# Patient Record
Sex: MALE | Race: WHITE | NOT HISPANIC OR LATINO | Employment: OTHER | URBAN - METROPOLITAN AREA
[De-identification: names, ages, dates, MRNs, and addresses within clinical notes are randomized per-mention and may not be internally consistent; named-entity substitution may affect disease eponyms.]

---

## 2017-01-05 DIAGNOSIS — F51.04 CHRONIC INSOMNIA: ICD-10-CM

## 2017-01-05 RX ORDER — ZOLPIDEM TARTRATE 10 MG/1
TABLET ORAL
Qty: 30 TABLET | Refills: 0 | Status: SHIPPED | OUTPATIENT
Start: 2017-01-05 | End: 2017-02-03 | Stop reason: SDUPTHER

## 2017-01-23 ENCOUNTER — LAB VISIT (OUTPATIENT)
Dept: LAB | Facility: HOSPITAL | Age: 62
End: 2017-01-23
Attending: FAMILY MEDICINE
Payer: MEDICARE

## 2017-01-23 DIAGNOSIS — L98.9 SKIN LESION: ICD-10-CM

## 2017-01-23 DIAGNOSIS — R73.03 PREDIABETES: ICD-10-CM

## 2017-01-23 DIAGNOSIS — F51.04 CHRONIC INSOMNIA: ICD-10-CM

## 2017-01-23 DIAGNOSIS — R91.1 LUNG NODULE SEEN ON IMAGING STUDY: ICD-10-CM

## 2017-01-23 DIAGNOSIS — I10 ESSENTIAL HYPERTENSION: ICD-10-CM

## 2017-01-23 DIAGNOSIS — K22.711 BARRETT'S ESOPHAGUS WITH HIGH GRADE DYSPLASIA: ICD-10-CM

## 2017-01-23 LAB
ALBUMIN SERPL BCP-MCNC: 4.1 G/DL
ALP SERPL-CCNC: 52 U/L
ALT SERPL W/O P-5'-P-CCNC: 24 U/L
ANION GAP SERPL CALC-SCNC: 6 MMOL/L
AST SERPL-CCNC: 18 U/L
BASOPHILS # BLD AUTO: 0.08 K/UL
BASOPHILS NFR BLD: 1 %
BILIRUB SERPL-MCNC: 0.5 MG/DL
BUN SERPL-MCNC: 21 MG/DL
CALCIUM SERPL-MCNC: 10 MG/DL
CHLORIDE SERPL-SCNC: 104 MMOL/L
CHOLEST/HDLC SERPL: 4.1 {RATIO}
CO2 SERPL-SCNC: 28 MMOL/L
CREAT SERPL-MCNC: 1.1 MG/DL
DIFFERENTIAL METHOD: NORMAL
EOSINOPHIL # BLD AUTO: 0.4 K/UL
EOSINOPHIL NFR BLD: 4.6 %
ERYTHROCYTE [DISTWIDTH] IN BLOOD BY AUTOMATED COUNT: 12.8 %
EST. GFR  (AFRICAN AMERICAN): >60 ML/MIN/1.73 M^2
EST. GFR  (NON AFRICAN AMERICAN): >60 ML/MIN/1.73 M^2
GLUCOSE SERPL-MCNC: 103 MG/DL
HCT VFR BLD AUTO: 47.3 %
HDL/CHOLESTEROL RATIO: 24.4 %
HDLC SERPL-MCNC: 209 MG/DL
HDLC SERPL-MCNC: 51 MG/DL
HGB BLD-MCNC: 16.1 G/DL
LDLC SERPL CALC-MCNC: 111.4 MG/DL
LYMPHOCYTES # BLD AUTO: 1.8 K/UL
LYMPHOCYTES NFR BLD: 22 %
MCH RBC QN AUTO: 29.7 PG
MCHC RBC AUTO-ENTMCNC: 34 %
MCV RBC AUTO: 87 FL
MONOCYTES # BLD AUTO: 0.8 K/UL
MONOCYTES NFR BLD: 9.9 %
NEUTROPHILS # BLD AUTO: 5.1 K/UL
NEUTROPHILS NFR BLD: 62.4 %
NONHDLC SERPL-MCNC: 158 MG/DL
PLATELET # BLD AUTO: 286 K/UL
PMV BLD AUTO: 9.8 FL
POTASSIUM SERPL-SCNC: 5.1 MMOL/L
PROT SERPL-MCNC: 7.7 G/DL
RBC # BLD AUTO: 5.42 M/UL
SODIUM SERPL-SCNC: 138 MMOL/L
TRIGL SERPL-MCNC: 233 MG/DL
WBC # BLD AUTO: 8.22 K/UL

## 2017-01-23 PROCEDURE — 80053 COMPREHEN METABOLIC PANEL: CPT

## 2017-01-23 PROCEDURE — 80061 LIPID PANEL: CPT

## 2017-01-23 PROCEDURE — 36415 COLL VENOUS BLD VENIPUNCTURE: CPT | Mod: PO

## 2017-01-23 PROCEDURE — 83036 HEMOGLOBIN GLYCOSYLATED A1C: CPT

## 2017-01-23 PROCEDURE — 85025 COMPLETE CBC W/AUTO DIFF WBC: CPT

## 2017-01-24 LAB
ESTIMATED AVG GLUCOSE: 126 MG/DL
HBA1C MFR BLD HPLC: 6 %

## 2017-02-02 DIAGNOSIS — F51.04 CHRONIC INSOMNIA: ICD-10-CM

## 2017-02-02 RX ORDER — ZOLPIDEM TARTRATE 10 MG/1
TABLET ORAL
Qty: 30 TABLET | Refills: 0 | OUTPATIENT
Start: 2017-02-02

## 2017-02-03 ENCOUNTER — OFFICE VISIT (OUTPATIENT)
Dept: INTERNAL MEDICINE | Facility: CLINIC | Age: 62
End: 2017-02-03
Payer: MEDICARE

## 2017-02-03 VITALS
OXYGEN SATURATION: 98 % | DIASTOLIC BLOOD PRESSURE: 80 MMHG | RESPIRATION RATE: 16 BRPM | SYSTOLIC BLOOD PRESSURE: 160 MMHG | BODY MASS INDEX: 30.52 KG/M2 | HEIGHT: 70 IN | TEMPERATURE: 97 F | WEIGHT: 213.19 LBS | HEART RATE: 68 BPM

## 2017-02-03 DIAGNOSIS — R10.32 ABDOMINAL PAIN, LLQ (LEFT LOWER QUADRANT): ICD-10-CM

## 2017-02-03 DIAGNOSIS — I10 ESSENTIAL HYPERTENSION: ICD-10-CM

## 2017-02-03 DIAGNOSIS — K40.20 BILATERAL INGUINAL HERNIA WITHOUT OBSTRUCTION OR GANGRENE, RECURRENCE NOT SPECIFIED: Primary | ICD-10-CM

## 2017-02-03 DIAGNOSIS — F51.04 CHRONIC INSOMNIA: ICD-10-CM

## 2017-02-03 DIAGNOSIS — R73.03 PREDIABETES: ICD-10-CM

## 2017-02-03 PROCEDURE — 3077F SYST BP >= 140 MM HG: CPT | Mod: S$GLB,,, | Performed by: FAMILY MEDICINE

## 2017-02-03 PROCEDURE — 99214 OFFICE O/P EST MOD 30 MIN: CPT | Mod: S$GLB,,, | Performed by: FAMILY MEDICINE

## 2017-02-03 PROCEDURE — 99499 UNLISTED E&M SERVICE: CPT | Mod: S$GLB,,, | Performed by: FAMILY MEDICINE

## 2017-02-03 PROCEDURE — 99999 PR PBB SHADOW E&M-EST. PATIENT-LVL IV: CPT | Mod: PBBFAC,,, | Performed by: FAMILY MEDICINE

## 2017-02-03 PROCEDURE — 3079F DIAST BP 80-89 MM HG: CPT | Mod: S$GLB,,, | Performed by: FAMILY MEDICINE

## 2017-02-03 RX ORDER — AMLODIPINE BESYLATE 10 MG/1
10 TABLET ORAL DAILY
Qty: 90 TABLET | Refills: 1 | Status: SHIPPED | OUTPATIENT
Start: 2017-02-03 | End: 2017-11-01 | Stop reason: SDUPTHER

## 2017-02-03 RX ORDER — BENAZEPRIL HYDROCHLORIDE 20 MG/1
40 TABLET ORAL DAILY
Qty: 180 TABLET | Refills: 1 | Status: SHIPPED | OUTPATIENT
Start: 2017-02-03 | End: 2017-07-22 | Stop reason: SDUPTHER

## 2017-02-03 RX ORDER — GABAPENTIN 300 MG/1
600 CAPSULE ORAL 3 TIMES DAILY
Qty: 270 CAPSULE | Refills: 1 | Status: SHIPPED | OUTPATIENT
Start: 2017-02-03 | End: 2017-04-22 | Stop reason: SDUPTHER

## 2017-02-03 RX ORDER — TIZANIDINE 2 MG/1
2 TABLET ORAL DAILY PRN
Qty: 90 TABLET | Refills: 1 | Status: SHIPPED | OUTPATIENT
Start: 2017-02-03 | End: 2017-07-06 | Stop reason: SDUPTHER

## 2017-02-03 RX ORDER — METOPROLOL TARTRATE 50 MG/1
50 TABLET ORAL 2 TIMES DAILY
Qty: 180 TABLET | Refills: 1 | Status: SHIPPED | OUTPATIENT
Start: 2017-02-03 | End: 2017-07-12 | Stop reason: SDUPTHER

## 2017-02-03 RX ORDER — PANTOPRAZOLE SODIUM 40 MG/1
40 TABLET, DELAYED RELEASE ORAL
Qty: 240 TABLET | Refills: 1 | Status: SHIPPED | OUTPATIENT
Start: 2017-02-03 | End: 2017-10-31 | Stop reason: SDUPTHER

## 2017-02-03 RX ORDER — HYDROCHLOROTHIAZIDE 12.5 MG/1
12.5 CAPSULE ORAL DAILY
Qty: 90 CAPSULE | Refills: 1 | Status: SHIPPED | OUTPATIENT
Start: 2017-02-03 | End: 2017-07-12 | Stop reason: SDUPTHER

## 2017-02-03 RX ORDER — HYDRALAZINE HYDROCHLORIDE 25 MG/1
25 TABLET, FILM COATED ORAL 3 TIMES DAILY
Qty: 90 TABLET | Refills: 11 | Status: SHIPPED | OUTPATIENT
Start: 2017-02-03 | End: 2020-02-10

## 2017-02-03 RX ORDER — ZOLPIDEM TARTRATE 10 MG/1
TABLET ORAL
Qty: 30 TABLET | Refills: 1 | Status: SHIPPED | OUTPATIENT
Start: 2017-02-03 | End: 2017-03-27 | Stop reason: SDUPTHER

## 2017-02-03 NOTE — MR AVS SNAPSHOT
[facility]  [address] [place] [address]  Phone: [phone]                  Mil LEPE May   2/3/2017 8:40 AM   Office Visit    Description:  Male : 1955   Provider:  Yumi Roberson MD   Department:  [facility]           Reason for Visit     Follow-up           Diagnoses this Visit        Comments    Bilateral inguinal hernia without obstruction or gangrene, recurrence not specified    -  Primary     Chronic insomnia         Abdominal pain, LLQ (left lower quadrant)         Essential hypertension         Prediabetes                To Do List           Future Appointments        Provider Department Dept Phone    2017 9:20 AM Yumi Roberson MD [facility] Internal Medicine [phone]    2017 9:00 [facility] [phone]    2017 10:00 [facility] [phone]      Goals (5 Years of Data)     None      Follow-Up and Disposition     Return in about 2 weeks (around 2017).       These Medications        Disp Refills Start End    amlodipine (NORVASC) 10 MG tablet 90 tablet 1 2/3/2017     Take 1 tablet (10 mg total) by mouth once daily. - Oral    Pharmacy: [address] Ph #: [phone]       benazepril (LOTENSIN) 20 MG tablet 180 tablet 1 2/3/2017     Take 2 tablets (40 mg total) by mouth once daily. - Oral    Pharmacy: [place] [id number] [facility] Ph #: [phone]       hydrochlorothiazide (MICROZIDE) 12.5 mg capsule 90 capsule 1 2/3/2017     Take 1 capsule (12.5 mg total) by mouth once daily. - Oral    Pharmacy: [address] Ph #: [phone]       metoprolol tartrate (LOPRESSOR) 50 MG tablet 180 tablet 1 2/3/2017     Take 1 tablet (50 mg total) by mouth 2 (two) times daily. - Oral    Pharmacy: [place] [id number] [facility] Ph #:  [phone]       pantoprazole (PROTONIX) 40 MG tablet 240 tablet 1 2/3/2017 2/3/2018    Take 1 tablet (40 mg total) by mouth 2 (two) times daily before meals. - Oral    Pharmacy: [place] [id number] [facility] Ph #: [phone]       zolpidem (AMBIEN) 10 mg Tab 30 tablet 1 2/3/2017     TAKE 1 TABLET BY MOUTH NIGHTLY AS NEEDED.    Pharmacy: [address] Ph #: [phone]       tizanidine (ZANAFLEX) 2 MG tablet 90 tablet 1 2/3/2017     Take 1 tablet (2 mg total) by mouth daily as needed. - Oral    Pharmacy: [address] Ph #: [phone]       gabapentin (NEURONTIN) 300 MG capsule 270 capsule 1 2/3/2017     Take 2 capsules (600 mg total) by mouth 3 (three) times daily. - Oral    Pharmacy: [address] Ph #: [phone]       hydrALAZINE (APRESOLINE) 25 MG tablet 90 tablet 11 2/3/2017 2/3/2018    Take 1 tablet (25 mg total) by mouth 3 (three) times daily. - Oral    Pharmacy: [address] Ph #: [phone]         [facility] On Call     [facility] On Call Nurse Care Line - 24/7 Assistance  Registered nurses in the [facility] On Call Center provide clinical advisement, health education, appointment booking, and other advisory services.  Call for this free service at [phone].             Medications           Message regarding Medications     Verify the changes and/or additions to your medication regime listed below are the same as discussed with your clinician today.  If any of these changes or additions are incorrect, please notify your healthcare provider.        START taking these NEW medications        Refills    hydrALAZINE (APRESOLINE) 25 MG tablet 11    Sig: Take 1 tablet (25 mg total) by mouth 3 (three) times daily.    Class: Normal    Route: Oral      STOP taking these medications     omeprazole (PRILOSEC) 20 MG capsule      "sucralfate (CARAFATE) 100 mg/mL suspension Take 10 mLs (1 g total) by mouth 4 (four) times daily with meals and nightly.           Verify that the below list of medications is an accurate representation of the medications you are currently taking.  If none reported, the list may be blank. If incorrect, please contact your healthcare provider. Carry this list with you in case of emergency.           Current Medications     amlodipine (NORVASC) 10 MG tablet Take 1 tablet (10 mg total) by mouth once daily.    aspirin 81 mg Tab Take 1 tablet by mouth Daily.    benazepril (LOTENSIN) 20 MG tablet Take 2 tablets (40 mg total) by mouth once daily.    clonazepam (KLONOPIN) 2 MG Tab 2 mg. Take 2 mg by mouth nightly as needed.    gabapentin (NEURONTIN) 300 MG capsule Take 2 capsules (600 mg total) by mouth 3 (three) times daily.    hydrochlorothiazide (MICROZIDE) 12.5 mg capsule Take 1 capsule (12.5 mg total) by mouth once daily.    hydrOXYzine (VISTARIL) 100 MG capsule Take 100 mg by mouth once daily.    lidocaine-prilocaine (EMLA) cream as needed.    methocarbamol (ROBAXIN) 750 MG Tab Take 1 tablet (750 mg total) by mouth 3 (three) times daily.    metoprolol tartrate (LOPRESSOR) 50 MG tablet Take 1 tablet (50 mg total) by mouth 2 (two) times daily.    pantoprazole (PROTONIX) 40 MG tablet Take 1 tablet (40 mg total) by mouth 2 (two) times daily before meals.    tizanidine (ZANAFLEX) 2 MG tablet Take 1 tablet (2 mg total) by mouth daily as needed.    zolpidem (AMBIEN) 10 mg Tab TAKE 1 TABLET BY MOUTH NIGHTLY AS NEEDED.    hydrALAZINE (APRESOLINE) 25 MG tablet Take 1 tablet (25 mg total) by mouth 3 (three) times daily.           Clinical Reference Information           Your Vitals Were     BP Pulse Temp Resp    160/80 (BP Location: Left arm, Patient Position: Sitting, BP Method: Manual) 68 97.3 °F (36.3 °C) (Tympanic) 16    Height Weight SpO2 BMI    5' 10" (1.778 m) 96.7 kg (213 lb 3 oz) 98% 30.59 kg/m2      Blood Pressure      "     Most Recent Value    BP  (!)  160/80      Allergies as of 2/3/2017     No Known Allergies      Immunizations Administered on Date of Encounter - 2/3/2017     None      Orders Placed During Today's Visit      Normal Orders This Visit    Ambulatory Referral to Gastroenterology     Ambulatory Referral to General Surgery       Language Assistance Services     ATTENTION: Language assistance services are available, free of charge. Please call [phone].      ATENCIÓN: Si habla español, tiene a meza disposición servicios gratuitos de asistencia lingüística. Llame al [phone].     CHÚ Ý: N?u b?n nói Ti?ng Vi?t, có các d?ch v? h? tr? ngôn ng? mi?n phí dành cho b?n. G?i s? [phone].         [facility] Medicine complies with applicable Federal civil rights laws and does not discriminate on the basis of race, color, national origin, age, disability, or sex.

## 2017-02-03 NOTE — PROGRESS NOTES
Chief Complaint:    Chief Complaint   Patient presents with    Follow-up       History of Present Illness:    Patient is a for six-month follow-up,  Is complaining of left lower quadrant pain going on for the past several weeks no nausea vomiting fever no change in bowel habits.  He has a history of partial nephrectomy on the left side.  The renal masses were not cancer.    Patient has hypertension blood pressures running high in spite of taking all the medication.      He is prediabetes which is stable.    He has Culp's esophagus followed with serial EGD.    He was found to have a nodule on his CAT scan and a repeat CAT scan will be done in 18 months from the previous one.    Patient is also following with psychiatrist for his anxiety depression.      ROS:  Review of Systems   Constitutional: Negative for activity change, chills, fatigue, fever and unexpected weight change.   HENT: Negative for congestion, ear discharge, ear pain, hearing loss, postnasal drip and rhinorrhea.    Eyes: Negative for pain and visual disturbance.   Respiratory: Negative for cough, chest tightness and shortness of breath.    Cardiovascular: Negative for chest pain and palpitations.   Gastrointestinal: Positive for abdominal pain. Negative for diarrhea and vomiting.   Endocrine: Negative for heat intolerance.   Genitourinary: Negative for dysuria, flank pain, frequency and hematuria.   Musculoskeletal: Negative for back pain, gait problem and neck pain.   Skin: Negative for color change and rash.   Neurological: Negative for dizziness, tremors, seizures, numbness and headaches.   Psychiatric/Behavioral: Negative for agitation, hallucinations, self-injury, sleep disturbance and suicidal ideas. The patient is not nervous/anxious.        Past Medical History   Diagnosis Date    Anticoagulant long-term use     Anxiety     Cancer     Diabetes mellitus     Diverticulitis     GERD (gastroesophageal reflux disease)     Hyperlipidemia  "     not on any meds currently    Hypertension     Lung mass     Mass of kidney        Social History:  Social History     Social History    Marital status:      Spouse name: N/A    Number of children: N/A    Years of education: N/A     Social History Main Topics    Smoking status: Former Smoker     Packs/day: 1.00     Years: 20.00     Quit date: 2/4/1995    Smokeless tobacco: Never Used    Alcohol use No    Drug use: No    Sexual activity: Yes     Partners: Female     Birth control/ protection: None     Other Topics Concern    None     Social History Narrative       Family History:   family history includes Cancer in his sister; Hypertension in his father and mother; Melanoma in his father.    Health Maintenance   Topic Date Due    TETANUS VACCINE  02/03/1973    Zoster Vaccine  02/03/2015    Influenza Vaccine  08/01/2016    Colonoscopy  01/05/2017    Lipid Panel  01/23/2018    Hepatitis C Screening  Completed       Physical Exam:    Vital Signs  Temp: 97.3 °F (36.3 °C)  Temp src: Tympanic  Pulse: 68  Resp: 16  SpO2: 98 %  BP: (!) 160/80  BP Location: Left arm  BP Method: Manual  Patient Position: Sitting  Pain Score: 0-No pain  Height and Weight  Height: 5' 10" (177.8 cm)  Weight: 96.7 kg (213 lb 3 oz)  BSA (Calculated - sq m): 2.19 sq meters  BMI (Calculated): 30.7  Weight in (lb) to have BMI = 25: 173.9]    Body mass index is 30.59 kg/(m^2).    Physical Exam   Constitutional: He is oriented to person, place, and time. He appears well-developed.   HENT:   Mouth/Throat: Oropharynx is clear and moist.   Eyes: Conjunctivae are normal. Pupils are equal, round, and reactive to light.   Neck: Normal range of motion. Neck supple.   Cardiovascular: Normal rate, regular rhythm and normal heart sounds.    No murmur heard.  Pulmonary/Chest: Effort normal and breath sounds normal. No respiratory distress. He has no wheezes. He has no rales. He exhibits no tenderness.   Abdominal: Soft. He exhibits no " distension and no mass. There is tenderness. There is no guarding. A hernia is present. Hernia confirmed positive in the right inguinal area and confirmed positive in the left inguinal area.       Genitourinary:         Musculoskeletal: He exhibits no edema or tenderness.   Lymphadenopathy:     He has no cervical adenopathy.   Neurological: He is alert and oriented to person, place, and time. He has normal reflexes.   Skin: Skin is warm and dry.   Psychiatric: He has a normal mood and affect. His behavior is normal. Judgment and thought content normal.       Lab Results   Component Value Date    CHOL 209 (H) 01/23/2017    CHOL 211 (H) 07/13/2015    CHOL 174 01/05/2015    TRIG 233 (H) 01/23/2017    TRIG 181 (H) 07/13/2015    TRIG 240 (H) 01/05/2015    HDL 51 01/23/2017    HDL 55 07/13/2015    HDL 55 01/05/2015    TOTALCHOLEST 4.1 01/23/2017    TOTALCHOLEST 3.8 07/13/2015    TOTALCHOLEST 3.2 01/05/2015    NONHDLCHOL 158 01/23/2017    NONHDLCHOL 156 07/13/2015    NONHDLCHOL 119 01/05/2015       Lab Results   Component Value Date    HGBA1C 6.0 01/23/2017       Assessment:      ICD-10-CM ICD-9-CM   1. Bilateral inguinal hernia without obstruction or gangrene, recurrence not specified K40.20 550.92   2. Chronic insomnia F51.04 780.52   3. Abdominal pain, LLQ (left lower quadrant) R10.32 789.04   4. Essential hypertension I10 401.9   5. Prediabetes R73.03 790.29         Plan:  1.  He will be referred to a general surgeon for bilateral inguinal hernia.  2.  Chronic left lower quadrant abdominal pain DC Gastro.  3.  Hypertension poor control and add hydralazine 25 mg 3 times a day.  4.  Prediabetes stable  5.  Culp's esophagus followed with serial EGDs with Gastro.  6.  Insomnia Ambien refill today.    Orders Placed This Encounter   Procedures    Ambulatory Referral to General Surgery    Ambulatory Referral to Gastroenterology       Current Outpatient Prescriptions   Medication Sig Dispense Refill    amlodipine  (NORVASC) 10 MG tablet Take 1 tablet (10 mg total) by mouth once daily. 90 tablet 1    aspirin 81 mg Tab Take 1 tablet by mouth Daily.      benazepril (LOTENSIN) 20 MG tablet Take 2 tablets (40 mg total) by mouth once daily. 180 tablet 1    clonazepam (KLONOPIN) 2 MG Tab 2 mg. Take 2 mg by mouth nightly as needed.      gabapentin (NEURONTIN) 300 MG capsule Take 2 capsules (600 mg total) by mouth 3 (three) times daily. 270 capsule 1    hydrochlorothiazide (MICROZIDE) 12.5 mg capsule Take 1 capsule (12.5 mg total) by mouth once daily. 90 capsule 1    hydrOXYzine (VISTARIL) 100 MG capsule Take 100 mg by mouth once daily.      lidocaine-prilocaine (EMLA) cream as needed.      methocarbamol (ROBAXIN) 750 MG Tab Take 1 tablet (750 mg total) by mouth 3 (three) times daily. 60 tablet 3    metoprolol tartrate (LOPRESSOR) 50 MG tablet Take 1 tablet (50 mg total) by mouth 2 (two) times daily. 180 tablet 1    pantoprazole (PROTONIX) 40 MG tablet Take 1 tablet (40 mg total) by mouth 2 (two) times daily before meals. 240 tablet 1    tizanidine (ZANAFLEX) 2 MG tablet Take 1 tablet (2 mg total) by mouth daily as needed. 90 tablet 1    zolpidem (AMBIEN) 10 mg Tab TAKE 1 TABLET BY MOUTH NIGHTLY AS NEEDED. 30 tablet 1    hydrALAZINE (APRESOLINE) 25 MG tablet Take 1 tablet (25 mg total) by mouth 3 (three) times daily. 90 tablet 11     No current facility-administered medications for this visit.        Medications Discontinued During This Encounter   Medication Reason    omeprazole (PRILOSEC) 20 MG capsule Patient no longer taking    sucralfate (CARAFATE) 100 mg/mL suspension Patient no longer taking    amlodipine (NORVASC) 10 MG tablet Reorder    benazepril (LOTENSIN) 20 MG tablet Reorder    hydrochlorothiazide (MICROZIDE) 12.5 mg capsule Reorder    metoprolol tartrate (LOPRESSOR) 50 MG tablet Reorder    pantoprazole (PROTONIX) 40 MG tablet Reorder    zolpidem (AMBIEN) 10 mg Tab Reorder    tizanidine (ZANAFLEX) 2  MG tablet Reorder    gabapentin (NEURONTIN) 300 MG capsule Reorder       Return in about 2 weeks (around 2/17/2017).      Dr Yumi Roberson MD    Disclaimer: This note is prepared using voice recognition system and as such is likely to have errors and is not proof read.

## 2017-02-17 ENCOUNTER — OFFICE VISIT (OUTPATIENT)
Dept: INTERNAL MEDICINE | Facility: CLINIC | Age: 62
End: 2017-02-17
Payer: MEDICARE

## 2017-02-17 VITALS
HEIGHT: 70 IN | DIASTOLIC BLOOD PRESSURE: 70 MMHG | OXYGEN SATURATION: 99 % | SYSTOLIC BLOOD PRESSURE: 110 MMHG | BODY MASS INDEX: 30.49 KG/M2 | HEART RATE: 49 BPM | TEMPERATURE: 97 F | RESPIRATION RATE: 16 BRPM | WEIGHT: 212.94 LBS

## 2017-02-17 DIAGNOSIS — I10 ESSENTIAL HYPERTENSION: Primary | ICD-10-CM

## 2017-02-17 DIAGNOSIS — H91.90 HEARING LOSS, UNSPECIFIED HEARING LOSS TYPE, UNSPECIFIED LATERALITY: ICD-10-CM

## 2017-02-17 PROCEDURE — 3074F SYST BP LT 130 MM HG: CPT | Mod: S$GLB,,, | Performed by: FAMILY MEDICINE

## 2017-02-17 PROCEDURE — 99999 PR PBB SHADOW E&M-EST. PATIENT-LVL III: CPT | Mod: PBBFAC,,, | Performed by: FAMILY MEDICINE

## 2017-02-17 PROCEDURE — 3078F DIAST BP <80 MM HG: CPT | Mod: S$GLB,,, | Performed by: FAMILY MEDICINE

## 2017-02-17 PROCEDURE — 99213 OFFICE O/P EST LOW 20 MIN: CPT | Mod: S$GLB,,, | Performed by: FAMILY MEDICINE

## 2017-02-17 PROCEDURE — 99499 UNLISTED E&M SERVICE: CPT | Mod: S$GLB,,, | Performed by: FAMILY MEDICINE

## 2017-02-17 NOTE — MR AVS SNAPSHOT
[facility]  [address] [place] [address]  Phone: [phone]                  Mil LEPE May   2017 9:20 AM   Office Visit    Description:  Male : 1955   Provider:  Yumi Roberson MD   Department:  [facility]           Reason for Visit     Follow-up           Diagnoses this Visit        Comments    Essential hypertension    -  Primary     Hearing loss, unspecified hearing loss type, unspecified laterality                To Do List           Future Appointments        Provider Department Dept Phone    2017 9:00 AM MD Maureen Aguiar - Gastroenterology [phone]    2017 10:00 AM MD Umu Capellanal - General Surgery [phone]    2017 11:30 AM Jorge Schwarz, [facility] - Audiology [phone]      Goals (5 Years of Data)     None      [facility] On Call     [facility] On Call Nurse [facility] -  Assistance  Registered nurses in the [facility] On Call Center provide clinical advisement, health education, appointment booking, and other advisory services.  Call for this free service at [phone].             Medications           Message regarding Medications     Verify the changes and/or additions to your medication regime listed below are the same as discussed with your clinician today.  If any of these changes or additions are incorrect, please notify your healthcare provider.             Verify that the below list of medications is an accurate representation of the medications you are currently taking.  If none reported, the list may be blank. If incorrect, please contact your healthcare provider. Carry this list with you in case of emergency.           Current Medications     amlodipine (NORVASC) 10 MG tablet Take 1 tablet (10 mg total) by mouth once daily.    aspirin 81 mg Tab Take 1 tablet by mouth Daily.    benazepril (LOTENSIN) 20 MG tablet Take 2 tablets (40 mg total) by mouth once daily.     "clonazepam (KLONOPIN) 2 MG Tab 2 mg. Take 2 mg by mouth nightly as needed.    gabapentin (NEURONTIN) 300 MG capsule Take 2 capsules (600 mg total) by mouth 3 (three) times daily.    hydrALAZINE (APRESOLINE) 25 MG tablet Take 1 tablet (25 mg total) by mouth 3 (three) times daily.    hydrochlorothiazide (MICROZIDE) 12.5 mg capsule Take 1 capsule (12.5 mg total) by mouth once daily.    hydrOXYzine (VISTARIL) 100 MG capsule Take 100 mg by mouth once daily.    lidocaine-prilocaine (EMLA) cream as needed.    methocarbamol (ROBAXIN) 750 MG Tab Take 1 tablet (750 mg total) by mouth 3 (three) times daily.    metoprolol tartrate (LOPRESSOR) 50 MG tablet Take 1 tablet (50 mg total) by mouth 2 (two) times daily.    pantoprazole (PROTONIX) 40 MG tablet Take 1 tablet (40 mg total) by mouth 2 (two) times daily before meals.    tizanidine (ZANAFLEX) 2 MG tablet Take 1 tablet (2 mg total) by mouth daily as needed.    zolpidem (AMBIEN) 10 mg Tab TAKE 1 TABLET BY MOUTH NIGHTLY AS NEEDED.           Clinical Reference Information           Your Vitals Were     BP Pulse Temp Resp    110/70 (BP Location: Left arm, Patient Position: Sitting, BP Method: Manual) 49 96.7 °F (35.9 °C) (Tympanic) 16    Height Weight SpO2 BMI    5' 10" (1.778 m) 96.6 kg (212 lb 15.4 oz) 99% 30.56 kg/m2      Blood Pressure          Most Recent Value    BP  110/70      Allergies as of 2/17/2017     No Known Allergies      Immunizations Administered on Date of Encounter - 2/17/2017     None      Orders Placed During Today's Visit      Normal Orders This Visit    Ambulatory referral to Audiology       Language Assistance Services     ATTENTION: Language assistance services are available, free of charge. Please call [phone].      ATENCIÓN: Si kunal pfeifferamy, tiene a meza disposición servicios gratuitos de asistencia lingüística. Llame al [phone].     CHÚ Ý: N?u b?n nói Ti?ng Vi?t, có các d?ch v? h? tr? ngôn ng? mi?n phí dành cho b?n. G?i s? " [phone].         [facility] Internal Medicine complies with applicable Federal civil rights laws and does not discriminate on the basis of race, color, national origin, age, disability, or sex.

## 2017-02-17 NOTE — PROGRESS NOTES
Chief Complaint:    Chief Complaint   Patient presents with    Follow-up       History of Present Illness:    Patient presents today for follow-up of his blood pressure.  His blood pressure is normal today he is taking the medications as prescribed he did feel a bit tired and drowsy but is getting better.  Also complains of hearing loss would like to get her hearing tested.    ROS:  Review of Systems   Constitutional: Negative for appetite change, chills and fever.   HENT: Positive for hearing loss. Negative for congestion, ear discharge, ear pain, facial swelling, mouth sores, postnasal drip, rhinorrhea, sinus pressure, sneezing, sore throat, trouble swallowing and voice change.    Eyes: Negative for discharge, redness and itching.   Respiratory: Negative for cough, chest tightness, shortness of breath and wheezing.    Cardiovascular: Negative for chest pain.       Past Medical History   Diagnosis Date    Anticoagulant long-term use     Anxiety     Cancer     Diabetes mellitus     Diverticulitis     GERD (gastroesophageal reflux disease)     Hyperlipidemia      not on any meds currently    Hypertension     Lung mass     Mass of kidney        Social History:  Social History     Social History    Marital status:      Spouse name: N/A    Number of children: N/A    Years of education: N/A     Social History Main Topics    Smoking status: Former Smoker     Packs/day: 1.00     Years: 20.00     Quit date: 2/4/1995    Smokeless tobacco: Never Used    Alcohol use No    Drug use: No    Sexual activity: Yes     Partners: Female     Birth control/ protection: None     Other Topics Concern    None     Social History Narrative    None       Family History:   family history includes Cancer in his sister; Hypertension in his father and mother; Melanoma in his father.    Health Maintenance   Topic Date Due    TETANUS VACCINE  02/03/1973    Zoster Vaccine  02/03/2015    Influenza Vaccine  08/01/2016  "   Colonoscopy  01/05/2017    Lipid Panel  01/23/2018    Hepatitis C Screening  Completed       Physical Exam:    Vital Signs  Temp: 96.7 °F (35.9 °C)  Temp src: Tympanic  Pulse: (!) 49  Resp: 16  SpO2: 99 %  BP: 110/70  BP Location: Left arm  BP Method: Manual  Patient Position: Sitting  Height and Weight  Height: 5' 10" (177.8 cm)  Weight: 96.6 kg (212 lb 15.4 oz)  BSA (Calculated - sq m): 2.18 sq meters  BMI (Calculated): 30.6  Weight in (lb) to have BMI = 25: 173.9]    Body mass index is 30.56 kg/(m^2).    Physical Exam   Constitutional: He appears well-developed and well-nourished.   HENT:   Head: Normocephalic and atraumatic.   Right Ear: Tympanic membrane is not bulging.   Left Ear: Tympanic membrane is not bulging.   Nose: No rhinorrhea. Right sinus exhibits no maxillary sinus tenderness and no frontal sinus tenderness. Left sinus exhibits no maxillary sinus tenderness and no frontal sinus tenderness.   Mouth/Throat: Mucous membranes are normal. No posterior oropharyngeal erythema or tonsillar abscesses.   Eyes: Conjunctivae are normal. Pupils are equal, round, and reactive to light.   Neck: Normal range of motion.   Cardiovascular: Normal rate and normal heart sounds.    Pulmonary/Chest: Effort normal and breath sounds normal. No stridor. No respiratory distress. He has no wheezes. He has no rales. He exhibits no tenderness.   Abdominal: Soft. There is no tenderness.   Lymphadenopathy:     He has no cervical adenopathy.       Lab Results   Component Value Date    CHOL 209 (H) 01/23/2017    CHOL 211 (H) 07/13/2015    CHOL 174 01/05/2015    TRIG 233 (H) 01/23/2017    TRIG 181 (H) 07/13/2015    TRIG 240 (H) 01/05/2015    HDL 51 01/23/2017    HDL 55 07/13/2015    HDL 55 01/05/2015    TOTALCHOLEST 4.1 01/23/2017    TOTALCHOLEST 3.8 07/13/2015    TOTALCHOLEST 3.2 01/05/2015    NONHDLCHOL 158 01/23/2017    NONHDLCHOL 156 07/13/2015    NONHDLCHOL 119 01/05/2015       Lab Results   Component Value Date    HGBA1C " 6.0 01/23/2017       Assessment:      ICD-10-CM ICD-9-CM   1. Essential hypertension I10 401.9   2. Hearing loss, unspecified hearing loss type, unspecified laterality H91.90 389.9         Plan:  1.  Hypertension good control continue current meds  2.  Hearing loss.  Audiology for hearing testing and hearing aid placement if needed.  Orders Placed This Encounter   Procedures    Ambulatory referral to Audiology       Current Outpatient Prescriptions   Medication Sig Dispense Refill    amlodipine (NORVASC) 10 MG tablet Take 1 tablet (10 mg total) by mouth once daily. 90 tablet 1    aspirin 81 mg Tab Take 1 tablet by mouth Daily.      benazepril (LOTENSIN) 20 MG tablet Take 2 tablets (40 mg total) by mouth once daily. 180 tablet 1    clonazepam (KLONOPIN) 2 MG Tab 2 mg. Take 2 mg by mouth nightly as needed.      gabapentin (NEURONTIN) 300 MG capsule Take 2 capsules (600 mg total) by mouth 3 (three) times daily. 270 capsule 1    hydrALAZINE (APRESOLINE) 25 MG tablet Take 1 tablet (25 mg total) by mouth 3 (three) times daily. 90 tablet 11    hydrochlorothiazide (MICROZIDE) 12.5 mg capsule Take 1 capsule (12.5 mg total) by mouth once daily. 90 capsule 1    hydrOXYzine (VISTARIL) 100 MG capsule Take 100 mg by mouth once daily.      lidocaine-prilocaine (EMLA) cream as needed.      methocarbamol (ROBAXIN) 750 MG Tab Take 1 tablet (750 mg total) by mouth 3 (three) times daily. 60 tablet 3    metoprolol tartrate (LOPRESSOR) 50 MG tablet Take 1 tablet (50 mg total) by mouth 2 (two) times daily. 180 tablet 1    pantoprazole (PROTONIX) 40 MG tablet Take 1 tablet (40 mg total) by mouth 2 (two) times daily before meals. 240 tablet 1    tizanidine (ZANAFLEX) 2 MG tablet Take 1 tablet (2 mg total) by mouth daily as needed. 90 tablet 1    zolpidem (AMBIEN) 10 mg Tab TAKE 1 TABLET BY MOUTH NIGHTLY AS NEEDED. 30 tablet 1     No current facility-administered medications for this visit.        There are no discontinued  medications.    No Follow-up on file.      Dr Yumi Roberson MD    Disclaimer: This note is prepared using voice recognition system and as such is likely to have errors and is not proof read.

## 2017-02-24 ENCOUNTER — CLINICAL SUPPORT (OUTPATIENT)
Dept: AUDIOLOGY | Facility: CLINIC | Age: 62
End: 2017-02-24
Payer: MEDICARE

## 2017-02-24 ENCOUNTER — OFFICE VISIT (OUTPATIENT)
Dept: SURGERY | Facility: CLINIC | Age: 62
End: 2017-02-24
Payer: MEDICARE

## 2017-02-24 ENCOUNTER — INITIAL CONSULT (OUTPATIENT)
Dept: GASTROENTEROLOGY | Facility: CLINIC | Age: 62
End: 2017-02-24
Payer: MEDICARE

## 2017-02-24 VITALS
WEIGHT: 210.75 LBS | BODY MASS INDEX: 30.17 KG/M2 | SYSTOLIC BLOOD PRESSURE: 148 MMHG | HEIGHT: 70 IN | DIASTOLIC BLOOD PRESSURE: 88 MMHG | HEART RATE: 64 BPM

## 2017-02-24 VITALS
HEART RATE: 52 BPM | DIASTOLIC BLOOD PRESSURE: 90 MMHG | TEMPERATURE: 99 F | SYSTOLIC BLOOD PRESSURE: 147 MMHG | WEIGHT: 209.44 LBS | BODY MASS INDEX: 30.05 KG/M2

## 2017-02-24 DIAGNOSIS — K22.70 BARRETT'S ESOPHAGUS DETERMINED BY BIOPSY: ICD-10-CM

## 2017-02-24 DIAGNOSIS — N28.89 BILATERAL RENAL MASSES: Primary | ICD-10-CM

## 2017-02-24 DIAGNOSIS — H90.3 SENSORINEURAL HEARING LOSS, BILATERAL: Primary | ICD-10-CM

## 2017-02-24 DIAGNOSIS — H93.13 TINNITUS, BILATERAL: ICD-10-CM

## 2017-02-24 DIAGNOSIS — R13.12 OROPHARYNGEAL DYSPHAGIA: ICD-10-CM

## 2017-02-24 DIAGNOSIS — K40.20 BILATERAL INGUINAL HERNIA WITHOUT OBSTRUCTION OR GANGRENE, RECURRENCE NOT SPECIFIED: Primary | ICD-10-CM

## 2017-02-24 DIAGNOSIS — K40.20 BILATERAL INGUINAL HERNIA WITHOUT OBSTRUCTION OR GANGRENE: ICD-10-CM

## 2017-02-24 DIAGNOSIS — R10.32 LLQ ABDOMINAL PAIN: ICD-10-CM

## 2017-02-24 DIAGNOSIS — Z12.11 SCREEN FOR COLON CANCER: ICD-10-CM

## 2017-02-24 DIAGNOSIS — H93.293 IMPAIRED AUDITORY DISCRIMINATION, BILATERAL: ICD-10-CM

## 2017-02-24 PROCEDURE — 3079F DIAST BP 80-89 MM HG: CPT | Mod: S$GLB,,, | Performed by: INTERNAL MEDICINE

## 2017-02-24 PROCEDURE — 99999 PR PBB SHADOW E&M-EST. PATIENT-LVL III: CPT | Mod: PBBFAC,,, | Performed by: INTERNAL MEDICINE

## 2017-02-24 PROCEDURE — 92557 COMPREHENSIVE HEARING TEST: CPT | Mod: S$GLB,,, | Performed by: AUDIOLOGIST-HEARING AID FITTER

## 2017-02-24 PROCEDURE — 3078F DIAST BP <80 MM HG: CPT | Mod: S$GLB,,, | Performed by: SURGERY

## 2017-02-24 PROCEDURE — 92567 TYMPANOMETRY: CPT | Mod: S$GLB,,, | Performed by: AUDIOLOGIST-HEARING AID FITTER

## 2017-02-24 PROCEDURE — 3077F SYST BP >= 140 MM HG: CPT | Mod: S$GLB,,, | Performed by: SURGERY

## 2017-02-24 PROCEDURE — 1160F RVW MEDS BY RX/DR IN RCRD: CPT | Mod: S$GLB,,, | Performed by: SURGERY

## 2017-02-24 PROCEDURE — 99499 UNLISTED E&M SERVICE: CPT | Mod: S$GLB,,, | Performed by: INTERNAL MEDICINE

## 2017-02-24 PROCEDURE — 99499 UNLISTED E&M SERVICE: CPT | Mod: S$GLB,,, | Performed by: SURGERY

## 2017-02-24 PROCEDURE — 3077F SYST BP >= 140 MM HG: CPT | Mod: S$GLB,,, | Performed by: INTERNAL MEDICINE

## 2017-02-24 PROCEDURE — 99203 OFFICE O/P NEW LOW 30 MIN: CPT | Mod: S$GLB,,, | Performed by: SURGERY

## 2017-02-24 PROCEDURE — 1160F RVW MEDS BY RX/DR IN RCRD: CPT | Mod: S$GLB,,, | Performed by: INTERNAL MEDICINE

## 2017-02-24 PROCEDURE — 99999 PR PBB SHADOW E&M-EST. PATIENT-LVL III: CPT | Mod: PBBFAC,,, | Performed by: SURGERY

## 2017-02-24 PROCEDURE — 99215 OFFICE O/P EST HI 40 MIN: CPT | Mod: S$GLB,,, | Performed by: INTERNAL MEDICINE

## 2017-02-24 RX ORDER — SODIUM CHLORIDE 9 MG/ML
INJECTION, SOLUTION INTRAVENOUS CONTINUOUS
Status: CANCELLED | OUTPATIENT
Start: 2017-02-24

## 2017-02-24 RX ORDER — SODIUM, POTASSIUM,MAG SULFATES 17.5-3.13G
1 SOLUTION, RECONSTITUTED, ORAL ORAL ONCE
Qty: 1 BOTTLE | Refills: 0 | Status: SHIPPED | OUTPATIENT
Start: 2017-02-24 | End: 2017-02-24

## 2017-02-24 NOTE — LETTER
February 24, 2017      Yumi Roberson MD  [address] [address] [address]           [facility]  [address] [place] [id number]  Phone: [phone]  Fax: [phone]          Patient: Mil Sood   MR Number: [id number]   YOB: 1955   Date of Visit: 2/24/2017       Dear Dr. Yumi Roberson:    Thank you for referring Mil Sood to me for evaluation. Attached you will find relevant portions of my assessment and plan of care.    If you have questions, please do not hesitate to call me. I look forward to following Mil Sood along with you.    Sincerely,    Ari Pelletier MD    Enclosure  CC:  No Recipients    If you would like to receive this communication electronically, please contact [email or web address] or [phone] to request more information on [employer] access.    For providers and/or their staff who would like to refer a patient to [facility], please contact us through our one-stop-shop provider referral line, Mindy Rene, at [phone].    If you feel you have received this communication in error or would no longer like to receive these types of communications, please e-mail [email or web address]

## 2017-02-24 NOTE — PROGRESS NOTES
Referring provider: Dr. Da LEPE May was seen 02/24/2017 for an audiological evaluation.  Patient complains of bilateral hearing loss for over five years.  He notes constant high-pitched tinnitus bilaterally for over one year.  It does not cause sleep disturbance.  He is accompanied by his wife who notes hearing loss for several years.  She puts the TV around volume 16 and he places around volume 65.  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  He thinks he hears better from his right ear.       Results reveal a mild-to-severe sensorineural hearing loss 250-8000 Hz for bilaterally with an asymmetry for the right ear at 750-1000 Hz.  Speech Reception Thresholds were  50 dBHL for the right ear and 40 dBHL for the left ear.   Word recognition scores were fair for the right ear and fair for the left ear.   Tympanograms were Type A for the right ear and Type A for the left ear.    Patient was counseled on the above findings.    Recommendations:  1. Hearing aids with tinnitus noise generator.  Information packet was given to patient.  I briefly discussed [facility] hearing aid program and [employer].  2. Hearing protection around loud noises

## 2017-02-24 NOTE — PROGRESS NOTES
Subjective:    PCP: Yumi Roberson MD    Referring physician: Dr. Yumi Roberson      Patient ID: Mil Sood is a 62 y.o. male.    Chief Complaint: Abdominal Pain (LLQ)      HPI   Mil Sood is a 62 y.o. /White male here today for abdominal pain.     Abdominal Pain  Patient complains of abdominal pain. The pain is located in the LUQ. The pain is described as stabbing, and is 10/10 in intensity, lasts 5-10 minutes and then subsides on it's own. No relation to meals or activity. No change in bowel pattern. Onset was 6 years ago. Symptoms have been gradually worsening since. Aggravating factors include none.  Alleviating factors include none. Associated symptoms include none. The patient denies belching, constipation, fever, hematochezia, melena, nausea and vomiting. Has 2-3 bowel movements a day which are loose. This has been the pattern since partial nephrectomy. He has not lost weight. Normal appetite.    Pain is around the scar from left partial nephrectomy. It has been present since his surgery in 2011 and has gradually worsened over the years. CT last year showed one left sided renal mass and 3 on right kidney. He reports he had a biopsy of these at [facility] and were noted to be benign and no further follow up was recommended.     When the pain comes on he can palpate a buldge around the scar site and this subsides when pain subsides. No change in bowel pattern with these symptoms.     Has intermittent dysphagia to dry foods like rice and chokes on it sometimes   EGD: no strictures       Past Medical History:   Diagnosis Date    Anticoagulant long-term use     Anxiety     Cancer     Diabetes mellitus     Diverticulitis     GERD (gastroesophageal reflux disease)     Hyperlipidemia     not on any meds currently    Hypertension     Lung mass     Mass of kidney        Past Surgical History:   Procedure Laterality Date    CHOLECYSTECTOMY      COLONOSCOPY      ESOPHAGOGASTRODUODENOSCOPY      NECK  SURGERY      PARTIAL NEPHRECTOMY      left    TONSILLECTOMY         Family History   Problem Relation Age of Onset    Hypertension Mother     Hypertension Father     Melanoma Father     Cancer Sister        Social History     Social History    Marital status:      Spouse name: N/A    Number of children: N/A    Years of education: N/A     Social History Main Topics    Smoking status: Former Smoker     Packs/day: 1.00     Years: 20.00     Quit date: 2/4/1995    Smokeless tobacco: Never Used    Alcohol use No    Drug use: No    Sexual activity: Yes     Partners: Female     Birth control/ protection: None     Other Topics Concern    None     Social History Narrative       Review of patient's allergies indicates:   Allergen Reactions    No known allergies        Current Outpatient Prescriptions   Medication Sig Dispense Refill    amlodipine (NORVASC) 10 MG tablet Take 1 tablet (10 mg total) by mouth once daily. 90 tablet 1    aspirin 81 mg Tab Take 1 tablet by mouth Daily.      benazepril (LOTENSIN) 20 MG tablet Take 2 tablets (40 mg total) by mouth once daily. 180 tablet 1    clonazepam (KLONOPIN) 2 MG Tab 2 mg. Take 2 mg by mouth nightly as needed.      gabapentin (NEURONTIN) 300 MG capsule Take 2 capsules (600 mg total) by mouth 3 (three) times daily. 270 capsule 1    hydrALAZINE (APRESOLINE) 25 MG tablet Take 1 tablet (25 mg total) by mouth 3 (three) times daily. 90 tablet 11    hydrochlorothiazide (MICROZIDE) 12.5 mg capsule Take 1 capsule (12.5 mg total) by mouth once daily. 90 capsule 1    hydrOXYzine (VISTARIL) 100 MG capsule Take 100 mg by mouth once daily.      lidocaine-prilocaine (EMLA) cream as needed.      methocarbamol (ROBAXIN) 750 MG Tab Take 1 tablet (750 mg total) by mouth 3 (three) times daily. 60 tablet 3    metoprolol tartrate (LOPRESSOR) 50 MG tablet Take 1 tablet (50 mg total) by mouth 2 (two) times daily. 180 tablet 1    pantoprazole (PROTONIX) 40 MG tablet Take  "1 tablet (40 mg total) by mouth 2 (two) times daily before meals. 240 tablet 1    tizanidine (ZANAFLEX) 2 MG tablet Take 1 tablet (2 mg total) by mouth daily as needed. 90 tablet 1    zolpidem (AMBIEN) 10 mg Tab TAKE 1 TABLET BY MOUTH NIGHTLY AS NEEDED. 30 tablet 1    sodium,potassium,mag sulfates (SUPREP BOWEL PREP KIT) 17.5-3.13-1.6 gram SolR Take 1 kit by mouth once. For colonoscopy preparation 1 Bottle 0     No current facility-administered medications for this visit.        Review of Systems   Constitutional: Negative for activity change, appetite change, chills, diaphoresis, fatigue and fever.   HENT: Positive for hearing loss. Negative for congestion, ear pain, facial swelling, mouth sores, nosebleeds, rhinorrhea, sore throat and voice change.    Eyes: Negative for photophobia, pain, discharge, redness and visual disturbance.   Respiratory: Negative for apnea, cough, choking, chest tightness, shortness of breath and wheezing.    Cardiovascular: Negative for chest pain, palpitations and leg swelling.   Gastrointestinal:        As per HPI   Endocrine: Positive for polyuria.   Genitourinary: Negative for decreased urine volume, difficulty urinating, dysuria, frequency and hematuria.   Musculoskeletal: Positive for back pain. Negative for arthralgias, myalgias, neck pain and neck stiffness.   Skin: Negative for pallor and rash.   Neurological: Positive for numbness. Negative for tremors, seizures, syncope, weakness and headaches.   Hematological: Negative for adenopathy. Does not bruise/bleed easily.   Psychiatric/Behavioral: Negative for behavioral problems, confusion, hallucinations and sleep disturbance. The patient is not nervous/anxious.        Objective:   BP (!) 148/88  Pulse 64  Ht 5' 10" (1.778 m)  Wt 95.6 kg (210 lb 12.2 oz)  BMI 30.24 kg/m2  Wt Readings from Last 1 Encounters:   02/24/17 0843 95.6 kg (210 lb 12.2 oz)       Physical Exam   Constitutional: He is oriented to person, place, and " time. He appears well-developed and well-nourished. No distress.   HENT:   Head: Normocephalic and atraumatic.   Mouth/Throat: Oropharynx is clear and moist.   Eyes: Conjunctivae and EOM are normal. Pupils are equal, round, and reactive to light. No scleral icterus.   Neck: Normal range of motion. Neck supple.   Cardiovascular: Normal rate, regular rhythm and normal heart sounds.    Pulmonary/Chest: Effort normal.   Abdominal: Soft. Bowel sounds are normal. He exhibits no distension and no mass. There is no tenderness. There is no rebound and no guarding. No hernia.   Transverse surgical scar on left side, mid abdomen without visible hernia   Musculoskeletal: Normal range of motion. He exhibits no edema.   Lymphadenopathy:     He has no cervical adenopathy.   Neurological: He is alert and oriented to person, place, and time. He has normal reflexes.   Skin: Skin is warm and dry. No rash noted. He is not diaphoretic. No erythema. No pallor.   Psychiatric: He has a normal mood and affect. His behavior is normal. Judgment and thought content normal.       Lab Results   Component Value Date    WBC 8.22 01/23/2017    HGB 16.1 01/23/2017    HCT 47.3 01/23/2017    MCV 87 01/23/2017     01/23/2017       Chemistry        Component Value Date/Time     01/23/2017 0808    K 5.1 01/23/2017 0808     01/23/2017 0808    CO2 28 01/23/2017 0808    BUN 21 01/23/2017 0808    CREATININE 1.1 01/23/2017 0808     01/23/2017 0808        Component Value Date/Time    CALCIUM 10.0 01/23/2017 0808    ALKPHOS 52 (L) 01/23/2017 0808    AST 18 01/23/2017 0808    ALT 24 01/23/2017 0808    BILITOT 0.5 01/23/2017 0808          Lab Results   Component Value Date    APTT 23.6 11/06/2014     Lab Results   Component Value Date    INR 1.0 05/02/2011       No components found for: [id number]    Lab Results   Component Value Date    TSH 1.211 02/26/2013     Lab Results   Component Value Date    HGBA1C 6.0 01/23/2017       Lab Results    Component Value Date    AMYLASE 83 01/25/2016     Lab Results   Component Value Date    LIPASE 145 (H) 01/25/2016       Lab Results   Component Value Date    HEPCAB Negative 07/13/2015     No results found for: PPD    No results found for: OCCULTBLOOD    No results found for: IRON, TIBC, FERRITIN, SATURATEDIRO    Assessment:      1. Bilateral renal masses    2. LLQ abdominal pain    3. Screen for colon cancer    4. Oropharyngeal dysphagia    5. Culp's esophagus determined by biopsy       LLQ pain appears to be related to internal hernia/adhesions vs secondary to renal mass (benign as per prior evaluation at [facility] as per patient)  Symptoms do not appear to be related to diverticulitis  Due for screening. Will exclude luminal pathology on colonoscopy.    Plan:     Bilateral renal masses  -     CT Abdomen Pelvis With Contrast; Future; Expected date: 2/24/17  -     CT Chest With Contrast; Future; Expected date: 2/24/17    LLQ abdominal pain  -     CT Abdomen Pelvis With Contrast; Future; Expected date: 2/24/17  -     CT Chest With Contrast; Future; Expected date: 2/24/17    Screen for colon cancer  -     Case request GI: COLONOSCOPY  -     sodium,potassium,mag sulfates (SUPREP BOWEL PREP KIT) 17.5-3.13-1.6 gram SolR; Take 1 kit by mouth once. For colonoscopy preparation  Dispense: 1 Bottle; Refill: 0    Oropharyngeal dysphagia  -     Fl Modified Barium Swallow Speech; Future; Expected date: 2/24/17    Culp's esophagus determined by biopsy    Surveillance due in 12/17.  Continue PPI      Return if symptoms worsen or fail to improve.      Amarilys Bravo MD

## 2017-02-24 NOTE — PROGRESS NOTES
Patient ID: Mil Sood is a 62 y.o. male.    Chief Complaint: Consult and Hernia      HPI:  HPI Comments: PATIENT HAS 6-8 month hx of noting some pain in both inguinal areas. He was noted to have some swelling there by pcp. Discomfort occurs mainly with exercise. Denies lower gi complaints, no gu complaints and no chronic cough. Never had hernias previously. He has some mild chronic discomfort from nephrectomy incision on left.       Review of Systems   Constitutional: Negative.    Eyes: Negative.    Respiratory: Negative.    Cardiovascular: Negative.  Negative for chest pain and palpitations.        Htn   Gastrointestinal:        Gerd with barretts esophagus   Endocrine: Negative.    Genitourinary: Negative.    Musculoskeletal: Positive for back pain, myalgias and neck pain.   Neurological: Negative.    Hematological: Negative.    Psychiatric/Behavioral: Negative.        Current Outpatient Prescriptions   Medication Sig Dispense Refill    amlodipine (NORVASC) 10 MG tablet Take 1 tablet (10 mg total) by mouth once daily. 90 tablet 1    aspirin 81 mg Tab Take 1 tablet by mouth Daily.      benazepril (LOTENSIN) 20 MG tablet Take 2 tablets (40 mg total) by mouth once daily. 180 tablet 1    clonazepam (KLONOPIN) 2 MG Tab 2 mg. Take 2 mg by mouth nightly as needed.      gabapentin (NEURONTIN) 300 MG capsule Take 2 capsules (600 mg total) by mouth 3 (three) times daily. 270 capsule 1    hydrALAZINE (APRESOLINE) 25 MG tablet Take 1 tablet (25 mg total) by mouth 3 (three) times daily. 90 tablet 11    hydrochlorothiazide (MICROZIDE) 12.5 mg capsule Take 1 capsule (12.5 mg total) by mouth once daily. 90 capsule 1    hydrOXYzine (VISTARIL) 100 MG capsule Take 100 mg by mouth once daily.      lidocaine-prilocaine (EMLA) cream as needed.      methocarbamol (ROBAXIN) 750 MG Tab Take 1 tablet (750 mg total) by mouth 3 (three) times daily. 60 tablet 3    metoprolol tartrate (LOPRESSOR) 50 MG tablet Take 1 tablet (50  mg total) by mouth 2 (two) times daily. 180 tablet 1    pantoprazole (PROTONIX) 40 MG tablet Take 1 tablet (40 mg total) by mouth 2 (two) times daily before meals. 240 tablet 1    sodium,potassium,mag sulfates (SUPREP BOWEL PREP KIT) 17.5-3.13-1.6 gram SolR Take 1 kit by mouth once. For colonoscopy preparation 1 Bottle 0    tizanidine (ZANAFLEX) 2 MG tablet Take 1 tablet (2 mg total) by mouth daily as needed. 90 tablet 1    zolpidem (AMBIEN) 10 mg Tab TAKE 1 TABLET BY MOUTH NIGHTLY AS NEEDED. 30 tablet 1     No current facility-administered medications for this visit.        Review of patient's allergies indicates:   Allergen Reactions    No known allergies        Past Medical History:   Diagnosis Date    Anticoagulant long-term use     Anxiety     Cancer     Diabetes mellitus     Diverticulitis     GERD (gastroesophageal reflux disease)     Hyperlipidemia     not on any meds currently    Hypertension     Lung mass     Mass of kidney    BARRETTS ESOPHAGUS    Past Surgical History:   Procedure Laterality Date    CHOLECYSTECTOMY      COLONOSCOPY      ESOPHAGOGASTRODUODENOSCOPY      NECK SURGERY      PARTIAL NEPHRECTOMY      left    TONSILLECTOMY     SUPPOSED TO GET C-SCOPE IN 2 MONTHS    Family History   Problem Relation Age of Onset    Hypertension Mother     Hypertension Father     Melanoma Father     Cancer Sister        Social History     Social History    Marital status:      Spouse name: N/A    Number of children: N/A    Years of education: N/A     Occupational History    Not on file.     Social History Main Topics    Smoking status: Former Smoker     Packs/day: 1.00     Years: 20.00     Quit date: 2/4/1995    Smokeless tobacco: Never Used    Alcohol use No    Drug use: No    Sexual activity: Yes     Partners: Female     Birth control/ protection: None     Other Topics Concern    Not on file     Social History Narrative       Vitals:    02/24/17 1005   BP: (!) 147/90    Pulse: (!) 52   Temp: 98.6 °F (37 °C)       Physical Exam   Constitutional: He is oriented to person, place, and time. He appears well-developed and well-nourished.   HENT:   Head: Atraumatic.   Eyes: EOM are normal. Pupils are equal, round, and reactive to light.   Neck: Normal range of motion. Neck supple.   Neck scar   Cardiovascular: Normal rate, regular rhythm and normal heart sounds.    Pulmonary/Chest: Effort normal and breath sounds normal.   Abdominal: Soft. Bowel sounds are normal.   Left flank scar, mildly tender.    Genitourinary:   Genitourinary Comments: Bilateral inguinal tenderness, with small bilateral protrusion. He is tender over protrusions that descend from external ring , are soft and reducible.   Neurological: He is alert and oriented to person, place, and time.   Psychiatric: He has a normal mood and affect.   Vitals reviewed.      Assessment & Plan:    bilateral inguinal hernias symptomatic in an active male. P: repair both hernias. Needs to take bp meds on am and protonix on am of surgery.Risk of hernia repair includes infection,recurrence, bleeding, mesh infection, testicular atrophy, and pain or numbness in the groin that may be long-lasting.

## 2017-02-24 NOTE — LETTER
February 24, 2017      Yumi Roberson MD  [address] [address] [address]           [facility]  [address] [place] [address]  Phone: [phone]  Fax: [phone]          Patient: Mil Sood   MR Number: [id number]   YOB: 1955   Date of Visit: 2/24/2017       Dear Dr. Yumi Roberson:    Thank you for referring Mil Sood to me for evaluation. Attached you will find relevant portions of my assessment and plan of care.    If you have questions, please do not hesitate to call me. I look forward to following Mil Sood along with you.    Sincerely,    Amarilys Bravo MD    Enclosure  CC:  No Recipients    If you would like to receive this communication electronically, please contact [email or web address] or [phone] to request more information on [employer] access.    For providers and/or their staff who would like to refer a patient to [facility], please contact us through our one-stop-shop provider referral line, [facility], at [phone].    If you feel you have received this communication in error or would no longer like to receive these types of communications, please e-mail [email or web address]

## 2017-02-24 NOTE — MR AVS SNAPSHOT
[facility]  [address] [facility]  [place] [address]  Phone: [phone]  Fax: [phone]                  Mil LEPE May   2017 9:00 AM   Initial consult    Description:  Male : 1955   Provider:  Amarilys Bravo MD   Department:  [facility]           Reason for Visit     Abdominal Pain           Diagnoses this Visit        Comments    Bilateral renal masses    -  Primary     LLQ abdominal pain         Screen for colon cancer                To Do List           Future Appointments        Provider Department Dept Phone    2017 10:00 AM [facility] [phone]    2017 11:30 AM Jorge Schwarz, [facility] Audiology [phone]      Goals (5 Years of Data)     None      Follow-Up and Disposition     Return if symptoms worsen or fail to improve.       These Medications        Disp Refills Start End    sodium,potassium,mag sulfates (SUPREP BOWEL PREP KIT) 17.5-3.13-1.6 gram SolR 1 Bottle 0 2017    Take 1 kit by mouth once. For colonoscopy preparation - Oral    Pharmacy: [address] #: [phone]         [facility] On Call     [facility] On Call Nurse Care Line -  Assistance  Registered nurses in the [facility] provide clinical advisement, health education, appointment booking, and other advisory services.  Call for this free service at [phone].             Medications           Message regarding Medications     Verify the changes and/or additions to your medication regime listed below are the same as discussed with your clinician today.  If any of these changes or additions are incorrect, please notify your healthcare provider.        START taking these NEW medications        Refills    sodium,potassium,mag sulfates (SUPREP BOWEL PREP KIT) 17.5-3.13-1.6 gram SolR 0    Sig: Take 1 kit by mouth once. For colonoscopy  preparation    Class: Normal    Route: Oral           Verify that the below list of medications is an accurate representation of the medications you are currently taking.  If none reported, the list may be blank. If incorrect, please contact your healthcare provider. Carry this list with you in case of emergency.           Current Medications     amlodipine (NORVASC) 10 MG tablet Take 1 tablet (10 mg total) by mouth once daily.    aspirin 81 mg Tab Take 1 tablet by mouth Daily.    benazepril (LOTENSIN) 20 MG tablet Take 2 tablets (40 mg total) by mouth once daily.    clonazepam (KLONOPIN) 2 MG Tab 2 mg. Take 2 mg by mouth nightly as needed.    gabapentin (NEURONTIN) 300 MG capsule Take 2 capsules (600 mg total) by mouth 3 (three) times daily.    hydrALAZINE (APRESOLINE) 25 MG tablet Take 1 tablet (25 mg total) by mouth 3 (three) times daily.    hydrochlorothiazide (MICROZIDE) 12.5 mg capsule Take 1 capsule (12.5 mg total) by mouth once daily.    hydrOXYzine (VISTARIL) 100 MG capsule Take 100 mg by mouth once daily.    lidocaine-prilocaine (EMLA) cream as needed.    methocarbamol (ROBAXIN) 750 MG Tab Take 1 tablet (750 mg total) by mouth 3 (three) times daily.    metoprolol tartrate (LOPRESSOR) 50 MG tablet Take 1 tablet (50 mg total) by mouth 2 (two) times daily.    pantoprazole (PROTONIX) 40 MG tablet Take 1 tablet (40 mg total) by mouth 2 (two) times daily before meals.    tizanidine (ZANAFLEX) 2 MG tablet Take 1 tablet (2 mg total) by mouth daily as needed.    zolpidem (AMBIEN) 10 mg Tab TAKE 1 TABLET BY MOUTH NIGHTLY AS NEEDED.    sodium,potassium,mag sulfates (SUPREP BOWEL PREP KIT) 17.5-3.13-1.6 gram SolR Take 1 kit by mouth once. For colonoscopy preparation           Clinical Reference Information           Your Vitals Were     BP                   148/88           Blood Pressure          Most Recent Value    BP  (!)  148/88      Allergies as of 2/24/2017     No Known Allergies      Immunizations Administered  on Date of Encounter - 2/24/2017     None      Orders Placed During Today's Visit      Normal Orders This Visit    Case request GI: COLONOSCOPY     Future Labs/Procedures Expected by Expires    CT Abdomen Pelvis With Contrast  2/24/2017 2/24/2018    CT Chest With Contrast  2/24/2017 2/24/2018      Language Assistance Services     ATTENTION: Language assistance services are available, free of charge. Please call [phone].      ATENCIÓN: Si habla español, tiene a meza disposición servicios gratuitos de asistencia lingüística. Llame al [phone].     CHÚ Ý: N?u b?n nói Ti?ng Vi?t, có các d?ch v? h? tr? ngôn ng? mi?n phí dành cho b?n. G?i s? [phone].         [facility] complies with applicable Federal civil rights laws and does not discriminate on the basis of race, color, national origin, age, disability, or sex.

## 2017-03-03 ENCOUNTER — HOSPITAL ENCOUNTER (OUTPATIENT)
Dept: RADIOLOGY | Facility: HOSPITAL | Age: 62
Discharge: HOME | End: 2017-03-03
Attending: INTERNAL MEDICINE
Payer: MEDICARE

## 2017-03-03 ENCOUNTER — CLINICAL SUPPORT (OUTPATIENT)
Dept: CARDIOLOGY | Facility: CLINIC | Age: 62
End: 2017-03-03
Payer: MEDICARE

## 2017-03-03 DIAGNOSIS — N28.89 BILATERAL RENAL MASSES: Primary | ICD-10-CM

## 2017-03-03 DIAGNOSIS — N28.89 BILATERAL RENAL MASSES: ICD-10-CM

## 2017-03-03 DIAGNOSIS — K40.20 BILATERAL INGUINAL HERNIA WITHOUT OBSTRUCTION OR GANGRENE, RECURRENCE NOT SPECIFIED: ICD-10-CM

## 2017-03-03 DIAGNOSIS — R10.32 LLQ ABDOMINAL PAIN: ICD-10-CM

## 2017-03-03 DIAGNOSIS — R13.12 OROPHARYNGEAL DYSPHAGIA: ICD-10-CM

## 2017-03-03 PROCEDURE — 93000 ELECTROCARDIOGRAM COMPLETE: CPT | Mod: S$GLB,,, | Performed by: INTERNAL MEDICINE

## 2017-03-03 RX ADMIN — IOHEXOL 75 ML: 350 INJECTION, SOLUTION INTRAVENOUS at 10:03

## 2017-03-03 RX ADMIN — IOHEXOL 30 ML: 350 INJECTION, SOLUTION INTRAVENOUS at 09:03

## 2017-03-07 ENCOUNTER — HOSPITAL ENCOUNTER (OUTPATIENT)
Dept: RADIOLOGY | Facility: HOSPITAL | Age: 62
Discharge: HOME | End: 2017-03-07
Attending: INTERNAL MEDICINE
Payer: MEDICARE

## 2017-03-07 PROCEDURE — G8997 SWALLOW GOAL STATUS: HCPCS | Mod: CJ

## 2017-03-07 PROCEDURE — 74230 X-RAY XM SWLNG FUNCJ C+: CPT | Mod: TC

## 2017-03-07 PROCEDURE — 92611 MOTION FLUOROSCOPY/SWALLOW: CPT

## 2017-03-07 PROCEDURE — G8996 SWALLOW CURRENT STATUS: HCPCS | Mod: CJ

## 2017-03-07 PROCEDURE — G8998 SWALLOW D/C STATUS: HCPCS | Mod: CJ

## 2017-03-07 NOTE — PROCEDURES
Modifed Barium Swallow Study  Speech Start Time: 1002  Speech Stop Time: 1020  Speech Total (min): 18 min         Reason for Referral  Patient was referred for a Modified Barium Swallow Study to assess the efficiency of his/her swallow function, rule out aspiration and make recommendations regarding safe dietary consistencies, effective compensatory strategies, and safe eating environment.     Diagnosis   <principal problem not specified>    Past Medical History:   Diagnosis Date    Anticoagulant long-term use     Anxiety     Cancer     Diabetes mellitus     Diverticulitis     GERD (gastroesophageal reflux disease)     Hyperlipidemia     not on any meds currently    Hypertension     Lung mass     Mass of kidney      Past Surgical History:   Procedure Laterality Date    CHOLECYSTECTOMY      COLONOSCOPY      ESOPHAGOGASTRODUODENOSCOPY      NECK SURGERY      PARTIAL NEPHRECTOMY      left    TONSILLECTOMY                  Recommendations  PO diet of mech soft with chopped meats, no grainy textures and thin liquids  Speech therapy for dysphagia    Oral Peripheral Examination   unremarable    Consistencies Assessed  Thin liquids via straw, Puree via spoon and Soft solids bites of cracker    Oral Preparation / Oral Phase  WNL    Pharyngeal Phase    Decreased laryngeal elevation, decreased pharyngeal constriction  Cervical Esophageal Phase    Mildly decreased UES opening, backflow in esophagus, slow emptying  Impressions  Pt presents with pharyngo-esophageal dysphagia characterized by reduced laryngeal elevation and pharyngeal constriction resulting in penetration of thin liquids and trace residue placing him at risk of aspiration. Small sips of thin reduced penetration and second dry swallow cleared residue. Backflow of solids were visualized in esophagus and slow emptying. No penetration or aspiration was noted with puree or solids.     Prognosis/Plan/Education    Care Plan        G-Codes  Functional  Assessment Tool Used: NOMS-Swallowing  Score: level 5  Functional Limitations: Swallowing  Swallow Current Status ():   Swallow Goal Status ():   Swallow Discharge Status ():

## 2017-03-09 NOTE — PRE-PROCEDURE INSTRUCTIONS
Pre op instructions reviewed with patient per phone:    To confirm, Your surgeon has instructed you:  Surgery is scheduled 3/14/17 at 1000.      Please report to [facility] 1st floor main lobby by 0830 Pre admit nurse will call day prior to surgery and verify arrival time.      INSTRUCTIONS IMPORTANT!!!  ¨ Do not eat, drink, or smoke after 12 midnight-including water. OK to brush teeth, no gum, candy or mints!    ¨ Take only these medicines with a small swallow of water-morning of surgery.  Norvasc, Benazepril     ____  Do not wear makeup, including mascara.  ____  No powder, lotions or creams to surgical area.  ____  Please remove all jewelry, including piercings and leave at home.  ____  No money or valuables needed. Please leave at home.  ____  Please bring identification and insurance information to hospital.  ____  If going home the same day, arrange for a ride home. You will not be able to   drive if Anesthesia was used.  ____  Children, under 12 years old, must remain in the waiting room with an adult.  They are not allowed in patient areas.  ____  Wear loose fitting clothing. Allow for dressings, bandages.  ____  Stop Aspirin, Ibuprofen, Motrin and Aleve at least 5-7 days before surgery, unless otherwise instructed by your doctor, or the nurse.   You MAY use Tylenol/acetaminophen until day of surgery.  ____  If you take diabetic medication, do not take am of surgery unless instructed by   Doctor.  ____ Stop taking any Fish Oil supplement or any Vitamins that contain Vitamin E at least 5 days prior to surgery.          Bathing Instructions-- The night before surgery and the morning prior to coming to the hospital:   -Do not shave the surgical area.   -Shower and wash your hair and body as usual with anti-bacterial  soap and shampoo.   -Rinse your hair and body completely.   -Use one packet of hibiclens to wash the surgical site (using your hand) gently for 5 minutes.  Do not scrub you  skin too hard.   -Do not use hibiclens on your head, face, or genitals.   -Do not wash with anti-bacterial soap after you use the hibiclens.   -Rinse your body thoroughly.   -Dry with clean, soft towel.  Do not use lotion, cream, deodorant, or powders on   the surgical site.    Use antibacterial soap in place of hibiclens if your surgery is on the head, face or genitals.         Surgical Site Infection    Prevention of surgical site infections:     -Keep incisions clean and dry.   -Do not soak/submerge incisions in water until completely healed.   -Do not apply lotions, powders, creams, or deodorants to site.   -Always make sure hands are cleaned with antibacterial soap/ alcohol-based   prior to touching the surgical site.  (This includes doctors, nurses, staff, and yourself.)    Signs and symptoms:   -Redness and pain around the area where you had surgery   -Drainage of cloudy fluid from your surgical wound   -Fever over 100.4  I have read or had read and explained to me, and understand the above information.

## 2017-03-13 ENCOUNTER — ANESTHESIA EVENT (OUTPATIENT)
Dept: SURGERY | Facility: HOSPITAL | Age: 62
End: 2017-03-13
Payer: MEDICARE

## 2017-03-14 ENCOUNTER — HOSPITAL ENCOUNTER (OUTPATIENT)
Facility: HOSPITAL | Age: 62
Discharge: HOME | End: 2017-03-14
Attending: SURGERY | Admitting: SURGERY
Payer: MEDICARE

## 2017-03-14 ENCOUNTER — SURGERY (OUTPATIENT)
Age: 62
End: 2017-03-14

## 2017-03-14 ENCOUNTER — ANESTHESIA (OUTPATIENT)
Dept: SURGERY | Facility: HOSPITAL | Age: 62
End: 2017-03-14
Payer: MEDICARE

## 2017-03-14 VITALS
DIASTOLIC BLOOD PRESSURE: 78 MMHG | WEIGHT: 207.25 LBS | SYSTOLIC BLOOD PRESSURE: 123 MMHG | HEART RATE: 50 BPM | RESPIRATION RATE: 18 BRPM | HEIGHT: 70 IN | OXYGEN SATURATION: 95 % | TEMPERATURE: 98 F | BODY MASS INDEX: 29.67 KG/M2

## 2017-03-14 DIAGNOSIS — K40.20 BILATERAL INGUINAL HERNIA WITHOUT OBSTRUCTION OR GANGRENE, RECURRENCE NOT SPECIFIED: ICD-10-CM

## 2017-03-14 DIAGNOSIS — K40.20 BILATERAL INGUINAL HERNIA WITHOUT OBSTRUCTION OR GANGRENE: ICD-10-CM

## 2017-03-14 LAB — POCT GLUCOSE: 61 MG/DL (ref 70–110)

## 2017-03-14 PROCEDURE — 49505 PRP I/HERN INIT REDUC >5 YR: CPT | Mod: RT,,, | Performed by: SURGERY

## 2017-03-14 PROCEDURE — 63600175 PHARM REV CODE 636 W HCPCS: Performed by: NURSE ANESTHETIST, CERTIFIED REGISTERED

## 2017-03-14 PROCEDURE — 25000003 PHARM REV CODE 250: Performed by: NURSE ANESTHETIST, CERTIFIED REGISTERED

## 2017-03-14 PROCEDURE — 63600175 PHARM REV CODE 636 W HCPCS: Performed by: SURGERY

## 2017-03-14 PROCEDURE — 25000003 PHARM REV CODE 250: Performed by: ANESTHESIOLOGY

## 2017-03-14 PROCEDURE — 37000009 HC ANESTHESIA EA ADD 15 MINS: Performed by: SURGERY

## 2017-03-14 PROCEDURE — 36000706: Performed by: SURGERY

## 2017-03-14 PROCEDURE — 71000016 HC POSTOP RECOV ADDL HR: Performed by: SURGERY

## 2017-03-14 PROCEDURE — 71000033 HC RECOVERY, INTIAL HOUR: Performed by: SURGERY

## 2017-03-14 PROCEDURE — 63600175 PHARM REV CODE 636 W HCPCS: Performed by: ANESTHESIOLOGY

## 2017-03-14 PROCEDURE — C1781 MESH (IMPLANTABLE): HCPCS | Performed by: SURGERY

## 2017-03-14 PROCEDURE — 37000008 HC ANESTHESIA 1ST 15 MINUTES: Performed by: SURGERY

## 2017-03-14 PROCEDURE — 36000707: Performed by: SURGERY

## 2017-03-14 PROCEDURE — 71000039 HC RECOVERY, EACH ADD'L HOUR: Performed by: SURGERY

## 2017-03-14 PROCEDURE — 25000003 PHARM REV CODE 250: Performed by: SURGERY

## 2017-03-14 PROCEDURE — 49505 PRP I/HERN INIT REDUC >5 YR: CPT | Mod: LT,,, | Performed by: SURGERY

## 2017-03-14 PROCEDURE — 71000015 HC POSTOP RECOV 1ST HR: Performed by: SURGERY

## 2017-03-14 DEVICE — PLUG LARGE: Type: IMPLANTABLE DEVICE | Site: INGUINAL | Status: FUNCTIONAL

## 2017-03-14 RX ORDER — MEPERIDINE HYDROCHLORIDE 50 MG/ML
12.5 INJECTION INTRAMUSCULAR; INTRAVENOUS; SUBCUTANEOUS ONCE AS NEEDED
Status: COMPLETED | OUTPATIENT
Start: 2017-03-14 | End: 2017-03-14

## 2017-03-14 RX ORDER — SODIUM CHLORIDE, SODIUM LACTATE, POTASSIUM CHLORIDE, CALCIUM CHLORIDE 600; 310; 30; 20 MG/100ML; MG/100ML; MG/100ML; MG/100ML
INJECTION, SOLUTION INTRAVENOUS CONTINUOUS
Status: DISCONTINUED | OUTPATIENT
Start: 2017-03-14 | End: 2017-03-14

## 2017-03-14 RX ORDER — ONDANSETRON 2 MG/ML
4 INJECTION INTRAMUSCULAR; INTRAVENOUS DAILY PRN
Status: DISCONTINUED | OUTPATIENT
Start: 2017-03-14 | End: 2017-03-14 | Stop reason: HOSPADM

## 2017-03-14 RX ORDER — ACETAMINOPHEN 10 MG/ML
1000 INJECTION, SOLUTION INTRAVENOUS ONCE
Status: COMPLETED | OUTPATIENT
Start: 2017-03-14 | End: 2017-03-14

## 2017-03-14 RX ORDER — GLYCOPYRROLATE 0.2 MG/ML
0.2 INJECTION INTRAMUSCULAR; INTRAVENOUS ONCE
Status: COMPLETED | OUTPATIENT
Start: 2017-03-14 | End: 2017-03-14

## 2017-03-14 RX ORDER — TAMSULOSIN HYDROCHLORIDE 0.4 MG/1
0.4 CAPSULE ORAL DAILY
Status: DISCONTINUED | OUTPATIENT
Start: 2017-03-15 | End: 2017-03-14 | Stop reason: HOSPADM

## 2017-03-14 RX ORDER — BUPIVACAINE HYDROCHLORIDE AND EPINEPHRINE 2.5; 5 MG/ML; UG/ML
INJECTION, SOLUTION EPIDURAL; INFILTRATION; INTRACAUDAL; PERINEURAL
Status: DISCONTINUED | OUTPATIENT
Start: 2017-03-14 | End: 2017-03-14 | Stop reason: HOSPADM

## 2017-03-14 RX ORDER — GLYCOPYRROLATE 0.2 MG/ML
INJECTION INTRAMUSCULAR; INTRAVENOUS
Status: DISCONTINUED | OUTPATIENT
Start: 2017-03-14 | End: 2017-03-14

## 2017-03-14 RX ORDER — SODIUM CHLORIDE 9 MG/ML
3 INJECTION, SOLUTION INTRAMUSCULAR; INTRAVENOUS; SUBCUTANEOUS
Status: DISCONTINUED | OUTPATIENT
Start: 2017-03-14 | End: 2017-03-14 | Stop reason: HOSPADM

## 2017-03-14 RX ORDER — MORPHINE SULFATE 10 MG/ML
2 INJECTION INTRAMUSCULAR; INTRAVENOUS; SUBCUTANEOUS EVERY 5 MIN PRN
Status: DISCONTINUED | OUTPATIENT
Start: 2017-03-14 | End: 2017-03-14 | Stop reason: HOSPADM

## 2017-03-14 RX ORDER — HYDROCODONE BITARTRATE AND ACETAMINOPHEN 5; 325 MG/1; MG/1
1 TABLET ORAL EVERY 4 HOURS PRN
Status: DISCONTINUED | OUTPATIENT
Start: 2017-03-14 | End: 2017-03-14 | Stop reason: HOSPADM

## 2017-03-14 RX ORDER — CEFAZOLIN SODIUM 2 G/50ML
2 SOLUTION INTRAVENOUS
Status: COMPLETED | OUTPATIENT
Start: 2017-03-14 | End: 2017-03-14

## 2017-03-14 RX ORDER — ONDANSETRON 8 MG/1
8 TABLET, ORALLY DISINTEGRATING ORAL EVERY 8 HOURS PRN
Status: DISCONTINUED | OUTPATIENT
Start: 2017-03-14 | End: 2017-03-14 | Stop reason: HOSPADM

## 2017-03-14 RX ORDER — PROPOFOL 10 MG/ML
VIAL (ML) INTRAVENOUS
Status: DISCONTINUED | OUTPATIENT
Start: 2017-03-14 | End: 2017-03-14

## 2017-03-14 RX ORDER — FENTANYL CITRATE 50 UG/ML
INJECTION, SOLUTION INTRAMUSCULAR; INTRAVENOUS
Status: DISCONTINUED | OUTPATIENT
Start: 2017-03-14 | End: 2017-03-14

## 2017-03-14 RX ORDER — HYDROCODONE BITARTRATE AND ACETAMINOPHEN 5; 325 MG/1; MG/1
1 TABLET ORAL
Status: DISCONTINUED | OUTPATIENT
Start: 2017-03-14 | End: 2017-03-14 | Stop reason: HOSPADM

## 2017-03-14 RX ORDER — NEOSTIGMINE METHYLSULFATE 1 MG/ML
INJECTION, SOLUTION INTRAVENOUS
Status: DISCONTINUED | OUTPATIENT
Start: 2017-03-14 | End: 2017-03-14

## 2017-03-14 RX ORDER — CEFAZOLIN SODIUM 1 G/3ML
INJECTION, POWDER, FOR SOLUTION INTRAMUSCULAR; INTRAVENOUS
Status: DISCONTINUED | OUTPATIENT
Start: 2017-03-14 | End: 2017-03-14 | Stop reason: HOSPADM

## 2017-03-14 RX ORDER — LIDOCAINE HYDROCHLORIDE 10 MG/ML
INJECTION INFILTRATION; PERINEURAL
Status: DISCONTINUED | OUTPATIENT
Start: 2017-03-14 | End: 2017-03-14

## 2017-03-14 RX ORDER — MIDAZOLAM HYDROCHLORIDE 1 MG/ML
INJECTION, SOLUTION INTRAMUSCULAR; INTRAVENOUS
Status: DISCONTINUED | OUTPATIENT
Start: 2017-03-14 | End: 2017-03-14

## 2017-03-14 RX ORDER — ROCURONIUM BROMIDE 10 MG/ML
INJECTION, SOLUTION INTRAVENOUS
Status: DISCONTINUED | OUTPATIENT
Start: 2017-03-14 | End: 2017-03-14

## 2017-03-14 RX ORDER — TAMSULOSIN HYDROCHLORIDE 0.4 MG/1
0.4 CAPSULE ORAL ONCE
Status: COMPLETED | OUTPATIENT
Start: 2017-03-14 | End: 2017-03-14

## 2017-03-14 RX ORDER — SODIUM CHLORIDE 9 MG/ML
INJECTION, SOLUTION INTRAVENOUS CONTINUOUS
Status: DISCONTINUED | OUTPATIENT
Start: 2017-03-14 | End: 2017-03-14

## 2017-03-14 RX ORDER — ONDANSETRON 2 MG/ML
INJECTION INTRAMUSCULAR; INTRAVENOUS
Status: DISCONTINUED | OUTPATIENT
Start: 2017-03-14 | End: 2017-03-14

## 2017-03-14 RX ORDER — HYDROCODONE BITARTRATE AND ACETAMINOPHEN 10; 325 MG/1; MG/1
1 TABLET ORAL
Qty: 20 TABLET | Refills: 0 | Status: SHIPPED | OUTPATIENT
Start: 2017-03-14 | End: 2017-03-31

## 2017-03-14 RX ADMIN — MIDAZOLAM HYDROCHLORIDE 2 MG: 1 INJECTION, SOLUTION INTRAMUSCULAR; INTRAVENOUS at 10:03

## 2017-03-14 RX ADMIN — MEPERIDINE HYDROCHLORIDE 12.5 MG: 50 INJECTION INTRAMUSCULAR; INTRAVENOUS; SUBCUTANEOUS at 12:03

## 2017-03-14 RX ADMIN — ONDANSETRON 4 MG: 2 INJECTION, SOLUTION INTRAMUSCULAR; INTRAVENOUS at 11:03

## 2017-03-14 RX ADMIN — SODIUM CHLORIDE, SODIUM LACTATE, POTASSIUM CHLORIDE, AND CALCIUM CHLORIDE: 600; 310; 30; 20 INJECTION, SOLUTION INTRAVENOUS at 10:03

## 2017-03-14 RX ADMIN — NEOSTIGMINE METHYLSULFATE 3 MG: 1 INJECTION INTRAVENOUS at 11:03

## 2017-03-14 RX ADMIN — GLYCOPYRROLATE 0.4 MG: 0.2 INJECTION, SOLUTION INTRAMUSCULAR; INTRAVENOUS at 11:03

## 2017-03-14 RX ADMIN — MORPHINE SULFATE 2 MG: 10 INJECTION, SOLUTION INTRAMUSCULAR; INTRAVENOUS at 02:03

## 2017-03-14 RX ADMIN — ROCURONIUM BROMIDE 30 MG: 10 INJECTION, SOLUTION INTRAVENOUS at 10:03

## 2017-03-14 RX ADMIN — PROPOFOL 150 MG: 10 INJECTION, EMULSION INTRAVENOUS at 10:03

## 2017-03-14 RX ADMIN — ROCURONIUM BROMIDE 20 MG: 10 INJECTION, SOLUTION INTRAVENOUS at 10:03

## 2017-03-14 RX ADMIN — GLYCOPYRROLATE 0.2 MG: 0.2 INJECTION INTRAMUSCULAR; INTRAVENOUS at 02:03

## 2017-03-14 RX ADMIN — CEFAZOLIN SODIUM 2 G: 2 SOLUTION INTRAVENOUS at 10:03

## 2017-03-14 RX ADMIN — MORPHINE SULFATE 2 MG: 10 INJECTION, SOLUTION INTRAMUSCULAR; INTRAVENOUS at 12:03

## 2017-03-14 RX ADMIN — ACETAMINOPHEN 1000 MG: 10 INJECTION, SOLUTION INTRAVENOUS at 01:03

## 2017-03-14 RX ADMIN — BUPIVACAINE HYDROCHLORIDE AND EPINEPHRINE BITARTRATE 30 ML: 2.5; .0091 INJECTION, SOLUTION EPIDURAL; INFILTRATION; INTRACAUDAL; PERINEURAL at 10:03

## 2017-03-14 RX ADMIN — LIDOCAINE HYDROCHLORIDE 80 MG: 10 INJECTION, SOLUTION INFILTRATION; PERINEURAL at 10:03

## 2017-03-14 RX ADMIN — CEFAZOLIN 1 G: 1 INJECTION, POWDER, FOR SOLUTION INTRAMUSCULAR; INTRAVENOUS at 10:03

## 2017-03-14 RX ADMIN — FENTANYL CITRATE 100 MCG: 50 INJECTION, SOLUTION INTRAMUSCULAR; INTRAVENOUS at 10:03

## 2017-03-14 RX ADMIN — GLYCOPYRROLATE 0.4 MG: 0.2 INJECTION, SOLUTION INTRAMUSCULAR; INTRAVENOUS at 10:03

## 2017-03-14 RX ADMIN — TAMSULOSIN HYDROCHLORIDE 0.4 MG: 0.4 CAPSULE ORAL at 07:03

## 2017-03-14 NOTE — OP NOTE
Please refer to Dr. Pelletier's op note for right inguinal hernia repair.  DATE OF PROCEDURE: 03/14/2017     PREOPERATIVE DIAGNOSIS: bilateraly inguinal hernia.     POSTOPERATIVE DIAGNOSIS: bilateral inguinal hernia.     PROCEDURE PERFORMED: left inguinal hernia repair with mesh.     SURGEON: Lew Hardy M.D.     ANESTHESIA: Monitored anesthesia care plus local.     ESTIMATED BLOOD LOSS: 3cc mL.     FINDINGS: moderate-sized left direct inguinal hernia repaired with plug and patch mesh    SPECIMEN: none    DRAINS: None.     COMPLICATIONS: None.     DESCRIPTION OF OPERATIVE PROCEDURE: The patient was identified in preoperative holding and brought back to the Operating Room. The patient was placed supine on the operating table and padded appropriately. Monitors were applied and monitored anesthesia care was initiated. His groins were shaved with electric clippers   and prepped and draped in the standard sterile surgical fashion. A timeout was   performed and all team members present agreed this was the correct procedure on   the correct side of the correct patient. We also confirmed administration of   appropriate preoperative antibiotics.     An appropriate left inguinal incision marked and administered a mix of   lidocaine and Marcaine. After assuring adequate local anesthesia, a 5 cm   oblique skin incision was made. Subcutaneous tissue was divided with Bovie   electrocautery. This dissection was carried down through Cruz fascia down to   the aponeurosis of the external oblique. The aponeurosis of the external   oblique was incised in line with its fibers, first with a scalpel and then   Metzenbaum scissors, and this incision was extended to the external spermatic   ring. The inguinal canal contents were bluntly encircled, first digitally and   then with a Penrose drain. Weitlaner retractors were placed to facilitate the   dissection. We proceeded to identify a direct inguinal hernia sac,   which was carefully  dissected away from the inguinal canal contents until it   could be reduced into the defect. We performed skeletonization of the spermatic cord and no indirect sac was noted. We then had appropriate borders of the inguinal canal identified and decided to repair the defect with a large meneuralon sutures. Medially, the mesh was anchored to the fascia overlying the pubic tubercle. Inferiorly, the mesh was anchored to the shelving edge of the inguinal ligament. Superiorly, the mesh was anchored to the conjoined tendon. The tails of the mesh were brought together around the cord structures creating a new internal ring that just admitted the tip of the   finger. The mesh was inspected and noted to lie in appropriate position without   any redundancy or tension. There was noted to be no tension on the cord structures. The aponeurosis of   the external oblique was closed with a running 3-0 Vicryl suture. Cruz   fascia was closed with several buried interrupted 3-0 Vicryl sutures and the   skin was closed with a running subcuticular vicryl suture. A sterile dressing   was applied. The patient was then transported to the Recovery Room in stable   condition. All sponge, instrument and needle counts were correct at the end of   the case. Please refer to Dr. Pelletier's note for right inguinal hernia repair.

## 2017-03-14 NOTE — ANESTHESIA PREPROCEDURE EVALUATION
03/14/2017  Mil Sood is a 62 y.o., male.    [facility] Anesthesia Evaluation    I have reviewed the Patient Summary Reports.    I have reviewed the Nursing Notes.      Review of Systems  Anesthesia Hx:  No problems with previous Anesthesia    Social:  Non-Smoker    Hematology/Oncology:     Oncology Normal     Cardiovascular:   Exercise tolerance: good Hypertension, well controlled CAD   ECG has been reviewed. Sinus bradycardia  Otherwise normal ECG  When compared with ECG of 06-NOV-2014 11:26,  Nonspecific T wave abnormality no longer evident in Lateral leads    CONCLUSIONS     1 - Normal left ventricular systolic function (EF 60-65%).     2 - Normal right ventricular systolic function .    Hepatic/GI:   GERD, well controlled    Endocrine:   Diabetes, well controlled, type 2    Psych:   Psychiatric History depression          Physical Exam  General:  Well nourished    Airway/Jaw/Neck:  Airway Findings: Mouth Opening: Normal Tongue: Large  General Airway Assessment: Adult  Mallampati: II  TM Distance: Normal, at least 6 cm      Dental:  Dental Findings: Upper Dentures    Chest/Lungs:  Chest/Lungs Findings: Normal Respiratory Rate     Heart/Vascular:  Heart Findings: Rate: Normal  Rhythm: Regular Rhythm        Mental Status:  Mental Status Findings:  Cooperative, Alert and Oriented         Anesthesia Plan  Type of Anesthesia, risks & benefits discussed:  Anesthesia Type:  general  Patient's Preference:   Intra-op Monitoring Plan:   Intra-op Monitoring Plan Comments:   Post Op Pain Control Plan:   Post Op Pain Control Plan Comments:   Induction:   IV  Beta Blocker:  Patient is not currently on a Beta-Blocker (No further documentation required).       Informed Consent: Patient understands risks and agrees with Anesthesia plan.  Questions answered. Anesthesia consent signed with patient.  ASA Score: 3     Day of  Surgery Review of History & Physical: I have interviewed and examined the patient. I have reviewed the patient's H&P dated:  There are no significant changes.          Ready For Surgery From Anesthesia Perspective.

## 2017-03-14 NOTE — OP NOTE
DATE OF PROCEDURE:  03/14/2017.    PREOPERATIVE DIAGNOSIS:  Bilateral small inguinal hernias.    POSTOPERATIVE DIAGNOSIS:  Bilateral small direct inguinal hernias.    I am dictating my portion of the procedure.  The other surgeon who is Dr. Lew Hardy, he is dictating his portion.  His portion was a left inguinal hernia   repair and mine was a right inguinal hernia repair with mesh.    FLUIDS:  1400 mL of crystalloid.    ESTIMATED BLOOD LOSS:  10 mL.    ANESTHESIA:  Total local anesthesia for both right and left side is 30 mL of   0.25% Marcaine and epinephrine.    OPERATIVE PROCEDURE:  Right inguinal hernia repair with mesh.    DESCRIPTION OF PROCEDURE:  After adequate general endotracheal anesthesia   obtained in supine position, the patient's abdomen and scrotum was prepped and   draped in the usual sterile fashion.  Preoperatively, he received SCD hose and   IV antibiotics.  After this was done, the skin and subcutaneous tissue was   injected with 0.25% Marcaine with epinephrine in the right inguinal area.  Later   in the case, muscle fascia and base of the spermatic cord was injected.  After   injecting the area, an oblique incision was made in the right inguinal area   carried through skin using knife directly over the inguinal canal.  It was   carried through the skin using knife, through subcutaneous tissue using cautery,   down to the aponeurosis of the external oblique.  This aponeurosis was opened   using scissors and the 2 leaves and this was carried through the external ring.    The 2 leaves were .  Spermatic cord was identified underneath and it   was dissected away from the external oblique fascia and elevated off of the   floor of inguinal canal.  Ilioinguinal nerve was identified and later in the   case, it was dissected as far lateral as possible and then injected laterally   and removed because danger of entrapment underneath the mesh.  After this was   done, a direct hernia was  identified.  Circumferentially was dissected away from   the spermatic cord and overlying muscle could be reduced.  Anterior medial   aspect of spermatic cord was identified at the internal ring.  It was ____   opened some and no indirect hernia could be identified.  The peritoneum was   identified and no indirect sac was noted.  After this was done, the floor was   injected with local anesthetic and a large mesh plug was then placed and to the   hernia defect.  The deep blue portion of the plug was placed just deep to the   inguinal ligament and the conjoined tendon and then the outside portion of the   plug was placed over top and then using 2-0 Vicryl it was used to secure the   mesh to the muscle superiorly, inguinal ligament inferiorly and kept the direct   hernia in reduction.  After this was done, a mesh screen was then placed on the   floor of the inguinal canal.  It was secured to underlying fascia using   interrupted 0 Nurolon sutures.  Medially, it was secured to the rectus fascia   and medial to the pubic tubercle, inferiorly to the inguinal ligament,   superiorly to the conjoined tendon.  Laterally, the mesh was split   longitudinally.  The 2 leaves were then wrapped around the spermatic cord,   secured to themselves laterally and then to the underlying oblique muscle.  This   created a new internal ring which was snug but not too tight.  After this was   done, the mesh appeared to have insecure the whole floor of the inguinal canal.    The wound was irrigated.  No real bleeders were noted.  The external oblique   was then closed using running 2-0 Vicryl.  Subcutaneous tissue closed using   interrupted 3-0 Vicryl, skin was closed using running 4-0 Vicryl subcuticular.    Steri-Strips were applied, 4 x 4's and tape.  The patient was awakened in the   Operating Room and taken to Recovery Room in stable condition.  It should be   noted that Dr. Martinez is doing a left inguinal hernia at the same  time.      ERICK/  dd: 03/14/2017 14:20:32 (CDT)  td: 03/14/2017 18:39:19 (CDT)  Doc ID   #[id number]  Job ID #[id number]    CC: [employer] [facility]

## 2017-03-14 NOTE — TRANSFER OF CARE
"Anesthesia Transfer of Care Note    Patient: Mil Sood    Procedure(s) Performed: Procedure(s) (LRB):  REPAIR-HERNIA-INGUINAL-BILATERAL (Bilateral)    Patient location: PACU    Anesthesia Type: general    Transport from OR: Transported from OR on room air with adequate spontaneous ventilation    Post pain: adequate analgesia    Post assessment: no apparent anesthetic complications    Post vital signs: stable    Level of consciousness: awake    Nausea/Vomiting: no nausea/vomiting    Complications: none          Last vitals:   Visit Vitals    BP (!) 186/100 (BP Location: Left arm, Patient Position: Sitting)    Pulse (!) 53    Temp 36.7 °C (98.1 °F) (Temporal)    Resp 16    Ht 5' 10" (1.778 m)    Wt 94 kg (207 lb 3.7 oz)    SpO2 96%    BMI 29.73 kg/m2     "

## 2017-03-14 NOTE — PLAN OF CARE
Spoke to Dr. Bauer concerning low HR.  Patient's pre-op HR was 53.  In PACU, patient has been running low 40s.  A few times, patient has dropped to 39.  Patient stated he took his metoprolol this am and his HR was 49.  MD said to continue to monitor and give ofirmev in place of narcotics for now.  May discharge patient when HR is > 47.

## 2017-03-14 NOTE — BRIEF OP NOTE
[facility]  Brief Operative Note     SUMMARY     Surgery Date: 3/14/2017     Surgeon(s) and Role:     * Ari Pelletier MD - Primary right inguinal hernia repair     * Lew Hardy MD -primary on left inguinal hernia rpair        Pre-op Diagnosis:  Bilateral inguinal hernia without obstruction or gangrene, recurrence not specified [K40.20]    Post-op Diagnosis:  Post-Op Diagnosis Codes:     * Bilateral inguinal hernia without obstruction or gangrene, recurrence not specified [K40.20]    Procedure(s) (LRB):  REPAIR-HERNIA-INGUINAL-BILATERAL (Bilateral)  Left inguinal hernia repair by dr. Hardy and right inguinal hernia repair by Dr. Pelletier  Anesthesia: General and 30ml of .25%marciane with epi    Description of the findings of the procedure:      Findings/Key Components: small bilateral direct inguinal hernias    Estimated Blood Loss: 10 mL         Specimens:   Specimen     None          Discharge Note    SUMMARY     Admit Date: 3/14/2017    Discharge Date and Time:  03/14/2017 12:09 PM    Hospital Course (synopsis of major diagnoses, care, treatment, and services provided during the course of the hospital stay): tolerated procedure well     Final Diagnosis: Post-Op Diagnosis Codes:     * Bilateral inguinal hernia without obstruction or gangrene, recurrence not specified [K40.20]    Disposition: Home or Self Care    Follow Up/Patient Instructions:     Medications:  Reconciled Home Medications:   Current Discharge Medication List      START taking these medications    Details   hydrocodone-acetaminophen 10-325mg (NORCO)  mg Tab Take 1 tablet by mouth every 4 to 6 hours as needed for Pain.  Qty: 20 tablet, Refills: 0         CONTINUE these medications which have NOT CHANGED    Details   amlodipine (NORVASC) 10 MG tablet Take 1 tablet (10 mg total) by mouth once daily.  Qty: 90 tablet, Refills: 1      benazepril (LOTENSIN) 20 MG tablet Take 2 tablets (40 mg total) by mouth once daily.  Qty:  180 tablet, Refills: 1      clonazepam (KLONOPIN) 2 MG Tab 2 mg. Take 2 mg by mouth nightly as needed.      gabapentin (NEURONTIN) 300 MG capsule Take 2 capsules (600 mg total) by mouth 3 (three) times daily.  Qty: 270 capsule, Refills: 1      hydrochlorothiazide (MICROZIDE) 12.5 mg capsule Take 1 capsule (12.5 mg total) by mouth once daily.  Qty: 90 capsule, Refills: 1      hydrOXYzine (VISTARIL) 100 MG capsule Take 100 mg by mouth once daily.      methocarbamol (ROBAXIN) 750 MG Tab Take 1 tablet (750 mg total) by mouth 3 (three) times daily.  Qty: 60 tablet, Refills: 3      pantoprazole (PROTONIX) 40 MG tablet Take 1 tablet (40 mg total) by mouth 2 (two) times daily before meals.  Qty: 240 tablet, Refills: 1      tizanidine (ZANAFLEX) 2 MG tablet Take 1 tablet (2 mg total) by mouth daily as needed.  Qty: 90 tablet, Refills: 1      zolpidem (AMBIEN) 10 mg Tab TAKE 1 TABLET BY MOUTH NIGHTLY AS NEEDED.  Qty: 30 tablet, Refills: 1    Associated Diagnoses: Chronic insomnia      aspirin 81 mg Tab Take 1 tablet by mouth Daily.      hydrALAZINE (APRESOLINE) 25 MG tablet Take 1 tablet (25 mg total) by mouth 3 (three) times daily.  Qty: 90 tablet, Refills: 11      lidocaine-prilocaine (EMLA) cream as needed.      metoprolol tartrate (LOPRESSOR) 50 MG tablet Take 1 tablet (50 mg total) by mouth 2 (two) times daily.  Qty: 180 tablet, Refills: 1             Discharge Procedure Orders  Diet general     Activity as tolerated     Shower on day dressing removed (No bath)     Call MD for:  temperature >100.4     Call MD for:  redness, tenderness, or signs of infection (pain, swelling, redness, odor or green/yellow discharge around incision site)     Remove dressing in 48 hours     WAIT 24 HRS BEFORE RESUMING ASPIRIN  Follow-up Information     Schedule an appointment as soon as possible for a visit with Ari Pelletier MD.    Specialty:  General Surgery    Why:  For wound re-check    Contact information:    [address]  [place] [address]  [phone]

## 2017-03-14 NOTE — DISCHARGE INSTRUCTIONS
Discharge Instructions for Open Hernia Repair  You had a procedure called open hernia repair. Also called a rupture, a hernia is a tear or weakness in the wall of the belly. This weakness may be present at birth. Or it can be caused by the wear and tear of daily living. Hernias may get worse with time or with physical stress. But surgery can help repair the weakness and eliminate symptoms.  Activity after surgery  Recommendations include the following:  · After surgery, take it easy for the rest of the day. If you had general anesthesia, dont use machinery or power tools, drink alcohol, or make any major decisions for at least the first 24 hours.  · Dont drive while you are still taking opioid pain medicine, and dont drive until you are able to step firmly on the brake pedal without hesitation.  · Ask others to help with chores and errands while you recover.  · Dont lift anything heavier than 10 pounds until your healthcare provider says its OK.  · Dont mow the lawn, use a vacuum , or do other strenuous activities until your healthcare provider says its OK.  · Walk as often as you feel able.  · Continue the coughing and deep breathing exercises that you learned in the hospital.  · Ask your healthcare provider when you can expect to return to work.  · Avoid constipation:  ¨ Eat fruits, vegetables, and whole grains.  ¨ Drink 6 to 8 glasses of water a day, unless otherwise instructed.  ¨ Use a laxative or a mild stool softener as instructed by your healthcare provider.  Bandage and incision care  Tips include the following:  · Do not get the bandage or wound wet for 48 hours.  · If strips of tape were used to close your incision, dont pull them off. Let them fall off on their own.  · Remove any gauze bandage in 48 hours.  · Wash your incision with mild soap and water. Pat it dry. Dont use oils, powders, or lotions on your incision. Do not soak your incision or take tub baths until cleared by your  healthcare provider.  Follow-up care  Keep follow-up appointments during your recovery. These allow your healthcare provider to check your progress and make sure youre healing well. You may also need to have your stitches, staples, or bandage removed. During office visits, tell your healthcare provider if you have any new symptoms. And be sure to ask any questions you have.     When to call your healthcare provider  Call your healthcare provider immediately if you have any of the following:  · A large amount of swelling or bruising (some testicular swelling and bruising is common)  · Bleeding  · Increasing pain  · Increased redness or drainage of the incision  · Fever of 100.4°F (38.0°C) or higher, or as directed by your healthcare provider  · Trouble urinating  · Nausea or vomiting   Date Last Reviewed: 7/1/2016 © 2000-2016 [employer]. [address] [place] [id number]. All rights reserved. This information is not intended as a substitute for professional medical care. Always follow your healthcare professional's instructions.    General Information:    1. Do not drink alcoholic beverages including beer for 24 hours or as long as you are on pain medication..  2. Do not drive a motor vehicle, operate machinery or power tools, or signs legal papers for 24 hours or as long as you are on pain medication.   3. You may experience light-headedness, dizziness, and sleepiness following surgery. Please do not stay alone. A responsible adult should be with you for this 24 hour period.  4. Go home and rest.  5. Progress slowly to a normal diet unless instructed.  Otherwise, begin with liquids such as soft drinks, then soup and crackers working up to solid foods. Drink plenty of nonalcoholic fluids.  6. Certain anesthetics and pain medications produce nausea and vomiting in certain individuals. If nausea becomes a problem at home, call you doctor.  7. A nurse will be calling you sometime after surgery.  Do not be alarmed. This is our way of finding out how you are doing.  8. Several times every hour while you are awake, take 2-3 deep breaths and cough. If you had stomach surgery hold a pillow or rolled towel firmly against your stomach before you cough. This will help with any pain the cough might cause.  9. Several times every hour while you are awake, pump and flex your feet 5-6 times and do foot circles. This will help prevent blood clots.  10. Call your doctor for severe pain, bleeding, fever, or signs or symptoms of infection (pain, swelling, redness, foul odor, drainage).  11. You can contact your doctor anytime by [phone] for the [facility] (at [facility]) or [phone] for the [facility] on [facility].   [email or web address] is another way to contact your doctor if you are an active participant online with [facility].      Acetaminophen; Hydrocodone tablets or capsules  What is this medicine?  ACETAMINOPHEN; HYDROCODONE (a set a ELAINE doni fen; priyanka droe KOE done) is a pain reliever. It is used to treat moderate to severe pain.  How should I use this medicine?  Take this medicine by mouth with a glass of water. Follow the directions on the prescription label. You can take it with or without food. If it upsets your stomach, take it with food. Do not take your medicine more often than directed.  A special MedGuide will be given to you by the pharmacist with each prescription and refill. Be sure to read this information carefully each time.  Talk to your pediatrician regarding the use of this medicine in children. Special care may be needed.  What side effects may I notice from receiving this medicine?  Side effects that you should report to your doctor or health care professional as soon as possible:  · allergic reactions like skin rash, itching or hives, swelling of the face, lips, or tongue  · breathing problems  · confusion  · redness, blistering, peeling or loosening of the skin,  including inside the mouth  · signs and symptoms of low blood pressure like dizziness; feeling faint or lightheaded, falls; unusually weak or tired  · trouble passing urine or change in the amount of urine  · yellowing of the eyes or skin  Side effects that usually do not require medical attention (report to your doctor or health care professional if they continue or are bothersome):  · constipation  · dry mouth  · nausea, vomiting  · tiredness  What may interact with this medicine?  This medicine may interact with the following medications:  · alcohol  · antiviral medicines for HIV or AIDS  · atropine  · antihistamines for allergy, cough and cold  · certain antibiotics like erythromycin, clarithromycin  · certain medicines for anxiety or sleep  · certain medicines for bladder problems like oxybutynin, tolterodine  · certain medicines for depression like amitriptyline, fluoxetine, sertraline  · certain medicines for fungal infections like ketoconazole and itraconazole  · certain medicines for Parkinson's disease like benztropine, trihexyphenidyl  · certain medicines for seizures like carbamazepine, phenobarbital, phenytoin, primidone  · certain medicines for stomach problems like dicyclomine, hyoscyamine  · certain medicines for travel sickness like scopolamine  · general anesthetics like halothane, isoflurane, methoxyflurane, propofol  · ipratropium  · local anesthetics like lidocaine, pramoxine, tetracaine  · MAOIs like Carbex, Eldepryl, Marplan, Nardil, and Parnate  · medicines that relax muscles for surgery  · other medicines with acetaminophen  · other narcotic medicines for pain or cough  · phenothiazines like chlorpromazine, mesoridazine, prochlorperazine, thioridazine  · rifampin  What if I miss a dose?  If you miss a dose, take it as soon as you can. If it is almost time for your next dose, take only that dose. Do not take double or extra doses.  Where should I keep my medicine?  Keep out of the reach of  children. This medicine can be abused. Keep your medicine in a safe place to protect it from theft. Do not share this medicine with anyone. Selling or giving away this medicine is dangerous and against the law.  This medicine may cause accidental overdose and death if it taken by other adults, children, or pets. Mix any unused medicine with a substance like cat litter or coffee grounds. Then throw the medicine away in a sealed container like a sealed bag or a coffee can with a lid. Do not use the medicine after the expiration date.  Store at room temperature between 15 and 30 degrees C (59 and 86 degrees F).  What should I tell my health care provider before I take this medicine?  They need to know if you have any of these conditions:  · brain tumor  · Crohn's disease, inflammatory bowel disease, or ulcerative colitis  · drug abuse or addiction  · head injury  · heart or circulation problems  · if you often drink alcohol  · kidney disease or problems going to the bathroom  · liver disease  · lung disease, asthma, or breathing problems  · an unusual or allergic reaction to acetaminophen, hydrocodone, other opioid analgesics, other medicines, foods, dyes, or preservatives  · pregnant or trying to get pregnant  · breast-feeding  What should I watch for while using this medicine?  Tell your doctor or health care professional if your pain does not go away, if it gets worse, or if you have new or a different type of pain. You may develop tolerance to the medicine. Tolerance means that you will need a higher dose of the medicine for pain relief. Tolerance is normal and is expected if you take the medicine for a long time.  Do not suddenly stop taking your medicine because you may develop a severe reaction. Your body becomes used to the medicine. This does NOT mean you are addicted. Addiction is a behavior related to getting and using a drug for a non-medical reason. If you have pain, you have a medical reason to take pain  medicine. Your doctor will tell you how much medicine to take. If your doctor wants you to stop the medicine, the dose will be slowly lowered over time to avoid any side effects.  There are different types of narcotic medicines (opiates). If you take more than one type at the same time or if you are taking another medicine that also causes drowsiness, you may have more side effects. Give your health care provider a list of all medicines you use. Your doctor will tell you how much medicine to take. Do not take more medicine than directed. Call emergency for help if you have problems breathing or unusual sleepiness.  Do not take other medicines that contain acetaminophen with this medicine. Always read labels carefully. If you have questions, ask your doctor or pharmacist.  If you take too much acetaminophen get medical help right away. Too much acetaminophen can be very dangerous and cause liver damage. Even if you do not have symptoms, it is important to get help right away.  You may get drowsy or dizzy. Do not drive, use machinery, or do anything that needs mental alertness until you know how this medicine affects you. Do not stand or sit up quickly, especially if you are an older patient. This reduces the risk of dizzy or fainting spells. Alcohol may interfere with the effect of this medicine. Avoid alcoholic drinks.  The medicine will cause constipation. Try to have a bowel movement at least every 2 to 3 days. If you do not have a bowel movement for 3 days, call your doctor or health care professional.  Your mouth may get dry. Chewing sugarless gum or sucking hard candy, and drinking plenty of water may help. Contact your doctor if the problem does not go away or is severe.  Date Last Reviewed:   NOTE:This sheet is a summary. It may not cover all possible information. If you have questions about this medicine, talk to your doctor, pharmacist, or health care provider. Copyright© 2016 [employer]

## 2017-03-14 NOTE — ANESTHESIA POSTPROCEDURE EVALUATION
"Anesthesia Post Evaluation    Patient: Mil Sood    Procedure(s) Performed: Procedure(s) (LRB):  REPAIR-HERNIA-INGUINAL-BILATERAL (Bilateral)    Final Anesthesia Type: general  Patient location during evaluation: PACU  Patient participation: Yes- Able to Participate  Level of consciousness: awake and alert  Post-procedure vital signs: reviewed and stable  Pain management: adequate  Airway patency: patent  PONV status at discharge: No PONV  Anesthetic complications: no      Cardiovascular status: blood pressure returned to baseline  Respiratory status: unassisted  Hydration status: euvolemic  Follow-up not needed.        Visit Vitals    BP (!) 159/88 (BP Location: Right arm, Patient Position: Lying, BP Method: Automatic)    Pulse (!) 47    Temp 36.3 °C (97.3 °F) (Temporal)    Resp (!) 21    Ht 5' 10" (1.778 m)    Wt 94 kg (207 lb 3.7 oz)    SpO2 98%    BMI 29.73 kg/m2       Pain/Doug Score: Pain Assessment Performed: Yes (3/14/2017  1:00 PM)  Presence of Pain: complains of pain/discomfort (3/14/2017  1:00 PM)  Pain Rating Prior to Med Admin: 8 (3/14/2017  1:00 PM)  Doug Score: 9 (3/14/2017  1:00 PM)      "

## 2017-03-14 NOTE — IP AVS SNAPSHOT
[address] [place] [address]           Patient Discharge Instructions     Our goal is to set you up for success. This packet includes information on your condition, medications, and your home care. It will help you to care for yourself so you don't get sicker and need to go back to the hospital.     Please ask your nurse if you have any questions.        There are many details to remember when preparing to leave the hospital. Here is what you will need to do:    1. Take your medicine. If you are prescribed medications, review your Medication List in the following pages. You may have new medications to  at the pharmacy and others that you'll need to stop taking. Review the instructions for how and when to take your medications. Talk with your doctor or nurses if you are unsure of what to do.     2. Go to your follow-up appointments. Specific follow-up information is listed in the following pages. Your may be contacted by a transition nurse or clinical provider about future appointments. Be sure we have all of the phone numbers to reach you, if needed. Please contact your provider's office if you are unable to make an appointment.     3. Watch for warning signs. Your doctor or nurse will give you detailed warning signs to watch for and when to call for assistance. These instructions may also include educational information about your condition. If you experience any of warning signs to your health, call your doctor.               ** Verify the list of medication(s) below is accurate and up to date. Carry this with you in case of emergency. If your medications have changed, please notify your healthcare provider.             Medication List      START taking these medications        Additional Info                      hydrocodone-acetaminophen 10-325mg  mg Tab   Commonly known as:  NORCO   Quantity:  20 tablet   Refills:  0   Dose:  1 tablet     Instructions:  Take 1 tablet by mouth every 4 to 6 hours as needed for Pain.     Begin Date    AM    Noon    PM    Bedtime         CONTINUE taking these medications        Additional Info                      amlodipine 10 MG tablet   Commonly known as:  NORVASC   Quantity:  90 tablet   Refills:  1   Dose:  10 mg    Instructions:  Take 1 tablet (10 mg total) by mouth once daily.     Begin Date    AM    Noon    PM    Bedtime       aspirin 81 mg Tab   Refills:  0   Dose:  1 tablet    Instructions:  Take 1 tablet by mouth Daily.     Begin Date    AM    Noon    PM    Bedtime       benazepril 20 MG tablet   Commonly known as:  LOTENSIN   Quantity:  180 tablet   Refills:  1   Dose:  40 mg    Instructions:  Take 2 tablets (40 mg total) by mouth once daily.     Begin Date    AM    Noon    PM    Bedtime       clonazePAM 2 MG Tab   Commonly known as:  KLONOPIN   Refills:  0   Dose:  2 mg    Instructions:  2 mg. Take 2 mg by mouth nightly as needed.     Begin Date    AM    Noon    PM    Bedtime       gabapentin 300 MG capsule   Commonly known as:  NEURONTIN   Quantity:  270 capsule   Refills:  1   Dose:  600 mg    Instructions:  Take 2 capsules (600 mg total) by mouth 3 (three) times daily.     Begin Date    AM    Noon    PM    Bedtime       hydrALAZINE 25 MG tablet   Commonly known as:  APRESOLINE   Quantity:  90 tablet   Refills:  11   Dose:  25 mg    Instructions:  Take 1 tablet (25 mg total) by mouth 3 (three) times daily.     Begin Date    AM    Noon    PM    Bedtime       hydrOXYzine 100 MG capsule   Commonly known as:  VISTARIL   Refills:  0   Dose:  100 mg    Instructions:  Take 100 mg by mouth once daily.     Begin Date    AM    Noon    PM    Bedtime       lidocaine-prilocaine cream   Commonly known as:  EMLA   Refills:  0    Instructions:  as needed.     Begin Date    AM    Noon    PM    Bedtime       methocarbamol 750 MG Tab   Commonly known as:  ROBAXIN   Quantity:  60 tablet   Refills:  3   Dose:  750 mg     Instructions:  Take 1 tablet (750 mg total) by mouth 3 (three) times daily.     Begin Date    AM    Noon    PM    Bedtime       metoprolol tartrate 50 MG tablet   Commonly known as:  LOPRESSOR   Quantity:  180 tablet   Refills:  1   Dose:  50 mg    Instructions:  Take 1 tablet (50 mg total) by mouth 2 (two) times daily.     Begin Date    AM    Noon    PM    Bedtime       pantoprazole 40 MG tablet   Commonly known as:  PROTONIX   Quantity:  240 tablet   Refills:  1   Dose:  40 mg    Instructions:  Take 1 tablet (40 mg total) by mouth 2 (two) times daily before meals.     Begin Date    AM    Noon    PM    Bedtime       tizanidine 2 MG tablet   Commonly known as:  ZANAFLEX   Quantity:  90 tablet   Refills:  1   Dose:  2 mg    Instructions:  Take 1 tablet (2 mg total) by mouth daily as needed.     Begin Date    AM    Noon    PM    Bedtime       zolpidem 10 mg Tab   Commonly known as:  AMBIEN   Quantity:  30 tablet   Refills:  1    Instructions:  TAKE 1 TABLET BY MOUTH NIGHTLY AS NEEDED.     Begin Date    AM    Noon    PM    Bedtime         ASK your doctor about these medications        Additional Info                      hydrochlorothiazide 12.5 mg capsule   Commonly known as:  MICROZIDE   Quantity:  90 capsule   Refills:  1   Dose:  12.5 mg    Instructions:  Take 1 capsule (12.5 mg total) by mouth once daily.     Begin Date    AM    Noon    PM    Bedtime            Where to Get Your Medications      You can get these medications from any pharmacy     Bring a paper prescription for each of these medications     hydrocodone-acetaminophen 10-325mg  mg Tab                  Please bring to all follow up appointments:    1. A copy of your discharge instructions.  2. All medicines you are currently taking in their original bottles.  3. Identification and insurance card.    Please arrive 15 minutes ahead of scheduled appointment time.    Please call 24 hours in advance if you must reschedule your appointment and/or  time.        Your Scheduled Appointments     Mar 22, 2017  1:00 PM CDT   Established Patient Visit with MD Maureen Page IV - Urology ([facility])    [address] [place] [id number] [phone]            Mar 31, 2017  8:00 AM CDT   Post OP with MD Maureen Capellan - General Surgery ([facility])    [address] [place] [id number] [phone]              Your Future Surgeries/Procedures     Apr 07, 2017   Surgery with Amarilys Bravo MD   [facility])    [address] [facility] [id number] [phone]              Follow-up Information     Schedule an appointment as soon as possible for a visit with Ari Pelletier MD.    Specialty:  General Surgery    Why:  For wound re-check    Contact information:    [address] [facility] [id number] [phone]          Discharge Instructions     Future Orders    Activity as tolerated     Call MD for:  redness, tenderness, or signs of infection (pain, swelling, redness, odor or green/yellow discharge around incision site)     Call MD for:  temperature >100.4     Diet general     Questions:    Total calories:      Fat restriction, if any:      Protein restriction, if any:      Na restriction, if any:      Fluid restriction:      Additional restrictions:      Remove dressing in 48 hours     Shower on day dressing removed (No bath)         Discharge Instructions         Discharge Instructions for Open Hernia Repair  You had a procedure called open hernia repair. Also called a rupture, a hernia is a tear or weakness in the wall of the belly. This weakness may be present at birth. Or it can be caused by the wear and tear of daily living. Hernias may get worse with time or with physical stress. But surgery can help repair the weakness and eliminate symptoms.  Activity after surgery  Recommendations include the following:  · After surgery, take it  easy for the rest of the day. If you had general anesthesia, dont use machinery or power tools, drink alcohol, or make any major decisions for at least the first 24 hours.  · Dont drive while you are still taking opioid pain medicine, and dont drive until you are able to step firmly on the brake pedal without hesitation.  · Ask others to help with chores and errands while you recover.  · Dont lift anything heavier than 10 pounds until your healthcare provider says its OK.  · Dont mow the lawn, use a vacuum , or do other strenuous activities until your healthcare provider says its OK.  · Walk as often as you feel able.  · Continue the coughing and deep breathing exercises that you learned in the hospital.  · Ask your healthcare provider when you can expect to return to work.  · Avoid constipation:  ¨ Eat fruits, vegetables, and whole grains.  ¨ Drink 6 to 8 glasses of water a day, unless otherwise instructed.  ¨ Use a laxative or a mild stool softener as instructed by your healthcare provider.  Bandage and incision care  Tips include the following:  · Do not get the bandage or wound wet for 48 hours.  · If strips of tape were used to close your incision, dont pull them off. Let them fall off on their own.  · Remove any gauze bandage in 48 hours.  · Wash your incision with mild soap and water. Pat it dry. Dont use oils, powders, or lotions on your incision. Do not soak your incision or take tub baths until cleared by your healthcare provider.  Follow-up care  Keep follow-up appointments during your recovery. These allow your healthcare provider to check your progress and make sure youre healing well. You may also need to have your stitches, staples, or bandage removed. During office visits, tell your healthcare provider if you have any new symptoms. And be sure to ask any questions you have.     When to call your healthcare provider  Call your healthcare provider immediately if you have any of the  following:  · A large amount of swelling or bruising (some testicular swelling and bruising is common)  · Bleeding  · Increasing pain  · Increased redness or drainage of the incision  · Fever of 100.4°F (38.0°C) or higher, or as directed by your healthcare provider  · Trouble urinating  · Nausea or vomiting   Date Last Reviewed: 7/1/2016 © 2000-2016 [employer]. [address] [place] [address], [place] [address]. All rights reserved. This information is not intended as a substitute for professional medical care. Always follow your healthcare professional's instructions.    General Information:    1. Do not drink alcoholic beverages including beer for 24 hours or as long as you are on pain medication..  2. Do not drive a motor vehicle, operate machinery or power tools, or signs legal papers for 24 hours or as long as you are on pain medication.   3. You may experience light-headedness, dizziness, and sleepiness following surgery. Please do not stay alone. A responsible adult should be with you for this 24 hour period.  4. Go home and rest.  5. Progress slowly to a normal diet unless instructed.  Otherwise, begin with liquids such as soft drinks, then soup and crackers working up to solid foods. Drink plenty of nonalcoholic fluids.  6. Certain anesthetics and pain medications produce nausea and vomiting in certain individuals. If nausea becomes a problem at home, call you doctor.  7. A nurse will be calling you sometime after surgery. Do not be alarmed. This is our way of finding out how you are doing.  8. Several times every hour while you are awake, take 2-3 deep breaths and cough. If you had stomach surgery hold a pillow or rolled towel firmly against your stomach before you cough. This will help with any pain the cough might cause.  9. Several times every hour while you are awake, pump and flex your feet 5-6 times and do foot circles. This will help prevent blood clots.  10. Call your doctor for severe pain,  bleeding, fever, or signs or symptoms of infection (pain, swelling, redness, foul odor, drainage).  11. You can contact your doctor anytime by [phone] for the [facility] (at [facility]) or [phone] for the [facility] on [facility].   [email or web address] is another way to contact your doctor if you are an active participant online with [facility].      Acetaminophen; Hydrocodone tablets or capsules  What is this medicine?  ACETAMINOPHEN; HYDROCODONE (a set a ELAINE doni fen; priyanka droe KOE done) is a pain reliever. It is used to treat moderate to severe pain.  How should I use this medicine?  Take this medicine by mouth with a glass of water. Follow the directions on the prescription label. You can take it with or without food. If it upsets your stomach, take it with food. Do not take your medicine more often than directed.  A special MedGuide will be given to you by the pharmacist with each prescription and refill. Be sure to read this information carefully each time.  Talk to your pediatrician regarding the use of this medicine in children. Special care may be needed.  What side effects may I notice from receiving this medicine?  Side effects that you should report to your doctor or health care professional as soon as possible:  · allergic reactions like skin rash, itching or hives, swelling of the face, lips, or tongue  · breathing problems  · confusion  · redness, blistering, peeling or loosening of the skin, including inside the mouth  · signs and symptoms of low blood pressure like dizziness; feeling faint or lightheaded, falls; unusually weak or tired  · trouble passing urine or change in the amount of urine  · yellowing of the eyes or skin  Side effects that usually do not require medical attention (report to your doctor or health care professional if they continue or are bothersome):  · constipation  · dry mouth  · nausea, vomiting  · tiredness  What may interact with this  medicine?  This medicine may interact with the following medications:  · alcohol  · antiviral medicines for HIV or AIDS  · atropine  · antihistamines for allergy, cough and cold  · certain antibiotics like erythromycin, clarithromycin  · certain medicines for anxiety or sleep  · certain medicines for bladder problems like oxybutynin, tolterodine  · certain medicines for depression like amitriptyline, fluoxetine, sertraline  · certain medicines for fungal infections like ketoconazole and itraconazole  · certain medicines for Parkinson's disease like benztropine, trihexyphenidyl  · certain medicines for seizures like carbamazepine, phenobarbital, phenytoin, primidone  · certain medicines for stomach problems like dicyclomine, hyoscyamine  · certain medicines for travel sickness like scopolamine  · general anesthetics like halothane, isoflurane, methoxyflurane, propofol  · ipratropium  · local anesthetics like lidocaine, pramoxine, tetracaine  · MAOIs like Carbex, Eldepryl, Marplan, Nardil, and Parnate  · medicines that relax muscles for surgery  · other medicines with acetaminophen  · other narcotic medicines for pain or cough  · phenothiazines like chlorpromazine, mesoridazine, prochlorperazine, thioridazine  · rifampin  What if I miss a dose?  If you miss a dose, take it as soon as you can. If it is almost time for your next dose, take only that dose. Do not take double or extra doses.  Where should I keep my medicine?  Keep out of the reach of children. This medicine can be abused. Keep your medicine in a safe place to protect it from theft. Do not share this medicine with anyone. Selling or giving away this medicine is dangerous and against the law.  This medicine may cause accidental overdose and death if it taken by other adults, children, or pets. Mix any unused medicine with a substance like cat litter or coffee grounds. Then throw the medicine away in a sealed container like a sealed bag or a coffee can with  a lid. Do not use the medicine after the expiration date.  Store at room temperature between 15 and 30 degrees C (59 and 86 degrees F).  What should I tell my health care provider before I take this medicine?  They need to know if you have any of these conditions:  · brain tumor  · Crohn's disease, inflammatory bowel disease, or ulcerative colitis  · drug abuse or addiction  · head injury  · heart or circulation problems  · if you often drink alcohol  · kidney disease or problems going to the bathroom  · liver disease  · lung disease, asthma, or breathing problems  · an unusual or allergic reaction to acetaminophen, hydrocodone, other opioid analgesics, other medicines, foods, dyes, or preservatives  · pregnant or trying to get pregnant  · breast-feeding  What should I watch for while using this medicine?  Tell your doctor or health care professional if your pain does not go away, if it gets worse, or if you have new or a different type of pain. You may develop tolerance to the medicine. Tolerance means that you will need a higher dose of the medicine for pain relief. Tolerance is normal and is expected if you take the medicine for a long time.  Do not suddenly stop taking your medicine because you may develop a severe reaction. Your body becomes used to the medicine. This does NOT mean you are addicted. Addiction is a behavior related to getting and using a drug for a non-medical reason. If you have pain, you have a medical reason to take pain medicine. Your doctor will tell you how much medicine to take. If your doctor wants you to stop the medicine, the dose will be slowly lowered over time to avoid any side effects.  There are different types of narcotic medicines (opiates). If you take more than one type at the same time or if you are taking another medicine that also causes drowsiness, you may have more side effects. Give your health care provider a list of all medicines you use. Your doctor will tell you how  much medicine to take. Do not take more medicine than directed. Call emergency for help if you have problems breathing or unusual sleepiness.  Do not take other medicines that contain acetaminophen with this medicine. Always read labels carefully. If you have questions, ask your doctor or pharmacist.  If you take too much acetaminophen get medical help right away. Too much acetaminophen can be very dangerous and cause liver damage. Even if you do not have symptoms, it is important to get help right away.  You may get drowsy or dizzy. Do not drive, use machinery, or do anything that needs mental alertness until you know how this medicine affects you. Do not stand or sit up quickly, especially if you are an older patient. This reduces the risk of dizzy or fainting spells. Alcohol may interfere with the effect of this medicine. Avoid alcoholic drinks.  The medicine will cause constipation. Try to have a bowel movement at least every 2 to 3 days. If you do not have a bowel movement for 3 days, call your doctor or health care professional.  Your mouth may get dry. Chewing sugarless gum or sucking hard candy, and drinking plenty of water may help. Contact your doctor if the problem does not go away or is severe.  Date Last Reviewed:   NOTE:This sheet is a summary. It may not cover all possible information. If you have questions about this medicine, talk to your doctor, pharmacist, or health care provider. Copyright© 2016 [employer]            Primary Diagnosis     Your primary diagnosis was:  Hernia      Admission Information     Date & Time Provider Department [facility]    3/14/2017  8:26 AM Ari Pelletier MD [facility] -  [id number]      Care Providers     Provider Role Specialty Primary office phone    Ari Pelletier MD Attending Provider General Surgery [phone]    Ari Pelletier MD Surgeon  General Surgery [phone]      Your Vitals Were     BP Pulse Temp Resp Height Weight    186/100 (BP  "Location: Left arm, Patient Position: Sitting) 53 98.1 °F (36.7 °C) (Temporal) 16 5' 10" (1.778 m) 94 kg (207 lb 3.7 oz)    SpO2 BMI             96% 29.73 kg/m2         Recent Lab Values        4/5/2012 10/5/2012 4/19/2013 1/2/2014 6/18/2014 1/5/2015 7/13/2015 1/23/2017      8:09 AM  8:13 AM  7:58 AM  1:41 PM  9:04 AM  8:40 AM  9:22 AM  8:08 AM    A1C 6.0 5.9 6.0 6.2 6.2 6.1 6.0 6.0    Comment for A1C at  8:08 AM on 1/23/2017:  According to ADA guidelines, hemoglobin A1C <7.0% represents  optimal control in non-pregnant diabetic patients.  Different  metrics may apply to specific populations.   Standards of Medical Care in Diabetes - 2016.  For the purpose of screening for the presence of diabetes:  <5.7%     Consistent with the absence of diabetes  5.7-6.4%  Consistent with increasing risk for diabetes   (prediabetes)  >or=6.5%  Consistent with diabetes  Currently no consensus exists for use of hemoglobin A1C  for diagnosis of diabetes for children.        Allergies as of 3/14/2017        Reactions    No Known Allergies       [facility]     [facility] Nurse Care Line - 24/7 Assistance  Unless otherwise directed by your provider, please contact [facility], our nurse care line that is available for 24/7 assistance.     Registered nurses in the [facility] provide clinical advisement, health education, appointment booking, and other advisory services.  Call for this free service at [phone].        Advance Directives     An advance directive is a document which, in the event you are no longer able to make decisions for yourself, tells your healthcare team what kind of treatment you do or do not want to receive, or who you would like to make those decisions for you.  If you do not currently have an advance directive, [facility] encourages you to create one.  For more information call:  [phone]), [phone] ([phone]),  or log on to [email or web address].      "   Language Assistance Services     ATTENTION: Language assistance services are available, free of charge. Please call [phone].      ATENCIÓN: Si habla stacey, tiene a meza disposición servicios gratuitos de asistencia lingüística. Llame al [phone].     CHÚ Ý: N?u b?n nói Ti?ng Vi?t, có các d?ch v? h? tr? ngôn ng? mi?n phí dành cho b?n. G?i s? [phone].         [facility] complies with applicable Federal civil rights laws and does not discriminate on the basis of race, color, national origin, age, disability, or sex.

## 2017-03-15 NOTE — PLAN OF CARE
Pt reports pain 4 out of 10 in abdomen. Confirmed pt and pt family had gone over AVS with previous nurse. Pt vitals taken and recored. Pt brought to main entrance via wheelchair with RN.

## 2017-03-22 ENCOUNTER — OFFICE VISIT (OUTPATIENT)
Dept: UROLOGY | Facility: CLINIC | Age: 62
End: 2017-03-22
Payer: MEDICARE

## 2017-03-22 VITALS — BODY MASS INDEX: 29.7 KG/M2 | WEIGHT: 207 LBS | SYSTOLIC BLOOD PRESSURE: 142 MMHG | DIASTOLIC BLOOD PRESSURE: 88 MMHG

## 2017-03-22 DIAGNOSIS — D36.9 ONCOCYTOMA: Primary | ICD-10-CM

## 2017-03-22 LAB
BILIRUB SERPL-MCNC: NORMAL MG/DL
BLOOD URINE, POC: NORMAL
COLOR, POC UA: YELLOW
GLUCOSE UR QL STRIP: NORMAL
KETONES UR QL STRIP: NORMAL
LEUKOCYTE ESTERASE URINE, POC: NORMAL
NITRITE, POC UA: NORMAL
PH, POC UA: 5
PROTEIN, POC: NORMAL
SPECIFIC GRAVITY, POC UA: 1.02
UROBILINOGEN, POC UA: NORMAL

## 2017-03-22 PROCEDURE — 3077F SYST BP >= 140 MM HG: CPT | Mod: S$GLB,,, | Performed by: UROLOGY

## 2017-03-22 PROCEDURE — 81002 URINALYSIS NONAUTO W/O SCOPE: CPT | Mod: S$GLB,,, | Performed by: UROLOGY

## 2017-03-22 PROCEDURE — 3079F DIAST BP 80-89 MM HG: CPT | Mod: S$GLB,,, | Performed by: UROLOGY

## 2017-03-22 PROCEDURE — 1160F RVW MEDS BY RX/DR IN RCRD: CPT | Mod: S$GLB,,, | Performed by: UROLOGY

## 2017-03-22 PROCEDURE — 99214 OFFICE O/P EST MOD 30 MIN: CPT | Mod: 25,S$GLB,, | Performed by: UROLOGY

## 2017-03-22 PROCEDURE — 99999 PR PBB SHADOW E&M-EST. PATIENT-LVL II: CPT | Mod: PBBFAC,,, | Performed by: UROLOGY

## 2017-03-22 NOTE — PROGRESS NOTES
"Chief Complaint: Renal Cell Carcinoma?    HPI:   3/22/17: Slight enlargement of bilateral small renal masses in background of path-proven oncocytoma in 2011.  Dr. Roberson completes prostate cancer screening annually.  2/17/16: Ct shows a 5.5 mm left lower lobe chest nodule and a metallic linear object in stomach.  It probably is the wire left behind after some sort of esophageal biopsy and the wire was supposed to pass over a few weeks time.  Br Gen path shows the prior mass to be an oncocytoma.  Our pathologist has requested the slides and will form a second opinion.  2/11/16: 62 yo man referred by Dr. Roberson after finding renal masses on recent workup for various complaints.  No abd/pelvic pain and no exac/rel factors.  No hematuria.  No urolithiasis except he says he passed one 30 years ago.  No urinary bother.  No  history.  Normal sexual function. In 2011 he had left upper pole partial nephrectomy with unknown findings at  General, but no longterm followup.  Now he has a 2.3 cm left lower pole enhancing mass, and "right kidney demonstrates development of at least 3 separate solid mass lesions. One in the upper pole cortex 1.6 cm in size. The other in the anterior right upper renal cortex measuring 1.5 cm in size. The third located in the anterior lower pole approximately 1.1 cm in size."  No chest imaging.  LFT normal.  Neg prostate cancer screening with PCP last ADA 6 months ago.    Allergies:  No known allergies    Medications:  has a current medication list which includes the following prescription(s): amlodipine, aspirin, benazepril, clonazepam, gabapentin, hydralazine, hydrochlorothiazide, hydrocodone-acetaminophen 10-325mg, hydroxyzine, lidocaine-prilocaine, methocarbamol, metoprolol tartrate, pantoprazole, tizanidine, and zolpidem.    Review of Systems:  General: No fever, chills, fatigability, or weight loss.  Skin: No rashes, itching, or changes in color or texture of skin.  Chest: Denies UMANA, " cyanosis, wheezing, cough, and sputum production.  Abdomen: Appetite fine. No weight loss. Denies diarrhea, abdominal pain, hematemesis, or blood in stool.  Musculoskeletal: Some joint stiffness or swelling. Some back pain.  : As above.  All other review of systems negative.    PMH:   has a past medical history of Anticoagulant long-term use; Anxiety; Cancer; Diabetes mellitus; Diverticulitis; GERD (gastroesophageal reflux disease); Hyperlipidemia; Hypertension; Lung mass; and Mass of kidney.    PSH:   has a past surgical history that includes Partial nephrectomy; Neck surgery; Cholecystectomy; Tonsillectomy; Colonoscopy; and Esophagogastroduodenoscopy.    FamHx: family history includes Cancer in his sister; Hypertension in his father and mother; Melanoma in his father.    SocHx:  reports that he quit smoking about 22 years ago. He has a 20.00 pack-year smoking history. He has never used smokeless tobacco. He reports that he does not drink alcohol or use illicit drugs.      Physical Exam:  Vitals:    03/22/17 1328   BP: (!) 142/88     General: A&Ox3, no apparent distress, no deformities  Neck: No masses, normal thyroid  Lungs: normal inspiration, no use of accessory muscles  Heart: normal pulse, no arrhythmias  Abdomen: Soft, NT, ND, no masses, no hernias, no hepatosplenomegaly  Lymphatic: Neck and groin nodes negative  Skin: The skin is warm and dry. No jaundice.  Ext: No c/c/e.  : Test desc lauryn, no abnormalities of epididymus. Penis normal, with normal penile and scrotal skin. Meatus normal.     Labs/Studies:   PSA    1/15: 0.71    Impression/Plan:   1. 3 suspicious lesions on right kidney and 1 on the left all a little larger.  Records from [facility] report original oncycytoma and second opinion underway.  Consideration still to staged bilateral partial nephrectomy versus cryotherapy. He just had a hernia repair, is in other workups, and isn't ready to do that now.  Will get Cr and CT in 6 mo and  discuss reccs again then.  Recc is for biopsy/cryo of left renal mass while observing smaller right lesions.

## 2017-03-22 NOTE — LETTER
March 22, 2017      Amarilys Bravo MD  [address] [place] [address] Dr Maritza OLIVIA [id number]           [facility]  [address] [place] [address] Tricia [place]IV[place] [address]  Phone: [phone]  Fax: [phone]          Patient: Mil Sood   MR Number: [id number]   YOB: 1955   Date of Visit: 3/22/2017       Dear Dr. Amarilys Bravo:    Thank you for referring Mil Sood to me for evaluation. Attached you will find relevant portions of my assessment and plan of care.    If you have questions, please do not hesitate to call me. I look forward to following Mil Sood along with you.    Sincerely,    Rashad Cleveland IV, MD    Enclosure  CC:  No Recipients    If you would like to receive this communication electronically, please contact [email or web address] or [phone] to request more information on [employer] access.    For providers and/or their staff who would like to refer a patient to [facility], please contact us through our one-stop-shop provider referral line, [facility], at [phone].    If you feel you have received this communication in error or would no longer like to receive these types of communications, please e-mail [email or web address]

## 2017-03-22 NOTE — MR AVS SNAPSHOT
[facility]  [address] [address]  Phone: [phone]  Fax: [phone]                  Mil LEPE May   3/22/2017 1:00 PM   Office Visit    Description:  Male : 1955   Provider:  Rashad Cleveland IV, MD   Department:  [facility]           Reason for Visit     Other           Diagnoses this Visit        Comments    Oncocytoma    -  Primary            To Do List           Future Appointments        Provider Department Dept Phone    3/31/2017 8:00 AM Ari Pelletier MD [facility] General Surgery [phone]    2017 10:00 AM LABORATORY, [address] [facility] [phone]    2017 9:30 AM [facility] CT1 LIMIT [address] [facility] [phone]    2017 2:20 PM Rashad Cleveland IV, MD [facility] Urolog [phone]      Your Future Surgeries/Procedures     2017   Surgery with Amarilys Bravo MD   [facility])    [address] [address] [phone]              Goals (5 Years of Data)     None      Follow-Up and Disposition     Return in about 6 months (around 2017).    Follow-up and Disposition History      [facility]     [facility] Care [facility] -  Assistance  Registered nurses in the [facility] provide clinical advisement, health education, appointment booking, and other advisory services.  Call for this free service at [phone].             Medications           Message regarding Medications     Verify the changes and/or additions to your medication regime listed below are the same as discussed with your clinician today.  If any of these changes or additions are incorrect, please notify your healthcare provider.             Verify that the below list of medications is an accurate representation of the medications you are currently taking.  If none reported, the list may be blank. If incorrect,  please contact your healthcare provider. Carry this list with you in case of emergency.           Current Medications     amlodipine (NORVASC) 10 MG tablet Take 1 tablet (10 mg total) by mouth once daily.    aspirin 81 mg Tab Take 1 tablet by mouth Daily.    benazepril (LOTENSIN) 20 MG tablet Take 2 tablets (40 mg total) by mouth once daily.    clonazepam (KLONOPIN) 2 MG Tab 2 mg. Take 2 mg by mouth nightly as needed.    gabapentin (NEURONTIN) 300 MG capsule Take 2 capsules (600 mg total) by mouth 3 (three) times daily.    hydrALAZINE (APRESOLINE) 25 MG tablet Take 1 tablet (25 mg total) by mouth 3 (three) times daily.    hydrochlorothiazide (MICROZIDE) 12.5 mg capsule Take 1 capsule (12.5 mg total) by mouth once daily.    hydrocodone-acetaminophen 10-325mg (NORCO)  mg Tab Take 1 tablet by mouth every 4 to 6 hours as needed for Pain.    hydrOXYzine (VISTARIL) 100 MG capsule Take 100 mg by mouth once daily.    lidocaine-prilocaine (EMLA) cream as needed.    methocarbamol (ROBAXIN) 750 MG Tab Take 1 tablet (750 mg total) by mouth 3 (three) times daily.    metoprolol tartrate (LOPRESSOR) 50 MG tablet Take 1 tablet (50 mg total) by mouth 2 (two) times daily.    pantoprazole (PROTONIX) 40 MG tablet Take 1 tablet (40 mg total) by mouth 2 (two) times daily before meals.    tizanidine (ZANAFLEX) 2 MG tablet Take 1 tablet (2 mg total) by mouth daily as needed.    zolpidem (AMBIEN) 10 mg Tab TAKE 1 TABLET BY MOUTH NIGHTLY AS NEEDED.           Clinical Reference Information           Your Vitals Were     BP Weight BMI          142/88 (BP Location: Left arm, Patient Position: Sitting) 93.9 kg (207 lb) 29.7 kg/m2        Blood Pressure          Most Recent Value    BP  (!)  142/88      Allergies as of 3/22/2017     No Known Allergies      Immunizations Administered on Date of Encounter - 3/22/2017     None      Orders Placed During Today's Visit      Normal Orders This Visit    POCT urine dipstick without microscope      Future Labs/Procedures Expected by Expires    Creatinine, serum  3/22/2017 5/21/2018    CT Abdomen Pelvis W Wo Contrast  3/22/2017 3/22/2018         3/22/2017  1:58 PM - Lucille Boggs LPN      Component Results     Component    Color    yellow    Spec Grav    1.025    pH, UA    5    WBC, UA    neg    Nitrite    neg    Protein    trace    Glucose, UA    neg    Ketones, UA    neg    Urobilinogen    neg    Bilirubin    neg    Blood, UA    neg            Language Assistance Services     ATTENTION: Language assistance services are available, free of charge. Please call [phone].      ATENCIÓN: Si habla español, tiene a meza disposición servicios gratuitos de asistencia lingüística. Llame al [phone].     CHÚ Ý: N?u b?n nói Ti?ng Vi?t, có các d?ch v? h? tr? ngôn ng? mi?n phí dành cho b?n. G?i s? [phone].         [facility] complies with applicable Federal civil rights laws and does not discriminate on the basis of race, color, national origin, age, disability, or sex.

## 2017-03-27 DIAGNOSIS — F51.04 CHRONIC INSOMNIA: ICD-10-CM

## 2017-03-27 RX ORDER — ZOLPIDEM TARTRATE 10 MG/1
TABLET ORAL
Qty: 30 TABLET | Refills: 0 | Status: SHIPPED | OUTPATIENT
Start: 2017-03-27 | End: 2017-06-08 | Stop reason: SDUPTHER

## 2017-03-31 ENCOUNTER — TELEPHONE (OUTPATIENT)
Dept: INTERNAL MEDICINE | Facility: CLINIC | Age: 62
End: 2017-03-31

## 2017-03-31 ENCOUNTER — OFFICE VISIT (OUTPATIENT)
Dept: SURGERY | Facility: CLINIC | Age: 62
End: 2017-03-31
Payer: MEDICARE

## 2017-03-31 VITALS
HEART RATE: 52 BPM | SYSTOLIC BLOOD PRESSURE: 151 MMHG | WEIGHT: 207 LBS | BODY MASS INDEX: 29.7 KG/M2 | DIASTOLIC BLOOD PRESSURE: 92 MMHG | TEMPERATURE: 98 F

## 2017-03-31 DIAGNOSIS — Z98.890 POST-OPERATIVE STATE: Primary | ICD-10-CM

## 2017-03-31 PROCEDURE — 99024 POSTOP FOLLOW-UP VISIT: CPT | Mod: S$GLB,,, | Performed by: SURGERY

## 2017-03-31 PROCEDURE — 99999 PR PBB SHADOW E&M-EST. PATIENT-LVL III: CPT | Mod: PBBFAC,,, | Performed by: SURGERY

## 2017-03-31 RX ORDER — HYDROCODONE BITARTRATE AND ACETAMINOPHEN 7.5; 325 MG/1; MG/1
1 TABLET ORAL EVERY 6 HOURS PRN
Qty: 12 TABLET | Refills: 0 | Status: ON HOLD | OUTPATIENT
Start: 2017-03-31 | End: 2017-07-18 | Stop reason: HOSPADM

## 2017-03-31 NOTE — MR AVS SNAPSHOT
[facility]  [address] [address] [address]  Phone: [phone]  Fax: [phone]                  Mil LEPE May   3/31/2017 8:00 AM   Office Visit    Description:  Male : 1955   Provider:  Ari Pelletier MD   Department:  [facility] General Surgery           Reason for Visit     Post-op Evaluation                To Do List           Future Appointments        Provider Department Dept Phone    2017 8:00 AM Ari Pelletier MD [facility] General Surgery [phone]    2017 10:00 AM LABORATORY, [address] [facility] [phone]    2017 9:30 AM [facility] CT1 LIMIT [address] [facility] [phone]    2017 2:20 PM Rashad Cleveland IV, MD [facility] Urology [phone]      Your Future Surgeries/Procedures     2017   Surgery with Amarilys Bravo MD   [facility]    [address] [facility] [address] [address] [phone]              Goals (5 Years of Data)     None       These Medications        Disp Refills Start End    hydrocodone-acetaminophen 7.5-325mg (NORCO) 7.5-325 mg per tablet 12 tablet 0 3/31/2017     Take 1 tablet by mouth every 6 (six) hours as needed for Pain. - Oral    Pharmacy: [facility] [id number] [place] [address] Ph #: [phone]         [facility]     [facility] Care Line - 24 Assistance  Unless otherwise directed by your provider, please contact [facility], our nurse care line that is available for  assistance.     Registered nurses in the [facility] provide: appointment scheduling, clinical advisement, health education, and other advisory services.  Call: [phone] (toll free)               Medications           Message regarding Medications     Verify the changes and/or additions to your medication regime listed below are the same as discussed with your clinician  today.  If any of these changes or additions are incorrect, please notify your healthcare provider.        START taking these NEW medications        Refills    hydrocodone-acetaminophen 7.5-325mg (NORCO) 7.5-325 mg per tablet 0    Sig: Take 1 tablet by mouth every 6 (six) hours as needed for Pain.    Class: Print    Route: Oral      STOP taking these medications     hydrocodone-acetaminophen 10-325mg (NORCO)  mg Tab Take 1 tablet by mouth every 4 to 6 hours as needed for Pain.           Verify that the below list of medications is an accurate representation of the medications you are currently taking.  If none reported, the list may be blank. If incorrect, please contact your healthcare provider. Carry this list with you in case of emergency.           Current Medications     amlodipine (NORVASC) 10 MG tablet Take 1 tablet (10 mg total) by mouth once daily.    aspirin 81 mg Tab Take 1 tablet by mouth Daily.    benazepril (LOTENSIN) 20 MG tablet Take 2 tablets (40 mg total) by mouth once daily.    clonazepam (KLONOPIN) 2 MG Tab 2 mg. Take 2 mg by mouth nightly as needed.    gabapentin (NEURONTIN) 300 MG capsule Take 2 capsules (600 mg total) by mouth 3 (three) times daily.    hydrALAZINE (APRESOLINE) 25 MG tablet Take 1 tablet (25 mg total) by mouth 3 (three) times daily.    hydrochlorothiazide (MICROZIDE) 12.5 mg capsule Take 1 capsule (12.5 mg total) by mouth once daily.    hydrOXYzine (VISTARIL) 100 MG capsule Take 100 mg by mouth once daily.    lidocaine-prilocaine (EMLA) cream as needed.    methocarbamol (ROBAXIN) 750 MG Tab Take 1 tablet (750 mg total) by mouth 3 (three) times daily.    metoprolol tartrate (LOPRESSOR) 50 MG tablet Take 1 tablet (50 mg total) by mouth 2 (two) times daily.    pantoprazole (PROTONIX) 40 MG tablet Take 1 tablet (40 mg total) by mouth 2 (two) times daily before meals.    tizanidine (ZANAFLEX) 2 MG tablet Take 1 tablet (2 mg total) by mouth daily as needed.    zolpidem (AMBIEN)  10 mg Tab TAKE 1 TABLET BY MOUTH NIGHTLY AS NEEDED.    hydrocodone-acetaminophen 7.5-325mg (NORCO) 7.5-325 mg per tablet Take 1 tablet by mouth every 6 (six) hours as needed for Pain.           Clinical Reference Information           Your Vitals Were     BP Pulse Temp Weight BMI    151/92 52 97.9 °F (36.6 °C) (Oral) 93.9 kg (207 lb 0.2 oz) 29.7 kg/m2      Blood Pressure          Most Recent Value    BP  (!)  151/92      Allergies as of 3/31/2017     No Known Allergies      Immunizations Administered on Date of Encounter - 3/31/2017     None      Language Assistance Services     ATTENTION: Language assistance services are available, free of charge. Please call [phone].      ATENCIÓN: Si kunal ibarra, tiene a meza disposición servicios gratuitos de asistencia lingüística. Llame al [phone].     CHÚ Ý: N?u b?n nói Ti?ng Vi?t, có các d?ch v? h? tr? ngôn ng? mi?n phí dành cho b?n. G?i s? [phone].         [facility] complies with applicable Federal civil rights laws and does not discriminate on the basis of race, color, national origin, age, disability, or sex.

## 2017-03-31 NOTE — TELEPHONE ENCOUNTER
Patient notified prescription refill for Ambien sent to pharmacy 03/27/17. Verbalized understanding.

## 2017-03-31 NOTE — TELEPHONE ENCOUNTER
----- Message from Rosaline Wilkes sent at 3/31/2017  8:34 AM CDT -----  HE IS NEEDING A REFILL OF HIS AMBIEN HE HAS TO USE MORE THAN HE USUALLY DOES SO HE COULD REST FROM HIS HERNIA SURGERY.

## 2017-04-07 ENCOUNTER — HOSPITAL ENCOUNTER (OUTPATIENT)
Facility: HOSPITAL | Age: 62
Discharge: HOME | End: 2017-04-07
Attending: INTERNAL MEDICINE | Admitting: INTERNAL MEDICINE
Payer: MEDICARE

## 2017-04-07 ENCOUNTER — SURGERY (OUTPATIENT)
Age: 62
End: 2017-04-07

## 2017-04-07 ENCOUNTER — ANESTHESIA EVENT (OUTPATIENT)
Dept: ENDOSCOPY | Facility: HOSPITAL | Age: 62
End: 2017-04-07
Payer: MEDICARE

## 2017-04-07 ENCOUNTER — ANESTHESIA (OUTPATIENT)
Dept: ENDOSCOPY | Facility: HOSPITAL | Age: 62
End: 2017-04-07
Payer: MEDICARE

## 2017-04-07 VITALS — RESPIRATION RATE: 21 BRPM

## 2017-04-07 VITALS
TEMPERATURE: 98 F | WEIGHT: 201 LBS | HEIGHT: 68 IN | OXYGEN SATURATION: 100 % | RESPIRATION RATE: 17 BRPM | HEART RATE: 66 BPM | DIASTOLIC BLOOD PRESSURE: 73 MMHG | BODY MASS INDEX: 30.46 KG/M2 | SYSTOLIC BLOOD PRESSURE: 118 MMHG

## 2017-04-07 DIAGNOSIS — Z12.11 SCREEN FOR COLON CANCER: ICD-10-CM

## 2017-04-07 LAB — POCT GLUCOSE: 105 MG/DL (ref 70–110)

## 2017-04-07 PROCEDURE — 25000003 PHARM REV CODE 250: Performed by: INTERNAL MEDICINE

## 2017-04-07 PROCEDURE — 63600175 PHARM REV CODE 636 W HCPCS: Performed by: NURSE ANESTHETIST, CERTIFIED REGISTERED

## 2017-04-07 PROCEDURE — 37000008 HC ANESTHESIA 1ST 15 MINUTES: Performed by: INTERNAL MEDICINE

## 2017-04-07 PROCEDURE — 88305 TISSUE EXAM BY PATHOLOGIST: CPT | Mod: 26,,, | Performed by: PATHOLOGY

## 2017-04-07 PROCEDURE — 45380 COLONOSCOPY AND BIOPSY: CPT | Mod: PT,,, | Performed by: INTERNAL MEDICINE

## 2017-04-07 PROCEDURE — 37000009 HC ANESTHESIA EA ADD 15 MINS: Performed by: INTERNAL MEDICINE

## 2017-04-07 PROCEDURE — 25000003 PHARM REV CODE 250: Performed by: NURSE ANESTHETIST, CERTIFIED REGISTERED

## 2017-04-07 PROCEDURE — 82962 GLUCOSE BLOOD TEST: CPT | Performed by: INTERNAL MEDICINE

## 2017-04-07 PROCEDURE — 27201012 HC FORCEPS, HOT/COLD, DISP: Performed by: INTERNAL MEDICINE

## 2017-04-07 PROCEDURE — 45380 COLONOSCOPY AND BIOPSY: CPT | Performed by: INTERNAL MEDICINE

## 2017-04-07 PROCEDURE — 88305 TISSUE EXAM BY PATHOLOGIST: CPT | Performed by: PATHOLOGY

## 2017-04-07 RX ORDER — SODIUM CHLORIDE, SODIUM LACTATE, POTASSIUM CHLORIDE, CALCIUM CHLORIDE 600; 310; 30; 20 MG/100ML; MG/100ML; MG/100ML; MG/100ML
INJECTION, SOLUTION INTRAVENOUS CONTINUOUS
Status: DISCONTINUED | OUTPATIENT
Start: 2017-04-07 | End: 2017-04-07 | Stop reason: HOSPADM

## 2017-04-07 RX ORDER — PROPOFOL 10 MG/ML
VIAL (ML) INTRAVENOUS
Status: DISCONTINUED | OUTPATIENT
Start: 2017-04-07 | End: 2017-04-07

## 2017-04-07 RX ORDER — LIDOCAINE HYDROCHLORIDE 10 MG/ML
INJECTION INFILTRATION; PERINEURAL
Status: DISCONTINUED | OUTPATIENT
Start: 2017-04-07 | End: 2017-04-07

## 2017-04-07 RX ADMIN — PROPOFOL 20 MG: 10 INJECTION, EMULSION INTRAVENOUS at 07:04

## 2017-04-07 RX ADMIN — PROPOFOL 50 MG: 10 INJECTION, EMULSION INTRAVENOUS at 07:04

## 2017-04-07 RX ADMIN — LIDOCAINE HYDROCHLORIDE 50 MG: 10 INJECTION, SOLUTION INFILTRATION; PERINEURAL at 07:04

## 2017-04-07 RX ADMIN — SODIUM CHLORIDE, SODIUM LACTATE, POTASSIUM CHLORIDE, AND CALCIUM CHLORIDE: .6; .31; .03; .02 INJECTION, SOLUTION INTRAVENOUS at 07:04

## 2017-04-07 NOTE — ANESTHESIA PREPROCEDURE EVALUATION
04/07/2017  Mil Sood is a 62 y.o., male.    [facility] Anesthesia Evaluation    I have reviewed the Patient Summary Reports.    I have reviewed the Nursing Notes.   I have reviewed the Medications.     Review of Systems  Anesthesia Hx:  No problems with previous Anesthesia  Denies Family Hx of Anesthesia complications.   Denies Personal Hx of Anesthesia complications.   Social:  Former Smoker, No Alcohol Use    Hematology/Oncology:  Hematology Normal      Oncology Comments: Lung and kidney    Cardiovascular:   Hypertension Denies MI. CAD    Denies CABG/stent.      hyperlipidemia ECG has been reviewed. ekg 3/2017:  Sinus bradycardia 54  Otherwise normal ECG  When compared with ECG of 06-NOV-2014 11:26,  Nonspecific T wave abnormality no longer evident in Lateral leads    Echo 11/2014:  1 - Normal left ventricular systolic function (EF 60-65%).     2 - Normal right ventricular systolic function    Pulmonary:   Denies COPD.  Denies Asthma.  Denies Sleep Apnea.    Renal/:  Renal/ Normal  S/p partial nephrectomy   Hepatic/GI:   Bowel Prep. GERD Denies Liver Disease. Denies Hepatitis. jensen's   Musculoskeletal:  Musculoskeletal Normal    Neurological:   Denies CVA. Denies Seizures.    Endocrine:   Diabetes Denies Hypothyroidism. Denies Hyperthyroidism.    Psych:   anxiety depression          Physical Exam  General:  Obesity    Airway/Jaw/Neck:  Airway Findings: Mouth Opening: Normal Tongue: Normal  General Airway Assessment: Adult  Mallampati: II      Dental:  Dental Findings: In tact   Chest/Lungs:  Chest/Lungs Findings: Clear to auscultation, Normal Respiratory Rate     Heart/Vascular:  Heart Findings: Rate: Normal  Rhythm: Regular Rhythm  Sounds: Normal             Anesthesia Plan  Type of Anesthesia, risks & benefits discussed:  Anesthesia Type:  MAC  Patient's Preference:   Intra-op Monitoring Plan:  standard ASA monitors  Intra-op Monitoring Plan Comments:   Post Op Pain Control Plan:   Post Op Pain Control Plan Comments:   Induction:   IV  Beta Blocker:  Patient is on a Beta-Blocker and has received one dose within the past 24 hours (No further documentation required).       Informed Consent: Patient understands risks and agrees with Anesthesia plan.  Questions answered. Anesthesia consent signed with patient.  ASA Score: 2     Day of Surgery Review of History & Physical: I have interviewed and examined the patient. I have reviewed the patient's H&P dated:  There are no significant changes.  H&P update referred to the surgeon.         Ready For Surgery From Anesthesia Perspective.

## 2017-04-07 NOTE — BRIEF OP NOTE
[facility]  Brief Operative Note     SUMMARY     Surgery Date: 4/7/2017     Surgeon(s) and Role:     * Amarilys Bravo MD - Primary    Assisting Surgeon: None    Pre-op Diagnosis:  Screen for colon cancer [Z12.11]    Post-op Diagnosis:  Post-Op Diagnosis Codes:     * Screen for colon cancer [Z12.11]    Procedure(s) (LRB):  COLONOSCOPY (N/A)    Anesthesia: Monitor Anesthesia Care    Description of the findings of the procedure: Procedure completed. See Procedure note for details.     Findings/Key Components:  Procedure completed. See Procedure note for details.     Prosthetic/Devices: None    Estimated Blood Loss: None         Specimens:   Specimen (12h ago through future)    Start     Ordered    04/07/17 0740  Specimen to Pathology - Surgery  Once     Comments:  1. Cecal polyps x 4  2. rectal polyp    04/07/17 0751          Discharge Note    SUMMARY     Admit Date: 4/7/2017    Discharge Date and Time: 4/7/2017    Hospital Course (synopsis of major diagnoses, care, treatment, and services provided during the course of the hospital stay): Procedure completed. See Procedure note for details.     Final Diagnosis: Post-Op Diagnosis Codes:     * Screen for colon cancer [Z12.11]    Disposition: Discharge home when discharge criteria met.    Follow Up/Patient Instructions: With primary care physician as previously scheduled.    Medications:  Reconciled Home Medications: Current Discharge Medication List      CONTINUE these medications which have NOT CHANGED    Details   amlodipine (NORVASC) 10 MG tablet Take 1 tablet (10 mg total) by mouth once daily.  Qty: 90 tablet, Refills: 1      benazepril (LOTENSIN) 20 MG tablet Take 2 tablets (40 mg total) by mouth once daily.  Qty: 180 tablet, Refills: 1      clonazepam (KLONOPIN) 2 MG Tab 2 mg. Take 2 mg by mouth nightly as needed.      metoprolol tartrate (LOPRESSOR) 50 MG tablet Take 1 tablet (50 mg total) by mouth 2 (two) times daily.  Qty: 180 tablet,  Refills: 1      aspirin 81 mg Tab Take 1 tablet by mouth Daily.      gabapentin (NEURONTIN) 300 MG capsule Take 2 capsules (600 mg total) by mouth 3 (three) times daily.  Qty: 270 capsule, Refills: 1      hydrALAZINE (APRESOLINE) 25 MG tablet Take 1 tablet (25 mg total) by mouth 3 (three) times daily.  Qty: 90 tablet, Refills: 11      hydrochlorothiazide (MICROZIDE) 12.5 mg capsule Take 1 capsule (12.5 mg total) by mouth once daily.  Qty: 90 capsule, Refills: 1      hydrocodone-acetaminophen 7.5-325mg (NORCO) 7.5-325 mg per tablet Take 1 tablet by mouth every 6 (six) hours as needed for Pain.  Qty: 12 tablet, Refills: 0      hydrOXYzine (VISTARIL) 100 MG capsule Take 100 mg by mouth once daily.      lidocaine-prilocaine (EMLA) cream as needed.      methocarbamol (ROBAXIN) 750 MG Tab Take 1 tablet (750 mg total) by mouth 3 (three) times daily.  Qty: 60 tablet, Refills: 3      pantoprazole (PROTONIX) 40 MG tablet Take 1 tablet (40 mg total) by mouth 2 (two) times daily before meals.  Qty: 240 tablet, Refills: 1      tizanidine (ZANAFLEX) 2 MG tablet Take 1 tablet (2 mg total) by mouth daily as needed.  Qty: 90 tablet, Refills: 1      zolpidem (AMBIEN) 10 mg Tab TAKE 1 TABLET BY MOUTH NIGHTLY AS NEEDED.  Qty: 30 tablet, Refills: 0    Associated Diagnoses: Chronic insomnia             Discharge Procedure Orders  Diet general     Activity as tolerated       Follow-up Information     Follow up with Yumi Roberson MD.    Specialty:  Family Medicine    Contact information:    [address] [address] [address] [phone]

## 2017-04-07 NOTE — H&P
Short Stay Endoscopy History and Physical    PCP - Yumi Roberson MD    Procedure - Colonoscopy  ASA - II  Mallampati - per anesthesia  History of Anesthesia problems - no  Family history Anesthesia problems -  no     HPI:  This is a 62 y.o. male here for evaluation of :  Screening for colon cancer    Average Risk Screening:Yes  Family history of colon cancer: No  History of polyps: No  Anemia: No  Blood in stools: No  Diarrhea: No  Abdominal Pain: No    Review of Systems:  CONSTITUTIONAL: Denies weight change,  fatigue, fevers, chills, night sweats.  CARDIOVASCULAR: Denies chest pain, shortness of breath, orthopnea and edema.  RESPIRATORY: Denies cough, hemoptysis, dyspnea, and wheezing.  GI: See HPI.    Medical History:  Past Medical History:   Diagnosis Date    Anticoagulant long-term use     Anxiety     Cancer     spot on kidney and lung    Diabetes mellitus     Borderline    Diverticulitis     GERD (gastroesophageal reflux disease)     Hyperlipidemia     not on any meds currently    Hypertension     Lung mass     Mass of kidney        Surgical History:   Past Surgical History:   Procedure Laterality Date    CHOLECYSTECTOMY      COLONOSCOPY      ESOPHAGOGASTRODUODENOSCOPY      NECK SURGERY      PARTIAL NEPHRECTOMY      left    TONSILLECTOMY         Family History:   Family History   Problem Relation Age of Onset    Hypertension Mother     Hypertension Father     Melanoma Father     Cancer Sister        Social History:   Social History   Substance Use Topics    Smoking status: Former Smoker     Packs/day: 1.00     Years: 20.00     Quit date: 2/4/1995    Smokeless tobacco: Never Used    Alcohol use No       Allergies: Reviewed.    Medications:  No current facility-administered medications on file prior to encounter.      Current Outpatient Prescriptions on File Prior to Encounter   Medication Sig Dispense Refill    amlodipine (NORVASC) 10 MG tablet Take 1 tablet (10 mg total) by mouth  once daily. 90 tablet 1    benazepril (LOTENSIN) 20 MG tablet Take 2 tablets (40 mg total) by mouth once daily. 180 tablet 1    clonazepam (KLONOPIN) 2 MG Tab 2 mg. Take 2 mg by mouth nightly as needed.      metoprolol tartrate (LOPRESSOR) 50 MG tablet Take 1 tablet (50 mg total) by mouth 2 (two) times daily. 180 tablet 1    aspirin 81 mg Tab Take 1 tablet by mouth Daily.      gabapentin (NEURONTIN) 300 MG capsule Take 2 capsules (600 mg total) by mouth 3 (three) times daily. 270 capsule 1    hydrALAZINE (APRESOLINE) 25 MG tablet Take 1 tablet (25 mg total) by mouth 3 (three) times daily. 90 tablet 11    hydrochlorothiazide (MICROZIDE) 12.5 mg capsule Take 1 capsule (12.5 mg total) by mouth once daily. 90 capsule 1    hydrOXYzine (VISTARIL) 100 MG capsule Take 100 mg by mouth once daily.      lidocaine-prilocaine (EMLA) cream as needed.      methocarbamol (ROBAXIN) 750 MG Tab Take 1 tablet (750 mg total) by mouth 3 (three) times daily. 60 tablet 3    pantoprazole (PROTONIX) 40 MG tablet Take 1 tablet (40 mg total) by mouth 2 (two) times daily before meals. 240 tablet 1    tizanidine (ZANAFLEX) 2 MG tablet Take 1 tablet (2 mg total) by mouth daily as needed. 90 tablet 1       Physical Exam:  Vital Signs:   Vitals:    04/07/17 0653   BP: (!) 153/90   Pulse: (!) 57   Resp: 18   Temp: 97.5 °F (36.4 °C)     General Appearance: Well appearing in no acute distress  ENT: OP clear  Chest: CTA B  CV: RRR, no m/r/g  Abd: s/nt/nd/nabs  Ext: no edema    Labs:  Lab Results   Component Value Date    WBC 5.87 03/03/2017    HGB 15.2 03/03/2017    HCT 45.5 03/03/2017    MCV 88 03/03/2017     03/03/2017     Lab Results   Component Value Date    INR 1.0 05/02/2011     No results found for: IRON, TIBC, FERRITIN, SATURATEDIRO      IMPRESSION:  Patient Active Problem List   Diagnosis    HTN (hypertension)    GERD (gastroesophageal reflux disease)    Prediabetes    Depression    CAD (coronary artery disease)     Vertigo, central    Cervical spondylosis    Oropharyngeal dysphagia    Culp's esophagus with high grade dysplasia    Culp's esophagus determined by biopsy    Bilateral inguinal hernia without obstruction or gangrene    Screen for colon cancer       Colon cancer screening    Plan:  I have explained the risks and benefits of colonoscopy to the patient including but not limited to bleeding, perforation, infection, and death. The patient wishes to proceed with colonoscopy.

## 2017-04-07 NOTE — DISCHARGE INSTRUCTIONS

## 2017-04-07 NOTE — ANESTHESIA POSTPROCEDURE EVALUATION
"Anesthesia Post Evaluation    Patient: Mil Sood    Procedure(s) Performed: Procedure(s) (LRB):  COLONOSCOPY (N/A)    Final Anesthesia Type: MAC  Patient location during evaluation: PACU  Patient participation: No - Unable to Participate, Sedation  Level of consciousness: sedated  Post-procedure vital signs: reviewed and stable  Pain management: adequate  Airway patency: patent  PONV status at discharge: No PONV  Anesthetic complications: no      Cardiovascular status: blood pressure returned to baseline and hemodynamically stable  Respiratory status: unassisted, spontaneous ventilation and room air  Hydration status: euvolemic  Follow-up not needed.        Visit Vitals    BP (!) 153/90 (BP Location: Left arm, Patient Position: Lying, BP Method: Automatic)    Pulse (!) 57    Temp 36.4 °C (97.5 °F) (Oral)    Resp 18    Ht 5' 8" (1.727 m)    Wt 91.2 kg (201 lb)    SpO2 98%    BMI 30.56 kg/m2       Pain/Doug Score: Pain Assessment Performed: Yes (4/7/2017  6:57 AM)  Presence of Pain: denies (4/7/2017  6:57 AM)      "

## 2017-04-07 NOTE — IP AVS SNAPSHOT
[address] [place] [address]           Patient Discharge Instructions   Our goal is to set you up for success. This packet includes information on your condition, medications, and your home care.  It will help you care for yourself to prevent having to return to the hospital.     Please ask your nurse if you have any questions.      There are many details to remember when preparing to leave the hospital. Here is what you will need to do:    1. Take your medicine. If you are prescribed medications, review your Medication List on the following pages. You may have new medications to  at the pharmacy and others that you'll need to stop taking. Review the instructions for how and when to take your medications. Talk with your doctor or nurses if you are unsure of what to do.     2. Go to your follow-up appointments. Specific follow-up information is listed in the following pages. Your may be contacted by a nurse or clinical provider about future appointments. Be sure we have all of the phone numbers to reach you. Please contact your provider's office if you are unable to make an appointment.     3. Watch for warning signs. Your doctor or nurse will give you detailed warning signs to watch for and when to call for assistance. These instructions may also include educational information about your condition. If you experience any of warning signs to your health, call your doctor.               ** Verify the list of medication(s) below is accurate and up to date. Carry this with you in case of emergency. If your medications have changed, please notify your healthcare provider.             Medication List      CONTINUE taking these medications        Additional Info                      amlodipine 10 MG tablet   Commonly known as:  NORVASC   Quantity:  90 tablet   Refills:  1   Dose:  10 mg    Instructions:  Take 1 tablet (10 mg total) by mouth once daily.     Begin  Date    AM    Noon    PM    Bedtime       aspirin 81 mg Tab   Refills:  0   Dose:  1 tablet    Instructions:  Take 1 tablet by mouth Daily.     Begin Date    AM    Noon    PM    Bedtime       benazepril 20 MG tablet   Commonly known as:  LOTENSIN   Quantity:  180 tablet   Refills:  1   Dose:  40 mg    Instructions:  Take 2 tablets (40 mg total) by mouth once daily.     Begin Date    AM    Noon    PM    Bedtime       clonazePAM 2 MG Tab   Commonly known as:  KLONOPIN   Refills:  0   Dose:  2 mg    Instructions:  2 mg. Take 2 mg by mouth nightly as needed.     Begin Date    AM    Noon    PM    Bedtime       gabapentin 300 MG capsule   Commonly known as:  NEURONTIN   Quantity:  270 capsule   Refills:  1   Dose:  600 mg    Instructions:  Take 2 capsules (600 mg total) by mouth 3 (three) times daily.     Begin Date    AM    Noon    PM    Bedtime       hydrALAZINE 25 MG tablet   Commonly known as:  APRESOLINE   Quantity:  90 tablet   Refills:  11   Dose:  25 mg    Instructions:  Take 1 tablet (25 mg total) by mouth 3 (three) times daily.     Begin Date    AM    Noon    PM    Bedtime       hydrochlorothiazide 12.5 mg capsule   Commonly known as:  MICROZIDE   Quantity:  90 capsule   Refills:  1   Dose:  12.5 mg    Instructions:  Take 1 capsule (12.5 mg total) by mouth once daily.     Begin Date    AM    Noon    PM    Bedtime       hydrocodone-acetaminophen 7.5-325mg 7.5-325 mg per tablet   Commonly known as:  NORCO   Quantity:  12 tablet   Refills:  0   Dose:  1 tablet    Instructions:  Take 1 tablet by mouth every 6 (six) hours as needed for Pain.     Begin Date    AM    Noon    PM    Bedtime       hydrOXYzine 100 MG capsule   Commonly known as:  VISTARIL   Refills:  0   Dose:  100 mg    Instructions:  Take 100 mg by mouth once daily.     Begin Date    AM    Noon    PM    Bedtime       lidocaine-prilocaine cream   Commonly known as:  EMLA   Refills:  0    Instructions:  as needed.     Begin Date    AM    Noon    PM     Bedtime       methocarbamol 750 MG Tab   Commonly known as:  ROBAXIN   Quantity:  60 tablet   Refills:  3   Dose:  750 mg    Instructions:  Take 1 tablet (750 mg total) by mouth 3 (three) times daily.     Begin Date    AM    Noon    PM    Bedtime       metoprolol tartrate 50 MG tablet   Commonly known as:  LOPRESSOR   Quantity:  180 tablet   Refills:  1   Dose:  50 mg    Instructions:  Take 1 tablet (50 mg total) by mouth 2 (two) times daily.     Begin Date    AM    Noon    PM    Bedtime       pantoprazole 40 MG tablet   Commonly known as:  PROTONIX   Quantity:  240 tablet   Refills:  1   Dose:  40 mg    Instructions:  Take 1 tablet (40 mg total) by mouth 2 (two) times daily before meals.     Begin Date    AM    Noon    PM    Bedtime       tizanidine 2 MG tablet   Commonly known as:  ZANAFLEX   Quantity:  90 tablet   Refills:  1   Dose:  2 mg    Instructions:  Take 1 tablet (2 mg total) by mouth daily as needed.     Begin Date    AM    Noon    PM    Bedtime       zolpidem 10 mg Tab   Commonly known as:  AMBIEN   Quantity:  30 tablet   Refills:  0    Instructions:  TAKE 1 TABLET BY MOUTH NIGHTLY AS NEEDED.     Begin Date    AM    Noon    PM    Bedtime                  Please bring to all follow up appointments:    1. A copy of your discharge instructions.  2. All medicines you are currently taking in their original bottles.  3. Identification and insurance card.    Please arrive 15 minutes ahead of scheduled appointment time.    Please call 24 hours in advance if you must reschedule your appointment and/or time.        Your Scheduled Appointments     Apr 28, 2017  8:00 AM CDT   Post OP with MD Erick Capellan - General Surgery ([facility])    [address] [place] [id number] [phone]            Sep 22, 2017 10:00 AM CDT   Non-Fasting Lab with LABORATORY, ERICK ALLEN   [facility] ([facility])    [address] [place] [id number]    [phone]            Sep 25, 2017  9:30 AM CDT   Ct Abd Pel W Contrast with [facility] CT1 LIMIT 500 LBS   [facility]    [address] [facility] [id number] [phone]            Sep 28, 2017  2:20 PM CDT   Established Patient Visit with MD Maureen Page IV - Urology ([facility])    [address] [facility] [address] [phone]              Follow-up Information     Follow up with Yumi Roberson MD.    Specialty:  Family Medicine    Contact information:    [address] [address] [address] [phone]          Discharge Instructions     Future Orders    Activity as tolerated     Diet general     Questions:    Total calories:      Fat restriction, if any:      Protein restriction, if any:      Na restriction, if any:      Fluid restriction:      Additional restrictions:          Discharge Instructions         Understanding Colon and Rectal Polyps    The colon (also called the large intestine) is a muscular tube that forms the last part of the digestive tract. It absorbs water and stores food waste. The colon is about 4 to 6 feet long. The rectum is the last 6 inches of the colon. The colon and rectum have a smooth lining composed of millions of cells. Changes in these cells can lead to growths in the colon that can become cancerous and should be removed. Multiple tests are available to screen for colon cancer, but the colonoscopy is the most recommended test. During colonoscopy, these polyps can be removed. How often you need this test depends on many things including your condition, your family history, symptoms, and what the findings were at the previous colonoscopy.   When the colon lining changes  Changes that happen in the cells that line the colon or rectum can lead to growths called polyps. Over a period of years, polyps can turn cancerous. Removing polyps early may prevent cancer from ever forming.  Polyps  Polyps  "are fleshy clumps of tissue that form on the lining of the colon or rectum. Small polyps are usually benign (not cancerous). However, over time, cells in a polyp can change and become cancerous. Certain types of polyps known as adenomatous polyps are premalignant. The risk for invasive cancer increases with the size of the polyp and certain cell and gene features. This means that they can become cancerous if they're not removed. Hyperplastic polyps are benign. They can grow quite large and not turn cancerous.   Cancer  Almost all colorectal cancers start when polyp cells begin growing abnormally. As a cancerous tumor grows, it may involve more and more of the colon or rectum. In time, cancer can also grow beyond the colon or rectum and spread to nearby organs or to glands called lymph nodes. The cells can also travel to other parts of the body. This is known as metastasis. The earlier a cancerous tumor is removed, the better the chance of preventing its spread.    Date Last Reviewed: 8/1/2016  © 6006-1606 [employer]. [address] [place] [address]. All rights reserved. This information is not intended as a substitute for professional medical care. Always follow your healthcare professional's instructions.            Admission Information     Date & Time Provider Department CSN    4/7/2017  6:27 AM Amarilys Bravo MD [facility] -  [id number]      Care Providers     Provider Role Specialty Primary office phone    Amarilys Bravo MD Attending Provider Gastroenterology [phone]    Amarilys Bravo MD Surgeon  Gastroenterology [phone]      Your Vitals Were     BP Pulse Temp Resp Height Weight    118/73 66 98.2 °F (36.8 °C) (Oral) 17 5' 8" (1.727 m) 91.2 kg (201 lb)    SpO2 BMI             100% 30.56 kg/m2         Recent Lab Values        4/5/2012 10/5/2012 4/19/2013 1/2/2014 6/18/2014 1/5/2015 7/13/2015 1/23/2017      8:09 AM  8:13 AM  7:58 AM  1:41 PM  9:04 AM "  8:40 AM  9:22 AM  8:08 AM    A1C 6.0 5.9 6.0 6.2 6.2 6.1 6.0 6.0    Comment for A1C at  8:08 AM on 1/23/2017:  According to ADA guidelines, hemoglobin A1C <7.0% represents  optimal control in non-pregnant diabetic patients.  Different  metrics may apply to specific populations.   Standards of Medical Care in Diabetes - 2016.  For the purpose of screening for the presence of diabetes:  <5.7%     Consistent with the absence of diabetes  5.7-6.4%  Consistent with increasing risk for diabetes   (prediabetes)  >or=6.5%  Consistent with diabetes  Currently no consensus exists for use of hemoglobin A1C  for diagnosis of diabetes for children.        Pending Labs     Order Current Status    Specimen to Pathology - Surgery Collected (04/07/17 0751)      Allergies as of 4/7/2017        Reactions    No Known Allergies       [facility]     [facility] Nurse Care Line - 24/7 Assistance  Unless otherwise directed by your provider, please contact [facility], our nurse care line that is available for 24/7 assistance.     Registered nurses in the [facility] provide clinical advisement, health education, appointment booking, and other advisory services.  Call for this free service at [phone].        Advance Directives     An advance directive is a document which, in the event you are no longer able to make decisions for yourself, tells your healthcare team what kind of treatment you do or do not want to receive, or who you would like to make those decisions for you.  If you do not currently have an advance directive, [facility] encourages you to create one.  For more information call:  [phone]), [phone] ([phone]),  or log on to [email or web address].        Smoking Cessation     If you would like to quit smoking:   You may be eligible for free services if you are a [place] resident and started smoking cigarettes before September 1, 1988.  Call the Smoking Cessation  Trust ([facility]) toll free at (429) [phone] or (292) 758-0832.   Call 1-800-QUIT-NOW if you do not meet the above criteria.   Contact us via email: [email or web address]   View our website for more information: [email or web address]        Language Assistance Services     ATTENTION: Language assistance services are available, free of charge. Please call [phone].      ATENCIÓN: Si habla español, tiene a meza disposición servicios gratuitos de asistencia lingüística. Llame al [phone].     CHÚ Ý: N?u b?n nói Ti?ng Vi?t, có các d?ch v? h? tr? ngôn ng? mi?n phí dành cho b?n. G?i s? [phone].         [facility] complies with applicable Federal civil rights laws and does not discriminate on the basis of race, color, national origin, age, disability, or sex.

## 2017-04-07 NOTE — TRANSFER OF CARE
"Anesthesia Transfer of Care Note    Patient: Mil Sood    Procedure(s) Performed: Procedure(s) (LRB):  COLONOSCOPY (N/A)    Patient location: PACU    Anesthesia Type: MAC    Transport from OR: Transported from OR on room air with adequate spontaneous ventilation    Post pain: adequate analgesia    Post assessment: no apparent anesthetic complications and tolerated procedure well    Post vital signs: stable    Level of consciousness: sedated    Nausea/Vomiting: no nausea/vomiting    Complications: none          Last vitals:   Visit Vitals    BP (!) 153/90 (BP Location: Left arm, Patient Position: Lying, BP Method: Automatic)    Pulse (!) 57    Temp 36.4 °C (97.5 °F) (Oral)    Resp 18    Ht 5' 8" (1.727 m)    Wt 91.2 kg (201 lb)    SpO2 98%    BMI 30.56 kg/m2     "

## 2017-04-07 NOTE — ANESTHESIA RELEASE NOTE
"Anesthesia Release from PACU Note    Patient: Mil Sood    Procedure(s) Performed: Procedure(s) (LRB):  COLONOSCOPY (N/A)    Anesthesia type: MAC    Post pain: Adequate analgesia    Post assessment: no apparent anesthetic complications, tolerated procedure well and no evidence of recall    Last Vitals:   Visit Vitals    BP (!) 153/90 (BP Location: Left arm, Patient Position: Lying, BP Method: Automatic)    Pulse (!) 57    Temp 36.4 °C (97.5 °F) (Oral)    Resp 18    Ht 5' 8" (1.727 m)    Wt 91.2 kg (201 lb)    SpO2 98%    BMI 30.56 kg/m2       Post vital signs: stable    Level of consciousness: sedated    Nausea/Vomiting: no nausea/no vomiting    Complications: none    Airway Patency: patent    Respiratory: unassisted, spontaneous ventilation, room air    Cardiovascular: stable and blood pressure at baseline    Hydration: euvolemic  "

## 2017-04-22 RX ORDER — GABAPENTIN 300 MG/1
CAPSULE ORAL
Qty: 270 CAPSULE | Refills: 0 | Status: SHIPPED | OUTPATIENT
Start: 2017-04-22 | End: 2017-05-24 | Stop reason: SDUPTHER

## 2017-04-28 ENCOUNTER — OFFICE VISIT (OUTPATIENT)
Dept: SURGERY | Facility: CLINIC | Age: 62
End: 2017-04-28
Payer: MEDICARE

## 2017-04-28 VITALS
WEIGHT: 203.69 LBS | DIASTOLIC BLOOD PRESSURE: 102 MMHG | TEMPERATURE: 98 F | HEART RATE: 61 BPM | BODY MASS INDEX: 30.97 KG/M2 | SYSTOLIC BLOOD PRESSURE: 177 MMHG

## 2017-04-28 DIAGNOSIS — Z98.890 POST-OPERATIVE STATE: Primary | ICD-10-CM

## 2017-04-28 PROCEDURE — 99024 POSTOP FOLLOW-UP VISIT: CPT | Mod: S$GLB,,, | Performed by: SURGERY

## 2017-04-28 PROCEDURE — 99999 PR PBB SHADOW E&M-EST. PATIENT-LVL III: CPT | Mod: PBBFAC,,, | Performed by: SURGERY

## 2017-04-28 NOTE — PROGRESS NOTES
Patient now 6 weeks post bilateral inguinal hernia repair with mesh. He is doing well and back to all his regular activities. No gi or gu complaints. Wound is fine with occasional tenderness to touch but no pain  O:vss, heart normal rate, normal resp. Abdomen is soft non tender. Both incisions are clean and dry , healing well with fascia firm , testes are normal  IMP: doing well post bilateral inguinal hernia repair with mesh  P: slowly resume exercises and normal activities. See me prn.

## 2017-04-28 NOTE — MR AVS SNAPSHOT
[facility] Surgery  [address] [address] [facility] [address]  [place] [address]  Phone: [phone]  Fax: [phone]                  Mil LEPE May   2017 8:00 AM   Office Visit    Description:  Male : 1955   Provider:  Ari Pelletier MD   Department:  [facility] General Surgery           Reason for Visit     Post-op Evaluation                To Do List           Future Appointments        Provider Department Dept Phone    2017 10:00 AM LABORATORY, [address] [facility] [phone]    2017 9:30 AM SUMH CT1 LIMIT 500 [address] [facility] [phone]    2017 2:20 PM Rashad Cleveland IV, MD [facility] Urology [phone]      Goals (5 Years of Data)     None      [facility]     [facility] Nurse Care Line -  Assistance  Unless otherwise directed by your provider, please contact [facility], our nurse care line that is available for  assistance.     Registered nurses in the [facility] provide: appointment scheduling, clinical advisement, health education, and other advisory services.  Call: [phone] (toll free)               Medications           Message regarding Medications     Verify the changes and/or additions to your medication regime listed below are the same as discussed with your clinician today.  If any of these changes or additions are incorrect, please notify your healthcare provider.             Verify that the below list of medications is an accurate representation of the medications you are currently taking.  If none reported, the list may be blank. If incorrect, please contact your healthcare provider. Carry this list with you in case of emergency.           Current Medications     amlodipine (NORVASC) 10 MG tablet Take 1 tablet (10 mg total) by mouth once daily.    aspirin 81 mg Tab Take 1 tablet by mouth Daily.    benazepril (LOTENSIN) 20 MG tablet Take 2 tablets (40 mg total) by mouth once  daily.    clonazepam (KLONOPIN) 2 MG Tab 2 mg. Take 2 mg by mouth nightly as needed.    gabapentin (NEURONTIN) 300 MG capsule TAKE 2 CAPSULES BY MOUTH THREE TIMES DAILY.    hydrALAZINE (APRESOLINE) 25 MG tablet Take 1 tablet (25 mg total) by mouth 3 (three) times daily.    hydrochlorothiazide (MICROZIDE) 12.5 mg capsule Take 1 capsule (12.5 mg total) by mouth once daily.    hydrocodone-acetaminophen 7.5-325mg (NORCO) 7.5-325 mg per tablet Take 1 tablet by mouth every 6 (six) hours as needed for Pain.    hydrOXYzine (VISTARIL) 100 MG capsule Take 100 mg by mouth once daily.    lidocaine-prilocaine (EMLA) cream as needed.    methocarbamol (ROBAXIN) 750 MG Tab Take 1 tablet (750 mg total) by mouth 3 (three) times daily.    metoprolol tartrate (LOPRESSOR) 50 MG tablet Take 1 tablet (50 mg total) by mouth 2 (two) times daily.    pantoprazole (PROTONIX) 40 MG tablet Take 1 tablet (40 mg total) by mouth 2 (two) times daily before meals.    tizanidine (ZANAFLEX) 2 MG tablet Take 1 tablet (2 mg total) by mouth daily as needed.    zolpidem (AMBIEN) 10 mg Tab TAKE 1 TABLET BY MOUTH NIGHTLY AS NEEDED.           Clinical Reference Information           Your Vitals Were     BP Pulse Temp Weight BMI    177/102 61 98.2 °F (36.8 °C) (Oral) 92.4 kg (203 lb 11.3 oz) 30.97 kg/m2      Blood Pressure          Most Recent Value    BP  (!)  177/102      Allergies as of 4/28/2017     No Known Allergies      Immunizations Administered on Date of Encounter - 4/28/2017     None      Language Assistance Services     ATTENTION: Language assistance services are available, free of charge. Please call [phone].      ATENCIÓN: Si anala satcey, tiene a meza disposición servicios gratuitos de asistencia lingüística. Llame al [phone].     CHÚ Ý: N?u b?n nói Ti?ng Vi?t, có các d?ch v? h? tr? ngôn ng? mi?n phí dành cho b?n. G?i s? [phone].         [facility] complies with applicable Federal civil rights laws and does not  discriminate on the basis of race, color, national origin, age, disability, or sex.

## 2017-05-24 RX ORDER — GABAPENTIN 300 MG/1
CAPSULE ORAL
Qty: 270 CAPSULE | Refills: 0 | Status: ON HOLD | OUTPATIENT
Start: 2017-05-24 | End: 2017-07-18 | Stop reason: HOSPADM

## 2017-05-30 ENCOUNTER — TELEPHONE (OUTPATIENT)
Dept: INTERNAL MEDICINE | Facility: CLINIC | Age: 62
End: 2017-05-30

## 2017-05-30 NOTE — TELEPHONE ENCOUNTER
----- Message from Jaqui Saravia sent at 5/30/2017 12:59 PM CDT -----  Contact: pt  Pt states he needs to speak someone in the office.[phone] or [phone]

## 2017-06-08 ENCOUNTER — OFFICE VISIT (OUTPATIENT)
Dept: FAMILY MEDICINE | Facility: CLINIC | Age: 62
End: 2017-06-08
Payer: MEDICARE

## 2017-06-08 VITALS
OXYGEN SATURATION: 98 % | HEIGHT: 70 IN | DIASTOLIC BLOOD PRESSURE: 80 MMHG | SYSTOLIC BLOOD PRESSURE: 138 MMHG | BODY MASS INDEX: 29.7 KG/M2 | TEMPERATURE: 97 F | WEIGHT: 207.44 LBS | HEART RATE: 64 BPM

## 2017-06-08 DIAGNOSIS — R73.03 PREDIABETES: ICD-10-CM

## 2017-06-08 DIAGNOSIS — I10 ESSENTIAL HYPERTENSION: ICD-10-CM

## 2017-06-08 DIAGNOSIS — M65.30 TRIGGER FINGER, UNSPECIFIED FINGER, UNSPECIFIED LATERALITY: Primary | ICD-10-CM

## 2017-06-08 DIAGNOSIS — Z12.5 PROSTATE CANCER SCREENING: ICD-10-CM

## 2017-06-08 DIAGNOSIS — F32.A DEPRESSION, UNSPECIFIED DEPRESSION TYPE: ICD-10-CM

## 2017-06-08 DIAGNOSIS — F51.04 CHRONIC INSOMNIA: ICD-10-CM

## 2017-06-08 PROCEDURE — 99999 PR PBB SHADOW E&M-EST. PATIENT-LVL IV: CPT | Mod: PBBFAC,,, | Performed by: FAMILY MEDICINE

## 2017-06-08 PROCEDURE — 99499 UNLISTED E&M SERVICE: CPT | Mod: S$GLB,,, | Performed by: FAMILY MEDICINE

## 2017-06-08 PROCEDURE — 99214 OFFICE O/P EST MOD 30 MIN: CPT | Mod: S$GLB,,, | Performed by: FAMILY MEDICINE

## 2017-06-08 RX ORDER — ZOLPIDEM TARTRATE 10 MG/1
10 TABLET ORAL NIGHTLY PRN
Qty: 30 TABLET | Refills: 4 | Status: SHIPPED | OUTPATIENT
Start: 2017-06-08 | End: 2017-10-31 | Stop reason: SDUPTHER

## 2017-06-11 NOTE — PROGRESS NOTES
Chief Complaint:    Chief Complaint   Patient presents with    Follow-up       History of Present Illness:    Here for six-month follow-up of chronic medical problems,  Hypertension hyperlipidemia prediabetes.  He also has some trigger finger was to be referred to surgery.  Blood pressure is normal no side effects to the medication.    ROS:  Review of Systems   Constitutional: Negative for activity change, chills, fatigue, fever and unexpected weight change.   HENT: Negative for congestion, ear discharge, ear pain, hearing loss, postnasal drip and rhinorrhea.    Eyes: Negative for pain and visual disturbance.   Respiratory: Negative for cough, chest tightness and shortness of breath.    Cardiovascular: Negative for chest pain and palpitations.   Gastrointestinal: Negative for abdominal pain, diarrhea and vomiting.   Endocrine: Negative for heat intolerance.   Genitourinary: Negative for dysuria, flank pain, frequency and hematuria.   Musculoskeletal: Negative for back pain, gait problem and neck pain.   Skin: Negative for color change and rash.   Neurological: Negative for dizziness, tremors, seizures, numbness and headaches.   Psychiatric/Behavioral: Negative for agitation, hallucinations, self-injury, sleep disturbance and suicidal ideas. The patient is not nervous/anxious.        Past Medical History:   Diagnosis Date    Anticoagulant long-term use     Anxiety     Cancer     spot on kidney and lung    Diabetes mellitus     Borderline    Diverticulitis     GERD (gastroesophageal reflux disease)     Hyperlipidemia     not on any meds currently    Hypertension     Lung mass     Mass of kidney        Social History:  Social History     Social History    Marital status:      Spouse name: N/A    Number of children: N/A    Years of education: N/A     Social History Main Topics    Smoking status: Former Smoker     Packs/day: 1.00     Years: 20.00     Quit date: 2/4/1995    Smokeless tobacco:  "Never Used    Alcohol use No    Drug use: No    Sexual activity: Yes     Partners: Female     Birth control/ protection: None     Other Topics Concern    None     Social History Narrative    None       Family History:   family history includes Cancer in his sister; Hypertension in his father and mother; Melanoma in his father.    Health Maintenance   Topic Date Due    TETANUS VACCINE  02/03/1973    Zoster Vaccine  02/03/2015    Influenza Vaccine  08/01/2017    Lipid Panel  01/23/2018    Colonoscopy  04/07/2020    Hepatitis C Screening  Completed       Physical Exam:    Vital Signs  Temp: 96.5 °F (35.8 °C)  Temp src: Temporal  Pulse: 64  SpO2: 98 %  BP: 138/80  BP Location: Left arm  BP Method: Manual  Patient Position: Sitting  Pain Score: 0-No pain  Height and Weight  Height: 5' 10" (177.8 cm)  Weight: 94.1 kg (207 lb 7.3 oz)  BSA (Calculated - sq m): 2.16 sq meters  BMI (Calculated): 29.8  Weight in (lb) to have BMI = 25: 173.9]    Body mass index is 29.77 kg/m².    Physical Exam   Constitutional: He is oriented to person, place, and time. He appears well-developed.   HENT:   Mouth/Throat: Oropharynx is clear and moist.   Eyes: Conjunctivae are normal. Pupils are equal, round, and reactive to light.   Neck: Normal range of motion. Neck supple.   Cardiovascular: Normal rate, regular rhythm and normal heart sounds.    No murmur heard.  Pulmonary/Chest: Effort normal and breath sounds normal. No respiratory distress. He has no wheezes. He has no rales. He exhibits no tenderness.   Abdominal: Soft. He exhibits no distension and no mass. There is no tenderness. There is no guarding.   Musculoskeletal: He exhibits no edema or tenderness.   Lymphadenopathy:     He has no cervical adenopathy.   Neurological: He is alert and oriented to person, place, and time. He has normal reflexes.   Skin: Skin is warm and dry.   Psychiatric: He has a normal mood and affect. His behavior is normal. Judgment and thought " content normal.       Lab Results   Component Value Date    CHOL 209 (H) 01/23/2017    CHOL 211 (H) 07/13/2015    CHOL 174 01/05/2015    TRIG 233 (H) 01/23/2017    TRIG 181 (H) 07/13/2015    TRIG 240 (H) 01/05/2015    HDL 51 01/23/2017    HDL 55 07/13/2015    HDL 55 01/05/2015    TOTALCHOLEST 4.1 01/23/2017    TOTALCHOLEST 3.8 07/13/2015    TOTALCHOLEST 3.2 01/05/2015    NONHDLCHOL 158 01/23/2017    NONHDLCHOL 156 07/13/2015    NONHDLCHOL 119 01/05/2015       Lab Results   Component Value Date    HGBA1C 6.0 01/23/2017       Assessment:      ICD-10-CM ICD-9-CM   1. Trigger finger, unspecified finger, unspecified laterality M65.30 727.03   2. Chronic insomnia F51.04 780.52   3. Essential hypertension I10 401.9   4. Depression, unspecified depression type F32.9 311   5. Prediabetes R73.03 790.29   6. Prostate cancer screening Z12.5 V76.44         Plan:  Doing okay continue current meds and plan  He will be referred to orthopedic  He will continue to follow with urology for the renal masses they turn out to be benign but they're enlarging in size.  Orders Placed This Encounter   Procedures    Lipid panel    Hemoglobin A1c    Comprehensive metabolic panel    PSA, Screening    Ambulatory referral to Orthopedics       Current Outpatient Prescriptions   Medication Sig Dispense Refill    amlodipine (NORVASC) 10 MG tablet Take 1 tablet (10 mg total) by mouth once daily. 90 tablet 1    aspirin 81 mg Tab Take 1 tablet by mouth Daily.      benazepril (LOTENSIN) 20 MG tablet Take 2 tablets (40 mg total) by mouth once daily. 180 tablet 1    clonazepam (KLONOPIN) 2 MG Tab 2 mg. Take 2 mg by mouth nightly as needed.      gabapentin (NEURONTIN) 300 MG capsule TAKE 2 CAPSULES BY MOUTH THREE TIMES DAILY. 270 capsule 0    hydrALAZINE (APRESOLINE) 25 MG tablet Take 1 tablet (25 mg total) by mouth 3 (three) times daily. 90 tablet 11    hydrochlorothiazide (MICROZIDE) 12.5 mg capsule Take 1 capsule (12.5 mg total) by mouth once  daily. 90 capsule 1    hydrocodone-acetaminophen 7.5-325mg (NORCO) 7.5-325 mg per tablet Take 1 tablet by mouth every 6 (six) hours as needed for Pain. 12 tablet 0    hydrOXYzine (VISTARIL) 100 MG capsule Take 100 mg by mouth once daily.      lidocaine-prilocaine (EMLA) cream as needed.      methocarbamol (ROBAXIN) 750 MG Tab Take 1 tablet (750 mg total) by mouth 3 (three) times daily. 60 tablet 3    metoprolol tartrate (LOPRESSOR) 50 MG tablet Take 1 tablet (50 mg total) by mouth 2 (two) times daily. 180 tablet 1    pantoprazole (PROTONIX) 40 MG tablet Take 1 tablet (40 mg total) by mouth 2 (two) times daily before meals. 240 tablet 1    tizanidine (ZANAFLEX) 2 MG tablet Take 1 tablet (2 mg total) by mouth daily as needed. 90 tablet 1    zolpidem (AMBIEN) 10 mg Tab Take 1 tablet (10 mg total) by mouth nightly as needed. 30 tablet 4     No current facility-administered medications for this visit.        Medications Discontinued During This Encounter   Medication Reason    zolpidem (AMBIEN) 10 mg Tab Reorder       Return in about 6 months (around 12/8/2017).      Dr Yumi Roberson MD    Disclaimer: This note is prepared using voice recognition system and as such is likely to have errors and is not proof read.

## 2017-06-15 RX ORDER — GABAPENTIN 300 MG/1
CAPSULE ORAL
Qty: 270 CAPSULE | Refills: 0 | Status: SHIPPED | OUTPATIENT
Start: 2017-06-15 | End: 2017-07-17 | Stop reason: SDUPTHER

## 2017-06-26 DIAGNOSIS — M79.643 PAIN OF HAND, UNSPECIFIED LATERALITY: Primary | ICD-10-CM

## 2017-06-28 ENCOUNTER — HOSPITAL ENCOUNTER (OUTPATIENT)
Dept: RADIOLOGY | Facility: HOSPITAL | Age: 62
Discharge: HOME | End: 2017-06-28
Attending: ORTHOPAEDIC SURGERY
Payer: MEDICARE

## 2017-06-28 ENCOUNTER — OFFICE VISIT (OUTPATIENT)
Dept: ORTHOPEDICS | Facility: CLINIC | Age: 62
End: 2017-06-28
Payer: MEDICARE

## 2017-06-28 VITALS
RESPIRATION RATE: 18 BRPM | HEIGHT: 70 IN | HEART RATE: 53 BPM | WEIGHT: 207.44 LBS | DIASTOLIC BLOOD PRESSURE: 87 MMHG | SYSTOLIC BLOOD PRESSURE: 161 MMHG | BODY MASS INDEX: 29.7 KG/M2

## 2017-06-28 DIAGNOSIS — Z01.818 PREOP TESTING: Primary | ICD-10-CM

## 2017-06-28 DIAGNOSIS — M79.643 PAIN OF HAND, UNSPECIFIED LATERALITY: ICD-10-CM

## 2017-06-28 DIAGNOSIS — M65.331 TRIGGER FINGER, RIGHT MIDDLE FINGER: ICD-10-CM

## 2017-06-28 DIAGNOSIS — M65.322 TRIGGER FINGER, LEFT INDEX FINGER: Primary | ICD-10-CM

## 2017-06-28 PROCEDURE — 73130 X-RAY EXAM OF HAND: CPT | Mod: 26,50,, | Performed by: RADIOLOGY

## 2017-06-28 PROCEDURE — 99204 OFFICE O/P NEW MOD 45 MIN: CPT | Mod: S$GLB,,, | Performed by: ORTHOPAEDIC SURGERY

## 2017-06-28 PROCEDURE — 99999 PR PBB SHADOW E&M-EST. PATIENT-LVL III: CPT | Mod: PBBFAC,,, | Performed by: ORTHOPAEDIC SURGERY

## 2017-06-28 RX ORDER — TRAZODONE HYDROCHLORIDE 100 MG/1
100 TABLET ORAL NIGHTLY
COMMUNITY
Start: 2017-06-08 | End: 2017-10-31 | Stop reason: SDUPTHER

## 2017-06-28 RX ORDER — HYDROXYZINE PAMOATE 50 MG/1
50 CAPSULE ORAL
COMMUNITY
Start: 2017-06-08 | End: 2018-12-03

## 2017-06-28 NOTE — PROGRESS NOTES
Subjective:      Patient ID: Mil Sood is a 62 y.o. male.    Chief Complaint: Pain of the Right Hand    HPI: The patient is seen in consultation at the request of Dr. Roberson for evaluation of painful triggering of the right long finger and left index finger.  His right long finger has been triggering for 6 or 7 years, the left index finger has been triggering for approximately 2 years.  The patient is right-hand dominant.  He is disabled due to post rack stress disorder.  The patient has had a partial left nephrectomy for removal of a benign mass.    Review of Systems   Constitution: Negative for chills, decreased appetite, weight gain and weight loss.   HENT: Negative for congestion, ear pain, hearing loss and sore throat.    Eyes: Negative for blurred vision, double vision, vision loss in left eye, vision loss in right eye and visual disturbance.   Cardiovascular: Negative for chest pain, irregular heartbeat, leg swelling and palpitations.   Respiratory: Negative for cough and shortness of breath.    Endocrine: Negative for cold intolerance and heat intolerance.   Hematologic/Lymphatic: Negative for adenopathy. Does not bruise/bleed easily.   Skin: Negative for color change, nail changes, rash and suspicious lesions.   Musculoskeletal: Positive for joint pain.   Gastrointestinal: Negative for abdominal pain, constipation, diarrhea, heartburn, nausea and vomiting.   Genitourinary: Negative for bladder incontinence and dysuria.        Status post partial left nephrectomy   Neurological: Negative for dizziness, paresthesias, sensory change and tremors.   Psychiatric/Behavioral: Negative for altered mental status, depression, memory loss and substance abuse.   Allergic/Immunologic: Negative for hives and persistent infections.         Objective:            General    Nursing note and vitals reviewed.  Constitutional: He is oriented to person, place, and time. He appears well-developed and well-nourished.   HENT:    Head: Normocephalic.   Nose: Nose normal.   Eyes: EOM are normal. Pupils are equal, round, and reactive to light.   Neck: Normal range of motion. Neck supple.   Cardiovascular: Normal rate and regular rhythm.    Pulmonary/Chest: Effort normal.   Abdominal: Soft. He exhibits no distension. There is no tenderness.   Neurological: He is alert and oriented to person, place, and time.   Psychiatric: He has a normal mood and affect. His behavior is normal.     General Musculoskeletal Exam   Gait: normal   Pelvic Obliquity: none    Right Ankle/Foot Exam   Right ankle exam is normal.    Range of Motion   The patient has normal right ankle ROM.    Alignment   Forefoot Alignment: normal    Muscle Strength   The patient has normal right ankle strength.    Tests   Anterior drawer: negative  Varus tilt: negative  Heel Walk: able to perform  Tiptoe Walk: able to perform    Other   Sensation: normal  Peroneal Subluxation: negative    Left Ankle/Foot Exam   Left ankle exam is normal.    Range of Motion   The patient has normal left ankle ROM.     Alignment   Forefoot Alignment: normal    Muscle Strength   The patient has normal left ankle strength.    Tests   Anterior drawer: negative  Varus tilt: negative  Heel Walk: able to perform  Tiptoe Walk: able to perform    Other   Sensation: normal  Peroneal Subluxation: negative    Right Knee Exam   Right knee exam is normal.    Inspection   Erythema: absent  Swelling: absent  Effusion: effusion  Deformity: deformity  Bruising: absent    Range of Motion   The patient has normal right knee ROM.    Tests   Meniscus   Raffaele:  Medial - negative Lateral - negative  Ligament Examination Lachman: normal (-1 to 2mm) PCL-Posterior Drawer: normal (0 to 2mm)     MCL - Valgus: normal (0 to 2mm)  LCL - Varus: normalPivot Shift: normal (Equal)  Posterolateral Corner: unstable (>15 degrees difference)  Patella   Patellar Apprehension: negative  Passive Patellar Tilt: neutral  Patellar Tracking:  normal  Patellar Grind: negative    Other   Sensation: normal    Left Knee Exam   Left knee exam is normal.    Inspection   Erythema: absent  Swelling: absent  Effusion: absent  Deformity: deformity  Bruising: absent    Range of Motion   The patient has normal left knee ROM.    Tests   Meniscus   Raffaele:  Medial - negative Lateral - negative  Stability Lachman: normal (-1 to 2mm) PCL-Posterior Drawer: normal (0 to 2mm)  MCL - Valgus: normal (0 to 2mm)  LCL - Varus: normal (0 to 2mm)Pivot Shift: normal (Equal)  Posterolateral Corner: unstable (>15 degrees difference)  Patella   Patellar Apprehension: negative  Passive Patellar Tilt: neutral  Patellar Tracking: normal  Patellar Grind: negative    Other   Sensation: normal    Right Hip Exam   Right hip exam is normal.     Inspection   Swelling: absent  Bruising: absent    Muscle Strength   The patient has normal right hip strength.    Other   Sensation: normal  Left Hip Exam   Left hip exam is normal.    Inspection   Swelling: absent  Bruising: absent    Range of Motion   The patient has normal left hip ROM.    Muscle Strength   The patient has normal left hip strength.     Other   Sensation: normal      Back (L-Spine & T-Spine) / Neck (C-Spine) Exam   Back exam is normal.    Neck (C-Spine) Range of Motion   Flexion:     Normal  Extension: Normal  Right Lateral Bend: normal  Left Lateral Bend: normal  Right Rotation: normal  Left Rotation: normal    Spinal Sensation   Right Side Sensation  C-Spine Level: normal   L-Spine Level: normal  S-Spine Level: normal  T-Spine Level: normal  Left Side Sensation  C-Spine Level: normal  L-Spine Level: normal  S-Spine Level: normal  T-Spine Level: normal    Other He has no scoliosis .  Head Tilt:  Negative      Right Hand/Wrist Exam     Inspection   Deformity: Wrist - deformity     Pain   Hand - The patient exhibits pain of the middle MCP.    Tenderness   The patient is tender to palpation of the abbott area.    Range of Motion    The patient has normal right hand/wrist ROM.    Tests   Phalens Sign: negative  Tinels Sign (Medial Nerve): negative  Finkelstein: negative  Cubital Tunnel Compression Test: negative      Other     Neuorologic Exam    Median Distribution: normal  Ulnar Distribution: normal  Radial Distribution: normal    Comments:  Positive triggering of right long finger      Left Hand/Wrist Exam     Inspection   Deformity: Wrist - absent     Pain   Hand - The patient exhibits pain of the index MCP.    Tenderness   The patient is tender to palpation of the abbott area.     Range of Motion   The patient has normal left hand/wrist ROM.    Tests   Phalens Sign: negative  Tinels Sign (Medial Nerve): negative  Finkelstein: negative  Cubital Tunnel Compression Test: negative      Other     Sensory Exam  Median Distribution: normal  Ulnar Distribution: normal  Radial Distribution: normal    Comments:  Positive triggering of left index finger      Right Elbow Exam   Right elbow exam is normal.    Inspection   Effusion: absent  Bruising: absent  Deformity: absent    Range of Motion   The patient has normal right elbow ROM.    Tests Tinel's Sign (cubital tunnel): negative    Other   Sensation: normal      Left Elbow Exam   Left elbow exam is normal.    Inspection   Effusion: absent  Bruising: absent  Deformity: absent    Range of Motion   The patient has normal left elbow ROM.    Tests Tinel's Sign (cubital tunnel): negative    Other   Sensation: normal    Right Shoulder Exam   Right shoulder exam is normal.    Tenderness   The patient is tender to palpation of the greater tuberosity.    Range of Motion   Active Abduction: normal   Passive Abduction: normal   Extension: normal   Forward Flexion: normal   Forward Elevation: normal  Adduction: normal  External Rotation 0 degrees: normal   Internal Rotation 0 degrees: normal     Muscle Strength   The patient has normal right shoulder strength.    Tests & Signs   Apprehension:  negative  Cross Arm: negative  Impingement: negative    Other   Sensation: normal    Left Shoulder Exam   Left shoulder exam is normal.    Range of Motion   Active Abduction: normal   Passive Abduction: normal   Extension: normal   Forward Flexion: normal   Forward Elevation: normal  Adduction: normal  External Rotation 0 degrees: normal   Internal Rotation 0 degrees: normal     Muscle Strength   The patient has normal left shoulder strength.    Tests & Signs   Apprehension: negative  Cross Arm: negative  Impingement: negative    Other   Sensation: normal       Muscle Strength   Right Upper Extremity   Wrist Extension:    Wrist Flexion:    :    Elbow Pronation:     Elbow Supination:     Elbow Extension:   Elbow Flexion:   Intrinsics:   Left Upper Extremity  Wrist Extension:    Wrist Flexion:    :     Elbow Pronation:     Elbow Supination:     Elbow Extension:   Elbow Flexion:   Intrinsics:   Right Lower Extremity   Hip Abduction:    Quadriceps:     Hamstrin/5   Left Lower Extremity   Hip Abduction:    Quadriceps:     Hamstrin/     Reflexes     Left Side  Biceps:  2+  Triceps:  2+  Quadriceps:  2+  Achilles:  2+    Right Side   Biceps:  2+  Triceps:  2+  Quadriceps:  2+  Achilles:  2+    Vascular Exam     Right Pulses  Dorsalis Pedis:      2+  Posterior Tibial:      2+  Radial:                    2+      Left Pulses  Dorsalis Pedis:      2+  Posterior Tibial:      2+  Radial:                    2+      Capillary Refill  Right Hand: normal capillary refill  Left Hand: normal capillary refill                X-RAY:   Technique: PA, lateral, and oblique views were obtained of the bilateral hands    Comparison: 2013    Findings: There is no radiographic evidence of acute osseous, articular, or soft tissue abnormality. 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  No definite  erosive osseous changes demonstrated.No appreciable change from prior.    Assessment:           Encounter Diagnoses   Name Primary?    Trigger finger, left index finger Yes    Trigger finger, right middle finger           Plan:     The patient desires to proceed with trigger finger releases beginning with his more symptomatically right long finger on July 18 and following with his left index finger on August 31.  He understands the material risks of surgery, what is involved, and desires to proceed.

## 2017-06-28 NOTE — LETTER
June 28, 2017      Yumi Roberson MD  [address] [address] [address]           [facility]  [address] [place] [address]  Phone: [phone]  Fax: [phone]          Patient: Mil Sood   MR Number: [id number]   YOB: 1955   Date of Visit: 6/28/2017       Dear Dr. Yumi Roberson:    Thank you for referring Mil Sood to me for evaluation. Attached you will find relevant portions of my assessment and plan of care.    If you have questions, please do not hesitate to call me. I look forward to following Mil Sood along with you.    Sincerely,    Timothy Abarca MD    Enclosure  CC:  No Recipients    If you would like to receive this communication electronically, please contact [email or web address] or [phone] to request more information on [employer] access.    For providers and/or their staff who would like to refer a patient to [facility], please contact us through our one-stop-shop provider referral line, [facility], at [phone].    If you feel you have received this communication in error or would no longer like to receive these types of communications, please e-mail [email or web address]

## 2017-06-29 ENCOUNTER — HOSPITAL ENCOUNTER (OUTPATIENT)
Dept: RADIOLOGY | Facility: HOSPITAL | Age: 62
Discharge: HOME | End: 2017-06-29
Attending: ORTHOPAEDIC SURGERY
Payer: MEDICARE

## 2017-06-29 DIAGNOSIS — Z01.818 PREOP TESTING: ICD-10-CM

## 2017-06-29 DIAGNOSIS — M65.30 TRIGGER FINGER OF LEFT HAND, UNSPECIFIED FINGER: Primary | ICD-10-CM

## 2017-06-29 DIAGNOSIS — M65.30 TRIGGER FINGER OF RIGHT HAND, UNSPECIFIED FINGER: Primary | ICD-10-CM

## 2017-06-29 PROCEDURE — 71020 XR CHEST PA AND LATERAL PRE-OP: CPT | Mod: 26,,, | Performed by: RADIOLOGY

## 2017-07-06 RX ORDER — TIZANIDINE 2 MG/1
TABLET ORAL
Qty: 90 TABLET | Refills: 0 | Status: SHIPPED | OUTPATIENT
Start: 2017-07-06 | End: 2017-07-12 | Stop reason: SDUPTHER

## 2017-07-12 RX ORDER — HYDROCHLOROTHIAZIDE 12.5 MG/1
CAPSULE ORAL
Qty: 90 CAPSULE | Refills: 0 | Status: SHIPPED | OUTPATIENT
Start: 2017-07-12 | End: 2017-10-03 | Stop reason: SDUPTHER

## 2017-07-12 RX ORDER — TIZANIDINE 2 MG/1
TABLET ORAL
Qty: 90 TABLET | Refills: 0 | Status: SHIPPED | OUTPATIENT
Start: 2017-07-12 | End: 2017-10-03 | Stop reason: SDUPTHER

## 2017-07-12 RX ORDER — METOPROLOL TARTRATE 50 MG/1
TABLET ORAL
Qty: 180 TABLET | Refills: 0 | Status: SHIPPED | OUTPATIENT
Start: 2017-07-12 | End: 2017-10-16 | Stop reason: SDUPTHER

## 2017-07-17 ENCOUNTER — ANESTHESIA EVENT (OUTPATIENT)
Dept: SURGERY | Facility: HOSPITAL | Age: 62
End: 2017-07-17
Payer: MEDICARE

## 2017-07-17 ENCOUNTER — OFFICE VISIT (OUTPATIENT)
Dept: FAMILY MEDICINE | Facility: CLINIC | Age: 62
End: 2017-07-17
Payer: MEDICARE

## 2017-07-17 VITALS
OXYGEN SATURATION: 98 % | TEMPERATURE: 97 F | WEIGHT: 204.06 LBS | DIASTOLIC BLOOD PRESSURE: 104 MMHG | HEART RATE: 70 BPM | BODY MASS INDEX: 29.21 KG/M2 | HEIGHT: 70 IN | SYSTOLIC BLOOD PRESSURE: 170 MMHG

## 2017-07-17 DIAGNOSIS — M65.331 TRIGGER FINGER, RIGHT MIDDLE FINGER: ICD-10-CM

## 2017-07-17 DIAGNOSIS — I25.10 CORONARY ARTERY DISEASE, ANGINA PRESENCE UNSPECIFIED, UNSPECIFIED VESSEL OR LESION TYPE, UNSPECIFIED WHETHER NATIVE OR TRANSPLANTED HEART: ICD-10-CM

## 2017-07-17 DIAGNOSIS — L25.9 CONTACT DERMATITIS, UNSPECIFIED CONTACT DERMATITIS TYPE, UNSPECIFIED TRIGGER: Primary | ICD-10-CM

## 2017-07-17 DIAGNOSIS — I10 ESSENTIAL HYPERTENSION: ICD-10-CM

## 2017-07-17 PROCEDURE — 99499 UNLISTED E&M SERVICE: CPT | Mod: S$GLB,,, | Performed by: FAMILY MEDICINE

## 2017-07-17 PROCEDURE — 99213 OFFICE O/P EST LOW 20 MIN: CPT | Mod: S$GLB,,, | Performed by: FAMILY MEDICINE

## 2017-07-17 PROCEDURE — 99999 PR PBB SHADOW E&M-EST. PATIENT-LVL III: CPT | Mod: PBBFAC,,, | Performed by: FAMILY MEDICINE

## 2017-07-17 RX ORDER — TRIAMCINOLONE ACETONIDE 1 MG/G
CREAM TOPICAL 2 TIMES DAILY
Qty: 45 G | Refills: 1 | Status: SHIPPED | OUTPATIENT
Start: 2017-07-17 | End: 2018-12-03

## 2017-07-17 NOTE — PROGRESS NOTES
Subjective:       Patient ID: Mil Sood is a 62 y.o. male.    Chief Complaint: Rash (Arms/ Legs / Chest / Left Eye )      HPI Comments:     He presents with an itchy rash all over his body that's emerge gradually over the last week.  Not exposed to any unusual plants or spending time outdoors.  Last new medication to be added was trazodone about a month ago.  He is due to have trigger finger release surgery tomorrow wanted to make sure this was not a problem.  Rash started on his right arm about a week ago later developed on his left arm right calf, left ankle,.  Yesterday a red area developed below his left eye, and the newest area,, left chest, broke out last night.  initial symptoms no malaise or fever.  Feels well overall.  The rash is intensely itchy.  He's used calamine and bleach.      Review of Systems   Constitutional: Negative for activity change, appetite change, chills, fatigue and fever.   HENT: Negative for sore throat.    Respiratory: Negative for cough and shortness of breath.    Cardiovascular: Negative for chest pain.   Gastrointestinal: Negative for abdominal pain and diarrhea.   Genitourinary: Negative for dysuria.   Musculoskeletal: Negative for arthralgias, back pain and myalgias.   Skin: Positive for rash.   Neurological: Negative for dizziness and headaches.   Hematological: Negative for adenopathy. Does not bruise/bleed easily.   Psychiatric/Behavioral: Negative for confusion.       Objective:      Physical Exam   Constitutional: He is oriented to person, place, and time.   HENT:   Head: Normocephalic and atraumatic.   Eyes: Conjunctivae are normal. Pupils are equal, round, and reactive to light.   Neck: Normal range of motion. Neck supple.   Cardiovascular: Normal rate, regular rhythm and normal heart sounds.    No murmur heard.  Pulmonary/Chest: Effort normal and breath sounds normal. He has no wheezes. He has no rales.   Neurological: He is alert and oriented to person, place, and  time.   Skin: Skin is warm and dry.   Essentially uniform-appearing  maculopapular rash in multiple areas: Bilateral arms, right calf, left ankle, L cheek. Clusters with some linear configurations. Non-vescicular. Excoriations.   Psychiatric: He has a normal mood and affect. His behavior is normal. Judgment and thought content normal.       Assessment:       1. Contact dermatitis, unspecified contact dermatitis type, unspecified trigger    2. Essential hypertension    3. Trigger finger, right middle finger    4. Coronary artery disease, angina presence unspecified, unspecified vessel or lesion type, unspecified whether native or transplanted heart        Plan:   Contact dermatitis, unspecified contact dermatitis type, unspecified trigger  Comments:  Triamcinolone cream twice a day    Essential hypertension  Comments:  Uncontrolled today.  Forgot to take his medicine.  Will take when he gets home    Trigger finger, right middle finger  Comments:  Plan for surgery tomorrow.  Has already held his aspirin    Coronary artery disease, angina presence unspecified, unspecified vessel or lesion type, unspecified whether native or transplanted heart  Comments:  Stable    Other orders  -     triamcinolone acetonide 0.1% (KENALOG) 0.1 % cream; Apply topically 2 (two) times daily.  Dispense: 45 g; Refill: 1

## 2017-07-17 NOTE — PRE ADMISSION SCREENING
Pre op instructions reviewed with patient per phone:    To confirm, Your surgeon has instructed you:  Surgery is scheduled 07/18/17 at 1045.      Please report to [facility] 1st floor main lobby by 0915  Pre admit office to call this afternoon only if arrival time changes.      INSTRUCTIONS IMPORTANT!!!  ¨ Do not eat, drink, or smoke after 12 midnight-including water. OK to brush teeth, no gum, candy or mints!    ¨ Take only these medicines with a small swallow of water-morning of surgery.    Amlodipine, Benazepril, Hydralazine, Metoprolol, Protonix        ____  Do not wear makeup, including mascara.  ____  No powder, lotions or creams to surgical area.  ____  Please remove all jewelry, including piercings and leave at home.  ____  No money or valuables needed. Please leave at home.  ____  Please bring identification and insurance information to hospital.  ____  If going home the same day, arrange for a ride home. You will not be able to   drive if Anesthesia was used.  ____  Children, under 12 years old, must remain in the waiting room with an adult.  They are not allowed in patient areas.  ____  Wear loose fitting clothing. Allow for dressings, bandages.  ____  Stop Aspirin, Ibuprofen, Motrin and Aleve at least 5-7 days before surgery, unless otherwise instructed by your doctor, or the nurse.   You MAY use Tylenol/acetaminophen until day of surgery.  ____  If you take diabetic medication, do not take am of surgery unless instructed by   Doctor.  ____ Stop taking any Fish Oil supplement or any Vitamins that contain Vitamin E at least 5 days prior to surgery.          Bathing Instructions-- The night before surgery and the morning prior to coming to the hospital:   -Do not shave the surgical area.   -Shower and wash your hair and body as usual with anti-bacterial  soap and shampoo.   -Rinse your hair and body completely.   -Use one packet of hibiclens to wash the surgical site (using your  hand) gently for 5 minutes.  Do not scrub you skin too hard.   -Do not use hibiclens on your head, face, or genitals.   -Do not wash with anti-bacterial soap after you use the hibiclens.   -Rinse your body thoroughly.   -Dry with clean, soft towel.  Do not use lotion, cream, deodorant, or powders on   the surgical site.    Use antibacterial soap in place of hibiclens if your surgery is on the head, face or genitals.         Surgical Site Infection    Prevention of surgical site infections:     -Keep incisions clean and dry.   -Do not soak/submerge incisions in water until completely healed.   -Do not apply lotions, powders, creams, or deodorants to site.   -Always make sure hands are cleaned with antibacterial soap/ alcohol-based   prior to touching the surgical site.  (This includes doctors, nurses, staff, and yourself.)    Signs and symptoms:   -Redness and pain around the area where you had surgery   -Drainage of cloudy fluid from your surgical wound   -Fever over 100.4  I have read or had read and explained to me, and understand the above information.

## 2017-07-18 ENCOUNTER — TELEPHONE (OUTPATIENT)
Dept: ORTHOPEDICS | Facility: CLINIC | Age: 62
End: 2017-07-18

## 2017-07-18 ENCOUNTER — ANESTHESIA (OUTPATIENT)
Dept: SURGERY | Facility: HOSPITAL | Age: 62
End: 2017-07-18
Payer: MEDICARE

## 2017-07-18 ENCOUNTER — HOSPITAL ENCOUNTER (OUTPATIENT)
Facility: HOSPITAL | Age: 62
Discharge: HOME | End: 2017-07-18
Attending: ORTHOPAEDIC SURGERY | Admitting: ORTHOPAEDIC SURGERY
Payer: MEDICARE

## 2017-07-18 VITALS
OXYGEN SATURATION: 99 % | WEIGHT: 203.94 LBS | BODY MASS INDEX: 29.2 KG/M2 | TEMPERATURE: 98 F | RESPIRATION RATE: 18 BRPM | DIASTOLIC BLOOD PRESSURE: 75 MMHG | SYSTOLIC BLOOD PRESSURE: 141 MMHG | HEIGHT: 70 IN | HEART RATE: 56 BPM

## 2017-07-18 DIAGNOSIS — M65.331 TRIGGER FINGER, RIGHT MIDDLE FINGER: Primary | ICD-10-CM

## 2017-07-18 DIAGNOSIS — M65.30 TRIGGER FINGER OF RIGHT HAND: ICD-10-CM

## 2017-07-18 LAB — POCT GLUCOSE: 89 MG/DL (ref 70–110)

## 2017-07-18 PROCEDURE — 63600175 PHARM REV CODE 636 W HCPCS: Performed by: PHYSICIAN ASSISTANT

## 2017-07-18 PROCEDURE — 36000707: Performed by: ORTHOPAEDIC SURGERY

## 2017-07-18 PROCEDURE — 63600175 PHARM REV CODE 636 W HCPCS: Performed by: NURSE ANESTHETIST, CERTIFIED REGISTERED

## 2017-07-18 PROCEDURE — 37000009 HC ANESTHESIA EA ADD 15 MINS: Performed by: ORTHOPAEDIC SURGERY

## 2017-07-18 PROCEDURE — 36000706: Performed by: ORTHOPAEDIC SURGERY

## 2017-07-18 PROCEDURE — 25000003 PHARM REV CODE 250: Performed by: ANESTHESIOLOGY

## 2017-07-18 PROCEDURE — 26055 INCISE FINGER TENDON SHEATH: CPT | Mod: F7,,, | Performed by: ORTHOPAEDIC SURGERY

## 2017-07-18 PROCEDURE — 25000003 PHARM REV CODE 250: Performed by: ORTHOPAEDIC SURGERY

## 2017-07-18 PROCEDURE — 71000033 HC RECOVERY, INTIAL HOUR: Performed by: ORTHOPAEDIC SURGERY

## 2017-07-18 PROCEDURE — 37000008 HC ANESTHESIA 1ST 15 MINUTES: Performed by: ORTHOPAEDIC SURGERY

## 2017-07-18 PROCEDURE — 71000015 HC POSTOP RECOV 1ST HR: Performed by: ORTHOPAEDIC SURGERY

## 2017-07-18 RX ORDER — MIDAZOLAM HYDROCHLORIDE 1 MG/ML
INJECTION, SOLUTION INTRAMUSCULAR; INTRAVENOUS
Status: DISCONTINUED | OUTPATIENT
Start: 2017-07-18 | End: 2017-07-18

## 2017-07-18 RX ORDER — KETOROLAC TROMETHAMINE 30 MG/ML
INJECTION, SOLUTION INTRAMUSCULAR; INTRAVENOUS
Status: DISCONTINUED | OUTPATIENT
Start: 2017-07-18 | End: 2017-07-18

## 2017-07-18 RX ORDER — OXYCODONE AND ACETAMINOPHEN 5; 325 MG/1; MG/1
1 TABLET ORAL
Status: DISCONTINUED | OUTPATIENT
Start: 2017-07-18 | End: 2017-07-18 | Stop reason: HOSPADM

## 2017-07-18 RX ORDER — BUPIVACAINE HYDROCHLORIDE 2.5 MG/ML
INJECTION, SOLUTION EPIDURAL; INFILTRATION; INTRACAUDAL
Status: DISCONTINUED | OUTPATIENT
Start: 2017-07-18 | End: 2017-07-18 | Stop reason: HOSPADM

## 2017-07-18 RX ORDER — MEPERIDINE HYDROCHLORIDE 50 MG/ML
12.5 INJECTION INTRAMUSCULAR; INTRAVENOUS; SUBCUTANEOUS
Status: DISCONTINUED | OUTPATIENT
Start: 2017-07-18 | End: 2017-07-18 | Stop reason: HOSPADM

## 2017-07-18 RX ORDER — CEFAZOLIN SODIUM 2 G/50ML
2 SOLUTION INTRAVENOUS
Status: COMPLETED | OUTPATIENT
Start: 2017-07-18 | End: 2017-07-18

## 2017-07-18 RX ORDER — SODIUM CHLORIDE 9 MG/ML
INJECTION, SOLUTION INTRAVENOUS CONTINUOUS
Status: DISCONTINUED | OUTPATIENT
Start: 2017-07-19 | End: 2017-07-18

## 2017-07-18 RX ORDER — LIDOCAINE HYDROCHLORIDE 10 MG/ML
1 INJECTION, SOLUTION EPIDURAL; INFILTRATION; INTRACAUDAL; PERINEURAL ONCE
Status: DISCONTINUED | OUTPATIENT
Start: 2017-07-18 | End: 2017-07-18 | Stop reason: HOSPADM

## 2017-07-18 RX ORDER — SODIUM CHLORIDE, SODIUM LACTATE, POTASSIUM CHLORIDE, CALCIUM CHLORIDE 600; 310; 30; 20 MG/100ML; MG/100ML; MG/100ML; MG/100ML
INJECTION, SOLUTION INTRAVENOUS CONTINUOUS
Status: DISCONTINUED | OUTPATIENT
Start: 2017-07-18 | End: 2017-07-18

## 2017-07-18 RX ORDER — MORPHINE SULFATE 10 MG/ML
2 INJECTION INTRAMUSCULAR; INTRAVENOUS; SUBCUTANEOUS EVERY 5 MIN PRN
Status: DISCONTINUED | OUTPATIENT
Start: 2017-07-18 | End: 2017-07-18 | Stop reason: HOSPADM

## 2017-07-18 RX ORDER — SODIUM CHLORIDE 0.9 % (FLUSH) 0.9 %
3 SYRINGE (ML) INJECTION
Status: DISCONTINUED | OUTPATIENT
Start: 2017-07-18 | End: 2017-07-18 | Stop reason: HOSPADM

## 2017-07-18 RX ORDER — ONDANSETRON 2 MG/ML
4 INJECTION INTRAMUSCULAR; INTRAVENOUS DAILY PRN
Status: DISCONTINUED | OUTPATIENT
Start: 2017-07-18 | End: 2017-07-18 | Stop reason: HOSPADM

## 2017-07-18 RX ORDER — SODIUM CHLORIDE 0.9 % (FLUSH) 0.9 %
3 SYRINGE (ML) INJECTION EVERY 8 HOURS
Status: DISCONTINUED | OUTPATIENT
Start: 2017-07-18 | End: 2017-07-18 | Stop reason: HOSPADM

## 2017-07-18 RX ORDER — HYDROCODONE BITARTRATE AND ACETAMINOPHEN 10; 325 MG/1; MG/1
TABLET ORAL
Qty: 40 TABLET | Refills: 0 | Status: SHIPPED | OUTPATIENT
Start: 2017-07-18 | End: 2018-12-03

## 2017-07-18 RX ORDER — PROPOFOL 10 MG/ML
VIAL (ML) INTRAVENOUS CONTINUOUS PRN
Status: DISCONTINUED | OUTPATIENT
Start: 2017-07-18 | End: 2017-07-18

## 2017-07-18 RX ADMIN — OXYCODONE HYDROCHLORIDE AND ACETAMINOPHEN 1 TABLET: 5; 325 TABLET ORAL at 12:07

## 2017-07-18 RX ADMIN — MIDAZOLAM HYDROCHLORIDE 2 MG: 1 INJECTION, SOLUTION INTRAMUSCULAR; INTRAVENOUS at 10:07

## 2017-07-18 RX ADMIN — CEFAZOLIN SODIUM 2 G: 2 SOLUTION INTRAVENOUS at 10:07

## 2017-07-18 RX ADMIN — SODIUM CHLORIDE, SODIUM LACTATE, POTASSIUM CHLORIDE, AND CALCIUM CHLORIDE: 600; 310; 30; 20 INJECTION, SOLUTION INTRAVENOUS at 10:07

## 2017-07-18 RX ADMIN — KETOROLAC TROMETHAMINE 30 MG: 30 INJECTION, SOLUTION INTRAMUSCULAR; INTRAVENOUS at 11:07

## 2017-07-18 RX ADMIN — PROPOFOL 1150 MG/HR: 10 INJECTION, EMULSION INTRAVENOUS at 10:07

## 2017-07-18 NOTE — ANESTHESIA PREPROCEDURE EVALUATION
07/18/2017  Mil Sood is a 62 y.o., male.    Anesthesia Evaluation    I have reviewed the Patient Summary Reports.    I have reviewed the Nursing Notes.   I have reviewed the Medications.     Review of Systems  Anesthesia Hx:  No problems with previous Anesthesia    Social:  Non-Smoker    Hematology/Oncology:  Hematology Normal        Cardiovascular:   Hypertension Denies CAD.    ECG has been reviewed.    Pulmonary:   Lung mass   Renal/:   Renal mass   Hepatic/GI:   GERD, poorly controlled    Neurological:  Neurology Normal    Endocrine:   Denies Diabetes.    Psych:   Psychiatric History          Physical Exam  General:  Obesity    Airway/Jaw/Neck:  Airway Findings: Mouth Opening: Normal General Airway Assessment: Adult  Mallampati: II  TM Distance: Normal, at least 6 cm       Chest/Lungs:  Chest/Lungs Findings: Clear to auscultation     Heart/Vascular:  Heart Findings: Rate: Normal  Rhythm: Regular Rhythm        Mental Status:  Mental Status Findings:  Cooperative, Alert and Oriented         Anesthesia Plan  Type of Anesthesia, risks & benefits discussed:  Anesthesia Type:  general  Patient's Preference:   Intra-op Monitoring Plan:   Intra-op Monitoring Plan Comments:   Post Op Pain Control Plan:   Post Op Pain Control Plan Comments:   Induction:   IV  Beta Blocker:  Patient is on a Beta-Blocker and has received one dose within the past 24 hours (No further documentation required).       Informed Consent: Patient understands risks and agrees with Anesthesia plan.  Questions answered. Anesthesia consent signed with patient.  ASA Score: 3     Day of Surgery Review of History & Physical: I have interviewed and examined the patient. I have reviewed the patient's H&P dated:  There are no significant changes.          Ready For Surgery From Anesthesia Perspective.

## 2017-07-18 NOTE — INTERVAL H&P NOTE
The patient has been examined and the H&P has been reviewed:    I concur with the findings and changes have been noted since the H&P was written:     The patient has marked triggering of his right long finger.  Poison ivy is not an issue on his right upper extremity.    Anesthesia/Surgery risks, benefits and alternative options discussed and understood by patient/family.          Active Hospital Problems    Diagnosis  POA    Trigger finger of right hand [M65.30]  Yes      Resolved Hospital Problems    Diagnosis Date Resolved POA   No resolved problems to display.

## 2017-07-18 NOTE — DISCHARGE INSTRUCTIONS
Treating Trigger Finger     The tendon sheath is opened to release the tendon. Once the tendon can move freely again, the finger can bend and straighten more normally.     Trigger finger occurs when the tissue inside your finger or thumb becomes inflamed. Mild cases can be treated without surgery. If the problem is severe, surgery may be needed. Your doctor will discuss your options with you.  Nonsurgical treatment  For mild symptoms, your doctor may have you rest the finger or thumb. You may also be told to take anti-inflammatory medicines. These include ibuprofen or aspirin. You may be given an injection of medicine in the base of the finger or thumb. This typically is a steroid, such as cortisone.  Surgery  If nonsurgical treatments dont ease your symptoms, surgery may be recommended. A tendon is a cordlike fiber that attaches muscle to bone and allows joints to bend. The tendon is surrounded by a protective cover called a sheath. During surgery, the sheath in your finger or thumb is opened to enlarge the space and release the swollen tendon. This allows the finger or thumb to bend and straighten normally. Surgery takes about 20 minutes. It can often be done using a local anesthetic. You may be able to go home the same day. Your hand will be wrapped in a soft bandage. You may need to wear a plaster splint for a short time to keep the finger or thumb still as it heals. The stitches will be removed in about 2 weeks. Your doctor can discuss the risks and benefits of surgery with you.  Date Last Reviewed: 9/21/2015 © 2000-2016 [employer]. [address] [place] [address], [place] [address]. All rights reserved. This information is not intended as a substitute for professional medical care. Always follow your healthcare professional's instructions.        General Information:    1.  Do not drink alcoholic beverages including beer for 24 hours or as long as you are on pain medication..  2.  Do not drive a  motor vehicle, operate machinery or power tools, or signs legal papers for 24 hours or as long as you are on pain medication.   3.  You may experience light-headedness, dizziness, and sleepiness following surgery. Please do not stay alone. A responsible adult should be with you for this 24 hour period.  4.  Go home and rest.    5. Progress slowly to a normal diet unless instructed.  Otherwise, begin with liquids such as soft drinks, then soup and crackers working up to solid foods. Drink plenty of nonalcoholic fluids.  6.  Certain anesthetics and pain medications produce nausea and vomiting in certain       individuals. If nausea becomes a problem at home, call you doctor.    7. A nurse will be calling you sometime after surgery. Do not be alarmed. This is our way of finding out how you are doing.    8. Several times every hour while you are awake, take 2-3 deep breaths and cough. If you had stomach surgery hold a pillow or rolled towel firmly against your stomach before you cough. This will help with any pain the cough might cause.  9. Several times every hour while you are awake, pump and flex your feet 5-6 times and do foot circles. This will help prevent blood clots.    10.Call your doctor for severe pain, bleeding, fever, or signs or symptoms of infection (pain, swelling, redness, foul odor, drainage).    11.You can contact your doctor anytime by [phone] for the [facility] (at [facility]) or [phone] for the [facility] on [facility].   [email or web address] is another way to contact your doctor if you are an active participant online with [facility].      Acetaminophen; Hydrocodone tablets or capsules  What is this medicine?  ACETAMINOPHEN; HYDROCODONE (a set a ELAINE doni fen; priyanka droe KOE done) is a pain reliever. It is used to treat moderate to severe pain.  How should I use this medicine?  Take this medicine by mouth with a glass of water. Follow the directions on the prescription  label. You can take it with or without food. If it upsets your stomach, take it with food. Do not take your medicine more often than directed.  A special MedGuide will be given to you by the pharmacist with each prescription and refill. Be sure to read this information carefully each time.  Talk to your pediatrician regarding the use of this medicine in children. Special care may be needed.  What side effects may I notice from receiving this medicine?  Side effects that you should report to your doctor or health care professional as soon as possible:  · allergic reactions like skin rash, itching or hives, swelling of the face, lips, or tongue  · breathing problems  · confusion  · redness, blistering, peeling or loosening of the skin, including inside the mouth  · signs and symptoms of low blood pressure like dizziness; feeling faint or lightheaded, falls; unusually weak or tired  · trouble passing urine or change in the amount of urine  · yellowing of the eyes or skin  Side effects that usually do not require medical attention (report to your doctor or health care professional if they continue or are bothersome):  · constipation  · dry mouth  · nausea, vomiting  · tiredness  What may interact with this medicine?  This medicine may interact with the following medications:  · alcohol  · antiviral medicines for HIV or AIDS  · atropine  · antihistamines for allergy, cough and cold  · certain antibiotics like erythromycin, clarithromycin  · certain medicines for anxiety or sleep  · certain medicines for bladder problems like oxybutynin, tolterodine  · certain medicines for depression like amitriptyline, fluoxetine, sertraline  · certain medicines for fungal infections like ketoconazole and itraconazole  · certain medicines for Parkinson's disease like benztropine, trihexyphenidyl  · certain medicines for seizures like carbamazepine, phenobarbital, phenytoin, primidone  · certain medicines for stomach problems like  dicyclomine, hyoscyamine  · certain medicines for travel sickness like scopolamine  · general anesthetics like halothane, isoflurane, methoxyflurane, propofol  · ipratropium  · local anesthetics like lidocaine, pramoxine, tetracaine  · MAOIs like Carbex, Eldepryl, Marplan, Nardil, and Parnate  · medicines that relax muscles for surgery  · other medicines with acetaminophen  · other narcotic medicines for pain or cough  · phenothiazines like chlorpromazine, mesoridazine, prochlorperazine, thioridazine  · rifampin  What if I miss a dose?  If you miss a dose, take it as soon as you can. If it is almost time for your next dose, take only that dose. Do not take double or extra doses.  Where should I keep my medicine?  Keep out of the reach of children. This medicine can be abused. Keep your medicine in a safe place to protect it from theft. Do not share this medicine with anyone. Selling or giving away this medicine is dangerous and against the law.  This medicine may cause accidental overdose and death if it taken by other adults, children, or pets. Mix any unused medicine with a substance like cat litter or coffee grounds. Then throw the medicine away in a sealed container like a sealed bag or a coffee can with a lid. Do not use the medicine after the expiration date.  Store at room temperature between 15 and 30 degrees C (59 and 86 degrees F).  What should I tell my health care provider before I take this medicine?  They need to know if you have any of these conditions:  · brain tumor  · Crohn's disease, inflammatory bowel disease, or ulcerative colitis  · drug abuse or addiction  · head injury  · heart or circulation problems  · if you often drink alcohol  · kidney disease or problems going to the bathroom  · liver disease  · lung disease, asthma, or breathing problems  · an unusual or allergic reaction to acetaminophen, hydrocodone, other opioid analgesics, other medicines, foods, dyes, or preservatives  · pregnant  or trying to get pregnant  · breast-feeding  What should I watch for while using this medicine?  Tell your doctor or health care professional if your pain does not go away, if it gets worse, or if you have new or a different type of pain. You may develop tolerance to the medicine. Tolerance means that you will need a higher dose of the medicine for pain relief. Tolerance is normal and is expected if you take the medicine for a long time.  Do not suddenly stop taking your medicine because you may develop a severe reaction. Your body becomes used to the medicine. This does NOT mean you are addicted. Addiction is a behavior related to getting and using a drug for a non-medical reason. If you have pain, you have a medical reason to take pain medicine. Your doctor will tell you how much medicine to take. If your doctor wants you to stop the medicine, the dose will be slowly lowered over time to avoid any side effects.  There are different types of narcotic medicines (opiates). If you take more than one type at the same time or if you are taking another medicine that also causes drowsiness, you may have more side effects. Give your health care provider a list of all medicines you use. Your doctor will tell you how much medicine to take. Do not take more medicine than directed. Call emergency for help if you have problems breathing or unusual sleepiness.  Do not take other medicines that contain acetaminophen with this medicine. Always read labels carefully. If you have questions, ask your doctor or pharmacist.  If you take too much acetaminophen get medical help right away. Too much acetaminophen can be very dangerous and cause liver damage. Even if you do not have symptoms, it is important to get help right away.  You may get drowsy or dizzy. Do not drive, use machinery, or do anything that needs mental alertness until you know how this medicine affects you. Do not stand or sit up quickly, especially if you are an older  patient. This reduces the risk of dizzy or fainting spells. Alcohol may interfere with the effect of this medicine. Avoid alcoholic drinks.  The medicine will cause constipation. Try to have a bowel movement at least every 2 to 3 days. If you do not have a bowel movement for 3 days, call your doctor or health care professional.  Your mouth may get dry. Chewing sugarless gum or sucking hard candy, and drinking plenty of water may help. Contact your doctor if the problem does not go away or is severe.  Date Last Reviewed:   NOTE:This sheet is a summary. It may not cover all possible information. If you have questions about this medicine, talk to your doctor, pharmacist, or health care provider. Copyright© 2016 [employer]

## 2017-07-18 NOTE — BRIEF OP NOTE
[facility]  Brief Operative Note     SUMMARY     Surgery Date: 7/18/2017     Surgeon(s) and Role:     * Timothy Abarca MD - Primary    Assisting Surgeon: None    Pre-op Diagnosis:  Trigger finger of right hand, unspecified finger [M65.30]    Post-op Diagnosis:  Post-Op Diagnosis Codes:     * Trigger finger of right hand, unspecified finger [M65.30]    Procedure(s) (LRB):  RELEASE-FINGER-TRIGGER, LONG FINGER. RIGHT (Right)    Anesthesia: General    Description of the findings of the procedure: Right long trigger finger    Findings/Key Components: Right long trigger finger    Estimated Blood Loss: Less than 3 cc       Specimens:   Specimen (12h ago through future)    None          Discharge Note    SUMMARY     Admit Date: 7/18/2017    Discharge Date and Time:  07/18/2017 11:33 AM    Hospital Course (synopsis of major diagnoses, care, treatment, and services provided during the course of the hospital stay): The patient had their procedure performed, had a routine post operative recovery, and was discharged home.       Final Diagnosis: Post-Op Diagnosis Codes:     * Trigger finger of right hand, unspecified finger [M65.30]    Disposition: Home or Self Care    Follow Up/Patient Instructions:     Medications:  Reconciled Home Medications:   Current Discharge Medication List      CONTINUE these medications which have NOT CHANGED    Details   amlodipine (NORVASC) 10 MG tablet Take 1 tablet (10 mg total) by mouth once daily.  Qty: 90 tablet, Refills: 1      aspirin 81 mg Tab Take 1 tablet by mouth Daily.      benazepril (LOTENSIN) 20 MG tablet Take 2 tablets (40 mg total) by mouth once daily.  Qty: 180 tablet, Refills: 1      clonazepam (KLONOPIN) 2 MG Tab 2 mg every evening. Take 2 mg by mouth nightly as needed.        hydrALAZINE (APRESOLINE) 25 MG tablet Take 1 tablet (25 mg total) by mouth 3 (three) times daily.  Qty: 90 tablet, Refills: 11      hydrochlorothiazide (MICROZIDE) 12.5 mg capsule TAKE 1  CAPSULE BY MOUTH ONCE DAILY.  Qty: 90 capsule, Refills: 0      !! hydrOXYzine pamoate (VISTARIL) 50 MG Cap       metoprolol tartrate (LOPRESSOR) 50 MG tablet TAKE 1 TABLET BY MOUTH TWICE A DAY.  Qty: 180 tablet, Refills: 0      pantoprazole (PROTONIX) 40 MG tablet Take 1 tablet (40 mg total) by mouth 2 (two) times daily before meals.  Qty: 240 tablet, Refills: 1      tizanidine (ZANAFLEX) 2 MG tablet TAKE 1 TABLET BY MOUTH ONCE DAILY AS NEEDED.  Qty: 90 tablet, Refills: 0      trazodone (DESYREL) 100 MG tablet Take 100 mg by mouth every evening.       zolpidem (AMBIEN) 10 mg Tab Take 1 tablet (10 mg total) by mouth nightly as needed.  Qty: 30 tablet, Refills: 4    Associated Diagnoses: Chronic insomnia      gabapentin (NEURONTIN) 300 MG capsule TAKE 2 CAPSULES BY MOUTH THREE TIMES DAILY.  Qty: 270 capsule, Refills: 0      hydrocodone-acetaminophen 7.5-325mg (NORCO) 7.5-325 mg per tablet Take 1 tablet by mouth every 6 (six) hours as needed for Pain.  Qty: 12 tablet, Refills: 0      !! hydrOXYzine (VISTARIL) 100 MG capsule Take 100 mg by mouth once daily.      triamcinolone acetonide 0.1% (KENALOG) 0.1 % cream Apply topically 2 (two) times daily.  Qty: 45 g, Refills: 1       !! - Potential duplicate medications found. Please discuss with provider.        No discharge procedures on file.

## 2017-07-18 NOTE — TELEPHONE ENCOUNTER
----- Message from Janina Alaniz sent at 7/18/2017 12:43 PM CDT -----  Contact: [place] [place]  States that pt needs post op appt for 07/31/17. Please call pt with appt at [phone]//thank you acc      Left message for pt letting him know that he is scheduled for 7/31 @ 8:15.

## 2017-07-18 NOTE — PROGRESS NOTES
Pt asked about rash to left arm. States its poison ivy and  prescribed cream for int. States it look better today than yesterday. Reported to mikey cervantes.

## 2017-07-18 NOTE — PLAN OF CARE
Patient and wife given discharge instructions and verbalize understanding.  Patient denies pain and vital signs are stable.

## 2017-07-18 NOTE — TRANSFER OF CARE
"Anesthesia Transfer of Care Note    Patient: Mil Sood    Procedure(s) Performed: Procedure(s) (LRB):  RELEASE-FINGER-TRIGGER, LONG FINGER. RIGHT (Right)    Patient location: PACU    Anesthesia Type: MAC    Transport from OR: Transported from OR on room air with adequate spontaneous ventilation    Post pain: adequate analgesia    Post assessment: no apparent anesthetic complications    Post vital signs: stable    Level of consciousness: awake, alert and oriented    Nausea/Vomiting: no nausea/vomiting    Complications: none    Transfer of care protocol was followed      Last vitals:   Visit Vitals  /78   Pulse 62   Temp 36.7 °C (98.1 °F) (Temporal)   Resp 17   Ht 5' 10" (1.778 m)   Wt 92.5 kg (203 lb 14.8 oz)   SpO2 (!) 92%   BMI 29.26 kg/m²     "

## 2017-07-18 NOTE — ANESTHESIA POSTPROCEDURE EVALUATION
"Anesthesia Post Evaluation    Patient: Mil Sood    Procedure(s) Performed: Procedure(s) (LRB):  RELEASE-FINGER-TRIGGER, LONG FINGER. RIGHT (Right)    Final Anesthesia Type: general  Patient location during evaluation: PACU  Patient participation: Yes- Able to Participate  Post-procedure vital signs: reviewed and stable  Pain management: adequate  Airway patency: patent  PONV status at discharge: No PONV  Anesthetic complications: no      Cardiovascular status: hemodynamically stable  Respiratory status: spontaneous ventilation  Hydration status: euvolemic  Follow-up not needed.        Visit Vitals  BP (!) 141/75   Pulse (!) 56   Temp 36.8 °C (98.2 °F) (Temporal)   Resp 18   Ht 5' 10" (1.778 m)   Wt 92.5 kg (203 lb 14.8 oz)   SpO2 99%   BMI 29.26 kg/m²       Pain/Doug Score: Pain Assessment Performed: Yes (7/18/2017 10:26 AM)  Presence of Pain: denies (7/18/2017 12:45 PM)  Pain Rating Prior to Med Admin: 0 (7/18/2017 12:11 PM)  Doug Score: 10 (7/18/2017 12:45 PM)      "

## 2017-07-18 NOTE — OP NOTE
Surgery date: July 18, 2017    Preoperative diagnosis: Right long trigger finger    Postoperative diagnosis: Right long trigger finger    Operation performed: Right long trigger finger release    Surgeon: Timothy Abarca M.D.    First Asst.: Umm Whitney PA-C    Anesthesia: Gen.    Drains: None    Findings: Right long trigger finger    Specimen: None    Estimated blood loss: Less than 3 cc    Indications: This 62-year-old male presented with painful triggering of his right long finger.  He was indicated for a right long trigger finger release to decrease his pain, diminish his morbidity, and improve his ultimate functional ability.    Description of operation: The patient was placed in the supine position operating table and satisfactory general anesthesia was administered by anesthesia.  Tourniquet was placed high on the patient's right upper arm and the patient's right upper extremity was then exsanguinated with an Esmarch bandage and the tourniquet inflated to 250 mmHg pressure.  The patient's right hand and arm were then prepped and draped in usual sterile fashion and positioned on an arm board.  A transverse palmar incision was made over the A1 pulley of the long finger.  Blunt dissection was utilized to identify the A1 pulley and the digital neurovascular bundles were retracted to the radial and ulnar side.  Longitudinal division of the A1 pulley was performed with sharp and blunt dissection.  Complete release of the entire pulley was achieved.  Nodularity of the underlying flexor tendons was noted.  Complete relief of altering was verified intraoperatively.  Hemostasis was maintained with needle tip electrocautery.  The wound was then closed utilizing 4-0 nylon running horizontal mattress sutures.  A sterile compression dressing was then applied to the wound followed by an Ace wrap.  The patient was awakened and taken to recovery room in satisfactory condition.  Blood loss was less than 3 cc, there were no  complications, and the patient tolerated the procedure well.

## 2017-07-24 RX ORDER — GABAPENTIN 300 MG/1
CAPSULE ORAL
Qty: 270 CAPSULE | Refills: 0 | Status: SHIPPED | OUTPATIENT
Start: 2017-07-24 | End: 2017-09-11 | Stop reason: SDUPTHER

## 2017-07-24 RX ORDER — BENAZEPRIL HYDROCHLORIDE 20 MG/1
TABLET ORAL
Qty: 180 TABLET | Refills: 0 | Status: SHIPPED | OUTPATIENT
Start: 2017-07-24 | End: 2017-10-03 | Stop reason: SDUPTHER

## 2017-07-31 ENCOUNTER — OFFICE VISIT (OUTPATIENT)
Dept: ORTHOPEDICS | Facility: CLINIC | Age: 62
End: 2017-07-31
Payer: MEDICARE

## 2017-07-31 VITALS
DIASTOLIC BLOOD PRESSURE: 101 MMHG | BODY MASS INDEX: 29.2 KG/M2 | SYSTOLIC BLOOD PRESSURE: 164 MMHG | WEIGHT: 203.94 LBS | RESPIRATION RATE: 18 BRPM | HEIGHT: 70 IN | HEART RATE: 61 BPM

## 2017-07-31 DIAGNOSIS — Z09 POSTOP CHECK: ICD-10-CM

## 2017-07-31 DIAGNOSIS — Z98.890 S/P TRIGGER FINGER RELEASE: Primary | ICD-10-CM

## 2017-07-31 PROCEDURE — 99999 PR PBB SHADOW E&M-EST. PATIENT-LVL III: CPT | Mod: PBBFAC,,, | Performed by: PHYSICIAN ASSISTANT

## 2017-07-31 PROCEDURE — 99024 POSTOP FOLLOW-UP VISIT: CPT | Mod: S$GLB,,, | Performed by: PHYSICIAN ASSISTANT

## 2017-07-31 NOTE — PROGRESS NOTES
Subjective:      Patient ID: Mil Sood is a 62 y.o. male.    Chief Complaint: Pain and Post-op Evaluation of the Right Hand    HPI  The patient is seen today s/p trigger finger release of right long finger performed on 7/18. He is here today to have sutures removed. No postop problems reported.       Review of Systems   Constitution: Negative for chills, fever and night sweats.   Respiratory: Negative for cough, shortness of breath and wheezing.    Gastrointestinal: Negative for diarrhea, nausea and vomiting.   Neurological: Negative for brief paralysis.   Psychiatric/Behavioral: Negative for altered mental status.         Objective:            General    Constitutional: He is oriented to person, place, and time. He appears well-developed and well-nourished.   Neck: Normal range of motion.   Cardiovascular: Normal rate and regular rhythm.    Pulmonary/Chest: Effort normal.   Abdominal: Soft.   Neurological: He is alert and oriented to person, place, and time.   Psychiatric: He has a normal mood and affect. His behavior is normal.             Right Hand/Wrist Exam     Comments:  Incision is clean, dry and intact. No evidence of infection.      Hand is calloused.                           Assessment:       Encounter Diagnoses   Name Primary?    S/P trigger finger release Yes    Postop check           Plan:       Mil was seen today for pain and post-op evaluation.    Diagnoses and all orders for this visit:    S/P trigger finger release    Postop check        Sutures were removed today without incident. He was instructed to keep hand clean and dry for 24 hrs before washing it.  He was educated on at-home exercises and scar massage.     He will return in 4 weeks if he has any concerns with that finger. He is currently scheduled for left index TF release next month.           Umm Whitney PA-C

## 2017-08-29 NOTE — PRE ADMISSION SCREENING
Pre op instructions reviewed with patient per phone:    To confirm, Your surgeon has instructed you:  Surgery is scheduled 08/31/17 at 0700.      Please report to [facility] 1st floor main lobby by 0530  Pre admit office to call afternoon prior to surgery with final arrival time.      INSTRUCTIONS IMPORTANT!!!  ¨ Do not eat, drink, or smoke after 12 midnight-including water. OK to brush teeth, no gum, candy or mints!    ¨ Take only these medicines with a small swallow of water-morning of surgery.  Benazepril, Hydralazine, Metoprolol         ____  Do not wear makeup, including mascara.  ____  No powder, lotions or creams to surgical area.  ____  Please remove all jewelry, including piercings and leave at home.  ____  No money or valuables needed. Please leave at home.  ____  Please bring identification and insurance information to hospital.  ____  If going home the same day, arrange for a ride home. You will not be able to   drive if Anesthesia was used.  ____  Children, under 12 years old, must remain in the waiting room with an adult.  They are not allowed in patient areas.  ____  Wear loose fitting clothing. Allow for dressings, bandages.  ____  Stop Aspirin, Ibuprofen, Motrin and Aleve at least 5-7 days before surgery, unless otherwise instructed by your doctor, or the nurse.   You MAY use Tylenol/acetaminophen until day of surgery.  ____  If you take diabetic medication, do not take am of surgery unless instructed by   Doctor.  ____ Stop taking any Fish Oil supplement or any Vitamins that contain Vitamin E at least 5 days prior to surgery.          Bathing Instructions-- The night before surgery and the morning prior to coming to the hospital:   -Do not shave the surgical area.   -Shower and wash your hair and body as usual with anti-bacterial  soap and shampoo.   -Rinse your hair and body completely.   -Use one packet of hibiclens to wash the surgical site (using your hand) gently for 5  minutes.  Do not scrub you skin too hard.   -Do not use hibiclens on your head, face, or genitals.   -Do not wash with anti-bacterial soap after you use the hibiclens.   -Rinse your body thoroughly.   -Dry with clean, soft towel.  Do not use lotion, cream, deodorant, or powders on   the surgical site.    Use antibacterial soap in place of hibiclens if your surgery is on the head, face or genitals.         Surgical Site Infection    Prevention of surgical site infections:     -Keep incisions clean and dry.   -Do not soak/submerge incisions in water until completely healed.   -Do not apply lotions, powders, creams, or deodorants to site.   -Always make sure hands are cleaned with antibacterial soap/ alcohol-based   prior to touching the surgical site.  (This includes doctors, nurses, staff, and yourself.)    Signs and symptoms:   -Redness and pain around the area where you had surgery   -Drainage of cloudy fluid from your surgical wound   -Fever over 100.4  I have read or had read and explained to me, and understand the above information.

## 2017-08-30 ENCOUNTER — ANESTHESIA EVENT (OUTPATIENT)
Dept: SURGERY | Facility: HOSPITAL | Age: 62
End: 2017-08-30
Payer: MEDICARE

## 2017-08-31 ENCOUNTER — SURGERY (OUTPATIENT)
Age: 62
End: 2017-08-31

## 2017-08-31 ENCOUNTER — HOSPITAL ENCOUNTER (OUTPATIENT)
Facility: HOSPITAL | Age: 62
Discharge: HOME | End: 2017-08-31
Attending: ORTHOPAEDIC SURGERY | Admitting: ORTHOPAEDIC SURGERY
Payer: MEDICARE

## 2017-08-31 ENCOUNTER — ANESTHESIA (OUTPATIENT)
Dept: SURGERY | Facility: HOSPITAL | Age: 62
End: 2017-08-31
Payer: MEDICARE

## 2017-08-31 DIAGNOSIS — M65.30 TRIGGER FINGER, LEFT: ICD-10-CM

## 2017-08-31 DIAGNOSIS — M65.322 TRIGGER FINGER, LEFT INDEX FINGER: Primary | ICD-10-CM

## 2017-08-31 LAB
POCT GLUCOSE: 94 MG/DL (ref 70–110)
POCT GLUCOSE: 98 MG/DL (ref 70–110)

## 2017-08-31 PROCEDURE — 71000015 HC POSTOP RECOV 1ST HR: Performed by: ORTHOPAEDIC SURGERY

## 2017-08-31 PROCEDURE — 25000003 PHARM REV CODE 250: Performed by: CLINIC/CENTER

## 2017-08-31 PROCEDURE — 25000003 PHARM REV CODE 250: Performed by: ANESTHESIOLOGY

## 2017-08-31 PROCEDURE — 25000003 PHARM REV CODE 250: Performed by: ORTHOPAEDIC SURGERY

## 2017-08-31 PROCEDURE — 63600175 PHARM REV CODE 636 W HCPCS: Performed by: PHYSICIAN ASSISTANT

## 2017-08-31 PROCEDURE — 26055 INCISE FINGER TENDON SHEATH: CPT | Mod: 79,F1,, | Performed by: ORTHOPAEDIC SURGERY

## 2017-08-31 PROCEDURE — 63600175 PHARM REV CODE 636 W HCPCS: Performed by: CLINIC/CENTER

## 2017-08-31 PROCEDURE — 71000033 HC RECOVERY, INTIAL HOUR: Performed by: ORTHOPAEDIC SURGERY

## 2017-08-31 PROCEDURE — 37000008 HC ANESTHESIA 1ST 15 MINUTES: Performed by: ORTHOPAEDIC SURGERY

## 2017-08-31 PROCEDURE — 37000009 HC ANESTHESIA EA ADD 15 MINS: Performed by: ORTHOPAEDIC SURGERY

## 2017-08-31 PROCEDURE — 36000707: Performed by: ORTHOPAEDIC SURGERY

## 2017-08-31 PROCEDURE — 36000706: Performed by: ORTHOPAEDIC SURGERY

## 2017-08-31 RX ORDER — METOCLOPRAMIDE HYDROCHLORIDE 5 MG/ML
10 INJECTION INTRAMUSCULAR; INTRAVENOUS EVERY 10 MIN PRN
Status: DISCONTINUED | OUTPATIENT
Start: 2017-08-31 | End: 2017-08-31 | Stop reason: HOSPADM

## 2017-08-31 RX ORDER — LIDOCAINE HYDROCHLORIDE 10 MG/ML
1 INJECTION, SOLUTION EPIDURAL; INFILTRATION; INTRACAUDAL; PERINEURAL ONCE
Status: DISCONTINUED | OUTPATIENT
Start: 2017-08-31 | End: 2017-08-31 | Stop reason: HOSPADM

## 2017-08-31 RX ORDER — MIDAZOLAM HYDROCHLORIDE 1 MG/ML
INJECTION, SOLUTION INTRAMUSCULAR; INTRAVENOUS
Status: DISCONTINUED | OUTPATIENT
Start: 2017-08-31 | End: 2017-08-31

## 2017-08-31 RX ORDER — HYDROCODONE BITARTRATE AND ACETAMINOPHEN 10; 325 MG/1; MG/1
TABLET ORAL
Qty: 40 TABLET | Refills: 0 | Status: SHIPPED | OUTPATIENT
Start: 2017-08-31 | End: 2017-11-16 | Stop reason: SDUPTHER

## 2017-08-31 RX ORDER — PROPOFOL 10 MG/ML
VIAL (ML) INTRAVENOUS
Status: DISCONTINUED | OUTPATIENT
Start: 2017-08-31 | End: 2017-08-31

## 2017-08-31 RX ORDER — ONDANSETRON 2 MG/ML
INJECTION INTRAMUSCULAR; INTRAVENOUS
Status: DISCONTINUED | OUTPATIENT
Start: 2017-08-31 | End: 2017-08-31

## 2017-08-31 RX ORDER — SODIUM CHLORIDE 9 MG/ML
INJECTION, SOLUTION INTRAVENOUS CONTINUOUS
Status: DISCONTINUED | OUTPATIENT
Start: 2017-09-01 | End: 2017-08-31 | Stop reason: HOSPADM

## 2017-08-31 RX ORDER — MEPERIDINE HYDROCHLORIDE 50 MG/ML
12.5 INJECTION INTRAMUSCULAR; INTRAVENOUS; SUBCUTANEOUS ONCE AS NEEDED
Status: DISCONTINUED | OUTPATIENT
Start: 2017-08-31 | End: 2017-08-31 | Stop reason: HOSPADM

## 2017-08-31 RX ORDER — FENTANYL CITRATE 50 UG/ML
INJECTION, SOLUTION INTRAMUSCULAR; INTRAVENOUS
Status: DISCONTINUED | OUTPATIENT
Start: 2017-08-31 | End: 2017-08-31

## 2017-08-31 RX ORDER — OXYCODONE HYDROCHLORIDE 5 MG/1
5 TABLET ORAL
Status: DISCONTINUED | OUTPATIENT
Start: 2017-08-31 | End: 2017-08-31 | Stop reason: HOSPADM

## 2017-08-31 RX ORDER — LIDOCAINE HYDROCHLORIDE 10 MG/ML
INJECTION INFILTRATION; PERINEURAL
Status: DISCONTINUED | OUTPATIENT
Start: 2017-08-31 | End: 2017-08-31

## 2017-08-31 RX ORDER — BUPIVACAINE HYDROCHLORIDE 2.5 MG/ML
INJECTION, SOLUTION EPIDURAL; INFILTRATION; INTRACAUDAL
Status: DISCONTINUED | OUTPATIENT
Start: 2017-08-31 | End: 2017-08-31 | Stop reason: HOSPADM

## 2017-08-31 RX ORDER — SODIUM CHLORIDE 0.9 % (FLUSH) 0.9 %
3 SYRINGE (ML) INJECTION
Status: DISCONTINUED | OUTPATIENT
Start: 2017-08-31 | End: 2017-08-31 | Stop reason: HOSPADM

## 2017-08-31 RX ORDER — SODIUM CHLORIDE, SODIUM LACTATE, POTASSIUM CHLORIDE, CALCIUM CHLORIDE 600; 310; 30; 20 MG/100ML; MG/100ML; MG/100ML; MG/100ML
INJECTION, SOLUTION INTRAVENOUS CONTINUOUS
Status: DISCONTINUED | OUTPATIENT
Start: 2017-08-31 | End: 2017-08-31 | Stop reason: HOSPADM

## 2017-08-31 RX ORDER — CEFAZOLIN SODIUM 2 G/50ML
2 SOLUTION INTRAVENOUS
Status: COMPLETED | OUTPATIENT
Start: 2017-08-31 | End: 2017-08-31

## 2017-08-31 RX ORDER — MORPHINE SULFATE 10 MG/ML
2 INJECTION INTRAMUSCULAR; INTRAVENOUS; SUBCUTANEOUS EVERY 5 MIN PRN
Status: DISCONTINUED | OUTPATIENT
Start: 2017-08-31 | End: 2017-08-31 | Stop reason: HOSPADM

## 2017-08-31 RX ORDER — SODIUM CHLORIDE 0.9 % (FLUSH) 0.9 %
3 SYRINGE (ML) INJECTION EVERY 8 HOURS
Status: DISCONTINUED | OUTPATIENT
Start: 2017-08-31 | End: 2017-08-31 | Stop reason: HOSPADM

## 2017-08-31 RX ORDER — SUCCINYLCHOLINE CHLORIDE 20 MG/ML
INJECTION INTRAMUSCULAR; INTRAVENOUS
Status: DISCONTINUED | OUTPATIENT
Start: 2017-08-31 | End: 2017-08-31

## 2017-08-31 RX ORDER — ROCURONIUM BROMIDE 10 MG/ML
INJECTION, SOLUTION INTRAVENOUS
Status: DISCONTINUED | OUTPATIENT
Start: 2017-08-31 | End: 2017-08-31

## 2017-08-31 RX ADMIN — CEFAZOLIN SODIUM 2 G: 2 SOLUTION INTRAVENOUS at 06:08

## 2017-08-31 RX ADMIN — PROPOFOL 150 MG: 10 INJECTION, EMULSION INTRAVENOUS at 07:08

## 2017-08-31 RX ADMIN — FENTANYL CITRATE 25 MCG: 50 INJECTION, SOLUTION INTRAMUSCULAR; INTRAVENOUS at 07:08

## 2017-08-31 RX ADMIN — FENTANYL CITRATE 50 MCG: 50 INJECTION, SOLUTION INTRAMUSCULAR; INTRAVENOUS at 07:08

## 2017-08-31 RX ADMIN — SODIUM CHLORIDE, SODIUM LACTATE, POTASSIUM CHLORIDE, AND CALCIUM CHLORIDE: 600; 310; 30; 20 INJECTION, SOLUTION INTRAVENOUS at 06:08

## 2017-08-31 RX ADMIN — BUPIVACAINE HYDROCHLORIDE 30 ML: 2.5 INJECTION, SOLUTION EPIDURAL; INFILTRATION; INTRACAUDAL; PERINEURAL at 07:08

## 2017-08-31 RX ADMIN — ONDANSETRON 4 MG: 2 INJECTION, SOLUTION INTRAMUSCULAR; INTRAVENOUS at 07:08

## 2017-08-31 RX ADMIN — LIDOCAINE HYDROCHLORIDE 60 MG: 10 INJECTION, SOLUTION INFILTRATION; PERINEURAL at 07:08

## 2017-08-31 RX ADMIN — SUCCINYLCHOLINE CHLORIDE 100 MG: 20 INJECTION, SOLUTION INTRAMUSCULAR; INTRAVENOUS at 07:08

## 2017-08-31 RX ADMIN — MIDAZOLAM HYDROCHLORIDE 2 MG: 1 INJECTION, SOLUTION INTRAMUSCULAR; INTRAVENOUS at 06:08

## 2017-08-31 RX ADMIN — OXYCODONE HYDROCHLORIDE 5 MG: 5 TABLET ORAL at 07:08

## 2017-08-31 RX ADMIN — ROCURONIUM BROMIDE 5 MG: 10 INJECTION, SOLUTION INTRAVENOUS at 07:08

## 2017-08-31 NOTE — ANESTHESIA POSTPROCEDURE EVALUATION
"Anesthesia Post Evaluation    Patient: Mil Sood    Procedure(s) Performed: Procedure(s) (LRB):  RELEASE-FINGER-TRIGGER, INDEX FINGER. LEFT (Left)    Final Anesthesia Type: general  Patient location during evaluation: PACU  Patient participation: Yes- Able to Participate  Level of consciousness: awake and alert  Post-procedure vital signs: reviewed and stable  Pain management: adequate  Airway patency: patent  PONV status at discharge: No PONV  Anesthetic complications: no      Cardiovascular status: blood pressure returned to baseline  Respiratory status: unassisted  Hydration status: euvolemic  Follow-up not needed.        Visit Vitals  BP (!) 151/95   Pulse (!) 59   Temp 36.8 °C (98.2 °F) (Temporal)   Resp 15   Ht 5' 11" (1.803 m)   Wt 92.7 kg (204 lb 5.9 oz)   SpO2 95%   BMI 28.50 kg/m²       Pain/Doug Score: Pain Assessment Performed: Yes (8/31/2017  8:30 AM)  Presence of Pain: denies (8/31/2017  8:30 AM)  Pain Rating Prior to Med Admin: 0 (8/31/2017  7:59 AM)  Doug Score: 10 (8/31/2017  8:30 AM)      "

## 2017-08-31 NOTE — OP NOTE
Surgery date: July 18, 2017     Preoperative diagnosis: Left index trigger finger     Postoperative diagnosis:  Left index trigger finger     Operation performed Left index trigger finger release     Surgeon: Timothy Abarca M.D.     First Asst.: Umm Whitney PA-C     Anesthesia: Gen.     Drains: None     Findings:  Left index trigger finger     Specimen: None     Estimated blood loss: Less than 3 cc     Indications: This 62-year-old male presented with painful triggering of his left index  finger.  He was indicated for a left index trigger finger release to decrease his pain, diminish his morbidity, and improve his ultimate functional ability.     Description of operation: The patient was placed in the supine position operating table and satisfactory general anesthesia was administered by anesthesia.  Tourniquet was placed high on the patient's left upper arm and the patient's left upper extremity was then exsanguinated with an Esmarch bandage and the tourniquet inflated to 250 mmHg pressure.  The patient's lefthand and arm were then prepped and draped in usual sterile fashion and positioned on an arm board.  A transverse palmar incision was made over the A1 pulley of theindexfinger.  Blunt dissection was utilized to identify the A1 pulley and the digital neurovascular bundles were retracted to the radial and ulnar side.  Longitudinal division of the A1 pulley was performed with sharp and blunt dissection.  Complete release of the entire pulley was achieved.  Nodularity of the underlying flexor tendons was noted.  Complete relief of all triggering was verified intraoperatively.  Hemostasis was maintained with needle tip electrocautery.  The wound was then closed utilizing 4-0 nylon running horizontal mattress sutures.  A sterile compression dressing was then applied to the wound followed by an Ace wrap.  The patient was awakened and taken to recovery room in satisfactory condition.  Blood loss was less than 3  cc, there were no complications, and the patient tolerated the procedure well.

## 2017-08-31 NOTE — ANESTHESIA PREPROCEDURE EVALUATION
08/31/2017  Mil oSod is a 62 y.o., male.    Anesthesia Evaluation    I have reviewed the Patient Summary Reports.    I have reviewed the Nursing Notes.   I have reviewed the Medications.     Review of Systems  Anesthesia Hx:  Denies Family Hx of Anesthesia complications.   Denies Personal Hx of Anesthesia complications.   Cardiovascular:   Hypertension CAD asymptomatic     Hepatic/GI:   GERD    Endocrine:   Diabetes    Psych:   Psychiatric History          Physical Exam  General:  Well nourished, Obesity    Airway/Jaw/Neck:  Airway Findings: Mallampati: II      Chest/Lungs:  Chest/Lungs Findings: Normal Respiratory Rate     Heart/Vascular:  Heart Findings: Normal            Anesthesia Plan  Type of Anesthesia, risks & benefits discussed:  Anesthesia Type:  general  Patient's Preference:   Intra-op Monitoring Plan:   Intra-op Monitoring Plan Comments:   Post Op Pain Control Plan:   Post Op Pain Control Plan Comments:   Induction:   IV  Beta Blocker:  Patient is on a Beta-Blocker and has received one dose within the past 24 hours (No further documentation required).       Informed Consent: Patient understands risks and agrees with Anesthesia plan.  Questions answered. Anesthesia consent signed with patient.  ASA Score: 3     Day of Surgery Review of History & Physical: I have interviewed and examined the patient. I have reviewed the patient's H&P dated:  There are no significant changes.          Ready For Surgery From Anesthesia Perspective.     Renal mass and lung mass    Chest two views.  Comparison 03/27/2013.    Findings: Heart size is within normal limits with mild tortuosity of the aorta.  Mediastinal and hilar contours are stable.  Slight reticular interstitial prominence in the lower lung zones and lucent bullous changes in the right upper lobe.  No confluent infiltrate or effusion.  Thoracic  spondylosis and post surgical changes cervical spine.   Impression      No acute disease.

## 2017-08-31 NOTE — DISCHARGE INSTRUCTIONS
Treating Trigger Finger     The tendon sheath is opened to release the tendon. Once the tendon can move freely again, the finger can bend and straighten more normally.     Trigger finger occurs when the tissue inside your finger or thumb becomes inflamed. Mild cases can be treated without surgery. If the problem is severe, surgery may be needed. Your doctor will discuss your options with you.  Nonsurgical treatment  For mild symptoms, your doctor may have you rest the finger or thumb. You may also be told to take anti-inflammatory medicines. These include ibuprofen or aspirin. You may be given an injection of medicine in the base of the finger or thumb. This typically is a steroid, such as cortisone.  Surgery  If nonsurgical treatments dont ease your symptoms, surgery may be recommended. A tendon is a cordlike fiber that attaches muscle to bone and allows joints to bend. The tendon is surrounded by a protective cover called a sheath. During surgery, the sheath in your finger or thumb is opened to enlarge the space and release the swollen tendon. This allows the finger or thumb to bend and straighten normally. Surgery takes about 20 minutes. It can often be done using a local anesthetic. You may be able to go home the same day. Your hand will be wrapped in a soft bandage. You may need to wear a plaster splint for a short time to keep the finger or thumb still as it heals. The stitches will be removed in about 2 weeks. Your doctor can discuss the risks and benefits of surgery with you.  Date Last Reviewed: 9/21/2015 © 2000-2016 [employer]. [address] [place] [address], [place] [address]. All rights reserved. This information is not intended as a substitute for professional medical care. Always follow your healthcare professional's instructions.      General Information:    1.  Do not drink alcoholic beverages including beer for 24 hours or as long as you are on pain medication..  2.  Do not drive a motor  vehicle, operate machinery or power tools, or signs legal papers for 24 hours or as long as you are on pain medication.   3.  You may experience light-headedness, dizziness, and sleepiness following surgery. Please do not stay alone. A responsible adult should be with you for this 24 hour period.  4.  Go home and rest.    5. Progress slowly to a normal diet unless instructed.  Otherwise, begin with liquids such as soft drinks, then soup and crackers working up to solid foods. Drink plenty of nonalcoholic fluids.  6.  Certain anesthetics and pain medications produce nausea and vomiting in certain       individuals. If nausea becomes a problem at home, call you doctor.    7. Several times every hour while you are awake, take 2-3 deep breaths and cough. If you had stomach surgery hold a pillow or rolled towel firmly against your stomach before you cough. This will help with any pain the cough might cause.    8. Several times every hour while you are awake, pump and flex your feet 5-6 times and do foot circles. This will help prevent blood clots.    9.Call your doctor for severe pain, bleeding, fever, or signs or symptoms of infection (pain, swelling, redness, foul odor, drainage).    10.You can contact your doctor anytime by [phone] for the [facility] (at [facility]) or [phone] for the [facility] on [facility].   [email or web address] is another way to contact your doctor if you are an active participant online with [facility].      Acetaminophen; Hydrocodone tablets or capsules  What is this medicine?  ACETAMINOPHEN; HYDROCODONE (a set a ELAINE doni fen; priyanka droe KOE done) is a pain reliever. It is used to treat moderate to severe pain.  How should I use this medicine?  Take this medicine by mouth with a glass of water. Follow the directions on the prescription label. You can take it with or without food. If it upsets your stomach, take it with food. Do not take your medicine more often than  directed.  A special MedGuide will be given to you by the pharmacist with each prescription and refill. Be sure to read this information carefully each time.  Talk to your pediatrician regarding the use of this medicine in children. Special care may be needed.  What side effects may I notice from receiving this medicine?  Side effects that you should report to your doctor or health care professional as soon as possible:  · allergic reactions like skin rash, itching or hives, swelling of the face, lips, or tongue  · breathing problems  · confusion  · redness, blistering, peeling or loosening of the skin, including inside the mouth  · signs and symptoms of low blood pressure like dizziness; feeling faint or lightheaded, falls; unusually weak or tired  · trouble passing urine or change in the amount of urine  · yellowing of the eyes or skin  Side effects that usually do not require medical attention (report to your doctor or health care professional if they continue or are bothersome):  · constipation  · dry mouth  · nausea, vomiting  · tiredness  What may interact with this medicine?  This medicine may interact with the following medications:  · alcohol  · antiviral medicines for HIV or AIDS  · atropine  · antihistamines for allergy, cough and cold  · certain antibiotics like erythromycin, clarithromycin  · certain medicines for anxiety or sleep  · certain medicines for bladder problems like oxybutynin, tolterodine  · certain medicines for depression like amitriptyline, fluoxetine, sertraline  · certain medicines for fungal infections like ketoconazole and itraconazole  · certain medicines for Parkinson's disease like benztropine, trihexyphenidyl  · certain medicines for seizures like carbamazepine, phenobarbital, phenytoin, primidone  · certain medicines for stomach problems like dicyclomine, hyoscyamine  · certain medicines for travel sickness like scopolamine  · general anesthetics like halothane, isoflurane,  methoxyflurane, propofol  · ipratropium  · local anesthetics like lidocaine, pramoxine, tetracaine  · MAOIs like Carbex, Eldepryl, Marplan, Nardil, and Parnate  · medicines that relax muscles for surgery  · other medicines with acetaminophen  · other narcotic medicines for pain or cough  · phenothiazines like chlorpromazine, mesoridazine, prochlorperazine, thioridazine  · rifampin  What if I miss a dose?  If you miss a dose, take it as soon as you can. If it is almost time for your next dose, take only that dose. Do not take double or extra doses.  Where should I keep my medicine?  Keep out of the reach of children. This medicine can be abused. Keep your medicine in a safe place to protect it from theft. Do not share this medicine with anyone. Selling or giving away this medicine is dangerous and against the law.  This medicine may cause accidental overdose and death if it taken by other adults, children, or pets. Mix any unused medicine with a substance like cat litter or coffee grounds. Then throw the medicine away in a sealed container like a sealed bag or a coffee can with a lid. Do not use the medicine after the expiration date.  Store at room temperature between 15 and 30 degrees C (59 and 86 degrees F).  What should I tell my health care provider before I take this medicine?  They need to know if you have any of these conditions:  · brain tumor  · Crohn's disease, inflammatory bowel disease, or ulcerative colitis  · drug abuse or addiction  · head injury  · heart or circulation problems  · if you often drink alcohol  · kidney disease or problems going to the bathroom  · liver disease  · lung disease, asthma, or breathing problems  · an unusual or allergic reaction to acetaminophen, hydrocodone, other opioid analgesics, other medicines, foods, dyes, or preservatives  · pregnant or trying to get pregnant  · breast-feeding  What should I watch for while using this medicine?  Tell your doctor or health care  professional if your pain does not go away, if it gets worse, or if you have new or a different type of pain. You may develop tolerance to the medicine. Tolerance means that you will need a higher dose of the medicine for pain relief. Tolerance is normal and is expected if you take the medicine for a long time.  Do not suddenly stop taking your medicine because you may develop a severe reaction. Your body becomes used to the medicine. This does NOT mean you are addicted. Addiction is a behavior related to getting and using a drug for a non-medical reason. If you have pain, you have a medical reason to take pain medicine. Your doctor will tell you how much medicine to take. If your doctor wants you to stop the medicine, the dose will be slowly lowered over time to avoid any side effects.  There are different types of narcotic medicines (opiates). If you take more than one type at the same time or if you are taking another medicine that also causes drowsiness, you may have more side effects. Give your health care provider a list of all medicines you use. Your doctor will tell you how much medicine to take. Do not take more medicine than directed. Call emergency for help if you have problems breathing or unusual sleepiness.  Do not take other medicines that contain acetaminophen with this medicine. Always read labels carefully. If you have questions, ask your doctor or pharmacist.  If you take too much acetaminophen get medical help right away. Too much acetaminophen can be very dangerous and cause liver damage. Even if you do not have symptoms, it is important to get help right away.  You may get drowsy or dizzy. Do not drive, use machinery, or do anything that needs mental alertness until you know how this medicine affects you. Do not stand or sit up quickly, especially if you are an older patient. This reduces the risk of dizzy or fainting spells. Alcohol may interfere with the effect of this medicine. Avoid  alcoholic drinks.  The medicine will cause constipation. Try to have a bowel movement at least every 2 to 3 days. If you do not have a bowel movement for 3 days, call your doctor or health care professional.  Your mouth may get dry. Chewing sugarless gum or sucking hard candy, and drinking plenty of water may help. Contact your doctor if the problem does not go away or is severe.  Date Last Reviewed:   NOTE:This sheet is a summary. It may not cover all possible information. If you have questions about this medicine, talk to your doctor, pharmacist, or health care provider. Copyright© 2016 [employer]

## 2017-08-31 NOTE — H&P
Subjective:     Patient is a 62 y.o. male presented with a history of left index trigger finger.  The left index finger has been triggering for approximately 2 years.  The patient is right-hand dominant. Onset of symptoms was gradual starting 2 years ago with gradually worsening course since that time. He is being admitted for outpatient surgical management of this condition.     Patient Active Problem List    Diagnosis Date Noted    Trigger finger of right hand 07/18/2017    Trigger finger, right middle finger 06/28/2017    Trigger finger, left index finger 06/28/2017    Screen for colon cancer 04/07/2017    Bilateral inguinal hernia without obstruction or gangrene 03/14/2017    Culp's esophagus determined by biopsy 12/01/2016    Culp's esophagus with high grade dysplasia 02/05/2016    Oropharyngeal dysphagia 02/01/2016    Cervical spondylosis 11/13/2015    Vertigo, central 11/06/2014    HTN (hypertension) 01/02/2014    GERD (gastroesophageal reflux disease) 01/02/2014    Prediabetes 01/02/2014    Depression 01/02/2014    CAD (coronary artery disease) 01/02/2014     Past Medical History:   Diagnosis Date    Anxiety     Cancer     spot on kidney and lung    Diabetes mellitus     Borderline    Diverticulitis     GERD (gastroesophageal reflux disease)     Hyperlipidemia     not on any meds currently    Hypertension     Lung mass     Mass of kidney       Past Surgical History:   Procedure Laterality Date    CHOLECYSTECTOMY      COLONOSCOPY      COLONOSCOPY N/A 4/7/2017    Procedure: COLONOSCOPY;  Surgeon: Amarilys Bravo MD;  Location[facility];  Service: Endoscopy;  Laterality: N/A;    ESOPHAGOGASTRODUODENOSCOPY      HERNIA REPAIR Bilateral     groin    NECK SURGERY      PARTIAL NEPHRECTOMY      left    RENAL BIOPSY      TONSILLECTOMY      TRIGGER FINGER RELEASE Right     long finger      No prescriptions prior to admission.     Review of patient's allergies indicates:    Allergen Reactions    No known allergies       Social History   Substance Use Topics    Smoking status: Former Smoker     Packs/day: 1.00     Years: 20.00     Quit date: 2/4/1995    Smokeless tobacco: Never Used    Alcohol use No      Family History   Problem Relation Age of Onset    Hypertension Mother     Hypertension Father     Melanoma Father     Cancer Sister       Review of Systems  A comprehensive review of systems was negative except for: Musculoskeletal: positive for arthralgias  Neurological: positive for paresthesia    Objective:     No data found.    General     Nursing note and vitals reviewed.  Constitutional: He is oriented to person, place, and time. He appears well-developed and well-nourished.   HENT:   Head: Normocephalic.   Nose: Nose normal.   Eyes: EOM are normal. Pupils are equal, round, and reactive to light.   Neck: Normal range of motion. Neck supple.   Cardiovascular: Normal rate and regular rhythm.    Pulmonary/Chest: Effort normal.   Abdominal: Soft. He exhibits no distension. There is no tenderness.   Neurological: He is alert and oriented to person, place, and time.   Psychiatric: He has a normal mood and affect. His behavior is normal.      Left Hand/Wrist Exam      Inspection   Deformity: Wrist - absent      Pain   Hand - The patient exhibits pain of the index MCP.     Tenderness   The patient is tender to palpation of the abbott area.      Range of Motion   The patient has normal left hand/wrist ROM.     Tests   Phalens Sign: negative  Tinels Sign (Medial Nerve): negative  Finkelstein: negative  Cubital Tunnel Compression Test: negative        Other      Sensory Exam  Median Distribution: normal  Ulnar Distribution: normal  Radial Distribution: normal     Comments:  Positive triggering of left index finger    Imaging Review  Technique: PA, lateral, and oblique views were obtained of the bilateral hands    Comparison: 02/26/2013    Findings: There is no radiographic  evidence of acute osseous, articular, or soft tissue abnormality. 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  No definite erosive osseous changes demonstrated.No appreciable change from prior.       Assessment:     Trigger finger, left index finger    Plan:     The various methods of treatment have been discussed with the patient and family.   After consideration of risks, benefits and other options for treatment, the patient has consented to surgical interventions for a trigger finger release of his left index finger on 8/31/17. He understands the material risks of surgery, what is involved, and desires to proceed.

## 2017-08-31 NOTE — TRANSFER OF CARE
"Anesthesia Transfer of Care Note    Patient: Mil Sood    Procedure(s) Performed: Procedure(s) (LRB):  RELEASE-FINGER-TRIGGER, INDEX FINGER. LEFT (Left)    Patient location: PACU    Anesthesia Type: general    Transport from OR: Transported from OR on room air with adequate spontaneous ventilation    Post pain: adequate analgesia    Post assessment: no apparent anesthetic complications    Post vital signs: stable    Level of consciousness: sedated    Nausea/Vomiting: no nausea/vomiting    Complications: none    Transfer of care protocol was followed      Last vitals:   Visit Vitals  /75   Pulse (!) 57   Temp 36.7 °C (98.1 °F) (Temporal)   Resp 14   Ht 5' 11" (1.803 m)   Wt 92.7 kg (204 lb 5.9 oz)   SpO2 99%   BMI 28.50 kg/m²     "

## 2017-08-31 NOTE — BRIEF OP NOTE
[facility]  Brief Operative Note     SUMMARY     Surgery Date: 8/31/2017     Surgeon(s) and Role:     * Timothy Abarca MD - Primary    Assisting Surgeon: None    Pre-op Diagnosis:  Trigger finger of left hand,index finger[M65.30]    Post-op Diagnosis:  Post-Op Diagnosis Codes:   Trigger finger of left hand,index finger[M65.30]          Procedure(s) (LRB):  RELEASE-FINGER-TRIGGER, INDEX FINGER. LEFT (Left)    Anesthesia: General    Description of the findings of the procedure:  Trigger finger of left hand,index finger    Findings/Key Components:  Trigger finger of left hand,index finger    Estimated Blood Loss: 2 mL         Specimens:   Specimen (12h ago through future)    None          Discharge Note    SUMMARY     Admit Date: 8/31/2017    Discharge Date and Time:  08/31/2017 8:07 AM    Hospital Course (synopsis of major diagnoses, care, treatment, and services provided during the course of the hospital stay): The patient had their procedure performed, had a routine post operative recovery, and was discharged home.       Final Diagnosis: Post-Op Diagnosis Codes:   Trigger finger of left hand,index finger  Disposition: Home or Self Care    Follow Up/Patient Instructions:     Medications:  Reconciled Home Medications:   Current Discharge Medication List      START taking these medications    Details   !! hydrocodone-acetaminophen 10-325mg (NORCO)  mg Tab Take 1 or 2 tablets every 4-8 hrs as needed for pain.  Qty: 40 tablet, Refills: 0       !! - Potential duplicate medications found. Please discuss with provider.      CONTINUE these medications which have NOT CHANGED    Details   amlodipine (NORVASC) 10 MG tablet Take 1 tablet (10 mg total) by mouth once daily.  Qty: 90 tablet, Refills: 1      benazepril (LOTENSIN) 20 MG tablet TAKE 2 TABLETS BY MOUTH ONCE A DAY.  Qty: 180 tablet, Refills: 0      clonazepam (KLONOPIN) 2 MG Tab 2 mg every evening. Take 2 mg by mouth nightly as needed.         gabapentin (NEURONTIN) 300 MG capsule TAKE 2 CAPSULES BY MOUTH THREE TIMES DAILY.  Qty: 270 capsule, Refills: 0      hydrALAZINE (APRESOLINE) 25 MG tablet Take 1 tablet (25 mg total) by mouth 3 (three) times daily.  Qty: 90 tablet, Refills: 11      hydrochlorothiazide (MICROZIDE) 12.5 mg capsule TAKE 1 CAPSULE BY MOUTH ONCE DAILY.  Qty: 90 capsule, Refills: 0      metoprolol tartrate (LOPRESSOR) 50 MG tablet TAKE 1 TABLET BY MOUTH TWICE A DAY.  Qty: 180 tablet, Refills: 0      pantoprazole (PROTONIX) 40 MG tablet Take 1 tablet (40 mg total) by mouth 2 (two) times daily before meals.  Qty: 240 tablet, Refills: 1      tizanidine (ZANAFLEX) 2 MG tablet TAKE 1 TABLET BY MOUTH ONCE DAILY AS NEEDED.  Qty: 90 tablet, Refills: 0      trazodone (DESYREL) 100 MG tablet Take 100 mg by mouth every evening.       zolpidem (AMBIEN) 10 mg Tab Take 1 tablet (10 mg total) by mouth nightly as needed.  Qty: 30 tablet, Refills: 4    Associated Diagnoses: Chronic insomnia      aspirin 81 mg Tab Take 1 tablet by mouth Daily.      !! hydrocodone-acetaminophen 10-325mg (NORCO)  mg Tab Take 1 or 2 tablets every 4-8 hrs as needed for pain.  Qty: 40 tablet, Refills: 0      hydrOXYzine pamoate (VISTARIL) 50 MG Cap       triamcinolone acetonide 0.1% (KENALOG) 0.1 % cream Apply topically 2 (two) times daily.  Qty: 45 g, Refills: 1       !! - Potential duplicate medications found. Please discuss with provider.          Discharge Procedure Orders  Diet general     Activity as tolerated     Call MD for:  temperature >100.4     Call MD for:  persistent nausea and vomiting     Call MD for:  severe uncontrolled pain     Call MD for:  redness, tenderness, or signs of infection (pain, swelling, redness, odor or green/yellow discharge around incision site)     Call MD for:  hives     Change dressing (specify)   Order Comments: Dressing change: in 3 days remove dressing and cover incision with a band-aide       Follow-up Information     Samantha SNYDER  BEBETO Whitney. Schedule an appointment as soon as possible for a visit on 9/11/2017.    Specialty:  Orthopedic Surgery  Why:  For suture removal, For wound re-check  Contact information:  [address] [place] [address] DR Maritza OLIVIA [id number] [phone]

## 2017-08-31 NOTE — INTERVAL H&P NOTE
The patient has been examined and the H&P has been reviewed:    I concur with the findings and no changes have occurred since H&P was written.    Anesthesia/Surgery risks, benefits and alternative options discussed and understood by patient/family.          Active Hospital Problems    Diagnosis  POA    Trigger finger, left [M65.30]  Yes      Resolved Hospital Problems    Diagnosis Date Resolved POA   No resolved problems to display.

## 2017-09-11 VITALS
SYSTOLIC BLOOD PRESSURE: 151 MMHG | HEART RATE: 59 BPM | HEIGHT: 71 IN | WEIGHT: 204.38 LBS | TEMPERATURE: 98 F | OXYGEN SATURATION: 95 % | RESPIRATION RATE: 15 BRPM | BODY MASS INDEX: 28.61 KG/M2 | DIASTOLIC BLOOD PRESSURE: 95 MMHG

## 2017-09-12 RX ORDER — GABAPENTIN 300 MG/1
CAPSULE ORAL
Qty: 270 CAPSULE | Refills: 0 | Status: SHIPPED | OUTPATIENT
Start: 2017-09-12 | End: 2017-10-26 | Stop reason: SDUPTHER

## 2017-09-15 ENCOUNTER — OFFICE VISIT (OUTPATIENT)
Dept: ORTHOPEDICS | Facility: CLINIC | Age: 62
End: 2017-09-15
Payer: MEDICARE

## 2017-09-15 VITALS
WEIGHT: 206.81 LBS | BODY MASS INDEX: 28.84 KG/M2 | HEART RATE: 50 BPM | SYSTOLIC BLOOD PRESSURE: 179 MMHG | DIASTOLIC BLOOD PRESSURE: 101 MMHG

## 2017-09-15 DIAGNOSIS — Z09 POSTOP CHECK: Primary | ICD-10-CM

## 2017-09-15 DIAGNOSIS — Z98.890 S/P TRIGGER FINGER RELEASE: ICD-10-CM

## 2017-09-15 PROCEDURE — 99024 POSTOP FOLLOW-UP VISIT: CPT | Mod: S$GLB,,, | Performed by: PHYSICIAN ASSISTANT

## 2017-09-15 PROCEDURE — 99999 PR PBB SHADOW E&M-EST. PATIENT-LVL III: CPT | Mod: PBBFAC,,, | Performed by: PHYSICIAN ASSISTANT

## 2017-09-15 NOTE — PROGRESS NOTES
Subjective:      Patient ID: Mil Sood is a 62 y.o. male.    Chief Complaint: Post-op Evaluation of the Left Hand    HPI  The patient is seen today status-post left index trigger finger release. This is his first visit since surgery. He has discomfort over his incision area. No drainage or fevers in his postoperative period.         Review of Systems   Constitution: Negative for chills, fever and night sweats.   Respiratory: Negative for cough, shortness of breath and wheezing.    Musculoskeletal: Positive for joint pain and stiffness.   Gastrointestinal: Negative for diarrhea, nausea and vomiting.   Neurological: Negative for brief paralysis.   Psychiatric/Behavioral: Negative for altered mental status.         Objective:            General    Constitutional: He is oriented to person, place, and time. He appears well-developed and well-nourished.   Neck: Normal range of motion.   Cardiovascular: Normal rate and regular rhythm.    Pulmonary/Chest: Effort normal.   Abdominal: Soft.   Neurological: He is alert and oriented to person, place, and time.   Psychiatric: He has a normal mood and affect. His behavior is normal.         Left Hand/Wrist Exam     Tenderness   The patient is tender to palpation of the abbott area.     Comments:  Skin is calloused on abbott area.   TTP along MCP joint of left index finger.     Incision is clean, dry and intact. No evidence for infection.                           Assessment:       Encounter Diagnoses   Name Primary?    Postop check Yes    S/P trigger finger release           Plan:       Mil was seen today for post-op evaluation.    Diagnoses and all orders for this visit:    Postop check    S/P trigger finger release      The patient had sutures removed without incident today. He was instructed on at-home exercises and scar massage. I offered to send him to OT to work on improving ROM of previous index and long fingers of right hand (s/p TFR) but he declined.       The  patient will return in 4 weeks if he has any concerns with his left hand.           Umm Whitney PA-C

## 2017-09-19 ENCOUNTER — LAB VISIT (OUTPATIENT)
Dept: LAB | Facility: HOSPITAL | Age: 62
End: 2017-09-19
Attending: UROLOGY
Payer: MEDICARE

## 2017-09-19 DIAGNOSIS — D36.9 ONCOCYTOMA: ICD-10-CM

## 2017-09-19 LAB
CREAT SERPL-MCNC: 1.1 MG/DL
EST. GFR  (AFRICAN AMERICAN): >60 ML/MIN/1.73 M^2
EST. GFR  (NON AFRICAN AMERICAN): >60 ML/MIN/1.73 M^2

## 2017-09-19 PROCEDURE — 36415 COLL VENOUS BLD VENIPUNCTURE: CPT

## 2017-09-19 PROCEDURE — 82565 ASSAY OF CREATININE: CPT

## 2017-09-22 ENCOUNTER — TELEPHONE (OUTPATIENT)
Dept: RADIOLOGY | Facility: HOSPITAL | Age: 62
End: 2017-09-22

## 2017-09-25 ENCOUNTER — HOSPITAL ENCOUNTER (OUTPATIENT)
Dept: RADIOLOGY | Facility: HOSPITAL | Age: 62
Discharge: HOME | End: 2017-09-25
Attending: UROLOGY
Payer: MEDICARE

## 2017-09-25 DIAGNOSIS — D36.9 ONCOCYTOMA: ICD-10-CM

## 2017-09-25 PROCEDURE — 25500020 PHARM REV CODE 255: Performed by: UROLOGY

## 2017-09-25 PROCEDURE — 74178 CT ABD&PLV WO CNTR FLWD CNTR: CPT | Mod: TC

## 2017-09-25 RX ADMIN — IOHEXOL 75 ML: 350 INJECTION, SOLUTION INTRAVENOUS at 08:09

## 2017-09-28 ENCOUNTER — OFFICE VISIT (OUTPATIENT)
Dept: UROLOGY | Facility: CLINIC | Age: 62
End: 2017-09-28
Payer: MEDICARE

## 2017-09-28 VITALS
BODY MASS INDEX: 28.72 KG/M2 | HEART RATE: 66 BPM | SYSTOLIC BLOOD PRESSURE: 143 MMHG | WEIGHT: 205.94 LBS | DIASTOLIC BLOOD PRESSURE: 90 MMHG

## 2017-09-28 DIAGNOSIS — Z12.5 PROSTATE CANCER SCREENING: ICD-10-CM

## 2017-09-28 DIAGNOSIS — N28.89 BILATERAL RENAL MASSES: Primary | ICD-10-CM

## 2017-09-28 PROCEDURE — 99999 PR PBB SHADOW E&M-EST. PATIENT-LVL II: CPT | Mod: PBBFAC,,, | Performed by: UROLOGY

## 2017-09-28 PROCEDURE — 3077F SYST BP >= 140 MM HG: CPT | Mod: S$GLB,,, | Performed by: UROLOGY

## 2017-09-28 PROCEDURE — 3008F BODY MASS INDEX DOCD: CPT | Mod: S$GLB,,, | Performed by: UROLOGY

## 2017-09-28 PROCEDURE — 3080F DIAST BP >= 90 MM HG: CPT | Mod: S$GLB,,, | Performed by: UROLOGY

## 2017-09-28 PROCEDURE — 99214 OFFICE O/P EST MOD 30 MIN: CPT | Mod: S$GLB,,, | Performed by: UROLOGY

## 2017-09-28 NOTE — PROGRESS NOTES
"Chief Complaint: Renal Cell Carcinoma?    HPI:   9/28/17: Followup CT shows renal masses stable on 6 month interval.  3/22/17: Slight enlargement of bilateral small renal masses in background of path-proven oncocytoma in 2011.  Dr. Roberson completes prostate cancer screening annually.  2/17/16: Ct shows a 5.5 mm left lower lobe chest nodule and a metallic linear object in stomach.  It probably is the wire left behind after some sort of esophageal biopsy and the wire was supposed to pass over a few weeks time.  Br Gen path shows the prior mass to be an oncocytoma.  Our pathologist has requested the slides and will form a second opinion.  2/11/16: 60 yo man referred by Dr. Roberson after finding renal masses on recent workup for various complaints.  No abd/pelvic pain and no exac/rel factors.  No hematuria.  No urolithiasis except he says he passed one 30 years ago.  No urinary bother.  No  history.  Normal sexual function. In 2011 he had left upper pole partial nephrectomy with unknown findings at  General, but no longterm followup.  Now he has a 2.3 cm left lower pole enhancing mass, and "right kidney demonstrates development of at least 3 separate solid mass lesions. One in the upper pole cortex 1.6 cm in size. The other in the anterior right upper renal cortex measuring 1.5 cm in size. The third located in the anterior lower pole approximately 1.1 cm in size."  No chest imaging.  LFT normal.  Neg prostate cancer screening with PCP last ADA 6 months ago.    Allergies:  No known allergies    Medications:  has a current medication list which includes the following prescription(s): amlodipine, aspirin, aspirin-calcium carbonate, benazepril, clonazepam, gabapentin, hydralazine, hydrochlorothiazide, hydrocodone-acetaminophen 10-325mg, hydrocodone-acetaminophen 10-325mg, hydroxyzine pamoate, metoprolol tartrate, pantoprazole, tizanidine, trazodone, triamcinolone acetonide 0.1%, and zolpidem.    Review of " Systems:  General: No fever, chills, fatigability, or weight loss.  Skin: No rashes, itching, or changes in color or texture of skin.  Chest: Denies UMANA, cyanosis, wheezing, cough, and sputum production.  Abdomen: Appetite fine. No weight loss. Denies diarrhea, abdominal pain, hematemesis, or blood in stool.  Musculoskeletal: Some joint stiffness or swelling. Some back pain.  : As above.  All other review of systems negative.    PMH:   has a past medical history of Anxiety; Cancer; Diabetes mellitus; Diverticulitis; GERD (gastroesophageal reflux disease); Hyperlipidemia; Hypertension; Lung mass; and Mass of kidney.    PSH:   has a past surgical history that includes Partial nephrectomy; Neck surgery; Cholecystectomy; Tonsillectomy; Colonoscopy; Esophagogastroduodenoscopy; Colonoscopy (N/A, 4/7/2017); Renal biopsy; Hernia repair (Bilateral); and Trigger finger release (Right).    FamHx: family history includes Cancer in his sister; Hypertension in his father and mother; Melanoma in his father.    SocHx:  reports that he quit smoking about 22 years ago. He has a 20.00 pack-year smoking history. He has never used smokeless tobacco. He reports that he does not drink alcohol or use drugs.      Physical Exam:  Vitals:    09/28/17 1355   BP: (!) 143/90   Pulse: 66     General: A&Ox3, no apparent distress, no deformities  Neck: No masses, normal thyroid  Lungs: normal inspiration, no use of accessory muscles  Heart: normal pulse, no arrhythmias  Abdomen: Soft, NT, ND  Skin: The skin is warm and dry. No jaundice.  Ext: No c/c/e.  :   3/17: Test desc lauryn, no abnormalities of epididymus. Penis normal, with normal penile and scrotal skin. Meatus normal.     Labs/Studies:   PSA    1/15: 0.71    Impression/Plan:   1. 3 suspicious lesions on right kidney and 1 on the left all about the same after getting a little bigger last time.  Records from [facility] report original oncycytoma and his other urologist concurs with  observation for the time being..  Consideration still to staged bilateral partial nephrectomy versus cryotherapy. Recc is for biopsy/cryo of left renal mass while observing smaller right lesions, if it grows some more.  2. US in 6 mo and CT 12 mo with RTC 12 mo.

## 2017-10-04 RX ORDER — HYDROCHLOROTHIAZIDE 12.5 MG/1
CAPSULE ORAL
Qty: 90 CAPSULE | Refills: 0 | Status: SHIPPED | OUTPATIENT
Start: 2017-10-04 | End: 2017-10-31 | Stop reason: SDUPTHER

## 2017-10-04 RX ORDER — BENAZEPRIL HYDROCHLORIDE 20 MG/1
TABLET ORAL
Qty: 180 TABLET | Refills: 0 | Status: SHIPPED | OUTPATIENT
Start: 2017-10-04 | End: 2017-12-18 | Stop reason: SDUPTHER

## 2017-10-04 RX ORDER — TIZANIDINE 2 MG/1
TABLET ORAL
Qty: 90 TABLET | Refills: 0 | Status: SHIPPED | OUTPATIENT
Start: 2017-10-04 | End: 2017-10-31 | Stop reason: SDUPTHER

## 2017-10-18 RX ORDER — METOPROLOL TARTRATE 50 MG/1
TABLET ORAL
Qty: 180 TABLET | Refills: 0 | Status: SHIPPED | OUTPATIENT
Start: 2017-10-18 | End: 2018-01-10 | Stop reason: SDUPTHER

## 2017-10-26 RX ORDER — GABAPENTIN 300 MG/1
CAPSULE ORAL
Qty: 270 CAPSULE | Refills: 0 | Status: SHIPPED | OUTPATIENT
Start: 2017-10-26 | End: 2017-12-11 | Stop reason: SDUPTHER

## 2017-10-31 ENCOUNTER — HOSPITAL ENCOUNTER (OUTPATIENT)
Dept: RADIOLOGY | Facility: HOSPITAL | Age: 62
Discharge: HOME | End: 2017-10-31
Attending: FAMILY MEDICINE
Payer: MEDICARE

## 2017-10-31 ENCOUNTER — OFFICE VISIT (OUTPATIENT)
Dept: FAMILY MEDICINE | Facility: CLINIC | Age: 62
End: 2017-10-31
Payer: MEDICARE

## 2017-10-31 VITALS
HEART RATE: 96 BPM | WEIGHT: 209.31 LBS | BODY MASS INDEX: 29.3 KG/M2 | SYSTOLIC BLOOD PRESSURE: 124 MMHG | DIASTOLIC BLOOD PRESSURE: 77 MMHG | TEMPERATURE: 97 F | OXYGEN SATURATION: 98 % | HEIGHT: 71 IN

## 2017-10-31 DIAGNOSIS — I25.10 CORONARY ARTERY DISEASE INVOLVING NATIVE HEART WITHOUT ANGINA PECTORIS, UNSPECIFIED VESSEL OR LESION TYPE: ICD-10-CM

## 2017-10-31 DIAGNOSIS — F51.04 CHRONIC INSOMNIA: ICD-10-CM

## 2017-10-31 DIAGNOSIS — R06.09 DOE (DYSPNEA ON EXERTION): Primary | ICD-10-CM

## 2017-10-31 DIAGNOSIS — R06.09 DOE (DYSPNEA ON EXERTION): ICD-10-CM

## 2017-10-31 DIAGNOSIS — I10 ESSENTIAL HYPERTENSION: ICD-10-CM

## 2017-10-31 PROCEDURE — 93010 ELECTROCARDIOGRAM REPORT: CPT | Mod: S$GLB,,, | Performed by: INTERNAL MEDICINE

## 2017-10-31 PROCEDURE — 71020 XR CHEST PA AND LATERAL: CPT | Mod: 26,,, | Performed by: RADIOLOGY

## 2017-10-31 PROCEDURE — 71020 XR CHEST PA AND LATERAL: CPT | Mod: TC,PO

## 2017-10-31 PROCEDURE — 99999 PR PBB SHADOW E&M-EST. PATIENT-LVL III: CPT | Mod: PBBFAC,,, | Performed by: FAMILY MEDICINE

## 2017-10-31 PROCEDURE — 93005 ELECTROCARDIOGRAM TRACING: CPT | Mod: S$GLB,,, | Performed by: FAMILY MEDICINE

## 2017-10-31 PROCEDURE — 99214 OFFICE O/P EST MOD 30 MIN: CPT | Mod: S$GLB,,, | Performed by: FAMILY MEDICINE

## 2017-10-31 PROCEDURE — 99499 UNLISTED E&M SERVICE: CPT | Mod: S$GLB,,, | Performed by: FAMILY MEDICINE

## 2017-10-31 RX ORDER — CLONAZEPAM 2 MG/1
2 TABLET ORAL NIGHTLY
Qty: 30 TABLET | Refills: 5 | Status: CANCELLED | OUTPATIENT
Start: 2017-10-31

## 2017-10-31 RX ORDER — TRAZODONE HYDROCHLORIDE 100 MG/1
100 TABLET ORAL NIGHTLY
Qty: 30 TABLET | Refills: 5 | Status: SHIPPED | OUTPATIENT
Start: 2017-10-31 | End: 2020-07-02 | Stop reason: SDUPTHER

## 2017-10-31 RX ORDER — ZOLPIDEM TARTRATE 10 MG/1
10 TABLET ORAL NIGHTLY PRN
Qty: 30 TABLET | Refills: 4 | Status: SHIPPED | OUTPATIENT
Start: 2017-10-31 | End: 2018-03-20 | Stop reason: SDUPTHER

## 2017-10-31 RX ORDER — PANTOPRAZOLE SODIUM 40 MG/1
40 TABLET, DELAYED RELEASE ORAL
Qty: 240 TABLET | Refills: 1 | Status: SHIPPED | OUTPATIENT
Start: 2017-10-31 | End: 2018-07-26

## 2017-10-31 RX ORDER — HYDROCHLOROTHIAZIDE 12.5 MG/1
12.5 CAPSULE ORAL DAILY
Qty: 90 CAPSULE | Refills: 0 | Status: SHIPPED | OUTPATIENT
Start: 2017-10-31 | End: 2018-04-05 | Stop reason: SDUPTHER

## 2017-10-31 RX ORDER — TIZANIDINE 2 MG/1
2 TABLET ORAL DAILY
Qty: 90 TABLET | Refills: 0 | Status: SHIPPED | OUTPATIENT
Start: 2017-10-31 | End: 2018-04-16 | Stop reason: SDUPTHER

## 2017-10-31 RX ORDER — ZINC GLUCONATE 50 MG
1000 TABLET ORAL DAILY
COMMUNITY
Start: 2017-10-02 | End: 2020-01-27

## 2017-10-31 NOTE — PROGRESS NOTES
Chief Complaint:    Chief Complaint   Patient presents with    Medication Refill    Shortness of Breath    Fatigue       History of Present Illness:    Patient presents today he has diabetes, hypertension hyperlipidemia coronary artery disease.  He said lately he's been feeling short of breath with slight exertion.  He denies any chest pain.  He has a long history of smoking.  Denies any fever cough wheezing.    ROS:  Review of Systems   Constitutional: Negative for activity change, chills, fatigue, fever and unexpected weight change.   HENT: Negative for congestion, ear discharge, ear pain, hearing loss, postnasal drip and rhinorrhea.    Eyes: Negative for pain and visual disturbance.   Respiratory: Positive for shortness of breath. Negative for cough and chest tightness.    Cardiovascular: Negative for chest pain and palpitations.   Gastrointestinal: Negative for abdominal pain, diarrhea and vomiting.   Endocrine: Negative for heat intolerance.   Genitourinary: Negative for dysuria, flank pain, frequency and hematuria.   Musculoskeletal: Negative for back pain, gait problem and neck pain.   Skin: Negative for color change and rash.   Neurological: Negative for dizziness, tremors, seizures, numbness and headaches.   Psychiatric/Behavioral: Negative for agitation, hallucinations, self-injury, sleep disturbance and suicidal ideas. The patient is not nervous/anxious.        Past Medical History:   Diagnosis Date    Anxiety     Cancer     spot on kidney and lung    Diabetes mellitus     Borderline    Diverticulitis     GERD (gastroesophageal reflux disease)     Hyperlipidemia     not on any meds currently    Hypertension     Lung mass     Mass of kidney        Social History:  Social History     Social History    Marital status:      Spouse name: N/A    Number of children: N/A    Years of education: N/A     Social History Main Topics    Smoking status: Former Smoker     Packs/day: 1.00      "Years: 20.00     Quit date: 2/4/1995    Smokeless tobacco: Never Used    Alcohol use No    Drug use: No    Sexual activity: Yes     Partners: Female     Birth control/ protection: None     Other Topics Concern    None     Social History Narrative    None       Family History:   family history includes Cancer in his sister; Hypertension in his father and mother; Melanoma in his father.    Health Maintenance   Topic Date Due    TETANUS VACCINE  02/03/1973    Zoster Vaccine  02/03/2015    Influenza Vaccine  08/01/2017    Lipid Panel  01/23/2018    Colonoscopy  04/07/2020    Hepatitis C Screening  Completed       Physical Exam:    Vital Signs  Temp: 97.2 °F (36.2 °C)  Temp src: Tympanic  Pulse: 96  SpO2: 98 %  BP: 124/77  BP Location: Left arm  Patient Position: Sitting  Pain Score:   4  Height and Weight  Height: 5' 11" (180.3 cm)  Weight: 95 kg (209 lb 5.2 oz)  BSA (Calculated - sq m): 2.18 sq meters  BMI (Calculated): 29.3  Weight in (lb) to have BMI = 25: 178.9]    Body mass index is 29.2 kg/m².    Physical Exam   Constitutional: He is oriented to person, place, and time. He appears well-developed.   HENT:   Mouth/Throat: Oropharynx is clear and moist.   Eyes: Conjunctivae are normal. Pupils are equal, round, and reactive to light.   Neck: Normal range of motion. Neck supple.   Cardiovascular: Normal rate, regular rhythm and normal heart sounds.    No murmur heard.  Pulmonary/Chest: Effort normal and breath sounds normal. No respiratory distress. He has no wheezes. He has no rales. He exhibits no tenderness.   Abdominal: Soft. He exhibits no distension and no mass. There is no tenderness. There is no guarding.   Musculoskeletal: He exhibits no edema or tenderness.   Lymphadenopathy:     He has no cervical adenopathy.   Neurological: He is alert and oriented to person, place, and time. He has normal reflexes.   Skin: Skin is warm and dry.   Psychiatric: He has a normal mood and affect. His behavior is " normal. Judgment and thought content normal.       Lab Results   Component Value Date    CHOL 209 (H) 01/23/2017    CHOL 211 (H) 07/13/2015    CHOL 174 01/05/2015    TRIG 233 (H) 01/23/2017    TRIG 181 (H) 07/13/2015    TRIG 240 (H) 01/05/2015    HDL 51 01/23/2017    HDL 55 07/13/2015    HDL 55 01/05/2015    TOTALCHOLEST 4.1 01/23/2017    TOTALCHOLEST 3.8 07/13/2015    TOTALCHOLEST 3.2 01/05/2015    NONHDLCHOL 158 01/23/2017    NONHDLCHOL 156 07/13/2015    NONHDLCHOL 119 01/05/2015       Lab Results   Component Value Date    HGBA1C 6.0 01/23/2017       Assessment:      ICD-10-CM ICD-9-CM   1. UMANA (dyspnea on exertion) R06.09 786.09   2. Chronic insomnia F51.04 780.52   3. Coronary artery disease involving native heart without angina pectoris, unspecified vessel or lesion type I25.10 414.01   4. Essential hypertension I10 401.9         Plan:  We'll initiate a workup as below.  At the local business negative as below consider doing a nuclear stress test.  Continue all medicines as before.  Orders Placed This Encounter   Procedures    X-Ray Chest PA And Lateral    CBC auto differential    Comprehensive metabolic panel    Lipid panel    Hemoglobin A1c    D dimer, quantitative    Brain natriuretic peptide    IN OFFICE EKG 12-LEAD (to Muse)    Complete PFT with bronchodilator       Current Outpatient Prescriptions   Medication Sig Dispense Refill    amlodipine (NORVASC) 10 MG tablet Take 1 tablet (10 mg total) by mouth once daily. (Patient taking differently: Take 10 mg by mouth every evening. ) 90 tablet 1    aspirin 81 mg Tab Take 1 tablet by mouth Daily.      benazepril (LOTENSIN) 20 MG tablet TAKE 2 TABLETS BY MOUTH ONCE A DAY. 180 tablet 0    clonazepam (KLONOPIN) 2 MG Tab 2 mg every evening. Take 2 mg by mouth nightly as needed.        gabapentin (NEURONTIN) 300 MG capsule TAKE 2 CAPSULES BY MOUTH THREE TIMES DAILY. 270 capsule 0    hydrALAZINE (APRESOLINE) 25 MG tablet Take 1 tablet (25 mg total) by  mouth 3 (three) times daily. (Patient taking differently: Take 25 mg by mouth every 12 (twelve) hours. ) 90 tablet 11    hydroCHLOROthiazide (MICROZIDE) 12.5 mg capsule Take 1 capsule (12.5 mg total) by mouth once daily. 90 capsule 0    hydrocodone-acetaminophen 10-325mg (NORCO)  mg Tab Take 1 or 2 tablets every 4-8 hrs as needed for pain. 40 tablet 0    hydrocodone-acetaminophen 10-325mg (NORCO)  mg Tab Take 1 or 2 tablets every 4-8 hrs as needed for pain. 40 tablet 0    hydrOXYzine pamoate (VISTARIL) 50 MG Cap       metoprolol tartrate (LOPRESSOR) 50 MG tablet TAKE 1 TABLET BY MOUTH TWICE A DAY. 180 tablet 0    pantoprazole (PROTONIX) 40 MG tablet Take 1 tablet (40 mg total) by mouth 2 (two) times daily before meals. 240 tablet 1    tiZANidine (ZANAFLEX) 2 MG tablet Take 1 tablet (2 mg total) by mouth once daily. 90 tablet 0    traZODone (DESYREL) 100 MG tablet Take 1 tablet (100 mg total) by mouth every evening. 30 tablet 5    VITAMIN B-12 1000 MCG tablet       zolpidem (AMBIEN) 10 mg Tab Take 1 tablet (10 mg total) by mouth nightly as needed. 30 tablet 4    triamcinolone acetonide 0.1% (KENALOG) 0.1 % cream Apply topically 2 (two) times daily. 45 g 1     No current facility-administered medications for this visit.        Medications Discontinued During This Encounter   Medication Reason    aspirin-calcium carbonate 81 mg-300 mg calcium(777 mg) Tab Duplicate Order    zolpidem (AMBIEN) 10 mg Tab Reorder    trazodone (DESYREL) 100 MG tablet Reorder    tizanidine (ZANAFLEX) 2 MG tablet Reorder    pantoprazole (PROTONIX) 40 MG tablet Reorder    hydrochlorothiazide (MICROZIDE) 12.5 mg capsule Reorder       No Follow-up on file.      Yumi Roberson MD          Disclaimer: This note is prepared using voice recognition system and as such is likely to have errors and is not proof read.

## 2017-11-01 RX ORDER — AMLODIPINE BESYLATE 10 MG/1
TABLET ORAL
Qty: 90 TABLET | Refills: 0 | Status: SHIPPED | OUTPATIENT
Start: 2017-11-01 | End: 2018-01-25 | Stop reason: SDUPTHER

## 2017-11-14 ENCOUNTER — PROCEDURE VISIT (OUTPATIENT)
Dept: PULMONOLOGY | Facility: CLINIC | Age: 62
End: 2017-11-14
Payer: MEDICARE

## 2017-11-14 DIAGNOSIS — R06.09 DOE (DYSPNEA ON EXERTION): ICD-10-CM

## 2017-11-14 PROCEDURE — 94729 DIFFUSING CAPACITY: CPT | Mod: S$GLB,,, | Performed by: INTERNAL MEDICINE

## 2017-11-14 PROCEDURE — 94060 EVALUATION OF WHEEZING: CPT | Mod: S$GLB,,, | Performed by: INTERNAL MEDICINE

## 2017-11-14 PROCEDURE — 94726 PLETHYSMOGRAPHY LUNG VOLUMES: CPT | Mod: S$GLB,,, | Performed by: INTERNAL MEDICINE

## 2017-11-15 LAB
POST FEF 25 75: 1.16 L/S (ref 2.14–3.77)
POST FET 100: 15.59 S
POST FEV1 FVC: 63 %
POST FEV1: 2.5 L (ref 3.25–4.06)
POST FIF 50: 2.33 L/S
POST FVC: 3.98 L (ref 4.37–5.33)
POST PEF: 5.77 L/S (ref 8.08–10.45)
PRE DLCO: 16.45 ML/MMHG/MIN (ref 21.25–29.55)
PRE ERV: 1.02 L
PRE FEF 25 75: 1.09 L/S (ref 2.14–3.77)
PRE FET 100: 14.16 S
PRE FEV1 FVC: 62 %
PRE FEV1: 2.46 L (ref 3.25–4.06)
PRE FRC PL: 2.3 L (ref 3.05–4.26)
PRE FVC: 4 L (ref 4.37–5.33)
PRE KROGHS K: 2.8 1/MIN
PRE PEF: 6.14 L/S (ref 8.08–10.45)
PRE RV: 1.28 L (ref 2.08–2.87)
PRE SVC: 4 L
PRE TLC: 5.28 L (ref 6.37–7.37)
PREDICTED DLCO: 25.4 ML/MMHG/MIN (ref 21.25–29.55)
PREDICTED FEV1 FVC: 75.26 % (ref 70.42–80.1)
PREDICTED FEV1: 3.65 L (ref 3.25–4.06)
PREDICTED FRC N2: 3.66 L (ref 3.05–4.26)
PREDICTED FRC PL: 3.66 L (ref 3.05–4.26)
PREDICTED FVC: 4.85 L (ref 4.37–5.33)
PREDICTED RV: 2.47 L (ref 2.08–2.87)
PREDICTED SVC: 4.56 L
PREDICTED TLC: 6.87 L (ref 6.37–7.37)
PROVOCATION PROTOCOL: ABNORMAL

## 2017-11-16 ENCOUNTER — OFFICE VISIT (OUTPATIENT)
Dept: FAMILY MEDICINE | Facility: CLINIC | Age: 62
End: 2017-11-16
Payer: MEDICARE

## 2017-11-16 VITALS
HEART RATE: 61 BPM | WEIGHT: 210.44 LBS | HEIGHT: 70 IN | DIASTOLIC BLOOD PRESSURE: 80 MMHG | SYSTOLIC BLOOD PRESSURE: 138 MMHG | OXYGEN SATURATION: 99 % | BODY MASS INDEX: 30.13 KG/M2 | TEMPERATURE: 97 F

## 2017-11-16 DIAGNOSIS — J44.9 CHRONIC OBSTRUCTIVE PULMONARY DISEASE, UNSPECIFIED COPD TYPE: Primary | ICD-10-CM

## 2017-11-16 DIAGNOSIS — I10 ESSENTIAL HYPERTENSION: ICD-10-CM

## 2017-11-16 PROCEDURE — 99499 UNLISTED E&M SERVICE: CPT | Mod: S$GLB,,, | Performed by: FAMILY MEDICINE

## 2017-11-16 PROCEDURE — 99999 PR PBB SHADOW E&M-EST. PATIENT-LVL III: CPT | Mod: PBBFAC,,, | Performed by: FAMILY MEDICINE

## 2017-11-16 PROCEDURE — 99214 OFFICE O/P EST MOD 30 MIN: CPT | Mod: S$GLB,,, | Performed by: FAMILY MEDICINE

## 2017-11-16 RX ORDER — FLUTICASONE PROPIONATE AND SALMETEROL XINAFOATE 115; 21 UG/1; UG/1
2 AEROSOL, METERED RESPIRATORY (INHALATION) 2 TIMES DAILY
Qty: 12 G | Refills: 12 | Status: SHIPPED | OUTPATIENT
Start: 2017-11-16 | End: 2020-07-27 | Stop reason: SDUPTHER

## 2017-11-16 RX ORDER — TIOTROPIUM BROMIDE 18 UG/1
18 CAPSULE ORAL; RESPIRATORY (INHALATION) DAILY
Qty: 30 CAPSULE | Refills: 2 | Status: SHIPPED | OUTPATIENT
Start: 2017-11-16 | End: 2020-07-27 | Stop reason: SDUPTHER

## 2017-11-16 NOTE — PROGRESS NOTES
Chief Complaint:    Chief Complaint   Patient presents with    Results       History of Present Illness:    Is here for follow-up on his tests,  Recently a primary function testing done which revealed that he had neck obstructive and restrictive defect he has a long history of smoking but doesn't smoke anymore.  His last CAT scan was shown a 6 mm nodule which was stable and apparently the previous CAT scans.  Patient's rest of the blood tests since come back normal negative for PE, BNP was normal and rest of the labs are okay.  He denies any chest pain.  His blood pressure was elevated he says he has not taken his medicines.    ROS:  Review of Systems   Constitutional: Negative for activity change, chills, fatigue, fever and unexpected weight change.   HENT: Negative for congestion, ear discharge, ear pain, hearing loss, postnasal drip and rhinorrhea.    Eyes: Negative for pain and visual disturbance.   Respiratory: Negative for cough, chest tightness and shortness of breath.    Cardiovascular: Negative for chest pain and palpitations.   Gastrointestinal: Negative for abdominal pain, diarrhea and vomiting.   Endocrine: Negative for heat intolerance.   Genitourinary: Negative for dysuria, flank pain, frequency and hematuria.   Musculoskeletal: Negative for back pain, gait problem and neck pain.   Skin: Negative for color change and rash.   Neurological: Negative for dizziness, tremors, seizures, numbness and headaches.   Psychiatric/Behavioral: Negative for agitation, hallucinations, self-injury, sleep disturbance and suicidal ideas. The patient is not nervous/anxious.        Past Medical History:   Diagnosis Date    Anxiety     Cancer     spot on kidney and lung    Diabetes mellitus     Borderline    Diverticulitis     GERD (gastroesophageal reflux disease)     Hyperlipidemia     not on any meds currently    Hypertension     Lung mass     Mass of kidney        Social History:  Social History     Social  "History    Marital status:      Spouse name: N/A    Number of children: N/A    Years of education: N/A     Social History Main Topics    Smoking status: Former Smoker     Packs/day: 1.00     Years: 20.00     Quit date: 2/4/1995    Smokeless tobacco: Never Used    Alcohol use No    Drug use: No    Sexual activity: Yes     Partners: Female     Birth control/ protection: None     Other Topics Concern    None     Social History Narrative    None       Family History:   family history includes Cancer in his sister; Hypertension in his father and mother; Melanoma in his father.    Health Maintenance   Topic Date Due    TETANUS VACCINE  02/03/1973    Zoster Vaccine  02/03/2015    Influenza Vaccine  08/01/2017    Lipid Panel  10/31/2018    Colonoscopy  04/07/2020    Hepatitis C Screening  Completed       Physical Exam:    Vital Signs  Temp: 96.7 °F (35.9 °C)  Temp src: Tympanic  Pulse: 61  SpO2: 99 %  BP: 138/80  BP Location: Left arm  Patient Position: Sitting  Pain Score: 0-No pain  Height and Weight  Height: 5' 10" (177.8 cm)  Weight: 95.5 kg (210 lb 6.9 oz)  BSA (Calculated - sq m): 2.17 sq meters  BMI (Calculated): 30.3  Weight in (lb) to have BMI = 25: 173.9]    Body mass index is 30.19 kg/m².    Physical Exam   Constitutional: He is oriented to person, place, and time. He appears well-developed.   HENT:   Mouth/Throat: Oropharynx is clear and moist.   Eyes: Conjunctivae are normal. Pupils are equal, round, and reactive to light.   Neck: Normal range of motion. Neck supple.   Cardiovascular: Normal rate, regular rhythm and normal heart sounds.    No murmur heard.  Pulmonary/Chest: Effort normal and breath sounds normal. No respiratory distress. He has no wheezes. He has no rales. He exhibits no tenderness.   Abdominal: Soft. He exhibits no distension and no mass. There is no tenderness. There is no guarding.   Musculoskeletal: He exhibits no edema or tenderness.   Lymphadenopathy:     He has no " cervical adenopathy.   Neurological: He is alert and oriented to person, place, and time. He has normal reflexes.   Skin: Skin is warm and dry.   Psychiatric: He has a normal mood and affect. His behavior is normal. Judgment and thought content normal.       Lab Results   Component Value Date    CHOL 224 (H) 10/31/2017    CHOL 209 (H) 01/23/2017    CHOL 211 (H) 07/13/2015    TRIG 259 (H) 10/31/2017    TRIG 233 (H) 01/23/2017    TRIG 181 (H) 07/13/2015    HDL 51 10/31/2017    HDL 51 01/23/2017    HDL 55 07/13/2015    TOTALCHOLEST 4.4 10/31/2017    TOTALCHOLEST 4.1 01/23/2017    TOTALCHOLEST 3.8 07/13/2015    NONHDLCHOL 173 10/31/2017    NONHDLCHOL 158 01/23/2017    NONHDLCHOL 156 07/13/2015       Lab Results   Component Value Date    HGBA1C 5.7 (H) 10/31/2017       Assessment:      ICD-10-CM ICD-9-CM   1. Chronic obstructive pulmonary disease, unspecified COPD type J44.9 496   2. Essential hypertension I10 401.9         Plan:  Recommend a trial of Spiriva 1 capsule daily and Advair 1 puff twice a day presents to mouth after using it.  Give it about 2 weeks if there is no improvement at all in his symptoms consider doing a nuclear stress test if he may have a case of coronary ischemia which is manifesting as shortness of breath.  2.  Please monitor home blood pressure readings carefully and call back with numbers.  No orders of the defined types were placed in this encounter.      Current Outpatient Prescriptions   Medication Sig Dispense Refill    amLODIPine (NORVASC) 10 MG tablet TAKE 1 TABLET BY MOUTH ONCE DAILY. 90 tablet 0    aspirin 81 mg Tab Take 1 tablet by mouth Daily.      benazepril (LOTENSIN) 20 MG tablet TAKE 2 TABLETS BY MOUTH ONCE A DAY. 180 tablet 0    clonazepam (KLONOPIN) 2 MG Tab 2 mg every evening. Take 2 mg by mouth nightly as needed.        gabapentin (NEURONTIN) 300 MG capsule TAKE 2 CAPSULES BY MOUTH THREE TIMES DAILY. 270 capsule 0    hydrALAZINE (APRESOLINE) 25 MG tablet Take 1 tablet  (25 mg total) by mouth 3 (three) times daily. (Patient taking differently: Take 25 mg by mouth every 12 (twelve) hours. ) 90 tablet 11    hydroCHLOROthiazide (MICROZIDE) 12.5 mg capsule Take 1 capsule (12.5 mg total) by mouth once daily. 90 capsule 0    hydrocodone-acetaminophen 10-325mg (NORCO)  mg Tab Take 1 or 2 tablets every 4-8 hrs as needed for pain. 40 tablet 0    hydrOXYzine pamoate (VISTARIL) 50 MG Cap       metoprolol tartrate (LOPRESSOR) 50 MG tablet TAKE 1 TABLET BY MOUTH TWICE A DAY. 180 tablet 0    pantoprazole (PROTONIX) 40 MG tablet Take 1 tablet (40 mg total) by mouth 2 (two) times daily before meals. 240 tablet 1    tiZANidine (ZANAFLEX) 2 MG tablet Take 1 tablet (2 mg total) by mouth once daily. 90 tablet 0    traZODone (DESYREL) 100 MG tablet Take 1 tablet (100 mg total) by mouth every evening. 30 tablet 5    VITAMIN B-12 1000 MCG tablet       zolpidem (AMBIEN) 10 mg Tab Take 1 tablet (10 mg total) by mouth nightly as needed. 30 tablet 4    fluticasone-salmeterol (ADVAIR HFA) 115-21 mcg/actuation HFAA Inhale 2 puffs into the lungs 2 (two) times daily. 12 g 12    tiotropium (SPIRIVA) 18 mcg inhalation capsule Inhale 1 capsule (18 mcg total) into the lungs once daily. Controller 30 capsule 2    triamcinolone acetonide 0.1% (KENALOG) 0.1 % cream Apply topically 2 (two) times daily. 45 g 1     No current facility-administered medications for this visit.        Medications Discontinued During This Encounter   Medication Reason    hydrocodone-acetaminophen 10-325mg (NORCO)  mg Tab Duplicate Order       No Follow-up on file.      Yumi Roberson MD

## 2017-12-11 ENCOUNTER — LAB VISIT (OUTPATIENT)
Dept: LAB | Facility: HOSPITAL | Age: 62
End: 2017-12-11
Attending: FAMILY MEDICINE
Payer: MEDICARE

## 2017-12-11 DIAGNOSIS — I10 ESSENTIAL HYPERTENSION: ICD-10-CM

## 2017-12-11 DIAGNOSIS — R73.03 PREDIABETES: ICD-10-CM

## 2017-12-11 DIAGNOSIS — Z12.5 PROSTATE CANCER SCREENING: ICD-10-CM

## 2017-12-11 LAB
ALBUMIN SERPL BCP-MCNC: 3.9 G/DL
ALP SERPL-CCNC: 51 U/L
ALT SERPL W/O P-5'-P-CCNC: 29 U/L
ANION GAP SERPL CALC-SCNC: 10 MMOL/L
AST SERPL-CCNC: 24 U/L
BILIRUB SERPL-MCNC: 0.5 MG/DL
BUN SERPL-MCNC: 19 MG/DL
CALCIUM SERPL-MCNC: 9.8 MG/DL
CHLORIDE SERPL-SCNC: 103 MMOL/L
CHOLEST SERPL-MCNC: 223 MG/DL
CHOLEST/HDLC SERPL: 4.4 {RATIO}
CO2 SERPL-SCNC: 26 MMOL/L
CREAT SERPL-MCNC: 1.1 MG/DL
EST. GFR  (AFRICAN AMERICAN): >60 ML/MIN/1.73 M^2
EST. GFR  (NON AFRICAN AMERICAN): >60 ML/MIN/1.73 M^2
GLUCOSE SERPL-MCNC: 85 MG/DL
HDLC SERPL-MCNC: 51 MG/DL
HDLC SERPL: 22.9 %
LDLC SERPL CALC-MCNC: 122.8 MG/DL
NONHDLC SERPL-MCNC: 172 MG/DL
POTASSIUM SERPL-SCNC: 5.1 MMOL/L
PROT SERPL-MCNC: 7.5 G/DL
SODIUM SERPL-SCNC: 139 MMOL/L
TRIGL SERPL-MCNC: 246 MG/DL

## 2017-12-11 PROCEDURE — 80053 COMPREHEN METABOLIC PANEL: CPT

## 2017-12-11 PROCEDURE — 83036 HEMOGLOBIN GLYCOSYLATED A1C: CPT

## 2017-12-11 PROCEDURE — 80061 LIPID PANEL: CPT

## 2017-12-11 PROCEDURE — 36415 COLL VENOUS BLD VENIPUNCTURE: CPT | Mod: PO

## 2017-12-11 PROCEDURE — 84153 ASSAY OF PSA TOTAL: CPT

## 2017-12-12 LAB
COMPLEXED PSA SERPL-MCNC: 0.87 NG/ML
ESTIMATED AVG GLUCOSE: 114 MG/DL
HBA1C MFR BLD HPLC: 5.6 %

## 2017-12-12 RX ORDER — GABAPENTIN 300 MG/1
CAPSULE ORAL
Qty: 270 CAPSULE | Refills: 0 | Status: SHIPPED | OUTPATIENT
Start: 2017-12-12 | End: 2018-01-25 | Stop reason: SDUPTHER

## 2017-12-20 RX ORDER — BENAZEPRIL HYDROCHLORIDE 20 MG/1
TABLET ORAL
Qty: 180 TABLET | Refills: 0 | Status: SHIPPED | OUTPATIENT
Start: 2017-12-20 | End: 2018-04-05 | Stop reason: SDUPTHER

## 2018-01-10 RX ORDER — METOPROLOL TARTRATE 50 MG/1
TABLET ORAL
Qty: 180 TABLET | Refills: 0 | Status: SHIPPED | OUTPATIENT
Start: 2018-01-10 | End: 2018-04-05 | Stop reason: SDUPTHER

## 2018-01-25 RX ORDER — GABAPENTIN 300 MG/1
CAPSULE ORAL
Qty: 270 CAPSULE | Refills: 0 | Status: SHIPPED | OUTPATIENT
Start: 2018-01-25 | End: 2018-03-07 | Stop reason: SDUPTHER

## 2018-01-25 RX ORDER — AMLODIPINE BESYLATE 10 MG/1
TABLET ORAL
Qty: 30 TABLET | Refills: 0 | Status: SHIPPED | OUTPATIENT
Start: 2018-01-25 | End: 2018-02-06 | Stop reason: SDUPTHER

## 2018-02-06 RX ORDER — AMLODIPINE BESYLATE 10 MG/1
TABLET ORAL
Qty: 30 TABLET | Refills: 0 | Status: SHIPPED | OUTPATIENT
Start: 2018-02-06 | End: 2018-04-05 | Stop reason: SDUPTHER

## 2018-03-07 RX ORDER — GABAPENTIN 300 MG/1
CAPSULE ORAL
Qty: 270 CAPSULE | Refills: 0 | Status: SHIPPED | OUTPATIENT
Start: 2018-03-07 | End: 2018-04-26 | Stop reason: SDUPTHER

## 2018-03-16 NOTE — PROGRESS NOTES
Subjective:       Patient ID: Mil Sood is a 62 y.o. male.    Chief Complaint: Post-op Evaluation    HPI Comments: Patient returns now17 days post bilateral inguinal hernia repair with mesh. Currently, still soreness in incisions but no wound complaints otherwise. He denies gi, or gu complaints    Review of Systems    Objective:      Physical Exam   Constitutional: He is oriented to person, place, and time. He appears well-developed and well-nourished.   Cardiovascular: Normal rate.    Pulmonary/Chest: Effort normal.   Abdominal: Soft. Bowel sounds are normal. He exhibits no distension. There is no tenderness.   Genitourinary:   Genitourinary Comments: Inguinal incisions are clean and dry, fascia is firm, good healing ridge, testes are normal   Neurological: He is alert and oriented to person, place, and time.   Psychiatric: He has a normal mood and affect. His behavior is normal.   Vitals reviewed.      Assessment:     doing well post op with some incisional pain     Plan:       Sedentary activities. No strenuous lifting or exercise until 6 weeks post surgery, see me in one month      .

## 2018-03-20 DIAGNOSIS — F51.04 CHRONIC INSOMNIA: ICD-10-CM

## 2018-03-20 RX ORDER — ZOLPIDEM TARTRATE 10 MG/1
TABLET ORAL
Qty: 30 TABLET | Refills: 0 | Status: SHIPPED | OUTPATIENT
Start: 2018-03-20 | End: 2018-04-26 | Stop reason: SDUPTHER

## 2018-03-28 ENCOUNTER — TELEPHONE (OUTPATIENT)
Dept: RADIOLOGY | Facility: HOSPITAL | Age: 63
End: 2018-03-28

## 2018-03-29 ENCOUNTER — APPOINTMENT (OUTPATIENT)
Dept: RADIOLOGY | Facility: HOSPITAL | Age: 63
End: 2018-03-29
Attending: UROLOGY
Payer: MEDICARE

## 2018-03-29 DIAGNOSIS — Z12.5 PROSTATE CANCER SCREENING: ICD-10-CM

## 2018-03-29 DIAGNOSIS — N28.89 BILATERAL RENAL MASSES: ICD-10-CM

## 2018-03-29 PROCEDURE — 76770 US EXAM ABDO BACK WALL COMP: CPT | Mod: TC,PO

## 2018-03-29 PROCEDURE — 76770 US EXAM ABDO BACK WALL COMP: CPT | Mod: 26,,, | Performed by: RADIOLOGY

## 2018-04-05 RX ORDER — HYDROCHLOROTHIAZIDE 12.5 MG/1
CAPSULE ORAL
Qty: 90 CAPSULE | Refills: 0 | Status: SHIPPED | OUTPATIENT
Start: 2018-04-05 | End: 2022-01-26 | Stop reason: SDUPTHER

## 2018-04-05 RX ORDER — METOPROLOL TARTRATE 50 MG/1
TABLET ORAL
Qty: 180 TABLET | Refills: 0 | Status: SHIPPED | OUTPATIENT
Start: 2018-04-05 | End: 2020-07-27 | Stop reason: SDUPTHER

## 2018-04-05 RX ORDER — AMLODIPINE BESYLATE 10 MG/1
TABLET ORAL
Qty: 90 TABLET | Refills: 0 | Status: SHIPPED | OUTPATIENT
Start: 2018-04-05 | End: 2020-03-04 | Stop reason: SDUPTHER

## 2018-04-05 RX ORDER — BENAZEPRIL HYDROCHLORIDE 20 MG/1
TABLET ORAL
Qty: 180 TABLET | Refills: 0 | Status: SHIPPED | OUTPATIENT
Start: 2018-04-05 | End: 2020-07-27 | Stop reason: SDUPTHER

## 2018-04-16 RX ORDER — GABAPENTIN 300 MG/1
CAPSULE ORAL
Qty: 270 CAPSULE | Refills: 0 | OUTPATIENT
Start: 2018-04-16

## 2018-04-16 RX ORDER — TIZANIDINE 2 MG/1
TABLET ORAL
Qty: 90 TABLET | Refills: 0 | Status: SHIPPED | OUTPATIENT
Start: 2018-04-16 | End: 2018-07-13 | Stop reason: SDUPTHER

## 2018-04-17 RX ORDER — TIZANIDINE 2 MG/1
TABLET ORAL
Qty: 90 TABLET | Refills: 0 | Status: SHIPPED | OUTPATIENT
Start: 2018-04-17 | End: 2018-11-01 | Stop reason: SDUPTHER

## 2018-04-26 ENCOUNTER — OFFICE VISIT (OUTPATIENT)
Dept: FAMILY MEDICINE | Facility: CLINIC | Age: 63
End: 2018-04-26
Payer: MEDICARE

## 2018-04-26 ENCOUNTER — PATIENT OUTREACH (OUTPATIENT)
Dept: ADMINISTRATIVE | Facility: HOSPITAL | Age: 63
End: 2018-04-26

## 2018-04-26 VITALS
SYSTOLIC BLOOD PRESSURE: 120 MMHG | TEMPERATURE: 99 F | HEART RATE: 55 BPM | WEIGHT: 205.56 LBS | BODY MASS INDEX: 29.43 KG/M2 | OXYGEN SATURATION: 99 % | HEIGHT: 70 IN | DIASTOLIC BLOOD PRESSURE: 80 MMHG

## 2018-04-26 DIAGNOSIS — F51.04 CHRONIC INSOMNIA: ICD-10-CM

## 2018-04-26 DIAGNOSIS — E78.2 MIXED HYPERLIPIDEMIA: ICD-10-CM

## 2018-04-26 DIAGNOSIS — R73.03 PREDIABETES: Primary | ICD-10-CM

## 2018-04-26 DIAGNOSIS — I25.10 CORONARY ARTERY DISEASE INVOLVING NATIVE HEART WITHOUT ANGINA PECTORIS, UNSPECIFIED VESSEL OR LESION TYPE: ICD-10-CM

## 2018-04-26 DIAGNOSIS — I10 ESSENTIAL HYPERTENSION: ICD-10-CM

## 2018-04-26 PROCEDURE — 90471 IMMUNIZATION ADMIN: CPT | Mod: S$GLB,,, | Performed by: FAMILY MEDICINE

## 2018-04-26 PROCEDURE — 99999 PR PBB SHADOW E&M-EST. PATIENT-LVL V: CPT | Mod: PBBFAC,,, | Performed by: FAMILY MEDICINE

## 2018-04-26 PROCEDURE — 99214 OFFICE O/P EST MOD 30 MIN: CPT | Mod: 25,S$GLB,, | Performed by: FAMILY MEDICINE

## 2018-04-26 PROCEDURE — 90715 TDAP VACCINE 7 YRS/> IM: CPT | Mod: S$GLB,,, | Performed by: FAMILY MEDICINE

## 2018-04-26 PROCEDURE — 99499 UNLISTED E&M SERVICE: CPT | Mod: S$GLB,,, | Performed by: FAMILY MEDICINE

## 2018-04-26 PROCEDURE — 3079F DIAST BP 80-89 MM HG: CPT | Mod: CPTII,S$GLB,, | Performed by: FAMILY MEDICINE

## 2018-04-26 PROCEDURE — 3074F SYST BP LT 130 MM HG: CPT | Mod: CPTII,S$GLB,, | Performed by: FAMILY MEDICINE

## 2018-04-26 RX ORDER — PRAVASTATIN SODIUM 20 MG/1
20 TABLET ORAL DAILY
Qty: 90 TABLET | Refills: 3 | Status: SHIPPED | OUTPATIENT
Start: 2018-04-26 | End: 2018-11-01

## 2018-04-26 RX ORDER — GABAPENTIN 300 MG/1
600 CAPSULE ORAL 3 TIMES DAILY
Qty: 270 CAPSULE | Refills: 1 | Status: SHIPPED | OUTPATIENT
Start: 2018-04-26 | End: 2018-07-13 | Stop reason: SDUPTHER

## 2018-04-26 NOTE — PROGRESS NOTES
Chief Complaint:    Chief Complaint   Patient presents with    Medication Refill     rev lab       History of Present Illness:    Patient is a for follow-up of chronic medical problems,  His chronic cough for the last couple days a dry cough.  No sinus pain and postnasal drip no new shortness of breath or any other symptoms.    His home blood pressure readings are normal  He does have COPD which is been stable he's been using his inhalers    He has bilateral renal masses which are being followed by urologist recent ultrasound was done but no comparison comments were made by the radiologist.    Patient had tried Crestor in the past with some muscle pain but is willing to try another statin.    ROS:  Review of Systems   Constitutional: Negative for activity change, chills, fatigue, fever and unexpected weight change.   HENT: Negative for congestion, ear discharge, ear pain, hearing loss, postnasal drip and rhinorrhea.    Eyes: Negative for pain and visual disturbance.   Respiratory: Positive for cough. Negative for chest tightness and shortness of breath.    Cardiovascular: Negative for chest pain and palpitations.   Gastrointestinal: Negative for abdominal pain, diarrhea and vomiting.   Endocrine: Negative for heat intolerance.   Genitourinary: Negative for dysuria, flank pain, frequency and hematuria.   Musculoskeletal: Negative for back pain, gait problem and neck pain.   Skin: Negative for color change and rash.   Neurological: Negative for dizziness, tremors, seizures, numbness and headaches.   Psychiatric/Behavioral: Negative for agitation, hallucinations, self-injury, sleep disturbance and suicidal ideas. The patient is not nervous/anxious.        Past Medical History:   Diagnosis Date    Anxiety     Cancer     spot on kidney and lung    Diabetes mellitus     Borderline    Diverticulitis     GERD (gastroesophageal reflux disease)     Hyperlipidemia     not on any meds currently    Hypertension      "Lung mass     Mass of kidney        Social History:  Social History     Social History    Marital status:      Spouse name: N/A    Number of children: N/A    Years of education: N/A     Social History Main Topics    Smoking status: Former Smoker     Packs/day: 1.00     Years: 20.00     Quit date: 2/4/1995    Smokeless tobacco: Never Used    Alcohol use No    Drug use: No    Sexual activity: Yes     Partners: Female     Birth control/ protection: None     Other Topics Concern    None     Social History Narrative    None       Family History:   family history includes Cancer in his sister; Hypertension in his father and mother; Melanoma in his father.    Health Maintenance   Topic Date Due    TETANUS VACCINE  02/03/1973    Zoster Vaccine  02/03/2015    Influenza Vaccine  08/01/2017    Lipid Panel  12/11/2018    Colonoscopy  04/07/2020    Hepatitis C Screening  Completed       Physical Exam:    Vital Signs  Temp: 98.6 °F (37 °C)  Temp src: Tympanic  Pulse: (!) 55  SpO2: 99 %  BP: 120/80  BP Location: Left arm  Patient Position: Sitting  Pain Score:  (home readings)  Height and Weight  Height: 5' 10" (177.8 cm)  Weight: 93.2 kg (205 lb 9.3 oz)  BSA (Calculated - sq m): 2.15 sq meters  BMI (Calculated): 29.6  Weight in (lb) to have BMI = 25: 173.9]    Body mass index is 29.5 kg/m².    Physical Exam   Constitutional: He is oriented to person, place, and time. He appears well-developed.   HENT:   Right Ear: External ear normal.   Left Ear: External ear normal.   Mouth/Throat: Oropharynx is clear and moist.   Eyes: Conjunctivae are normal. Pupils are equal, round, and reactive to light.   Neck: Normal range of motion. Neck supple.   Cardiovascular: Normal rate, regular rhythm and normal heart sounds.    No murmur heard.  Pulmonary/Chest: Effort normal and breath sounds normal. No respiratory distress. He has no wheezes. He has no rales. He exhibits no tenderness.   Abdominal: Soft. He exhibits no " distension and no mass. There is no tenderness. There is no guarding.   Musculoskeletal: He exhibits no edema or tenderness.   Lymphadenopathy:     He has no cervical adenopathy.   Neurological: He is alert and oriented to person, place, and time. He has normal reflexes.   Skin: Skin is warm and dry.   Psychiatric: He has a normal mood and affect. His behavior is normal. Judgment and thought content normal.       Results for orders placed or performed in visit on 12/11/17   Lipid panel   Result Value Ref Range    Cholesterol 223 (H) 120 - 199 mg/dL    Triglycerides 246 (H) 30 - 150 mg/dL    HDL 51 40 - 75 mg/dL    LDL Cholesterol 122.8 63.0 - 159.0 mg/dL    HDL/Chol Ratio 22.9 20.0 - 50.0 %    Total Cholesterol/HDL Ratio 4.4 2.0 - 5.0    Non-HDL Cholesterol 172 mg/dL   Hemoglobin A1c   Result Value Ref Range    Hemoglobin A1C 5.6 4.0 - 5.6 %    Estimated Avg Glucose 114 68 - 131 mg/dL   Comprehensive metabolic panel   Result Value Ref Range    Sodium 139 136 - 145 mmol/L    Potassium 5.1 3.5 - 5.1 mmol/L    Chloride 103 95 - 110 mmol/L    CO2 26 23 - 29 mmol/L    Glucose 85 70 - 110 mg/dL    BUN, Bld 19 8 - 23 mg/dL    Creatinine 1.1 0.5 - 1.4 mg/dL    Calcium 9.8 8.7 - 10.5 mg/dL    Total Protein 7.5 6.0 - 8.4 g/dL    Albumin 3.9 3.5 - 5.2 g/dL    Total Bilirubin 0.5 0.1 - 1.0 mg/dL    Alkaline Phosphatase 51 (L) 55 - 135 U/L    AST 24 10 - 40 U/L    ALT 29 10 - 44 U/L    Anion Gap 10 8 - 16 mmol/L    eGFR if African American >60.0 >60 mL/min/1.73 m^2    eGFR if non African American >60.0 >60 mL/min/1.73 m^2   PSA, Screening   Result Value Ref Range    PSA, SCREEN 0.87 0.00 - 4.00 ng/mL         Lab Results   Component Value Date    HGBA1C 5.6 12/11/2017       Assessment:      ICD-10-CM ICD-9-CM   1. Prediabetes R73.03 790.29   2. Essential hypertension I10 401.9   3. Coronary artery disease involving native heart without angina pectoris, unspecified vessel or lesion type I25.10 414.01   4. Mixed hyperlipidemia  E78.2 272.2         Plan:    The cough does not appear to be any significant issue at this time please watch it carefully could be upper respiratory versus ALLERGIES if any other symptoms develop please contact us  We'll start patient on pravastatin 20 mg once daily if muscle pain develops please stop the medication.  Home blood pressure readings are adequate  Coronary artery disease stable  Prediabetes stable with check his labs in 3 months  Renal mass bilateral please follow with urology.      Orders Placed This Encounter   Procedures    (In Office Administered) Tdap Vaccine    Comprehensive metabolic panel    Lipid panel    Hemoglobin A1c    CBC auto differential    Microalbumin/creatinine urine ratio       Current Outpatient Prescriptions   Medication Sig Dispense Refill    amLODIPine (NORVASC) 10 MG tablet TAKE 1 TABLET BY MOUTH ONCE DAILY. 90 tablet 0    aspirin 81 mg Tab Take 1 tablet by mouth Daily.      benazepril (LOTENSIN) 20 MG tablet TAKE 2 TABLETS BY MOUTH ONCE A DAY. 180 tablet 0    clonazepam (KLONOPIN) 2 MG Tab 2 mg every evening. Take 2 mg by mouth nightly as needed.        fluticasone-salmeterol (ADVAIR HFA) 115-21 mcg/actuation HFAA Inhale 2 puffs into the lungs 2 (two) times daily. 12 g 12    gabapentin (NEURONTIN) 300 MG capsule Take 2 capsules (600 mg total) by mouth 3 (three) times daily. 270 capsule 1    hydroCHLOROthiazide (MICROZIDE) 12.5 mg capsule TAKE 1 CAPSULE BY MOUTH ONCE DAILY. 90 capsule 0    hydrocodone-acetaminophen 10-325mg (NORCO)  mg Tab Take 1 or 2 tablets every 4-8 hrs as needed for pain. 40 tablet 0    hydrOXYzine pamoate (VISTARIL) 50 MG Cap Take 50 mg by mouth as needed.       metoprolol tartrate (LOPRESSOR) 50 MG tablet TAKE 1 TABLET BY MOUTH TWICE A DAY. 180 tablet 0    pantoprazole (PROTONIX) 40 MG tablet Take 1 tablet (40 mg total) by mouth 2 (two) times daily before meals. 240 tablet 1    tiotropium (SPIRIVA) 18 mcg inhalation capsule Inhale 1  capsule (18 mcg total) into the lungs once daily. Controller 30 capsule 2    traZODone (DESYREL) 100 MG tablet Take 1 tablet (100 mg total) by mouth every evening. 30 tablet 5    VITAMIN B-12 1000 MCG tablet       zolpidem (AMBIEN) 10 mg Tab TAKE 1 TABLET BY MOUTH AT BEDTIME AS NEEDED. 30 tablet 0    hydrALAZINE (APRESOLINE) 25 MG tablet Take 1 tablet (25 mg total) by mouth 3 (three) times daily. (Patient taking differently: Take 25 mg by mouth every 12 (twelve) hours. ) 90 tablet 11    pravastatin (PRAVACHOL) 20 MG tablet Take 1 tablet (20 mg total) by mouth once daily. 90 tablet 3    tiZANidine (ZANAFLEX) 2 MG tablet TAKE 1 TABLET BY MOUTH ONCE DAILY. (Patient not taking: Reported on 4/26/2018) 90 tablet 0    tiZANidine (ZANAFLEX) 2 MG tablet TAKE 1 TABLET BY MOUTH ONCE DAILY. 90 tablet 0    triamcinolone acetonide 0.1% (KENALOG) 0.1 % cream Apply topically 2 (two) times daily. 45 g 1     No current facility-administered medications for this visit.        Medications Discontinued During This Encounter   Medication Reason    gabapentin (NEURONTIN) 300 MG capsule Reorder       Follow-up in about 3 months (around 7/26/2018).      Yumi Roberson MD

## 2018-04-26 NOTE — PROGRESS NOTES
Adacel administered.  See immunization record.  Pt advised to wait in clinic 15 minutes to monitor for side effects.  Pt voiced understanding and tolerated injection well.

## 2018-04-27 RX ORDER — ZOLPIDEM TARTRATE 10 MG/1
TABLET ORAL
Qty: 30 TABLET | Refills: 0 | Status: SHIPPED | OUTPATIENT
Start: 2018-04-27 | End: 2018-06-04 | Stop reason: SDUPTHER

## 2018-06-04 DIAGNOSIS — F51.04 CHRONIC INSOMNIA: ICD-10-CM

## 2018-06-04 RX ORDER — ZOLPIDEM TARTRATE 10 MG/1
TABLET ORAL
Qty: 30 TABLET | Refills: 0 | Status: SHIPPED | OUTPATIENT
Start: 2018-06-04 | End: 2018-07-02 | Stop reason: SDUPTHER

## 2018-07-02 DIAGNOSIS — F51.04 CHRONIC INSOMNIA: ICD-10-CM

## 2018-07-02 RX ORDER — ZOLPIDEM TARTRATE 10 MG/1
TABLET ORAL
Qty: 30 TABLET | Refills: 0 | Status: SHIPPED | OUTPATIENT
Start: 2018-07-02 | End: 2018-07-26 | Stop reason: SDUPTHER

## 2018-07-03 DIAGNOSIS — F51.04 CHRONIC INSOMNIA: ICD-10-CM

## 2018-07-03 RX ORDER — ZOLPIDEM TARTRATE 10 MG/1
TABLET ORAL
Qty: 30 TABLET | Refills: 0 | Status: SHIPPED | OUTPATIENT
Start: 2018-07-03 | End: 2018-08-14 | Stop reason: SDUPTHER

## 2018-07-06 ENCOUNTER — PES CALL (OUTPATIENT)
Dept: ADMINISTRATIVE | Facility: CLINIC | Age: 63
End: 2018-07-06

## 2018-07-13 RX ORDER — TIZANIDINE 2 MG/1
TABLET ORAL
Qty: 90 TABLET | Refills: 0 | Status: SHIPPED | OUTPATIENT
Start: 2018-07-13 | End: 2018-10-02 | Stop reason: SDUPTHER

## 2018-07-13 RX ORDER — GABAPENTIN 300 MG/1
CAPSULE ORAL
Qty: 270 CAPSULE | Refills: 0 | Status: SHIPPED | OUTPATIENT
Start: 2018-07-13 | End: 2018-09-04 | Stop reason: SDUPTHER

## 2018-07-19 ENCOUNTER — LAB VISIT (OUTPATIENT)
Dept: LAB | Facility: HOSPITAL | Age: 63
End: 2018-07-19
Attending: FAMILY MEDICINE
Payer: MEDICARE

## 2018-07-19 DIAGNOSIS — R73.03 PREDIABETES: ICD-10-CM

## 2018-07-19 DIAGNOSIS — I10 ESSENTIAL HYPERTENSION: ICD-10-CM

## 2018-07-19 DIAGNOSIS — E78.2 MIXED HYPERLIPIDEMIA: ICD-10-CM

## 2018-07-19 LAB
ALBUMIN SERPL BCP-MCNC: 3.9 G/DL
ALP SERPL-CCNC: 44 U/L
ALT SERPL W/O P-5'-P-CCNC: 20 U/L
ANION GAP SERPL CALC-SCNC: 7 MMOL/L
AST SERPL-CCNC: 19 U/L
BASOPHILS # BLD AUTO: 0.09 K/UL
BASOPHILS NFR BLD: 0.9 %
BILIRUB SERPL-MCNC: 0.7 MG/DL
BUN SERPL-MCNC: 16 MG/DL
CALCIUM SERPL-MCNC: 9.6 MG/DL
CHLORIDE SERPL-SCNC: 101 MMOL/L
CHOLEST SERPL-MCNC: 200 MG/DL
CHOLEST/HDLC SERPL: 4.1 {RATIO}
CO2 SERPL-SCNC: 27 MMOL/L
CREAT SERPL-MCNC: 1 MG/DL
DIFFERENTIAL METHOD: ABNORMAL
EOSINOPHIL # BLD AUTO: 0.4 K/UL
EOSINOPHIL NFR BLD: 4.2 %
ERYTHROCYTE [DISTWIDTH] IN BLOOD BY AUTOMATED COUNT: 12.8 %
EST. GFR  (AFRICAN AMERICAN): >60 ML/MIN/1.73 M^2
EST. GFR  (NON AFRICAN AMERICAN): >60 ML/MIN/1.73 M^2
ESTIMATED AVG GLUCOSE: 111 MG/DL
GLUCOSE SERPL-MCNC: 90 MG/DL
HBA1C MFR BLD HPLC: 5.5 %
HCT VFR BLD AUTO: 45.3 %
HDLC SERPL-MCNC: 49 MG/DL
HDLC SERPL: 24.5 %
HGB BLD-MCNC: 14.6 G/DL
IMM GRANULOCYTES # BLD AUTO: 0.03 K/UL
IMM GRANULOCYTES NFR BLD AUTO: 0.3 %
LDLC SERPL CALC-MCNC: 116 MG/DL
LYMPHOCYTES # BLD AUTO: 1.5 K/UL
LYMPHOCYTES NFR BLD: 15.3 %
MCH RBC QN AUTO: 29.2 PG
MCHC RBC AUTO-ENTMCNC: 32.2 G/DL
MCV RBC AUTO: 91 FL
MONOCYTES # BLD AUTO: 1.3 K/UL
MONOCYTES NFR BLD: 13.2 %
NEUTROPHILS # BLD AUTO: 6.4 K/UL
NEUTROPHILS NFR BLD: 66.1 %
NONHDLC SERPL-MCNC: 151 MG/DL
NRBC BLD-RTO: 0 /100 WBC
PLATELET # BLD AUTO: 302 K/UL
PMV BLD AUTO: 9.6 FL
POTASSIUM SERPL-SCNC: 4.6 MMOL/L
PROT SERPL-MCNC: 7.3 G/DL
RBC # BLD AUTO: 5 M/UL
SODIUM SERPL-SCNC: 135 MMOL/L
TRIGL SERPL-MCNC: 175 MG/DL
WBC # BLD AUTO: 9.72 K/UL

## 2018-07-19 PROCEDURE — 80061 LIPID PANEL: CPT

## 2018-07-19 PROCEDURE — 80053 COMPREHEN METABOLIC PANEL: CPT

## 2018-07-19 PROCEDURE — 36415 COLL VENOUS BLD VENIPUNCTURE: CPT | Mod: PO

## 2018-07-19 PROCEDURE — 83036 HEMOGLOBIN GLYCOSYLATED A1C: CPT

## 2018-07-19 PROCEDURE — 85025 COMPLETE CBC W/AUTO DIFF WBC: CPT

## 2018-07-25 ENCOUNTER — HOSPITAL ENCOUNTER (EMERGENCY)
Facility: HOSPITAL | Age: 63
Discharge: HOME | End: 2018-07-25
Attending: EMERGENCY MEDICINE
Payer: MEDICARE

## 2018-07-25 ENCOUNTER — TELEPHONE (OUTPATIENT)
Dept: FAMILY MEDICINE | Facility: CLINIC | Age: 63
End: 2018-07-25

## 2018-07-25 VITALS
RESPIRATION RATE: 18 BRPM | HEIGHT: 71 IN | BODY MASS INDEX: 28.37 KG/M2 | OXYGEN SATURATION: 97 % | WEIGHT: 202.63 LBS | DIASTOLIC BLOOD PRESSURE: 81 MMHG | TEMPERATURE: 98 F | HEART RATE: 67 BPM | SYSTOLIC BLOOD PRESSURE: 145 MMHG

## 2018-07-25 DIAGNOSIS — K57.30 DIVERTICULOSIS OF LARGE INTESTINE WITHOUT HEMORRHAGE: ICD-10-CM

## 2018-07-25 DIAGNOSIS — N28.89 BILATERAL KIDNEY MASSES: ICD-10-CM

## 2018-07-25 DIAGNOSIS — I10 ESSENTIAL HYPERTENSION: ICD-10-CM

## 2018-07-25 DIAGNOSIS — R10.31 RIGHT LOWER QUADRANT ABDOMINAL PAIN: Primary | ICD-10-CM

## 2018-07-25 DIAGNOSIS — R93.7 ABNORMAL CT OF SPINE: ICD-10-CM

## 2018-07-25 LAB
ALBUMIN SERPL BCP-MCNC: 3.5 G/DL
ALP SERPL-CCNC: 43 U/L
ALT SERPL W/O P-5'-P-CCNC: 17 U/L
ANION GAP SERPL CALC-SCNC: 7 MMOL/L
APTT BLDCRRT: 27.3 SEC
AST SERPL-CCNC: 13 U/L
BASOPHILS # BLD AUTO: 0.05 K/UL
BASOPHILS NFR BLD: 0.5 %
BILIRUB SERPL-MCNC: 0.7 MG/DL
BILIRUB UR QL STRIP: NEGATIVE
BUN SERPL-MCNC: 15 MG/DL
CALCIUM SERPL-MCNC: 9.3 MG/DL
CHLORIDE SERPL-SCNC: 101 MMOL/L
CLARITY UR: CLEAR
CO2 SERPL-SCNC: 26 MMOL/L
COLOR UR: YELLOW
CREAT SERPL-MCNC: 1 MG/DL
DIFFERENTIAL METHOD: ABNORMAL
EOSINOPHIL # BLD AUTO: 0.3 K/UL
EOSINOPHIL NFR BLD: 2.5 %
ERYTHROCYTE [DISTWIDTH] IN BLOOD BY AUTOMATED COUNT: 12.6 %
EST. GFR  (AFRICAN AMERICAN): >60 ML/MIN/1.73 M^2
EST. GFR  (NON AFRICAN AMERICAN): >60 ML/MIN/1.73 M^2
GLUCOSE SERPL-MCNC: 140 MG/DL
GLUCOSE UR QL STRIP: NEGATIVE
HCT VFR BLD AUTO: 40.7 %
HGB BLD-MCNC: 13.9 G/DL
HGB UR QL STRIP: NEGATIVE
INR PPP: 1
KETONES UR QL STRIP: NEGATIVE
LEUKOCYTE ESTERASE UR QL STRIP: NEGATIVE
LIPASE SERPL-CCNC: 20 U/L
LYMPHOCYTES # BLD AUTO: 1.2 K/UL
LYMPHOCYTES NFR BLD: 10.9 %
MCH RBC QN AUTO: 29.4 PG
MCHC RBC AUTO-ENTMCNC: 34.2 G/DL
MCV RBC AUTO: 86 FL
MONOCYTES # BLD AUTO: 1.1 K/UL
MONOCYTES NFR BLD: 10 %
NEUTROPHILS # BLD AUTO: 8 K/UL
NEUTROPHILS NFR BLD: 76.1 %
NITRITE UR QL STRIP: NEGATIVE
PH UR STRIP: 8 [PH] (ref 5–8)
PLATELET # BLD AUTO: 278 K/UL
PMV BLD AUTO: 8.9 FL
POTASSIUM SERPL-SCNC: 4.1 MMOL/L
PROT SERPL-MCNC: 6.9 G/DL
PROT UR QL STRIP: NEGATIVE
PROTHROMBIN TIME: 10.7 SEC
RBC # BLD AUTO: 4.72 M/UL
SODIUM SERPL-SCNC: 134 MMOL/L
SP GR UR STRIP: 1.01 (ref 1–1.03)
URN SPEC COLLECT METH UR: NORMAL
UROBILINOGEN UR STRIP-ACNC: NEGATIVE EU/DL
WBC # BLD AUTO: 10.55 K/UL

## 2018-07-25 PROCEDURE — 96375 TX/PRO/DX INJ NEW DRUG ADDON: CPT | Mod: 59

## 2018-07-25 PROCEDURE — 81003 URINALYSIS AUTO W/O SCOPE: CPT

## 2018-07-25 PROCEDURE — 96361 HYDRATE IV INFUSION ADD-ON: CPT

## 2018-07-25 PROCEDURE — 96374 THER/PROPH/DIAG INJ IV PUSH: CPT

## 2018-07-25 PROCEDURE — 85730 THROMBOPLASTIN TIME PARTIAL: CPT

## 2018-07-25 PROCEDURE — 83690 ASSAY OF LIPASE: CPT

## 2018-07-25 PROCEDURE — 63600175 PHARM REV CODE 636 W HCPCS: Performed by: EMERGENCY MEDICINE

## 2018-07-25 PROCEDURE — 85025 COMPLETE CBC W/AUTO DIFF WBC: CPT

## 2018-07-25 PROCEDURE — 25500020 PHARM REV CODE 255: Performed by: EMERGENCY MEDICINE

## 2018-07-25 PROCEDURE — 99284 EMERGENCY DEPT VISIT MOD MDM: CPT | Mod: 25

## 2018-07-25 PROCEDURE — 80053 COMPREHEN METABOLIC PANEL: CPT

## 2018-07-25 PROCEDURE — 85610 PROTHROMBIN TIME: CPT

## 2018-07-25 PROCEDURE — 25000003 PHARM REV CODE 250: Performed by: EMERGENCY MEDICINE

## 2018-07-25 PROCEDURE — 36415 COLL VENOUS BLD VENIPUNCTURE: CPT

## 2018-07-25 PROCEDURE — 96376 TX/PRO/DX INJ SAME DRUG ADON: CPT

## 2018-07-25 RX ORDER — AMOXICILLIN AND CLAVULANATE POTASSIUM 875; 125 MG/1; MG/1
1 TABLET, FILM COATED ORAL EVERY 8 HOURS
Qty: 30 TABLET | Refills: 0 | Status: SHIPPED | OUTPATIENT
Start: 2018-07-25 | End: 2018-08-04

## 2018-07-25 RX ORDER — MORPHINE SULFATE 4 MG/ML
2 INJECTION, SOLUTION INTRAMUSCULAR; INTRAVENOUS
Status: COMPLETED | OUTPATIENT
Start: 2018-07-25 | End: 2018-07-25

## 2018-07-25 RX ORDER — CIPROFLOXACIN 500 MG/1
500 TABLET ORAL 2 TIMES DAILY
Qty: 20 TABLET | Refills: 0 | Status: SHIPPED | OUTPATIENT
Start: 2018-07-25 | End: 2018-07-25

## 2018-07-25 RX ORDER — METRONIDAZOLE 500 MG/1
500 TABLET ORAL
Status: DISCONTINUED | OUTPATIENT
Start: 2018-07-25 | End: 2018-07-25

## 2018-07-25 RX ORDER — ONDANSETRON 2 MG/ML
4 INJECTION INTRAMUSCULAR; INTRAVENOUS
Status: COMPLETED | OUTPATIENT
Start: 2018-07-25 | End: 2018-07-25

## 2018-07-25 RX ORDER — AMOXICILLIN AND CLAVULANATE POTASSIUM 875; 125 MG/1; MG/1
1 TABLET, FILM COATED ORAL
Status: COMPLETED | OUTPATIENT
Start: 2018-07-25 | End: 2018-07-25

## 2018-07-25 RX ORDER — METRONIDAZOLE 500 MG/1
500 TABLET ORAL 3 TIMES DAILY
Qty: 30 TABLET | Refills: 0 | Status: SHIPPED | OUTPATIENT
Start: 2018-07-25 | End: 2018-07-25

## 2018-07-25 RX ORDER — CIPROFLOXACIN 500 MG/1
500 TABLET ORAL
Status: DISCONTINUED | OUTPATIENT
Start: 2018-07-25 | End: 2018-07-25

## 2018-07-25 RX ORDER — MORPHINE SULFATE 4 MG/ML
4 INJECTION, SOLUTION INTRAMUSCULAR; INTRAVENOUS
Status: COMPLETED | OUTPATIENT
Start: 2018-07-25 | End: 2018-07-25

## 2018-07-25 RX ADMIN — SODIUM CHLORIDE 1000 ML: 0.9 INJECTION, SOLUTION INTRAVENOUS at 11:07

## 2018-07-25 RX ADMIN — MORPHINE SULFATE 2 MG: 4 INJECTION INTRAVENOUS at 10:07

## 2018-07-25 RX ADMIN — IOHEXOL 75 ML: 350 INJECTION, SOLUTION INTRAVENOUS at 12:07

## 2018-07-25 RX ADMIN — MORPHINE SULFATE 4 MG: 4 INJECTION INTRAVENOUS at 11:07

## 2018-07-25 RX ADMIN — ONDANSETRON 4 MG: 2 INJECTION, SOLUTION INTRAMUSCULAR; INTRAVENOUS at 10:07

## 2018-07-25 RX ADMIN — AMOXICILLIN AND CLAVULANATE POTASSIUM 1 TABLET: 875; 125 TABLET, FILM COATED ORAL at 01:07

## 2018-07-25 NOTE — DISCHARGE INSTRUCTIONS
Recommend outpatient bone scan to evaluate spine    Follow-up with urologist to evaluate solid renal masses

## 2018-07-25 NOTE — ED PROVIDER NOTES
"SCRIBE #1 NOTE: I, Jenifer Gallego, am scribing for, and in the presence of, Mery Viveros DO. I have scribed the entire note.      History      Chief Complaint   Patient presents with    Abdominal Pain     Pt reports right mid to lower abdominal pain that is sharp since Friday with fever        Review of patient's allergies indicates:   Allergen Reactions    No known allergies         HPI   HPI    7/25/2018, 9:59 AM   History obtained from the patient      History of Present Illness: Mil Sood is a 63 y.o. male patient who presents to the Emergency Department for RLQ abd pain migrating to groin described as "sharp" which onset suddenly about 5 days ago. Pt currently rates the pain a 8/10. Symptoms are intermittent and moderate in severity. Pt states the episodes are getting more frequent since onset.  Patient reports that he was unable to mow the lawn because the bumps aggravated the pain.  Patient reports that lying still makes it feel better.  No mitigating or exacerbating factors reported. Associated sxs include fever, nausea, and "loose" stools 3x/day. Patient denies any chills, constipation, hematochezia, dysuria, hematuria, urinary frequency, flank pain, vomiting, CP, SOB, testicular pain, scrotal swelling, and all other sxs at this time. No prior tx reported. Past surgical hx includes cholecystectomy. No further complaints or concerns at this time.       Arrival mode: Personal vehicle      PCP: Yumi Roberson MD       Past Medical History:  Past Medical History:   Diagnosis Date    Anxiety     Cancer     spot on kidney and lung    Diabetes mellitus     Borderline    Diverticulitis     GERD (gastroesophageal reflux disease)     Hyperlipidemia     not on any meds currently    Hypertension     Lung mass     Mass of kidney        Past Surgical History:  Past Surgical History:   Procedure Laterality Date    CHOLECYSTECTOMY      COLONOSCOPY      COLONOSCOPY N/A 4/7/2017    Procedure: " "COLONOSCOPY;  Surgeon: Amarilys Bravo MD;  Location: [facility];  Service: Endoscopy;  Laterality: N/A;    ESOPHAGOGASTRODUODENOSCOPY      HERNIA REPAIR Bilateral     groin    NECK SURGERY      PARTIAL NEPHRECTOMY      left    RENAL BIOPSY      TONSILLECTOMY      TRIGGER FINGER RELEASE Right     long finger         Family History:  Family History   Problem Relation Age of Onset    Hypertension Mother     Hypertension Father     Melanoma Father     Cancer Sister        Social History:  Social History     Social History Main Topics    Smoking status: Former Smoker     Packs/day: 1.00     Years: 20.00     Quit date: 2/4/1995    Smokeless tobacco: Never Used    Alcohol use No    Drug use: No    Sexual activity: Yes     Partners: Female     Birth control/ protection: None       ROS   Review of Systems   Constitutional: Positive for fever. Negative for chills.   HENT: Negative for sore throat.    Respiratory: Negative for shortness of breath.    Cardiovascular: Negative for chest pain.   Gastrointestinal: Positive for abdominal pain (RLQ) and nausea. Negative for blood in stool, constipation and vomiting.        + "loose" stools   Genitourinary: Negative for dysuria, flank pain, frequency, hematuria, scrotal swelling and testicular pain.   Musculoskeletal: Negative for back pain.   Skin: Negative for rash.   Neurological: Negative for weakness.   Hematological: Does not bruise/bleed easily.   All other systems reviewed and are negative.      Physical Exam      Initial Vitals [07/25/18 0952]   BP Pulse Resp Temp SpO2   (!) 160/72 79 18 98.1 °F (36.7 °C) 97 %      MAP       --          Physical Exam  Nursing Notes and Vital Signs Reviewed.  Constitutional: Patient is in no acute distress. Well-developed and well-nourished.  Head: Atraumatic. Normocephalic.  Eyes: PERRL. EOM intact. Conjunctivae are not pale. No scleral icterus.  ENT: Mucous membranes are moist. Oropharynx is clear and symmetric.  " "  Neck: Supple. Full ROM. No lymphadenopathy.  Cardiovascular: Regular rate. Regular rhythm. No murmurs, rubs, or gallops. Distal pulses are 2+ and symmetric.  Pulmonary/Chest: No respiratory distress. Clear to auscultation bilaterally. No wheezing or rales.  Abdominal: Soft. Distended.  There is RLQ tenderness.  No rebound, guarding, or rigidity. Bowel sounds are tympanic.  Genitourinary: No CVA tenderness  Musculoskeletal: Moves all extremities. No obvious deformities. No edema. No calf tenderness.  Skin: Warm and dry.  Neurological:  Alert, awake, and appropriate.  Normal speech.  No acute focal neurological deficits are appreciated.  Psychiatric: Normal affect. Good eye contact. Appropriate in content.    ED Course    Procedures  ED Vital Signs:  Vitals:    07/25/18 0952 07/25/18 1101 07/25/18 1131   BP: (!) 160/72 128/80 (!) 159/81   Pulse: 79 68 84   Resp: 18 18    Temp: 98.1 °F (36.7 °C)     TempSrc: Oral     SpO2: 97% 96% 97%   Weight: 91.9 kg (202 lb 9.6 oz)     Height: 5' 11" (1.803 m)         Abnormal Lab Results:  Labs Reviewed   CBC W/ AUTO DIFFERENTIAL - Abnormal; Notable for the following:        Result Value    Hemoglobin 13.9 (*)     MPV 8.9 (*)     Gran # (ANC) 8.0 (*)     Mono # 1.1 (*)     Gran% 76.1 (*)     Lymph% 10.9 (*)     All other components within normal limits   COMPREHENSIVE METABOLIC PANEL - Abnormal; Notable for the following:     Sodium 134 (*)     Glucose 140 (*)     Alkaline Phosphatase 43 (*)     Anion Gap 7 (*)     All other components within normal limits   LIPASE   PROTIME-INR   APTT   URINALYSIS        All Lab Results:  Results for orders placed or performed during the hospital encounter of 07/25/18   CBC auto differential   Result Value Ref Range    WBC 10.55 3.90 - 12.70 K/uL    RBC 4.72 4.60 - 6.20 M/uL    Hemoglobin 13.9 (L) 14.0 - 18.0 g/dL    Hematocrit 40.7 40.0 - 54.0 %    MCV 86 82 - 98 fL    MCH 29.4 27.0 - 31.0 pg    MCHC 34.2 32.0 - 36.0 g/dL    RDW 12.6 11.5 - " 14.5 %    Platelets 278 150 - 350 K/uL    MPV 8.9 (L) 9.2 - 12.9 fL    Gran # (ANC) 8.0 (H) 1.8 - 7.7 K/uL    Lymph # 1.2 1.0 - 4.8 K/uL    Mono # 1.1 (H) 0.3 - 1.0 K/uL    Eos # 0.3 0.0 - 0.5 K/uL    Baso # 0.05 0.00 - 0.20 K/uL    Gran% 76.1 (H) 38.0 - 73.0 %    Lymph% 10.9 (L) 18.0 - 48.0 %    Mono% 10.0 4.0 - 15.0 %    Eosinophil% 2.5 0.0 - 8.0 %    Basophil% 0.5 0.0 - 1.9 %    Differential Method Automated    Comprehensive metabolic panel   Result Value Ref Range    Sodium 134 (L) 136 - 145 mmol/L    Potassium 4.1 3.5 - 5.1 mmol/L    Chloride 101 95 - 110 mmol/L    CO2 26 23 - 29 mmol/L    Glucose 140 (H) 70 - 110 mg/dL    BUN, Bld 15 8 - 23 mg/dL    Creatinine 1.0 0.5 - 1.4 mg/dL    Calcium 9.3 8.7 - 10.5 mg/dL    Total Protein 6.9 6.0 - 8.4 g/dL    Albumin 3.5 3.5 - 5.2 g/dL    Total Bilirubin 0.7 0.1 - 1.0 mg/dL    Alkaline Phosphatase 43 (L) 55 - 135 U/L    AST 13 10 - 40 U/L    ALT 17 10 - 44 U/L    Anion Gap 7 (L) 8 - 16 mmol/L    eGFR if African American >60 >60 mL/min/1.73 m^2    eGFR if non African American >60 >60 mL/min/1.73 m^2   Lipase   Result Value Ref Range    Lipase 20 4 - 60 U/L   Protime-INR   Result Value Ref Range    Prothrombin Time 10.7 9.0 - 12.5 sec    INR 1.0 0.8 - 1.2   APTT   Result Value Ref Range    aPTT 27.3 21.0 - 32.0 sec   Urinalysis   Result Value Ref Range    Specimen UA Urine, Clean Catch     Color, UA Yellow Yellow, Straw, Asiya    Appearance, UA Clear Clear    pH, UA 8.0 5.0 - 8.0    Specific Gravity, UA 1.015 1.005 - 1.030    Protein, UA Negative Negative    Glucose, UA Negative Negative    Ketones, UA Negative Negative    Bilirubin (UA) Negative Negative    Occult Blood UA Negative Negative    Nitrite, UA Negative Negative    Urobilinogen, UA Negative <2.0 EU/dL    Leukocytes, UA Negative Negative         Imaging Results:  Imaging Results          CT Abdomen Pelvis With Contrast (Final result)  Result time 07/25/18 12:25:59    Final result by Kevin Geiger MD  (07/25/18 12:25:59)                 Impression:      Bilateral solid masses are noted within both kidneys.  Within the right kidney there is a 2.6 cm mass within the upper pole, 1.9 cm mass within the interpolar region, and 1.3 cm mass within the lower pole. Within the left kidney there is a 2.8 cm mass within the lower pole.  Findings were noted on prior CT and appear slightly larger on today's exam.    Extensive sigmoid diverticulosis without evidence of diverticulitis.    Normal appendix.    All CT scans at this facility use dose modulation, iterative reconstruction, and/or weight based dosing when appropriate to reduce radiation dose to as low as reasonable achievable.      Electronically signed by: Kevin Geiger MD  Date:    07/25/2018  Time:    12:25             Narrative:    EXAMINATION:  CT ABDOMEN PELVIS WITH CONTRAST    CLINICAL HISTORY:  RLQ pain, appendicitis suspected;Right lower quadrant pain;    TECHNIQUE:  Low dose axial images, sagittal and coronal reformations were obtained from the lung bases to the pubic symphysis following the IV administration of 75 mL of Omnipaque 350.    COMPARISON:  None    FINDINGS:  Heart: Normal size. No effusion.  Coronary artery disease noted.    Lung Bases: Clear.    Liver: Normal size and attenuation. No focal lesions.    Gallbladder: Surgically absent.    Bile Ducts: No dilatation.    Pancreas: No mass. No peripancreatic fat stranding.    Spleen: Normal.  Benign cyst with calcification is suspected.    Adrenals: Normal.    Kidneys/Ureters: The kidneys are normal in size.  Bilateral solid masses are noted within both kidneys.  Within the right kidney there is a 2.6 cm mass within the upper pole, 1.9 cm mass within the interpolar region, and 1.3 cm mass within the lower pole.  Within the left kidney there is a 2.8 cm mass within the lower pole.  No evidence of hydronephrosis.  No nephrolithiasis.    Bladder: No wall thickening.    Reproductive organs: Normal.    GI  Tract/Mesentery: Small hiatal hernia.  No evidence of bowel obstruction or inflammation.  Normal appendix.    Peritoneal Space: No ascites or free air.    Retroperitoneum: No significant adenopathy.    Abdominal wall: Normal.    Vasculature: No aneurysm.    Bones: Moderate multilevel degenerative changes are seen throughout the spine.  Lesions within the lower lumbar spine most likely reflect Schmorl's nodes.  Bone scan can be obtained as clinically warranted if there is concern for bony metastasis.                                        The Emergency Provider reviewed the vital signs and test results, which are outlined above.    ED Discussion     1:19 PM: Re-evaluated pt. Pt is resting comfortably and is in no acute distress. A repeat abdominal exam was performed. Abdomen is soft. No rebound, guarding, or rigidity.  No pulsatile masses. Discussed with pt the results of CT showing kidney masses. Discuss need for outpatient bone scan and follow up with Urology.  Also discussed importance of following up with bone scan.  Patient has history of diverticulosis.  Suspect that he may have mild level of diverticulitis.  Therefore treat with antibiotics.    1:24 PM: Reassessed pt at this time.  Pt is awake, alert, and in NAD at this time. Discussed with pt all pertinent ED information and results. Discussed pt dx and plan of tx. Gave pt all f/u and return to the ED instructions. All questions and concerns were addressed at this time. Pt expresses understanding of information and instructions, and is comfortable with plan to discharge. Pt is stable for discharge.      1:32 PM Suspect Diverticulitis per up-to-date..   Antibiotics used to treat diverticulitis must cover the usual gastrointestinal yvan of gram-negative rods and anaerobes, particularly Escherichia coli and Bacteroides fragilis [9,24]. We use one of the following outpatient antibiotic regimens orally in adult patients with normal renal and hepatic function:    ?Ciprofloxacin (500 mg every 12 hours) plus metronidazole (500 mg every 8 hours)   ?Trimethoprim-sulfamethoxazole (1 double-strength tablet [sulfamethoxazole 800 mg; trimethoprim 160 mg] every 12 hours) plus metronidazole (500 mg every 8 hours)   ?Amoxicillin-clavulanate (1 tablet [875 mg amoxicillin; 125 mg clavulanic acid] every 8 hours) [20,24,25] or Augmentin XR (2 tablets [each tablet containing 1 g amoxicillin; 62.5 mg clavulanic acid] every 12 hours)  ?Moxifloxacin (400 mg daily; use in patients intolerant of both metronidazole and beta lactam agents)  The local antibiogram should be consulted to avoid prescribing a regimen to which bacterial resistance exceeds 10 percent. As an example, in areas where the prevalence of E. coli resistance to fluoroquinolones exceeds 10 percent, amoxicillin-clavulanate or trimethoprim-sulfamethoxazole plus metronidazole are the preferred agents.       I discussed with patient and/or family/caretaker that evaluation in the ED does not suggest any emergent or life threatening medical conditions requiring immediate intervention beyond what was provided in the ED, and I believe patient is safe for discharge.  Regardless, an unremarkable evaluation in the ED does not preclude the development or presence of a serious of life threatening condition. As such, patient was instructed to return immediately for any worsening or change in current symptoms.    ED Medication(s):  Medications   morphine injection 2 mg (2 mg Intravenous Given 7/25/18 1032)   ondansetron injection 4 mg (4 mg Intravenous Given 7/25/18 1032)   sodium chloride 0.9% bolus 1,000 mL (0 mLs Intravenous Stopped 7/25/18 1140)   morphine injection 4 mg (4 mg Intravenous Given 7/25/18 1102)   omnipaque 350 iohexol 75 mL (75 mLs Intravenous Given 7/25/18 1204)   amoxicillin-clavulanate 875-125mg per tablet 1 tablet (1 tablet Oral Given 7/25/18 1333)       New Prescriptions    AMOXICILLIN-CLAVULANATE 875-125MG (AUGMENTIN)  875-125 MG PER TABLET    Take 1 tablet by mouth every 8 (eight) hours. for 10 days       Follow-up Information     Yumi Roberson MD In 2 days.    Specialty:  Family Medicine  Why:  Return to emergency department for:  Worsening pain, nausea, vomiting, fever, weakness, blood in the stool, or other concerns  Contact information:  [id number] [address] [address]  [phone]             Rashad Cleveland IV, MD.    Specialty:  Urology  Why:  To evaluate kidney masses.  Contact information:  [address]  [place] [address] [phone]                     Medical Decision Making    Medical Decision Making:   Initial Assessment:   Patient 63-year-old male complaining of right lower quadrant pain for 5 days.  No urinary complaints.  Differential Diagnosis:   Appendicitis, UTI, renal colic, hernia, diverticulitis  Clinical Tests:   Lab Tests: Ordered and Reviewed  Radiological Study: Ordered and Reviewed  ED Management:  IV started, blood work obtained, CT scan, repeat abdominal exams.  In-depth discussion with patient and family member for outpatient follow-up regarding solid kidney masses as well as outpatient bone scan for abnormal CT scan of the spine.  Patient was provided copies of his CT report.  Secure message was sent to patient's primary care physician as well as urologist to arrange follow-up.           Scribe Attestation:   Scribe #1: I performed the above scribed service and the documentation accurately describes the services I performed. I attest to the accuracy of the note.    Attending:   Physician Attestation Statement for Scribe #1: I, Mery Viveros DO, personally performed the services described in this documentation, as scribed by Jenifer Gallego, in my presence, and it is both accurate and complete.          Clinical Impression       ICD-10-CM ICD-9-CM   1. Right lower quadrant abdominal pain R10.31 789.03   2. Bilateral kidney masses N28.89 593.9   3. Diverticulosis of large  intestine without hemorrhage K57.30 562.10   4. Essential hypertension I10 401.9   5. Abnormal CT of spine R93.7 793.7       Disposition:   Disposition: Discharged  Condition: Stable         Mery Viveros,   07/25/18 1239

## 2018-07-26 ENCOUNTER — OFFICE VISIT (OUTPATIENT)
Dept: FAMILY MEDICINE | Facility: CLINIC | Age: 63
End: 2018-07-26
Payer: MEDICARE

## 2018-07-26 ENCOUNTER — TELEPHONE (OUTPATIENT)
Dept: UROLOGY | Facility: CLINIC | Age: 63
End: 2018-07-26

## 2018-07-26 ENCOUNTER — TELEPHONE (OUTPATIENT)
Dept: FAMILY MEDICINE | Facility: CLINIC | Age: 63
End: 2018-07-26

## 2018-07-26 VITALS
BODY MASS INDEX: 29.13 KG/M2 | WEIGHT: 203.5 LBS | HEART RATE: 69 BPM | HEIGHT: 70 IN | DIASTOLIC BLOOD PRESSURE: 65 MMHG | SYSTOLIC BLOOD PRESSURE: 107 MMHG | TEMPERATURE: 99 F | OXYGEN SATURATION: 97 %

## 2018-07-26 DIAGNOSIS — D49.89 NEOPLASM OF ABDOMEN: ICD-10-CM

## 2018-07-26 DIAGNOSIS — R93.7 ABNORMAL CT SCAN, LUMBAR SPINE: ICD-10-CM

## 2018-07-26 DIAGNOSIS — R10.31 RLQ ABDOMINAL PAIN: Primary | ICD-10-CM

## 2018-07-26 DIAGNOSIS — N28.89 BILATERAL RENAL MASSES: ICD-10-CM

## 2018-07-26 PROCEDURE — 3078F DIAST BP <80 MM HG: CPT | Mod: CPTII,S$GLB,, | Performed by: FAMILY MEDICINE

## 2018-07-26 PROCEDURE — 3074F SYST BP LT 130 MM HG: CPT | Mod: CPTII,S$GLB,, | Performed by: FAMILY MEDICINE

## 2018-07-26 PROCEDURE — 3008F BODY MASS INDEX DOCD: CPT | Mod: CPTII,S$GLB,, | Performed by: FAMILY MEDICINE

## 2018-07-26 PROCEDURE — 99214 OFFICE O/P EST MOD 30 MIN: CPT | Mod: S$GLB,,, | Performed by: FAMILY MEDICINE

## 2018-07-26 PROCEDURE — 99999 PR PBB SHADOW E&M-EST. PATIENT-LVL III: CPT | Mod: PBBFAC,,, | Performed by: FAMILY MEDICINE

## 2018-07-26 RX ORDER — OMEPRAZOLE 40 MG/1
40 CAPSULE, DELAYED RELEASE ORAL DAILY
Qty: 30 CAPSULE | Refills: 1 | Status: SHIPPED | OUTPATIENT
Start: 2018-07-26 | End: 2018-09-07 | Stop reason: SDUPTHER

## 2018-07-26 RX ORDER — ONDANSETRON HYDROCHLORIDE 8 MG/1
8 TABLET, FILM COATED ORAL EVERY 8 HOURS PRN
Qty: 20 TABLET | Refills: 1 | Status: SHIPPED | OUTPATIENT
Start: 2018-07-26 | End: 2018-12-03

## 2018-07-26 NOTE — TELEPHONE ENCOUNTER
----- Message from Mery Viveros DO sent at 7/25/2018  9:28 PM CDT -----  Good evening.  I saw your patient in the emergency room today.  He had evaluation for right lower abdominal pain.  CT scan showed that he had solid masses in his kidney,  increased in size since previous.  There was some abnormality of the lumbar spine as well.  Radiologist had recommended outpatient bone scan to exclude  possible mets to the bone .  I did discuss with the patient the importance of following with urology to evaluate the  solid kidney masses. Patient verbalized understanding.  I appreciate your help in arranging follow-up.    Mery

## 2018-07-26 NOTE — TELEPHONE ENCOUNTER
----- Message from Rashad Cleveland IV, MD sent at 7/26/2018  9:15 AM CDT -----  Please arrange to get him back in with me.    ----- Message -----  From: Mery Viveros DO  Sent: 7/25/2018   9:28 PM  To: Rashad Cleveland IV, MD, Yumi Roberson MD, #    Good evening.  I saw your patient in the emergency room today.  He had evaluation for right lower abdominal pain.  CT scan showed that he had solid masses in his kidney,  increased in size since previous.  There was some abnormality of the lumbar spine as well.  Radiologist had recommended outpatient bone scan to exclude  possible mets to the bone .  I did discuss with the patient the importance of following with urology to evaluate the  solid kidney masses. Patient verbalized understanding.  I appreciate your help in arranging follow-up.    Mery

## 2018-07-26 NOTE — PROGRESS NOTES
Chief Complaint:    Chief Complaint   Patient presents with    Follow-up       History of Present Illness:    Patient is here status post ER follow-up:  He says he has been having abdominal pain for the last 5 days but yesterday it got worse the pain was localized mostly in the lower abdomen associated with nausea and some low-grade fever he said.  Denies any vomiting he has not had any changes in bowel habits no change in appetite no trouble urinating.  Patient went to the ER he had a full workup labs were normal his CT scan did not show any acute inflammatory process he was started on some antibiotics and sent home he says the pain has eased a lot but is still there and he has some heartburn and nausea also he requests some medication to help with that.    Also it was found that he has solid masses on his kidneys which we are aware of but there was some concern about increase in the size of the masses and there was some mention about ruling out possible metastatic disease to the spine.    ROS:  Review of Systems   Constitutional: Positive for fever. Negative for activity change, chills, fatigue and unexpected weight change.   HENT: Negative for congestion, ear discharge, ear pain, hearing loss, postnasal drip and rhinorrhea.    Eyes: Negative for pain and visual disturbance.   Respiratory: Negative for cough, chest tightness and shortness of breath.    Cardiovascular: Negative for chest pain and palpitations.   Gastrointestinal: Positive for abdominal pain and nausea. Negative for diarrhea and vomiting.   Endocrine: Negative for heat intolerance.   Genitourinary: Negative for dysuria, flank pain, frequency and hematuria.   Musculoskeletal: Negative for back pain, gait problem and neck pain.   Skin: Negative for color change and rash.   Neurological: Negative for dizziness, tremors, seizures, numbness and headaches.   Psychiatric/Behavioral: Negative for agitation, hallucinations, self-injury, sleep disturbance  "and suicidal ideas. The patient is not nervous/anxious.        Past Medical History:   Diagnosis Date    Anxiety     Cancer     spot on kidney and lung    Diabetes mellitus     Borderline    Diverticulitis     GERD (gastroesophageal reflux disease)     Hyperlipidemia     not on any meds currently    Hypertension     Lung mass     Mass of kidney        Social History:  Social History     Social History    Marital status:      Spouse name: N/A    Number of children: N/A    Years of education: N/A     Social History Main Topics    Smoking status: Former Smoker     Packs/day: 1.00     Years: 20.00     Quit date: 2/4/1995    Smokeless tobacco: Never Used    Alcohol use No    Drug use: No    Sexual activity: Yes     Partners: Female     Birth control/ protection: None     Other Topics Concern    None     Social History Narrative    None       Family History:   family history includes Cancer in his sister; Hypertension in his father and mother; Melanoma in his father.    Health Maintenance   Topic Date Due    Zoster Vaccine  02/03/2015    Influenza Vaccine  08/01/2018    Lipid Panel  07/19/2019    Colonoscopy  04/07/2020    TETANUS VACCINE  04/26/2028    Hepatitis C Screening  Completed       Physical Exam:    Vital Signs  Temp: 99.2 °F (37.3 °C)  Temp src: Tympanic  Pulse: 69  SpO2: 97 %  BP: 107/65  BP Location: Left arm  Patient Position: Sitting  Pain Score:   6 (Upper right side)  Height and Weight  Height: 5' 10" (177.8 cm)  Weight: 92.3 kg (203 lb 7.8 oz)  BSA (Calculated - sq m): 2.13 sq meters  BMI (Calculated): 29.3  Weight in (lb) to have BMI = 25: 173.9]    Body mass index is 29.2 kg/m².    Physical Exam   Constitutional: He is oriented to person, place, and time. He appears well-developed.   HENT:   Mouth/Throat: Oropharynx is clear and moist.   Eyes: Conjunctivae are normal. Pupils are equal, round, and reactive to light.   Neck: Normal range of motion. Neck supple. "   Cardiovascular: Normal rate, regular rhythm and normal heart sounds.    No murmur heard.  Pulmonary/Chest: Effort normal and breath sounds normal. No respiratory distress. He has no wheezes. He has no rales. He exhibits no tenderness.   Abdominal: Soft. He exhibits no distension and no mass. There is tenderness in the right lower quadrant. There is no guarding. Hernia confirmed negative in the right inguinal area and confirmed negative in the left inguinal area.   Genitourinary: Testes normal.   Genitourinary Comments: Pt refused rectal exam   Musculoskeletal: He exhibits no edema or tenderness.   Lymphadenopathy:     He has no cervical adenopathy.   Neurological: He is alert and oriented to person, place, and time. He has normal reflexes.   Skin: Skin is warm and dry.   Psychiatric: He has a normal mood and affect. His behavior is normal. Judgment and thought content normal.       Results for orders placed or performed during the hospital encounter of 07/25/18   CBC auto differential   Result Value Ref Range    WBC 10.55 3.90 - 12.70 K/uL    RBC 4.72 4.60 - 6.20 M/uL    Hemoglobin 13.9 (L) 14.0 - 18.0 g/dL    Hematocrit 40.7 40.0 - 54.0 %    MCV 86 82 - 98 fL    MCH 29.4 27.0 - 31.0 pg    MCHC 34.2 32.0 - 36.0 g/dL    RDW 12.6 11.5 - 14.5 %    Platelets 278 150 - 350 K/uL    MPV 8.9 (L) 9.2 - 12.9 fL    Gran # (ANC) 8.0 (H) 1.8 - 7.7 K/uL    Lymph # 1.2 1.0 - 4.8 K/uL    Mono # 1.1 (H) 0.3 - 1.0 K/uL    Eos # 0.3 0.0 - 0.5 K/uL    Baso # 0.05 0.00 - 0.20 K/uL    Gran% 76.1 (H) 38.0 - 73.0 %    Lymph% 10.9 (L) 18.0 - 48.0 %    Mono% 10.0 4.0 - 15.0 %    Eosinophil% 2.5 0.0 - 8.0 %    Basophil% 0.5 0.0 - 1.9 %    Differential Method Automated    Comprehensive metabolic panel   Result Value Ref Range    Sodium 134 (L) 136 - 145 mmol/L    Potassium 4.1 3.5 - 5.1 mmol/L    Chloride 101 95 - 110 mmol/L    CO2 26 23 - 29 mmol/L    Glucose 140 (H) 70 - 110 mg/dL    BUN, Bld 15 8 - 23 mg/dL    Creatinine 1.0 0.5 - 1.4  mg/dL    Calcium 9.3 8.7 - 10.5 mg/dL    Total Protein 6.9 6.0 - 8.4 g/dL    Albumin 3.5 3.5 - 5.2 g/dL    Total Bilirubin 0.7 0.1 - 1.0 mg/dL    Alkaline Phosphatase 43 (L) 55 - 135 U/L    AST 13 10 - 40 U/L    ALT 17 10 - 44 U/L    Anion Gap 7 (L) 8 - 16 mmol/L    eGFR if African American >60 >60 mL/min/1.73 m^2    eGFR if non African American >60 >60 mL/min/1.73 m^2   Lipase   Result Value Ref Range    Lipase 20 4 - 60 U/L   Protime-INR   Result Value Ref Range    Prothrombin Time 10.7 9.0 - 12.5 sec    INR 1.0 0.8 - 1.2   APTT   Result Value Ref Range    aPTT 27.3 21.0 - 32.0 sec   Urinalysis   Result Value Ref Range    Specimen UA Urine, Clean Catch     Color, UA Yellow Yellow, Straw, Asiya    Appearance, UA Clear Clear    pH, UA 8.0 5.0 - 8.0    Specific Gravity, UA 1.015 1.005 - 1.030    Protein, UA Negative Negative    Glucose, UA Negative Negative    Ketones, UA Negative Negative    Bilirubin (UA) Negative Negative    Occult Blood UA Negative Negative    Nitrite, UA Negative Negative    Urobilinogen, UA Negative <2.0 EU/dL    Leukocytes, UA Negative Negative         Lab Results   Component Value Date    HGBA1C 5.5 07/19/2018       Assessment:      ICD-10-CM ICD-9-CM   1. RLQ abdominal pain R10.31 789.03   2. Abnormal CT scan, lumbar spine R93.7 793.7   3. Neoplasm of abdomen D49.89 239.89   4. Bilateral renal masses N28.89 593.9         Plan:    At this time we do not completely know the etiology of the right lower quadrant pain, patient has been instructed to take his antibiotics and watch the symptoms carefully should there be any worsening symptoms he needs to come back CS be can always consult Gastroenterology.    Patient has been made aware of his renal masses and we have requested his urologist to see him for follow-up of these masses.  Will get a bone scan to rule out possible med a stasis to the bones        Orders Placed This Encounter   Procedures    NM Bone Scan Whole Body       Current  Outpatient Prescriptions   Medication Sig Dispense Refill    amLODIPine (NORVASC) 10 MG tablet TAKE 1 TABLET BY MOUTH ONCE DAILY. 90 tablet 0    amoxicillin-clavulanate 875-125mg (AUGMENTIN) 875-125 mg per tablet Take 1 tablet by mouth every 8 (eight) hours. for 10 days 30 tablet 0    aspirin 81 mg Tab Take 1 tablet by mouth Daily.      benazepril (LOTENSIN) 20 MG tablet TAKE 2 TABLETS BY MOUTH ONCE A DAY. 180 tablet 0    clonazepam (KLONOPIN) 2 MG Tab 2 mg every evening. Take 2 mg by mouth nightly as needed.        fluticasone-salmeterol (ADVAIR HFA) 115-21 mcg/actuation HFAA Inhale 2 puffs into the lungs 2 (two) times daily. 12 g 12    gabapentin (NEURONTIN) 300 MG capsule TAKE 2 CAPSULES BY MOUTH THREE TIMES DAILY. 270 capsule 0    hydroCHLOROthiazide (MICROZIDE) 12.5 mg capsule TAKE 1 CAPSULE BY MOUTH ONCE DAILY. 90 capsule 0    hydrocodone-acetaminophen 10-325mg (NORCO)  mg Tab Take 1 or 2 tablets every 4-8 hrs as needed for pain. 40 tablet 0    hydrOXYzine pamoate (VISTARIL) 50 MG Cap Take 50 mg by mouth as needed.       metoprolol tartrate (LOPRESSOR) 50 MG tablet TAKE 1 TABLET BY MOUTH TWICE A DAY. 180 tablet 0    pravastatin (PRAVACHOL) 20 MG tablet Take 1 tablet (20 mg total) by mouth once daily. 90 tablet 3    tiotropium (SPIRIVA) 18 mcg inhalation capsule Inhale 1 capsule (18 mcg total) into the lungs once daily. Controller 30 capsule 2    tiZANidine (ZANAFLEX) 2 MG tablet TAKE 1 TABLET BY MOUTH ONCE DAILY. 90 tablet 0    tiZANidine (ZANAFLEX) 2 MG tablet TAKE 1 TABLET BY MOUTH ONCE DAILY. 90 tablet 0    traZODone (DESYREL) 100 MG tablet Take 1 tablet (100 mg total) by mouth every evening. 30 tablet 5    VITAMIN B-12 1000 MCG tablet       zolpidem (AMBIEN) 10 mg Tab TAKE 1 TABLET BY MOUTH AT BEDTIME AS NEEDED. 30 tablet 0    hydrALAZINE (APRESOLINE) 25 MG tablet Take 1 tablet (25 mg total) by mouth 3 (three) times daily. (Patient taking differently: Take 25 mg by mouth every 12  (twelve) hours. ) 90 tablet 11    omeprazole (PRILOSEC) 40 MG capsule Take 1 capsule (40 mg total) by mouth once daily. 30 capsule 1    ondansetron (ZOFRAN) 8 MG tablet Take 1 tablet (8 mg total) by mouth every 8 (eight) hours as needed for Nausea. 20 tablet 1    triamcinolone acetonide 0.1% (KENALOG) 0.1 % cream Apply topically 2 (two) times daily. 45 g 1     No current facility-administered medications for this visit.        Medications Discontinued During This Encounter   Medication Reason    zolpidem (AMBIEN) 10 mg Tab Duplicate Order    pantoprazole (PROTONIX) 40 MG tablet        No Follow-up on file.      Yumi Roberson MD

## 2018-07-26 NOTE — TELEPHONE ENCOUNTER
Please advised patient to see follow-up with Dr. Cleveland about the kidney masses.  He needs to also follow up with us  since there are some findings in the spine as well.

## 2018-07-31 ENCOUNTER — TELEPHONE (OUTPATIENT)
Dept: FAMILY MEDICINE | Facility: CLINIC | Age: 63
End: 2018-07-31

## 2018-07-31 NOTE — TELEPHONE ENCOUNTER
----- Message from Narda Rich sent at 7/31/2018 10:38 AM CDT -----  Contact: Bairon with [facility]   Bairon called and stated he needed to speak to the nurse. He stated he needs clarity on prescription. He can be reached at [phone].    Thanks,  TF

## 2018-07-31 NOTE — TELEPHONE ENCOUNTER
NO NO, he can only take 1 sleeping medication, he can choose which one and the others need to be cancelled.

## 2018-07-31 NOTE — TELEPHONE ENCOUNTER
Notified Bairon with [facility] of Dr Roberson's response. Bairon states he'll try to contact the patient.

## 2018-07-31 NOTE — TELEPHONE ENCOUNTER
[facility] pharmacy called and wants to make sure you're okay with this patient taking ambien with trazodone and clonazepam. Please advise.

## 2018-08-01 ENCOUNTER — HOSPITAL ENCOUNTER (OUTPATIENT)
Dept: RADIOLOGY | Facility: HOSPITAL | Age: 63
Discharge: HOME | End: 2018-08-01
Attending: FAMILY MEDICINE
Payer: MEDICARE

## 2018-08-01 DIAGNOSIS — D49.89 NEOPLASM OF ABDOMEN: ICD-10-CM

## 2018-08-01 PROCEDURE — A9503 TC99M MEDRONATE: HCPCS

## 2018-08-08 ENCOUNTER — OFFICE VISIT (OUTPATIENT)
Dept: UROLOGY | Facility: CLINIC | Age: 63
End: 2018-08-08
Payer: MEDICARE

## 2018-08-08 VITALS
WEIGHT: 200.63 LBS | DIASTOLIC BLOOD PRESSURE: 76 MMHG | SYSTOLIC BLOOD PRESSURE: 124 MMHG | HEIGHT: 70 IN | HEART RATE: 62 BPM | BODY MASS INDEX: 28.72 KG/M2

## 2018-08-08 DIAGNOSIS — N28.89 RENAL MASS: Primary | ICD-10-CM

## 2018-08-08 LAB
BILIRUB SERPL-MCNC: NORMAL MG/DL
BLOOD URINE, POC: NORMAL
COLOR, POC UA: YELLOW
GLUCOSE UR QL STRIP: NORMAL
KETONES UR QL STRIP: NORMAL
LEUKOCYTE ESTERASE URINE, POC: NORMAL
NITRITE, POC UA: NORMAL
PH, POC UA: 5
PROTEIN, POC: NORMAL
SPECIFIC GRAVITY, POC UA: 1.01
UROBILINOGEN, POC UA: NORMAL

## 2018-08-08 PROCEDURE — 81002 URINALYSIS NONAUTO W/O SCOPE: CPT | Mod: S$GLB,,, | Performed by: UROLOGY

## 2018-08-08 PROCEDURE — 99999 PR PBB SHADOW E&M-EST. PATIENT-LVL III: CPT | Mod: PBBFAC,,, | Performed by: UROLOGY

## 2018-08-08 PROCEDURE — 3078F DIAST BP <80 MM HG: CPT | Mod: CPTII,S$GLB,, | Performed by: UROLOGY

## 2018-08-08 PROCEDURE — 3074F SYST BP LT 130 MM HG: CPT | Mod: CPTII,S$GLB,, | Performed by: UROLOGY

## 2018-08-08 PROCEDURE — 3008F BODY MASS INDEX DOCD: CPT | Mod: CPTII,S$GLB,, | Performed by: UROLOGY

## 2018-08-08 PROCEDURE — 99214 OFFICE O/P EST MOD 30 MIN: CPT | Mod: 25,S$GLB,, | Performed by: UROLOGY

## 2018-08-08 NOTE — PROGRESS NOTES
Spoke with Juliet to scheduled Cyro Ablation for pt. States she will need to speak with Dr. Martínez before scheduling and she will call back with details.

## 2018-08-08 NOTE — PROGRESS NOTES
"Chief Complaint: Renal Cell Carcinoma?    HPI:   8/8/18: Reviewed imaging, history, symptoms in detail.  9/28/17: Followup CT shows renal masses stable on 6 month interval.  CT scan shows 2.6/1.9/2.3 right renal masses and a 2.8cm left lower pole renal mass.  All enlarging slowly.  3/22/17: Slight enlargement of bilateral small renal masses in background of path-proven oncocytoma in 2011.  Dr. Roberson completes prostate cancer screening annually.  2/17/16: Ct shows a 5.5 mm left lower lobe chest nodule and a metallic linear object in stomach.  It probably is the wire left behind after some sort of esophageal biopsy and the wire was supposed to pass over a few weeks time.  Br Gen path shows the prior mass to be an oncocytoma.  Our pathologist has requested the slides and will form a second opinion.  2/11/16: 60 yo man referred by Dr. Roberson after finding renal masses on recent workup for various complaints.  No abd/pelvic pain and no exac/rel factors.  No hematuria.  No urolithiasis except he says he passed one 30 years ago.  No urinary bother.  No  history.  Normal sexual function. In 2011 he had left upper pole partial nephrectomy with unknown findings at  General, but no longterm followup.  Now he has a 2.3 cm left lower pole enhancing mass, and "right kidney demonstrates development of at least 3 separate solid mass lesions. One in the upper pole cortex 1.6 cm in size. The other in the anterior right upper renal cortex measuring 1.5 cm in size. The third located in the anterior lower pole approximately 1.1 cm in size."  No chest imaging.  LFT normal.  Neg prostate cancer screening with PCP last ADA 6 months ago.    Allergies:  No known allergies    Medications:  has a current medication list which includes the following prescription(s): amlodipine, aspirin, benazepril, clonazepam, fluticasone-salmeterol, gabapentin, hydrochlorothiazide, hydrocodone-acetaminophen, hydroxyzine pamoate, metoprolol tartrate, " omeprazole, ondansetron, pravastatin, tiotropium, tizanidine, tizanidine, trazodone, vitamin b-12, zolpidem, hydralazine, and triamcinolone acetonide 0.1%.    Review of Systems:  General: No fever, chills, fatigability, or weight loss.  Skin: No rashes, itching, or changes in color or texture of skin.  Chest: Denies UMANA, cyanosis, wheezing, cough, and sputum production.  Abdomen: Appetite fine. No weight loss. Denies diarrhea, abdominal pain, hematemesis, or blood in stool.  Musculoskeletal: Some joint stiffness or swelling. Some back pain.  : As above.  All other review of systems negative.    PMH:   has a past medical history of Anxiety; Cancer; Diabetes mellitus; Diverticulitis; GERD (gastroesophageal reflux disease); Hyperlipidemia; Hypertension; Lung mass; and Mass of kidney.    PSH:   has a past surgical history that includes Partial nephrectomy; Neck surgery; Cholecystectomy; Tonsillectomy; Colonoscopy; Esophagogastroduodenoscopy; Colonoscopy (N/A, 4/7/2017); Renal biopsy; Hernia repair (Bilateral); and Trigger finger release (Right).    FamHx: family history includes Cancer in his sister; Hypertension in his father and mother; Melanoma in his father.    SocHx:  reports that he quit smoking about 23 years ago. He has a 20.00 pack-year smoking history. He has never used smokeless tobacco. He reports that he does not drink alcohol or use drugs.      Physical Exam:  Vitals:    08/08/18 0713   BP: 124/76   Pulse: 62     General: A&Ox3, no apparent distress, no deformities  Neck: No masses, normal thyroid  Lungs: normal inspiration, no use of accessory muscles  Heart: normal pulse, no arrhythmias  Abdomen: Soft, NT, ND  Skin: The skin is warm and dry. No jaundice.  Ext: No c/c/e.  :   3/17: Test desc lauryn, no abnormalities of epididymus. Penis normal, with normal penile and scrotal skin. Meatus normal.     Labs/Studies:   PSA    1/15: 0.71    Impression/Plan:   1. 3 suspicious lesions on right kidney and 1 on  the left all about the same after getting a little bigger last time.  Records from [facility] report original oncycytoma and his other urologist concurs with observation for the time being.  However they are getting larger.  Will refer for cryoablation of two larger right renal masses with biopsy and then modify plan from there.  2. RTC 4 mo with CT

## 2018-08-08 NOTE — H&P (VIEW-ONLY)
"Chief Complaint: Renal Cell Carcinoma?    HPI:   8/8/18: Reviewed imaging, history, symptoms in detail.  9/28/17: Followup CT shows renal masses stable on 6 month interval.  CT scan shows 2.6/1.9/2.3 right renal masses and a 2.8cm left lower pole renal mass.  All enlarging slowly.  3/22/17: Slight enlargement of bilateral small renal masses in background of path-proven oncocytoma in 2011.  Dr. Roberson completes prostate cancer screening annually.  2/17/16: Ct shows a 5.5 mm left lower lobe chest nodule and a metallic linear object in stomach.  It probably is the wire left behind after some sort of esophageal biopsy and the wire was supposed to pass over a few weeks time.  Br Gen path shows the prior mass to be an oncocytoma.  Our pathologist has requested the slides and will form a second opinion.  2/11/16: 62 yo man referred by Dr. Roberson after finding renal masses on recent workup for various complaints.  No abd/pelvic pain and no exac/rel factors.  No hematuria.  No urolithiasis except he says he passed one 30 years ago.  No urinary bother.  No  history.  Normal sexual function. In 2011 he had left upper pole partial nephrectomy with unknown findings at  General, but no longterm followup.  Now he has a 2.3 cm left lower pole enhancing mass, and "right kidney demonstrates development of at least 3 separate solid mass lesions. One in the upper pole cortex 1.6 cm in size. The other in the anterior right upper renal cortex measuring 1.5 cm in size. The third located in the anterior lower pole approximately 1.1 cm in size."  No chest imaging.  LFT normal.  Neg prostate cancer screening with PCP last ADA 6 months ago.    Allergies:  No known allergies    Medications:  has a current medication list which includes the following prescription(s): amlodipine, aspirin, benazepril, clonazepam, fluticasone-salmeterol, gabapentin, hydrochlorothiazide, hydrocodone-acetaminophen, hydroxyzine pamoate, metoprolol tartrate, " omeprazole, ondansetron, pravastatin, tiotropium, tizanidine, tizanidine, trazodone, vitamin b-12, zolpidem, hydralazine, and triamcinolone acetonide 0.1%.    Review of Systems:  General: No fever, chills, fatigability, or weight loss.  Skin: No rashes, itching, or changes in color or texture of skin.  Chest: Denies UMANA, cyanosis, wheezing, cough, and sputum production.  Abdomen: Appetite fine. No weight loss. Denies diarrhea, abdominal pain, hematemesis, or blood in stool.  Musculoskeletal: Some joint stiffness or swelling. Some back pain.  : As above.  All other review of systems negative.    PMH:   has a past medical history of Anxiety; Cancer; Diabetes mellitus; Diverticulitis; GERD (gastroesophageal reflux disease); Hyperlipidemia; Hypertension; Lung mass; and Mass of kidney.    PSH:   has a past surgical history that includes Partial nephrectomy; Neck surgery; Cholecystectomy; Tonsillectomy; Colonoscopy; Esophagogastroduodenoscopy; Colonoscopy (N/A, 4/7/2017); Renal biopsy; Hernia repair (Bilateral); and Trigger finger release (Right).    FamHx: family history includes Cancer in his sister; Hypertension in his father and mother; Melanoma in his father.    SocHx:  reports that he quit smoking about 23 years ago. He has a 20.00 pack-year smoking history. He has never used smokeless tobacco. He reports that he does not drink alcohol or use drugs.      Physical Exam:  Vitals:    08/08/18 0713   BP: 124/76   Pulse: 62     General: A&Ox3, no apparent distress, no deformities  Neck: No masses, normal thyroid  Lungs: normal inspiration, no use of accessory muscles  Heart: normal pulse, no arrhythmias  Abdomen: Soft, NT, ND  Skin: The skin is warm and dry. No jaundice.  Ext: No c/c/e.  :   3/17: Test desc lauryn, no abnormalities of epididymus. Penis normal, with normal penile and scrotal skin. Meatus normal.     Labs/Studies:   PSA    1/15: 0.71    Impression/Plan:   1. 3 suspicious lesions on right kidney and 1 on  the left all about the same after getting a little bigger last time.  Records from [facility] report original oncycytoma and his other urologist concurs with observation for the time being.  However they are getting larger.  Will refer for cryoablation of two larger right renal masses with biopsy and then modify plan from there.  2. RTC 4 mo with CT

## 2018-08-13 ENCOUNTER — TELEPHONE (OUTPATIENT)
Dept: UROLOGY | Facility: CLINIC | Age: 63
End: 2018-08-13

## 2018-08-13 NOTE — TELEPHONE ENCOUNTER
Spoke with Juliet, states Dr. Martínez is out this week & she will have to have provider review pt's chart then call back with information.

## 2018-08-14 DIAGNOSIS — F51.04 CHRONIC INSOMNIA: ICD-10-CM

## 2018-08-14 RX ORDER — ZOLPIDEM TARTRATE 10 MG/1
TABLET ORAL
Qty: 30 TABLET | Refills: 0 | Status: SHIPPED | OUTPATIENT
Start: 2018-08-14 | End: 2018-09-29 | Stop reason: SDUPTHER

## 2018-08-22 ENCOUNTER — TELEPHONE (OUTPATIENT)
Dept: RADIOLOGY | Facility: HOSPITAL | Age: 63
End: 2018-08-22

## 2018-08-22 ENCOUNTER — ANESTHESIA EVENT (OUTPATIENT)
Dept: SURGERY | Facility: HOSPITAL | Age: 63
End: 2018-08-22
Payer: MEDICARE

## 2018-08-23 ENCOUNTER — ANESTHESIA (OUTPATIENT)
Dept: SURGERY | Facility: HOSPITAL | Age: 63
End: 2018-08-23
Payer: MEDICARE

## 2018-08-23 ENCOUNTER — HOSPITAL ENCOUNTER (OUTPATIENT)
Facility: HOSPITAL | Age: 63
Discharge: HOME | End: 2018-08-23
Attending: RADIOLOGY | Admitting: RADIOLOGY
Payer: MEDICARE

## 2018-08-23 DIAGNOSIS — N28.89 RENAL MASS: ICD-10-CM

## 2018-08-23 PROCEDURE — 25000003 PHARM REV CODE 250: Performed by: NURSE ANESTHETIST, CERTIFIED REGISTERED

## 2018-08-23 PROCEDURE — 37000009 HC ANESTHESIA EA ADD 15 MINS: Performed by: RADIOLOGY

## 2018-08-23 PROCEDURE — 63600175 PHARM REV CODE 636 W HCPCS: Performed by: RADIOLOGY

## 2018-08-23 PROCEDURE — 88313 SPECIAL STAINS GROUP 2: CPT | Performed by: PATHOLOGY

## 2018-08-23 PROCEDURE — 25000003 PHARM REV CODE 250

## 2018-08-23 PROCEDURE — 88313 SPECIAL STAINS GROUP 2: CPT | Mod: 26,,, | Performed by: PATHOLOGY

## 2018-08-23 PROCEDURE — 50200 RENAL BIOPSY PERQ: CPT

## 2018-08-23 PROCEDURE — 88342 IMHCHEM/IMCYTCHM 1ST ANTB: CPT | Performed by: PATHOLOGY

## 2018-08-23 PROCEDURE — 88342 IMHCHEM/IMCYTCHM 1ST ANTB: CPT | Mod: 26,,, | Performed by: PATHOLOGY

## 2018-08-23 PROCEDURE — 63600175 PHARM REV CODE 636 W HCPCS: Performed by: NURSE ANESTHETIST, CERTIFIED REGISTERED

## 2018-08-23 PROCEDURE — 37000008 HC ANESTHESIA 1ST 15 MINUTES: Performed by: RADIOLOGY

## 2018-08-23 PROCEDURE — 50593 PERC CRYO ABLATE RENAL TUM: CPT

## 2018-08-23 PROCEDURE — 88305 TISSUE EXAM BY PATHOLOGIST: CPT | Mod: 26,,, | Performed by: PATHOLOGY

## 2018-08-23 RX ORDER — PHENYLEPHRINE HYDROCHLORIDE 10 MG/ML
INJECTION INTRAVENOUS
Status: DISCONTINUED | OUTPATIENT
Start: 2018-08-23 | End: 2018-08-23 | Stop reason: HOSPADM

## 2018-08-23 RX ORDER — CIPROFLOXACIN 2 MG/ML
400 INJECTION, SOLUTION INTRAVENOUS ONCE
Status: COMPLETED | OUTPATIENT
Start: 2018-08-23 | End: 2018-08-23

## 2018-08-23 RX ORDER — MIDAZOLAM HYDROCHLORIDE 1 MG/ML
INJECTION, SOLUTION INTRAMUSCULAR; INTRAVENOUS
Status: DISCONTINUED | OUTPATIENT
Start: 2018-08-23 | End: 2018-08-23 | Stop reason: HOSPADM

## 2018-08-23 RX ORDER — LIDOCAINE HYDROCHLORIDE 10 MG/ML
INJECTION INFILTRATION; PERINEURAL
Status: DISCONTINUED | OUTPATIENT
Start: 2018-08-23 | End: 2018-08-23 | Stop reason: HOSPADM

## 2018-08-23 RX ORDER — LIDOCAINE HYDROCHLORIDE 10 MG/ML
1 INJECTION, SOLUTION EPIDURAL; INFILTRATION; INTRACAUDAL; PERINEURAL ONCE
Status: DISCONTINUED | OUTPATIENT
Start: 2018-08-23 | End: 2018-08-23 | Stop reason: HOSPADM

## 2018-08-23 RX ORDER — MORPHINE SULFATE 10 MG/ML
2 INJECTION INTRAMUSCULAR; INTRAVENOUS; SUBCUTANEOUS EVERY 5 MIN PRN
Status: DISCONTINUED | OUTPATIENT
Start: 2018-08-23 | End: 2018-08-23 | Stop reason: HOSPADM

## 2018-08-23 RX ORDER — SUCCINYLCHOLINE CHLORIDE 20 MG/ML
INJECTION INTRAMUSCULAR; INTRAVENOUS
Status: DISCONTINUED | OUTPATIENT
Start: 2018-08-23 | End: 2018-08-23 | Stop reason: HOSPADM

## 2018-08-23 RX ORDER — GLYCOPYRROLATE 0.2 MG/ML
INJECTION INTRAMUSCULAR; INTRAVENOUS
Status: DISCONTINUED | OUTPATIENT
Start: 2018-08-23 | End: 2018-08-23 | Stop reason: HOSPADM

## 2018-08-23 RX ORDER — ROCURONIUM BROMIDE 10 MG/ML
INJECTION, SOLUTION INTRAVENOUS
Status: DISCONTINUED | OUTPATIENT
Start: 2018-08-23 | End: 2018-08-23 | Stop reason: HOSPADM

## 2018-08-23 RX ORDER — EPHEDRINE SULFATE 50 MG/ML
INJECTION, SOLUTION INTRAVENOUS
Status: DISCONTINUED | OUTPATIENT
Start: 2018-08-23 | End: 2018-08-23 | Stop reason: HOSPADM

## 2018-08-23 RX ORDER — PROPOFOL 10 MG/ML
VIAL (ML) INTRAVENOUS
Status: DISCONTINUED | OUTPATIENT
Start: 2018-08-23 | End: 2018-08-23 | Stop reason: HOSPADM

## 2018-08-23 RX ORDER — ONDANSETRON 2 MG/ML
INJECTION INTRAMUSCULAR; INTRAVENOUS
Status: DISCONTINUED | OUTPATIENT
Start: 2018-08-23 | End: 2018-08-23 | Stop reason: HOSPADM

## 2018-08-23 RX ORDER — SODIUM CHLORIDE 9 MG/ML
INJECTION, SOLUTION INTRAVENOUS CONTINUOUS PRN
Status: DISCONTINUED | OUTPATIENT
Start: 2018-08-23 | End: 2018-08-23 | Stop reason: HOSPADM

## 2018-08-23 RX ORDER — FENTANYL CITRATE 50 UG/ML
INJECTION, SOLUTION INTRAMUSCULAR; INTRAVENOUS
Status: DISCONTINUED | OUTPATIENT
Start: 2018-08-23 | End: 2018-08-23 | Stop reason: HOSPADM

## 2018-08-23 RX ADMIN — LIDOCAINE HYDROCHLORIDE 100 MG: 10 INJECTION, SOLUTION INFILTRATION; PERINEURAL at 10:08

## 2018-08-23 RX ADMIN — ONDANSETRON 4 MG: 2 INJECTION, SOLUTION INTRAMUSCULAR; INTRAVENOUS at 10:08

## 2018-08-23 RX ADMIN — SODIUM CHLORIDE: 900 INJECTION, SOLUTION INTRAVENOUS at 10:08

## 2018-08-23 RX ADMIN — MIDAZOLAM 2 MG: 1 INJECTION INTRAMUSCULAR; INTRAVENOUS at 10:08

## 2018-08-23 RX ADMIN — PHENYLEPHRINE HYDROCHLORIDE 300 MCG: 10 INJECTION INTRAVENOUS at 10:08

## 2018-08-23 RX ADMIN — ROBINUL 0.4 MG: 0.2 INJECTION INTRAMUSCULAR; INTRAVENOUS at 10:08

## 2018-08-23 RX ADMIN — CIPROFLOXACIN 400 MG: 2 INJECTION, SOLUTION INTRAVENOUS at 10:08

## 2018-08-23 RX ADMIN — PROPOFOL 150 MG: 10 INJECTION, EMULSION INTRAVENOUS at 10:08

## 2018-08-23 RX ADMIN — FENTANYL CITRATE 125 MCG: 50 INJECTION, SOLUTION INTRAMUSCULAR; INTRAVENOUS at 10:08

## 2018-08-23 RX ADMIN — EPHEDRINE SULFATE 25 MG: 50 INJECTION, SOLUTION INTRAMUSCULAR; INTRAVENOUS; SUBCUTANEOUS at 10:08

## 2018-08-23 RX ADMIN — ROCURONIUM BROMIDE 5 MG: 10 INJECTION, SOLUTION INTRAVENOUS at 10:08

## 2018-08-23 RX ADMIN — SUCCINYLCHOLINE CHLORIDE 160 MG: 20 INJECTION, SOLUTION INTRAMUSCULAR; INTRAVENOUS at 10:08

## 2018-08-23 NOTE — DISCHARGE INSTRUCTIONS
General Information:    1. Do not drink alcoholic beverages including beer for 24 hours or as long as you are on pain medication..  2. Do not drive a motor vehicle, operate machinery or power tools, or signs legal papers for 24 hours or as long as you are on pain medication.   3. You may experience light-headedness, dizziness, and sleepiness following surgery. Please do not stay alone. A responsible adult should be with you for this 24 hour period.  4. Go home and rest.  5. Progress slowly to a normal diet unless instructed.  Otherwise, begin with liquids such as soft drinks, then soup and crackers working up to solid foods. Drink plenty of nonalcoholic fluids.  6. Certain anesthetics and pain medications produce nausea and vomiting in certain individuals. If nausea becomes a problem at home, call you doctor.  7. A nurse will be calling you sometime after surgery. Do not be alarmed. This is our way of finding out how you are doing.  8. Several times every hour while you are awake, take 2-3 deep breaths and cough. If you had stomach surgery hold a pillow or rolled towel firmly against your stomach before you cough. This will help with any pain the cough might cause.  9. Several times every hour while you are awake, pump and flex your feet 5-6 times and do foot circles. This will help prevent blood clots.  10. Call your doctor for severe pain, bleeding, fever, or signs or symptoms of infection (pain, swelling, redness, foul odor, drainage).  11. You can contact your doctor anytime by [phone] for the [facility] (at [facility]) or [phone] for the [facility] on [facility].   [email or web address] is another way to contact your doctor if you are an active participant online with [facility].    Watch for signs and systems of infections and bleeding

## 2018-08-23 NOTE — TRANSFER OF CARE
"Anesthesia Transfer of Care Note    Patient: Mil Sood    Procedure(s) Performed: Procedure(s) (LRB):  ABLATION-CRYO in CT (N/A)    Patient location: PACU    Anesthesia Type: general    Transport from OR: Transported from OR on room air with adequate spontaneous ventilation    Post pain: adequate analgesia    Post assessment: no apparent anesthetic complications    Post vital signs: stable    Level of consciousness: awake    Nausea/Vomiting: no nausea/vomiting    Complications: none    Transfer of care protocol was followed      Last vitals:   Visit Vitals  /67   Pulse 64   Temp 36.8 °C (98.2 °F) (Oral)   Resp 18   Ht 5' 10.5" (1.791 m)   Wt 90.7 kg (200 lb)   SpO2 100%   BMI 28.29 kg/m²     "

## 2018-08-23 NOTE — PLAN OF CARE
Pt resting on bed, denies pain at present. Respirations even and unlabored on room air and tolerating well. VSS. Will cont to monitor. See flow sheet for detailed assessment.

## 2018-08-23 NOTE — INTERVAL H&P NOTE
Most recent H&P has been reviewed and updated by radiology  Risks and benefits were explained to the patient prior to the procedure.  Written and informed consent was obtained.        There are no hospital problems to display for this patient.

## 2018-08-23 NOTE — ANESTHESIA POSTPROCEDURE EVALUATION
"Anesthesia Post Evaluation    Patient: Mil Sood    Procedure(s) Performed: Procedure(s) (LRB):  ABLATION-CRYO in CT (N/A)    Final Anesthesia Type: general  Patient location during evaluation: PACU  Patient participation: Yes- Able to Participate  Level of consciousness: awake and alert  Post-procedure vital signs: reviewed and stable  Pain management: adequate  Airway patency: patent  PONV status at discharge: No PONV  Anesthetic complications: no      Cardiovascular status: blood pressure returned to baseline  Respiratory status: unassisted  Hydration status: euvolemic  Follow-up not needed.        Visit Vitals  /70   Pulse (!) 50   Temp 36.6 °C (97.8 °F) (Oral)   Resp 18   Ht 5' 10.5" (1.791 m)   Wt 90.7 kg (200 lb)   SpO2 (!) 94%   BMI 28.29 kg/m²       Pain/Doug Score: Pain Assessment Performed: Yes (8/23/2018  1:30 PM)  Presence of Pain: denies (8/23/2018  1:30 PM)  Doug Score: 9 (8/23/2018  1:30 PM)        "

## 2018-08-23 NOTE — ANESTHESIA PREPROCEDURE EVALUATION
08/23/2018  Mil Sood is a 63 y.o., male.    Anesthesia Evaluation    I have reviewed the Patient Summary Reports.    I have reviewed the Nursing Notes.   I have reviewed the Medications.     Review of Systems  Anesthesia Hx:  No problems with previous Anesthesia Denies Hx of Anesthetic complications  Denies Family Hx of Anesthesia complications.   Denies Personal Hx of Anesthesia complications.   Social:  Former Smoker    Cardiovascular:   Exercise tolerance: good Hypertension ECG has been reviewed.    Pulmonary:   COPD    Hepatic/GI:   GERD    Musculoskeletal:   Arthritis     Psych:   anxiety          Physical Exam  General:  Well nourished    Airway/Jaw/Neck:  Airway Findings: Mouth Opening: Normal Tongue: Normal  General Airway Assessment: Adult  Mallampati: II  TM Distance: Normal, at least 6 cm  Jaw/Neck Findings:  Neck ROM: Normal ROM      Dental:  Dental Findings: Upper Dentures   Chest/Lungs:  Chest/Lungs Findings: Clear to auscultation, Normal Respiratory Rate     Heart/Vascular:  Heart Findings: Rate: Normal  Rhythm: Regular Rhythm        Mental Status:  Mental Status Findings:  Alert and Oriented         Anesthesia Plan  Type of Anesthesia, risks & benefits discussed:  Anesthesia Type:  general  Patient's Preference:   Intra-op Monitoring Plan:   Intra-op Monitoring Plan Comments:   Post Op Pain Control Plan:   Post Op Pain Control Plan Comments:   Induction:   IV  Beta Blocker:  Patient is not currently on a Beta-Blocker (No further documentation required).       Informed Consent: Patient understands risks and agrees with Anesthesia plan.  Questions answered. Anesthesia consent signed with patient.  ASA Score: 2     Day of Surgery Review of History & Physical: I have interviewed and examined the patient. I have reviewed the patient's H&P dated:  There are no significant changes.           Ready For Surgery From Anesthesia Perspective.

## 2018-08-24 NOTE — DISCHARGE SUMMARY
Radiology Discharge Summary      Hospital Course: No complications    Admit Date: 8/23/2018  Discharge Date: 8/23/18    Instructions Given to Patient: Yes  Diet: regular diet and Resume prior diet  Activity: activity as tolerated and no driving for today    Description of Condition on Discharge: Stable  Vital Signs (Most Recent): Temp: 98 °F (36.7 °C) (08/23/18 1545)  Pulse: (!) 57 (08/23/18 1545)  Resp: 16 (08/23/18 1545)  BP: 124/70 (08/23/18 1545)  SpO2: 96 % (08/23/18 1545)    Discharge Disposition: Home    Discharge Diagnosis: rcc     Follow-up: with dr rojas with ct urogram in 1-3 months.     Kevin Geiger MD  Staff Radiologist  Department of Radiology  Pager: [phone]

## 2018-08-27 VITALS
HEART RATE: 57 BPM | RESPIRATION RATE: 16 BRPM | WEIGHT: 200 LBS | TEMPERATURE: 98 F | HEIGHT: 71 IN | BODY MASS INDEX: 28 KG/M2 | SYSTOLIC BLOOD PRESSURE: 124 MMHG | DIASTOLIC BLOOD PRESSURE: 70 MMHG | OXYGEN SATURATION: 96 %

## 2018-09-04 RX ORDER — GABAPENTIN 300 MG/1
CAPSULE ORAL
Qty: 270 CAPSULE | Refills: 0 | Status: SHIPPED | OUTPATIENT
Start: 2018-09-04 | End: 2018-10-19 | Stop reason: SDUPTHER

## 2018-09-08 RX ORDER — OMEPRAZOLE 40 MG/1
CAPSULE, DELAYED RELEASE ORAL
Qty: 60 CAPSULE | Refills: 0 | Status: SHIPPED | OUTPATIENT
Start: 2018-09-08 | End: 2018-11-05 | Stop reason: SDUPTHER

## 2018-09-29 DIAGNOSIS — F51.04 CHRONIC INSOMNIA: ICD-10-CM

## 2018-09-30 RX ORDER — ZOLPIDEM TARTRATE 10 MG/1
TABLET ORAL
Qty: 30 TABLET | Refills: 0 | Status: SHIPPED | OUTPATIENT
Start: 2018-09-30 | End: 2018-11-01 | Stop reason: SDUPTHER

## 2018-10-02 RX ORDER — TIZANIDINE 2 MG/1
TABLET ORAL
Qty: 90 TABLET | Refills: 0 | Status: SHIPPED | OUTPATIENT
Start: 2018-10-02 | End: 2018-11-01 | Stop reason: SDUPTHER

## 2018-10-09 DIAGNOSIS — F51.04 CHRONIC INSOMNIA: ICD-10-CM

## 2018-10-09 RX ORDER — ZOLPIDEM TARTRATE 10 MG/1
TABLET ORAL
Qty: 30 TABLET | Refills: 0 | OUTPATIENT
Start: 2018-10-09

## 2018-10-19 RX ORDER — GABAPENTIN 300 MG/1
CAPSULE ORAL
Qty: 270 CAPSULE | Refills: 0 | Status: SHIPPED | OUTPATIENT
Start: 2018-10-19 | End: 2019-01-22 | Stop reason: SDUPTHER

## 2018-11-01 ENCOUNTER — LAB VISIT (OUTPATIENT)
Dept: LAB | Facility: HOSPITAL | Age: 63
End: 2018-11-01
Attending: FAMILY MEDICINE
Payer: MEDICARE

## 2018-11-01 ENCOUNTER — OFFICE VISIT (OUTPATIENT)
Dept: FAMILY MEDICINE | Facility: CLINIC | Age: 63
End: 2018-11-01
Payer: MEDICARE

## 2018-11-01 VITALS
WEIGHT: 203.5 LBS | HEART RATE: 56 BPM | DIASTOLIC BLOOD PRESSURE: 88 MMHG | HEIGHT: 70 IN | TEMPERATURE: 97 F | SYSTOLIC BLOOD PRESSURE: 138 MMHG | BODY MASS INDEX: 29.13 KG/M2 | OXYGEN SATURATION: 98 %

## 2018-11-01 DIAGNOSIS — F51.04 CHRONIC INSOMNIA: ICD-10-CM

## 2018-11-01 DIAGNOSIS — G62.89 OTHER POLYNEUROPATHY: ICD-10-CM

## 2018-11-01 DIAGNOSIS — D30.00 RENAL ONCOCYTOMA, UNSPECIFIED LATERALITY: ICD-10-CM

## 2018-11-01 DIAGNOSIS — R20.2 NUMBNESS AND TINGLING OF FOOT: Primary | ICD-10-CM

## 2018-11-01 DIAGNOSIS — R20.0 NUMBNESS AND TINGLING OF FOOT: Primary | ICD-10-CM

## 2018-11-01 DIAGNOSIS — I10 ESSENTIAL HYPERTENSION: ICD-10-CM

## 2018-11-01 LAB
ALBUMIN SERPL BCP-MCNC: 4.1 G/DL
ALP SERPL-CCNC: 51 U/L
ALT SERPL W/O P-5'-P-CCNC: 35 U/L
ANION GAP SERPL CALC-SCNC: 9 MMOL/L
AST SERPL-CCNC: 38 U/L
BILIRUB SERPL-MCNC: 0.5 MG/DL
BUN SERPL-MCNC: 17 MG/DL
CALCIUM SERPL-MCNC: 10 MG/DL
CHLORIDE SERPL-SCNC: 102 MMOL/L
CO2 SERPL-SCNC: 26 MMOL/L
CREAT SERPL-MCNC: 1 MG/DL
EST. GFR  (AFRICAN AMERICAN): >60 ML/MIN/1.73 M^2
EST. GFR  (NON AFRICAN AMERICAN): >60 ML/MIN/1.73 M^2
GLUCOSE SERPL-MCNC: 99 MG/DL
POTASSIUM SERPL-SCNC: 4.8 MMOL/L
PROT SERPL-MCNC: 7.7 G/DL
SODIUM SERPL-SCNC: 137 MMOL/L
TSH SERPL DL<=0.005 MIU/L-ACNC: 1.42 UIU/ML
VIT B12 SERPL-MCNC: 368 PG/ML

## 2018-11-01 PROCEDURE — 3008F BODY MASS INDEX DOCD: CPT | Mod: CPTII,HCNC,S$GLB, | Performed by: FAMILY MEDICINE

## 2018-11-01 PROCEDURE — 99999 PR PBB SHADOW E&M-EST. PATIENT-LVL III: CPT | Mod: PBBFAC,HCNC,, | Performed by: FAMILY MEDICINE

## 2018-11-01 PROCEDURE — 80053 COMPREHEN METABOLIC PANEL: CPT | Mod: HCNC

## 2018-11-01 PROCEDURE — 3079F DIAST BP 80-89 MM HG: CPT | Mod: CPTII,HCNC,S$GLB, | Performed by: FAMILY MEDICINE

## 2018-11-01 PROCEDURE — 82607 VITAMIN B-12: CPT | Mod: HCNC

## 2018-11-01 PROCEDURE — 3075F SYST BP GE 130 - 139MM HG: CPT | Mod: CPTII,HCNC,S$GLB, | Performed by: FAMILY MEDICINE

## 2018-11-01 PROCEDURE — 84165 PROTEIN E-PHORESIS SERUM: CPT | Mod: 26,HCNC,, | Performed by: PATHOLOGY

## 2018-11-01 PROCEDURE — 36415 COLL VENOUS BLD VENIPUNCTURE: CPT | Mod: HCNC,PO

## 2018-11-01 PROCEDURE — 99213 OFFICE O/P EST LOW 20 MIN: CPT | Mod: PBBFAC,HCNC,PO | Performed by: FAMILY MEDICINE

## 2018-11-01 PROCEDURE — 84443 ASSAY THYROID STIM HORMONE: CPT | Mod: HCNC

## 2018-11-01 PROCEDURE — 99214 OFFICE O/P EST MOD 30 MIN: CPT | Mod: HCNC,S$GLB,, | Performed by: FAMILY MEDICINE

## 2018-11-01 PROCEDURE — 82300 ASSAY OF CADMIUM: CPT | Mod: HCNC

## 2018-11-01 PROCEDURE — 84165 PROTEIN E-PHORESIS SERUM: CPT | Mod: HCNC

## 2018-11-01 RX ORDER — ROPINIROLE 1 MG/1
1 TABLET, FILM COATED ORAL NIGHTLY
Qty: 30 TABLET | Refills: 11 | Status: SHIPPED | OUTPATIENT
Start: 2018-11-01 | End: 2019-09-04

## 2018-11-01 RX ORDER — ZOLPIDEM TARTRATE 10 MG/1
10 TABLET ORAL NIGHTLY PRN
Qty: 30 TABLET | Refills: 0 | Status: SHIPPED | OUTPATIENT
Start: 2018-11-01 | End: 2018-11-29 | Stop reason: SDUPTHER

## 2018-11-01 RX ORDER — TIZANIDINE 2 MG/1
2 TABLET ORAL DAILY
Qty: 90 TABLET | Refills: 0 | Status: SHIPPED | OUTPATIENT
Start: 2018-11-01 | End: 2019-02-13 | Stop reason: DRUGHIGH

## 2018-11-01 RX ORDER — CLONAZEPAM 2 MG/1
2 TABLET ORAL NIGHTLY
Qty: 21 TABLET | Refills: 0 | Status: SHIPPED | OUTPATIENT
Start: 2018-11-01 | End: 2019-02-13 | Stop reason: DRUGHIGH

## 2018-11-01 NOTE — PROGRESS NOTES
Chief Complaint:    Chief Complaint   Patient presents with    Bilateral feet burning       History of Present Illness:    He is here for 6 month follow-up:  He had a biopsy of his renal lesion which turned out to be benign he had cryo therapy destruction done he is doing okay.  He complains of some numbing sensation bilateral feet mostly in the for feet, no history of alcohol or any type of chemotherapy.  Home blood pressure readings are okay    Also complains of leg cramps mostly at nighttime.    ROS:  Review of Systems   Constitutional: Negative for activity change, chills, fatigue, fever and unexpected weight change.   HENT: Negative for congestion, ear discharge, ear pain, hearing loss, postnasal drip and rhinorrhea.    Eyes: Negative for pain and visual disturbance.   Respiratory: Negative for cough, chest tightness and shortness of breath.    Cardiovascular: Negative for chest pain and palpitations.   Gastrointestinal: Negative for abdominal pain, diarrhea and vomiting.   Endocrine: Negative for heat intolerance.   Genitourinary: Negative for dysuria, flank pain, frequency and hematuria.   Musculoskeletal: Negative for back pain, gait problem and neck pain.   Skin: Negative for color change and rash.   Neurological: Negative for dizziness, tremors, seizures, numbness and headaches.   Psychiatric/Behavioral: Negative for agitation, hallucinations, self-injury, sleep disturbance and suicidal ideas. The patient is not nervous/anxious.        Past Medical History:   Diagnosis Date    Anxiety     Cancer     spot on kidney and lung    Diabetes mellitus     Borderline    Diverticulitis     GERD (gastroesophageal reflux disease)     Hyperlipidemia     not on any meds currently    Hypertension     Lung mass     Mass of kidney        Social History:  Social History     Socioeconomic History    Marital status:      Spouse name: None    Number of children: None    Years of education: None     "Highest education level: None   Social Needs    Financial resource strain: None    Food insecurity - worry: None    Food insecurity - inability: None    Transportation needs - medical: None    Transportation needs - non-medical: None   Occupational History    None   Tobacco Use    Smoking status: Former Smoker     Packs/day: 1.00     Years: 20.00     Pack years: 20.00     Last attempt to quit: 1995     Years since quittin.7    Smokeless tobacco: Never Used   Substance and Sexual Activity    Alcohol use: No     Alcohol/week: 0.0 oz    Drug use: No    Sexual activity: Yes     Partners: Female     Birth control/protection: None   Other Topics Concern    None   Social History Narrative    None       Family History:   family history includes Cancer in his sister; Hypertension in his father and mother; Melanoma in his father.    Health Maintenance   Topic Date Due    Zoster Vaccine  2015    Influenza Vaccine  2018    Lipid Panel  2019    Colonoscopy  2020    TETANUS VACCINE  2028    Hepatitis C Screening  Completed       Physical Exam:    Vital Signs  Temp: 97 °F (36.1 °C)  Temp src: Tympanic  Pulse: (!) 56  SpO2: 98 %  BP: 138/88  BP Location: Left arm  Patient Position: Sitting  Pain Score: 0-No pain  Height and Weight  Height: 5' 10" (177.8 cm)  Weight: 92.3 kg (203 lb 7.8 oz)  BSA (Calculated - sq m): 2.13 sq meters  BMI (Calculated): 29.3  Weight in (lb) to have BMI = 25: 173.9]    Body mass index is 29.2 kg/m².    Physical Exam   Constitutional: He is oriented to person, place, and time. He appears well-developed.   HENT:   Mouth/Throat: Oropharynx is clear and moist.   Eyes: Conjunctivae are normal. Pupils are equal, round, and reactive to light.   Neck: Normal range of motion. Neck supple.   Cardiovascular: Normal rate, regular rhythm and normal heart sounds.   No murmur heard.  Pulses:       Dorsalis pedis pulses are 1+ on the right side, and 1+ on the " left side.        Posterior tibial pulses are 1+ on the right side, and 1+ on the left side.   Pulmonary/Chest: Effort normal and breath sounds normal. No respiratory distress. He has no wheezes. He has no rales. He exhibits no tenderness.   Abdominal: Soft. He exhibits no distension and no mass. There is no tenderness. There is no guarding.   Musculoskeletal: He exhibits no edema or tenderness.   Feet:   Right Foot:   Protective Sensation: 10 sites tested. 6 sites sensed.   Left Foot:   Protective Sensation: 10 sites tested. 6 sites sensed.   Lymphadenopathy:     He has no cervical adenopathy.   Neurological: He is alert and oriented to person, place, and time. He has normal reflexes.   Skin: Skin is warm and dry.   Psychiatric: He has a normal mood and affect. His behavior is normal. Judgment and thought content normal.         Assessment:      ICD-10-CM ICD-9-CM   1. Numbness and tingling of foot R20.0 782.0    R20.2    2. Chronic insomnia F51.04 780.52   3. Essential hypertension I10 401.9   4. Renal oncocytoma, unspecified laterality D30.00 223.0   5. Other polyneuropathy G62.89 357.89         Plan:    Will do nerve conduction study and labs as below  Continue Neurontin  Will prescribe be quit for leg cramps  Other medical problems as above stable  Follow-up in 1 month      Orders Placed This Encounter   Procedures    Vitamin B12    TSH    Comprehensive metabolic panel    HEAVY METALS SCREEN, BLOOD (QUANTITATIVE)    Protein electrophoresis, serum    Nerve conduction test       Current Outpatient Medications   Medication Sig Dispense Refill    amLODIPine (NORVASC) 10 MG tablet TAKE 1 TABLET BY MOUTH ONCE DAILY. 90 tablet 0    aspirin 81 mg Tab Take 1 tablet by mouth Daily.      benazepril (LOTENSIN) 20 MG tablet TAKE 2 TABLETS BY MOUTH ONCE A DAY. 180 tablet 0    clonazePAM (KLONOPIN) 2 MG Tab Take 1 tablet (2 mg total) by mouth every evening. 21 tablet 0    fluticasone-salmeterol (ADVAIR HFA) 115-21  mcg/actuation HFAA Inhale 2 puffs into the lungs 2 (two) times daily. 12 g 12    gabapentin (NEURONTIN) 300 MG capsule TAKE 2 CAPSULES BY MOUTH THREE TIMES DAILY. 270 capsule 0    hydroCHLOROthiazide (MICROZIDE) 12.5 mg capsule TAKE 1 CAPSULE BY MOUTH ONCE DAILY. 90 capsule 0    hydrocodone-acetaminophen 10-325mg (NORCO)  mg Tab Take 1 or 2 tablets every 4-8 hrs as needed for pain. 40 tablet 0    hydrOXYzine pamoate (VISTARIL) 50 MG Cap Take 50 mg by mouth as needed.       metoprolol tartrate (LOPRESSOR) 50 MG tablet TAKE 1 TABLET BY MOUTH TWICE A DAY. 180 tablet 0    omeprazole (PRILOSEC) 40 MG capsule TAKE 1 CAPSULE BY MOUTH ONCE DAILY. 60 capsule 0    ondansetron (ZOFRAN) 8 MG tablet Take 1 tablet (8 mg total) by mouth every 8 (eight) hours as needed for Nausea. 20 tablet 1    tiotropium (SPIRIVA) 18 mcg inhalation capsule Inhale 1 capsule (18 mcg total) into the lungs once daily. Controller 30 capsule 2    tiZANidine (ZANAFLEX) 2 MG tablet Take 1 tablet (2 mg total) by mouth once daily. 90 tablet 0    traZODone (DESYREL) 100 MG tablet Take 1 tablet (100 mg total) by mouth every evening. 30 tablet 5    VITAMIN B-12 1000 MCG tablet       zolpidem (AMBIEN) 10 mg Tab Take 1 tablet (10 mg total) by mouth nightly as needed. 30 tablet 0    hydrALAZINE (APRESOLINE) 25 MG tablet Take 1 tablet (25 mg total) by mouth 3 (three) times daily. (Patient taking differently: Take 25 mg by mouth every 12 (twelve) hours. ) 90 tablet 11    rOPINIRole (REQUIP) 1 MG tablet Take 1 tablet (1 mg total) by mouth every evening. 30 tablet 11    triamcinolone acetonide 0.1% (KENALOG) 0.1 % cream Apply topically 2 (two) times daily. 45 g 1     No current facility-administered medications for this visit.        Medications Discontinued During This Encounter   Medication Reason    pravastatin (PRAVACHOL) 20 MG tablet Patient no longer taking    tiZANidine (ZANAFLEX) 2 MG tablet Duplicate Order    zolpidem (AMBIEN) 10 mg  Tab Reorder    clonazepam (KLONOPIN) 2 MG Tab Reorder    tiZANidine (ZANAFLEX) 2 MG tablet Reorder       Follow-up in about 1 month (around 12/1/2018).      Yumi Roberson MD

## 2018-11-02 LAB
ALBUMIN SERPL ELPH-MCNC: 4.4 G/DL
ALPHA1 GLOB SERPL ELPH-MCNC: 0.29 G/DL
ALPHA2 GLOB SERPL ELPH-MCNC: 0.98 G/DL
B-GLOBULIN SERPL ELPH-MCNC: 0.83 G/DL
GAMMA GLOB SERPL ELPH-MCNC: 1.01 G/DL
PATHOLOGIST INTERPRETATION SPE: NORMAL
PROT SERPL-MCNC: 7.5 G/DL

## 2018-11-03 LAB
ARSENIC BLD-MCNC: <1 NG/ML (ref 0–12)
CADMIUM BLD-MCNC: 0.7 NG/ML (ref 0–4.9)
CITY: NORMAL
COUNTY: NORMAL
GUARDIAN FIRST NAME: NORMAL
GUARDIAN LAST NAME: NORMAL
HOME PHONE: NORMAL
LEAD BLD-MCNC: <1 MCG/DL (ref 0–4.9)
MERCURY BLD-MCNC: <1 NG/ML (ref 0–9)
RACE: NORMAL
STATE: NORMAL
STREET ADDRESS: NORMAL
VENOUS/CAPILLARY: NORMAL
ZIP: NORMAL

## 2018-11-05 RX ORDER — OMEPRAZOLE 40 MG/1
CAPSULE, DELAYED RELEASE ORAL
Qty: 60 CAPSULE | Refills: 0 | Status: SHIPPED | OUTPATIENT
Start: 2018-11-05 | End: 2019-02-04 | Stop reason: SDUPTHER

## 2018-11-29 RX ORDER — ZOLPIDEM TARTRATE 10 MG/1
TABLET ORAL
Qty: 30 TABLET | Refills: 5 | Status: SHIPPED | OUTPATIENT
Start: 2018-11-29 | End: 2019-06-17 | Stop reason: SDUPTHER

## 2018-12-03 ENCOUNTER — HOSPITAL ENCOUNTER (OUTPATIENT)
Dept: RADIOLOGY | Facility: HOSPITAL | Age: 63
Discharge: HOME | End: 2018-12-03
Attending: FAMILY MEDICINE
Payer: MEDICARE

## 2018-12-03 ENCOUNTER — OFFICE VISIT (OUTPATIENT)
Dept: FAMILY MEDICINE | Facility: CLINIC | Age: 63
End: 2018-12-03
Payer: MEDICARE

## 2018-12-03 VITALS
WEIGHT: 207.25 LBS | OXYGEN SATURATION: 98 % | BODY MASS INDEX: 29.67 KG/M2 | HEART RATE: 70 BPM | DIASTOLIC BLOOD PRESSURE: 80 MMHG | SYSTOLIC BLOOD PRESSURE: 132 MMHG | HEIGHT: 70 IN

## 2018-12-03 DIAGNOSIS — M54.41 CHRONIC RIGHT-SIDED LOW BACK PAIN WITH RIGHT-SIDED SCIATICA: Primary | ICD-10-CM

## 2018-12-03 DIAGNOSIS — G89.29 CHRONIC RIGHT-SIDED LOW BACK PAIN WITH RIGHT-SIDED SCIATICA: ICD-10-CM

## 2018-12-03 DIAGNOSIS — M54.50 LOW BACK PAIN, NON-SPECIFIC: ICD-10-CM

## 2018-12-03 DIAGNOSIS — M54.41 CHRONIC RIGHT-SIDED LOW BACK PAIN WITH RIGHT-SIDED SCIATICA: ICD-10-CM

## 2018-12-03 DIAGNOSIS — G89.29 CHRONIC RIGHT-SIDED LOW BACK PAIN WITH RIGHT-SIDED SCIATICA: Primary | ICD-10-CM

## 2018-12-03 PROCEDURE — 3079F DIAST BP 80-89 MM HG: CPT | Mod: CPTII,HCNC,S$GLB, | Performed by: FAMILY MEDICINE

## 2018-12-03 PROCEDURE — 3075F SYST BP GE 130 - 139MM HG: CPT | Mod: CPTII,HCNC,S$GLB, | Performed by: FAMILY MEDICINE

## 2018-12-03 PROCEDURE — 3008F BODY MASS INDEX DOCD: CPT | Mod: CPTII,HCNC,S$GLB, | Performed by: FAMILY MEDICINE

## 2018-12-03 PROCEDURE — 72100 X-RAY EXAM L-S SPINE 2/3 VWS: CPT | Mod: 26,HCNC,, | Performed by: RADIOLOGY

## 2018-12-03 PROCEDURE — 72100 X-RAY EXAM L-S SPINE 2/3 VWS: CPT | Mod: TC,FY,HCNC,PO

## 2018-12-03 PROCEDURE — 99999 PR PBB SHADOW E&M-EST. PATIENT-LVL V: CPT | Mod: PBBFAC,HCNC,, | Performed by: FAMILY MEDICINE

## 2018-12-03 PROCEDURE — 99214 OFFICE O/P EST MOD 30 MIN: CPT | Mod: HCNC,S$GLB,, | Performed by: FAMILY MEDICINE

## 2018-12-03 RX ORDER — CITALOPRAM 20 MG/1
20 TABLET, FILM COATED ORAL DAILY
COMMUNITY
Start: 2018-11-20 | End: 2019-06-19

## 2018-12-03 RX ORDER — TIZANIDINE 4 MG/1
4 TABLET ORAL EVERY 8 HOURS
Qty: 30 TABLET | Refills: 2 | Status: SHIPPED | OUTPATIENT
Start: 2018-12-03 | End: 2018-12-26 | Stop reason: SDUPTHER

## 2018-12-03 NOTE — PROGRESS NOTES
Chief Complaint:    Chief Complaint   Patient presents with    Follow-up     1 month f/u     Flank Pain     right flank pain, he thinks it may be hsi kidney, no urinary problems , increases with lifting or carrying        History of Present Illness:  Presents today says been having some back pain/flank pain for last few months sometimes is radiates down his leg he says he has some tingling numbness on his right toes he has nerve conduction study set up.  He denies any abdominal symptoms no nausea vomiting fever no trouble urinating.  He does have benign renal tumors for which she is following with Urology    ROS:  Review of Systems   Constitutional: Negative for activity change, chills, fatigue, fever and unexpected weight change.   HENT: Negative for congestion, ear discharge, ear pain, hearing loss, postnasal drip and rhinorrhea.    Eyes: Negative for pain and visual disturbance.   Respiratory: Negative for cough, chest tightness and shortness of breath.    Cardiovascular: Negative for chest pain and palpitations.   Gastrointestinal: Negative for abdominal pain, diarrhea and vomiting.   Endocrine: Negative for heat intolerance.   Genitourinary: Negative for dysuria, flank pain, frequency and hematuria.   Musculoskeletal: Positive for back pain. Negative for gait problem and neck pain.   Skin: Negative for color change and rash.   Neurological: Negative for dizziness, tremors, seizures, numbness and headaches.   Psychiatric/Behavioral: Negative for agitation, hallucinations, self-injury, sleep disturbance and suicidal ideas. The patient is not nervous/anxious.        Past Medical History:   Diagnosis Date    Anxiety     Cancer     spot on kidney and lung    Diabetes mellitus     Borderline    Diverticulitis     GERD (gastroesophageal reflux disease)     Hyperlipidemia     not on any meds currently    Hypertension     Lung mass     Mass of kidney        Social History:  Social History     Socioeconomic  "History    Marital status:      Spouse name: None    Number of children: None    Years of education: None    Highest education level: None   Social Needs    Financial resource strain: None    Food insecurity - worry: None    Food insecurity - inability: None    Transportation needs - medical: None    Transportation needs - non-medical: None   Occupational History    None   Tobacco Use    Smoking status: Former Smoker     Packs/day: 1.00     Years: 20.00     Pack years: 20.00     Last attempt to quit: 1995     Years since quittin.8    Smokeless tobacco: Never Used   Substance and Sexual Activity    Alcohol use: No     Alcohol/week: 0.0 oz    Drug use: No    Sexual activity: Yes     Partners: Female     Birth control/protection: None   Other Topics Concern    None   Social History Narrative    None       Family History:   family history includes Cancer in his sister; Hypertension in his father and mother; Melanoma in his father.    Health Maintenance   Topic Date Due    Zoster Vaccine  2015    Influenza Vaccine  2018    Lipid Panel  2019    Colonoscopy  2020    TETANUS VACCINE  2028    Hepatitis C Screening  Completed       Physical Exam:    Vital Signs  Pulse: 70  SpO2: 98 %  BP: 132/80  BP Location: Left arm  Patient Position: Sitting  Pain Score:   5  Pain Loc: Back(right flank pain )  Height and Weight  Height: 5' 10" (177.8 cm)  Weight: 94 kg (207 lb 3.7 oz)  BSA (Calculated - sq m): 2.15 sq meters  BMI (Calculated): 29.8  Weight in (lb) to have BMI = 25: 173.9]    Body mass index is 29.73 kg/m².    Physical Exam   Constitutional: He is oriented to person, place, and time. He appears well-developed.   HENT:   Mouth/Throat: Oropharynx is clear and moist.   Eyes: Conjunctivae are normal. Pupils are equal, round, and reactive to light.   Neck: Normal range of motion. Neck supple.   Cardiovascular: Normal rate, regular rhythm and normal heart " sounds.   No murmur heard.  Pulmonary/Chest: Effort normal and breath sounds normal. No respiratory distress. He has no wheezes. He has no rales. He exhibits no tenderness.   Abdominal: Soft. He exhibits no distension and no mass. There is no tenderness. There is no guarding.   Musculoskeletal: He exhibits no edema.        Lumbar back: He exhibits tenderness.        Back:    Lymphadenopathy:     He has no cervical adenopathy.   Neurological: He is alert and oriented to person, place, and time. He has normal reflexes.   Skin: Skin is warm and dry.   Psychiatric: He has a normal mood and affect. His behavior is normal. Judgment and thought content normal.         Assessment:      ICD-10-CM ICD-9-CM   1. Chronic right-sided low back pain with right-sided sciatica M54.41 724.2    G89.29 724.3     338.29   2. Low back pain, non-specific M54.5 724.2         Plan:    Will get x-ray of the lumbar spine and an MRI.  Keep appointment for the nerve conduction study.  Zanaflex given for pain.      Orders Placed This Encounter   Procedures    X-Ray Lumbar Spine AP And Lateral    MRI Lumbar Spine Without Contrast       Current Outpatient Medications   Medication Sig Dispense Refill    amLODIPine (NORVASC) 10 MG tablet TAKE 1 TABLET BY MOUTH ONCE DAILY. 90 tablet 0    aspirin 81 mg Tab Take 1 tablet by mouth Daily.      benazepril (LOTENSIN) 20 MG tablet TAKE 2 TABLETS BY MOUTH ONCE A DAY. 180 tablet 0    citalopram (CELEXA) 20 MG tablet Take 20 mg by mouth once daily.      clonazePAM (KLONOPIN) 2 MG Tab Take 1 tablet (2 mg total) by mouth every evening. 21 tablet 0    fluticasone-salmeterol (ADVAIR HFA) 115-21 mcg/actuation HFAA Inhale 2 puffs into the lungs 2 (two) times daily. 12 g 12    gabapentin (NEURONTIN) 300 MG capsule TAKE 2 CAPSULES BY MOUTH THREE TIMES DAILY. 270 capsule 0    hydrALAZINE (APRESOLINE) 25 MG tablet Take 1 tablet (25 mg total) by mouth 3 (three) times daily. (Patient taking differently: Take 25  mg by mouth every 12 (twelve) hours. ) 90 tablet 11    hydroCHLOROthiazide (MICROZIDE) 12.5 mg capsule TAKE 1 CAPSULE BY MOUTH ONCE DAILY. 90 capsule 0    metoprolol tartrate (LOPRESSOR) 50 MG tablet TAKE 1 TABLET BY MOUTH TWICE A DAY. 180 tablet 0    omeprazole (PRILOSEC) 40 MG capsule TAKE 1 CAPSULE BY MOUTH ONCE DAILY. 60 capsule 0    rOPINIRole (REQUIP) 1 MG tablet Take 1 tablet (1 mg total) by mouth every evening. 30 tablet 11    tiotropium (SPIRIVA) 18 mcg inhalation capsule Inhale 1 capsule (18 mcg total) into the lungs once daily. Controller 30 capsule 2    tiZANidine (ZANAFLEX) 2 MG tablet Take 1 tablet (2 mg total) by mouth once daily. 90 tablet 0    traZODone (DESYREL) 100 MG tablet Take 1 tablet (100 mg total) by mouth every evening. 30 tablet 5    VITAMIN B-12 1000 MCG tablet       zolpidem (AMBIEN) 10 mg Tab TAKE 1 TABLET BY MOUTH NIGHTLY AS NEEDED. 30 tablet 5    tiZANidine (ZANAFLEX) 4 MG tablet Take 1 tablet (4 mg total) by mouth every 8 (eight) hours. 30 tablet 2     No current facility-administered medications for this visit.        Medications Discontinued During This Encounter   Medication Reason    ondansetron (ZOFRAN) 8 MG tablet Patient no longer taking    triamcinolone acetonide 0.1% (KENALOG) 0.1 % cream Patient no longer taking    hydrocodone-acetaminophen 10-325mg (NORCO)  mg Tab Patient no longer taking    hydrOXYzine pamoate (VISTARIL) 50 MG Cap Patient no longer taking       No Follow-up on file.      Yumi Roberson MD

## 2018-12-10 ENCOUNTER — HOSPITAL ENCOUNTER (OUTPATIENT)
Dept: RADIOLOGY | Facility: HOSPITAL | Age: 63
Discharge: HOME | End: 2018-12-10
Attending: UROLOGY
Payer: MEDICARE

## 2018-12-10 DIAGNOSIS — N28.89 RENAL MASS: ICD-10-CM

## 2018-12-10 PROCEDURE — 74178 CT ABD&PLV WO CNTR FLWD CNTR: CPT | Mod: TC,HCNC

## 2018-12-10 PROCEDURE — 25500020 PHARM REV CODE 255: Mod: HCNC | Performed by: UROLOGY

## 2018-12-10 RX ADMIN — IOHEXOL 75 ML: 350 INJECTION, SOLUTION INTRAVENOUS at 09:12

## 2018-12-12 ENCOUNTER — HOSPITAL ENCOUNTER (OUTPATIENT)
Dept: RADIOLOGY | Facility: HOSPITAL | Age: 63
Discharge: HOME | End: 2018-12-12
Attending: FAMILY MEDICINE
Payer: MEDICARE

## 2018-12-12 DIAGNOSIS — M54.50 LOW BACK PAIN, NON-SPECIFIC: ICD-10-CM

## 2018-12-12 PROCEDURE — 72148 MRI LUMBAR SPINE W/O DYE: CPT | Mod: TC,HCNC

## 2018-12-17 ENCOUNTER — TELEPHONE (OUTPATIENT)
Dept: FAMILY MEDICINE | Facility: CLINIC | Age: 63
End: 2018-12-17

## 2018-12-17 ENCOUNTER — OFFICE VISIT (OUTPATIENT)
Dept: UROLOGY | Facility: CLINIC | Age: 63
End: 2018-12-17
Payer: MEDICARE

## 2018-12-17 VITALS
DIASTOLIC BLOOD PRESSURE: 82 MMHG | HEIGHT: 70 IN | BODY MASS INDEX: 30.08 KG/M2 | WEIGHT: 210.13 LBS | SYSTOLIC BLOOD PRESSURE: 140 MMHG | HEART RATE: 76 BPM

## 2018-12-17 DIAGNOSIS — M47.816 OSTEOARTHRITIS OF FACET JOINT OF LUMBAR SPINE: ICD-10-CM

## 2018-12-17 DIAGNOSIS — D36.9 ONCOCYTOMA: Primary | ICD-10-CM

## 2018-12-17 DIAGNOSIS — M48.00 MULTILEVEL NEURAL FORAMINAL STENOSIS: Primary | ICD-10-CM

## 2018-12-17 PROCEDURE — 3008F BODY MASS INDEX DOCD: CPT | Mod: CPTII,HCNC,S$GLB, | Performed by: UROLOGY

## 2018-12-17 PROCEDURE — 3079F DIAST BP 80-89 MM HG: CPT | Mod: CPTII,HCNC,S$GLB, | Performed by: UROLOGY

## 2018-12-17 PROCEDURE — 99214 OFFICE O/P EST MOD 30 MIN: CPT | Mod: HCNC,S$GLB,, | Performed by: UROLOGY

## 2018-12-17 PROCEDURE — 3077F SYST BP >= 140 MM HG: CPT | Mod: CPTII,HCNC,S$GLB, | Performed by: UROLOGY

## 2018-12-17 PROCEDURE — 99999 PR PBB SHADOW E&M-EST. PATIENT-LVL III: CPT | Mod: PBBFAC,HCNC,, | Performed by: UROLOGY

## 2018-12-17 NOTE — PROGRESS NOTES
"Chief Complaint: Renal Cell Carcinoma?    HPI:   12/17/18: Did cryoablation of largest right renal mass, and path redemonstrates oncocytoma.  One residual right mass, and two lower pole left (3.1/1.1 cm).  8/8/18: Reviewed imaging, history, symptoms in detail.  9/28/17: Followup CT shows renal masses stable on 6 month interval.  CT scan shows 2.6/1.9/2.3 right renal masses and a 2.8cm left lower pole renal mass.  All enlarging slowly.  3/22/17: Slight enlargement of bilateral small renal masses in background of path-proven oncocytoma in 2011.  Dr. Roberson completes prostate cancer screening annually.  2/17/16: Ct shows a 5.5 mm left lower lobe chest nodule and a metallic linear object in stomach.  It probably is the wire left behind after some sort of esophageal biopsy and the wire was supposed to pass over a few weeks time.   Gen path shows the prior mass to be an oncocytoma.  Our pathologist has requested the slides and will form a second opinion.  2/11/16: 60 yo man referred by Dr. Roberson after finding renal masses on recent workup for various complaints.  No abd/pelvic pain and no exac/rel factors.  No hematuria.  No urolithiasis except he says he passed one 30 years ago.  No urinary bother.  No  history.  Normal sexual function. In 2011 he had left upper pole partial nephrectomy with unknown findings at  General, but no longterm followup.  Now he has a 2.3 cm left lower pole enhancing mass, and "right kidney demonstrates development of at least 3 separate solid mass lesions. One in the upper pole cortex 1.6 cm in size. The other in the anterior right upper renal cortex measuring 1.5 cm in size. The third located in the anterior lower pole approximately 1.1 cm in size."  No chest imaging.  LFT normal.  Neg prostate cancer screening with PCP last ADA 6 months ago.    Allergies:  No known allergies    Medications:  has a current medication list which includes the following prescription(s): amlodipine, " aspirin, benazepril, citalopram, clonazepam, fluticasone-salmeterol, gabapentin, hydralazine, hydrochlorothiazide, metoprolol tartrate, omeprazole, ropinirole, tiotropium, tizanidine, tizanidine, trazodone, zolpidem, and vitamin b-12.    Review of Systems:  General: No fever, chills, fatigability, or weight loss.  Skin: No rashes, itching, or changes in color or texture of skin.  Chest: Denies UMANA, cyanosis, wheezing, cough, and sputum production.  Abdomen: Appetite fine. No weight loss. Denies diarrhea, abdominal pain, hematemesis, or blood in stool.  Musculoskeletal: Some joint stiffness or swelling. Some back pain.  : As above.  All other review of systems negative.    PMH:   has a past medical history of Anxiety, Cancer, Diabetes mellitus, Diverticulitis, GERD (gastroesophageal reflux disease), Hyperlipidemia, Hypertension, Lung mass, and Mass of kidney.    PSH:   has a past surgical history that includes Partial nephrectomy; Neck surgery; Cholecystectomy; Tonsillectomy; Colonoscopy; Esophagogastroduodenoscopy; Renal biopsy; Hernia repair (Bilateral); Trigger finger release (Right); ABLATION-CRYO in CT (N/A, 8/23/2018); RELEASE-FINGER-TRIGGER (Left, 8/31/2017); RELEASE-FINGER-TRIGGER (Right, 7/18/2017); COLONOSCOPY (N/A, 4/7/2017); REPAIR-HERNIA-INGUINAL-BILATERAL (Bilateral, 3/14/2017); ESOPHAGOGASTRODUODENOSCOPY (EGD) (N/A, 12/1/2016); ESOPHAGOGASTRODUODENOSCOPY (EGD) (N/A, 5/23/2016); ULTRASOUND-ENDOSCOPIC-UPPER / EMR (N/A, 2/11/2016); ESOPHAGOGASTRODUODENOSCOPY (EGD) (N/A, 2/11/2016); and ESOPHAGOGASTRODUODENOSCOPY (EGD) (N/A, 2/1/2016).    FamHx: family history includes Cancer in his sister; Hypertension in his father and mother; Melanoma in his father.    SocHx:  reports that he quit smoking about 23 years ago. He has a 20.00 pack-year smoking history. he has never used smokeless tobacco. He reports that he does not drink alcohol or use drugs.      Physical Exam:  Vitals:    12/17/18 0805   BP: (!)  140/82   Pulse: 76     General: A&Ox3, no apparent distress, no deformities  Neck: No masses, normal thyroid  Lungs: normal inspiration, no use of accessory muscles  Heart: normal pulse, no arrhythmias  Abdomen: Soft, NT, ND  Skin: The skin is warm and dry. No jaundice.  Ext: No c/c/e.  :   3/17: Test desc lauryn, no abnormalities of epididymus. Penis normal, with normal penile and scrotal skin. Meatus normal.     Labs/Studies:   PSA    1/15: 0.71    Impression/Plan:   1. Right cryo went well will now treat left masses similarly.   2. RTC 4 mo with CT

## 2018-12-18 ENCOUNTER — TELEPHONE (OUTPATIENT)
Dept: UROLOGY | Facility: CLINIC | Age: 63
End: 2018-12-18

## 2018-12-18 NOTE — TELEPHONE ENCOUNTER
Per Juliet in Radiology Cryoablation will be done at [facility]. Referral edited to assure authorization for outside facility. Spoke with Theresa (nurse at [facility]). She states she will call pt directly to schedule & provide instructions.

## 2018-12-19 DIAGNOSIS — M47.812 CERVICAL SPONDYLOSIS: Primary | ICD-10-CM

## 2018-12-20 ENCOUNTER — PROCEDURE VISIT (OUTPATIENT)
Dept: NEUROLOGY | Facility: CLINIC | Age: 63
End: 2018-12-20
Payer: MEDICARE

## 2018-12-20 DIAGNOSIS — M47.812 CERVICAL SPONDYLOSIS: ICD-10-CM

## 2018-12-20 PROCEDURE — 95886 MUSC TEST DONE W/N TEST COMP: CPT | Mod: HCNC,S$GLB,, | Performed by: PSYCHIATRY & NEUROLOGY

## 2018-12-20 PROCEDURE — 95911 NRV CNDJ TEST 9-10 STUDIES: CPT | Mod: HCNC,S$GLB,, | Performed by: PSYCHIATRY & NEUROLOGY

## 2018-12-21 NOTE — PROGRESS NOTES
Notified pt of abnormal results. Pt verbalized understanding and states he had the nerve conduction study yesterday. States he was told he has a pinched nerve.

## 2018-12-26 RX ORDER — TIZANIDINE 4 MG/1
TABLET ORAL
Qty: 90 TABLET | Refills: 0 | Status: SHIPPED | OUTPATIENT
Start: 2018-12-26 | End: 2019-01-08 | Stop reason: SDUPTHER

## 2019-01-04 DIAGNOSIS — C64.9 RENAL CELL CARCINOMA, UNSPECIFIED LATERALITY: Primary | ICD-10-CM

## 2019-01-08 RX ORDER — TIZANIDINE 4 MG/1
TABLET ORAL
Qty: 90 TABLET | Refills: 0 | Status: SHIPPED | OUTPATIENT
Start: 2019-01-08 | End: 2019-01-16 | Stop reason: ALTCHOICE

## 2019-01-09 ENCOUNTER — LAB VISIT (OUTPATIENT)
Dept: LAB | Facility: HOSPITAL | Age: 64
End: 2019-01-09
Attending: FAMILY MEDICINE
Payer: MEDICARE

## 2019-01-09 ENCOUNTER — OFFICE VISIT (OUTPATIENT)
Dept: FAMILY MEDICINE | Facility: CLINIC | Age: 64
End: 2019-01-09
Payer: MEDICARE

## 2019-01-09 VITALS
BODY MASS INDEX: 31.4 KG/M2 | WEIGHT: 212 LBS | SYSTOLIC BLOOD PRESSURE: 130 MMHG | TEMPERATURE: 98 F | DIASTOLIC BLOOD PRESSURE: 82 MMHG | HEART RATE: 70 BPM | HEIGHT: 69 IN | OXYGEN SATURATION: 98 %

## 2019-01-09 DIAGNOSIS — Z12.5 PROSTATE CANCER SCREENING ENCOUNTER, OPTIONS AND RISKS DISCUSSED: ICD-10-CM

## 2019-01-09 DIAGNOSIS — Z51.81 MEDICATION MONITORING ENCOUNTER: ICD-10-CM

## 2019-01-09 DIAGNOSIS — I10 ESSENTIAL HYPERTENSION: ICD-10-CM

## 2019-01-09 DIAGNOSIS — T46.6X5A MYALGIA DUE TO HMG COA REDUCTASE INHIBITOR: ICD-10-CM

## 2019-01-09 DIAGNOSIS — F32.A DEPRESSION, UNSPECIFIED DEPRESSION TYPE: ICD-10-CM

## 2019-01-09 DIAGNOSIS — Z78.9 STATIN INTOLERANCE: ICD-10-CM

## 2019-01-09 DIAGNOSIS — K40.30 UNILATERAL INGUINAL HERNIA WITH OBSTRUCTION AND WITHOUT GANGRENE, RECURRENCE NOT SPECIFIED: ICD-10-CM

## 2019-01-09 DIAGNOSIS — Z00.00 WELL ADULT EXAM: Primary | ICD-10-CM

## 2019-01-09 DIAGNOSIS — M54.16 BILATERAL LUMBAR RADICULOPATHY: ICD-10-CM

## 2019-01-09 DIAGNOSIS — M79.10 MYALGIA DUE TO HMG COA REDUCTASE INHIBITOR: ICD-10-CM

## 2019-01-09 LAB
ALBUMIN SERPL BCP-MCNC: 3.4 G/DL
ALP SERPL-CCNC: 48 U/L
ALT SERPL W/O P-5'-P-CCNC: 22 U/L
ANION GAP SERPL CALC-SCNC: 7 MMOL/L
AST SERPL-CCNC: 20 U/L
BILIRUB SERPL-MCNC: 0.4 MG/DL
BUN SERPL-MCNC: 23 MG/DL
CALCIUM SERPL-MCNC: 9.7 MG/DL
CHLORIDE SERPL-SCNC: 101 MMOL/L
CHOLEST SERPL-MCNC: 164 MG/DL
CHOLEST/HDLC SERPL: 3.9 {RATIO}
CO2 SERPL-SCNC: 29 MMOL/L
CREAT SERPL-MCNC: 1.1 MG/DL
EST. GFR  (AFRICAN AMERICAN): >60 ML/MIN/1.73 M^2
EST. GFR  (NON AFRICAN AMERICAN): >60 ML/MIN/1.73 M^2
GLUCOSE SERPL-MCNC: 95 MG/DL
HDLC SERPL-MCNC: 42 MG/DL
HDLC SERPL: 25.6 %
LDLC SERPL CALC-MCNC: 91.6 MG/DL
NONHDLC SERPL-MCNC: 122 MG/DL
POTASSIUM SERPL-SCNC: 4.9 MMOL/L
PROT SERPL-MCNC: 7.3 G/DL
SODIUM SERPL-SCNC: 137 MMOL/L
TRIGL SERPL-MCNC: 152 MG/DL

## 2019-01-09 PROCEDURE — 84153 ASSAY OF PSA TOTAL: CPT | Mod: HCNC

## 2019-01-09 PROCEDURE — 36415 COLL VENOUS BLD VENIPUNCTURE: CPT | Mod: HCNC,PO

## 2019-01-09 PROCEDURE — 93010 ELECTROCARDIOGRAM REPORT: CPT | Mod: HCNC,S$GLB,, | Performed by: INTERNAL MEDICINE

## 2019-01-09 PROCEDURE — 93005 EKG 12-LEAD: ICD-10-PCS | Mod: HCNC,S$GLB,, | Performed by: FAMILY MEDICINE

## 2019-01-09 PROCEDURE — 99999 PR PBB SHADOW E&M-EST. PATIENT-LVL V: ICD-10-PCS | Mod: PBBFAC,HCNC,, | Performed by: FAMILY MEDICINE

## 2019-01-09 PROCEDURE — 80061 LIPID PANEL: CPT | Mod: HCNC

## 2019-01-09 PROCEDURE — 3075F PR MOST RECENT SYSTOLIC BLOOD PRESS GE 130-139MM HG: ICD-10-PCS | Mod: CPTII,HCNC,S$GLB, | Performed by: FAMILY MEDICINE

## 2019-01-09 PROCEDURE — 80053 COMPREHEN METABOLIC PANEL: CPT | Mod: HCNC

## 2019-01-09 PROCEDURE — 3075F SYST BP GE 130 - 139MM HG: CPT | Mod: CPTII,HCNC,S$GLB, | Performed by: FAMILY MEDICINE

## 2019-01-09 PROCEDURE — 93010 EKG 12-LEAD: ICD-10-PCS | Mod: HCNC,S$GLB,, | Performed by: INTERNAL MEDICINE

## 2019-01-09 PROCEDURE — 3079F PR MOST RECENT DIASTOLIC BLOOD PRESSURE 80-89 MM HG: ICD-10-PCS | Mod: CPTII,HCNC,S$GLB, | Performed by: FAMILY MEDICINE

## 2019-01-09 PROCEDURE — 3079F DIAST BP 80-89 MM HG: CPT | Mod: CPTII,HCNC,S$GLB, | Performed by: FAMILY MEDICINE

## 2019-01-09 PROCEDURE — 99396 PREV VISIT EST AGE 40-64: CPT | Mod: HCNC,S$GLB,, | Performed by: FAMILY MEDICINE

## 2019-01-09 PROCEDURE — 99999 PR PBB SHADOW E&M-EST. PATIENT-LVL V: CPT | Mod: PBBFAC,HCNC,, | Performed by: FAMILY MEDICINE

## 2019-01-09 PROCEDURE — 99396 PR PREVENTIVE VISIT,EST,40-64: ICD-10-PCS | Mod: HCNC,S$GLB,, | Performed by: FAMILY MEDICINE

## 2019-01-09 PROCEDURE — 93005 ELECTROCARDIOGRAM TRACING: CPT | Mod: HCNC,S$GLB,, | Performed by: FAMILY MEDICINE

## 2019-01-09 RX ORDER — CIPROFLOXACIN 250 MG/1
1 TABLET, FILM COATED ORAL 2 TIMES DAILY
Refills: 0 | COMMUNITY
Start: 2019-01-02 | End: 2019-02-13 | Stop reason: DRUGHIGH

## 2019-01-09 NOTE — PROGRESS NOTES
Chief Complaint:    Chief Complaint   Patient presents with    Back Pain    Annual Exam       History of Present Illness:  Presents today for six-month follow-up,  He had a renal biopsy done which results are pending  He has had hernia surgery many years back he seems to be getting pain in the left inguinal region any time he coughs  He recently had nerve conduction study and MRI which revealed that he has chronic bilateral L5-S1 lumbar radiculopathy he experiences constant pain and numbness both legs  Blood pressure is normal  Depression anxiety is going to [facility] [place] and wants EKG.      ROS:  Review of Systems   Constitutional: Negative for activity change, chills, fatigue, fever and unexpected weight change.   HENT: Negative for congestion, ear discharge, ear pain, hearing loss, postnasal drip and rhinorrhea.    Eyes: Negative for pain and visual disturbance.   Respiratory: Negative for cough, chest tightness and shortness of breath.    Cardiovascular: Negative for chest pain and palpitations.   Gastrointestinal: Negative for abdominal pain, diarrhea and vomiting.   Endocrine: Negative for heat intolerance.   Genitourinary: Negative for dysuria, flank pain, frequency and hematuria.   Musculoskeletal: Positive for back pain. Negative for gait problem and neck pain.   Skin: Negative for color change and rash.   Neurological: Negative for dizziness, tremors, seizures, numbness and headaches.   Psychiatric/Behavioral: Negative for agitation, hallucinations, self-injury, sleep disturbance and suicidal ideas. The patient is not nervous/anxious.        Past Medical History:   Diagnosis Date    Anxiety     Cancer     spot on kidney and lung    Diabetes mellitus     Borderline    Diverticulitis     GERD (gastroesophageal reflux disease)     Hyperlipidemia     not on any meds currently    Hypertension     Lung mass     Mass of kidney        Social History:  Social History     Socioeconomic History     "Marital status:      Spouse name: None    Number of children: None    Years of education: None    Highest education level: None   Social Needs    Financial resource strain: None    Food insecurity - worry: None    Food insecurity - inability: None    Transportation needs - medical: None    Transportation needs - non-medical: None   Occupational History    None   Tobacco Use    Smoking status: Former Smoker     Packs/day: 1.00     Years: 20.00     Pack years: 20.00     Last attempt to quit: 1995     Years since quittin.9    Smokeless tobacco: Never Used   Substance and Sexual Activity    Alcohol use: No     Alcohol/week: 0.0 oz    Drug use: No    Sexual activity: Yes     Partners: Female     Birth control/protection: None   Other Topics Concern    None   Social History Narrative    None       Family History:   family history includes Cancer in his sister; Hypertension in his father and mother; Melanoma in his father.    Health Maintenance   Topic Date Due    Zoster Vaccine  2015    Influenza Vaccine  2018    Lipid Panel  2019    Colonoscopy  2020    TETANUS VACCINE  2028    Hepatitis C Screening  Completed       Physical Exam:    Vital Signs  Temp: 97.7 °F (36.5 °C)  Temp src: Tympanic  Pulse: 70  SpO2: 98 %  BP: 130/82  BP Location: Left arm  Patient Position: Sitting  Pain Score:   8  Pain Loc: Back  Height and Weight  Height: 5' 9" (175.3 cm)  Weight: 96.2 kg (211 lb 15.6 oz)  BSA (Calculated - sq m): 2.16 sq meters  BMI (Calculated): 31.4  Weight in (lb) to have BMI = 25: 168.9]    Body mass index is 31.3 kg/m².    Physical Exam   Constitutional: He is oriented to person, place, and time. He appears well-developed.   HENT:   Mouth/Throat: Oropharynx is clear and moist.   Eyes: Conjunctivae are normal. Pupils are equal, round, and reactive to light.   Neck: Normal range of motion. Neck supple.   Cardiovascular: Normal rate, regular rhythm and " normal heart sounds.   No murmur heard.  Pulmonary/Chest: Effort normal and breath sounds normal. No respiratory distress. He has no wheezes. He has no rales. He exhibits no tenderness.   Abdominal: Soft. He exhibits no distension and no mass. There is no tenderness. There is no guarding.   Genitourinary:         Musculoskeletal: He exhibits no edema or tenderness.   Lymphadenopathy:     He has no cervical adenopathy.   Neurological: He is alert and oriented to person, place, and time. He has normal reflexes.   Skin: Skin is warm and dry.   Psychiatric: He has a normal mood and affect. His behavior is normal. Judgment and thought content normal.         Assessment:      ICD-10-CM ICD-9-CM   1. Well adult exam Z00.00 V70.0   2. Bilateral lumbar radiculopathy M54.16 724.4   3. Essential hypertension I10 401.9   4. Depression, unspecified depression type F32.9 311   5. Unilateral inguinal hernia with obstruction and without gangrene, recurrence not specified K40.30 550.10   6. Medication monitoring encounter Z51.81 V58.83   7. Prostate cancer screening encounter, options and risks discussed Z12.5 V76.44   8. Myalgia due to HMG CoA reductase inhibitor M79.10 729.1    T46.6X5A E942.2   9. Statin intolerance Z78.9 995.27         Plan:    Patient will be referred to Neurosurgery for bilateral lumbar radiculopathy  Vahid do EKG as he request  Other medical problems are stable  He will be referred to General surgery for evaluation of the left inguinal pain I did not see any evidence of the hernia recurring.  Follow-up 6 months      Orders Placed This Encounter   Procedures    Comprehensive metabolic panel    PSA, Screening    Lipid panel    Ambulatory referral to Neurosurgery    Ambulatory referral to General Surgery    EKG 12-lead       Current Outpatient Medications   Medication Sig Dispense Refill    amLODIPine (NORVASC) 10 MG tablet TAKE 1 TABLET BY MOUTH ONCE DAILY. 90 tablet 0    aspirin 81 mg Tab Take 1  tablet by mouth Daily.      benazepril (LOTENSIN) 20 MG tablet TAKE 2 TABLETS BY MOUTH ONCE A DAY. 180 tablet 0    ciprofloxacin HCl (CIPRO) 250 MG tablet Take 1 tablet by mouth 2 (two) times daily.  0    citalopram (CELEXA) 20 MG tablet Take 20 mg by mouth once daily.      clonazePAM (KLONOPIN) 2 MG Tab Take 1 tablet (2 mg total) by mouth every evening. 21 tablet 0    gabapentin (NEURONTIN) 300 MG capsule TAKE 2 CAPSULES BY MOUTH THREE TIMES DAILY. 270 capsule 0    hydroCHLOROthiazide (MICROZIDE) 12.5 mg capsule TAKE 1 CAPSULE BY MOUTH ONCE DAILY. 90 capsule 0    metoprolol tartrate (LOPRESSOR) 50 MG tablet TAKE 1 TABLET BY MOUTH TWICE A DAY. 180 tablet 0    omeprazole (PRILOSEC) 40 MG capsule TAKE 1 CAPSULE BY MOUTH ONCE DAILY. 60 capsule 0    rOPINIRole (REQUIP) 1 MG tablet Take 1 tablet (1 mg total) by mouth every evening. 30 tablet 11    tiZANidine (ZANAFLEX) 2 MG tablet Take 1 tablet (2 mg total) by mouth once daily. 90 tablet 0    tiZANidine (ZANAFLEX) 4 MG tablet TAKE 1 TABLET BY MOUTH EVERY 8 HOURS AS NEEDED. 90 tablet 0    traZODone (DESYREL) 100 MG tablet Take 1 tablet (100 mg total) by mouth every evening. 30 tablet 5    VITAMIN B-12 1000 MCG tablet       zolpidem (AMBIEN) 10 mg Tab TAKE 1 TABLET BY MOUTH NIGHTLY AS NEEDED. 30 tablet 5    fluticasone-salmeterol (ADVAIR HFA) 115-21 mcg/actuation HFAA Inhale 2 puffs into the lungs 2 (two) times daily. 12 g 12    hydrALAZINE (APRESOLINE) 25 MG tablet Take 1 tablet (25 mg total) by mouth 3 (three) times daily. (Patient taking differently: Take 25 mg by mouth every 12 (twelve) hours. ) 90 tablet 11    tiotropium (SPIRIVA) 18 mcg inhalation capsule Inhale 1 capsule (18 mcg total) into the lungs once daily. Controller 30 capsule 2     No current facility-administered medications for this visit.        There are no discontinued medications.    Follow-up in about 6 months (around 7/9/2019).      Yumi Roberson MD

## 2019-01-10 ENCOUNTER — TELEPHONE (OUTPATIENT)
Dept: FAMILY MEDICINE | Facility: CLINIC | Age: 64
End: 2019-01-10

## 2019-01-10 LAB — COMPLEXED PSA SERPL-MCNC: 1 NG/ML

## 2019-01-10 NOTE — TELEPHONE ENCOUNTER
Attempted to notify patient the EKG results were faxed to DEEPA Rosales at [phone] with no answer. Message left.    DEEPA Rosales  [facility]  [address], [facility]  (p) [phone] (f) [phone]

## 2019-01-16 ENCOUNTER — OFFICE VISIT (OUTPATIENT)
Dept: NEUROSURGERY | Facility: CLINIC | Age: 64
End: 2019-01-16
Payer: MEDICARE

## 2019-01-16 ENCOUNTER — OFFICE VISIT (OUTPATIENT)
Dept: PAIN MEDICINE | Facility: CLINIC | Age: 64
End: 2019-01-16
Payer: MEDICARE

## 2019-01-16 ENCOUNTER — HOSPITAL ENCOUNTER (OUTPATIENT)
Dept: RADIOLOGY | Facility: HOSPITAL | Age: 64
Discharge: HOME | End: 2019-01-16
Attending: PHYSICIAN ASSISTANT
Payer: MEDICARE

## 2019-01-16 ENCOUNTER — TELEPHONE (OUTPATIENT)
Dept: NEUROSURGERY | Facility: CLINIC | Age: 64
End: 2019-01-16

## 2019-01-16 VITALS
HEART RATE: 62 BPM | BODY MASS INDEX: 31.25 KG/M2 | WEIGHT: 211 LBS | SYSTOLIC BLOOD PRESSURE: 179 MMHG | DIASTOLIC BLOOD PRESSURE: 106 MMHG | HEIGHT: 69 IN

## 2019-01-16 VITALS
RESPIRATION RATE: 18 BRPM | HEART RATE: 68 BPM | DIASTOLIC BLOOD PRESSURE: 99 MMHG | BODY MASS INDEX: 31.25 KG/M2 | HEIGHT: 69 IN | WEIGHT: 211 LBS | SYSTOLIC BLOOD PRESSURE: 168 MMHG

## 2019-01-16 DIAGNOSIS — M51.36 DEGENERATIVE DISC DISEASE, LUMBAR: ICD-10-CM

## 2019-01-16 DIAGNOSIS — M48.061 LUMBAR FORAMINAL STENOSIS: ICD-10-CM

## 2019-01-16 DIAGNOSIS — Z98.890 HISTORY OF CERVICAL SPINAL SURGERY: ICD-10-CM

## 2019-01-16 DIAGNOSIS — M51.36 DEGENERATIVE DISC DISEASE, LUMBAR: Primary | ICD-10-CM

## 2019-01-16 DIAGNOSIS — M54.16 LUMBAR RADICULOPATHY: ICD-10-CM

## 2019-01-16 DIAGNOSIS — M51.36 DDD (DEGENERATIVE DISC DISEASE), LUMBAR: Primary | ICD-10-CM

## 2019-01-16 PROCEDURE — 72120 X-RAY BEND ONLY L-S SPINE: CPT | Mod: TC,HCNC

## 2019-01-16 PROCEDURE — 99499 UNLISTED E&M SERVICE: CPT | Mod: S$GLB,,, | Performed by: PHYSICIAN ASSISTANT

## 2019-01-16 PROCEDURE — 99204 OFFICE O/P NEW MOD 45 MIN: CPT | Mod: HCNC,S$GLB,, | Performed by: PHYSICIAN ASSISTANT

## 2019-01-16 PROCEDURE — 3008F PR BODY MASS INDEX (BMI) DOCUMENTED: ICD-10-PCS | Mod: CPTII,HCNC,S$GLB, | Performed by: PHYSICIAN ASSISTANT

## 2019-01-16 PROCEDURE — 99204 OFFICE O/P NEW MOD 45 MIN: CPT | Mod: HCNC,S$GLB,, | Performed by: NEUROLOGICAL SURGERY

## 2019-01-16 PROCEDURE — 99999 PR PBB SHADOW E&M-EST. PATIENT-LVL IV: CPT | Mod: PBBFAC,HCNC,, | Performed by: NEUROLOGICAL SURGERY

## 2019-01-16 PROCEDURE — 3080F PR MOST RECENT DIASTOLIC BLOOD PRESSURE >= 90 MM HG: ICD-10-PCS | Mod: CPTII,HCNC,S$GLB, | Performed by: PHYSICIAN ASSISTANT

## 2019-01-16 PROCEDURE — 72120 X-RAY BEND ONLY L-S SPINE: CPT | Mod: 26,HCNC,, | Performed by: RADIOLOGY

## 2019-01-16 PROCEDURE — 72120 XR LUMBAR SPINE FLEXION AND EXTENSION ONLY: ICD-10-PCS | Mod: 26,HCNC,, | Performed by: RADIOLOGY

## 2019-01-16 PROCEDURE — 99999 PR PBB SHADOW E&M-EST. PATIENT-LVL IV: CPT | Mod: PBBFAC,HCNC,, | Performed by: PHYSICIAN ASSISTANT

## 2019-01-16 PROCEDURE — 99999 PR PBB SHADOW E&M-EST. PATIENT-LVL IV: ICD-10-PCS | Mod: PBBFAC,HCNC,, | Performed by: NEUROLOGICAL SURGERY

## 2019-01-16 PROCEDURE — 3080F DIAST BP >= 90 MM HG: CPT | Mod: CPTII,HCNC,S$GLB, | Performed by: PHYSICIAN ASSISTANT

## 2019-01-16 PROCEDURE — 3008F PR BODY MASS INDEX (BMI) DOCUMENTED: ICD-10-PCS | Mod: CPTII,HCNC,S$GLB, | Performed by: NEUROLOGICAL SURGERY

## 2019-01-16 PROCEDURE — 99204 PR OFFICE/OUTPT VISIT, NEW, LEVL IV, 45-59 MIN: ICD-10-PCS | Mod: HCNC,S$GLB,, | Performed by: PHYSICIAN ASSISTANT

## 2019-01-16 PROCEDURE — 3077F PR MOST RECENT SYSTOLIC BLOOD PRESSURE >= 140 MM HG: ICD-10-PCS | Mod: CPTII,HCNC,S$GLB, | Performed by: NEUROLOGICAL SURGERY

## 2019-01-16 PROCEDURE — 3077F SYST BP >= 140 MM HG: CPT | Mod: CPTII,HCNC,S$GLB, | Performed by: NEUROLOGICAL SURGERY

## 2019-01-16 PROCEDURE — 3080F PR MOST RECENT DIASTOLIC BLOOD PRESSURE >= 90 MM HG: ICD-10-PCS | Mod: CPTII,HCNC,S$GLB, | Performed by: NEUROLOGICAL SURGERY

## 2019-01-16 PROCEDURE — 3077F SYST BP >= 140 MM HG: CPT | Mod: CPTII,HCNC,S$GLB, | Performed by: PHYSICIAN ASSISTANT

## 2019-01-16 PROCEDURE — 99999 PR PBB SHADOW E&M-EST. PATIENT-LVL IV: ICD-10-PCS | Mod: PBBFAC,HCNC,, | Performed by: PHYSICIAN ASSISTANT

## 2019-01-16 PROCEDURE — 3008F BODY MASS INDEX DOCD: CPT | Mod: CPTII,HCNC,S$GLB, | Performed by: PHYSICIAN ASSISTANT

## 2019-01-16 PROCEDURE — 99499 RISK ADDL DX/OHS AUDIT: ICD-10-PCS | Mod: S$GLB,,, | Performed by: PHYSICIAN ASSISTANT

## 2019-01-16 PROCEDURE — 3008F BODY MASS INDEX DOCD: CPT | Mod: CPTII,HCNC,S$GLB, | Performed by: NEUROLOGICAL SURGERY

## 2019-01-16 PROCEDURE — 99204 PR OFFICE/OUTPT VISIT, NEW, LEVL IV, 45-59 MIN: ICD-10-PCS | Mod: HCNC,S$GLB,, | Performed by: NEUROLOGICAL SURGERY

## 2019-01-16 PROCEDURE — 3080F DIAST BP >= 90 MM HG: CPT | Mod: CPTII,HCNC,S$GLB, | Performed by: NEUROLOGICAL SURGERY

## 2019-01-16 PROCEDURE — 3077F PR MOST RECENT SYSTOLIC BLOOD PRESSURE >= 140 MM HG: ICD-10-PCS | Mod: CPTII,HCNC,S$GLB, | Performed by: PHYSICIAN ASSISTANT

## 2019-01-16 RX ORDER — HYDROCODONE BITARTRATE AND ACETAMINOPHEN 5; 325 MG/1; MG/1
1 TABLET ORAL EVERY 6 HOURS PRN
Qty: 30 TABLET | Refills: 0 | Status: SHIPPED | OUTPATIENT
Start: 2019-01-16 | End: 2019-03-12

## 2019-01-16 RX ORDER — METHOCARBAMOL 750 MG/1
750 TABLET, FILM COATED ORAL 3 TIMES DAILY PRN
Qty: 40 TABLET | Refills: 0 | Status: SHIPPED | OUTPATIENT
Start: 2019-01-16 | End: 2019-01-26

## 2019-01-16 NOTE — LETTER
January 16, 2019      Yumi Roberson MD  [address] [address] [address]           [facility]  [address] [place] [address] Dr [address]it[address] [place]IA [address]  Phone: [phone]  Fax: [phone]          Patient: Mil Sood   MR Number: [id number]   YOB: 1955   Date of Visit: 1/16/2019       Dear Dr. Yumi Roberson:    Thank you for referring Mil Sood to me for evaluation. Attached you will find relevant portions of my assessment and plan of care.    If you have questions, please do not hesitate to call me. I look forward to following Mil Sood along with you.    Sincerely,    Blayne Atwood MD    Enclosure  CC:  No Recipients    If you would like to receive this communication electronically, please contact [email or web address] or [phone] to request more information on [place] [employer] access.    For providers and/or their staff who would like to refer a patient to [facility], please contact us through our one-stop-shop provider referral line, [facility] , at [phone].    If you feel you have received this communication in error or would no longer like to receive these types of communications, please e-mail [email or web address]
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
## 2019-01-16 NOTE — TELEPHONE ENCOUNTER
----- Message from Nany Burns sent at 1/16/2019 10:11 AM CST -----  Contact: Judith from [facility]   States she's she's calling rg a drug change and can be reached at [phone]//thanks/dbw

## 2019-01-16 NOTE — PROGRESS NOTES
Subjective:     Patient ID: Mil Sood is a 63 y.o. male.    Chief Complaint: No chief complaint on file.    HPI    Past Medical History:   Diagnosis Date    Anxiety     Cancer     spot on kidney and lung    Diabetes mellitus     Borderline    Diverticulitis     GERD (gastroesophageal reflux disease)     Hyperlipidemia     not on any meds currently    Hypertension     Lung mass     Mass of kidney      Past Surgical History:   Procedure Laterality Date    ABLATION-CRYO in CT N/A 8/23/2018    Performed by Kevin Geiger MD at [facility] OR    CHOLECYSTECTOMY      COLONOSCOPY      COLONOSCOPY N/A 4/7/2017    Performed by Amarilys Bravo MD at [facility] ENDO    ESOPHAGOGASTRODUODENOSCOPY      ESOPHAGOGASTRODUODENOSCOPY (EGD) N/A 12/1/2016    Performed by Am[facility]ilys [facility]ravo [facility] at [facility] ENDO    ESOPHAGOGASTRODUODENOSCOPY (EGD) N/A 5/23/2016    Performed by Am[facility]ilys Bravo MD at [facility] ENDO    ESOPHAGOGASTRODUODENOSCOPY (EGD) N/A 2/11/2016    Performed by Am[facility]ilys Bravo MD at [facility] ENDO    ESOPHAGOGASTRODUODENOSCOPY (EGD) N/A 2/1/2016    Performed by Am[facility]ilys Bravo MD at [facility] ENDO    HERNIA REPAIR Bilateral     groin    NECK SURGERY      PARTIAL NEPHRECTOMY      left    RELEASE-FINGER-TRIGGER Left 8/31/2017    Performed by Timothy Abarca MD at [facility] OR    RELEASE-FINGER-TRIGGER Right 7/18/2017    Performed by Timothy [facility]barca MD at [facility] OR    RENAL BIOPSY      REPAIR-HERNIA-INGUINAL-BILATERAL Bilateral 3/14/2017    Performed by Ari Pelletier MD at [facility] OR    TONSILLECTOMY      TRIGGER FINGER RELEASE Right     long finger    ULTRASOUND-ENDOSCOPIC-UPPER / EMR N/A 2/11/2016    Performed by Am[facility]ilys [facility]ravo MD at [facility] ENDO     Family History   Problem Relation Age of Onset    Hypertension Mother     Hypertension Father     Melanoma Father     Cancer Sister      Social History     Socioeconomic History    Marital status:      Spouse name: Not on file    Number of  children: Not on file    Years of education: Not on file    Highest education level: Not on file   Social Needs    Financial resource strain: Not on file    Food insecurity - worry: Not on file    Food insecurity - inability: Not on file    Transportation needs - medical: Not on file    Transportation needs - non-medical: Not on file   Occupational History    Not on file   Tobacco Use    Smoking status: Former Smoker     Packs/day: 1.00     Years: 20.00     Pack years: 20.00     Last attempt to quit: 1995     Years since quittin.9    Smokeless tobacco: Never Used   Substance and Sexual Activity    Alcohol use: No     Alcohol/week: 0.0 oz    Drug use: No    Sexual activity: Yes     Partners: Female     Birth control/protection: None   Other Topics Concern    Not on file   Social History Narrative    Not on file        Medication List           Accurate as of 19  8:05 AM. If you have any questions, ask your nurse or doctor.               CHANGE how you take these medications    hydrALAZINE 25 MG tablet  Commonly known as:  APRESOLINE  Take 1 tablet (25 mg total) by mouth 3 (three) times daily.  What changed:  when to take this        CONTINUE taking these medications    amLODIPine 10 MG tablet  Commonly known as:  NORVASC  TAKE 1 TABLET BY MOUTH ONCE DAILY.     aspirin 81 mg Tab     benazepril 20 MG tablet  Commonly known as:  LOTENSIN  TAKE 2 TABLETS BY MOUTH ONCE A DAY.     ciprofloxacin HCl 250 MG tablet  Commonly known as:  CIPRO     citalopram 20 MG tablet  Commonly known as:  CELEXA     clonazePAM 2 MG Tab  Commonly known as:  KLONOPIN  Take 1 tablet (2 mg total) by mouth every evening.     fluticasone-salmeterol 115-21 mcg/actuation Hfaa  Commonly known as:  ADVAIR HFA  Inhale 2 puffs into the lungs 2 (two) times daily.     gabapentin 300 MG capsule  Commonly known as:  NEURONTIN  TAKE 2 CAPSULES BY MOUTH THREE TIMES DAILY.     hydroCHLOROthiazide 12.5 mg capsule  Commonly known as:   MICROZIDE  TAKE 1 CAPSULE BY MOUTH ONCE DAILY.     metoprolol tartrate 50 MG tablet  Commonly known as:  LOPRESSOR  TAKE 1 TABLET BY MOUTH TWICE A DAY.     omeprazole 40 MG capsule  Commonly known as:  PRILOSEC  TAKE 1 CAPSULE BY MOUTH ONCE DAILY.     rOPINIRole 1 MG tablet  Commonly known as:  REQUIP  Take 1 tablet (1 mg total) by mouth every evening.     tiotropium 18 mcg inhalation capsule  Commonly known as:  SPIRIVA  Inhale 1 capsule (18 mcg total) into the lungs once daily. Controller     * tiZANidine 2 MG tablet  Commonly known as:  ZANAFLEX  Take 1 tablet (2 mg total) by mouth once daily.     * tiZANidine 4 MG tablet  Commonly known as:  ZANAFLEX  TAKE 1 TABLET BY MOUTH EVERY 8 HOURS AS NEEDED.     traZODone 100 MG tablet  Commonly known as:  DESYREL  Take 1 tablet (100 mg total) by mouth every evening.     VITAMIN B-12 1000 MCG tablet  Generic drug:  cyanocobalamin     zolpidem 10 mg Tab  Commonly known as:  AMBIEN  TAKE 1 TABLET BY MOUTH NIGHTLY AS NEEDED.         * This list has 2 medication(s) that are the same as other medications prescribed for you. Read the directions carefully, and ask your doctor or other care provider to review them with you.              Review of patient's allergies indicates:   Allergen Reactions    Crestor [rosuvastatin]      Joint pain      ROS    Objective:   There is no height or weight on file to calculate BMI.  There were no vitals filed for this visit.    General: Mil is well-developed, well-nourished, appears stated age, in no acute distress, alert and oriented to time, place and person.       Ortho/SPM Exam    Assessment:     No diagnosis found.     Plan:
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
## 2019-01-16 NOTE — LETTER
January 16, 2019      Blayne Atwood MD  [address]  [address]  [facility]  [place] [address]           [facility]  [address] [facility] [address]  Phone: [phone]  Fax: [phone]          Patient: Mil Sood   MR Number: [id number]   YOB: 1955   Date of Visit: 1/16/2019       Dear Dr. Blayne Atwood:    Thank you for referring Mil Sood to me for evaluation. Attached you will find relevant portions of my assessment and plan of care.    If you have questions, please do not hesitate to call me. I look forward to following Mil Sood along with you.    Sincerely,    Delores Mcknight PA-C    Enclosure  CC:  No Recipients    If you would like to receive this communication electronically, please contact [email or web address] or [phone] to request more information on [employer] access.    For providers and/or their staff who would like to refer a patient to [facility], please contact us through our one-stop-shop provider referral line, [facility], at [phone].    If you feel you have received this communication in error or would no longer like to receive these types of communications, please e-mail [email or web address]

## 2019-01-16 NOTE — PROGRESS NOTES
"Subjective:      Patient ID: Mil Sood is a 63 y.o. male.    Chief Complaint: Lumbar Spine Pain (L-Spine)    HPI     The patient is here today for evaluation of lumbar spine pain.  His pain is constant and radiates down his legs. He states that his toes are numb and are "on fire." His right lower extremity is more symptomatic. The patient had EMG/NCV in December of 2018 which indicated chronic radiculopathy of L5-S1 greater on the right side. The patient currently is not prescribed any pain medication. His current pain is a 6 on a pain scale of 1-10. The patients symptoms are gradually worsening. The patient has not had any previous lumbar surgery. He has had surgery on his cervical spine. The patient has not tried formal physical therapy or pain management/injections yet. The patient has experienced these symptoms for years. He denies any bladder or bowel issues.     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     Review of Systems   Constitutional: Negative for activity change, appetite change and chills.   HENT: Negative for hearing loss, sore throat and tinnitus.    Eyes: Negative for pain, discharge and itching.   Cardiovascular: Negative for chest pain.   Gastrointestinal: Negative for abdominal pain.   Endocrine: Negative for cold intolerance and heat intolerance.   Genitourinary: Negative for difficulty urinating and dysuria.   Musculoskeletal: Positive for back pain and gait problem.   Allergic/Immunologic: Negative for environmental allergies.   Neurological: Positive for weakness. Negative for dizziness, tremors, light-headedness and headaches.   Hematological: Negative for adenopathy.   Psychiatric/Behavioral: Negative for agitation, behavioral problems and confusion.         Objective:       Physical Exam:  Nursing note and vitals reviewed.    Constitutional: He appears well-developed and well-nourished. No distress.     Eyes: Pupils " are equal, round, and reactive to light. EOM are normal.     Cardiovascular: Intact distal pulses.     Abdominal: Soft.     Psych/Behavior: He is alert. He is oriented to person, place, and time. He has a normal mood and affect.     Neurological:        DTRs: Tricep reflexes are 2+ on the right side and 2+ on the left side. Bicep reflexes are 2+ on the right side and 2+ on the left side. Brachioradialis reflexes are 2+ on the right side and 2+ on the left side. Achilles reflexes are 2+ on the right side and 2+ on the left side.     General    Nursing note and vitals reviewed.  Constitutional: He is oriented to person, place, and time. He appears well-developed and well-nourished. No distress.   HENT:   Head: Normocephalic and atraumatic.   Nose: Nose normal.   Eyes: EOM are normal. Pupils are equal, round, and reactive to light.   Neck: Normal range of motion. Neck supple.   Cardiovascular: Intact distal pulses.    Pulmonary/Chest: Effort normal. No respiratory distress.   Abdominal: Soft. He exhibits no distension.   Neurological: He is alert and oriented to person, place, and time. No cranial nerve deficit.   Psychiatric: He has a normal mood and affect. His behavior is normal.         Right Ankle/Foot Exam     Tests   Heel Walk: unable to perform  Tiptoe Walk: unable to perform  Single Heel Rise: able to perform  Double Heel Rise: unable to perform double heel rise    Comments:  Weakness with DF and PF    Left Ankle/Foot Exam     Tests   Heel Walk: unable to perform  Tiptoe Walk: unable to perform  Single Heel Rise: able to perform  Double Heel Rise: able to perform    Comments:  Weakness with DF and PF  Back (L-Spine & T-Spine) / Neck (C-Spine) Exam     Tenderness Posterior midline palpation reveals tenderness of the Upper L-Spine and Lower L-Spine. Right paramedian tenderness of the Lower L-Spine, Sacrum and Upper L-Spine. Left paramedian tenderness of the Lower L-Spine, Sacrum and Upper L-Spine.     Back  (L-Spine & T-Spine) Range of Motion   Lateral bend right: abnormal   Lateral bend left: abnormal     Neck (C-Spine) Range of Motion   Flexion:     Normal  Extension: Normal  Right Lateral Bend: normal  Left Lateral Bend: normal  Right Rotation: normal  Left Rotation: normal    Spinal Sensation   Right Side Sensation  C-Spine Level: normal   L-Spine Level: normal  Left Side Sensation  C-Spine Level: normal  L-Spine Level: normal    Back (L-Spine & T-Spine) Tests   Right Side Tests  Squat Test: unable to perform  Left Side Tests  Squat: unable to perform    Neck (C-Spine) Tests   Spurling's Test   Left:  Negative  Right: negative      Muscle Strength   Right Upper Extremity   Biceps: 5/5/5   Deltoid:  5/5  Triceps:  5/5  Wrist extension: 5/5/5   Wrist flexion: 5/5/5   Finger Flexors:  5/5  Finger Extensors:  5/5  Left Upper Extremity  Biceps: 5/5/5   Deltoid:  5/5  Triceps:  5/5  Wrist extension: 5/5/5   Wrist flexion: 5/5/5   Finger Flexors:  5/5  Finger Extensors:  5/5  Right Lower Extremity   Hip Abduction: 5/5   Hip Flexion: 5/5   Hip Extensors: 5/5  Quadriceps:  5/5   Hamstrin/5   Anterior tibial:  5/5/5  Gastrocsoleus:  5/5/5  EHL:  5/5  Left Lower Extremity   Hip Abduction: 5/5   Hip Flexion: 5/5   Hip Extensors: 5/5  Quadriceps:  5/5   Hamstrin/5   Anterior tibial:  5/5/5   Gastrocsoleus:  5/5/5  EHL:  5/5    Reflexes     Left Side  Biceps:  2+  Triceps:  2+  Brachioradialis:  2+  Quadriceps:  2+  Post Tibial:  2+  Achilles:  2+  Left Rios's Sign:  Absent    Right Side   Biceps:  2+  Triceps:  2+  Brachioradialis:  2+  Quadriceps:  2+  Post Tibial:  2+  Achilles:  2+  Right Rios's Sign:  absent    I  reviewed all pertinent imaging regarding this case.    MR Lumbar   L1-L2: Degenerative disc disease.  Disc bulge with mild central canal stenosis.  No significant neural foraminal stenosis.    L2-L3: Degenerative disc disease.  Mild disc bulge without central canal or neural foraminal  stenosis.    L3-L4: Degenerative disc disease.  Disc bulge and ligamentum flavum thickening with mild central canal narrowing and mild bilateral neural foraminal stenosis.    L4-5: Degenerative disc disease.  Patent central canal.  Mild right neural foraminal stenosis due to degenerative endplate spurring.    L5-S1: Degenerative disc disease.  Bilateral facet joint osteoarthritis.  Disc bulge without central canal narrowing.  There is severe bilateral neural foraminal stenosis due to disc bulge and degenerative endplate spurring.      Impression       1. Severe bilateral L5-S1 neural foraminal stenosis due to degenerative endplate spurring and a disc bulge.  2. Multilevel marked degenerative disc disease at all levels of the lumbar spine.  3. No significant central canal stenosis seen at any level in the lumbar spine.       Assessment:     1. Degenerative disc disease, lumbar      Plan:     Degenerative disc disease, lumbar  -     X-Ray Lumbar Spine Flexion And Extension Only; Future; Expected date: 01/16/2019  -     Ambulatory Referral to Physical/Occupational Therapy  -     HYDROcodone-acetaminophen (NORCO) 5-325 mg per tablet; Take 1 tablet by mouth every 6 (six) hours as needed for Pain.  Dispense: 30 tablet; Refill: 0    Other orders  -     methocarbamol (ROBAXIN) 750 MG Tab; Take 1 tablet (750 mg total) by mouth 3 (three) times daily as needed.  Dispense: 40 tablet; Refill: 0    Treatment options were discussed today.   The patient is a candidate for a lumbar spinal fusion however he will be referred to outpatient physical therapy and interventional pain management for an injection prior to considering surgery.  He will get flexion and extension x-rays of his lumbar spine to look for instability.   We will arrange for an appointment with IPM for bilateral L5-S1 RIGOBERTO. Patient will follow-up 2 weeks after injection.        Thank you for the referral   Please call with any questions    Blayne Atwood  MD  Neurosurgery

## 2019-01-16 NOTE — TELEPHONE ENCOUNTER
----- Message from Narda Rich sent at 1/16/2019  1:56 PM CST -----  Contact: Pt   Pt called and stated he needs to know if he was prescribed a pain medication along with the muscle relaxer. He can be reached at [phone].     Thanks,  TF

## 2019-01-21 ENCOUNTER — OFFICE VISIT (OUTPATIENT)
Dept: SURGERY | Facility: CLINIC | Age: 64
End: 2019-01-21
Payer: MEDICARE

## 2019-01-21 ENCOUNTER — CLINICAL SUPPORT (OUTPATIENT)
Dept: REHABILITATION | Facility: HOSPITAL | Age: 64
End: 2019-01-21
Payer: MEDICARE

## 2019-01-21 VITALS
TEMPERATURE: 98 F | HEART RATE: 62 BPM | WEIGHT: 211 LBS | SYSTOLIC BLOOD PRESSURE: 117 MMHG | DIASTOLIC BLOOD PRESSURE: 76 MMHG | BODY MASS INDEX: 31.16 KG/M2

## 2019-01-21 DIAGNOSIS — R10.32 LEFT INGUINAL PAIN: Primary | ICD-10-CM

## 2019-01-21 DIAGNOSIS — M54.16 LUMBAR RADICULOPATHY: Primary | ICD-10-CM

## 2019-01-21 PROCEDURE — 3008F PR BODY MASS INDEX (BMI) DOCUMENTED: ICD-10-PCS | Mod: CPTII,HCNC,S$GLB, | Performed by: SURGERY

## 2019-01-21 PROCEDURE — 99999 PR PBB SHADOW E&M-EST. PATIENT-LVL IV: CPT | Mod: PBBFAC,HCNC,, | Performed by: SURGERY

## 2019-01-21 PROCEDURE — G8978 MOBILITY CURRENT STATUS: HCPCS | Mod: CK,HCNC

## 2019-01-21 PROCEDURE — 3078F DIAST BP <80 MM HG: CPT | Mod: CPTII,HCNC,S$GLB, | Performed by: SURGERY

## 2019-01-21 PROCEDURE — 3074F PR MOST RECENT SYSTOLIC BLOOD PRESSURE < 130 MM HG: ICD-10-PCS | Mod: CPTII,HCNC,S$GLB, | Performed by: SURGERY

## 2019-01-21 PROCEDURE — 3008F BODY MASS INDEX DOCD: CPT | Mod: CPTII,HCNC,S$GLB, | Performed by: SURGERY

## 2019-01-21 PROCEDURE — 99212 OFFICE O/P EST SF 10 MIN: CPT | Mod: HCNC,S$GLB,, | Performed by: SURGERY

## 2019-01-21 PROCEDURE — G8979 MOBILITY GOAL STATUS: HCPCS | Mod: CI,HCNC

## 2019-01-21 PROCEDURE — 99999 PR PBB SHADOW E&M-EST. PATIENT-LVL IV: ICD-10-PCS | Mod: PBBFAC,HCNC,, | Performed by: SURGERY

## 2019-01-21 PROCEDURE — 3074F SYST BP LT 130 MM HG: CPT | Mod: CPTII,HCNC,S$GLB, | Performed by: SURGERY

## 2019-01-21 PROCEDURE — 3078F PR MOST RECENT DIASTOLIC BLOOD PRESSURE < 80 MM HG: ICD-10-PCS | Mod: CPTII,HCNC,S$GLB, | Performed by: SURGERY

## 2019-01-21 PROCEDURE — 97161 PT EVAL LOW COMPLEX 20 MIN: CPT | Mod: HCNC

## 2019-01-21 PROCEDURE — 97110 THERAPEUTIC EXERCISES: CPT | Mod: HCNC

## 2019-01-21 PROCEDURE — 99212 PR OFFICE/OUTPT VISIT, EST, LEVL II, 10-19 MIN: ICD-10-PCS | Mod: HCNC,S$GLB,, | Performed by: SURGERY

## 2019-01-21 NOTE — PROGRESS NOTES
PHYSICAL THERAPY INITIAL OUTPATIENT EVALUATION    Referring Provider:  Umm Whitney    Diagnosis:       ICD-10-CM ICD-9-CM    1. Lumbar radiculopathy M54.16 724.4        Orders:  Evaluate and Treat    Date of Initial Evaluation: 1/21/19    Visit # 1     BACKGROUND: 62 yo male who was referred to PT due to LBP. Patient reports having an MRI on his back and that there are multiple disc bulges throughout.  He reports the following information:  - duration of pain: pain for years, worse over past ~3 months   - timing: constant pain  - character: aching, burning, numbness  - radiating, dermatomal: extends into bilateral lower extremities & his last 3 toes with numbness / tingling / burning  - reports no prior trauma, prior cervical surgery (C4-C6 ACDF in 2005  - pertinent negatives: No fever, No chills, No weight loss, No bladder dysfunction, No bowel dysfunction, No saddle anesthesia    OBJECTIVE      Posture/Structure:  Slightly flexed posture in standing/walking (at hips), Leans to right in sitting (able to correct); BLE hamstring length: (45 degrees bilaterally)    L/sp AROM:   % limited Pain Present (Y/N)   FB 75% Y   RSB 25% minimal   LSB 25% minimal   BB 75% (standing) Y     Palpation: tenderness to touch throughout the T/S and L/S paraspinals    Strength:   Right Left   Hip Flexor/ER 3-/5     3- /5   Quad 4+/5     4 /5   Hamstring 4+/5      4/5   ANT TIB 5/5      5/5     Neuro/Sensation:  Tingling/numbness Left anterior thigh (sometimes on right) and bilateral last three toes both lower extremities    Special Test: positive slump test and SLR test bilaterally    Function: Patient reports 42% disability based on score of the Oswestry Low back Pain questionnaire Scale. (see below)    MODIFIED OSWESTRY LOW BACK PAIN DISABILITY QUESTIONNAIRE    Completed by patient on 1/21/19    The following scores are patient-reported and range from 0-5, with 0 being least impaired and 5 being most impaired.      Section 1-  Pain intensity    Score 3/5   Section 2- Person care  Score 1/5   Section 3 Lifting- Optional  Score 1/5     Section 4  Walking  Score 2/5  Section 5 Sitting   Score 2/5   Section 6 Standing  Score 2/5  Section 7 Sleeping  Score 2/5   Section 8 Social Life   Score 1/5  Section 9 Traveling  Score 3/5   Section 10 Employ/home  Score 4/5      ASSESSMENT:  The patient is a 63 y.o. year old male who presents to physical therapy with complaints of worsening of LBP.  Patient's impairments include radicular pain down LLE (sometimes has radicular pain in RLE), numbness in left anterior thigh and bilateral last 3 toes on both legs .  These impairments are limiting patient's ability to sit or stand for long periods of time and walking for more than 1/2 mile. Traveling is limited due to inability to sit for long periods of time.  Patient's prognosis is good for stated goals.  Patient will benefit from skilled physical therapy intervention to instruct and educate on positions to avoid that will increase pain and exercises to perform to decrease pain/numbness.  During today's session, patient did not have relief in pain and had an increase in pain during standing back extension. Prone prop on elbows was tolerated and did not increase pain. He was able to shift weight over the LLE in sitting and standing once corrected. He ambulated with slight trunk/hip flexion, but was able to correct with cues. Pt presentation is stable requiring a low complexity evaluation.     Short Term Goals:    1.I with HEP  2.Pt to increase lumbar ROM to WNL (trunk flexion and lateral flexion B)  3. Pt to increase BLE hip flexion strength and ER to 3+/5    4.Pt to have pain less than 4/10 at all times.  5.Pt to improve with hamstring length by 20 degrees  6.Pt to score less than 30% impaired on the Oswestry back pain questionnaire  Long Term Goals:  1.Pt to score less than 10% On Oswestry Low back pain disability questionnaire    TREATMENT  PROVIDED:  -Manual Therapy: unable to tolerated L/S or T/S mobs due to tenderness  -Therapeutic Exercise:  Patient instructed in prone on elbows to tolerance, supine pelvic tilts, supine LTR within pain-free range, supine knee to chest within pain-free range.  -Modalities: -----  -Evaluation: Eval complete and goals established with patient  -Education on proper posture, body mechanics and condition; HEP provided to patient    PLAN:  Patient will benefit from physical therapy (2) x/week for (6) weeks including manual therapy, therapeutic exercise, functional activities, modalities, and patient education.    Thank you for this referral.    These services are reasonable and necessary for the conditions set forth above while under my care.     Feli Nunez, PT

## 2019-01-21 NOTE — PROGRESS NOTES
Subjective:       Patient ID: Mil Sood is a 63 y.o. male.    Left groin pain    Chief Complaint: Consult and Hernia    Patient felt the pain in the left groin.  It to sharp stabbing pain it developed about the same time he developed pain in his back in some weakness in his left leg.  He was seen by his primary care doctor there was concern for recurrence.  He had bilateral open inguinal hernia repairs done in 2016.  He does not report a bulge      Review of Systems   Musculoskeletal:        Left groin pain       Objective:      Physical Exam   Constitutional: He appears well-developed and well-nourished. No distress.   HENT:   Head: Normocephalic and atraumatic.   Abdominal: Soft. Bowel sounds are normal. A hernia (No evidence of a right or left recurrent inguinal hernia) is present.   Well-healed incision   Vitals reviewed.      Assessment:      no evidence of a recurrent hernia.  The patient states his scheduled for back surgery in several months.      Plan:       Ice packs, nonsteroidal anti inflammatories.  Follow up in 6 months

## 2019-01-21 NOTE — LETTER
January 21, 2019      Yumi Roberson MD  [address] [address] [address]           [facility]  [address] [place] [id number]  Phone: [phone]  Fax: [phone]          Patient: Mil Sood   MR Number: [id number]   YOB: 1955   Date of Visit: 1/21/2019       Dear Dr. Yumi Roberson:    Thank you for referring Mil Sood to me for evaluation. Attached you will find relevant portions of my assessment and plan of care.    If you have questions, please do not hesitate to call me. I look forward to following Mil Sood along with you.    Sincerely,    Shaji Jauregui MD    Enclosure  CC:  No Recipients    If you would like to receive this communication electronically, please contact [email or web address] or [phone] to request more information on [facility] Link access.    For providers and/or their staff who would like to refer a patient to [facility], please contact us through our one-stop-shop provider referral line, Mindy Rene, at [phone].    If you feel you have received this communication in error or would no longer like to receive these types of communications, please e-mail [email or web address]

## 2019-01-21 NOTE — PATIENT INSTRUCTIONS
Try ice packs  You can also try aleve, ibuprofen/motrin      If you notice a bulge in the left inguinal area please make an appointment to see us

## 2019-01-22 RX ORDER — GABAPENTIN 300 MG/1
CAPSULE ORAL
Qty: 270 CAPSULE | Refills: 0 | Status: SHIPPED | OUTPATIENT
Start: 2019-01-22 | End: 2019-03-12 | Stop reason: SDUPTHER

## 2019-01-28 ENCOUNTER — CLINICAL SUPPORT (OUTPATIENT)
Dept: REHABILITATION | Facility: HOSPITAL | Age: 64
End: 2019-01-28
Payer: MEDICARE

## 2019-01-28 ENCOUNTER — TELEPHONE (OUTPATIENT)
Dept: NEUROSURGERY | Facility: CLINIC | Age: 64
End: 2019-01-28

## 2019-01-28 DIAGNOSIS — M54.16 LUMBAR RADICULOPATHY: Primary | ICD-10-CM

## 2019-01-28 PROCEDURE — 97014 ELECTRIC STIMULATION THERAPY: CPT | Mod: HCNC

## 2019-01-28 PROCEDURE — 97010 HOT OR COLD PACKS THERAPY: CPT | Mod: HCNC

## 2019-01-28 PROCEDURE — 97110 THERAPEUTIC EXERCISES: CPT | Mod: HCNC

## 2019-01-28 NOTE — TELEPHONE ENCOUNTER
Left message informing pt of next appt is 2/13 if he'd like and earlier appt to give call back//ns    Spoke with pt informed pt of 's last note, pt is scheduled for injection on 1/31/19 and visit with  On 2/13/19//ns    ----- Message from Sabrina Funez sent at 1/28/2019 10:38 AM CST -----  Contact: pt  Please call pt @ [phone] regarding physical therapy, pt states it's not working, pt need to discuss with nurse.

## 2019-01-28 NOTE — PROGRESS NOTES
PHYSICAL THERAPY PROGRESS NOTE:    Referring Provider:  Umm Whitney    Diagnosis:       ICD-10-CM ICD-9-CM    1. Lumbar radiculopathy M54.16 724.4        Orders:  Evaluate and Treat    Date of Initial Evaluation: 1/21/19    Visit # 2    BACKGROUND: 64 yo male who was referred to PT due to LBP. Patient reports having an MRI on his back and that there are multiple disc bulges throughout.  He reports the following information:  - duration of pain: pain for years, worse over past ~3 months   - timing: constant pain  - character: aching, burning, numbness  - radiating, dermatomal: extends into bilateral lower extremities & his last 3 toes with numbness / tingling / burning  - reports no prior trauma, prior cervical surgery (C4-C6 ACDF in 2005)    SUBJECTIVE: Patient reports that he has not had any pain relief with the exercises prescribed. He reports that he almost didn't come to therapy today because of pain. He reports that he has an injection scheduled for Thursday and will try therapy if the injection gives him any pain relief.    -OBJECTIVE      Posture/Structure:  Slightly flexed posture in standing/walking (at hips), lateral rotation right when walking, Leans to right in sitting (able to correct);   BLE hamstring length: (45 degrees bilaterally)    L/sp AROM:   % limited Pain Present (Y/N)   FB 75% Y   RSB 25% minimal   LSB 25% minimal   BB 75% (standing) Y     Palpation: tenderness to touch throughout the T/S and L/S paraspinals    Strength:   Right Left   Hip Flexor/ER 3-/5     3- /5   Quad 4+/5     4 /5   Hamstring 4+/5      4/5   ANT TIB 5/5      5/5     Neuro/Sensation:  Tingling/numbness Left anterior thigh (sometimes on right) and bilateral last three toes both lower extremities    Special Test: positive slump test and SLR test bilaterally    Function: Patient reports 42% disability based on score of the Oswestry Low back Pain questionnaire Scale. (see below)    MODIFIED OSWESTRY LOW BACK PAIN  DISABILITY QUESTIONNAIRE    Completed by patient on 1/21/19    The following scores are patient-reported and range from 0-5, with 0 being least impaired and 5 being most impaired.      Section 1- Pain intensity    Score 3/5   Section 2- Person care  Score 1/5   Section 3 Lifting- Optional  Score 1/5     Section 4  Walking  Score 2/5  Section 5 Sitting   Score 2/5   Section 6 Standing  Score 2/5  Section 7 Sleeping  Score 2/5   Section 8 Social Life   Score 1/5  Section 9 Traveling  Score 3/5   Section 10 Employ/home  Score 4/5      ASSESSMENT:  The patient is a 63 y.o. year old male who presents to physical therapy with complaints of worsening of LBP.  Patient's impairments include radicular pain down LLE (sometimes has radicular pain in RLE), numbness in left anterior thigh and bilateral last 3 toes on both legs .  These impairments are limiting patient's ability to sit or stand for long periods of time and walking for more than 1/2 mile. Traveling is limited due to inability to sit for long periods of time.  During today's session, patient did not have relief in pain and reported that none of the exercises prescribed gave him  pain relief. He had no relief after MH and IFC e-stim. He was unable to tolerated any exercises today.    Short Term Goals:    1.I with HEP  2.Pt to increase lumbar ROM to WNL (trunk flexion and lateral flexion B)  3. Pt to increase BLE hip flexion strength and ER to 3+/5    4.Pt to have pain less than 4/10 at all times.  5.Pt to improve with hamstring length by 20 degrees  6.Pt to score less than 30% impaired on the Oswestry back pain questionnaire  Long Term Goals:  1.Pt to score less than 10% On Oswestry Low back pain disability questionnaire    TREATMENT PROVIDED:  -Manual Therapy:   -Therapeutic Exercise: performed lateral pelvic shifts in standing with increase in pain, prone positioning did not improve pain, unable to perform other exercises due to pain.  -Modalities: MH + IFC x 15  minutes for pain management (no change in pain)  -Evaluation: ------  -Education on proper posture, body mechanics and condition    PLAN:  Patient will benefit from physical therapy (2) x/week to include manual therapy, therapeutic exercise, functional activities, modalities, and patient education.    Thank you for this referral.    These services are reasonable and necessary for the conditions set forth above while under my care.     Feli Nunez, PT

## 2019-01-29 ENCOUNTER — PATIENT OUTREACH (OUTPATIENT)
Dept: ADMINISTRATIVE | Facility: HOSPITAL | Age: 64
End: 2019-01-29

## 2019-01-29 NOTE — PROGRESS NOTES
Statin Therapy: Completed statin therapy risk assessment dated 04/26/18//11/01/2018//01/09/2019 [facility] for [facility] Ad[facility] Functional Status Assessment measurement year 2018. Intolerance documented.

## 2019-01-31 ENCOUNTER — HOSPITAL ENCOUNTER (OUTPATIENT)
Facility: HOSPITAL | Age: 64
Discharge: HOME | End: 2019-01-31
Attending: ANESTHESIOLOGY | Admitting: ANESTHESIOLOGY
Payer: MEDICARE

## 2019-01-31 VITALS
SYSTOLIC BLOOD PRESSURE: 114 MMHG | OXYGEN SATURATION: 97 % | DIASTOLIC BLOOD PRESSURE: 74 MMHG | RESPIRATION RATE: 16 BRPM | HEART RATE: 80 BPM

## 2019-01-31 DIAGNOSIS — M54.16 LUMBAR RADICULOPATHY: ICD-10-CM

## 2019-01-31 PROCEDURE — 63600175 PHARM REV CODE 636 W HCPCS: Mod: HCNC

## 2019-01-31 PROCEDURE — 62323 NJX INTERLAMINAR LMBR/SAC: CPT | Mod: HCNC,,, | Performed by: ANESTHESIOLOGY

## 2019-01-31 PROCEDURE — 63600175 PHARM REV CODE 636 W HCPCS: Mod: HCNC | Performed by: ANESTHESIOLOGY

## 2019-01-31 PROCEDURE — 62323 PR INJ LUMBAR/SACRAL, W/IMAGING GUIDANCE: ICD-10-PCS | Mod: HCNC,,, | Performed by: ANESTHESIOLOGY

## 2019-01-31 PROCEDURE — 25000003 PHARM REV CODE 250: Mod: HCNC

## 2019-01-31 PROCEDURE — 25000003 PHARM REV CODE 250: Mod: HCNC | Performed by: ANESTHESIOLOGY

## 2019-01-31 RX ORDER — DEXAMETHASONE SODIUM PHOSPHATE 10 MG/ML
INJECTION INTRAMUSCULAR; INTRAVENOUS
Status: DISCONTINUED | OUTPATIENT
Start: 2019-01-31 | End: 2019-01-31 | Stop reason: HOSPADM

## 2019-01-31 RX ORDER — DIAZEPAM 5 MG/1
5 TABLET ORAL ONCE
Status: COMPLETED | OUTPATIENT
Start: 2019-01-31 | End: 2019-01-31

## 2019-01-31 RX ORDER — LIDOCAINE HYDROCHLORIDE 10 MG/ML
INJECTION, SOLUTION EPIDURAL; INFILTRATION; INTRACAUDAL; PERINEURAL
Status: DISCONTINUED | OUTPATIENT
Start: 2019-01-31 | End: 2019-01-31 | Stop reason: HOSPADM

## 2019-01-31 RX ADMIN — DIAZEPAM 5 MG: 5 TABLET ORAL at 12:01

## 2019-01-31 NOTE — OP NOTE
PROCEDURE: Lumbar epidural steroid injection under fluoroscopic guidance    LEVEL: L5/S1  SIDE: Midline     PROCEDURE DATE: 1/31/2019    PREOPERATIVE DIAGNOSIS: Lumbar radiculopathy  POSTOPERATIVE DIAGNOSIS: Lumbar radiculopathy    PROVIDER: Garcia Galvan MD  Assistant(s): None    ANESTHESIA: Local, Diazepam 5 mg po    >> 0 mg of VERSED    >> 0 mcg of FENTANYL    INDICATION: The patient has low back pain and radiculopathy symptoms unresponsive to conservative treatments. Fluoroscopy was used to optimize visualization of needle placement and to maximize safety.        PROCEDURE DESCRIPTION / TECHNIQUE:   The patient was seen and identified in the preoperative area. Risks, benefits, complications, and alternatives were discussed with the patient. The patient agreed to proceed with the procedure and signed the consent. The site and side of the procedure was identified and marked. An IV was not placed for this procedure.    The patient was taken to the procedural suite. The patient was positioned in prone orientation on procedure table and a pillow was placed under the abdomen to reduce lumbar lordosis. A time out was performed prior to any intervention. The procedure, site, side, and allergies were stated and agreed to by all present. The lumbosacral area was widely prepped with ChloraPrep. The procedural site was draped in usual sterile fashion. Vital signs were closely monitored throughout this procedure. Conscious sedation was not used for this procedure.    Using anterior-posterior fluoroscopy, the above noted INTERLAMINAR SPACE was identified and the skin over this site of intended entry was marked and then infiltrated with 3-4 mL of PF 1% LIDOCAINE subcutaneously using a 1.5 inch 27 gauge needle. A 20-gauge Tuohy epidural needle was then inserted and advanced toward the epidural space incrementally under fluoroscopic guidance. A Loss of Resistance technique was used as the epidural needle was advanced. Once loss  of resistance was realized and after negative aspiration of blood and spinal fluid, correct needle position within the epidural space was verified with injection of 0.5 to 1 mL of contrast dye (Omnipaque 240). Appropriate epidural spread was seen on imaging. Again, after negative aspiration for blood and spinal fluid a 4 mL mixture containing 2 mL of Dexamethasone (10 mg/mL) and 2 mL of preservative free Normal Saline was then injected. No pain or paresthesias were noted with injection. There was low resistance during the injection. Washout of epidurogram was seen on fluoroscopy following injection of the above solution. The stylet was replaced and the Tuohy needle was withdrawn intact. The skin was cleansed, and bandages were applied.    Description of Findings: Not applicable    Prosthetic devices, grafts, tissues, or devices implanted: None    Specimen Removed: No    Estimated Blood Loss: minimal    COMPLICATIONS: None    DISPOSITION / PLANS: The patient was transferred to the recovery area in a stable condition for observation. The patient was reexamined prior to discharge. There was no evidence of acute neurologic injury following the procedure.  Patient was discharged from the recovery room after meeting discharge criteria. Home discharge instructions were given to the patient by the staff.

## 2019-01-31 NOTE — PLAN OF CARE
Problem: Adult Inpatient Plan of Care  Goal: Plan of Care Review  Outcome: Outcome(s) achieved Date Met: 01/31/19  Patient d/c home in stable condition via wheelchair with ride. Verbalized understanding of d/c instructions. Patient voiced no complaints at this time. Patient stood at side of bed, walked steps with no new motor deficits. Neurologically intact.

## 2019-02-04 RX ORDER — OMEPRAZOLE 40 MG/1
CAPSULE, DELAYED RELEASE ORAL
Qty: 60 CAPSULE | Refills: 0 | Status: SHIPPED | OUTPATIENT
Start: 2019-02-04 | End: 2019-05-20 | Stop reason: SDUPTHER

## 2019-02-13 ENCOUNTER — OFFICE VISIT (OUTPATIENT)
Dept: NEUROSURGERY | Facility: CLINIC | Age: 64
End: 2019-02-13
Payer: MEDICARE

## 2019-02-13 VITALS
SYSTOLIC BLOOD PRESSURE: 141 MMHG | WEIGHT: 211 LBS | HEIGHT: 69 IN | RESPIRATION RATE: 12 BRPM | HEART RATE: 63 BPM | BODY MASS INDEX: 31.25 KG/M2 | DIASTOLIC BLOOD PRESSURE: 93 MMHG

## 2019-02-13 DIAGNOSIS — G89.29 CHRONIC BILATERAL LOW BACK PAIN WITH BILATERAL SCIATICA: Primary | ICD-10-CM

## 2019-02-13 DIAGNOSIS — M54.41 CHRONIC BILATERAL LOW BACK PAIN WITH BILATERAL SCIATICA: Primary | ICD-10-CM

## 2019-02-13 DIAGNOSIS — M54.42 CHRONIC BILATERAL LOW BACK PAIN WITH BILATERAL SCIATICA: Primary | ICD-10-CM

## 2019-02-13 DIAGNOSIS — S43.431A TEAR OF RIGHT GLENOID LABRUM, INITIAL ENCOUNTER: ICD-10-CM

## 2019-02-13 PROCEDURE — 3080F PR MOST RECENT DIASTOLIC BLOOD PRESSURE >= 90 MM HG: ICD-10-PCS | Mod: HCNC,CPTII,S$GLB, | Performed by: NEUROLOGICAL SURGERY

## 2019-02-13 PROCEDURE — 3080F DIAST BP >= 90 MM HG: CPT | Mod: HCNC,CPTII,S$GLB, | Performed by: NEUROLOGICAL SURGERY

## 2019-02-13 PROCEDURE — 3008F PR BODY MASS INDEX (BMI) DOCUMENTED: ICD-10-PCS | Mod: HCNC,CPTII,S$GLB, | Performed by: NEUROLOGICAL SURGERY

## 2019-02-13 PROCEDURE — 3008F BODY MASS INDEX DOCD: CPT | Mod: HCNC,CPTII,S$GLB, | Performed by: NEUROLOGICAL SURGERY

## 2019-02-13 PROCEDURE — 99214 PR OFFICE/OUTPT VISIT, EST, LEVL IV, 30-39 MIN: ICD-10-PCS | Mod: HCNC,S$GLB,, | Performed by: NEUROLOGICAL SURGERY

## 2019-02-13 PROCEDURE — 3077F PR MOST RECENT SYSTOLIC BLOOD PRESSURE >= 140 MM HG: ICD-10-PCS | Mod: HCNC,CPTII,S$GLB, | Performed by: NEUROLOGICAL SURGERY

## 2019-02-13 PROCEDURE — 99999 PR PBB SHADOW E&M-EST. PATIENT-LVL V: ICD-10-PCS | Mod: PBBFAC,HCNC,, | Performed by: NEUROLOGICAL SURGERY

## 2019-02-13 PROCEDURE — 99999 PR PBB SHADOW E&M-EST. PATIENT-LVL V: CPT | Mod: PBBFAC,HCNC,, | Performed by: NEUROLOGICAL SURGERY

## 2019-02-13 PROCEDURE — 99214 OFFICE O/P EST MOD 30 MIN: CPT | Mod: HCNC,S$GLB,, | Performed by: NEUROLOGICAL SURGERY

## 2019-02-13 PROCEDURE — 3077F SYST BP >= 140 MM HG: CPT | Mod: HCNC,CPTII,S$GLB, | Performed by: NEUROLOGICAL SURGERY

## 2019-02-13 RX ORDER — CLONAZEPAM 1 MG/1
1 TABLET ORAL NIGHTLY PRN
COMMUNITY
Start: 2019-01-17 | End: 2021-07-29 | Stop reason: SDUPTHER

## 2019-02-13 RX ORDER — HYDROXYZINE PAMOATE 50 MG/1
50 CAPSULE ORAL DAILY PRN
COMMUNITY
Start: 2019-01-21 | End: 2020-01-27 | Stop reason: SDUPTHER

## 2019-02-13 RX ORDER — TIZANIDINE 4 MG/1
TABLET ORAL
COMMUNITY
Start: 2019-01-21 | End: 2019-03-12 | Stop reason: SDUPTHER

## 2019-02-21 RX ORDER — TIZANIDINE 4 MG/1
TABLET ORAL
Qty: 90 TABLET | Refills: 0 | Status: SHIPPED | OUTPATIENT
Start: 2019-02-21 | End: 2019-03-04 | Stop reason: SDUPTHER

## 2019-02-21 NOTE — TELEPHONE ENCOUNTER
Spoke with patient and informed him that no one from this office called him. Patient verbalized understanding.

## 2019-02-21 NOTE — TELEPHONE ENCOUNTER
----- Message from Sudhakar Valladares sent at 2/21/2019  1:34 PM CST -----  Contact: self  Type:  Patient Returning Call    Who Called:pt  Who Left Message for Patient:n/a  Does the patient know what this is regarding?:no  Would the patient rather a call back or a response via [facility]? call back  Best Call Back Number:[phone]  Additional Information: none      Thanks,  Sudhakar Valladares

## 2019-02-25 ENCOUNTER — HOSPITAL ENCOUNTER (OUTPATIENT)
Dept: RADIOLOGY | Facility: HOSPITAL | Age: 64
Discharge: HOME | End: 2019-02-25
Attending: NEUROLOGICAL SURGERY
Payer: MEDICARE

## 2019-02-25 DIAGNOSIS — M54.42 CHRONIC BILATERAL LOW BACK PAIN WITH BILATERAL SCIATICA: ICD-10-CM

## 2019-02-25 DIAGNOSIS — M54.41 CHRONIC BILATERAL LOW BACK PAIN WITH BILATERAL SCIATICA: ICD-10-CM

## 2019-02-25 DIAGNOSIS — G89.29 CHRONIC BILATERAL LOW BACK PAIN WITH BILATERAL SCIATICA: ICD-10-CM

## 2019-02-25 PROCEDURE — 73721 MRI JNT OF LWR EXTRE W/O DYE: CPT | Mod: TC,HCNC,LT

## 2019-02-25 PROCEDURE — 72131 CT LUMBAR SPINE W/O DYE: CPT | Mod: TC,HCNC

## 2019-02-26 ENCOUNTER — TELEPHONE (OUTPATIENT)
Dept: NEUROSURGERY | Facility: CLINIC | Age: 64
End: 2019-02-26

## 2019-02-26 ENCOUNTER — TELEPHONE (OUTPATIENT)
Dept: ORTHOPEDICS | Facility: CLINIC | Age: 64
End: 2019-02-26

## 2019-02-26 NOTE — PROGRESS NOTES
Subjective:      Patient ID: Mil Sood is a 63 y.o. male.    Chief Complaint: Lumbar Spine Pain (L-Spine)    HPI   Patient is here today for follow-up after having a flexion-extension film of the lumbar spine, CT scan of the lumbar spine, and after epidural steroid injection and having undergone physical therapy.    Since last visit his symptoms are essentially unchanged  Continues to complain of back pain greater than leg pain.  Leg pain on the right side down to his foot with associated numbness and tingling.  No weakness.  Ambulates unassisted.  Denying any bowel or bladder symptoms    Epidural injections only offered temporary relief of the symptoms    EMG/NCV in December of 2018 which indicated chronic radiculopathy of L5-S1 greater on the right side.      Review of Systems   Constitutional: Negative for activity change, appetite change and chills.   HENT: Negative for hearing loss, sore throat and tinnitus.    Eyes: Negative for pain, discharge and itching.   Cardiovascular: Negative for chest pain.   Gastrointestinal: Negative for abdominal pain.   Endocrine: Negative for cold intolerance and heat intolerance.   Genitourinary: Negative for difficulty urinating and dysuria.   Musculoskeletal: Positive for back pain and gait problem.   Allergic/Immunologic: Negative for environmental allergies.   Neurological: Positive for weakness. Negative for dizziness, tremors, light-headedness and headaches.   Hematological: Negative for adenopathy.   Psychiatric/Behavioral: Negative for agitation, behavioral problems and confusion.         Objective:       Physical Exam:  Nursing note and vitals reviewed.    Constitutional: He appears well-developed and well-nourished. No distress.     Eyes: Pupils are equal, round, and reactive to light. EOM are normal.     Cardiovascular: Intact distal pulses.     Abdominal: Soft.     Psych/Behavior: He is alert. He is oriented to person, place, and time. He has a normal mood and  affect.     Neurological:        DTRs: Tricep reflexes are 2+ on the right side and 2+ on the left side. Bicep reflexes are 2+ on the right side and 2+ on the left side. Brachioradialis reflexes are 2+ on the right side and 2+ on the left side. Achilles reflexes are 2+ on the right side and 2+ on the left side.     General    Nursing note and vitals reviewed.  Constitutional: He is oriented to person, place, and time. He appears well-developed and well-nourished. No distress.   HENT:   Head: Normocephalic and atraumatic.   Nose: Nose normal.   Eyes: EOM are normal. Pupils are equal, round, and reactive to light.   Neck: Normal range of motion. Neck supple.   Cardiovascular: Intact distal pulses.    Pulmonary/Chest: Effort normal. No respiratory distress.   Abdominal: Soft. He exhibits no distension.   Neurological: He is alert and oriented to person, place, and time. No cranial nerve deficit.   Psychiatric: He has a normal mood and affect. His behavior is normal.         Right Ankle/Foot Exam     Tests   Heel Walk: unable to perform  Tiptoe Walk: unable to perform  Single Heel Rise: able to perform  Double Heel Rise: unable to perform double heel rise    Comments:  Weakness with DF and PF    Left Ankle/Foot Exam     Tests   Heel Walk: unable to perform  Tiptoe Walk: unable to perform  Single Heel Rise: able to perform  Double Heel Rise: able to perform    Comments:  Weakness with DF and PF  Back (L-Spine & T-Spine) / Neck (C-Spine) Exam     Tenderness Posterior midline palpation reveals tenderness of the Upper L-Spine and Lower L-Spine. Right paramedian tenderness of the Lower L-Spine, Sacrum and Upper L-Spine. Left paramedian tenderness of the Lower L-Spine, Sacrum and Upper L-Spine.     Back (L-Spine & T-Spine) Range of Motion   Lateral bend right: abnormal   Lateral bend left: abnormal     Neck (C-Spine) Range of Motion   Flexion:     Normal  Extension: Normal  Right Lateral Bend: normal  Left Lateral Bend:  normal  Right Rotation: normal  Left Rotation: normal    Spinal Sensation   Right Side Sensation  C-Spine Level: normal   L-Spine Level: normal  Left Side Sensation  C-Spine Level: normal  L-Spine Level: normal    Back (L-Spine & T-Spine) Tests   Right Side Tests  Squat Test: unable to perform  Left Side Tests  Squat: unable to perform    Neck (C-Spine) Tests   Spurling's Test   Left:  Negative  Right: negative      Muscle Strength   Right Upper Extremity   Biceps: /5   Deltoid:  5/5  Triceps:  5/5  Wrist extension: 5/5   Wrist flexion: 5/5   Finger Flexors:  5/5  Finger Extensors:  5/5  Left Upper Extremity  Biceps:    Deltoid:  /5  Triceps:    Wrist extension:    Wrist flexion:    Finger Flexors:  5  Finger Extensors:  5  Right Lower Extremity   Hip Abduction: 5/5   Hip Flexion: 5/5   Hip Extensors: /5  Quadriceps:  5/5   Hamstrin/5   Anterior tibial:  /5  Gastrocsoleus:  /  EHL:  5/5  Left Lower Extremity   Hip Abduction:    Hip Flexion:    Hip Extensors:   Quadriceps:     Hamstrin/5   Anterior tibial:     Gastrocsoleus:    EHL:  /5    Reflexes     Left Side  Biceps:  2+  Triceps:  2+  Brachioradialis:  2+  Quadriceps:  2+  Post Tibial:  2+  Achilles:  2+  Left Rios's Sign:  Absent    Right Side   Biceps:  2+  Triceps:  2+  Brachioradialis:  2+  Quadriceps:  2+  Post Tibial:  2+  Achilles:  2+  Right Rios's Sign:  absent    I  reviewed all pertinent imaging regarding this case    CT Lumbar:  There are 5 lumbar-type vertebral bodies.  There is no fracture, spondylolisthesis, or scoliosis. There is normal lumbar lordosis.  There are mild degenerative changes in the visualized portion of the spine.  There are several nonobstructive stones in the superior pole of the left kidney.  The larger 1 measures 1 mm.  There is a 4.2 cm heterogeneous mass arising off of the posteromedial aspect of the superior pole of the right kidney.        XR  lumbar:  Multilevel discogenic degenerative changes of the lumbar spine are seen with facet arthropathy similar to the prior exam.  No instability is demonstrated on flexion and extension views.  No fracture or listhesis.  Aortic atherosclerosis is noted    MRI hip  1. There is a suspected tear involving the superior aspect of the glenoid labrum. There is a paucity of fluid within the left hip. Hence, the acetabular labrum is not optimally visualized.  If additional imaging evaluation is clinically indicated, I recommend consideration of an MRI arthrogram of the left hip.  2. There are subarticular cystic changes in the posterosuperior aspect of the left acetabulum.  This is characteristic of osteoarthritis.      Assessment:     1. Degenerative disc disease, lumbar      Plan:     Patient has MRI findings on the hip which potentially be contributing to his symptoms.  Will make ambulatory referral to Orthopedics for evaluation of his hip.  In terms of his lumbar spine.  He has no instability seen.  He has degenerative changes and foraminal stenosis at multiple levels.  Recommend continue physical therapy and discuss possible surgery after failing all conservative therapies.      Thank you for the referral   Please call with any questions    Blayne Atwood MD  Neurosurgery

## 2019-02-26 NOTE — TELEPHONE ENCOUNTER
Called patient to reschedule his appointment on 03/06/2019 with Dr Atwood. Left a message for patient for patient to call back.

## 2019-02-27 ENCOUNTER — TELEPHONE (OUTPATIENT)
Dept: ORTHOPEDICS | Facility: CLINIC | Age: 64
End: 2019-02-27

## 2019-02-28 ENCOUNTER — TELEPHONE (OUTPATIENT)
Dept: ORTHOPEDICS | Facility: CLINIC | Age: 64
End: 2019-02-28

## 2019-03-04 ENCOUNTER — TELEPHONE (OUTPATIENT)
Dept: NEUROSURGERY | Facility: CLINIC | Age: 64
End: 2019-03-04

## 2019-03-04 RX ORDER — TIZANIDINE 4 MG/1
TABLET ORAL
Qty: 90 TABLET | Refills: 0 | Status: SHIPPED | OUTPATIENT
Start: 2019-03-04 | End: 2019-05-01 | Stop reason: SDUPTHER

## 2019-03-12 ENCOUNTER — OFFICE VISIT (OUTPATIENT)
Dept: NEUROSURGERY | Facility: CLINIC | Age: 64
End: 2019-03-12
Payer: MEDICARE

## 2019-03-12 VITALS
SYSTOLIC BLOOD PRESSURE: 170 MMHG | HEIGHT: 69 IN | HEART RATE: 79 BPM | DIASTOLIC BLOOD PRESSURE: 104 MMHG | BODY MASS INDEX: 31.25 KG/M2 | WEIGHT: 211 LBS

## 2019-03-12 DIAGNOSIS — M24.152 DEGENERATIVE TEAR OF ACETABULAR LABRUM OF LEFT HIP: Primary | ICD-10-CM

## 2019-03-12 DIAGNOSIS — S73.192A TEAR OF LEFT ACETABULAR LABRUM, INITIAL ENCOUNTER: ICD-10-CM

## 2019-03-12 PROCEDURE — 3077F SYST BP >= 140 MM HG: CPT | Mod: HCNC,CPTII,S$GLB, | Performed by: NEUROLOGICAL SURGERY

## 2019-03-12 PROCEDURE — 3008F PR BODY MASS INDEX (BMI) DOCUMENTED: ICD-10-PCS | Mod: HCNC,CPTII,S$GLB, | Performed by: NEUROLOGICAL SURGERY

## 2019-03-12 PROCEDURE — 3008F BODY MASS INDEX DOCD: CPT | Mod: HCNC,CPTII,S$GLB, | Performed by: NEUROLOGICAL SURGERY

## 2019-03-12 PROCEDURE — 99499 RISK ADDL DX/OHS AUDIT: ICD-10-PCS | Mod: S$GLB,,, | Performed by: NEUROLOGICAL SURGERY

## 2019-03-12 PROCEDURE — 3077F PR MOST RECENT SYSTOLIC BLOOD PRESSURE >= 140 MM HG: ICD-10-PCS | Mod: HCNC,CPTII,S$GLB, | Performed by: NEUROLOGICAL SURGERY

## 2019-03-12 PROCEDURE — 3080F PR MOST RECENT DIASTOLIC BLOOD PRESSURE >= 90 MM HG: ICD-10-PCS | Mod: HCNC,CPTII,S$GLB, | Performed by: NEUROLOGICAL SURGERY

## 2019-03-12 PROCEDURE — 99499 UNLISTED E&M SERVICE: CPT | Mod: S$GLB,,, | Performed by: NEUROLOGICAL SURGERY

## 2019-03-12 PROCEDURE — 3080F DIAST BP >= 90 MM HG: CPT | Mod: HCNC,CPTII,S$GLB, | Performed by: NEUROLOGICAL SURGERY

## 2019-03-12 PROCEDURE — 99999 PR PBB SHADOW E&M-EST. PATIENT-LVL IV: ICD-10-PCS | Mod: PBBFAC,HCNC,, | Performed by: NEUROLOGICAL SURGERY

## 2019-03-12 PROCEDURE — 99213 PR OFFICE/OUTPT VISIT, EST, LEVL III, 20-29 MIN: ICD-10-PCS | Mod: HCNC,S$GLB,, | Performed by: NEUROLOGICAL SURGERY

## 2019-03-12 PROCEDURE — 99999 PR PBB SHADOW E&M-EST. PATIENT-LVL IV: CPT | Mod: PBBFAC,HCNC,, | Performed by: NEUROLOGICAL SURGERY

## 2019-03-12 PROCEDURE — 99213 OFFICE O/P EST LOW 20 MIN: CPT | Mod: HCNC,S$GLB,, | Performed by: NEUROLOGICAL SURGERY

## 2019-03-12 RX ORDER — GABAPENTIN 300 MG/1
CAPSULE ORAL
Qty: 270 CAPSULE | Refills: 0 | Status: SHIPPED | OUTPATIENT
Start: 2019-03-12 | End: 2020-01-27

## 2019-03-12 NOTE — PROGRESS NOTES
Subjective:      Patient ID: Mil Sood is a 63 y.o. male.    Chief Complaint: Lumbar Spine Pain (L-Spine)    HPI   Patient is here today for follow-up after having an MRI of the hip.  I had been seen him for degenerative changes at L5-S1.  Today he complains of hip pain on the left side into his groin.  He also has pain on the lateral aspect of his leg down into his knee.  He does complain of back pain however the hip pain is worse today        Review of Systems   Constitutional: Negative for activity change, appetite change and chills.   HENT: Negative for hearing loss, sore throat and tinnitus.    Eyes: Negative for pain, discharge and itching.   Cardiovascular: Negative for chest pain.   Gastrointestinal: Negative for abdominal pain.   Endocrine: Negative for cold intolerance and heat intolerance.   Genitourinary: Negative for difficulty urinating and dysuria.   Musculoskeletal: Positive for back pain and gait problem.   Allergic/Immunologic: Negative for environmental allergies.   Neurological: Positive for weakness. Negative for dizziness, tremors, light-headedness and headaches.   Hematological: Negative for adenopathy.   Psychiatric/Behavioral: Negative for agitation, behavioral problems and confusion.         Objective:       Physical Exam:  Nursing note and vitals reviewed.    Constitutional: He appears well-developed and well-nourished. No distress.     Eyes: Pupils are equal, round, and reactive to light. EOM are normal.     Cardiovascular: Intact distal pulses.     Abdominal: Soft.     Psych/Behavior: He is alert. He is oriented to person, place, and time. He has a normal mood and affect.     Neurological:        DTRs: Tricep reflexes are 2+ on the right side and 2+ on the left side. Bicep reflexes are 2+ on the right side and 2+ on the left side. Brachioradialis reflexes are 2+ on the right side and 2+ on the left side. Achilles reflexes are 2+ on the right side and 2+ on the left side.      General    Nursing note and vitals reviewed.  Constitutional: He is oriented to person, place, and time. He appears well-developed and well-nourished. No distress.   HENT:   Head: Normocephalic and atraumatic.   Nose: Nose normal.   Eyes: EOM are normal. Pupils are equal, round, and reactive to light.   Neck: Normal range of motion. Neck supple.   Cardiovascular: Intact distal pulses.    Pulmonary/Chest: Effort normal. No respiratory distress.   Abdominal: Soft. He exhibits no distension.   Neurological: He is alert and oriented to person, place, and time. No cranial nerve deficit.   Psychiatric: He has a normal mood and affect. His behavior is normal.         Right Ankle/Foot Exam     Tests   Heel Walk: unable to perform  Tiptoe Walk: unable to perform  Single Heel Rise: able to perform  Double Heel Rise: unable to perform double heel rise    Comments:  Weakness with DF and PF    Left Ankle/Foot Exam     Tests   Heel Walk: unable to perform  Tiptoe Walk: unable to perform  Single Heel Rise: able to perform  Double Heel Rise: able to perform    Comments:  Weakness with DF and PF  Back (L-Spine & T-Spine) / Neck (C-Spine) Exam     Tenderness Posterior midline palpation reveals tenderness of the Upper L-Spine and Lower L-Spine. Right paramedian tenderness of the Lower L-Spine, Sacrum and Upper L-Spine. Left paramedian tenderness of the Lower L-Spine, Sacrum and Upper L-Spine.     Back (L-Spine & T-Spine) Range of Motion   Lateral bend right: abnormal   Lateral bend left: abnormal     Neck (C-Spine) Range of Motion   Flexion:     Normal  Extension: Normal  Right Lateral Bend: normal  Left Lateral Bend: normal  Right Rotation: normal  Left Rotation: normal    Spinal Sensation   Right Side Sensation  C-Spine Level: normal   L-Spine Level: normal  Left Side Sensation  C-Spine Level: normal  L-Spine Level: normal    Back (L-Spine & T-Spine) Tests   Right Side Tests  Squat Test: unable to perform  Left Side  Tests  Squat: unable to perform    Neck (C-Spine) Tests   Spurling's Test   Left:  Negative  Right: negative      Muscle Strength   Right Upper Extremity   Biceps: 5//5   Deltoid:  5/5  Triceps:  5/5  Wrist extension: /5   Wrist flexion: /5   Finger Flexors:  5/5  Finger Extensors:  5/5  Left Upper Extremity  Biceps: /   Deltoid:  5/5  Triceps:  5/5  Wrist extension: /   Wrist flexion:    Finger Flexors:  5  Finger Extensors:  5/5  Right Lower Extremity   Hip Abduction:    Hip Flexion:    Hip Extensors:   Quadriceps:  5   Hamstrin/5   Anterior tibial:    Gastrocsoleus:    EHL:    Left Lower Extremity   Hip Abduction:    Hip Flexion:    Hip Extensors:   Quadriceps:     Hamstrin/5   Anterior tibial:     Gastrocsoleus:    EHL:  /    Reflexes     Left Side  Biceps:  2+  Triceps:  2+  Brachioradialis:  2+  Quadriceps:  2+  Post Tibial:  2+  Achilles:  2+  Left Rios's Sign:  Absent    Right Side   Biceps:  2+  Triceps:  2+  Brachioradialis:  2+  Quadriceps:  2+  Post Tibial:  2+  Achilles:  2+  Right Rios's Sign:  absent    I  reviewed all pertinent imaging regarding this case    CT Lumbar:  There are 5 lumbar-type vertebral bodies.  There is no fracture, spondylolisthesis, or scoliosis. There is normal lumbar lordosis.  There are mild degenerative changes in the visualized portion of the spine.  There are several nonobstructive stones in the superior pole of the left kidney.  The larger 1 measures 1 mm.  There is a 4.2 cm heterogeneous mass arising off of the posteromedial aspect of the superior pole of the right kidney.        MR L spine     1. Severe bilateral L5-S1 neural foraminal stenosis due to degenerative endplate spurring and a disc bulge.  2. Multilevel marked degenerative disc disease at all levels of the lumbar spine.  3. No significant central canal stenosis seen at any level in the lumbar spine.    XR lumbar:  Multilevel  discogenic degenerative changes of the lumbar spine are seen with facet arthropathy similar to the prior exam.  No instability is demonstrated on flexion and extension views.  No fracture or listhesis.  Aortic atherosclerosis is noted    MRI hip  1. There is a suspected tear involving the superior aspect of the glenoid labrum. There is a paucity of fluid within the left hip. Hence, the acetabular labrum is not optimally visualized.  If additional imaging evaluation is clinically indicated, I recommend consideration of an MRI arthrogram of the left hip.  2. There are subarticular cystic changes in the posterosuperior aspect of the left acetabulum.  This is characteristic of osteoarthritis.      Assessment:     1. Degenerative disc disease, lumbar      Plan:     Patient has an MRI of the hip which shows labral tear.  Currently his lower back pain is not the main source of his symptoms.  Will order ambulatory referral to Orthopedics for evaluation of labral tear.  He has some mild degenerative changes at L5-S1.  He has no evidence of instability or major stenosis at this level.  Would not recommend any surgical intervention of his lumbar spine.  He can follow up in my office p.r.n.     Thank you for the referral   Please call with any questions    Blayne Atwood MD  Neurosurgery

## 2019-03-14 ENCOUNTER — OFFICE VISIT (OUTPATIENT)
Dept: FAMILY MEDICINE | Facility: CLINIC | Age: 64
End: 2019-03-14
Payer: MEDICARE

## 2019-03-14 VITALS
DIASTOLIC BLOOD PRESSURE: 80 MMHG | HEIGHT: 71 IN | OXYGEN SATURATION: 98 % | WEIGHT: 213.38 LBS | BODY MASS INDEX: 29.87 KG/M2 | SYSTOLIC BLOOD PRESSURE: 130 MMHG | HEART RATE: 70 BPM | TEMPERATURE: 98 F

## 2019-03-14 DIAGNOSIS — M79.10 MYALGIA DUE TO HMG COA REDUCTASE INHIBITOR: ICD-10-CM

## 2019-03-14 DIAGNOSIS — M54.16 LUMBAR RADICULOPATHY, CHRONIC: Primary | ICD-10-CM

## 2019-03-14 DIAGNOSIS — I10 ESSENTIAL HYPERTENSION: ICD-10-CM

## 2019-03-14 DIAGNOSIS — T46.6X5A MYALGIA DUE TO HMG COA REDUCTASE INHIBITOR: ICD-10-CM

## 2019-03-14 DIAGNOSIS — K21.00 GASTROESOPHAGEAL REFLUX DISEASE WITH ESOPHAGITIS: ICD-10-CM

## 2019-03-14 PROCEDURE — 3008F BODY MASS INDEX DOCD: CPT | Mod: HCNC,CPTII,S$GLB, | Performed by: FAMILY MEDICINE

## 2019-03-14 PROCEDURE — 99214 PR OFFICE/OUTPT VISIT, EST, LEVL IV, 30-39 MIN: ICD-10-PCS | Mod: HCNC,S$GLB,, | Performed by: FAMILY MEDICINE

## 2019-03-14 PROCEDURE — 3075F SYST BP GE 130 - 139MM HG: CPT | Mod: HCNC,CPTII,S$GLB, | Performed by: FAMILY MEDICINE

## 2019-03-14 PROCEDURE — 99999 PR PBB SHADOW E&M-EST. PATIENT-LVL IV: CPT | Mod: PBBFAC,HCNC,, | Performed by: FAMILY MEDICINE

## 2019-03-14 PROCEDURE — 3075F PR MOST RECENT SYSTOLIC BLOOD PRESS GE 130-139MM HG: ICD-10-PCS | Mod: HCNC,CPTII,S$GLB, | Performed by: FAMILY MEDICINE

## 2019-03-14 PROCEDURE — 3008F PR BODY MASS INDEX (BMI) DOCUMENTED: ICD-10-PCS | Mod: HCNC,CPTII,S$GLB, | Performed by: FAMILY MEDICINE

## 2019-03-14 PROCEDURE — 99214 OFFICE O/P EST MOD 30 MIN: CPT | Mod: HCNC,S$GLB,, | Performed by: FAMILY MEDICINE

## 2019-03-14 PROCEDURE — 99999 PR PBB SHADOW E&M-EST. PATIENT-LVL IV: ICD-10-PCS | Mod: PBBFAC,HCNC,, | Performed by: FAMILY MEDICINE

## 2019-03-14 PROCEDURE — 3079F DIAST BP 80-89 MM HG: CPT | Mod: HCNC,CPTII,S$GLB, | Performed by: FAMILY MEDICINE

## 2019-03-14 PROCEDURE — 3079F PR MOST RECENT DIASTOLIC BLOOD PRESSURE 80-89 MM HG: ICD-10-PCS | Mod: HCNC,CPTII,S$GLB, | Performed by: FAMILY MEDICINE

## 2019-03-14 RX ORDER — HYDROCODONE BITARTRATE AND ACETAMINOPHEN 7.5; 325 MG/1; MG/1
1 TABLET ORAL EVERY 8 HOURS PRN
Qty: 14 TABLET | Refills: 0 | Status: SHIPPED | OUTPATIENT
Start: 2019-03-14 | End: 2019-07-09

## 2019-03-14 NOTE — PROGRESS NOTES
Chief Complaint:    Chief Complaint   Patient presents with    Follow-up       History of Present Illness:    He presents today for follow-up:  He had seen neurosurgeon 1st his impression was that he needs surgery but he had MRI which her thought to have labral tear but then was told there is no labral tear he went in seen an orthopedic surgeon also.  Neurosurgeon later told him that he just has DJD of the spine and no surgeries needed.  Patient does have lumbar radiculopathy he has seen both on nerve conduction study and he had constant pain he says he was given some pain medicine but he ran out.  He wants to know what needs to be done next he attempted physical therapy and he has also tried RIGOBERTO and could not be performed or was unsuccessful summer for some reasons.  His blood pressure at home runs okay he says.      ROS:  Review of Systems   Constitutional: Negative for activity change, chills, fatigue, fever and unexpected weight change.   HENT: Negative for congestion, ear discharge, ear pain, hearing loss, postnasal drip and rhinorrhea.    Eyes: Negative for pain and visual disturbance.   Respiratory: Negative for cough, chest tightness and shortness of breath.    Cardiovascular: Negative for chest pain and palpitations.   Gastrointestinal: Negative for abdominal pain, diarrhea and vomiting.   Endocrine: Negative for heat intolerance.   Genitourinary: Negative for dysuria, flank pain, frequency and hematuria.   Musculoskeletal: Negative for back pain, gait problem and neck pain.   Skin: Negative for color change and rash.   Neurological: Negative for dizziness, tremors, seizures, numbness and headaches.   Psychiatric/Behavioral: Negative for agitation, hallucinations, self-injury, sleep disturbance and suicidal ideas. The patient is not nervous/anxious.        Past Medical History:   Diagnosis Date    Anxiety     Cancer     spot on kidney and lung    Diabetes mellitus     Borderline    Diverticulitis   "   GERD (gastroesophageal reflux disease)     Hyperlipidemia     not on any meds currently    Hypertension     Lung mass     Mass of kidney        Social History:  Social History     Socioeconomic History    Marital status:      Spouse name: None    Number of children: None    Years of education: None    Highest education level: None   Social Needs    Financial resource strain: None    Food insecurity - worry: None    Food insecurity - inability: None    Transportation needs - medical: None    Transportation needs - non-medical: None   Occupational History    None   Tobacco Use    Smoking status: Former Smoker     Packs/day: 1.00     Years: 20.00     Pack years: 20.00     Last attempt to quit: 1995     Years since quittin.1    Smokeless tobacco: Never Used   Substance and Sexual Activity    Alcohol use: No     Alcohol/week: 0.0 oz    Drug use: No    Sexual activity: Yes     Partners: Female     Birth control/protection: None   Other Topics Concern    None   Social History Narrative    None       Family History:   family history includes Cancer in his sister; Hypertension in his father and mother; Melanoma in his father.    Health Maintenance   Topic Date Due    Zoster Vaccine  2015    Influenza Vaccine  2018    Lipid Panel  2020    Colonoscopy  2020    TETANUS VACCINE  2028    Hepatitis C Screening  Completed       Physical Exam:    Vital Signs  Temp: 97.9 °F (36.6 °C)  Temp src: Tympanic  Pulse: 70  SpO2: 98 %  BP: (!) 154/82  BP Location: Left arm  Patient Position: Sitting  Pain Score:   6  Pain Loc: Back  Height and Weight  Height: 5' 11" (180.3 cm)  Weight: 96.8 kg (213 lb 6.5 oz)  BSA (Calculated - sq m): 2.2 sq meters  BMI (Calculated): 29.8  Weight in (lb) to have BMI = 25: 178.9]    Body mass index is 29.76 kg/m².    Physical Exam   Constitutional: He is oriented to person, place, and time. He appears well-developed.   HENT: "   Mouth/Throat: Oropharynx is clear and moist.   Eyes: Conjunctivae are normal. Pupils are equal, round, and reactive to light.   Neck: Normal range of motion. Neck supple.   Cardiovascular: Normal rate, regular rhythm and normal heart sounds.   No murmur heard.  Pulmonary/Chest: Effort normal and breath sounds normal. No respiratory distress. He has no wheezes. He has no rales. He exhibits no tenderness.   Abdominal: Soft. He exhibits no distension and no mass. There is no tenderness. There is no guarding.   Musculoskeletal: He exhibits no edema or tenderness.   Lymphadenopathy:     He has no cervical adenopathy.   Neurological: He is alert and oriented to person, place, and time. He has normal reflexes.   Skin: Skin is warm and dry.   Psychiatric: He has a normal mood and affect. His behavior is normal. Judgment and thought content normal.         Assessment:      ICD-10-CM ICD-9-CM   1. Lumbar radiculopathy, chronic M54.16 724.4   2. Essential hypertension I10 401.9   3. Myalgia due to HMG CoA reductase inhibitor M79.10 729.1    T46.6X5A E942.2   4. Gastroesophageal reflux disease with esophagitis K21.0 530.11         Plan:    I have sent a message via secure messaging to the neurosurgeon via secure messaging and kindly review the nerve conduction study to see if patient could benefit from spine surgery.  Will refer him to pain management who can do narcotic pain medication  Will give him 1 time prescription for hydrocodone.  14.  Pills with 0 refills  Hypertension stable based on home readings  GERD stable        Orders Placed This Encounter   Procedures    Ambulatory referral to Pain Clinic       Current Outpatient Medications   Medication Sig Dispense Refill    amLODIPine (NORVASC) 10 MG tablet TAKE 1 TABLET BY MOUTH ONCE DAILY. 90 tablet 0    aspirin 81 mg Tab Take 1 tablet by mouth Daily.      benazepril (LOTENSIN) 20 MG tablet TAKE 2 TABLETS BY MOUTH ONCE A DAY. 180 tablet 0    citalopram (CELEXA) 20  MG tablet Take 20 mg by mouth once daily.      clonazePAM (KLONOPIN) 1 MG tablet Take 1 mg by mouth once daily.       gabapentin (NEURONTIN) 300 MG capsule TAKE 2 CAPSULES BY MOUTH THREE TIMES DAILY. 270 capsule 0    hydroCHLOROthiazide (MICROZIDE) 12.5 mg capsule TAKE 1 CAPSULE BY MOUTH ONCE DAILY. 90 capsule 0    hydrOXYzine pamoate (VISTARIL) 50 MG Cap 50 mg daily as needed.       metoprolol tartrate (LOPRESSOR) 50 MG tablet TAKE 1 TABLET BY MOUTH TWICE A DAY. 180 tablet 0    omeprazole (PRILOSEC) 40 MG capsule TAKE 1 CAPSULE BY MOUTH ONCE DAILY. 60 capsule 0    rOPINIRole (REQUIP) 1 MG tablet Take 1 tablet (1 mg total) by mouth every evening. 30 tablet 11    tiZANidine (ZANAFLEX) 4 MG tablet TAKE 1 TABLET BY MOUTH EVERY 8 HOURS AS NEEDED. 90 tablet 0    traZODone (DESYREL) 100 MG tablet Take 1 tablet (100 mg total) by mouth every evening. 30 tablet 5    zolpidem (AMBIEN) 10 mg Tab TAKE 1 TABLET BY MOUTH NIGHTLY AS NEEDED. 30 tablet 5    fluticasone-salmeterol (ADVAIR HFA) 115-21 mcg/actuation HFAA Inhale 2 puffs into the lungs 2 (two) times daily. 12 g 12    hydrALAZINE (APRESOLINE) 25 MG tablet Take 1 tablet (25 mg total) by mouth 3 (three) times daily. (Patient taking differently: Take 25 mg by mouth every 12 (twelve) hours. ) 90 tablet 11    HYDROcodone-acetaminophen (NORCO) 7.5-325 mg per tablet Take 1 tablet by mouth every 8 (eight) hours as needed for Pain. 14 tablet 0    tiotropium (SPIRIVA) 18 mcg inhalation capsule Inhale 1 capsule (18 mcg total) into the lungs once daily. Controller 30 capsule 2    VITAMIN B-12 1000 MCG tablet Take 1,000 mcg by mouth once daily.        No current facility-administered medications for this visit.        There are no discontinued medications.    Follow-up in about 6 months (around 9/14/2019).      Yumi Roberson MD

## 2019-03-15 ENCOUNTER — TELEPHONE (OUTPATIENT)
Dept: FAMILY MEDICINE | Facility: CLINIC | Age: 64
End: 2019-03-15

## 2019-03-15 NOTE — TELEPHONE ENCOUNTER
----- Message from Yumi Roberson MD sent at 3/14/2019  5:27 PM CDT -----  Please let patient know that I did speak with the neurosurgeon Dr. Atwood and he will call him to schedule a follow-up appointment so they can discuss his future plans.

## 2019-03-15 NOTE — TELEPHONE ENCOUNTER
----- Message from Olive Riggins sent at 3/15/2019  9:17 AM CDT -----  Contact: pt  Type:  Patient Returning Call    Who Called: pt  Who Left Message for Patient:   Does the patient know what this is regarding?: regarding management   Would the patient rather a call back or a response via [facility]?  Call back   Best Call Back Number: [phone] (home)   Additional Information:

## 2019-03-20 ENCOUNTER — TELEPHONE (OUTPATIENT)
Dept: FAMILY MEDICINE | Facility: CLINIC | Age: 64
End: 2019-03-20

## 2019-03-20 NOTE — TELEPHONE ENCOUNTER
Attempted to call patient, there was no answer, message left to call the clinic back at their convenience.      Referral has been faxed to Dr Antonina Earl. I re-faxed it again

## 2019-03-20 NOTE — TELEPHONE ENCOUNTER
----- Message from Dash Velasquez sent at 3/20/2019 11:32 AM CDT -----  Contact: pt   Type:  Needs Medical Advice    Who Called: CHRISTIN BEDOLLA   Symptoms (please be specific):   How long has patient had these symptoms:   Pharmacy name and phone #:    Would the patient rather a call back or a response via [facility]? Call   Best Call Back Number:  [phone]  Additional Information: pt is requesting a call back from the nurse in regards to the pt pain management apt

## 2019-03-21 NOTE — TELEPHONE ENCOUNTER
----- Message from Yoli Solorzano sent at 3/21/2019 11:34 AM CDT -----  ..Type:  Patient Returning Call    Who Called:call back   Who Left Message for Patient:  Does the patient know what this is regarding?:  Would the patient rather a call back or a response via [facility]? Call back   Best Call Back Number: [phone]  Additional Information: pt is requesting a call from nurse to discuss which pain management. Pt states he need more information.

## 2019-03-21 NOTE — TELEPHONE ENCOUNTER
Spoke with patient and informed him that the pain mgmt referral was sent to [facility] with Dr Antonina Brooks (p) [phone] (f) [phone]. Patient verbalized understanding and states he will return their call.

## 2019-04-08 ENCOUNTER — TELEPHONE (OUTPATIENT)
Dept: UROLOGY | Facility: CLINIC | Age: 64
End: 2019-04-08

## 2019-04-08 NOTE — TELEPHONE ENCOUNTER
Patient states he would like to cancel CT scan and follow up with Dr. Cleveland. He will call back at a later date to reschedule.

## 2019-05-01 RX ORDER — TIZANIDINE 4 MG/1
TABLET ORAL
Qty: 90 TABLET | Refills: 0 | Status: SHIPPED | OUTPATIENT
Start: 2019-05-01 | End: 2020-02-03

## 2019-05-20 RX ORDER — OMEPRAZOLE 40 MG/1
CAPSULE, DELAYED RELEASE ORAL
Qty: 60 CAPSULE | Refills: 0 | Status: SHIPPED | OUTPATIENT
Start: 2019-05-20 | End: 2019-07-22 | Stop reason: SDUPTHER

## 2019-06-18 RX ORDER — ZOLPIDEM TARTRATE 10 MG/1
TABLET ORAL
Qty: 30 TABLET | Refills: 0 | Status: SHIPPED | OUTPATIENT
Start: 2019-06-18 | End: 2019-08-09 | Stop reason: SDUPTHER

## 2019-06-19 ENCOUNTER — OFFICE VISIT (OUTPATIENT)
Dept: FAMILY MEDICINE | Facility: CLINIC | Age: 64
End: 2019-06-19
Payer: MEDICARE

## 2019-06-19 VITALS
OXYGEN SATURATION: 97 % | DIASTOLIC BLOOD PRESSURE: 90 MMHG | HEART RATE: 60 BPM | BODY MASS INDEX: 29.43 KG/M2 | WEIGHT: 210.19 LBS | SYSTOLIC BLOOD PRESSURE: 140 MMHG | HEIGHT: 71 IN | TEMPERATURE: 99 F

## 2019-06-19 DIAGNOSIS — F43.21 GRIEF REACTION: Primary | ICD-10-CM

## 2019-06-19 PROCEDURE — 99213 PR OFFICE/OUTPT VISIT, EST, LEVL III, 20-29 MIN: ICD-10-PCS | Mod: HCNC,S$GLB,, | Performed by: FAMILY MEDICINE

## 2019-06-19 PROCEDURE — 3077F SYST BP >= 140 MM HG: CPT | Mod: HCNC,CPTII,S$GLB, | Performed by: FAMILY MEDICINE

## 2019-06-19 PROCEDURE — 3008F BODY MASS INDEX DOCD: CPT | Mod: HCNC,CPTII,S$GLB, | Performed by: FAMILY MEDICINE

## 2019-06-19 PROCEDURE — 99999 PR PBB SHADOW E&M-EST. PATIENT-LVL III: CPT | Mod: PBBFAC,HCNC,, | Performed by: FAMILY MEDICINE

## 2019-06-19 PROCEDURE — 99213 OFFICE O/P EST LOW 20 MIN: CPT | Mod: HCNC,S$GLB,, | Performed by: FAMILY MEDICINE

## 2019-06-19 PROCEDURE — 99999 PR PBB SHADOW E&M-EST. PATIENT-LVL III: ICD-10-PCS | Mod: PBBFAC,HCNC,, | Performed by: FAMILY MEDICINE

## 2019-06-19 PROCEDURE — 3008F PR BODY MASS INDEX (BMI) DOCUMENTED: ICD-10-PCS | Mod: HCNC,CPTII,S$GLB, | Performed by: FAMILY MEDICINE

## 2019-06-19 PROCEDURE — 3080F DIAST BP >= 90 MM HG: CPT | Mod: HCNC,CPTII,S$GLB, | Performed by: FAMILY MEDICINE

## 2019-06-19 PROCEDURE — 3077F PR MOST RECENT SYSTOLIC BLOOD PRESSURE >= 140 MM HG: ICD-10-PCS | Mod: HCNC,CPTII,S$GLB, | Performed by: FAMILY MEDICINE

## 2019-06-19 PROCEDURE — 3080F PR MOST RECENT DIASTOLIC BLOOD PRESSURE >= 90 MM HG: ICD-10-PCS | Mod: HCNC,CPTII,S$GLB, | Performed by: FAMILY MEDICINE

## 2019-06-19 RX ORDER — VENLAFAXINE HYDROCHLORIDE 37.5 MG/1
37.5 CAPSULE, EXTENDED RELEASE ORAL DAILY
Qty: 30 CAPSULE | Refills: 11 | Status: SHIPPED | OUTPATIENT
Start: 2019-06-19 | End: 2020-02-21 | Stop reason: DRUGHIGH

## 2019-06-19 NOTE — PROGRESS NOTES
Chief Complaint:    Chief Complaint   Patient presents with    Medication Refill       History of Present Illness:    Patient presents today he is very upset his wife says diet recently suddenly without any warning.  He is crying need something to take the edge off.    ROS:  Review of Systems    Past Medical History:   Diagnosis Date    Anxiety     Cancer     spot on kidney and lung    Diabetes mellitus     Borderline    Diverticulitis     GERD (gastroesophageal reflux disease)     Hyperlipidemia     not on any meds currently    Hypertension     Lung mass     Mass of kidney        Social History:  Social History     Socioeconomic History    Marital status:      Spouse name: Not on file    Number of children: Not on file    Years of education: Not on file    Highest education level: Not on file   Occupational History    Not on file   Social Needs    Financial resource strain: Not on file    Food insecurity:     Worry: Not on file     Inability: Not on file    Transportation needs:     Medical: Not on file     Non-medical: Not on file   Tobacco Use    Smoking status: Former Smoker     Packs/day: 1.00     Years: 20.00     Pack years: 20.00     Last attempt to quit: 1995     Years since quittin.3    Smokeless tobacco: Never Used   Substance and Sexual Activity    Alcohol use: No     Alcohol/week: 0.0 oz    Drug use: No    Sexual activity: Yes     Partners: Female     Birth control/protection: None   Lifestyle    Physical activity:     Days per week: Not on file     Minutes per session: Not on file    Stress: Not on file   Relationships    Social connections:     Talks on phone: Not on file     Gets together: Not on file     Attends Zoroastrianism service: Not on file     Active member of club or organization: Not on file     Attends meetings of clubs or organizations: Not on file     Relationship status: Not on file   Other Topics Concern    Not on file   Social History Narrative  "   Not on file       Family History:   family history includes Cancer in his sister; Hypertension in his father and mother; Melanoma in his father.    Health Maintenance   Topic Date Due    Influenza Vaccine  08/01/2019    Lipid Panel  01/09/2020    Colonoscopy  04/07/2020    Aspirin/Antiplatelet Therapy  06/19/2020    TETANUS VACCINE  04/26/2028    Hepatitis C Screening  Completed       Physical Exam:    Vital Signs  Temp: 99 °F (37.2 °C)  Temp src: Temporal  Pulse: 60  SpO2: 97 %  BP: (!) 140/90  BP Location: Left arm  Patient Position: Sitting  Pain Score: 0-No pain  Height and Weight  Height: 5' 11" (180.3 cm)  Weight: 95.4 kg (210 lb 3.3 oz)  BSA (Calculated - sq m): 2.19 sq meters  BMI (Calculated): 29.4  Weight in (lb) to have BMI = 25: 178.9]    Body mass index is 29.32 kg/m².    Physical Exam      Assessment:      ICD-10-CM ICD-9-CM   1. Grief reaction F43.21 309.0         Plan:    Start him on venlafaxine 37.5 mg daily      No orders of the defined types were placed in this encounter.      Current Outpatient Medications   Medication Sig Dispense Refill    amLODIPine (NORVASC) 10 MG tablet TAKE 1 TABLET BY MOUTH ONCE DAILY. 90 tablet 0    aspirin 81 mg Tab Take 1 tablet by mouth Daily.      benazepril (LOTENSIN) 20 MG tablet TAKE 2 TABLETS BY MOUTH ONCE A DAY. 180 tablet 0    clonazePAM (KLONOPIN) 1 MG tablet Take 1 mg by mouth once daily.       gabapentin (NEURONTIN) 300 MG capsule TAKE 2 CAPSULES BY MOUTH THREE TIMES DAILY. 270 capsule 0    hydroCHLOROthiazide (MICROZIDE) 12.5 mg capsule TAKE 1 CAPSULE BY MOUTH ONCE DAILY. 90 capsule 0    HYDROcodone-acetaminophen (NORCO) 7.5-325 mg per tablet Take 1 tablet by mouth every 8 (eight) hours as needed for Pain. 14 tablet 0    hydrOXYzine pamoate (VISTARIL) 50 MG Cap 50 mg daily as needed.       metoprolol tartrate (LOPRESSOR) 50 MG tablet TAKE 1 TABLET BY MOUTH TWICE A DAY. 180 tablet 0    omeprazole (PRILOSEC) 40 MG capsule TAKE 1 CAPSULE BY " MOUTH ONCE DAILY. 60 capsule 0    rOPINIRole (REQUIP) 1 MG tablet Take 1 tablet (1 mg total) by mouth every evening. 30 tablet 11    tiZANidine (ZANAFLEX) 4 MG tablet TAKE 1 TABLET BY MOUTH EVERY 8 HOURS AS NEEDED. 90 tablet 0    traZODone (DESYREL) 100 MG tablet Take 1 tablet (100 mg total) by mouth every evening. 30 tablet 5    VITAMIN B-12 1000 MCG tablet Take 1,000 mcg by mouth once daily.       zolpidem (AMBIEN) 10 mg Tab TAKE 1 TABLET BY MOUTH NIGHTLY AS NEEDED. 30 tablet 0    fluticasone-salmeterol (ADVAIR HFA) 115-21 mcg/actuation HFAA Inhale 2 puffs into the lungs 2 (two) times daily. 12 g 12    hydrALAZINE (APRESOLINE) 25 MG tablet Take 1 tablet (25 mg total) by mouth 3 (three) times daily. (Patient taking differently: Take 25 mg by mouth every 12 (twelve) hours. ) 90 tablet 11    tiotropium (SPIRIVA) 18 mcg inhalation capsule Inhale 1 capsule (18 mcg total) into the lungs once daily. Controller 30 capsule 2    venlafaxine (EFFEXOR-XR) 37.5 MG 24 hr capsule Take 1 capsule (37.5 mg total) by mouth once daily. 30 capsule 11     No current facility-administered medications for this visit.        Medications Discontinued During This Encounter   Medication Reason    citalopram (CELEXA) 20 MG tablet        No follow-ups on file.      Yumi Roberson MD

## 2019-07-08 ENCOUNTER — NURSE TRIAGE (OUTPATIENT)
Dept: ADMINISTRATIVE | Facility: CLINIC | Age: 64
End: 2019-07-08

## 2019-07-08 NOTE — TELEPHONE ENCOUNTER
Patient is calling about chest pain, sob, double vision, swaying back and forth like he's drunk.  He began feeling this way yesterday, it's constant.  He thinks it might be from a nerve pill that he began taking 2 weeks ago.  Advised 911 now, and he agreed to do so.    Reason for Disposition   Chest pain lasting longer than 5 minutes and ANY of the following:* Over 50 years old* Over 30 years old and at least one cardiac risk factor (i.e., high blood pressure, diabetes, high cholesterol, obesity, smoker or strong family history of heart disease)* Pain is crushing, pressure-like, or heavy * Took nitroglycerin and chest pain was not relieved* History of heart disease (i.e., angina, heart attack, bypass surgery, angioplasty, CHF)    Protocols used: CHEST PAIN-A-[place]

## 2019-07-09 ENCOUNTER — HOSPITAL ENCOUNTER (EMERGENCY)
Facility: HOSPITAL | Age: 64
Discharge: HOME | End: 2019-07-09
Attending: FAMILY MEDICINE
Payer: MEDICARE

## 2019-07-09 ENCOUNTER — OFFICE VISIT (OUTPATIENT)
Dept: FAMILY MEDICINE | Facility: CLINIC | Age: 64
End: 2019-07-09
Payer: MEDICARE

## 2019-07-09 VITALS
SYSTOLIC BLOOD PRESSURE: 150 MMHG | BODY MASS INDEX: 29.26 KG/M2 | OXYGEN SATURATION: 97 % | WEIGHT: 209 LBS | RESPIRATION RATE: 20 BRPM | HEART RATE: 52 BPM | HEIGHT: 71 IN | TEMPERATURE: 98 F | DIASTOLIC BLOOD PRESSURE: 87 MMHG

## 2019-07-09 VITALS
BODY MASS INDEX: 29.32 KG/M2 | HEART RATE: 59 BPM | SYSTOLIC BLOOD PRESSURE: 110 MMHG | HEIGHT: 71 IN | WEIGHT: 209.44 LBS | DIASTOLIC BLOOD PRESSURE: 78 MMHG | OXYGEN SATURATION: 98 %

## 2019-07-09 DIAGNOSIS — R47.81 SLURRED SPEECH: Primary | ICD-10-CM

## 2019-07-09 DIAGNOSIS — E86.0 DEHYDRATION: ICD-10-CM

## 2019-07-09 DIAGNOSIS — R07.9 CHEST PAIN: ICD-10-CM

## 2019-07-09 DIAGNOSIS — R53.1 WEAKNESS: ICD-10-CM

## 2019-07-09 DIAGNOSIS — R07.9 CHEST PAIN, UNSPECIFIED TYPE: ICD-10-CM

## 2019-07-09 DIAGNOSIS — R41.0 CONFUSION: ICD-10-CM

## 2019-07-09 DIAGNOSIS — R41.82 ALTERED MENTAL STATUS: Primary | ICD-10-CM

## 2019-07-09 LAB
ALBUMIN SERPL BCP-MCNC: 3.6 G/DL (ref 3.5–5.2)
ALP SERPL-CCNC: 44 U/L (ref 55–135)
ALT SERPL W/O P-5'-P-CCNC: 20 U/L (ref 10–44)
ANION GAP SERPL CALC-SCNC: 13 MMOL/L (ref 8–16)
AST SERPL-CCNC: 19 U/L (ref 10–40)
BASOPHILS # BLD AUTO: 0.07 K/UL (ref 0–0.2)
BASOPHILS NFR BLD: 1.1 % (ref 0–1.9)
BILIRUB SERPL-MCNC: 0.4 MG/DL (ref 0.1–1)
BNP SERPL-MCNC: 31 PG/ML (ref 0–99)
BUN SERPL-MCNC: 35 MG/DL (ref 8–23)
CALCIUM SERPL-MCNC: 9.3 MG/DL (ref 8.7–10.5)
CHLORIDE SERPL-SCNC: 101 MMOL/L (ref 95–110)
CO2 SERPL-SCNC: 23 MMOL/L (ref 23–29)
CREAT SERPL-MCNC: 1.9 MG/DL (ref 0.5–1.4)
DIFFERENTIAL METHOD: ABNORMAL
EOSINOPHIL # BLD AUTO: 0.5 K/UL (ref 0–0.5)
EOSINOPHIL NFR BLD: 7.6 % (ref 0–8)
ERYTHROCYTE [DISTWIDTH] IN BLOOD BY AUTOMATED COUNT: 13.3 % (ref 11.5–14.5)
EST. GFR  (AFRICAN AMERICAN): 42 ML/MIN/1.73 M^2
EST. GFR  (NON AFRICAN AMERICAN): 36 ML/MIN/1.73 M^2
GLUCOSE SERPL-MCNC: 95 MG/DL (ref 70–110)
HCT VFR BLD AUTO: 41.2 % (ref 40–54)
HCV AB SERPL QL IA: NEGATIVE
HGB BLD-MCNC: 13.7 G/DL (ref 14–18)
HIV 1+2 AB+HIV1 P24 AG SERPL QL IA: NEGATIVE
LYMPHOCYTES # BLD AUTO: 1.8 K/UL (ref 1–4.8)
LYMPHOCYTES NFR BLD: 28.3 % (ref 18–48)
MCH RBC QN AUTO: 29 PG (ref 27–31)
MCHC RBC AUTO-ENTMCNC: 33.3 G/DL (ref 32–36)
MCV RBC AUTO: 87 FL (ref 82–98)
MONOCYTES # BLD AUTO: 1 K/UL (ref 0.3–1)
MONOCYTES NFR BLD: 15.9 % (ref 4–15)
NEUTROPHILS # BLD AUTO: 3 K/UL (ref 1.8–7.7)
NEUTROPHILS NFR BLD: 47.1 % (ref 38–73)
PLATELET # BLD AUTO: 283 K/UL (ref 150–350)
PMV BLD AUTO: 9.9 FL (ref 9.2–12.9)
POTASSIUM SERPL-SCNC: 4.9 MMOL/L (ref 3.5–5.1)
PROT SERPL-MCNC: 6.7 G/DL (ref 6–8.4)
RBC # BLD AUTO: 4.73 M/UL (ref 4.6–6.2)
SODIUM SERPL-SCNC: 137 MMOL/L (ref 136–145)
TROPONIN I SERPL DL<=0.01 NG/ML-MCNC: 0.01 NG/ML (ref 0–0.03)
WBC # BLD AUTO: 6.29 K/UL (ref 3.9–12.7)

## 2019-07-09 PROCEDURE — 3008F BODY MASS INDEX DOCD: CPT | Mod: HCNC,CPTII,S$GLB, | Performed by: FAMILY MEDICINE

## 2019-07-09 PROCEDURE — 84484 ASSAY OF TROPONIN QUANT: CPT | Mod: HCNC

## 2019-07-09 PROCEDURE — 99999 PR PBB SHADOW E&M-EST. PATIENT-LVL III: ICD-10-PCS | Mod: PBBFAC,HCNC,, | Performed by: FAMILY MEDICINE

## 2019-07-09 PROCEDURE — 93010 ELECTROCARDIOGRAM REPORT: CPT | Mod: HCNC,,, | Performed by: INTERNAL MEDICINE

## 2019-07-09 PROCEDURE — 86803 HEPATITIS C AB TEST: CPT | Mod: HCNC

## 2019-07-09 PROCEDURE — 3078F DIAST BP <80 MM HG: CPT | Mod: HCNC,CPTII,S$GLB, | Performed by: FAMILY MEDICINE

## 2019-07-09 PROCEDURE — 3074F SYST BP LT 130 MM HG: CPT | Mod: HCNC,CPTII,S$GLB, | Performed by: FAMILY MEDICINE

## 2019-07-09 PROCEDURE — 96360 HYDRATION IV INFUSION INIT: CPT | Mod: HCNC

## 2019-07-09 PROCEDURE — 99999 PR PBB SHADOW E&M-EST. PATIENT-LVL III: CPT | Mod: PBBFAC,HCNC,, | Performed by: FAMILY MEDICINE

## 2019-07-09 PROCEDURE — 85025 COMPLETE CBC W/AUTO DIFF WBC: CPT | Mod: HCNC

## 2019-07-09 PROCEDURE — 83880 ASSAY OF NATRIURETIC PEPTIDE: CPT | Mod: HCNC

## 2019-07-09 PROCEDURE — 80053 COMPREHEN METABOLIC PANEL: CPT | Mod: HCNC

## 2019-07-09 PROCEDURE — 86703 HIV-1/HIV-2 1 RESULT ANTBDY: CPT | Mod: HCNC

## 2019-07-09 PROCEDURE — 25000003 PHARM REV CODE 250: Mod: HCNC | Performed by: FAMILY MEDICINE

## 2019-07-09 PROCEDURE — 36415 COLL VENOUS BLD VENIPUNCTURE: CPT | Mod: HCNC

## 2019-07-09 PROCEDURE — 99213 PR OFFICE/OUTPT VISIT, EST, LEVL III, 20-29 MIN: ICD-10-PCS | Mod: HCNC,S$GLB,, | Performed by: FAMILY MEDICINE

## 2019-07-09 PROCEDURE — 99285 EMERGENCY DEPT VISIT HI MDM: CPT | Mod: 25,HCNC

## 2019-07-09 PROCEDURE — 3008F PR BODY MASS INDEX (BMI) DOCUMENTED: ICD-10-PCS | Mod: HCNC,CPTII,S$GLB, | Performed by: FAMILY MEDICINE

## 2019-07-09 PROCEDURE — 93005 ELECTROCARDIOGRAM TRACING: CPT | Mod: HCNC

## 2019-07-09 PROCEDURE — 93010 EKG 12-LEAD: ICD-10-PCS | Mod: HCNC,,, | Performed by: INTERNAL MEDICINE

## 2019-07-09 PROCEDURE — 3078F PR MOST RECENT DIASTOLIC BLOOD PRESSURE < 80 MM HG: ICD-10-PCS | Mod: HCNC,CPTII,S$GLB, | Performed by: FAMILY MEDICINE

## 2019-07-09 PROCEDURE — 99213 OFFICE O/P EST LOW 20 MIN: CPT | Mod: HCNC,S$GLB,, | Performed by: FAMILY MEDICINE

## 2019-07-09 PROCEDURE — 3074F PR MOST RECENT SYSTOLIC BLOOD PRESSURE < 130 MM HG: ICD-10-PCS | Mod: HCNC,CPTII,S$GLB, | Performed by: FAMILY MEDICINE

## 2019-07-09 RX ORDER — OXYCODONE AND ACETAMINOPHEN 10; 325 MG/1; MG/1
TABLET ORAL
COMMUNITY
Start: 2019-04-25 | End: 2019-09-04

## 2019-07-09 RX ADMIN — SODIUM CHLORIDE 1000 ML: 0.9 INJECTION, SOLUTION INTRAVENOUS at 02:07

## 2019-07-09 NOTE — PROGRESS NOTES
Chief Complaint:    Chief Complaint   Patient presents with    Chest Pain     dizzy, SOB, chest pain x 2 days        History of Present Illness:  Symptoms started yesterday or so, coming on slowly.  Slurred speech, chest pain, sleepy, dizzy, and confused.      ROS:  Review of Systems   Constitutional: Negative for activity change, chills, fatigue, fever and unexpected weight change.   HENT: Negative for congestion, ear discharge, ear pain, hearing loss, postnasal drip and rhinorrhea.    Eyes: Negative for pain and visual disturbance.   Respiratory: Negative for cough, chest tightness and shortness of breath.    Cardiovascular: Positive for chest pain. Negative for palpitations.   Gastrointestinal: Negative for abdominal pain, diarrhea and vomiting.   Endocrine: Negative for heat intolerance.   Genitourinary: Negative for dysuria, flank pain, frequency and hematuria.   Musculoskeletal: Negative for back pain, gait problem and neck pain.   Skin: Negative for color change and rash.   Neurological: Positive for dizziness, speech difficulty, weakness and light-headedness. Negative for tremors, seizures, numbness and headaches.   Psychiatric/Behavioral: Negative for agitation, hallucinations, self-injury, sleep disturbance and suicidal ideas. The patient is not nervous/anxious.        Past Medical History:   Diagnosis Date    Anxiety     Cancer     spot on kidney and lung    Diabetes mellitus     Borderline    Diverticulitis     GERD (gastroesophageal reflux disease)     Hyperlipidemia     not on any meds currently    Hypertension     Lung mass     Mass of kidney        Social History:  Social History     Socioeconomic History    Marital status:      Spouse name: Not on file    Number of children: Not on file    Years of education: Not on file    Highest education level: Not on file   Occupational History    Not on file   Social Needs    Financial resource strain: Not on file    Food insecurity:  "    Worry: Not on file     Inability: Not on file    Transportation needs:     Medical: Not on file     Non-medical: Not on file   Tobacco Use    Smoking status: Former Smoker     Packs/day: 1.00     Years: 20.00     Pack years: 20.00     Last attempt to quit: 1995     Years since quittin.4    Smokeless tobacco: Never Used   Substance and Sexual Activity    Alcohol use: No     Alcohol/week: 0.0 oz    Drug use: No    Sexual activity: Yes     Partners: Female     Birth control/protection: None   Lifestyle    Physical activity:     Days per week: Not on file     Minutes per session: Not on file    Stress: Not at all   Relationships    Social connections:     Talks on phone: Not on file     Gets together: Not on file     Attends Evangelical service: Not on file     Active member of club or organization: Not on file     Attends meetings of clubs or organizations: Not on file     Relationship status: Not on file   Other Topics Concern    Not on file   Social History Narrative    Not on file       Family History:   family history includes Cancer in his sister; Hypertension in his father and mother; Melanoma in his father.    Health Maintenance   Topic Date Due    Influenza Vaccine  2019    Lipid Panel  2020    Colonoscopy  2020    Aspirin/Antiplatelet Therapy  2020    TETANUS VACCINE  2028    Hepatitis C Screening  Completed       Physical Exam:    Vital Signs  Pulse: (!) 59  SpO2: 98 %  BP: 110/78  BP Location: Left arm  Patient Position: Sitting  Pain Score:   2  Height and Weight  Height: 5' 11" (180.3 cm)  Weight: 95 kg (209 lb 7 oz)  BSA (Calculated - sq m): 2.18 sq meters  BMI (Calculated): 29.3  Weight in (lb) to have BMI = 25: 178.9]    Body mass index is 29.21 kg/m².    Physical Exam   Constitutional: He appears well-developed.   HENT:   Mouth/Throat: Oropharynx is clear and moist.   Eyes: Pupils are equal, round, and reactive to light. Conjunctivae are normal. "   Neck: Normal range of motion. Neck supple.   Cardiovascular: Normal rate, regular rhythm and normal heart sounds.   No murmur heard.  Pulmonary/Chest: Effort normal and breath sounds normal. No respiratory distress. He has no wheezes. He has no rales. He exhibits no tenderness.   Abdominal: Soft. He exhibits no distension and no mass. There is no tenderness. There is no guarding.   Musculoskeletal: He exhibits no edema or tenderness.   Lymphadenopathy:     He has no cervical adenopathy.   Neurological: He is alert. Coordination and gait abnormal.   Weakness B legs   Skin: Skin is warm and dry.   Psychiatric: He has a normal mood and affect. His behavior is normal. Judgment and thought content normal.         Assessment:      ICD-10-CM ICD-9-CM   1. Slurred speech R47.81 784.59   2. Weakness R53.1 780.79   3. Confusion R41.0 298.9   4. Chest pain, unspecified type R07.9 786.50         Plan:       To ED via ambulance  D/d CVA, drug OD, sepsis.          No orders of the defined types were placed in this encounter.      Current Outpatient Medications   Medication Sig Dispense Refill    amLODIPine (NORVASC) 10 MG tablet TAKE 1 TABLET BY MOUTH ONCE DAILY. 90 tablet 0    aspirin 81 mg Tab Take 1 tablet by mouth Daily.      benazepril (LOTENSIN) 20 MG tablet TAKE 2 TABLETS BY MOUTH ONCE A DAY. 180 tablet 0    clonazePAM (KLONOPIN) 1 MG tablet Take 1 mg by mouth once daily.       fluticasone-salmeterol (ADVAIR HFA) 115-21 mcg/actuation HFAA Inhale 2 puffs into the lungs 2 (two) times daily. 12 g 12    gabapentin (NEURONTIN) 300 MG capsule TAKE 2 CAPSULES BY MOUTH THREE TIMES DAILY. 270 capsule 0    hydrALAZINE (APRESOLINE) 25 MG tablet Take 1 tablet (25 mg total) by mouth 3 (three) times daily. (Patient taking differently: Take 25 mg by mouth every 12 (twelve) hours. ) 90 tablet 11    hydroCHLOROthiazide (MICROZIDE) 12.5 mg capsule TAKE 1 CAPSULE BY MOUTH ONCE DAILY. 90 capsule 0    hydrOXYzine pamoate (VISTARIL)  50 MG Cap 50 mg daily as needed.       metoprolol tartrate (LOPRESSOR) 50 MG tablet TAKE 1 TABLET BY MOUTH TWICE A DAY. 180 tablet 0    omeprazole (PRILOSEC) 40 MG capsule TAKE 1 CAPSULE BY MOUTH ONCE DAILY. 60 capsule 0    rOPINIRole (REQUIP) 1 MG tablet Take 1 tablet (1 mg total) by mouth every evening. 30 tablet 11    tiotropium (SPIRIVA) 18 mcg inhalation capsule Inhale 1 capsule (18 mcg total) into the lungs once daily. Controller 30 capsule 2    tiZANidine (ZANAFLEX) 4 MG tablet TAKE 1 TABLET BY MOUTH EVERY 8 HOURS AS NEEDED. 90 tablet 0    traZODone (DESYREL) 100 MG tablet Take 1 tablet (100 mg total) by mouth every evening. 30 tablet 5    venlafaxine (EFFEXOR-XR) 37.5 MG 24 hr capsule Take 1 capsule (37.5 mg total) by mouth once daily. 30 capsule 11    VITAMIN B-12 1000 MCG tablet Take 1,000 mcg by mouth once daily.       zolpidem (AMBIEN) 10 mg Tab TAKE 1 TABLET BY MOUTH NIGHTLY AS NEEDED. 30 tablet 0    oxyCODONE-acetaminophen (PERCOCET)  mg per tablet        No current facility-administered medications for this visit.        Medications Discontinued During This Encounter   Medication Reason    HYDROcodone-acetaminophen (NORCO) 7.5-325 mg per tablet Patient no longer taking       No follow-ups on file.      Yumi Roberson MD

## 2019-07-09 NOTE — ED PROVIDER NOTES
"SCRIBE #1 NOTE: I, Josefina Momin, am scribing for, and in the presence of, Ondina Meza MD. I have scribed the entire note.      History      Chief Complaint   Patient presents with    multiple medical complaints     pt reports being confused upon waking up, CP to left breast sharp pain rated 5/10, dizziness, SOB which improved when placed on O2, and lisp to speech, states "my mouth feels swollen or there is cotton in it"       Review of patient's allergies indicates:   Allergen Reactions    Crestor [rosuvastatin]      Joint pain         HPI   HPI    7/9/2019, 1:15 PM   History obtained from the patient      History of Present Illness: Mil Sood is a 64 y.o. male patient with PMHx of GERD, HLD, DM, CA who presents to the Emergency Department for sharp, non-radiating L sided chest pain which onset 4 days ago. Pt initially thought he was having acid reflux so he took OTC acid reflux medication with no improvement. Pt started Lisinopril 2 weeks ago and felt that maybe he was having a reaction to his medication so he presented to his PCP, Dr. Roberson, this morning. Pt was referred to the ED via ambulance for further evaluation and to r/o CVA, drug OD, and sepsis. Symptoms are intermittent and moderate in severity. Pt's pain is worse with deep breaths. No mitigating factors reported. Associated sxs include double vision, slurred speech, dry mouth, SOB, dizziness, and generalized weakness. Pt denies coughing, abdominal pain, n/v/d, numbness, fever, chills, and all other sxs at this time. No other prior Tx reported. Pt reports having chronic back pain and states that he takes Doans pills for it regularly. Of note, pt is taking oxycodone for lower back pain, but states that he only takes it PRN. No further complaints or concerns at this time.       Arrival mode: EMS    PCP: Yumi Roberson MD       Past Medical History:  Past Medical History:   Diagnosis Date    Anxiety     Cancer     spot on kidney and lung    " Diabetes mellitus     Borderline    Diverticulitis     GERD (gastroesophageal reflux disease)     Hyperlipidemia     not on any meds currently    Hypertension     Lung mass     Mass of kidney        Past Surgical History:  Past Surgical History:   Procedure Laterality Date    ABLATION-CRYO in CT N/A 2018    Performed by [facility] at [facility] OR    CHOLECYSTECTOMY      COLONOSCOPY      COLONOSCOPY N/A 2017    Performed by Am[facility]ilys Bravo MD at [facility] ENDO    ESOPHAGOGASTRODUODENOSCOPY      ESOPHAGOGASTRODUODENOSCOPY (EGD) N/A 2016    Performed by [facility] at [facility] ENDO    ESOPHAGOGASTRODUODENOSCOPY (EGD) N/A 2016    Performed by [facility] at [facility] ENDO    ESOPHAGOGASTRODUODENOSCOPY (EGD) N/A 2016    Performed by [facility] at [facility] ENDO    ESOPHAGOGASTRODUODENOSCOPY (EGD) N/A 2016    Performed by [facility] at [facility] ENDO    HERNIA REPAIR Bilateral     groin    NECK SURGERY      PARTIAL NEPHRECTOMY      left    RELEASE-FINGER-TRIGGER Left 2017    Performed by Timothy [facility]barca MD at [facility] OR    RELEASE-FINGER-TRIGGER Right 2017    Performed by [facility]ca [facility] at [facility] OR    RENAL BIOPSY      REPAIR-HERNIA-INGUINAL-BILATERAL Bilateral 3/14/2017    Performed by Ari Pelletier MD at [facility] OR    TONSILLECTOMY      TRIGGER FINGER RELEASE Right     long finger    ULTRASOUND-ENDOSCOPIC-UPPER / EMR N/A 2016    Performed by [facility]ilys [facility]ravo [facility] at [facility] ENDO         Family History:  Family History   Problem Relation Age of Onset    Hypertension Mother     Hypertension Father     Melanoma Father     Cancer Sister        Social History:  Social History     Tobacco Use    Smoking status: Former Smoker     Packs/day: 1.00     Years: 20.00     Pack years: 20.00     Last attempt to quit: 1995     Years since quittin.4    Smokeless tobacco: Never Used   Substance and Sexual Activity     Alcohol use: No     Alcohol/week: 0.0 oz    Drug use: No    Sexual activity: Yes     Partners: Female     Birth control/protection: None       ROS   Review of Systems   Constitutional: Negative for chills and fever.   HENT: Negative for congestion and sore throat.         (+) dry mouth   Eyes: Positive for visual disturbance (double vision).   Respiratory: Positive for shortness of breath. Negative for cough and stridor.    Cardiovascular: Positive for chest pain. Negative for leg swelling.   Gastrointestinal: Negative for abdominal pain, diarrhea, nausea and vomiting.   Genitourinary: Negative for dysuria and frequency.   Musculoskeletal: Negative for back pain, neck pain and neck stiffness.   Skin: Negative for rash and wound.   Neurological: Positive for dizziness, speech difficulty (slurred) and weakness (generalized). Negative for facial asymmetry, light-headedness, numbness and headaches.   Hematological: Does not bruise/bleed easily.   All other systems reviewed and are negative.    Physical Exam      Initial Vitals [07/09/19 1253]   BP Pulse Resp Temp SpO2   (!) 99/52 67 16 97.7 °F (36.5 °C) 95 %      MAP       --          Physical Exam  Nursing Notes and Vital Signs Reviewed.  Constitutional: Patient is in no acute distress. Well-developed and well-nourished.  Head: Atraumatic. Normocephalic.  Eyes: PERRL. EOM intact. Conjunctivae are not pale. No scleral icterus.  ENT: Mucous membranes are moist. Oropharynx is clear and symmetric.    Neck: Supple. Full ROM. No lymphadenopathy.  Cardiovascular: Regular rate. Regular rhythm. No murmurs, rubs, or gallops. Distal pulses are 2+ and symmetric.  Pulmonary/Chest: No respiratory distress. Clear to auscultation bilaterally. No wheezing or rales.  Abdominal: Soft and non-distended.  There is no tenderness.  No rebound, guarding, or rigidity. Good bowel sounds.  Genitourinary: No CVA tenderness  Musculoskeletal: Moves all extremities. No obvious deformities. No  "edema. No calf tenderness.  Skin: Warm and dry.  Neurological:  Alert, awake, and appropriate.  Normal speech.  No acute focal neurological deficits are appreciated.  Psychiatric: Normal affect. Good eye contact. Appropriate in content.    ED Course    Procedures  ED Vital Signs:  Vitals:    07/09/19 1253 07/09/19 1302 07/09/19 1329 07/09/19 1345   BP: (!) 99/52 113/72  131/74   Pulse: 67 (!) 56 (!) 55 (!) 53   Resp: 16 15  17   Temp: 97.7 °F (36.5 °C)      TempSrc: Oral      SpO2: 95% 96%  99%   Weight: 94.8 kg (209 lb)      Height: 5' 11" (1.803 m)       07/09/19 1402 07/09/19 1403 07/09/19 1433 07/09/19 1438   BP: 128/70  112/60 137/78   Pulse:  (!) 54 (!) 47 (!) 53   Resp:  17 14 18   Temp:       TempSrc:       SpO2:  100% 100% 100%   Weight:       Height:        07/09/19 1441 07/09/19 1443 07/09/19 1632   BP: 123/80 126/81 (!) 150/87   Pulse: (!) 52 (!) 51 (!) 52   Resp: (!) 22 20 20   Temp:   97.8 °F (36.6 °C)   TempSrc:   Oral   SpO2: 99% 99% 97%   Weight:      Height:          Abnormal Lab Results:  Labs Reviewed   CBC W/ AUTO DIFFERENTIAL - Abnormal; Notable for the following components:       Result Value    Hemoglobin 13.7 (*)     Mono% 15.9 (*)     All other components within normal limits   COMPREHENSIVE METABOLIC PANEL - Abnormal; Notable for the following components:    BUN, Bld 35 (*)     Creatinine 1.9 (*)     Alkaline Phosphatase 44 (*)     eGFR if  42 (*)     eGFR if non  36 (*)     All other components within normal limits   HIV 1 / 2 ANTIBODY   HEPATITIS C ANTIBODY   B-TYPE NATRIURETIC PEPTIDE   TROPONIN I        All Lab Results:  Results for orders placed or performed during the hospital encounter of 07/09/19   HIV 1/2 Ag/Ab (4th Gen)   Result Value Ref Range    HIV 1/2 Ag/Ab Negative Negative   Hepatitis C antibody   Result Value Ref Range    Hepatitis C Ab Negative    CBC auto differential   Result Value Ref Range    WBC 6.29 3.90 - 12.70 K/uL    RBC 4.73 4.60 " - 6.20 M/uL    Hemoglobin 13.7 (L) 14.0 - 18.0 g/dL    Hematocrit 41.2 40.0 - 54.0 %    Mean Corpuscular Volume 87 82 - 98 fL    Mean Corpuscular Hemoglobin 29.0 27.0 - 31.0 pg    Mean Corpuscular Hemoglobin Conc 33.3 32.0 - 36.0 g/dL    RDW 13.3 11.5 - 14.5 %    Platelets 283 150 - 350 K/uL    MPV 9.9 9.2 - 12.9 fL    Gran # (ANC) 3.0 1.8 - 7.7 K/uL    Lymph # 1.8 1.0 - 4.8 K/uL    Mono # 1.0 0.3 - 1.0 K/uL    Eos # 0.5 0.0 - 0.5 K/uL    Baso # 0.07 0.00 - 0.20 K/uL    Gran% 47.1 38.0 - 73.0 %    Lymph% 28.3 18.0 - 48.0 %    Mono% 15.9 (H) 4.0 - 15.0 %    Eosinophil% 7.6 0.0 - 8.0 %    Basophil% 1.1 0.0 - 1.9 %    Differential Method Automated    B-Type natriuretic peptide (BNP)   Result Value Ref Range    BNP 31 0 - 99 pg/mL   Troponin I   Result Value Ref Range    Troponin I 0.011 0.000 - 0.026 ng/mL   Comprehensive metabolic panel   Result Value Ref Range    Sodium 137 136 - 145 mmol/L    Potassium 4.9 3.5 - 5.1 mmol/L    Chloride 101 95 - 110 mmol/L    CO2 23 23 - 29 mmol/L    Glucose 95 70 - 110 mg/dL    BUN, Bld 35 (H) 8 - 23 mg/dL    Creatinine 1.9 (H) 0.5 - 1.4 mg/dL    Calcium 9.3 8.7 - 10.5 mg/dL    Total Protein 6.7 6.0 - 8.4 g/dL    Albumin 3.6 3.5 - 5.2 g/dL    Total Bilirubin 0.4 0.1 - 1.0 mg/dL    Alkaline Phosphatase 44 (L) 55 - 135 U/L    AST 19 10 - 40 U/L    ALT 20 10 - 44 U/L    Anion Gap 13 8 - 16 mmol/L    eGFR if African American 42 (A) >60 mL/min/1.73 m^2    eGFR if non African American 36 (A) >60 mL/min/1.73 m^2       Imaging Results:  Imaging Results          X-Ray Chest AP Portable (Final result)  Result time 07/09/19 13:50:05    Final result by Kevin Geiger MD (07/09/19 13:50:05)                 Impression:      No acute process seen.      Electronically signed by: Kevin eGiger MD  Date:    07/09/2019  Time:    13:50             Narrative:    EXAMINATION:  XR CHEST AP PORTABLE    CLINICAL HISTORY:  Chest Pain;    FINDINGS:  Single view of the chest.  Aorta demonstrates  atherosclerotic disease. Tortuosity of the descending aorta can be seen with chronic hypertension.    Cardiac silhouette is normal.  The lungs demonstrate no evidence of active disease.  No evidence of pleural effusion or pneumothorax.  Bones demonstrate moderate degenerative changes.                               CT Head Without Contrast (Final result)  Result time 07/09/19 13:49:15    Final result by Kevin Geiger MD (07/09/19 13:49:15)                 Impression:      Chronic microvascular ischemic changes.    All CT scans at this facility use dose modulation, iterative reconstruction, and/or weight based dosing when appropriate to reduce radiation dose to as low as reasonable achievable.      Electronically signed by: Kevin Geiger MD  Date:    07/09/2019  Time:    13:49             Narrative:    EXAMINATION:  CT HEAD WITHOUT CONTRAST    CLINICAL HISTORY:  Confusion/delirium, altered LOC, unexplained;    TECHNIQUE:  Low dose axial CT images obtained throughout the head without intravenous contrast. Sagittal and coronal reconstructions were performed.    COMPARISON:  11/07/2014.    FINDINGS:  Intracranial compartment:    The brain parenchyma demonstrates areas of decreased attenuation with moderate periventricular white matter consistent with chronic microvascular ischemic changes..  No parenchymal mass, hemorrhage, edema or major vascular distribution infarct.  Vascular calcifications are noted.    Mild prominence of the sulci and ventricles are consistent with age-related involutional changes.    No extra-axial blood or fluid collections.    Skull/extracranial contents (limited evaluation): No fracture.  Minimal mucosal thickening within the posterior ethmoid air cells and sphenoid sinus.  Mastoid air cells and paranasal sinuses are essentially clear.                               The EKG was ordered, reviewed, and independently interpreted by the ED provider.  Interpretation time: 13:04   Rate: 57  BPM  Rhythm: sinus bradycardia  Interpretation: No ST changes. No STEMI.             The Emergency Provider reviewed the vital signs and test results, which are outlined above.    ED Discussion     3:23 PM: Reassessed pt at this time. Discussed with pt all pertinent ED information and results. Discussed pt dx and plan of tx. Gave pt all f/u and return to the ED instructions. All questions and concerns were addressed at this time. Pt expresses understanding of information and instructions, and is comfortable with plan to discharge. Pt is stable for discharge.    I discussed with patient and/or family/caretaker that evaluation in the ED does not suggest any emergent or life threatening medical conditions requiring immediate intervention beyond what was provided in the ED, and I believe patient is safe for discharge.  Regardless, an unremarkable evaluation in the ED does not preclude the development or presence of a serious of life threatening condition. As such, patient was instructed to return immediately for any worsening or change in current symptoms.    I have discussed with patient and/or family/caretaker chest pain precautions, specifically to return for worsening chest pain, shortness of breath, fever, or any concern.  I have low suspicion for cardiopulmonary, vascular, infectious, respiratory, or other emergent medical condition based on my evaluation in the ED.    ED Medication(s):  Medications   sodium chloride 0.9% bolus 1,000 mL (0 mLs Intravenous Stopped 7/9/19 8330)          Medication List      ASK your doctor about these medications    amLODIPine 10 MG tablet  Commonly known as:  NORVASC  TAKE 1 TABLET BY MOUTH ONCE DAILY.     aspirin 81 mg Tab     benazepril 20 MG tablet  Commonly known as:  LOTENSIN  TAKE 2 TABLETS BY MOUTH ONCE A DAY.     clonazePAM 1 MG tablet  Commonly known as:  KLONOPIN     fluticasone propion-salmeterol 115-21 mcg/actuation Hfaa  Commonly known as:  ADVAIR HFA  Inhale 2 puffs  into the lungs 2 (two) times daily.     gabapentin 300 MG capsule  Commonly known as:  NEURONTIN  TAKE 2 CAPSULES BY MOUTH THREE TIMES DAILY.     hydrALAZINE 25 MG tablet  Commonly known as:  APRESOLINE  Take 1 tablet (25 mg total) by mouth 3 (three) times daily.     hydroCHLOROthiazide 12.5 mg capsule  Commonly known as:  MICROZIDE  TAKE 1 CAPSULE BY MOUTH ONCE DAILY.     hydrOXYzine pamoate 50 MG Cap  Commonly known as:  VISTARIL     metoprolol tartrate 50 MG tablet  Commonly known as:  LOPRESSOR  TAKE 1 TABLET BY MOUTH TWICE A DAY.     omeprazole 40 MG capsule  Commonly known as:  PRILOSEC  TAKE 1 CAPSULE BY MOUTH ONCE DAILY.     oxyCODONE-acetaminophen  mg per tablet  Commonly known as:  PERCOCET     rOPINIRole 1 MG tablet  Commonly known as:  REQUIP  Take 1 tablet (1 mg total) by mouth every evening.     tiotropium 18 mcg inhalation capsule  Commonly known as:  SPIRIVA  Inhale 1 capsule (18 mcg total) into the lungs once daily. Controller     tiZANidine 4 MG tablet  Commonly known as:  ZANAFLEX  TAKE 1 TABLET BY MOUTH EVERY 8 HOURS AS NEEDED.     traZODone 100 MG tablet  Commonly known as:  DESYREL  Take 1 tablet (100 mg total) by mouth every evening.     venlafaxine 37.5 MG 24 hr capsule  Commonly known as:  EFFEXOR-XR  Take 1 capsule (37.5 mg total) by mouth once daily.     VITAMIN B-12 1000 MCG tablet  Generic drug:  cyanocobalamin     zolpidem 10 mg Tab  Commonly known as:  AMBIEN  TAKE 1 TABLET BY MOUTH NIGHTLY AS NEEDED.            Follow-up Information     Yumi Roberson MD.    Specialty:  Family Medicine  Contact information:  [address] [address] [address] [phone]                     Medical Decision Making    Medical Decision Making:   Clinical Tests:   Lab Tests: Ordered and Reviewed  Radiological Study: Ordered and Reviewed  Medical Tests: Ordered and Reviewed           Scribe Attestation:   Scribe #1: I performed the above scribed service and the documentation accurately  describes the services I performed. I attest to the accuracy of the note.    Attending:   Physician Attestation Statement for Scribe #1: I, Ondina Meza MD, personally performed the services described in this documentation, as scribed by Josefina Momin, in my presence, and it is both accurate and complete.          Clinical Impression       ICD-10-CM ICD-9-CM   1. Altered mental status R41.82 780.97   2. Chest pain R07.9 786.50   3. Dehydration E86.0 276.51       Disposition:   Disposition: Discharged  Condition: Stable       Ondina Meza MD  07/12/19 2050

## 2019-07-19 ENCOUNTER — LAB VISIT (OUTPATIENT)
Dept: LAB | Facility: HOSPITAL | Age: 64
End: 2019-07-19
Attending: FAMILY MEDICINE
Payer: MEDICARE

## 2019-07-19 ENCOUNTER — OFFICE VISIT (OUTPATIENT)
Dept: FAMILY MEDICINE | Facility: CLINIC | Age: 64
End: 2019-07-19
Payer: MEDICARE

## 2019-07-19 VITALS
WEIGHT: 214.19 LBS | DIASTOLIC BLOOD PRESSURE: 60 MMHG | HEIGHT: 71 IN | OXYGEN SATURATION: 93 % | SYSTOLIC BLOOD PRESSURE: 84 MMHG | HEART RATE: 74 BPM | BODY MASS INDEX: 29.98 KG/M2 | TEMPERATURE: 98 F

## 2019-07-19 DIAGNOSIS — M75.82 ROTATOR CUFF TENDINITIS, LEFT: ICD-10-CM

## 2019-07-19 DIAGNOSIS — N17.9 ACUTE RENAL FAILURE, UNSPECIFIED ACUTE RENAL FAILURE TYPE: ICD-10-CM

## 2019-07-19 DIAGNOSIS — M54.16 LEFT LUMBAR RADICULOPATHY: ICD-10-CM

## 2019-07-19 DIAGNOSIS — R47.81 SLURRED SPEECH: Primary | ICD-10-CM

## 2019-07-19 DIAGNOSIS — R27.8 IMPAIRED COORDINATION OF UPPER EXTREMITY: ICD-10-CM

## 2019-07-19 LAB
ANION GAP SERPL CALC-SCNC: 10 MMOL/L (ref 8–16)
BUN SERPL-MCNC: 25 MG/DL (ref 8–23)
CALCIUM SERPL-MCNC: 9.8 MG/DL (ref 8.7–10.5)
CHLORIDE SERPL-SCNC: 100 MMOL/L (ref 95–110)
CO2 SERPL-SCNC: 28 MMOL/L (ref 23–29)
CREAT SERPL-MCNC: 2.3 MG/DL (ref 0.5–1.4)
EST. GFR  (AFRICAN AMERICAN): 33.4 ML/MIN/1.73 M^2
EST. GFR  (NON AFRICAN AMERICAN): 28.9 ML/MIN/1.73 M^2
GLUCOSE SERPL-MCNC: 82 MG/DL (ref 70–110)
POTASSIUM SERPL-SCNC: 4.9 MMOL/L (ref 3.5–5.1)
SODIUM SERPL-SCNC: 138 MMOL/L (ref 136–145)

## 2019-07-19 PROCEDURE — 3074F SYST BP LT 130 MM HG: CPT | Mod: HCNC,CPTII,S$GLB, | Performed by: FAMILY MEDICINE

## 2019-07-19 PROCEDURE — 3008F BODY MASS INDEX DOCD: CPT | Mod: HCNC,CPTII,S$GLB, | Performed by: FAMILY MEDICINE

## 2019-07-19 PROCEDURE — 3078F PR MOST RECENT DIASTOLIC BLOOD PRESSURE < 80 MM HG: ICD-10-PCS | Mod: HCNC,CPTII,S$GLB, | Performed by: FAMILY MEDICINE

## 2019-07-19 PROCEDURE — 99214 OFFICE O/P EST MOD 30 MIN: CPT | Mod: HCNC,S$GLB,, | Performed by: FAMILY MEDICINE

## 2019-07-19 PROCEDURE — 99214 PR OFFICE/OUTPT VISIT, EST, LEVL IV, 30-39 MIN: ICD-10-PCS | Mod: HCNC,S$GLB,, | Performed by: FAMILY MEDICINE

## 2019-07-19 PROCEDURE — 80048 BASIC METABOLIC PNL TOTAL CA: CPT | Mod: HCNC

## 2019-07-19 PROCEDURE — 99999 PR PBB SHADOW E&M-EST. PATIENT-LVL V: CPT | Mod: PBBFAC,HCNC,, | Performed by: FAMILY MEDICINE

## 2019-07-19 PROCEDURE — 3078F DIAST BP <80 MM HG: CPT | Mod: HCNC,CPTII,S$GLB, | Performed by: FAMILY MEDICINE

## 2019-07-19 PROCEDURE — 3074F PR MOST RECENT SYSTOLIC BLOOD PRESSURE < 130 MM HG: ICD-10-PCS | Mod: HCNC,CPTII,S$GLB, | Performed by: FAMILY MEDICINE

## 2019-07-19 PROCEDURE — 99999 PR PBB SHADOW E&M-EST. PATIENT-LVL V: ICD-10-PCS | Mod: PBBFAC,HCNC,, | Performed by: FAMILY MEDICINE

## 2019-07-19 PROCEDURE — 3008F PR BODY MASS INDEX (BMI) DOCUMENTED: ICD-10-PCS | Mod: HCNC,CPTII,S$GLB, | Performed by: FAMILY MEDICINE

## 2019-07-19 PROCEDURE — 36415 COLL VENOUS BLD VENIPUNCTURE: CPT | Mod: HCNC,PO

## 2019-07-19 NOTE — PROGRESS NOTES
Chief Complaint:    Chief Complaint   Patient presents with    Back Pain    Neck Pain    Leg Pain       History of Present Illness:  Patient presents today he says he has been having some back pain and radiates to his left leg and he has also got some left shoulder pain. Patient says the symptoms have been going on recently however it appears that patient had does have underlying back issues lumbar radiculitis and has been seeing Pain Management and Neurosurgery.  He denies any trauma.  He says he does have some numbness on the left side of his leg.    Last time he was seen in the clinic had slurred speech and was sent to the ED for possible CVA CT scan at that time was normal.  Patient's speech is still slurred he had says his acute renal failure back then he has was given some fluid and sent home.  He does appear to be little confused from time to time but some high is able to answer most of my questions properly although I had to repeat them 2 times.    He has had a nerve conduction study many years back of his lower extremity that did not reveal any neuropathy.  He was seen recently by Neurosurgery and was deemed not to be a candidate for surgery at that time and was sent for RIGOBERTO.  He was also he says seen other Orthopedic for possible labral tear of the left hip and was told that that is not was causing his pain.    His blood pressure little low but he says he has checked his pressure at home is running okay typically around 120 most days.      ROS:  Review of Systems   Constitutional: Negative for activity change, chills, fatigue, fever and unexpected weight change.   HENT: Negative for congestion, ear discharge, ear pain, hearing loss, postnasal drip and rhinorrhea.    Eyes: Negative for pain and visual disturbance.   Respiratory: Negative for cough, chest tightness and shortness of breath.    Cardiovascular: Negative for chest pain and palpitations.   Gastrointestinal: Negative for abdominal pain,  diarrhea and vomiting.   Endocrine: Negative for heat intolerance.   Genitourinary: Negative for dysuria, flank pain, frequency and hematuria.   Musculoskeletal: Positive for back pain and gait problem. Negative for neck pain.   Skin: Negative for color change and rash.   Neurological: Negative for dizziness, tremors, seizures, numbness and headaches.   Psychiatric/Behavioral: Negative for agitation, hallucinations, self-injury, sleep disturbance and suicidal ideas. The patient is not nervous/anxious.        Past Medical History:   Diagnosis Date    Anxiety     Cancer     spot on kidney and lung    Diabetes mellitus     Borderline    Diverticulitis     GERD (gastroesophageal reflux disease)     Hyperlipidemia     not on any meds currently    Hypertension     Lung mass     Mass of kidney        Social History:  Social History     Socioeconomic History    Marital status:      Spouse name: Not on file    Number of children: Not on file    Years of education: Not on file    Highest education level: Not on file   Occupational History    Not on file   Social Needs    Financial resource strain: Not on file    Food insecurity:     Worry: Not on file     Inability: Not on file    Transportation needs:     Medical: Not on file     Non-medical: Not on file   Tobacco Use    Smoking status: Former Smoker     Packs/day: 1.00     Years: 20.00     Pack years: 20.00     Last attempt to quit: 1995     Years since quittin.4    Smokeless tobacco: Never Used   Substance and Sexual Activity    Alcohol use: No     Alcohol/week: 0.0 oz    Drug use: No    Sexual activity: Yes     Partners: Female     Birth control/protection: None   Lifestyle    Physical activity:     Days per week: Not on file     Minutes per session: Not on file    Stress: Not at all   Relationships    Social connections:     Talks on phone: Not on file     Gets together: Not on file     Attends L[employer] day service: Not on file      "Active member of club or organization: Not on file     Attends meetings of clubs or organizations: Not on file     Relationship status: Not on file   Other Topics Concern    Not on file   Social History Narrative    Not on file       Family History:   family history includes Cancer in his sister; Hypertension in his father and mother; Melanoma in his father.    Health Maintenance   Topic Date Due    Influenza Vaccine  08/01/2019    Lipid Panel  01/09/2020    Colonoscopy  04/07/2020    Aspirin/Antiplatelet Therapy  07/19/2020    TETANUS VACCINE  04/26/2028    Hepatitis C Screening  Completed       Physical Exam:    Vital Signs  Temp: 98.2 °F (36.8 °C)  Temp src: Temporal  Pulse: 74  SpO2: (!) 93 %  BP: (!) 84/60  BP Location: Left arm  Patient Position: Sitting  Pain Score: 10-Worst pain ever  Pain Loc: Back  Height and Weight  Height: 5' 11" (180.3 cm)  Weight: 97.1 kg (214 lb 2.8 oz)  BSA (Calculated - sq m): 2.21 sq meters  BMI (Calculated): 29.9  Weight in (lb) to have BMI = 25: 178.9]    Body mass index is 29.87 kg/m².    Physical Exam   Constitutional: He appears well-developed.   HENT:   Mouth/Throat: Oropharynx is clear and moist.   Eyes: Pupils are equal, round, and reactive to light. Conjunctivae are normal.   Neck: Normal range of motion. Neck supple.   Cardiovascular: Normal rate, regular rhythm and normal heart sounds.   No murmur heard.  Pulmonary/Chest: Effort normal and breath sounds normal. No respiratory distress. He has no wheezes. He has no rales. He exhibits no tenderness.   Abdominal: Soft. He exhibits no distension and no mass. There is no tenderness. There is no guarding.   Musculoskeletal: He exhibits no edema.        Lumbar back: He exhibits tenderness.        Arms:       Legs:  Lymphadenopathy:     He has no cervical adenopathy.   Neurological: He is alert. He has normal reflexes. Coordination and gait abnormal.   Finger nose test is abnormal more so on the left-hand " side    Patient's speech is still slurred   Skin: Skin is warm and dry.   Psychiatric: He has a normal mood and affect. His behavior is normal. Judgment and thought content normal.         Assessment:      ICD-10-CM ICD-9-CM   1. Slurred speech R47.81 784.59   2. Impaired coordination of upper extremity R27.8 781.3   3. Left lumbar radiculopathy M54.16 724.4   4. Rotator cuff tendinitis, left M75.82 726.10   5. Acute renal failure, unspecified acute renal failure type N17.9 584.9         Plan:  Will get MRI of the brain to rule out a CVA the impaired coordination on the left side and he has some weakness on the left arm highly suggestive of a CVA  He will need a nerve conduction study as study involving the lower extremity, and suspect acute on chronic lumbar radiculopathy.  He has has pain medications that he says he can take.  Based on the results of nerve conduction study may need referral back to either neurosurgeon or for pain clinic.  Recheck a BMP  He does have rotator cuff tendinitis physical therapy will be options once his acute issues been taking care of               Orders Placed This Encounter   Procedures    MRI Brain Without Contrast    Basic metabolic panel    TOXICOLOGY SCREEN, URINE, RANDOM (COMPLIANCE)    Nerve conduction test       Current Outpatient Medications   Medication Sig Dispense Refill    amLODIPine (NORVASC) 10 MG tablet TAKE 1 TABLET BY MOUTH ONCE DAILY. 90 tablet 0    aspirin 81 mg Tab Take 1 tablet by mouth Daily.      benazepril (LOTENSIN) 20 MG tablet TAKE 2 TABLETS BY MOUTH ONCE A DAY. 180 tablet 0    clonazePAM (KLONOPIN) 1 MG tablet Take 1 mg by mouth once daily.       gabapentin (NEURONTIN) 300 MG capsule TAKE 2 CAPSULES BY MOUTH THREE TIMES DAILY. 270 capsule 0    hydroCHLOROthiazide (MICROZIDE) 12.5 mg capsule TAKE 1 CAPSULE BY MOUTH ONCE DAILY. 90 capsule 0    hydrOXYzine pamoate (VISTARIL) 50 MG Cap 50 mg daily as needed.       metoprolol tartrate (LOPRESSOR)  50 MG tablet TAKE 1 TABLET BY MOUTH TWICE A DAY. 180 tablet 0    omeprazole (PRILOSEC) 40 MG capsule TAKE 1 CAPSULE BY MOUTH ONCE DAILY. 60 capsule 0    oxyCODONE-acetaminophen (PERCOCET)  mg per tablet       rOPINIRole (REQUIP) 1 MG tablet Take 1 tablet (1 mg total) by mouth every evening. 30 tablet 11    tiZANidine (ZANAFLEX) 4 MG tablet TAKE 1 TABLET BY MOUTH EVERY 8 HOURS AS NEEDED. 90 tablet 0    traZODone (DESYREL) 100 MG tablet Take 1 tablet (100 mg total) by mouth every evening. 30 tablet 5    venlafaxine (EFFEXOR-XR) 37.5 MG 24 hr capsule Take 1 capsule (37.5 mg total) by mouth once daily. 30 capsule 11    VITAMIN B-12 1000 MCG tablet Take 1,000 mcg by mouth once daily.       zolpidem (AMBIEN) 10 mg Tab TAKE 1 TABLET BY MOUTH NIGHTLY AS NEEDED. 30 tablet 0    fluticasone-salmeterol (ADVAIR HFA) 115-21 mcg/actuation HFAA Inhale 2 puffs into the lungs 2 (two) times daily. 12 g 12    hydrALAZINE (APRESOLINE) 25 MG tablet Take 1 tablet (25 mg total) by mouth 3 (three) times daily. (Patient taking differently: Take 25 mg by mouth every 12 (twelve) hours. ) 90 tablet 11    tiotropium (SPIRIVA) 18 mcg inhalation capsule Inhale 1 capsule (18 mcg total) into the lungs once daily. Controller 30 capsule 2     No current facility-administered medications for this visit.        There are no discontinued medications.    No follow-ups on file.      Yumi Roberson MD

## 2019-07-22 ENCOUNTER — HOSPITAL ENCOUNTER (EMERGENCY)
Facility: HOSPITAL | Age: 64
Discharge: HOME | End: 2019-07-22
Attending: EMERGENCY MEDICINE
Payer: MEDICARE

## 2019-07-22 ENCOUNTER — OFFICE VISIT (OUTPATIENT)
Dept: SURGERY | Facility: CLINIC | Age: 64
End: 2019-07-22
Payer: MEDICARE

## 2019-07-22 ENCOUNTER — TELEPHONE (OUTPATIENT)
Dept: FAMILY MEDICINE | Facility: CLINIC | Age: 64
End: 2019-07-22

## 2019-07-22 VITALS
WEIGHT: 214 LBS | TEMPERATURE: 98 F | SYSTOLIC BLOOD PRESSURE: 147 MMHG | DIASTOLIC BLOOD PRESSURE: 100 MMHG | HEART RATE: 79 BPM | BODY MASS INDEX: 29.85 KG/M2

## 2019-07-22 VITALS
HEIGHT: 70 IN | TEMPERATURE: 98 F | RESPIRATION RATE: 18 BRPM | HEART RATE: 69 BPM | BODY MASS INDEX: 30.51 KG/M2 | DIASTOLIC BLOOD PRESSURE: 90 MMHG | OXYGEN SATURATION: 96 % | WEIGHT: 213.13 LBS | SYSTOLIC BLOOD PRESSURE: 149 MMHG

## 2019-07-22 DIAGNOSIS — R42 DIZZINESS: ICD-10-CM

## 2019-07-22 DIAGNOSIS — E86.0 DEHYDRATION: ICD-10-CM

## 2019-07-22 DIAGNOSIS — R10.30 INGUINAL PAIN, UNSPECIFIED LATERALITY: Primary | ICD-10-CM

## 2019-07-22 DIAGNOSIS — R53.1 WEAKNESS: ICD-10-CM

## 2019-07-22 DIAGNOSIS — R47.81 SLURRED SPEECH: ICD-10-CM

## 2019-07-22 DIAGNOSIS — N28.9 RENAL INSUFFICIENCY: Primary | ICD-10-CM

## 2019-07-22 LAB
ALBUMIN SERPL BCP-MCNC: 4 G/DL (ref 3.5–5.2)
ALP SERPL-CCNC: 45 U/L (ref 55–135)
ALT SERPL W/O P-5'-P-CCNC: 21 U/L (ref 10–44)
ANION GAP SERPL CALC-SCNC: 14 MMOL/L (ref 8–16)
APTT BLDCRRT: 28 SEC (ref 21–32)
AST SERPL-CCNC: 22 U/L (ref 10–40)
BASOPHILS # BLD AUTO: 0.06 K/UL (ref 0–0.2)
BASOPHILS NFR BLD: 0.9 % (ref 0–1.9)
BILIRUB SERPL-MCNC: 0.5 MG/DL (ref 0.1–1)
BILIRUB UR QL STRIP: NEGATIVE
BUN SERPL-MCNC: 24 MG/DL (ref 8–23)
CALCIUM SERPL-MCNC: 10 MG/DL (ref 8.7–10.5)
CHLORIDE SERPL-SCNC: 101 MMOL/L (ref 95–110)
CK MB SERPL-MCNC: 1.8 NG/ML (ref 0.1–6.5)
CK MB SERPL-RTO: 1.5 % (ref 0–5)
CK SERPL-CCNC: 124 U/L (ref 20–200)
CK SERPL-CCNC: 124 U/L (ref 20–200)
CLARITY UR: CLEAR
CO2 SERPL-SCNC: 23 MMOL/L (ref 23–29)
COLOR UR: YELLOW
CREAT SERPL-MCNC: 1.4 MG/DL (ref 0.5–1.4)
DIFFERENTIAL METHOD: ABNORMAL
EOSINOPHIL # BLD AUTO: 0.5 K/UL (ref 0–0.5)
EOSINOPHIL NFR BLD: 7.4 % (ref 0–8)
ERYTHROCYTE [DISTWIDTH] IN BLOOD BY AUTOMATED COUNT: 13 % (ref 11.5–14.5)
EST. GFR  (AFRICAN AMERICAN): >60 ML/MIN/1.73 M^2
EST. GFR  (NON AFRICAN AMERICAN): 53 ML/MIN/1.73 M^2
ETHANOL SERPL-MCNC: <10 MG/DL
GLUCOSE SERPL-MCNC: 90 MG/DL (ref 70–110)
GLUCOSE UR QL STRIP: NEGATIVE
HCT VFR BLD AUTO: 43.7 % (ref 40–54)
HGB BLD-MCNC: 15.1 G/DL (ref 14–18)
HGB UR QL STRIP: NEGATIVE
INR PPP: 1 (ref 0.8–1.2)
KETONES UR QL STRIP: NEGATIVE
LEUKOCYTE ESTERASE UR QL STRIP: NEGATIVE
LYMPHOCYTES # BLD AUTO: 1.6 K/UL (ref 1–4.8)
LYMPHOCYTES NFR BLD: 23.1 % (ref 18–48)
MAGNESIUM SERPL-MCNC: 1.8 MG/DL (ref 1.6–2.6)
MCH RBC QN AUTO: 28.9 PG (ref 27–31)
MCHC RBC AUTO-ENTMCNC: 34.6 G/DL (ref 32–36)
MCV RBC AUTO: 84 FL (ref 82–98)
MONOCYTES # BLD AUTO: 0.9 K/UL (ref 0.3–1)
MONOCYTES NFR BLD: 12.7 % (ref 4–15)
NEUTROPHILS # BLD AUTO: 3.9 K/UL (ref 1.8–7.7)
NEUTROPHILS NFR BLD: 56.3 % (ref 38–73)
NITRITE UR QL STRIP: NEGATIVE
PH UR STRIP: 6 [PH] (ref 5–8)
PHOSPHATE SERPL-MCNC: 3.8 MG/DL (ref 2.7–4.5)
PLATELET # BLD AUTO: 218 K/UL (ref 150–350)
PMV BLD AUTO: 8.8 FL (ref 9.2–12.9)
POTASSIUM SERPL-SCNC: 4.5 MMOL/L (ref 3.5–5.1)
PROT SERPL-MCNC: 7.5 G/DL (ref 6–8.4)
PROT UR QL STRIP: NEGATIVE
PROTHROMBIN TIME: 10.5 SEC (ref 9–12.5)
RBC # BLD AUTO: 5.23 M/UL (ref 4.6–6.2)
SODIUM SERPL-SCNC: 138 MMOL/L (ref 136–145)
SP GR UR STRIP: 1.01 (ref 1–1.03)
TROPONIN I SERPL DL<=0.01 NG/ML-MCNC: 0.01 NG/ML (ref 0–0.03)
URN SPEC COLLECT METH UR: NORMAL
UROBILINOGEN UR STRIP-ACNC: NEGATIVE EU/DL
WBC # BLD AUTO: 7 K/UL (ref 3.9–12.7)

## 2019-07-22 PROCEDURE — 85610 PROTHROMBIN TIME: CPT | Mod: HCNC

## 2019-07-22 PROCEDURE — 3008F BODY MASS INDEX DOCD: CPT | Mod: HCNC,CPTII,S$GLB, | Performed by: SURGERY

## 2019-07-22 PROCEDURE — 93010 EKG 12-LEAD: ICD-10-PCS | Mod: HCNC,,, | Performed by: INTERNAL MEDICINE

## 2019-07-22 PROCEDURE — 83735 ASSAY OF MAGNESIUM: CPT | Mod: HCNC

## 2019-07-22 PROCEDURE — 99999 PR PBB SHADOW E&M-EST. PATIENT-LVL IV: CPT | Mod: PBBFAC,HCNC,, | Performed by: SURGERY

## 2019-07-22 PROCEDURE — 84100 ASSAY OF PHOSPHORUS: CPT | Mod: HCNC

## 2019-07-22 PROCEDURE — 82550 ASSAY OF CK (CPK): CPT | Mod: HCNC

## 2019-07-22 PROCEDURE — 99213 PR OFFICE/OUTPT VISIT, EST, LEVL III, 20-29 MIN: ICD-10-PCS | Mod: HCNC,S$GLB,, | Performed by: SURGERY

## 2019-07-22 PROCEDURE — 99213 OFFICE O/P EST LOW 20 MIN: CPT | Mod: HCNC,S$GLB,, | Performed by: SURGERY

## 2019-07-22 PROCEDURE — 80320 DRUG SCREEN QUANTALCOHOLS: CPT | Mod: HCNC

## 2019-07-22 PROCEDURE — 36415 COLL VENOUS BLD VENIPUNCTURE: CPT | Mod: HCNC

## 2019-07-22 PROCEDURE — 80053 COMPREHEN METABOLIC PANEL: CPT | Mod: HCNC

## 2019-07-22 PROCEDURE — 3077F PR MOST RECENT SYSTOLIC BLOOD PRESSURE >= 140 MM HG: ICD-10-PCS | Mod: HCNC,CPTII,S$GLB, | Performed by: SURGERY

## 2019-07-22 PROCEDURE — 85730 THROMBOPLASTIN TIME PARTIAL: CPT | Mod: HCNC

## 2019-07-22 PROCEDURE — 3080F PR MOST RECENT DIASTOLIC BLOOD PRESSURE >= 90 MM HG: ICD-10-PCS | Mod: HCNC,CPTII,S$GLB, | Performed by: SURGERY

## 2019-07-22 PROCEDURE — 81003 URINALYSIS AUTO W/O SCOPE: CPT | Mod: HCNC

## 2019-07-22 PROCEDURE — 99999 PR PBB SHADOW E&M-EST. PATIENT-LVL IV: ICD-10-PCS | Mod: PBBFAC,HCNC,, | Performed by: SURGERY

## 2019-07-22 PROCEDURE — 93010 ELECTROCARDIOGRAM REPORT: CPT | Mod: HCNC,,, | Performed by: INTERNAL MEDICINE

## 2019-07-22 PROCEDURE — 99285 EMERGENCY DEPT VISIT HI MDM: CPT | Mod: HCNC,25

## 2019-07-22 PROCEDURE — 82553 CREATINE MB FRACTION: CPT | Mod: HCNC

## 2019-07-22 PROCEDURE — 3008F PR BODY MASS INDEX (BMI) DOCUMENTED: ICD-10-PCS | Mod: HCNC,CPTII,S$GLB, | Performed by: SURGERY

## 2019-07-22 PROCEDURE — 84484 ASSAY OF TROPONIN QUANT: CPT | Mod: HCNC

## 2019-07-22 PROCEDURE — 93005 ELECTROCARDIOGRAM TRACING: CPT | Mod: HCNC

## 2019-07-22 PROCEDURE — 3077F SYST BP >= 140 MM HG: CPT | Mod: HCNC,CPTII,S$GLB, | Performed by: SURGERY

## 2019-07-22 PROCEDURE — 85025 COMPLETE CBC W/AUTO DIFF WBC: CPT | Mod: HCNC

## 2019-07-22 PROCEDURE — 3080F DIAST BP >= 90 MM HG: CPT | Mod: HCNC,CPTII,S$GLB, | Performed by: SURGERY

## 2019-07-22 PROCEDURE — 25000003 PHARM REV CODE 250: Mod: HCNC | Performed by: EMERGENCY MEDICINE

## 2019-07-22 RX ORDER — OXYCODONE AND ACETAMINOPHEN 10; 325 MG/1; MG/1
1 TABLET ORAL
Status: DISCONTINUED | OUTPATIENT
Start: 2019-07-22 | End: 2019-07-22

## 2019-07-22 RX ORDER — OMEPRAZOLE 40 MG/1
CAPSULE, DELAYED RELEASE ORAL
Qty: 60 CAPSULE | Refills: 0 | Status: SHIPPED | OUTPATIENT
Start: 2019-07-22 | End: 2019-10-08 | Stop reason: SDUPTHER

## 2019-07-22 RX ADMIN — SODIUM CHLORIDE 1000 ML: 0.9 INJECTION, SOLUTION INTRAVENOUS at 05:07

## 2019-07-22 NOTE — TELEPHONE ENCOUNTER
Notified patient and Landon(patients son) of advice per Dr. Roberson. Verbalized understanding and will go to the ED as recommended.

## 2019-07-22 NOTE — TELEPHONE ENCOUNTER
----- Message from Sudhakar Valladares sent at 7/22/2019  9:54 AM CDT -----  Contact: son-george  Type:  Patient Returning Call    Who Called:son  Who Left Message for Patient:n/a  Does the patient know what this is regarding?:no  Would the patient rather a call back or a response via [facility]? Call back  Best Call Back Number:[phone]  Additional Information: none    Thanks,  Sudhakar Valladares

## 2019-07-22 NOTE — TELEPHONE ENCOUNTER
----- Message from Sudhakar Valladares sent at 7/22/2019  9:54 AM CDT -----  Contact: son-george  Type:  Patient Returning Call    Who Called:son  Who Left Message for Patient:n/a  Does the patient know what this is regarding?:no  Would the patient rather a call back or a response via [facility]? Call back  Best Call Back Number:[phone]  Additional Information: none    Thanks,  Sudhakar Valladares

## 2019-07-22 NOTE — ED PROVIDER NOTES
"SCRIBE #1 NOTE: I, Naye Bravo, am scribing for, and in the presence of, Mukul Bernal MD. I have scribed the entire note.       History     Chief Complaint   Patient presents with    Abnormal Lab     PT states, "Dr Roberson sent me over and said I am in acute kidney failure."     Review of patient's allergies indicates:   Allergen Reactions    Crestor [rosuvastatin]      Joint pain          History of Present Illness     HPI    2019, 4:18 PM  History obtained from the patient and son      History of Present Illness: Mil Sood is a 64 y.o. male patient with a PMHx of DM, HTN, GERD, HLD, who was referred by PCP to the Emergency Department for evaluation of acute renal failure. Pt had blood work done 3 days ago and received a phone call to come to ED for further evaluation. Pt is c/o dizziness and lightheadedness. Son reports pt has slurred speech, increased confusion, LLE weakness for about 2-3 weeks. Son reports sxs onset after pt's wife .  Symptoms are constant and moderate in severity.  No mitigating or exacerbating factors reported. Patient denies any CP, SOB, extremity numbness, N/V, fever, chills, diaphoresis, LOC, and all other sxs at this time. No further complaints or concerns at this time.         Arrival mode: Personal vehicle     PCP: Yumi Roberson MD        Past Medical History:  Past Medical History:   Diagnosis Date    Anxiety     Cancer     spot on kidney and lung    Diabetes mellitus     Borderline    Diverticulitis     GERD (gastroesophageal reflux disease)     Hyperlipidemia     not on any meds currently    Hypertension     Lung mass     Mass of kidney        Past Surgical History:  Past Surgical History:   Procedure Laterality Date    ABLATION-CRYO in CT N/A 2018    Performed by Kevin Geiger MD at [facility] OR    CHOLECYSTECTOMY      COLONOSCOPY      COLONOSCOPY N/A 2017    Performed by Amarilys Bravo MD at [facility] ENDO    ESOPHAGOGASTRODUODENOSCOPY      " ESOPHAGOGASTRODUODENOSCOPY (EGD) N/A 2016    [facility]    ESOPHAGOGASTRODUODENOSCOPY (EGD) N/A 2016    Performed by [facility] at [facility] ENDO    ESOPHAGOGASTRODUODENOSCOPY (EGD) N/A 2016    Performed by [facility]ys [facility] at [facility] ENDO    ESOPHAGOGASTRODUODENOSCOPY (EGD) N/A 2016    Performed by [facility]ys [facility] at [facility] ENDO    HERNIA REPAIR Bilateral     groin    NECK SURGERY      PARTIAL NEPHRECTOMY      left    RELEASE-FINGER-TRIGGER Left 2017    Performed by Timothy [facility]barca MD at [facility] OR    RELEASE-FINGER-TRIGGER Right 2017    Performed by Timothy [facility] at [facility] OR    RENAL BIOPSY      REPAIR-HERNIA-INGUINAL-BILATERAL Bilateral 3/14/2017    Performed by Ari Pelletier MD at [facility] OR    TONSILLECTOMY      TRIGGER FINGER RELEASE Right     long finger    ULTRASOUND-ENDOSCOPIC-UPPER / EMR N/A 2016    Performed by [facility]ilys Bravo [facility] at [facility] ENDO         Family History:  Family History   Problem Relation Age of Onset    Hypertension Mother     Hypertension Father     Melanoma Father     Cancer Sister        Social History:  Social History     Tobacco Use    Smoking status: Former Smoker     Packs/day: 1.00     Years: 20.00     Pack years: 20.00     Last attempt to quit: 1995     Years since quittin.4    Smokeless tobacco: Never Used   Substance and Sexual Activity    Alcohol use: No     Alcohol/week: 0.0 oz    Drug use: No    Sexual activity: Yes     Partners: Female     Birth control/protection: None        Review of Systems     Review of Systems   Constitutional: Negative for chills, diaphoresis and fever.   HENT: Negative for sore throat.    Respiratory: Negative for shortness of breath.    Cardiovascular: Negative for chest pain.   Gastrointestinal: Negative for nausea and vomiting.   Genitourinary: Negative for dysuria.   Musculoskeletal: Negative for back pain.   Skin:  Negative for rash.   Neurological: Positive for dizziness, speech difficulty (slurred speech), weakness (LLE) and light-headedness. Negative for syncope.   Hematological: Does not bruise/bleed easily.   Psychiatric/Behavioral: Positive for confusion (increased).   All other systems reviewed and are negative.       Physical Exam     Initial Vitals [07/22/19 1509]   BP Pulse Resp Temp SpO2   (!) 148/78 97 20 97.9 °F (36.6 °C) 97 %      MAP       --          Physical Exam  Nursing Notes and Vital Signs Reviewed.  Constitutional: Patient is in no acute distress. Well-developed and well-nourished.  Head: Atraumatic. Normocephalic.  Eyes: PERRL. EOM intact. Conjunctivae are not pale. No scleral icterus.  ENT: Mucous membranes are moist. Oropharynx is clear and symmetric.    Neck: Supple. Full ROM. No lymphadenopathy.  Cardiovascular: Regular rate. Regular rhythm. No murmurs, rubs, or gallops. Distal pulses are 2+ and symmetric.  Pulmonary/Chest: No respiratory distress. Clear to auscultation bilaterally. No wheezing or rales.  Abdominal: Soft and non-distended.  There is no tenderness.  No rebound, guarding, or rigidity. Good bowel sounds.  Genitourinary: No CVA tenderness  Musculoskeletal: Moves all extremities. No obvious deformities. No edema. No calf tenderness.  Skin: Warm and dry.  Neurological: Patient is alert and oriented to person, place and time. Slightly slurred speech. Slow with responses. Pupils ERRL and EOM normal. Cranial nerves II-XII are intact. LLE extremity is 4/5 in strength. No acute focal neurological deficits noted.  Psychiatric: Normal affect. Good eye contact. Appropriate in content.     ED Course   Procedures  ED Vital Signs:  Vitals:    07/22/19 1509 07/22/19 1632 07/22/19 1705 07/22/19 1723   BP: (!) 148/78 (!) 165/98  (!) 163/96   Pulse: 97 72  69   Resp: 20 20  20   Temp: 97.9 °F (36.6 °C)  98.2 °F (36.8 °C)    TempSrc: Oral  Oral    SpO2: 97% 95%  95%   Weight: 96.7 kg (213 lb 1.6 oz)     "  Height: 5' 10" (1.778 m)       07/22/19 1750 07/22/19 1921 07/22/19 1932 07/22/19 2200   BP: (!) 160/91  (!) 158/95    Pulse: 67  64    Resp: 20  20    Temp:  98.5 °F (36.9 °C)  98.5 °F (36.9 °C)   TempSrc:  Oral  Oral   SpO2: 96%  96%    Weight:       Height:        07/22/19 2225   BP: (!) 149/90   Pulse: 69   Resp: 18   Temp: 98.4 °F (36.9 °C)   TempSrc: Oral   SpO2: 96%   Weight:    Height:        Abnormal Lab Results:  Labs Reviewed   CBC W/ AUTO DIFFERENTIAL - Abnormal; Notable for the following components:       Result Value    MPV 8.8 (*)     All other components within normal limits   COMPREHENSIVE METABOLIC PANEL - Abnormal; Notable for the following components:    BUN, Bld 24 (*)     Alkaline Phosphatase 45 (*)     eGFR if non  53 (*)     All other components within normal limits   MAGNESIUM   PHOSPHORUS   PROTIME-INR   APTT   CK-MB   CK   TROPONIN I   URINALYSIS, REFLEX TO URINE CULTURE    Narrative:     Preferred Collection Type->Urine, Clean Catch   ALCOHOL,MEDICAL (ETHANOL)   ALCOHOL,MEDICAL (ETHANOL)        All Lab Results:  Results for orders placed or performed during the hospital encounter of 07/22/19   CBC auto differential   Result Value Ref Range    WBC 7.00 3.90 - 12.70 K/uL    RBC 5.23 4.60 - 6.20 M/uL    Hemoglobin 15.1 14.0 - 18.0 g/dL    Hematocrit 43.7 40.0 - 54.0 %    Mean Corpuscular Volume 84 82 - 98 fL    Mean Corpuscular Hemoglobin 28.9 27.0 - 31.0 pg    Mean Corpuscular Hemoglobin Conc 34.6 32.0 - 36.0 g/dL    RDW 13.0 11.5 - 14.5 %    Platelets 218 150 - 350 K/uL    MPV 8.8 (L) 9.2 - 12.9 fL    Gran # (ANC) 3.9 1.8 - 7.7 K/uL    Lymph # 1.6 1.0 - 4.8 K/uL    Mono # 0.9 0.3 - 1.0 K/uL    Eos # 0.5 0.0 - 0.5 K/uL    Baso # 0.06 0.00 - 0.20 K/uL    Gran% 56.3 38.0 - 73.0 %    Lymph% 23.1 18.0 - 48.0 %    Mono% 12.7 4.0 - 15.0 %    Eosinophil% 7.4 0.0 - 8.0 %    Basophil% 0.9 0.0 - 1.9 %    Differential Method Automated    Comprehensive metabolic panel   Result Value " Ref Range    Sodium 138 136 - 145 mmol/L    Potassium 4.5 3.5 - 5.1 mmol/L    Chloride 101 95 - 110 mmol/L    CO2 23 23 - 29 mmol/L    Glucose 90 70 - 110 mg/dL    BUN, Bld 24 (H) 8 - 23 mg/dL    Creatinine 1.4 0.5 - 1.4 mg/dL    Calcium 10.0 8.7 - 10.5 mg/dL    Total Protein 7.5 6.0 - 8.4 g/dL    Albumin 4.0 3.5 - 5.2 g/dL    Total Bilirubin 0.5 0.1 - 1.0 mg/dL    Alkaline Phosphatase 45 (L) 55 - 135 U/L    AST 22 10 - 40 U/L    ALT 21 10 - 44 U/L    Anion Gap 14 8 - 16 mmol/L    eGFR if African American >60 >60 mL/min/1.73 m^2    eGFR if non African American 53 (A) >60 mL/min/1.73 m^2   Magnesium   Result Value Ref Range    Magnesium 1.8 1.6 - 2.6 mg/dL   Phosphorus   Result Value Ref Range    Phosphorus 3.8 2.7 - 4.5 mg/dL   Protime-INR   Result Value Ref Range    Prothrombin Time 10.5 9.0 - 12.5 sec    INR 1.0 0.8 - 1.2   APTT   Result Value Ref Range    aPTT 28.0 21.0 - 32.0 sec   CK-MB   Result Value Ref Range     20 - 200 U/L    CPK MB 1.8 0.1 - 6.5 ng/mL    MB% 1.5 0.0 - 5.0 %   CK   Result Value Ref Range     20 - 200 U/L   Troponin I   Result Value Ref Range    Troponin I 0.008 0.000 - 0.026 ng/mL   Urinalysis, Reflex to Urine Culture Urine, Clean Catch   Result Value Ref Range    Specimen UA Urine, Clean Catch     Color, UA Yellow Yellow, Straw, Asiya    Appearance, UA Clear Clear    pH, UA 6.0 5.0 - 8.0    Specific Gravity, UA 1.010 1.005 - 1.030    Protein, UA Negative Negative    Glucose, UA Negative Negative    Ketones, UA Negative Negative    Bilirubin (UA) Negative Negative    Occult Blood UA Negative Negative    Nitrite, UA Negative Negative    Urobilinogen, UA Negative <2.0 EU/dL    Leukocytes, UA Negative Negative   Ethanol   Result Value Ref Range    Alcohol, Medical, Serum <10 <10 mg/dL         Imaging Results:  Imaging Results          MRI Brain Without Contrast (Final result)  Result time 07/22/19 21:25:25    Final result by Kane Sheffield MD (07/22/19 21:25:25)                  Impression:      No acute or focal process.  Mild atrophy with white matter degeneration.      Electronically signed by: Kane Sheffield MD  Date:    07/22/2019  Time:    21:25             Narrative:    EXAMINATION:  MRI BRAIN WITHOUT CONTRAST    CLINICAL HISTORY:  Slurred speech.  Confusion.  Left lower extremity weakness.    TECHNIQUE:  Standard multiplanar noncontrast sequences of the brain.    COMPARISON:  None    FINDINGS:  The ventricles and extra-axial CSF spaces are mildly enlarged.    Moderately extensive white matter signal changes are present involving the periventricular, subcortical and brainstem white matter tracts.  Most consistent with age-related small vessel ischemic degeneration.    No acute or chronic infarct.    Diffusion-weighted sequence is normal.    Pituitary gland region is normal.    Minor polypoid mucosal thickening left maxillary sinus.    No additional findings.                               X-Ray Chest 1 View (Final result)  Result time 07/22/19 17:40:49    Final result by Kane Sheffield MD (07/22/19 17:40:49)                 Impression:      No acute findings.      Electronically signed by: Kane Sheffield MD  Date:    07/22/2019  Time:    17:40             Narrative:    EXAMINATION:  XR CHEST 1 VIEW    CLINICAL HISTORY:  Respiratory distress., Weakness;    COMPARISON:  07/09/2019.    FINDINGS:  Normal heart size.  Aortic atherosclerosis.  Clear lungs.  Postop changes of the cervical spine.                               CT Head Without Contrast (Final result)  Result time 07/22/19 17:19:31    Final result by Kane Sheffield MD (07/22/19 17:19:31)                 Impression:      No acute findings.  Mild atrophy with white matter degeneration.      Electronically signed by: Kane Sheffield MD  Date:    07/22/2019  Time:    17:19             Narrative:    EXAMINATION:  CT HEAD WITHOUT CONTRAST    CLINICAL HISTORY:  Dizziness.  Lightheadedness.   TIA.    TECHNIQUE:  Standard noncontrast CT of the brain.    All CT scans at this facility are performed  using dose modulation techniques as appropriate to performed exam including the following:  automated exposure control; adjustment of mA and/or kV according to the patients size (this includes techniques or standardized protocols for targeted exams where dose is matched to indication/reason for exam: i.e. extremities or head);  iterative reconstruction technique.    COMPARISON:  07/09/2019    FINDINGS:  The ventricles are mildly enlarged.  Periventricular white matter hypodensity is noted consistent with small vessel ischemic degeneration.    No changes noted.    The skull is grossly normal.                                 The EKG was ordered, reviewed, and independently interpreted by the ED provider.  Interpretation time: 1655  Rate: 70 BPM  Rhythm: normal sinus rhythm  Interpretation: No acute ST changes. No STEMI.         The Emergency Provider reviewed the vital signs and test results, which are outlined above.     ED Discussion     Neurologic findings of left LE weakness and slurred speech with ataxia per family.  CT neg, MRI done secondary to concern of cerebellar involvement.    10:18 PM: Reassessed pt at this time. Discussed with pt all pertinent ED information and results. Discussed pt dx and plan of tx. Gave pt all f/u and return to the ED instructions. All questions and concerns were addressed at this time. Pt expresses understanding of information and instructions, and is comfortable with plan to discharge. Pt is stable for discharge.    I discussed with patient and/or family/caretaker that evaluation in the ED does not suggest any emergent or life threatening medical conditions requiring immediate intervention beyond what was provided in the ED, and I believe patient is safe for discharge.  Regardless, an unremarkable evaluation in the ED does not preclude the development or presence of a serious  of life threatening condition. As such, patient was instructed to return immediately for any worsening or change in current symptoms.      ED Medication(s):  Medications   sodium chloride 0.9% bolus 1,000 mL (0 mLs Intravenous Stopped 7/22/19 1857)       Discharge Medication List as of 7/22/2019 10:19 PM          Follow-up Information     Yumi Roberson MD In 1 day.    Specialty:  Family Medicine  Contact information:  [address] [address] [address]  [phone]             [facility] - .    Specialty:  Emergency Medicine  Why:  If symptoms worsen  Contact information:  [address] [address]  [facility] [address] [phone]                       Medical Decision Making:   Clinical Tests:   Lab Tests: Ordered and Reviewed  Radiological Study: Ordered and Reviewed  Medical Tests: Ordered and Reviewed             Scribe Attestation:   Scribe #1: I performed the above scribed service and the documentation accurately describes the services I performed. I attest to the accuracy of the note.     Attending:   Physician Attestation Statement for Scribe #1: I, Mukul Bernal MD, personally performed the services described in this documentation, as scribed by Naye Bravo, in my presence, and it is both accurate and complete.           Clinical Impression       ICD-10-CM ICD-9-CM   1. Renal insufficiency N28.9 593.9   2. Weakness R53.1 780.79   3. Dehydration E86.0 276.51   4. Slurred speech R47.81 784.59   5. Dizziness R42 780.4       Disposition:   Disposition: Discharged  Condition: Stable         Mukul Bernal MD  07/23/19 0125

## 2019-07-22 NOTE — TELEPHONE ENCOUNTER
----- Message from Olive Riggins sent at 7/22/2019  2:09 PM CDT -----  Contact: pts son Landon  .Type:  Patient Returning Call    Who Called: Landon  Who Left Message for Patient:   Does the patient know what this is regarding?: pts acute kidney failure  Would the patient rather a call back or a response via [facility]?  Call back   Best Call Back Number: [phone]  Additional Information: pt keeps missing call from doctor

## 2019-07-22 NOTE — PROGRESS NOTES
Subjective:       Patient ID: Mil Sood is a 64 y.o. male.    Bilateral groin pain    Chief Complaint: Follow-up    Patient presents for follow-up with the complaint of bilateral groin pain. He states he still has pain in both groins.    He has undergone open bilateral inguinal hernia repairs in the past.  He has been evaluated in the past and no evidence of a hernia recurrence was detected.    The patient still complains about a sharp to stabbing/burning pain in bilateral groins    Review of Systems   Constitutional: Negative.    HENT: Negative.    Respiratory: Negative.    Cardiovascular: Negative.    Musculoskeletal: Positive for arthralgias and back pain.        Bilateral groin pain   Neurological: Negative.    Hematological: Negative.    Psychiatric/Behavioral: Negative.        Objective:      Physical Exam   Constitutional: He appears well-nourished.   Slightly unkept   Neck: Normal range of motion.   Cardiovascular: Normal rate and regular rhythm.   Pulmonary/Chest: Effort normal and breath sounds normal.   Abdominal: Soft. Bowel sounds are normal. A hernia is present.   Well-healed umbilical incisions.  Well-healed laparoscopic incisions.  Well-healed bilateral groin incisions.    No evidence of a recurrent right or left inguinal hernia   Skin: Skin is warm.   Psychiatric: He has a normal mood and affect. His behavior is normal. Judgment and thought content normal.   Vitals reviewed.      Assessment:      bilateral groin pain  No evidence of a recurrent hernia     Plan:        No need for surgical intervention.  Over-the-counter medicines for groin pain.  Further evaluation and management can be discussed with his primary care doctor or his pain management doctor.    Follow up with surgery as needed

## 2019-07-22 NOTE — PATIENT INSTRUCTIONS
On today's exam, once again I do not detect a recurrence of your right or left inguinal hernias.    With regard to the pain you can try over-the-counter medications like Aleve, Motrin or Advil.  You can also try Tylenol.    I would further discuss this pain with your pain management doctor or your primary care doctor.  There is no need to see general surgery

## 2019-07-22 NOTE — TELEPHONE ENCOUNTER
"Spoke with pt's son and he said that the patient went to the ED and was told he didn't need to be seen emergently to "just follow up with his PCP"  The son is not currently with the patient he is going to his house now and I will call him back in about 15 minutes    "

## 2019-07-22 NOTE — TELEPHONE ENCOUNTER
----- Message from Elizabeth Foley sent at 7/22/2019  1:36 PM CDT -----  Contact: Pt  Pt is requesting call back from nurse in regards to message about kidney failure      Pls call pt back at [phone]

## 2019-07-23 ENCOUNTER — TELEPHONE (OUTPATIENT)
Dept: PHYSICAL MEDICINE AND REHAB | Facility: CLINIC | Age: 64
End: 2019-07-23

## 2019-07-23 NOTE — ED NOTES
"Patient identifiers verified and correct for Mil Sood.    LOC: The patient is awake, alert and aware of environment with an appropriate affect, the patient is oriented x 3 and speaking appropriately. Pt denies complaints at this time. States "get me out of here, i'm hungry"  APPEARANCE: Patient resting comfortably and in no acute distress, patient is clean and well groomed, patient's clothing is properly fastened.  SKIN: The skin is warm and dry, color consistent with ethnicity, patient has normal skin turgor and moist mucus membranes, skin intact, no breakdown or bruising noted.  MUSCULOSKELETAL: Patient moving all extremities spontaneously.  RESPIRATORY: Airway is open and patent, respirations are spontaneous.  CARDIAC: Patient has a normal rate, no periphreal edema noted, capillary refill < 3 seconds.  ABDOMEN: Soft and non tender to palpation.    "

## 2019-07-23 NOTE — TELEPHONE ENCOUNTER
----- Message from Gloria Woodard MA sent at 7/19/2019 12:03 PM CDT -----  Regarding: Nerve conduction  Please contact patient to schedule a nerve conduction study. Thanks

## 2019-07-26 ENCOUNTER — HOSPITAL ENCOUNTER (EMERGENCY)
Facility: HOSPITAL | Age: 64
Discharge: HOME | End: 2019-07-27
Attending: FAMILY MEDICINE
Payer: MEDICARE

## 2019-07-26 DIAGNOSIS — R11.10 VOMITING: ICD-10-CM

## 2019-07-26 DIAGNOSIS — R19.7 NAUSEA VOMITING AND DIARRHEA: Primary | ICD-10-CM

## 2019-07-26 DIAGNOSIS — F43.21 GRIEF REACTION: ICD-10-CM

## 2019-07-26 DIAGNOSIS — R11.2 NAUSEA VOMITING AND DIARRHEA: Primary | ICD-10-CM

## 2019-07-26 LAB
ALBUMIN SERPL BCP-MCNC: 4.4 G/DL (ref 3.5–5.2)
ALP SERPL-CCNC: 46 U/L (ref 55–135)
ALT SERPL W/O P-5'-P-CCNC: 28 U/L (ref 10–44)
AMYLASE SERPL-CCNC: 73 U/L (ref 20–110)
ANION GAP SERPL CALC-SCNC: 10 MMOL/L (ref 8–16)
AST SERPL-CCNC: 24 U/L (ref 10–40)
BASOPHILS # BLD AUTO: 0.05 K/UL (ref 0–0.2)
BASOPHILS NFR BLD: 0.5 % (ref 0–1.9)
BILIRUB SERPL-MCNC: 0.6 MG/DL (ref 0.1–1)
BNP SERPL-MCNC: 34 PG/ML (ref 0–99)
BUN SERPL-MCNC: 29 MG/DL (ref 8–23)
CALCIUM SERPL-MCNC: 10.1 MG/DL (ref 8.7–10.5)
CHLORIDE SERPL-SCNC: 101 MMOL/L (ref 95–110)
CO2 SERPL-SCNC: 27 MMOL/L (ref 23–29)
CREAT SERPL-MCNC: 1.2 MG/DL (ref 0.5–1.4)
DIFFERENTIAL METHOD: ABNORMAL
EOSINOPHIL # BLD AUTO: 0.1 K/UL (ref 0–0.5)
EOSINOPHIL NFR BLD: 1 % (ref 0–8)
ERYTHROCYTE [DISTWIDTH] IN BLOOD BY AUTOMATED COUNT: 12.7 % (ref 11.5–14.5)
EST. GFR  (AFRICAN AMERICAN): >60 ML/MIN/1.73 M^2
EST. GFR  (NON AFRICAN AMERICAN): >60 ML/MIN/1.73 M^2
GLUCOSE SERPL-MCNC: 101 MG/DL (ref 70–110)
HCT VFR BLD AUTO: 44.7 % (ref 40–54)
HGB BLD-MCNC: 15.6 G/DL (ref 14–18)
LIPASE SERPL-CCNC: 41 U/L (ref 4–60)
LYMPHOCYTES # BLD AUTO: 1.5 K/UL (ref 1–4.8)
LYMPHOCYTES NFR BLD: 15.5 % (ref 18–48)
MCH RBC QN AUTO: 28.5 PG (ref 27–31)
MCHC RBC AUTO-ENTMCNC: 34.9 G/DL (ref 32–36)
MCV RBC AUTO: 82 FL (ref 82–98)
MONOCYTES # BLD AUTO: 1.1 K/UL (ref 0.3–1)
MONOCYTES NFR BLD: 10.7 % (ref 4–15)
NEUTROPHILS # BLD AUTO: 7.1 K/UL (ref 1.8–7.7)
NEUTROPHILS NFR BLD: 72.3 % (ref 38–73)
PLATELET # BLD AUTO: 308 K/UL (ref 150–350)
PLATELET BLD QL SMEAR: ABNORMAL
PMV BLD AUTO: 8.6 FL (ref 9.2–12.9)
POTASSIUM SERPL-SCNC: 4.5 MMOL/L (ref 3.5–5.1)
PROT SERPL-MCNC: 7.9 G/DL (ref 6–8.4)
RBC # BLD AUTO: 5.47 M/UL (ref 4.6–6.2)
SODIUM SERPL-SCNC: 138 MMOL/L (ref 136–145)
TROPONIN I SERPL DL<=0.01 NG/ML-MCNC: 0.01 NG/ML (ref 0–0.03)
WBC # BLD AUTO: 9.77 K/UL (ref 3.9–12.7)

## 2019-07-26 PROCEDURE — 99285 EMERGENCY DEPT VISIT HI MDM: CPT | Mod: 25,HCNC

## 2019-07-26 PROCEDURE — 93010 ELECTROCARDIOGRAM REPORT: CPT | Mod: HCNC,,, | Performed by: INTERNAL MEDICINE

## 2019-07-26 PROCEDURE — 83690 ASSAY OF LIPASE: CPT | Mod: HCNC

## 2019-07-26 PROCEDURE — 93010 EKG 12-LEAD: ICD-10-PCS | Mod: HCNC,,, | Performed by: INTERNAL MEDICINE

## 2019-07-26 PROCEDURE — 83880 ASSAY OF NATRIURETIC PEPTIDE: CPT | Mod: HCNC

## 2019-07-26 PROCEDURE — 80053 COMPREHEN METABOLIC PANEL: CPT | Mod: HCNC

## 2019-07-26 PROCEDURE — 63600175 PHARM REV CODE 636 W HCPCS: Mod: HCNC | Performed by: FAMILY MEDICINE

## 2019-07-26 PROCEDURE — 96361 HYDRATE IV INFUSION ADD-ON: CPT | Mod: HCNC

## 2019-07-26 PROCEDURE — 85025 COMPLETE CBC W/AUTO DIFF WBC: CPT | Mod: HCNC

## 2019-07-26 PROCEDURE — 96375 TX/PRO/DX INJ NEW DRUG ADDON: CPT | Mod: HCNC

## 2019-07-26 PROCEDURE — 36415 COLL VENOUS BLD VENIPUNCTURE: CPT | Mod: HCNC

## 2019-07-26 PROCEDURE — 93005 ELECTROCARDIOGRAM TRACING: CPT | Mod: HCNC

## 2019-07-26 PROCEDURE — 82150 ASSAY OF AMYLASE: CPT | Mod: HCNC

## 2019-07-26 PROCEDURE — 96365 THER/PROPH/DIAG IV INF INIT: CPT | Mod: HCNC

## 2019-07-26 PROCEDURE — 84484 ASSAY OF TROPONIN QUANT: CPT | Mod: HCNC

## 2019-07-26 RX ORDER — KETOROLAC TROMETHAMINE 30 MG/ML
30 INJECTION, SOLUTION INTRAMUSCULAR; INTRAVENOUS
Status: COMPLETED | OUTPATIENT
Start: 2019-07-26 | End: 2019-07-26

## 2019-07-26 RX ORDER — ONDANSETRON 2 MG/ML
4 INJECTION INTRAMUSCULAR; INTRAVENOUS
Status: COMPLETED | OUTPATIENT
Start: 2019-07-26 | End: 2019-07-26

## 2019-07-26 RX ORDER — HYDRALAZINE HYDROCHLORIDE 20 MG/ML
10 INJECTION INTRAMUSCULAR; INTRAVENOUS
Status: COMPLETED | OUTPATIENT
Start: 2019-07-26 | End: 2019-07-26

## 2019-07-26 RX ADMIN — KETOROLAC TROMETHAMINE 30 MG: 30 INJECTION, SOLUTION INTRAMUSCULAR at 10:07

## 2019-07-26 RX ADMIN — HYDRALAZINE HYDROCHLORIDE 10 MG: 20 INJECTION INTRAMUSCULAR; INTRAVENOUS at 10:07

## 2019-07-26 RX ADMIN — SODIUM CHLORIDE 1000 ML: 0.9 INJECTION, SOLUTION INTRAVENOUS at 10:07

## 2019-07-26 RX ADMIN — ONDANSETRON 4 MG: 2 INJECTION INTRAMUSCULAR; INTRAVENOUS at 10:07

## 2019-07-27 VITALS
OXYGEN SATURATION: 98 % | HEIGHT: 70 IN | BODY MASS INDEX: 28.99 KG/M2 | HEART RATE: 75 BPM | RESPIRATION RATE: 21 BRPM | DIASTOLIC BLOOD PRESSURE: 83 MMHG | WEIGHT: 202.5 LBS | TEMPERATURE: 98 F | SYSTOLIC BLOOD PRESSURE: 164 MMHG

## 2019-07-27 LAB
BILIRUB UR QL STRIP: NEGATIVE
CLARITY UR: CLEAR
COLOR UR: YELLOW
GLUCOSE UR QL STRIP: NEGATIVE
HGB UR QL STRIP: NEGATIVE
KETONES UR QL STRIP: NEGATIVE
LEUKOCYTE ESTERASE UR QL STRIP: NEGATIVE
NITRITE UR QL STRIP: NEGATIVE
PH UR STRIP: 6 [PH] (ref 5–8)
PROT UR QL STRIP: NEGATIVE
SP GR UR STRIP: 1.02 (ref 1–1.03)
URN SPEC COLLECT METH UR: NORMAL
UROBILINOGEN UR STRIP-ACNC: NEGATIVE EU/DL

## 2019-07-27 PROCEDURE — 25000003 PHARM REV CODE 250: Mod: HCNC | Performed by: EMERGENCY MEDICINE

## 2019-07-27 PROCEDURE — 81003 URINALYSIS AUTO W/O SCOPE: CPT | Mod: HCNC

## 2019-07-27 PROCEDURE — 63600175 PHARM REV CODE 636 W HCPCS: Mod: HCNC | Performed by: FAMILY MEDICINE

## 2019-07-27 PROCEDURE — 25000003 PHARM REV CODE 250: Mod: HCNC | Performed by: FAMILY MEDICINE

## 2019-07-27 RX ORDER — ACETAMINOPHEN 500 MG
1000 TABLET ORAL
Status: COMPLETED | OUTPATIENT
Start: 2019-07-27 | End: 2019-07-27

## 2019-07-27 RX ORDER — ONDANSETRON 4 MG/1
2 TABLET, ORALLY DISINTEGRATING ORAL EVERY 6 HOURS PRN
Qty: 18 TABLET | Refills: 0 | Status: SHIPPED | OUTPATIENT
Start: 2019-07-27 | End: 2019-09-04

## 2019-07-27 RX ORDER — TRAMADOL HYDROCHLORIDE 50 MG/1
50 TABLET ORAL
Status: COMPLETED | OUTPATIENT
Start: 2019-07-27 | End: 2019-07-27

## 2019-07-27 RX ADMIN — ACETAMINOPHEN 1000 MG: 500 TABLET ORAL at 03:07

## 2019-07-27 RX ADMIN — TRAMADOL HYDROCHLORIDE 50 MG: 50 TABLET, FILM COATED ORAL at 01:07

## 2019-07-27 RX ADMIN — PROMETHAZINE HYDROCHLORIDE 25 MG: 25 INJECTION INTRAMUSCULAR; INTRAVENOUS at 01:07

## 2019-07-27 NOTE — ED NOTES
Notified Dr Meza of patients BP of 179/101 and complaint of nausea. Waiting for new orders at this time.

## 2019-07-27 NOTE — ED PROVIDER NOTES
SCRIBE #1 NOTE: I, Michael Medina, am scribing for, and in the presence of, Ondina Meza MD. I have scribed the HPI, ROS, and PEx.     SCRIBE #2 NOTE: I, Naye Bravo, am scribing for, and in the presence of,  Michael Mccoy MD. I have scribed the remaining portions of the note not scribed by Scribe #1.     History      Chief Complaint   Patient presents with    Depression     the pts wife  and he hasnt been able to eat or drink, The pt is tearful and reports a headache        Review of patient's allergies indicates:   Allergen Reactions    Crestor [rosuvastatin]      Joint pain         HPI   HPI    2019, 10:21 PM   History obtained from the patient      History of Present Illness: Mil Sood is a 64 y.o. male patient with a PMHx of HLD, HTN, and DM who presents to the Emergency Department for depression. Pt reports that his wife passed away 2 months ago. Pt states that he has been unable to tolerate any food or drink in the past 3 days. Symptoms are constant and moderate in severity. No mitigating or exacerbating factors reported. Associated sxs include n/v, headache, diarrhea, and LLQ pain. Patient denies any fever, chills, SOB, CP, weakness, numbness, SI, HI, and all other sxs at this time. No prior Tx reported. No further complaints or concerns at this time.     Arrival mode: Personal vehicle    PCP: Yumi Roberson MD       Past Medical History:  Past Medical History:   Diagnosis Date    Anxiety     Cancer     spot on kidney and lung    Diabetes mellitus     Borderline    Diverticulitis     GERD (gastroesophageal reflux disease)     Hyperlipidemia     not on any meds currently    Hypertension     Lung mass     Mass of kidney        Past Surgical History:  Past Surgical History:   Procedure Laterality Date    ABLATION-CRYO in CT N/A 2018    Performed [facility] at [facility]    CHOLECYSTECTOMY      COLONOSCOPY      COLONOSCOPY N/A 2017    Performed by Amarilys HOLLIS  MD Shelly at [facility] ENDO    ESOPHAGOGASTRODUODENOSCOPY      ESOPHAGOGASTRODUODENOSCOPY (EGD) N/A 2016    Performed by Am[facility]ilys Bravo MD at [facility] ENDO    ESOPHAGOGASTRODUODENOSCOPY (EGD) N/A 2016    Performed by [facility]ilys Bravo MD at [facility] ENDO    ESOPHAGOGASTRODUODENOSCOPY (EGD) N/A 2016    Performed by [facility]ilys Bravo MD at [facility] ENDO    ESOPHAGOGASTRODUODENOSCOPY (EGD) N/A 2016    Performed by [facility]ilys Bravo MD at [facility] ENDO    HERNIA REPAIR Bilateral     groin    NECK SURGERY      PARTIAL NEPHRECTOMY      left    RELEASE-FINGER-TRIGGER Left 2017    Performed by Timothy Abarca MD at [facility] OR    RELEASE-FINGER-TRIGGER Right 2017    Performed by [facility]barca [facility] at [facility] OR    RENAL BIOPSY      REPAIR-HERNIA-INGUINAL-BILATERAL Bilateral 3/14/2017    Performed by Ari Pelletier MD at [facility] OR    TONSILLECTOMY      TRIGGER FINGER RELEASE Right     long finger    ULTRASOUND-ENDOSCOPIC-UPPER / EMR N/A 2016    [facility] at [facility]         Family History:  Family History   Problem Relation Age of Onset    Hypertension Mother     Hypertension Father     Melanoma Father     Cancer Sister        Social History:  Social History     Tobacco Use    Smoking status: Former Smoker     Packs/day: 1.00     Years: 20.00     Pack years: 20.00     Last attempt to quit: 1995     Years since quittin.4    Smokeless tobacco: Never Used   Substance and Sexual Activity    Alcohol use: No     Alcohol/week: 0.0 oz    Drug use: No    Sexual activity: Yes     Partners: Female     Birth control/protection: None       ROS   Review of Systems   Constitutional: Negative for chills, diaphoresis, fatigue and fever.   HENT: Negative for sore throat.    Respiratory: Negative for shortness of breath.    Cardiovascular: Negative for chest pain.   Gastrointestinal: Positive for abdominal pain (LLQ), diarrhea, nausea and vomiting.  "  Genitourinary: Negative for dysuria.   Musculoskeletal: Negative for back pain.   Skin: Negative for rash and wound.   Neurological: Positive for headaches. Negative for dizziness, weakness and numbness.   Hematological: Does not bruise/bleed easily.   Psychiatric/Behavioral: Positive for dysphoric mood. Negative for hallucinations and suicidal ideas.        (-) HI   All other systems reviewed and are negative.    Physical Exam      Initial Vitals [07/26/19 2043]   BP Pulse Resp Temp SpO2   (!) 163/98 89 17 98 °F (36.7 °C) 99 %      MAP       --          Physical Exam  Nursing Notes and Vital Signs Reviewed.  Constitutional: Patient is in mild distress. Well-developed and well-nourished.  Head: Atraumatic. Normocephalic.  Eyes: PERRL. EOM intact. Conjunctivae are not pale. No scleral icterus.  ENT: Mucous membranes are moist. Oropharynx is clear and symmetric.    Neck: Supple. Full ROM. No lymphadenopathy.  Cardiovascular: Regular rate. Regular rhythm. No murmurs, rubs, or gallops. Distal pulses are 2+ and symmetric.  Pulmonary/Chest: No respiratory distress. Clear to auscultation bilaterally. No wheezing or rales.  Abdominal: Soft and non-distended.  There is LLQ tenderness.  No rebound, guarding, or rigidity. Good bowel sounds.  Musculoskeletal: Moves all extremities. No obvious deformities. No edema.  Skin: Warm and dry.  Neurological:  Alert, awake, and appropriate.  Normal speech.  No acute focal neurological deficits are appreciated.  Psychiatric: Normal affect. Good eye contact. Tearful. Appropriate in content.    ED Course    Procedures  ED Vital Signs:  Vitals:    07/26/19 2043 07/26/19 2200 07/26/19 2252 07/26/19 2258   BP: (!) 163/98 (!) 168/103 (!) 171/101 (!) 168/86   Pulse: 89 62  62   Resp: 17 20  20   Temp: 98 °F (36.7 °C)      TempSrc: Oral      SpO2: 99% 96%  96%   Weight: 91.8 kg (202 lb 7.9 oz)      Height: 5' 10" (1.778 m)       07/26/19 2310 07/26/19 2330 07/26/19 2345 07/27/19 0055   BP: (!) " 162/80 (!) 178/91 (!) 164/71 (!) 179/101   Pulse: 63 76 79 75   Resp: 19 19 20 (!) 21   Temp:  98.2 °F (36.8 °C) 98.2 °F (36.8 °C)    TempSrc:  Oral Oral    SpO2: 97% 97% 97% 97%   Weight:       Height:        07/27/19 0200 07/27/19 0330 07/27/19 0440   BP: (!) 164/77 (!) 159/93 (!) 164/83   Pulse: 85 65 75   Resp: (!) 21 (!) 21 (!) 21   Temp:   98.1 °F (36.7 °C)   TempSrc:   Oral   SpO2: 97% 95% 98%   Weight:      Height:          Abnormal Lab Results:  Labs Reviewed   CBC W/ AUTO DIFFERENTIAL - Abnormal; Notable for the following components:       Result Value    MPV 8.6 (*)     Mono # 1.1 (*)     Lymph% 15.5 (*)     All other components within normal limits   COMPREHENSIVE METABOLIC PANEL - Abnormal; Notable for the following components:    BUN, Bld 29 (*)     Alkaline Phosphatase 46 (*)     All other components within normal limits   URINALYSIS   TROPONIN I   B-TYPE NATRIURETIC PEPTIDE   AMYLASE   LIPASE        All Lab Results:  Results for orders placed or performed during the hospital encounter of 07/26/19   CBC auto differential   Result Value Ref Range    WBC 9.77 3.90 - 12.70 K/uL    RBC 5.47 4.60 - 6.20 M/uL    Hemoglobin 15.6 14.0 - 18.0 g/dL    Hematocrit 44.7 40.0 - 54.0 %    Mean Corpuscular Volume 82 82 - 98 fL    Mean Corpuscular Hemoglobin 28.5 27.0 - 31.0 pg    Mean Corpuscular Hemoglobin Conc 34.9 32.0 - 36.0 g/dL    RDW 12.7 11.5 - 14.5 %    Platelets 308 150 - 350 K/uL    MPV 8.6 (L) 9.2 - 12.9 fL    Gran # (ANC) 7.1 1.8 - 7.7 K/uL    Lymph # 1.5 1.0 - 4.8 K/uL    Mono # 1.1 (H) 0.3 - 1.0 K/uL    Eos # 0.1 0.0 - 0.5 K/uL    Baso # 0.05 0.00 - 0.20 K/uL    Gran% 72.3 38.0 - 73.0 %    Lymph% 15.5 (L) 18.0 - 48.0 %    Mono% 10.7 4.0 - 15.0 %    Eosinophil% 1.0 0.0 - 8.0 %    Basophil% 0.5 0.0 - 1.9 %    Platelet Estimate Appears normal     Differential Method Automated    Comprehensive metabolic panel   Result Value Ref Range    Sodium 138 136 - 145 mmol/L    Potassium 4.5 3.5 - 5.1 mmol/L     Chloride 101 95 - 110 mmol/L    CO2 27 23 - 29 mmol/L    Glucose 101 70 - 110 mg/dL    BUN, Bld 29 (H) 8 - 23 mg/dL    Creatinine 1.2 0.5 - 1.4 mg/dL    Calcium 10.1 8.7 - 10.5 mg/dL    Total Protein 7.9 6.0 - 8.4 g/dL    Albumin 4.4 3.5 - 5.2 g/dL    Total Bilirubin 0.6 0.1 - 1.0 mg/dL    Alkaline Phosphatase 46 (L) 55 - 135 U/L    AST 24 10 - 40 U/L    ALT 28 10 - 44 U/L    Anion Gap 10 8 - 16 mmol/L    eGFR if African American >60 >60 mL/min/1.73 m^2    eGFR if non African American >60 >60 mL/min/1.73 m^2   Urinalysis - Clean Catch   Result Value Ref Range    Specimen UA Urine, Clean Catch     Color, UA Yellow Yellow, Straw, Asiya    Appearance, UA Clear Clear    pH, UA 6.0 5.0 - 8.0    Specific Gravity, UA 1.025 1.005 - 1.030    Protein, UA Negative Negative    Glucose, UA Negative Negative    Ketones, UA Negative Negative    Bilirubin (UA) Negative Negative    Occult Blood UA Negative Negative    Nitrite, UA Negative Negative    Urobilinogen, UA Negative <2.0 EU/dL    Leukocytes, UA Negative Negative   Troponin I   Result Value Ref Range    Troponin I 0.009 0.000 - 0.026 ng/mL   B-Type natriuretic peptide (BNP)   Result Value Ref Range    BNP 34 0 - 99 pg/mL   Amylase   Result Value Ref Range    Amylase 73 20 - 110 U/L   Lipase   Result Value Ref Range    Lipase 41 4 - 60 U/L     Imaging Results:  Imaging Results          CT Abdomen Pelvis  Without Contrast (Final result)  Result time 07/27/19 08:09:05    Final result by Agusto Kidd Jr., MD (07/27/19 08:09:05)                 Impression:      1. No acute findings.  2. Postoperative scarring involving each kidney.  3. Diverticulosis.      Electronically signed by: Agusto Kidd Jr., MD  Date:    07/27/2019  Time:    08:09             Narrative:    EXAMINATION:  CT ABDOMEN PELVIS WITHOUT CONTRAST    CLINICAL HISTORY:  Abdominal pain, unspecified;    TECHNIQUE:  Low dose axial images, sagittal and coronal reformations were obtained from the lung bases to the  pubic symphysis, Oral contrast was not administered.  All CT scans at this facility use dose modulation, iterative reconstruction and/or weight based dosing when appropriate to reduce radiation dose to as low as reasonably achievable.    COMPARISON:  Prior CT from December 10, 2018.    FINDINGS:  Heart: Normal in size. No pericardial effusion.    Lung Bases: Well aerated, without consolidation or pleural fluid.    Liver: Normal in size and attenuation, with no focal hepatic lesions.    Gallbladder: Prior cholecystectomy.    Bile Ducts: No evidence of dilated ducts.    Pancreas: Mild fatty atrophy.    Spleen: Unremarkable.    Adrenals: Unremarkable.    Kidneys/ Ureters: There are partially fatty, irregularly marginated lesions within each kidney.  This may relate to post treatment change from prior of ablation/cryotherapy.  The focus on the right measures 35 mm and lies at the superior right kidney.  The focus on the left measures 33 mm.  No new contour deforming mass is evident within limits of a noncontrast study.  There is no hydronephrosis or kidney stone.    Bladder: No evidence of wall thickening.    Reproductive organs: Dystrophic calcifications within the prostate gland.    GI Tract/Mesentery: Diverticulosis of the sigmoid colon.  Bowel loops are normal in caliber.  The appendix measures 9 mm with no definite surrounding inflammation.    Peritoneal Space: No ascites. No free air.    Retroperitoneum: No significant adenopathy.    Abdominal wall: Left-sided spigelian/lateral hernia containing fat near the level of the left kidney.    Vasculature: Calcific atheromatous change of the aortoiliac tree without aneurysm.    Bones: No acute fracture.                                 Per STAT radiology, pt's CT results:  Comparison:   No bowel dilation, free air or free fluid.   Diverticulosis without diverticulitis.   The appendix is prominent measuring up to 8 mm surround inflammatory change, clinically correlate.    Bilateral mixed fat and soft tissue-containing renal lesions consistent with angiomyolipomas  Punctate non obstructing stone upper pole left kidney with adjacent cortical scarring  Status post cholecystectomy   No free fluid.    The EKG was ordered, reviewed, and independently interpreted by the ED provider.  Interpretation time: 22:39  Rate: 64 BPM  Rhythm: normal sinus rhythm  Interpretation: No acute ST changes. No STEMI.           The Emergency Provider reviewed the vital signs and test results, which are outlined above.    ED Discussion     1:47 AM: Dr. Meza transfers care of pt to Dr. Mccoy pending imaging results.    4:24 AM: Dr. Mccoy assessed pt at this time. Discussed with pt all pertinent ED information and results. Discussed pt dx and plan of tx. Patient again affirms he has no SI, has many things including children and grandchildren to live for, but is just upset and nauseous.  Pt to f/u with his psychiatrist. Zofran Rx given. Gave pt all f/u and return to the ED instructions. All questions and concerns were addressed at this time. Pt expresses understanding of information and instructions, and is comfortable with plan to discharge. Pt is stable for discharge.    I discussed with patient and/or family/caretaker that evaluation in the ED does not suggest any emergent or life threatening medical conditions requiring immediate intervention beyond what was provided in the ED, and I believe patient is safe for discharge.  Regardless, an unremarkable evaluation in the ED does not preclude the development or presence of a serious of life threatening condition. As such, patient was instructed to return immediately for any worsening or change in current symptoms.      ED Medication(s):  Medications   ketorolac injection 30 mg (30 mg Intravenous Given 7/26/19 2251)   hydrALAZINE injection 10 mg (10 mg Intravenous Given 7/26/19 2252)   ondansetron injection 4 mg (4 mg Intravenous Given 7/26/19 2251)    sodium chloride 0.9% bolus 1,000 mL (0 mLs Intravenous Stopped 7/26/19 2330)   promethazine (PHENERGAN) 25 mg in dextrose 5 % 50 mL IVPB (0 mg Intravenous Stopped 7/27/19 0144)   traMADol tablet 50 mg (50 mg Oral Given 7/27/19 0147)   acetaminophen tablet 1,000 mg (1,000 mg Oral Given 7/27/19 0300)       Follow-up Information     Yumi Roberson MD In 1 week.    Specialty:  Family Medicine  Contact information:  [address] [address] [address]  [phone]             [facility] - .    Specialty:  Emergency Medicine  Why:  As needed, If symptoms worsen  Contact information:  [address]  [facility] [address] [phone]           with your counselor as scheduled.                Discharge Medication List as of 7/27/2019  4:27 AM      START taking these medications    Details   ondansetron (ZOFRAN-ODT) 4 MG TbDL Take 0.5 tablets (2 mg total) by mouth every 6 (six) hours as needed (nausea/vomiting)., Starting Sat 7/27/2019, Print                 Medical Decision Making    Medical Decision Making:   Clinical Tests:   Lab Tests: Ordered and Reviewed  Radiological Study: Ordered and Reviewed  Medical Tests: Ordered and Reviewed           Scribe Attestation:   Scribe #1: I performed the above scribed service and the documentation accurately describes the services I performed. I attest to the accuracy of the note.    Attending:   Physician Attestation Statement for Scribe #1: I, Ondina Meza MD, personally performed the services described in this documentation, as scribed by Michael Medina, in my presence, and it is both accurate and complete.       Scribe Attestation:   Scribe #2: I performed the above scribed service and the documentation accurately describes the services I performed. I attest to the accuracy of the note.    Attending Attestation:           Physician Attestation for Scribe:    Physician Attestation Statement for Scribe #2: I, Michael Mccoy MD,  reviewed documentation, as scribed by Naye Bravo in my presence, and it is both accurate and complete. I also acknowledge and confirm the content of the note done by Scribe #1.          Clinical Impression       ICD-10-CM ICD-9-CM   1. Nausea vomiting and diarrhea R11.2 787.91    R19.7 787.01   2. Vomiting R11.10 787.03   3. Grief reaction F43.21 309.0       Disposition:   Disposition: Discharged  Condition: Stable         Michael Mccoy MD  07/27/19 0830

## 2019-07-30 DIAGNOSIS — M54.16 LEFT LUMBAR RADICULOPATHY: Primary | ICD-10-CM

## 2019-08-09 RX ORDER — ZOLPIDEM TARTRATE 10 MG/1
TABLET ORAL
Qty: 30 TABLET | Refills: 3 | Status: SHIPPED | OUTPATIENT
Start: 2019-08-19 | End: 2019-09-04

## 2019-09-04 ENCOUNTER — HOSPITAL ENCOUNTER (OUTPATIENT)
Dept: RADIOLOGY | Facility: HOSPITAL | Age: 64
Discharge: HOME | End: 2019-09-04
Attending: FAMILY MEDICINE
Payer: MEDICARE

## 2019-09-04 ENCOUNTER — CLINICAL SUPPORT (OUTPATIENT)
Dept: CARDIOLOGY | Facility: CLINIC | Age: 64
End: 2019-09-04
Payer: MEDICARE

## 2019-09-04 ENCOUNTER — OFFICE VISIT (OUTPATIENT)
Dept: FAMILY MEDICINE | Facility: CLINIC | Age: 64
End: 2019-09-04
Payer: MEDICARE

## 2019-09-04 VITALS
WEIGHT: 209 LBS | BODY MASS INDEX: 29.92 KG/M2 | HEART RATE: 80 BPM | OXYGEN SATURATION: 96 % | SYSTOLIC BLOOD PRESSURE: 138 MMHG | DIASTOLIC BLOOD PRESSURE: 88 MMHG | HEIGHT: 70 IN | TEMPERATURE: 98 F

## 2019-09-04 DIAGNOSIS — I10 ESSENTIAL HYPERTENSION: ICD-10-CM

## 2019-09-04 DIAGNOSIS — Z01.818 PREOP GENERAL PHYSICAL EXAM: Primary | ICD-10-CM

## 2019-09-04 DIAGNOSIS — Z01.818 PREOP GENERAL PHYSICAL EXAM: ICD-10-CM

## 2019-09-04 DIAGNOSIS — I25.10 CORONARY ARTERY DISEASE INVOLVING NATIVE HEART WITHOUT ANGINA PECTORIS, UNSPECIFIED VESSEL OR LESION TYPE: ICD-10-CM

## 2019-09-04 DIAGNOSIS — R73.03 PREDIABETES: ICD-10-CM

## 2019-09-04 LAB
BACTERIA #/AREA URNS AUTO: ABNORMAL /HPF
BILIRUB UR QL STRIP: NEGATIVE
CLARITY UR REFRACT.AUTO: ABNORMAL
COLOR UR AUTO: YELLOW
GLUCOSE UR QL STRIP: NEGATIVE
HGB UR QL STRIP: NEGATIVE
HYALINE CASTS UR QL AUTO: 13 /LPF
KETONES UR QL STRIP: NEGATIVE
LEUKOCYTE ESTERASE UR QL STRIP: NEGATIVE
MICROSCOPIC COMMENT: ABNORMAL
NITRITE UR QL STRIP: NEGATIVE
PH UR STRIP: 5 [PH] (ref 5–8)
PROT UR QL STRIP: ABNORMAL
RBC #/AREA URNS AUTO: 0 /HPF (ref 0–4)
SP GR UR STRIP: 1.02 (ref 1–1.03)
URN SPEC COLLECT METH UR: ABNORMAL
WBC #/AREA URNS AUTO: 3 /HPF (ref 0–5)

## 2019-09-04 PROCEDURE — 71046 XR CHEST PA AND LATERAL: ICD-10-PCS | Mod: 26,HCNC,, | Performed by: RADIOLOGY

## 2019-09-04 PROCEDURE — 93010 ELECTROCARDIOGRAM REPORT: CPT | Mod: HCNC,S$GLB,, | Performed by: INTERNAL MEDICINE

## 2019-09-04 PROCEDURE — 71046 X-RAY EXAM CHEST 2 VIEWS: CPT | Mod: TC,HCNC,FY,PO

## 2019-09-04 PROCEDURE — 3008F PR BODY MASS INDEX (BMI) DOCUMENTED: ICD-10-PCS | Mod: HCNC,CPTII,S$GLB, | Performed by: FAMILY MEDICINE

## 2019-09-04 PROCEDURE — 99999 PR PBB SHADOW E&M-EST. PATIENT-LVL III: ICD-10-PCS | Mod: PBBFAC,HCNC,, | Performed by: FAMILY MEDICINE

## 2019-09-04 PROCEDURE — 3079F DIAST BP 80-89 MM HG: CPT | Mod: HCNC,CPTII,S$GLB, | Performed by: FAMILY MEDICINE

## 2019-09-04 PROCEDURE — 99214 PR OFFICE/OUTPT VISIT, EST, LEVL IV, 30-39 MIN: ICD-10-PCS | Mod: HCNC,S$GLB,, | Performed by: FAMILY MEDICINE

## 2019-09-04 PROCEDURE — 93010 EKG 12-LEAD: ICD-10-PCS | Mod: HCNC,S$GLB,, | Performed by: INTERNAL MEDICINE

## 2019-09-04 PROCEDURE — 93005 ELECTROCARDIOGRAM TRACING: CPT | Mod: HCNC,S$GLB,, | Performed by: FAMILY MEDICINE

## 2019-09-04 PROCEDURE — 3079F PR MOST RECENT DIASTOLIC BLOOD PRESSURE 80-89 MM HG: ICD-10-PCS | Mod: HCNC,CPTII,S$GLB, | Performed by: FAMILY MEDICINE

## 2019-09-04 PROCEDURE — 71046 X-RAY EXAM CHEST 2 VIEWS: CPT | Mod: 26,HCNC,, | Performed by: RADIOLOGY

## 2019-09-04 PROCEDURE — 3075F PR MOST RECENT SYSTOLIC BLOOD PRESS GE 130-139MM HG: ICD-10-PCS | Mod: HCNC,CPTII,S$GLB, | Performed by: FAMILY MEDICINE

## 2019-09-04 PROCEDURE — 3075F SYST BP GE 130 - 139MM HG: CPT | Mod: HCNC,CPTII,S$GLB, | Performed by: FAMILY MEDICINE

## 2019-09-04 PROCEDURE — 93005 EKG 12-LEAD: ICD-10-PCS | Mod: HCNC,S$GLB,, | Performed by: FAMILY MEDICINE

## 2019-09-04 PROCEDURE — 3008F BODY MASS INDEX DOCD: CPT | Mod: HCNC,CPTII,S$GLB, | Performed by: FAMILY MEDICINE

## 2019-09-04 PROCEDURE — 99999 PR PBB SHADOW E&M-EST. PATIENT-LVL III: CPT | Mod: PBBFAC,HCNC,, | Performed by: FAMILY MEDICINE

## 2019-09-04 PROCEDURE — 99214 OFFICE O/P EST MOD 30 MIN: CPT | Mod: HCNC,S$GLB,, | Performed by: FAMILY MEDICINE

## 2019-09-04 PROCEDURE — 81001 URINALYSIS AUTO W/SCOPE: CPT | Mod: HCNC

## 2019-09-04 NOTE — PROGRESS NOTES
Chief Complaint:    Chief Complaint   Patient presents with    Follow-up    Pre-op Exam       History of Present Illness:  Patient with underlying hypertension, GERD, hyperlipidemia, pre diabetes, is here for preop evaluation as requested by Dr. Lazaro for lumbar surgery to be performed under general anesthesia.    He denies any chest pain or shortness of breath.  Underlying coronary artery disease stable no recent MI or chest pain. In the last 6 months      ROS:  Review of Systems   Constitutional: Negative for activity change, chills, fatigue, fever and unexpected weight change.   HENT: Negative for congestion, ear discharge, ear pain, hearing loss, postnasal drip and rhinorrhea.    Eyes: Negative for pain and visual disturbance.   Respiratory: Negative for cough, chest tightness and shortness of breath.    Cardiovascular: Negative for chest pain and palpitations.   Gastrointestinal: Negative for abdominal pain, diarrhea and vomiting.   Endocrine: Negative for heat intolerance.   Genitourinary: Negative for dysuria, flank pain, frequency and hematuria.   Musculoskeletal: Negative for back pain, gait problem and neck pain.   Skin: Negative for color change and rash.   Neurological: Negative for dizziness, tremors, seizures, numbness and headaches.   Psychiatric/Behavioral: Negative for agitation, hallucinations, self-injury, sleep disturbance and suicidal ideas. The patient is not nervous/anxious.        Past Medical History:   Diagnosis Date    Anxiety     Cancer     spot on kidney and lung    Diabetes mellitus     Borderline    Diverticulitis     GERD (gastroesophageal reflux disease)     Hyperlipidemia     not on any meds currently    Hypertension     Lung mass     Mass of kidney        Social History:  Social History     Socioeconomic History    Marital status:      Spouse name: Not on file    Number of children: Not on file    Years of education: Not on file    Highest education level:  "Not on file   Occupational History    Not on file   Social Needs    Financial resource strain: Not on file    Food insecurity:     Worry: Not on file     Inability: Not on file    Transportation needs:     Medical: Not on file     Non-medical: Not on file   Tobacco Use    Smoking status: Former Smoker     Packs/day: 1.00     Years: 20.00     Pack years: 20.00     Last attempt to quit: 1995     Years since quittin.5    Smokeless tobacco: Never Used   Substance and Sexual Activity    Alcohol use: No     Alcohol/week: 0.0 oz    Drug use: No    Sexual activity: Yes     Partners: Female     Birth control/protection: None   Lifestyle    Physical activity:     Days per week: Not on file     Minutes per session: Not on file    Stress: Not at all   Relationships    Social connections:     Talks on phone: Not on file     Gets together: Not on file     Attends Faith service: Not on file     Active member of club or organization: Not on file     Attends meetings of clubs or organizations: Not on file     Relationship status: Not on file   Other Topics Concern    Not on file   Social History Narrative    Not on file       Family History:   family history includes Cancer in his sister; Hypertension in his father and mother; Melanoma in his father.    Health Maintenance   Topic Date Due    Lipid Panel  2020    Colonoscopy  2020    Aspirin/Antiplatelet Therapy  2020    TETANUS VACCINE  2028    Hepatitis C Screening  Completed       Physical Exam:    Vital Signs  Temp: 98.1 °F (36.7 °C)  Temp src: Temporal  Pulse: 80  SpO2: 96 %  BP: 138/88  BP Location: Left arm  Patient Position: Sitting  Pain Score:   5  Pain Loc: Back  Height and Weight  Height: 5' 10" (177.8 cm)  Weight: 94.8 kg (208 lb 15.9 oz)  BSA (Calculated - sq m): 2.16 sq meters  BMI (Calculated): 30.1  Weight in (lb) to have BMI = 25: 173.9]    Body mass index is 29.99 kg/m².    Physical Exam   Constitutional: He " is oriented to person, place, and time. He appears well-developed.   HENT:   Mouth/Throat: Oropharynx is clear and moist.   Eyes: Pupils are equal, round, and reactive to light. Conjunctivae are normal.   Neck: Normal range of motion. Neck supple.   Cardiovascular: Normal rate, regular rhythm and normal heart sounds.   No murmur heard.  Pulmonary/Chest: Effort normal and breath sounds normal. No respiratory distress. He has no wheezes. He has no rales. He exhibits no tenderness.   Abdominal: Soft. He exhibits no distension and no mass. There is no tenderness. There is no guarding.   Musculoskeletal: He exhibits no edema or tenderness.   Lymphadenopathy:     He has no cervical adenopathy.   Neurological: He is alert and oriented to person, place, and time. He has normal reflexes.   Skin: Skin is warm and dry.   Psychiatric: He has a normal mood and affect. His behavior is normal. Judgment and thought content normal.         Assessment:      ICD-10-CM ICD-9-CM   1. Preop general physical exam Z01.818 V72.83   2. Essential hypertension I10 401.9   3. Prediabetes R73.03 790.29   4. Coronary artery disease involving native heart without angina pectoris, unspecified vessel or lesion type I25.10 414.01         Plan:         Patient has above-mentioned medical problems are stable  Labs EKG chest x-ray wnl  Surgical risk is low under general anesthesia proceed as planned.  Preop evaluation will be faxed over to the requesting surgeon Dr. Shola Macias MD fax : [phone]      Orders Placed This Encounter   Procedures    X-Ray Chest PA And Lateral    CBC auto differential    Comprehensive metabolic panel    Protime-INR    Urinalysis    EKG 12-lead       Current Outpatient Medications   Medication Sig Dispense Refill    amLODIPine (NORVASC) 10 MG tablet TAKE 1 TABLET BY MOUTH ONCE DAILY. 90 tablet 0    benazepril (LOTENSIN) 20 MG tablet TAKE 2 TABLETS BY MOUTH ONCE A DAY. 180 tablet 0    clonazePAM (KLONOPIN) 1 MG  tablet Take 1 mg by mouth once daily.       gabapentin (NEURONTIN) 300 MG capsule TAKE 2 CAPSULES BY MOUTH THREE TIMES DAILY. 270 capsule 0    hydroCHLOROthiazide (MICROZIDE) 12.5 mg capsule TAKE 1 CAPSULE BY MOUTH ONCE DAILY. 90 capsule 0    hydrOXYzine pamoate (VISTARIL) 50 MG Cap 50 mg daily as needed.       metoprolol tartrate (LOPRESSOR) 50 MG tablet TAKE 1 TABLET BY MOUTH TWICE A DAY. 180 tablet 0    omeprazole (PRILOSEC) 40 MG capsule TAKE 1 CAPSULE BY MOUTH ONCE DAILY. 60 capsule 0    tiZANidine (ZANAFLEX) 4 MG tablet TAKE 1 TABLET BY MOUTH EVERY 8 HOURS AS NEEDED. 90 tablet 0    traZODone (DESYREL) 100 MG tablet Take 1 tablet (100 mg total) by mouth every evening. 30 tablet 5    venlafaxine (EFFEXOR-XR) 37.5 MG 24 hr capsule Take 1 capsule (37.5 mg total) by mouth once daily. 30 capsule 11    VITAMIN B-12 1000 MCG tablet Take 1,000 mcg by mouth once daily.       aspirin 81 mg Tab Take 1 tablet by mouth Daily.      fluticasone-salmeterol (ADVAIR HFA) 115-21 mcg/actuation HFAA Inhale 2 puffs into the lungs 2 (two) times daily. 12 g 12    hydrALAZINE (APRESOLINE) 25 MG tablet Take 1 tablet (25 mg total) by mouth 3 (three) times daily. (Patient taking differently: Take 25 mg by mouth every 12 (twelve) hours. ) 90 tablet 11    tiotropium (SPIRIVA) 18 mcg inhalation capsule Inhale 1 capsule (18 mcg total) into the lungs once daily. Controller 30 capsule 2     No current facility-administered medications for this visit.        Medications Discontinued During This Encounter   Medication Reason    ondansetron (ZOFRAN-ODT) 4 MG TbDL Patient no longer taking    oxyCODONE-acetaminophen (PERCOCET)  mg per tablet Patient no longer taking    rOPINIRole (REQUIP) 1 MG tablet Patient no longer taking    zolpidem (AMBIEN) 10 mg Tab Patient no longer taking       No follow-ups on file.      Yumi Roberson MD

## 2019-09-09 ENCOUNTER — TELEPHONE (OUTPATIENT)
Dept: FAMILY MEDICINE | Facility: CLINIC | Age: 64
End: 2019-09-09

## 2019-09-09 NOTE — TELEPHONE ENCOUNTER
----- Message from Trudy Tellez sent at 9/9/2019  3:28 PM CDT -----  Contact: [facility]  Calling for clearance for the patient and chest X-ray, and EKG. Please call at  [phone],  Fax [phone]

## 2019-09-09 NOTE — TELEPHONE ENCOUNTER
They are calling for his lab and ekg and clearance . I don't see carin notation on the lab, cxr and ekg, is he okay for surgery ?

## 2019-09-17 ENCOUNTER — HOSPITAL ENCOUNTER (EMERGENCY)
Facility: HOSPITAL | Age: 64
Discharge: HOME | End: 2019-09-17
Attending: EMERGENCY MEDICINE
Payer: MEDICARE

## 2019-09-17 VITALS
BODY MASS INDEX: 31.25 KG/M2 | DIASTOLIC BLOOD PRESSURE: 86 MMHG | TEMPERATURE: 99 F | SYSTOLIC BLOOD PRESSURE: 128 MMHG | HEIGHT: 70 IN | HEART RATE: 83 BPM | RESPIRATION RATE: 18 BRPM | WEIGHT: 218.25 LBS | OXYGEN SATURATION: 96 %

## 2019-09-17 DIAGNOSIS — N39.0 URINARY TRACT INFECTION WITHOUT HEMATURIA, SITE UNSPECIFIED: ICD-10-CM

## 2019-09-17 DIAGNOSIS — R33.9 URINARY RETENTION: Primary | ICD-10-CM

## 2019-09-17 LAB
ALBUMIN SERPL BCP-MCNC: 3.6 G/DL (ref 3.5–5.2)
ALP SERPL-CCNC: 44 U/L (ref 55–135)
ALT SERPL W/O P-5'-P-CCNC: 10 U/L (ref 10–44)
ANION GAP SERPL CALC-SCNC: 10 MMOL/L (ref 8–16)
AST SERPL-CCNC: 17 U/L (ref 10–40)
BACTERIA #/AREA URNS HPF: ABNORMAL /HPF
BASOPHILS # BLD AUTO: 0.05 K/UL (ref 0–0.2)
BASOPHILS NFR BLD: 0.5 % (ref 0–1.9)
BILIRUB SERPL-MCNC: 0.4 MG/DL (ref 0.1–1)
BILIRUB UR QL STRIP: NEGATIVE
BUN SERPL-MCNC: 13 MG/DL (ref 8–23)
CALCIUM SERPL-MCNC: 9.1 MG/DL (ref 8.7–10.5)
CHLORIDE SERPL-SCNC: 101 MMOL/L (ref 95–110)
CLARITY UR: CLEAR
CO2 SERPL-SCNC: 27 MMOL/L (ref 23–29)
COLOR UR: YELLOW
CREAT SERPL-MCNC: 1.3 MG/DL (ref 0.5–1.4)
DIFFERENTIAL METHOD: ABNORMAL
EOSINOPHIL # BLD AUTO: 0.4 K/UL (ref 0–0.5)
EOSINOPHIL NFR BLD: 4.4 % (ref 0–8)
ERYTHROCYTE [DISTWIDTH] IN BLOOD BY AUTOMATED COUNT: 13.3 % (ref 11.5–14.5)
EST. GFR  (AFRICAN AMERICAN): >60 ML/MIN/1.73 M^2
EST. GFR  (NON AFRICAN AMERICAN): 58 ML/MIN/1.73 M^2
GLUCOSE SERPL-MCNC: 109 MG/DL (ref 70–110)
GLUCOSE UR QL STRIP: NEGATIVE
HCT VFR BLD AUTO: 43 % (ref 40–54)
HGB BLD-MCNC: 14.2 G/DL (ref 14–18)
HGB UR QL STRIP: ABNORMAL
KETONES UR QL STRIP: NEGATIVE
LEUKOCYTE ESTERASE UR QL STRIP: NEGATIVE
LYMPHOCYTES # BLD AUTO: 1.4 K/UL (ref 1–4.8)
LYMPHOCYTES NFR BLD: 15.3 % (ref 18–48)
MCH RBC QN AUTO: 27.8 PG (ref 27–31)
MCHC RBC AUTO-ENTMCNC: 33 G/DL (ref 32–36)
MCV RBC AUTO: 84 FL (ref 82–98)
MICROSCOPIC COMMENT: ABNORMAL
MONOCYTES # BLD AUTO: 1.2 K/UL (ref 0.3–1)
MONOCYTES NFR BLD: 12.6 % (ref 4–15)
NEUTROPHILS # BLD AUTO: 6.3 K/UL (ref 1.8–7.7)
NEUTROPHILS NFR BLD: 67.4 % (ref 38–73)
NITRITE UR QL STRIP: NEGATIVE
PH UR STRIP: 8 [PH] (ref 5–8)
PLATELET # BLD AUTO: 224 K/UL (ref 150–350)
PMV BLD AUTO: 8.5 FL (ref 9.2–12.9)
POTASSIUM SERPL-SCNC: 5.1 MMOL/L (ref 3.5–5.1)
PROT SERPL-MCNC: 7.1 G/DL (ref 6–8.4)
PROT UR QL STRIP: NEGATIVE
RBC # BLD AUTO: 5.1 M/UL (ref 4.6–6.2)
RBC #/AREA URNS HPF: 5 /HPF (ref 0–4)
SODIUM SERPL-SCNC: 138 MMOL/L (ref 136–145)
SP GR UR STRIP: 1.01 (ref 1–1.03)
URN SPEC COLLECT METH UR: ABNORMAL
UROBILINOGEN UR STRIP-ACNC: NEGATIVE EU/DL
WBC # BLD AUTO: 9.35 K/UL (ref 3.9–12.7)

## 2019-09-17 PROCEDURE — 80053 COMPREHEN METABOLIC PANEL: CPT | Mod: HCNC

## 2019-09-17 PROCEDURE — 99283 EMERGENCY DEPT VISIT LOW MDM: CPT | Mod: HCNC

## 2019-09-17 PROCEDURE — 81000 URINALYSIS NONAUTO W/SCOPE: CPT | Mod: HCNC

## 2019-09-17 PROCEDURE — 85025 COMPLETE CBC W/AUTO DIFF WBC: CPT | Mod: HCNC

## 2019-09-17 RX ORDER — CEFDINIR 300 MG/1
300 CAPSULE ORAL 2 TIMES DAILY
Qty: 14 CAPSULE | Refills: 0 | Status: SHIPPED | OUTPATIENT
Start: 2019-09-17 | End: 2019-09-24

## 2019-09-17 NOTE — ED NOTES
"Pt attempted to urinate; however, " It just tinkles when I tried". I am unable to urinate. RN notified Dr. Mera.   "

## 2019-09-17 NOTE — ED PROVIDER NOTES
"64 year old year male presents with urinary retention after back surgery yesterday.    Pt understands that a workup will begin in the treatment lounge/results waiting areas due to there being no available beds. Pt also undertstands they will be placed in the next available bed where they will be seen and dispositioned by a physician. I am removing myself from the care of pt. Pt will be assigned to next available physician.      Easton Thakkar FNP-C 1758        SCRIBE #1 NOTE: I, Sánchez Monroy, am scribing for, and in the presence of, Paradise Williamson Do, MD. I have scribed the entire note.       History     Chief Complaint   Patient presents with    Dysuria     pt reports he had back sugery yesterday and had to have a catheter in and has only been able to dribble since it was removed.      Review of patient's allergies indicates:   Allergen Reactions    Crestor [rosuvastatin]      Joint pain          History of Present Illness     HPI    9/17/2019, 7:17 PM  History obtained from the patient      History of Present Illness: Mil Sood is a 64 y.o. male patient who presents to the Emergency Department for evaluation of urinary retention s/p back surgery which onset gradually yesterday. Pt had a catheter placed for back surgery yesterday at the [facility] and has "only been able to dribble" since it was removed yesterday. Symptoms are constant and moderate in severity. No mitigating or exacerbating factors reported. No associated sxs reported. Patient denies any CP, abd pain, SOB, dysuria, hematuria, urinary frequency/urgency, penile pain, and all other sxs at this time. No prior Tx reported. No further complaints or concerns at this time.         Arrival mode: Personal vehicle     PCP: Yumi Roberson MD        Past Medical History:  Past Medical History:   Diagnosis Date    Anxiety     Cancer     spot on kidney and lung    Diabetes mellitus     Borderline    Diverticulitis     GERD (gastroesophageal " reflux disease)     Hyperlipidemia     not on any meds currently    Hypertension     Lung mass     Mass of kidney        Past Surgical History:  Past Surgical History:   Procedure Laterality Date    ABLATION-CRYO in CT N/A 2018    Performed by [facility] at [facility] OR    CHOLECYSTECTOMY      COLONOSCOPY      COLONOSCOPY N/A 2017    Performed by [facility]ys Bravo MD at [facility] ENDO    ESOPHAGOGASTRODUODENOSCOPY      ESOPHAGOGASTRODUODENOSCOPY (EGD) N/A 2016    [facility] by [facility] at [facility] ENDO    ESOPHAGOGASTRODUODENOSCOPY (EGD) N/A 2016    Performed by [facility] at [facility] ENDO    ESOPHAGOGASTRODUODENOSCOPY (EGD) N/A 2016    Performed by [facility] at [facility] ENDO    ESOPHAGOGASTRODUODENOSCOPY (EGD) N/A 2016    Performed by [facility] at [facility] ENDO    HERNIA REPAIR Bilateral     groin    NECK SURGERY      PARTIAL NEPHRECTOMY      left    RELEASE-FINGER-TRIGGER Left 2017    Performed by Timothy [facility]barca MD at [facility] OR    RELEASE-FINGER-TRIGGER Right 2017    Performed by [facility]barca [facility] at [facility] OR    RENAL BIOPSY      REPAIR-HERNIA-INGUINAL-BILATERAL Bilateral 3/14/2017    Performed by Ari Pelletier MD at [facility] OR    TONSILLECTOMY      TRIGGER FINGER RELEASE Right     long finger    ULTRASOUND-ENDOSCOPIC-UPPER / EMR N/A 2016    Performed by [facility]ys [facility]ravo [facility] at [facility] ENDO         Family History:  Family History   Problem Relation Age of Onset    Hypertension Mother     Hypertension Father     Melanoma Father     Cancer Sister        Social History:  Social History     Tobacco Use    Smoking status: Former Smoker     Packs/day: 1.00     Years: 20.00     Pack years: 20.00     Last attempt to quit: 1995     Years since quittin.6    Smokeless tobacco: Never Used   Substance and Sexual Activity    Alcohol use: No     Alcohol/week: 0.0 oz    Drug use: No    Sexual activity: Yes      Partners: Female     Birth control/protection: None        Review of Systems     Review of Systems   Constitutional: Negative for fever.   HENT: Negative for sore throat.    Respiratory: Negative for shortness of breath.    Cardiovascular: Negative for chest pain.   Gastrointestinal: Negative for abdominal pain and nausea.   Genitourinary: Negative for dysuria, frequency, hematuria, penile pain and urgency.        + urinary retention   Musculoskeletal: Negative for back pain.   Skin: Negative for rash.   Neurological: Negative for weakness.   Hematological: Does not bruise/bleed easily.   All other systems reviewed and are negative.       Physical Exam     Initial Vitals [09/17/19 1758]   BP Pulse Resp Temp SpO2   (!) 143/82 95 18 99.1 °F (37.3 °C) 95 %      MAP       --          Physical Exam  Nursing Notes and Vital Signs Reviewed.  Constitutional: Patient is in no acute distress. Well-developed and well-nourished.  Head: Atraumatic. Normocephalic.  Eyes:  EOM intact. Conjunctivae are not pale. No scleral icterus.  ENT: Mucous membranes are moist. Oropharynx is clear and symmetric.    Neck: Supple. Full ROM.   Cardiovascular: Regular rate. Regular rhythm. No murmurs, rubs, or gallops. Distal pulses are 2+ and symmetric.  Pulmonary/Chest: No respiratory distress. Clear to auscultation bilaterally. No wheezing or rales.  Abdominal: Soft and non-distended.  There is no tenderness.  No rebound, guarding, or rigidity. Good bowel sounds.  Genitourinary: No CVA tenderness. Bladder is distended.  Musculoskeletal: Moves all extremities. No obvious deformities.   Skin: Warm and dry.  Neurological:  Alert, awake, and appropriate.  Normal speech.  No acute focal neurological deficits are appreciated.  Psychiatric: Normal affect. Good eye contact. Appropriate in content.  Back: Dressing covering surgical wounds on the back are clean, dry, and intact.     ED Course   Procedures  ED Vital Signs:  Vitals:    09/17/19 1758  "09/17/19 1904 09/17/19 2019   BP: (!) 143/82 132/71 128/86   Pulse: 95 82 83   Resp: 18 18    Temp: 99.1 °F (37.3 °C)  99 °F (37.2 °C)   TempSrc: Oral  Oral   SpO2: 95% 95% 96%   Weight: 99 kg (218 lb 4.1 oz)     Height: 5' 10" (1.778 m)         Abnormal Lab Results:  Labs Reviewed   CBC W/ AUTO DIFFERENTIAL - Abnormal; Notable for the following components:       Result Value    MPV 8.5 (*)     Mono # 1.2 (*)     Lymph% 15.3 (*)     All other components within normal limits   COMPREHENSIVE METABOLIC PANEL - Abnormal; Notable for the following components:    Alkaline Phosphatase 44 (*)     eGFR if non  58 (*)     All other components within normal limits   URINALYSIS, REFLEX TO URINE CULTURE - Abnormal; Notable for the following components:    Occult Blood UA 1+ (*)     All other components within normal limits    Narrative:     Preferred Collection Type->Urine, Clean Catch   URINALYSIS MICROSCOPIC - Abnormal; Notable for the following components:    RBC, UA 5 (*)     All other components within normal limits    Narrative:     Preferred Collection Type->Urine, Clean Catch        All Lab Results:  Results for orders placed or performed during the hospital encounter of 09/17/19   CBC auto differential   Result Value Ref Range    WBC 9.35 3.90 - 12.70 K/uL    RBC 5.10 4.60 - 6.20 M/uL    Hemoglobin 14.2 14.0 - 18.0 g/dL    Hematocrit 43.0 40.0 - 54.0 %    Mean Corpuscular Volume 84 82 - 98 fL    Mean Corpuscular Hemoglobin 27.8 27.0 - 31.0 pg    Mean Corpuscular Hemoglobin Conc 33.0 32.0 - 36.0 g/dL    RDW 13.3 11.5 - 14.5 %    Platelets 224 150 - 350 K/uL    MPV 8.5 (L) 9.2 - 12.9 fL    Gran # (ANC) 6.3 1.8 - 7.7 K/uL    Lymph # 1.4 1.0 - 4.8 K/uL    Mono # 1.2 (H) 0.3 - 1.0 K/uL    Eos # 0.4 0.0 - 0.5 K/uL    Baso # 0.05 0.00 - 0.20 K/uL    Gran% 67.4 38.0 - 73.0 %    Lymph% 15.3 (L) 18.0 - 48.0 %    Mono% 12.6 4.0 - 15.0 %    Eosinophil% 4.4 0.0 - 8.0 %    Basophil% 0.5 0.0 - 1.9 %    Differential " Method Automated    Comprehensive metabolic panel   Result Value Ref Range    Sodium 138 136 - 145 mmol/L    Potassium 5.1 3.5 - 5.1 mmol/L    Chloride 101 95 - 110 mmol/L    CO2 27 23 - 29 mmol/L    Glucose 109 70 - 110 mg/dL    BUN, Bld 13 8 - 23 mg/dL    Creatinine 1.3 0.5 - 1.4 mg/dL    Calcium 9.1 8.7 - 10.5 mg/dL    Total Protein 7.1 6.0 - 8.4 g/dL    Albumin 3.6 3.5 - 5.2 g/dL    Total Bilirubin 0.4 0.1 - 1.0 mg/dL    Alkaline Phosphatase 44 (L) 55 - 135 U/L    AST 17 10 - 40 U/L    ALT 10 10 - 44 U/L    Anion Gap 10 8 - 16 mmol/L    eGFR if African American >60 >60 mL/min/1.73 m^2    eGFR if non African American 58 (A) >60 mL/min/1.73 m^2   Urinalysis, Reflex to Urine Culture Urine, Clean Catch   Result Value Ref Range    Specimen UA Urine, Catheterized     Color, UA Yellow Yellow, Straw, Asiya    Appearance, UA Clear Clear    pH, UA 8.0 5.0 - 8.0    Specific Gravity, UA 1.010 1.005 - 1.030    Protein, UA Negative Negative    Glucose, UA Negative Negative    Ketones, UA Negative Negative    Bilirubin (UA) Negative Negative    Occult Blood UA 1+ (A) Negative    Nitrite, UA Negative Negative    Urobilinogen, UA Negative <2.0 EU/dL    Leukocytes, UA Negative Negative   Urinalysis Microscopic   Result Value Ref Range    RBC, UA 5 (H) 0 - 4 /hpf    Bacteria Rare None-Occ /hpf    Microscopic Comment SEE COMMENT          Imaging Results:  Imaging Results    None               The Emergency Provider reviewed the vital signs and test results, which are outlined above.     ED Discussion     7:17 PM: Bedside US shows over 1L of fluid in the bladder.    8:10 PM: Reassessed pt at this time.  Pt states his condition has resolved at this time. Klein bag placed and pt will go home with klein and f/u Dr. Cleveland.  He has next appt with [facility] in 2 weeks.  Discussed with pt all pertinent ED information and results. Discussed pt dx and plan of tx. Gave pt all f/u and return to the ED instructions. All questions and  concerns were addressed at this time. Pt expresses understanding of information and instructions, and is comfortable with plan to discharge. Pt is stable for discharge.    I discussed with patient and/or family/caretaker that evaluation in the ED does not suggest any emergent or life threatening medical conditions requiring immediate intervention beyond what was provided in the ED, and I believe patient is safe for discharge.  Regardless, an unremarkable evaluation in the ED does not preclude the development or presence of a serious of life threatening condition. As such, patient was instructed to return immediately for any worsening or change in current symptoms.    I counseled the patient on the risks of taking antibiotics, including taking all doses as prescribed. I explained the risk of antibiotic resistance in the future and susceptibility to C. diff infection, severe allergic reactions, and yeast infections.              Medical Decision Making:   Clinical Tests:   Lab Tests: Ordered and Reviewed  Radiological Study: Ordered and Reviewed           ED Medication(s):  Medications - No data to display    Discharge Medication List as of 9/17/2019  8:10 PM      START taking these medications    Details   cefdinir (OMNICEF) 300 MG capsule Take 1 capsule (300 mg total) by mouth 2 (two) times daily. for 7 days, Starting Tue 9/17/2019, Until Tue 9/24/2019, Print              Medication List      START taking these medications    cefdinir 300 MG capsule  Commonly known as:  OMNICEF  Take 1 capsule (300 mg total) by mouth 2 (two) times daily. for 7 days        ASK your doctor about these medications    amLODIPine 10 MG tablet  Commonly known as:  NORVASC  TAKE 1 TABLET BY MOUTH ONCE DAILY.     aspirin 81 mg Tab     benazepril 20 MG tablet  Commonly known as:  LOTENSIN  TAKE 2 TABLETS BY MOUTH ONCE A DAY.     clonazePAM 1 MG tablet  Commonly known as:  KLONOPIN     fluticasone propion-salmeterol 115-21 mcg/actuation  Hfaa  Commonly known as:  ADVAIR HFA  Inhale 2 puffs into the lungs 2 (two) times daily.     gabapentin 300 MG capsule  Commonly known as:  NEURONTIN  TAKE 2 CAPSULES BY MOUTH THREE TIMES DAILY.     hydrALAZINE 25 MG tablet  Commonly known as:  APRESOLINE  Take 1 tablet (25 mg total) by mouth 3 (three) times daily.     hydroCHLOROthiazide 12.5 mg capsule  Commonly known as:  MICROZIDE  TAKE 1 CAPSULE BY MOUTH ONCE DAILY.     hydrOXYzine pamoate 50 MG Cap  Commonly known as:  VISTARIL     metoprolol tartrate 50 MG tablet  Commonly known as:  LOPRESSOR  TAKE 1 TABLET BY MOUTH TWICE A DAY.     omeprazole 40 MG capsule  Commonly known as:  PRILOSEC  TAKE 1 CAPSULE BY MOUTH ONCE DAILY.     tiotropium 18 mcg inhalation capsule  Commonly known as:  SPIRIVA  Inhale 1 capsule (18 mcg total) into the lungs once daily. Controller     tiZANidine 4 MG tablet  Commonly known as:  ZANAFLEX  TAKE 1 TABLET BY MOUTH EVERY 8 HOURS AS NEEDED.     traZODone 100 MG tablet  Commonly known as:  DESYREL  Take 1 tablet (100 mg total) by mouth every evening.     venlafaxine 37.5 MG 24 hr capsule  Commonly known as:  EFFEXOR-XR  Take 1 capsule (37.5 mg total) by mouth once daily.     VITAMIN B-12 1000 MCG tablet  Generic drug:  cyanocobalamin           Where to Get Your Medications      You can get these medications from any pharmacy    Bring a paper prescription for each of these medications  · cefdinir 300 MG capsule         Follow-up Information     Rashad Cleveland IV, MD In 3 days.    Specialty:  Urology  Contact information:  [address] [place] [address] [address]it[address] OLIVIA [address] [phone]                       Scribe Attestation:   Scribe #1: I performed the above scribed service and the documentation accurately describes the services I performed. I attest to the accuracy of the note.     Attending:   Physician Attestation Statement for Scribe #1: I, Paradise Williamson Do, MD, personally performed the services described in this documentation, as  scribed by Sánchez Monroy, in my presence, and it is both accurate and complete.           Clinical Impression       ICD-10-CM ICD-9-CM   1. Urinary retention R33.9 788.20   2. Urinary tract infection without hematuria, site unspecified N39.0 599.0       Disposition:   Disposition: Discharged  Condition: Stable         Paradise Williamson Do, MD  09/17/19 0693

## 2019-09-19 ENCOUNTER — OFFICE VISIT (OUTPATIENT)
Dept: UROLOGY | Facility: CLINIC | Age: 64
End: 2019-09-19
Payer: MEDICARE

## 2019-09-19 VITALS
DIASTOLIC BLOOD PRESSURE: 74 MMHG | SYSTOLIC BLOOD PRESSURE: 138 MMHG | HEART RATE: 75 BPM | WEIGHT: 212.06 LBS | HEIGHT: 70 IN | BODY MASS INDEX: 30.36 KG/M2

## 2019-09-19 DIAGNOSIS — N28.89 RENAL MASS: ICD-10-CM

## 2019-09-19 DIAGNOSIS — R33.9 URINARY RETENTION: Primary | ICD-10-CM

## 2019-09-19 PROCEDURE — 99214 OFFICE O/P EST MOD 30 MIN: CPT | Mod: HCNC,S$GLB,, | Performed by: UROLOGY

## 2019-09-19 PROCEDURE — 3078F PR MOST RECENT DIASTOLIC BLOOD PRESSURE < 80 MM HG: ICD-10-PCS | Mod: HCNC,CPTII,S$GLB, | Performed by: UROLOGY

## 2019-09-19 PROCEDURE — 99999 PR PBB SHADOW E&M-EST. PATIENT-LVL III: ICD-10-PCS | Mod: PBBFAC,HCNC,, | Performed by: UROLOGY

## 2019-09-19 PROCEDURE — 99999 PR PBB SHADOW E&M-EST. PATIENT-LVL III: CPT | Mod: PBBFAC,HCNC,, | Performed by: UROLOGY

## 2019-09-19 PROCEDURE — 99214 PR OFFICE/OUTPT VISIT, EST, LEVL IV, 30-39 MIN: ICD-10-PCS | Mod: HCNC,S$GLB,, | Performed by: UROLOGY

## 2019-09-19 PROCEDURE — 3078F DIAST BP <80 MM HG: CPT | Mod: HCNC,CPTII,S$GLB, | Performed by: UROLOGY

## 2019-09-19 PROCEDURE — 3075F PR MOST RECENT SYSTOLIC BLOOD PRESS GE 130-139MM HG: ICD-10-PCS | Mod: HCNC,CPTII,S$GLB, | Performed by: UROLOGY

## 2019-09-19 PROCEDURE — 3075F SYST BP GE 130 - 139MM HG: CPT | Mod: HCNC,CPTII,S$GLB, | Performed by: UROLOGY

## 2019-09-19 PROCEDURE — 3008F BODY MASS INDEX DOCD: CPT | Mod: HCNC,CPTII,S$GLB, | Performed by: UROLOGY

## 2019-09-19 PROCEDURE — 3008F PR BODY MASS INDEX (BMI) DOCUMENTED: ICD-10-PCS | Mod: HCNC,CPTII,S$GLB, | Performed by: UROLOGY

## 2019-09-19 RX ORDER — TAMSULOSIN HYDROCHLORIDE 0.4 MG/1
0.4 CAPSULE ORAL DAILY
Qty: 30 CAPSULE | Refills: 11 | Status: SHIPPED | OUTPATIENT
Start: 2019-09-19 | End: 2020-07-27 | Stop reason: SDUPTHER

## 2019-09-19 NOTE — PROGRESS NOTES
"Chief Complaint: Renal Cell Carcinoma?    HPI:   9/19/19: Has surgery Monday on his back for a bone spur at L5-S1 and entered retention.  Has had a klein two days.  Didn't get the left renal mass treated secondary to other problems so far as I can tell.  Reviewed history in detail.   12/17/18: Did cryoablation of largest right renal mass, and path redemonstrates oncocytoma.  One residual right mass, and two lower pole left (3.1/1.1 cm).  8/8/18: Reviewed imaging, history, symptoms in detail.  9/28/17: Followup CT shows renal masses stable on 6 month interval.  CT scan shows 2.6/1.9/2.3 right renal masses and a 2.8cm left lower pole renal mass.  All enlarging slowly.  3/22/17: Slight enlargement of bilateral small renal masses in background of path-proven oncocytoma in 2011.  Dr. Roberson completes prostate cancer screening annually.  2/17/16: Ct shows a 5.5 mm left lower lobe chest nodule and a metallic linear object in stomach.  It probably is the wire left behind after some sort of esophageal biopsy and the wire was supposed to pass over a few weeks time.   Gen path shows the prior mass to be an oncocytoma.  Our pathologist has requested the slides and will form a second opinion.  2/11/16: 62 yo man referred by Dr. Roberson after finding renal masses on recent workup for various complaints.  No abd/pelvic pain and no exac/rel factors.  No hematuria.  No urolithiasis except he says he passed one 30 years ago.  No urinary bother.  No  history.  Normal sexual function. In 2011 he had left upper pole partial nephrectomy with unknown findings at  General, but no longterm followup.  Now he has a 2.3 cm left lower pole enhancing mass, and "right kidney demonstrates development of at least 3 separate solid mass lesions. One in the upper pole cortex 1.6 cm in size. The other in the anterior right upper renal cortex measuring 1.5 cm in size. The third located in the anterior lower pole approximately 1.1 cm in size."  No " chest imaging.  LFT normal.  Neg prostate cancer screening with PCP last ADA 6 months ago.    Allergies:  Crestor [rosuvastatin]    Medications:  has a current medication list which includes the following prescription(s): amlodipine, aspirin, benazepril, cefdinir, clonazepam, gabapentin, hydrochlorothiazide, hydroxyzine pamoate, metoprolol tartrate, omeprazole, tizanidine, trazodone, venlafaxine, vitamin b-12, fluticasone propion-salmeterol, hydralazine, and tiotropium.    Review of Systems:  General: No fever, chills, fatigability, or weight loss.  Skin: No rashes, itching, or changes in color or texture of skin.  Chest: Denies UMANA, cyanosis, wheezing, cough, and sputum production.  Abdomen: Appetite fine. No weight loss. Denies diarrhea, abdominal pain, hematemesis, or blood in stool.  Musculoskeletal: Some joint stiffness or swelling. Some back pain.  : As above.  All other review of systems negative.    PMH:   has a past medical history of Anxiety, Cancer, Diabetes mellitus, Diverticulitis, GERD (gastroesophageal reflux disease), Hyperlipidemia, Hypertension, Lung mass, and Mass of kidney.    PSH:   has a past surgical history that includes Partial nephrectomy; Neck surgery; Cholecystectomy; Tonsillectomy; Colonoscopy; Esophagogastroduodenoscopy; Colonoscopy (N/A, 4/7/2017); Renal biopsy; Hernia repair (Bilateral); and Trigger finger release (Right).    FamHx: family history includes Cancer in his sister; Hypertension in his father and mother; Melanoma in his father.    SocHx:  reports that he quit smoking about 24 years ago. He has a 20.00 pack-year smoking history. He has never used smokeless tobacco. He reports that he does not drink alcohol or use drugs.      Physical Exam:  Vitals:    09/19/19 1327   BP: 138/74   Pulse: 75     General: A&Ox3, no apparent distress, no deformities  Neck: No masses, normal thyroid  Lungs: normal inspiration, no use of accessory muscles  Heart: normal pulse, no  arrhythmias  Abdomen: Soft, NT, ND  Skin: The skin is warm and dry. No jaundice.  Ext: No c/c/e.  :   3/17: Test desc lauryn, no abnormalities of epididymus. Penis normal, with normal penile and scrotal skin. Meatus normal.     Labs/Studies:   PSA    1/15: 0.71    Impression/Plan:   1. Flomax and RTC for cysto/uroflow  2. CT Renal Mass protocol to keep up with renal masses.

## 2019-10-08 ENCOUNTER — TELEPHONE (OUTPATIENT)
Dept: UROLOGY | Facility: CLINIC | Age: 64
End: 2019-10-08

## 2019-10-08 RX ORDER — OMEPRAZOLE 40 MG/1
CAPSULE, DELAYED RELEASE ORAL
Qty: 60 CAPSULE | Refills: 0 | Status: SHIPPED | OUTPATIENT
Start: 2019-10-08 | End: 2020-07-27 | Stop reason: SDUPTHER

## 2019-10-09 ENCOUNTER — OFFICE VISIT (OUTPATIENT)
Dept: UROLOGY | Facility: CLINIC | Age: 64
End: 2019-10-09
Payer: MEDICARE

## 2019-10-09 VITALS — WEIGHT: 212.06 LBS | HEIGHT: 70 IN | BODY MASS INDEX: 30.36 KG/M2

## 2019-10-09 DIAGNOSIS — N40.0 BENIGN PROSTATIC HYPERPLASIA, UNSPECIFIED WHETHER LOWER URINARY TRACT SYMPTOMS PRESENT: Primary | ICD-10-CM

## 2019-10-09 LAB — POC RESIDUAL URINE VOLUME: 0 ML (ref 0–100)

## 2019-10-09 PROCEDURE — 99499 NO LOS: ICD-10-PCS | Mod: HCNC,S$GLB,, | Performed by: UROLOGY

## 2019-10-09 PROCEDURE — 52000 CYSTOURETHROSCOPY: CPT | Mod: 59,HCNC,S$GLB, | Performed by: UROLOGY

## 2019-10-09 PROCEDURE — 52000 PR CYSTOURETHROSCOPY: ICD-10-PCS | Mod: 59,HCNC,S$GLB, | Performed by: UROLOGY

## 2019-10-09 PROCEDURE — 51798 POCT BLADDER SCAN: ICD-10-PCS | Mod: 51,HCNC,S$GLB, | Performed by: UROLOGY

## 2019-10-09 PROCEDURE — 51798 US URINE CAPACITY MEASURE: CPT | Mod: 51,HCNC,S$GLB, | Performed by: UROLOGY

## 2019-10-09 PROCEDURE — 96372 PR INJECTION,THERAP/PROPH/DIAG2ST, IM OR SUBCUT: ICD-10-PCS | Mod: HCNC,59,S$GLB, | Performed by: UROLOGY

## 2019-10-09 PROCEDURE — 99999 PR PBB SHADOW E&M-EST. PATIENT-LVL III: CPT | Mod: PBBFAC,HCNC,, | Performed by: UROLOGY

## 2019-10-09 PROCEDURE — 99499 UNLISTED E&M SERVICE: CPT | Mod: HCNC,S$GLB,, | Performed by: UROLOGY

## 2019-10-09 PROCEDURE — 96372 THER/PROPH/DIAG INJ SC/IM: CPT | Mod: HCNC,59,S$GLB, | Performed by: UROLOGY

## 2019-10-09 PROCEDURE — 99999 PR PBB SHADOW E&M-EST. PATIENT-LVL III: ICD-10-PCS | Mod: PBBFAC,HCNC,, | Performed by: UROLOGY

## 2019-10-09 RX ORDER — FINASTERIDE 5 MG/1
5 TABLET, FILM COATED ORAL DAILY
Qty: 30 TABLET | Refills: 11 | Status: SHIPPED | OUTPATIENT
Start: 2019-10-09 | End: 2020-07-27 | Stop reason: SDUPTHER

## 2019-10-09 RX ORDER — ZOLPIDEM TARTRATE 10 MG/1
TABLET ORAL
COMMUNITY
Start: 2019-09-18 | End: 2020-01-13

## 2019-10-09 RX ORDER — CEFTRIAXONE 1 G/1
1 INJECTION, POWDER, FOR SOLUTION INTRAMUSCULAR; INTRAVENOUS ONCE
Status: COMPLETED | OUTPATIENT
Start: 2019-10-09 | End: 2019-10-09

## 2019-10-09 RX ORDER — SUCRALFATE 1 G/10ML
SUSPENSION ORAL
COMMUNITY
Start: 2016-02-11 | End: 2020-01-27

## 2019-10-09 RX ADMIN — CEFTRIAXONE 1 G: 1 INJECTION, POWDER, FOR SOLUTION INTRAMUSCULAR; INTRAVENOUS at 02:10

## 2019-10-09 NOTE — PROGRESS NOTES
Left Ventrogluteal injection site cleaned with alcohol prep. Rocephin  1 Gram given per protocol, patient tolerated well, bandaid applied. Patient asked to remain in the room for 15 minutes after injection. No reaction noted.

## 2019-10-09 NOTE — PROGRESS NOTES
"Chief Complaint: Renal Cell Carcinoma?    HPI:   10/9/19: Cysto confirms BPH would merit from TURP if meds not satisfactory.  CT not done.  9/19/19: Has surgery Monday on his back for a bone spur at L5-S1 and entered retention.  Has had a klein two days.  Didn't get the left renal mass treated secondary to other problems so far as I can tell.  Reviewed history in detail.   12/17/18: Did cryoablation of largest right renal mass, and path redemonstrates oncocytoma.  One residual right mass, and two lower pole left (3.1/1.1 cm).  8/8/18: Reviewed imaging, history, symptoms in detail.  9/28/17: Followup CT shows renal masses stable on 6 month interval.  CT scan shows 2.6/1.9/2.3 right renal masses and a 2.8cm left lower pole renal mass.  All enlarging slowly.  3/22/17: Slight enlargement of bilateral small renal masses in background of path-proven oncocytoma in 2011.  Dr. Roberson completes prostate cancer screening annually.  2/17/16: Ct shows a 5.5 mm left lower lobe chest nodule and a metallic linear object in stomach.  It probably is the wire left behind after some sort of esophageal biopsy and the wire was supposed to pass over a few weeks time.  Br Gen path shows the prior mass to be an oncocytoma.  Our pathologist has requested the slides and will form a second opinion.  2/11/16: 62 yo man referred by Dr. Roberson after finding renal masses on recent workup for various complaints.  No abd/pelvic pain and no exac/rel factors.  No hematuria.  No urolithiasis except he says he passed one 30 years ago.  No urinary bother.  No  history.  Normal sexual function. In 2011 he had left upper pole partial nephrectomy with unknown findings at  General, but no longterm followup.  Now he has a 2.3 cm left lower pole enhancing mass, and "right kidney demonstrates development of at least 3 separate solid mass lesions. One in the upper pole cortex 1.6 cm in size. The other in the anterior right upper renal cortex measuring 1.5 " "cm in size. The third located in the anterior lower pole approximately 1.1 cm in size."  No chest imaging.  LFT normal.  Neg prostate cancer screening with PCP last ADA 6 months ago.    Allergies:  Crestor [rosuvastatin]    Medications:  has a current medication list which includes the following prescription(s): sucralfate, amlodipine, aspirin, benazepril, clonazepam, fluticasone propion-salmeterol, gabapentin, hydralazine, hydrochlorothiazide, hydroxyzine pamoate, metoprolol tartrate, omeprazole, tamsulosin, tiotropium, tizanidine, trazodone, venlafaxine, vitamin b-12, and zolpidem.    Review of Systems:  General: No fever, chills, fatigability, or weight loss.  Skin: No rashes, itching, or changes in color or texture of skin.  Chest: Denies UMANA, cyanosis, wheezing, cough, and sputum production.  Abdomen: Appetite fine. No weight loss. Denies diarrhea, abdominal pain, hematemesis, or blood in stool.  Musculoskeletal: Some joint stiffness or swelling. Some back pain.  : As above.  All other review of systems negative.    PMH:   has a past medical history of Anxiety, Cancer, Diabetes mellitus, Diverticulitis, GERD (gastroesophageal reflux disease), Hyperlipidemia, Hypertension, Lung mass, and Mass of kidney.    PSH:   has a past surgical history that includes Partial nephrectomy; Neck surgery; Cholecystectomy; Tonsillectomy; Colonoscopy; Esophagogastroduodenoscopy; Colonoscopy (N/A, 4/7/2017); Renal biopsy; Hernia repair (Bilateral); and Trigger finger release (Right).    FamHx: family history includes Cancer in his sister; Hypertension in his father and mother; Melanoma in his father.    SocHx:  reports that he quit smoking about 24 years ago. He has a 20.00 pack-year smoking history. He has never used smokeless tobacco. He reports that he does not drink alcohol or use drugs.      Physical Exam:  There were no vitals filed for this visit.  General: A&Ox3, no apparent distress, no deformities  Neck: No masses, " normal thyroid  Lungs: normal inspiration, no use of accessory muscles  Heart: normal pulse, no arrhythmias  Abdomen: Soft, NT, ND  Skin: The skin is warm and dry. No jaundice.  Ext: No c/c/e.  :   3/17: Test desc lauryn, no abnormalities of epididymus. Penis normal, with normal penile and scrotal skin. Meatus normal.     Labs/Studies:   PSA    1/15: 0.71    Procedure: Diagnostic Cystoscopy    Procedure in Detail: After proper consents were obtained, the patient was prepped and draped in normal sterile fashion for diagnostic cystoscopy. 5 ml of lidocaine jelly was instilled in the urethra. The flexible cystoscope was then introduced into the urethra, and advanced into the bladder under direct vision. The urethral mucosa appeared normal, and no strictures were noted. The sphincter appeared to be normal, and the veru montanum was unremarkable. The prostatic mucosa and the lateral lobes of the prostate were opposed and obstructing. The bladder neck was normal. Inspection of the interior of the bladder was then carried out. The trigone was unremarkable, with no mucosal lesions. The ureteral orifices were normal in position and configuration. Systematic inspection of the mucosa of the bladder it was then carried out, rotating the cystoscope so that all areas of the left and right lateral walls, the dome of the bladder, and the posterior wall were all visualized. The cystoscope was then advanced further into the bladder, and maximum deflection of the scope was performed so that the bladder neck could be inspected. No mucosal lesions were noted there. The cystoscope was then removed, and the procedure terminated.     Findings: BPH with obstruction might merit from TURP    ABx: Rocephin 1 gm IM    Impression/Plan:   1. Flomax and RTC for cysto/uroflow  2. CT Renal Mass protocol to keep up with renal masses.  RTC after.  3. Adding finasteride for BPH

## 2019-10-11 ENCOUNTER — PES CALL (OUTPATIENT)
Dept: ADMINISTRATIVE | Facility: CLINIC | Age: 64
End: 2019-10-11

## 2019-10-15 ENCOUNTER — HOSPITAL ENCOUNTER (OUTPATIENT)
Dept: RADIOLOGY | Facility: HOSPITAL | Age: 64
Discharge: HOME | End: 2019-10-15
Attending: UROLOGY
Payer: MEDICARE

## 2019-10-15 DIAGNOSIS — N28.89 RENAL MASS: ICD-10-CM

## 2019-10-15 DIAGNOSIS — R33.9 URINARY RETENTION: ICD-10-CM

## 2019-10-15 PROCEDURE — 25500020 PHARM REV CODE 255: Mod: HCNC | Performed by: UROLOGY

## 2019-10-15 PROCEDURE — 74178 CT ABD&PLV WO CNTR FLWD CNTR: CPT | Mod: TC,HCNC

## 2019-10-15 RX ADMIN — IOHEXOL 75 ML: 350 INJECTION, SOLUTION INTRAVENOUS at 03:10

## 2019-10-18 ENCOUNTER — OFFICE VISIT (OUTPATIENT)
Dept: FAMILY MEDICINE | Facility: CLINIC | Age: 64
End: 2019-10-18
Payer: MEDICARE

## 2019-10-18 VITALS
BODY MASS INDEX: 29.95 KG/M2 | OXYGEN SATURATION: 97 % | TEMPERATURE: 98 F | HEIGHT: 70 IN | RESPIRATION RATE: 20 BRPM | WEIGHT: 209.19 LBS | HEART RATE: 70 BPM | DIASTOLIC BLOOD PRESSURE: 78 MMHG | SYSTOLIC BLOOD PRESSURE: 130 MMHG

## 2019-10-18 DIAGNOSIS — J30.89 SEASONAL ALLERGIC RHINITIS DUE TO OTHER ALLERGIC TRIGGER: Primary | ICD-10-CM

## 2019-10-18 DIAGNOSIS — J01.81 OTHER ACUTE RECURRENT SINUSITIS: ICD-10-CM

## 2019-10-18 PROCEDURE — 99999 PR PBB SHADOW E&M-EST. PATIENT-LVL III: CPT | Mod: PBBFAC,HCNC,, | Performed by: FAMILY MEDICINE

## 2019-10-18 PROCEDURE — 3008F BODY MASS INDEX DOCD: CPT | Mod: HCNC,CPTII,S$GLB, | Performed by: FAMILY MEDICINE

## 2019-10-18 PROCEDURE — 96372 PR INJECTION,THERAP/PROPH/DIAG2ST, IM OR SUBCUT: ICD-10-PCS | Mod: HCNC,S$GLB,, | Performed by: FAMILY MEDICINE

## 2019-10-18 PROCEDURE — 3075F PR MOST RECENT SYSTOLIC BLOOD PRESS GE 130-139MM HG: ICD-10-PCS | Mod: HCNC,CPTII,S$GLB, | Performed by: FAMILY MEDICINE

## 2019-10-18 PROCEDURE — 96372 THER/PROPH/DIAG INJ SC/IM: CPT | Mod: HCNC,S$GLB,, | Performed by: FAMILY MEDICINE

## 2019-10-18 PROCEDURE — 99999 PR PBB SHADOW E&M-EST. PATIENT-LVL III: ICD-10-PCS | Mod: PBBFAC,HCNC,, | Performed by: FAMILY MEDICINE

## 2019-10-18 PROCEDURE — 3075F SYST BP GE 130 - 139MM HG: CPT | Mod: HCNC,CPTII,S$GLB, | Performed by: FAMILY MEDICINE

## 2019-10-18 PROCEDURE — 99214 PR OFFICE/OUTPT VISIT, EST, LEVL IV, 30-39 MIN: ICD-10-PCS | Mod: HCNC,25,S$GLB, | Performed by: FAMILY MEDICINE

## 2019-10-18 PROCEDURE — 99214 OFFICE O/P EST MOD 30 MIN: CPT | Mod: HCNC,25,S$GLB, | Performed by: FAMILY MEDICINE

## 2019-10-18 PROCEDURE — 3078F DIAST BP <80 MM HG: CPT | Mod: HCNC,CPTII,S$GLB, | Performed by: FAMILY MEDICINE

## 2019-10-18 PROCEDURE — 3078F PR MOST RECENT DIASTOLIC BLOOD PRESSURE < 80 MM HG: ICD-10-PCS | Mod: HCNC,CPTII,S$GLB, | Performed by: FAMILY MEDICINE

## 2019-10-18 PROCEDURE — 3008F PR BODY MASS INDEX (BMI) DOCUMENTED: ICD-10-PCS | Mod: HCNC,CPTII,S$GLB, | Performed by: FAMILY MEDICINE

## 2019-10-18 RX ORDER — OLOPATADINE HYDROCHLORIDE 2 MG/ML
1 SOLUTION/ DROPS OPHTHALMIC DAILY
Qty: 2.5 ML | Refills: 0 | Status: SHIPPED | OUTPATIENT
Start: 2019-10-18 | End: 2020-02-03

## 2019-10-18 RX ORDER — BETAMETHASONE SODIUM PHOSPHATE AND BETAMETHASONE ACETATE 3; 3 MG/ML; MG/ML
9 INJECTION, SUSPENSION INTRA-ARTICULAR; INTRALESIONAL; INTRAMUSCULAR; SOFT TISSUE
Status: COMPLETED | OUTPATIENT
Start: 2019-10-18 | End: 2019-10-18

## 2019-10-18 RX ADMIN — BETAMETHASONE SODIUM PHOSPHATE AND BETAMETHASONE ACETATE 9 MG: 3; 3 INJECTION, SUSPENSION INTRA-ARTICULAR; INTRALESIONAL; INTRAMUSCULAR; SOFT TISSUE at 08:10

## 2019-10-18 NOTE — PROGRESS NOTES
Subjective:       Patient ID: Mil Sood is a 64 y.o. male.    Chief Complaint: Burning Eyes (x2 days) and sinusitis      History of Present Illness:   Mil Sood 64 y.o. male presents today with Burning eyes x 2 days  Eyes: Bilateral. Burning x 2 days.  Itching. No drainage and denies trauma and foreign body.  Sinusitis: Recurrent. He gets it ones a year at this time.   With nasal congestion and post nasal drip.  He is on Mucinex and Nyquil and Dayquil. Asking for steroid injection.  Past Medical History:   Diagnosis Date    Anxiety     Cancer     spot on kidney and lung    Diabetes mellitus     Borderline    Diverticulitis     GERD (gastroesophageal reflux disease)     Hyperlipidemia     not on any meds currently    Hypertension     Lung mass     Mass of kidney      Family History   Problem Relation Age of Onset    Hypertension Mother     Hypertension Father     Melanoma Father     Cancer Sister      Social History     Socioeconomic History    Marital status:      Spouse name: Not on file    Number of children: Not on file    Years of education: Not on file    Highest education level: Not on file   Occupational History    Not on file   Social Needs    Financial resource strain: Not on file    Food insecurity:     Worry: Not on file     Inability: Not on file    Transportation needs:     Medical: Not on file     Non-medical: Not on file   Tobacco Use    Smoking status: Former Smoker     Packs/day: 1.00     Years: 20.00     Pack years: 20.00     Last attempt to quit: 1995     Years since quittin.7    Smokeless tobacco: Never Used   Substance and Sexual Activity    Alcohol use: No     Alcohol/week: 0.0 standard drinks    Drug use: No    Sexual activity: Yes     Partners: Female     Birth control/protection: None   Lifestyle    Physical activity:     Days per week: Not on file     Minutes per session: Not on file    Stress: Not at all   Relationships    Social  connections:     Talks on phone: Not on file     Gets together: Not on file     Attends [employer] service: Not on file     Active member of club or organization: Not on file     Attends meetings of clubs or organizations: Not on file     Relationship status: Not on file   Other Topics Concern    Not on file   Social History Narrative    Not on file     Outpatient Encounter Medications as of 10/18/2019   Medication Sig Dispense Refill    amLODIPine (NORVASC) 10 MG tablet TAKE 1 TABLET BY MOUTH ONCE DAILY. 90 tablet 0    aspirin 81 mg Tab Take 1 tablet by mouth Daily.      benazepril (LOTENSIN) 20 MG tablet TAKE 2 TABLETS BY MOUTH ONCE A DAY. 180 tablet 0    clonazePAM (KLONOPIN) 1 MG tablet Take 1 mg by mouth once daily.       finasteride (PROSCAR) 5 mg tablet Take 1 tablet (5 mg total) by mouth once daily. 30 tablet 11    fluticasone-salmeterol (ADVAIR HFA) 115-21 mcg/actuation HFAA Inhale 2 puffs into the lungs 2 (two) times daily. 12 g 12    gabapentin (NEURONTIN) 300 MG capsule TAKE 2 CAPSULES BY MOUTH THREE TIMES DAILY. 270 capsule 0    hydrALAZINE (APRESOLINE) 25 MG tablet Take 1 tablet (25 mg total) by mouth 3 (three) times daily. (Patient taking differently: Take 25 mg by mouth every 12 (twelve) hours. ) 90 tablet 11    hydroCHLOROthiazide (MICROZIDE) 12.5 mg capsule TAKE 1 CAPSULE BY MOUTH ONCE DAILY. 90 capsule 0    hydrOXYzine pamoate (VISTARIL) 50 MG Cap 50 mg daily as needed.       metoprolol tartrate (LOPRESSOR) 50 MG tablet TAKE 1 TABLET BY MOUTH TWICE A DAY. 180 tablet 0    omeprazole (PRILOSEC) 40 MG capsule TAKE 1 CAPSULE BY MOUTH ONCE DAILY. 60 capsule 0    tamsulosin (FLOMAX) 0.4 mg Cap Take 1 capsule (0.4 mg total) by mouth once daily. 30 capsule 11    tiotropium (SPIRIVA) 18 mcg inhalation capsule Inhale 1 capsule (18 mcg total) into the lungs once daily. Controller 30 capsule 2    tiZANidine (ZANAFLEX) 4 MG tablet TAKE 1 TABLET BY MOUTH EVERY 8 HOURS AS NEEDED. 90 tablet 0     "traZODone (DESYREL) 100 MG tablet Take 1 tablet (100 mg total) by mouth every evening. 30 tablet 5    venlafaxine (EFFEXOR-XR) 37.5 MG 24 hr capsule Take 1 capsule (37.5 mg total) by mouth once daily. 30 capsule 11    VITAMIN B-12 1000 MCG tablet Take 1,000 mcg by mouth once daily.       zolpidem (AMBIEN) 10 mg Tab       olopatadine (PATADAY) 0.2 % Drop Place 1 drop into both eyes once daily. 2.5 mL 0    sucralfate (CARAFATE) 100 mg/mL suspension        Facility-Administered Encounter Medications as of 10/18/2019   Medication Dose Route Frequency Provider Last Rate Last Dose    [COMPLETED] betamethasone acetate-betamethasone sodium phosphate injection 9 mg  9 mg Intramuscular 1 time in Clinic/HOD Karyna Tellez MD   9 mg at 10/18/19 0811       Review of Systems   Constitutional: Negative for activity change, appetite change, fatigue and fever.   HENT: Positive for congestion, postnasal drip and sinus pressure. Negative for facial swelling, sore throat and voice change.    Eyes: Negative for discharge and itching.   Respiratory: Negative for cough, chest tightness and wheezing.    Cardiovascular: Negative.  Negative for chest pain and leg swelling.   Gastrointestinal: Negative for abdominal pain, nausea and vomiting.   Endocrine: Negative for cold intolerance and heat intolerance.   Genitourinary: Negative for dysuria and flank pain.   Musculoskeletal: Negative for myalgias and neck stiffness.   Skin: Negative for pallor and rash.   Neurological: Negative for facial asymmetry and weakness.   Psychiatric/Behavioral: Negative for agitation and confusion.       Objective:      /78 (BP Location: Right arm, Patient Position: Sitting, BP Method: Large (Manual))   Pulse 70   Temp 98.4 °F (36.9 °C) (Oral)   Resp 20   Ht 5' 10" (1.778 m)   Wt 94.9 kg (209 lb 3.5 oz)   SpO2 97%   BMI 30.02 kg/m²   Physical Exam   Constitutional: He is oriented to person, place, and time. He appears well-developed. No " distress.   HENT:   Head: Normocephalic and atraumatic.   Right Ear: External ear normal.   Left Ear: External ear normal.   Eyes: EOM are normal. Left eye exhibits no chemosis and no discharge. Right conjunctiva is injected. Left conjunctiva is injected.   Dry eyes   Neck: Neck supple.   Cardiovascular: Normal rate and regular rhythm.   Pulmonary/Chest: Effort normal. No respiratory distress.   Abdominal: Soft. Normal appearance and bowel sounds are normal. There is no hepatosplenomegaly.   Genitourinary:   Genitourinary Comments: deferred   Musculoskeletal: He exhibits no edema.   Neurological: He is alert and oriented to person, place, and time.   Skin: Skin is warm and dry.   Psychiatric: He has a normal mood and affect. His behavior is normal.   Nursing note and vitals reviewed.                         Assessment:       1. Seasonal allergic rhinitis due to other allergic trigger    2. Other acute recurrent sinusitis        Plan:   Seasonal allergic rhinitis due to other allergic trigger  -     olopatadine (PATADAY) 0.2 % Drop; Place 1 drop into both eyes once daily.  Dispense: 2.5 mL; Refill: 0    Other acute recurrent sinusitis  -     betamethasone acetate-betamethasone sodium phosphate injection 9 mg      OTC medications for cough and nasal congestion  Rest and Increase Fluids  Tylenol or Motrin as needed for fever, aches, or pain  If any worsening in the next 48 to 72 hours  please RTC      .[id number]  Total time spent in face to face counseling and coordination of care - 25 minutes over 50% of time was used in discussion of prognosis, risks, benefits of treatment, instructions and compliance with regimen .

## 2019-10-18 NOTE — PATIENT INSTRUCTIONS

## 2019-10-18 NOTE — PROGRESS NOTES
Celestone 9mg given IM; pt advised to wait 15 minutes.  Left in stable condition, no adverse reactions.

## 2019-10-22 RX ORDER — OMEPRAZOLE 40 MG/1
CAPSULE, DELAYED RELEASE ORAL
Qty: 60 CAPSULE | Refills: 0 | Status: SHIPPED | OUTPATIENT
Start: 2019-10-22 | End: 2020-01-10

## 2019-11-22 ENCOUNTER — HOSPITAL ENCOUNTER (EMERGENCY)
Facility: HOSPITAL | Age: 64
Discharge: LEFT BEFORE DISCHARGE | End: 2019-11-22
Attending: EMERGENCY MEDICINE
Payer: MEDICARE

## 2019-11-22 VITALS
TEMPERATURE: 98 F | BODY MASS INDEX: 29.04 KG/M2 | SYSTOLIC BLOOD PRESSURE: 141 MMHG | DIASTOLIC BLOOD PRESSURE: 83 MMHG | RESPIRATION RATE: 18 BRPM | OXYGEN SATURATION: 99 % | WEIGHT: 202.38 LBS | HEART RATE: 72 BPM

## 2019-11-22 DIAGNOSIS — R10.13 EPIGASTRIC PAIN: ICD-10-CM

## 2019-11-22 DIAGNOSIS — R11.2 NAUSEA AND VOMITING, INTRACTABILITY OF VOMITING NOT SPECIFIED, UNSPECIFIED VOMITING TYPE: Primary | ICD-10-CM

## 2019-11-22 LAB
ALBUMIN SERPL BCP-MCNC: 4.5 G/DL (ref 3.5–5.2)
ALP SERPL-CCNC: 64 U/L (ref 55–135)
ALT SERPL W/O P-5'-P-CCNC: 19 U/L (ref 10–44)
ANION GAP SERPL CALC-SCNC: 12 MMOL/L (ref 8–16)
AST SERPL-CCNC: 19 U/L (ref 10–40)
BASOPHILS # BLD AUTO: 0.06 K/UL (ref 0–0.2)
BASOPHILS NFR BLD: 0.6 % (ref 0–1.9)
BILIRUB SERPL-MCNC: 0.8 MG/DL (ref 0.1–1)
BUN SERPL-MCNC: 24 MG/DL (ref 8–23)
CALCIUM SERPL-MCNC: 9.9 MG/DL (ref 8.7–10.5)
CHLORIDE SERPL-SCNC: 95 MMOL/L (ref 95–110)
CO2 SERPL-SCNC: 24 MMOL/L (ref 23–29)
CREAT SERPL-MCNC: 1.2 MG/DL (ref 0.5–1.4)
DIFFERENTIAL METHOD: ABNORMAL
EOSINOPHIL # BLD AUTO: 0.1 K/UL (ref 0–0.5)
EOSINOPHIL NFR BLD: 0.7 % (ref 0–8)
ERYTHROCYTE [DISTWIDTH] IN BLOOD BY AUTOMATED COUNT: 13.7 % (ref 11.5–14.5)
EST. GFR  (AFRICAN AMERICAN): >60 ML/MIN/1.73 M^2
EST. GFR  (NON AFRICAN AMERICAN): >60 ML/MIN/1.73 M^2
GLUCOSE SERPL-MCNC: 132 MG/DL (ref 70–110)
HCT VFR BLD AUTO: 48 % (ref 40–54)
HGB BLD-MCNC: 16 G/DL (ref 14–18)
IMM GRANULOCYTES # BLD AUTO: 0.03 K/UL (ref 0–0.04)
IMM GRANULOCYTES NFR BLD AUTO: 0.3 % (ref 0–0.5)
LIPASE SERPL-CCNC: 28 U/L (ref 4–60)
LYMPHOCYTES # BLD AUTO: 1.3 K/UL (ref 1–4.8)
LYMPHOCYTES NFR BLD: 13.4 % (ref 18–48)
MCH RBC QN AUTO: 27.1 PG (ref 27–31)
MCHC RBC AUTO-ENTMCNC: 33.3 G/DL (ref 32–36)
MCV RBC AUTO: 81 FL (ref 82–98)
MONOCYTES # BLD AUTO: 0.9 K/UL (ref 0.3–1)
MONOCYTES NFR BLD: 9.7 % (ref 4–15)
NEUTROPHILS # BLD AUTO: 7.2 K/UL (ref 1.8–7.7)
NEUTROPHILS NFR BLD: 75.3 % (ref 38–73)
NRBC BLD-RTO: 0 /100 WBC
PLATELET # BLD AUTO: 348 K/UL (ref 150–350)
PMV BLD AUTO: 8.4 FL (ref 9.2–12.9)
POTASSIUM SERPL-SCNC: 4.1 MMOL/L (ref 3.5–5.1)
PROT SERPL-MCNC: 8 G/DL (ref 6–8.4)
RBC # BLD AUTO: 5.9 M/UL (ref 4.6–6.2)
SODIUM SERPL-SCNC: 131 MMOL/L (ref 136–145)
WBC # BLD AUTO: 9.61 K/UL (ref 3.9–12.7)

## 2019-11-22 PROCEDURE — 99284 EMERGENCY DEPT VISIT MOD MDM: CPT | Mod: 25,HCNC

## 2019-11-22 PROCEDURE — 85025 COMPLETE CBC W/AUTO DIFF WBC: CPT | Mod: HCNC

## 2019-11-22 PROCEDURE — 63600175 PHARM REV CODE 636 W HCPCS: Mod: HCNC | Performed by: EMERGENCY MEDICINE

## 2019-11-22 PROCEDURE — 96374 THER/PROPH/DIAG INJ IV PUSH: CPT | Mod: HCNC

## 2019-11-22 PROCEDURE — 80053 COMPREHEN METABOLIC PANEL: CPT | Mod: HCNC

## 2019-11-22 PROCEDURE — 83690 ASSAY OF LIPASE: CPT | Mod: HCNC

## 2019-11-22 PROCEDURE — 96361 HYDRATE IV INFUSION ADD-ON: CPT | Mod: HCNC

## 2019-11-22 RX ORDER — ONDANSETRON 2 MG/ML
4 INJECTION INTRAMUSCULAR; INTRAVENOUS
Status: COMPLETED | OUTPATIENT
Start: 2019-11-22 | End: 2019-11-22

## 2019-11-22 RX ADMIN — ONDANSETRON 4 MG: 2 INJECTION, SOLUTION INTRAMUSCULAR; INTRAVENOUS at 05:11

## 2019-11-22 RX ADMIN — SODIUM CHLORIDE, SODIUM LACTATE, POTASSIUM CHLORIDE, AND CALCIUM CHLORIDE 1000 ML: .6; .31; .03; .02 INJECTION, SOLUTION INTRAVENOUS at 05:11

## 2019-11-22 NOTE — ED PROVIDER NOTES
Per Song Manriquez, NP: 64 year old male presents with abdominal pain, N/V/D, and dizziness. Work up initiated. Pt placed in sort area for next available md    SCRIBE #1 NOTE: I, Michael Medina, myesha scribing for, and in the presence of, Song Christine MD. I have scribed the entire note.      History      Chief Complaint   Patient presents with    Emesis     abdominal pain and vomiting since yesterday, head spinning, diarrhea today; had teeth pulled yesterday       Review of patient's allergies indicates:   Allergen Reactions    Crestor [rosuvastatin]      Joint pain         HPI   HPI    11/22/2019, 5:01 PM   History obtained from the patient      History of Present Illness: Mil Sood is a 64 y.o. male patient who presents to the Emergency Department for emesis, onset 1 day PTA. Symptoms are episodic and moderate in severity. No mitigating or exacerbating factors reported. Associated sxs include nausea, chills, generalized abdominal pain, dizziness, diaphoresis, and diarrhea. Patient denies any fever, SOB, CP, weakness, numbness, headache, and all other sxs at this time. No prior Tx reported. No further complaints or concerns at this time.     Arrival mode: Personal vehicle    PCP: Yumi Roberson MD       Past Medical History:  Past Medical History:   Diagnosis Date    Anxiety     Cancer     spot on kidney and lung    Diabetes mellitus     Borderline    Diverticulitis     GERD (gastroesophageal reflux disease)     Hyperlipidemia     not on any meds currently    Hypertension     Lung mass     Mass of kidney        Past Surgical History:  Past Surgical History:   Procedure Laterality Date    CHOLECYSTECTOMY      COLONOSCOPY      COLONOSCOPY N/A 4/7/2017    Procedure: COLONOSCOPY;  Surgeon: Amarilys Bravo MD;  Location: [facility];  Service: Endoscopy;  Laterality: N/A;    ESOPHAGOGASTRODUODENOSCOPY      HERNIA REPAIR Bilateral     groin    NECK SURGERY      PARTIAL NEPHRECTOMY      left     RENAL BIOPSY      TONSILLECTOMY      TRIGGER FINGER RELEASE Right     long finger         Family History:  Family History   Problem Relation Age of Onset    Hypertension Mother     Hypertension Father     Melanoma Father     Cancer Sister        Social History:  Social History     Tobacco Use    Smoking status: Former Smoker     Packs/day: 1.00     Years: 20.00     Pack years: 20.00     Last attempt to quit: 1995     Years since quittin.8    Smokeless tobacco: Never Used   Substance and Sexual Activity    Alcohol use: No     Alcohol/week: 0.0 standard drinks    Drug use: No    Sexual activity: Yes     Partners: Female     Birth control/protection: None       ROS   Review of Systems   Constitutional: Positive for chills and diaphoresis. Negative for fever.   HENT: Negative for sore throat.    Respiratory: Negative for shortness of breath.    Cardiovascular: Negative for chest pain.   Gastrointestinal: Positive for abdominal pain (generalized), diarrhea, nausea and vomiting.   Genitourinary: Negative for dysuria.   Musculoskeletal: Negative for back pain.   Skin: Negative for rash and wound.   Neurological: Positive for dizziness. Negative for weakness, light-headedness, numbness and headaches.   Hematological: Does not bruise/bleed easily.   All other systems reviewed and are negative.    Physical Exam      Initial Vitals [19 1636]   BP Pulse Resp Temp SpO2   (!) 148/90 69 (!) 26 97.7 °F (36.5 °C) 98 %      MAP       --          Physical Exam  Nursing Notes and Vital Signs Reviewed.  Constitutional: Patient is in mild distress. Well-developed and well-nourished.  Head: Atraumatic. Normocephalic. Edentulous, at bottom from recent dental work. No evidence of infection.  Eyes: PERRL. EOM intact. Conjunctivae are not pale. No scleral icterus.  ENT: Mucous membranes are moist. Oropharynx is clear and symmetric.    Neck: Supple. Full ROM. No lymphadenopathy.  Cardiovascular: Regular rate. Regular  rhythm. No murmurs, rubs, or gallops. Distal pulses are 2+ and symmetric.  Pulmonary/Chest: Tachypneic. Clear to auscultation bilaterally. No wheezing or rales.  Abdominal: Soft and non-distended.  There is mild epigastric tenderness.  No rebound, guarding, or rigidity.   Musculoskeletal: Moves all extremities. No obvious deformities. No edema.  Skin: Warm and dry.  Neurological:  Alert, awake, and appropriate.  Normal speech.  No acute focal neurological deficits are appreciated.  Psychiatric: Normal affect. Good eye contact. Appropriate in content.    ED Course    Procedures  ED Vital Signs:  Vitals:    11/22/19 1636 11/22/19 1832   BP: (!) 148/90 (!) 141/83   Pulse: 69 72   Resp: (!) 26 18   Temp: 97.7 °F (36.5 °C) 97.9 °F (36.6 °C)   TempSrc: Oral    SpO2: 98% 99%   Weight: 91.8 kg (202 lb 6.1 oz)        Abnormal Lab Results:  Labs Reviewed   CBC W/ AUTO DIFFERENTIAL - Abnormal; Notable for the following components:       Result Value    Mean Corpuscular Volume 81 (*)     MPV 8.4 (*)     Gran% 75.3 (*)     Lymph% 13.4 (*)     All other components within normal limits   COMPREHENSIVE METABOLIC PANEL - Abnormal; Notable for the following components:    Sodium 131 (*)     Glucose 132 (*)     BUN, Bld 24 (*)     All other components within normal limits   LIPASE        All Lab Results:  Results for orders placed or performed during the hospital encounter of 11/22/19   CBC auto differential   Result Value Ref Range    WBC 9.61 3.90 - 12.70 K/uL    RBC 5.90 4.60 - 6.20 M/uL    Hemoglobin 16.0 14.0 - 18.0 g/dL    Hematocrit 48.0 40.0 - 54.0 %    Mean Corpuscular Volume 81 (L) 82 - 98 fL    Mean Corpuscular Hemoglobin 27.1 27.0 - 31.0 pg    Mean Corpuscular Hemoglobin Conc 33.3 32.0 - 36.0 g/dL    RDW 13.7 11.5 - 14.5 %    Platelets 348 150 - 350 K/uL    MPV 8.4 (L) 9.2 - 12.9 fL    Immature Granulocytes 0.3 0.0 - 0.5 %    Gran # (ANC) 7.2 1.8 - 7.7 K/uL    Immature Grans (Abs) 0.03 0.00 - 0.04 K/uL    Lymph # 1.3 1.0 -  4.8 K/uL    Mono # 0.9 0.3 - 1.0 K/uL    Eos # 0.1 0.0 - 0.5 K/uL    Baso # 0.06 0.00 - 0.20 K/uL    nRBC 0 0 /100 WBC    Gran% 75.3 (H) 38.0 - 73.0 %    Lymph% 13.4 (L) 18.0 - 48.0 %    Mono% 9.7 4.0 - 15.0 %    Eosinophil% 0.7 0.0 - 8.0 %    Basophil% 0.6 0.0 - 1.9 %    Differential Method Automated    Comprehensive metabolic panel   Result Value Ref Range    Sodium 131 (L) 136 - 145 mmol/L    Potassium 4.1 3.5 - 5.1 mmol/L    Chloride 95 95 - 110 mmol/L    CO2 24 23 - 29 mmol/L    Glucose 132 (H) 70 - 110 mg/dL    BUN, Bld 24 (H) 8 - 23 mg/dL    Creatinine 1.2 0.5 - 1.4 mg/dL    Calcium 9.9 8.7 - 10.5 mg/dL    Total Protein 8.0 6.0 - 8.4 g/dL    Albumin 4.5 3.5 - 5.2 g/dL    Total Bilirubin 0.8 0.1 - 1.0 mg/dL    Alkaline Phosphatase 64 55 - 135 U/L    AST 19 10 - 40 U/L    ALT 19 10 - 44 U/L    Anion Gap 12 8 - 16 mmol/L    eGFR if African American >60 >60 mL/min/1.73 m^2    eGFR if non African American >60 >60 mL/min/1.73 m^2   Lipase   Result Value Ref Range    Lipase 28 4 - 60 U/L              The Emergency Provider reviewed the vital signs and test results, which are outlined above.    ED Discussion     ED Course as of Nov 23 0013 Fri Nov 22, 2019   1920 Eloped prior to finishing evaluation.     [BA]      ED Course User Index  [BA] Song Christine MD     7:20 PM: Patient walked out without waiting for further workup. Able to ambulate without assistance or difficulty. Patient has eloped at this time.     ED Medication(s):  Medications   ondansetron injection 4 mg (4 mg Intravenous Given 11/22/19 1700)   lactated ringers bolus 1,000 mL (0 mLs Intravenous Stopped 11/22/19 1901)     Discharge Medication List as of 11/22/2019  8:34 PM              Medical Decision Making    Medical Decision Making:   Clinical Tests:   Lab Tests: Ordered and Reviewed           Scribe Attestation:   Scribe #1: I performed the above scribed service and the documentation accurately describes the services I performed. I attest to the  accuracy of the note.    Attending:   Physician Attestation Statement for Scribe #1: I, Song Christine MD, personally performed the services described in this documentation, as scribed by Michael Medina, in my presence, and it is both accurate and complete.          Clinical Impression       ICD-10-CM ICD-9-CM   1. Nausea and vomiting, intractability of vomiting not specified, unspecified vomiting type R11.2 787.01   2. Epigastric pain R10.13 789.06       Disposition:   Disposition: [place]         Song Christine MD  11/23/19 0013

## 2019-11-22 NOTE — ED NOTES
Pt presents to ED today with c/o abdominal pain. Associated symptoms include nausea, vomiting, diarrhea, and dizziness. Patient denies any chest pain, fever, chills, sore throat, cough, SOB, HA, syncope, weakness, numbness, seizures, and all other sxs at this time.    Patient identifiers verified and correct for Jaeger May.    Level of Consciousness: The patient is awake, alert and aware of environment with an appropriate affect, the patient is oriented x 3 and speaking appropriately.  Appearance: Patient is anxious and appears uncomfortable, patient is clean and well groomed, patient's clothing is properly fastened.  Skin: The skin is warm and dry, color consistent with ethnicity, patient has normal skin turgor and moist mucus membranes, skin intact, no breakdown or bruising noted.  Musculoskeletal: Moves all extremities well in full range of motion. No obvious deformities or swelling noted.  Respiratory: Airway open and patent, respirations spontaneous, pt is tachypnic. No accessory muscles in use. Breath sounds clear & equal   Cardiac: Patient has a normal rate, no periphreal edema noted, capillary refill < 3 seconds. good pulses palpated peripherally.  Abdomen: Soft, non-tender to palpation. No distention noted. +abdominal pain +n/v/d began today  Neurologic: PERRLA, face exhibits symmetrical expression, hand grasps equal and even bilaterally, reports normal sensation to all extremities and face.  Psychosocial: Normal affect. Good eye contact. Appropriate in content.  HEENT: +pt reports having teeth pulled yesterday    Patient verbalized understanding of status and plan of care. [facility].

## 2020-01-10 RX ORDER — OMEPRAZOLE 40 MG/1
CAPSULE, DELAYED RELEASE ORAL
Qty: 60 CAPSULE | Refills: 0 | Status: SHIPPED | OUTPATIENT
Start: 2020-01-10 | End: 2020-01-13

## 2020-01-13 RX ORDER — ZOLPIDEM TARTRATE 10 MG/1
TABLET ORAL
Qty: 30 TABLET | Refills: 0 | Status: SHIPPED | OUTPATIENT
Start: 2020-01-13 | End: 2020-02-19

## 2020-01-13 RX ORDER — OMEPRAZOLE 40 MG/1
CAPSULE, DELAYED RELEASE ORAL
Qty: 60 CAPSULE | Refills: 0 | Status: SHIPPED | OUTPATIENT
Start: 2020-01-13 | End: 2020-01-27 | Stop reason: SDUPTHER

## 2020-01-22 ENCOUNTER — TELEPHONE (OUTPATIENT)
Dept: FAMILY MEDICINE | Facility: CLINIC | Age: 65
End: 2020-01-22

## 2020-01-22 RX ORDER — CITALOPRAM 40 MG/1
40 TABLET, FILM COATED ORAL NIGHTLY
COMMUNITY
Start: 2019-10-22 | End: 2020-07-27 | Stop reason: SDUPTHER

## 2020-01-22 RX ORDER — HYDROXYZINE PAMOATE 25 MG/1
25 CAPSULE ORAL NIGHTLY
COMMUNITY
Start: 2019-10-22 | End: 2022-01-26 | Stop reason: SDUPTHER

## 2020-01-22 NOTE — TELEPHONE ENCOUNTER
----- Message from Maye Thrasher sent at 1/22/2020  9:08 AM CST -----  Type:  Pharmacy Calling to Clarify an RX    Name of Caller:Avelino  Pharmacy Name:[facility]   Prescription Name:Effexor  What do they need to clarify?:States they patient is on trazodone and Celexa. They just want to see if Dr Roberson is aware of that and see if he still wants him on the Effexor.  Best Call Back Number:[phone]  Additional Information: .    Thank you

## 2020-01-22 NOTE — TELEPHONE ENCOUNTER
Pharmacy is calling regarding patients depression meds, we have him on Effexor .    He has been seen by another physician and put on Celexa and trazodone.  They want to know I he should be on both?     I tried to call the patient and ask him if he is taking both but there was no answer.

## 2020-01-27 ENCOUNTER — LAB VISIT (OUTPATIENT)
Dept: LAB | Facility: HOSPITAL | Age: 65
End: 2020-01-27
Attending: FAMILY MEDICINE
Payer: MEDICARE

## 2020-01-27 ENCOUNTER — OFFICE VISIT (OUTPATIENT)
Dept: FAMILY MEDICINE | Facility: CLINIC | Age: 65
End: 2020-01-27
Payer: MEDICARE

## 2020-01-27 VITALS
HEIGHT: 70 IN | DIASTOLIC BLOOD PRESSURE: 84 MMHG | WEIGHT: 212.06 LBS | TEMPERATURE: 98 F | HEART RATE: 93 BPM | BODY MASS INDEX: 30.36 KG/M2 | OXYGEN SATURATION: 97 % | SYSTOLIC BLOOD PRESSURE: 136 MMHG

## 2020-01-27 DIAGNOSIS — F51.04 CHRONIC INSOMNIA: ICD-10-CM

## 2020-01-27 DIAGNOSIS — I10 ESSENTIAL HYPERTENSION: ICD-10-CM

## 2020-01-27 DIAGNOSIS — Z00.00 WELL ADULT EXAM: Primary | ICD-10-CM

## 2020-01-27 DIAGNOSIS — I25.10 CORONARY ARTERY DISEASE INVOLVING NATIVE HEART WITHOUT ANGINA PECTORIS, UNSPECIFIED VESSEL OR LESION TYPE: ICD-10-CM

## 2020-01-27 DIAGNOSIS — F43.29 PROLONGED GRIEF REACTION: ICD-10-CM

## 2020-01-27 DIAGNOSIS — Z12.5 PROSTATE CANCER SCREENING ENCOUNTER, OPTIONS AND RISKS DISCUSSED: ICD-10-CM

## 2020-01-27 DIAGNOSIS — E78.5 HYPERLIPIDEMIA, UNSPECIFIED HYPERLIPIDEMIA TYPE: ICD-10-CM

## 2020-01-27 DIAGNOSIS — K22.70 BARRETT'S ESOPHAGUS DETERMINED BY BIOPSY: ICD-10-CM

## 2020-01-27 DIAGNOSIS — Z00.00 WELL ADULT EXAM: ICD-10-CM

## 2020-01-27 LAB
ALBUMIN SERPL BCP-MCNC: 4.2 G/DL (ref 3.5–5.2)
ALP SERPL-CCNC: 57 U/L (ref 55–135)
ALT SERPL W/O P-5'-P-CCNC: 20 U/L (ref 10–44)
ANION GAP SERPL CALC-SCNC: 9 MMOL/L (ref 8–16)
AST SERPL-CCNC: 20 U/L (ref 10–40)
BASOPHILS # BLD AUTO: 0.11 K/UL (ref 0–0.2)
BASOPHILS NFR BLD: 1.8 % (ref 0–1.9)
BILIRUB SERPL-MCNC: 0.3 MG/DL (ref 0.1–1)
BUN SERPL-MCNC: 13 MG/DL (ref 8–23)
CALCIUM SERPL-MCNC: 9.7 MG/DL (ref 8.7–10.5)
CHLORIDE SERPL-SCNC: 103 MMOL/L (ref 95–110)
CHOLEST SERPL-MCNC: 258 MG/DL (ref 120–199)
CHOLEST/HDLC SERPL: 4.4 {RATIO} (ref 2–5)
CO2 SERPL-SCNC: 28 MMOL/L (ref 23–29)
COMPLEXED PSA SERPL-MCNC: 0.51 NG/ML (ref 0–4)
CREAT SERPL-MCNC: 1.1 MG/DL (ref 0.5–1.4)
DIFFERENTIAL METHOD: ABNORMAL
EOSINOPHIL # BLD AUTO: 0.4 K/UL (ref 0–0.5)
EOSINOPHIL NFR BLD: 6.6 % (ref 0–8)
ERYTHROCYTE [DISTWIDTH] IN BLOOD BY AUTOMATED COUNT: 13.9 % (ref 11.5–14.5)
EST. GFR  (AFRICAN AMERICAN): >60 ML/MIN/1.73 M^2
EST. GFR  (NON AFRICAN AMERICAN): >60 ML/MIN/1.73 M^2
GLUCOSE SERPL-MCNC: 99 MG/DL (ref 70–110)
HCT VFR BLD AUTO: 46.6 % (ref 40–54)
HDLC SERPL-MCNC: 58 MG/DL (ref 40–75)
HDLC SERPL: 22.5 % (ref 20–50)
HGB BLD-MCNC: 14.9 G/DL (ref 14–18)
IMM GRANULOCYTES # BLD AUTO: 0.05 K/UL (ref 0–0.04)
IMM GRANULOCYTES NFR BLD AUTO: 0.8 % (ref 0–0.5)
LDLC SERPL CALC-MCNC: 126.4 MG/DL (ref 63–159)
LYMPHOCYTES # BLD AUTO: 1.5 K/UL (ref 1–4.8)
LYMPHOCYTES NFR BLD: 24.8 % (ref 18–48)
MCH RBC QN AUTO: 28.1 PG (ref 27–31)
MCHC RBC AUTO-ENTMCNC: 32 G/DL (ref 32–36)
MCV RBC AUTO: 88 FL (ref 82–98)
MONOCYTES # BLD AUTO: 0.6 K/UL (ref 0.3–1)
MONOCYTES NFR BLD: 9.6 % (ref 4–15)
NEUTROPHILS # BLD AUTO: 3.4 K/UL (ref 1.8–7.7)
NEUTROPHILS NFR BLD: 56.4 % (ref 38–73)
NONHDLC SERPL-MCNC: 200 MG/DL
NRBC BLD-RTO: 0 /100 WBC
PLATELET # BLD AUTO: 312 K/UL (ref 150–350)
PMV BLD AUTO: 9.1 FL (ref 9.2–12.9)
POTASSIUM SERPL-SCNC: 4.7 MMOL/L (ref 3.5–5.1)
PROT SERPL-MCNC: 7.6 G/DL (ref 6–8.4)
RBC # BLD AUTO: 5.3 M/UL (ref 4.6–6.2)
SODIUM SERPL-SCNC: 140 MMOL/L (ref 136–145)
TRIGL SERPL-MCNC: 368 MG/DL (ref 30–150)
WBC # BLD AUTO: 6.04 K/UL (ref 3.9–12.7)

## 2020-01-27 PROCEDURE — 3075F PR MOST RECENT SYSTOLIC BLOOD PRESS GE 130-139MM HG: ICD-10-PCS | Mod: HCNC,CPTII,S$GLB, | Performed by: FAMILY MEDICINE

## 2020-01-27 PROCEDURE — 3075F SYST BP GE 130 - 139MM HG: CPT | Mod: HCNC,CPTII,S$GLB, | Performed by: FAMILY MEDICINE

## 2020-01-27 PROCEDURE — 85025 COMPLETE CBC W/AUTO DIFF WBC: CPT | Mod: HCNC

## 2020-01-27 PROCEDURE — 36415 COLL VENOUS BLD VENIPUNCTURE: CPT | Mod: HCNC,PO

## 2020-01-27 PROCEDURE — 99999 PR PBB SHADOW E&M-EST. PATIENT-LVL III: CPT | Mod: PBBFAC,HCNC,, | Performed by: FAMILY MEDICINE

## 2020-01-27 PROCEDURE — 80061 LIPID PANEL: CPT | Mod: HCNC

## 2020-01-27 PROCEDURE — 99396 PREV VISIT EST AGE 40-64: CPT | Mod: HCNC,S$GLB,, | Performed by: FAMILY MEDICINE

## 2020-01-27 PROCEDURE — 84153 ASSAY OF PSA TOTAL: CPT | Mod: HCNC

## 2020-01-27 PROCEDURE — 99396 PR PREVENTIVE VISIT,EST,40-64: ICD-10-PCS | Mod: HCNC,S$GLB,, | Performed by: FAMILY MEDICINE

## 2020-01-27 PROCEDURE — 3079F DIAST BP 80-89 MM HG: CPT | Mod: HCNC,CPTII,S$GLB, | Performed by: FAMILY MEDICINE

## 2020-01-27 PROCEDURE — 99999 PR PBB SHADOW E&M-EST. PATIENT-LVL III: ICD-10-PCS | Mod: PBBFAC,HCNC,, | Performed by: FAMILY MEDICINE

## 2020-01-27 PROCEDURE — 80053 COMPREHEN METABOLIC PANEL: CPT | Mod: HCNC

## 2020-01-27 PROCEDURE — 3079F PR MOST RECENT DIASTOLIC BLOOD PRESSURE 80-89 MM HG: ICD-10-PCS | Mod: HCNC,CPTII,S$GLB, | Performed by: FAMILY MEDICINE

## 2020-01-27 PROCEDURE — 83036 HEMOGLOBIN GLYCOSYLATED A1C: CPT | Mod: HCNC

## 2020-01-27 RX ORDER — GABAPENTIN 600 MG/1
600 TABLET ORAL 3 TIMES DAILY
Qty: 90 TABLET | Refills: 11 | Status: SHIPPED | OUTPATIENT
Start: 2020-01-27 | End: 2020-07-27 | Stop reason: SDUPTHER

## 2020-01-27 NOTE — H&P (VIEW-ONLY)
Chief Complaint:    Chief Complaint   Patient presents with    Discuss medication       History of Present Illness:  He is here for physical:  Doing okay he still going through significant grief process following the death of his wife.  He is on medications.    Blood pressure is normal today    Doing okay after his back surgery    He has Culp's esophagus he has not been following up on his EGD.  It was supposed to be done in 1 year.    Also has BPH and follows with Urology.      ROS:  Review of Systems   Constitutional: Negative for activity change, chills, fatigue, fever and unexpected weight change.   HENT: Negative for congestion, ear discharge, ear pain, hearing loss, postnasal drip and rhinorrhea.    Eyes: Negative for pain and visual disturbance.   Respiratory: Negative for cough, chest tightness and shortness of breath.    Cardiovascular: Negative for chest pain and palpitations.   Gastrointestinal: Negative for abdominal pain, diarrhea and vomiting.   Endocrine: Negative for heat intolerance.   Genitourinary: Negative for dysuria, flank pain, frequency and hematuria.   Musculoskeletal: Negative for back pain, gait problem and neck pain.   Skin: Negative for color change and rash.   Neurological: Negative for dizziness, tremors, seizures, numbness and headaches.   Psychiatric/Behavioral: Negative for agitation, hallucinations, self-injury, sleep disturbance and suicidal ideas. The patient is not nervous/anxious.        Past Medical History:   Diagnosis Date    Anxiety     Cancer     spot on kidney and lung    Diabetes mellitus     Borderline    Diverticulitis     GERD (gastroesophageal reflux disease)     Hyperlipidemia     not on any meds currently    Hypertension     Lung mass     Mass of kidney        Social History:  Social History     Socioeconomic History    Marital status:      Spouse name: Not on file    Number of children: Not on file    Years of education: Not on file     "Highest education level: Not on file   Occupational History    Not on file   Social Needs    Financial resource strain: Not on file    Food insecurity:     Worry: Not on file     Inability: Not on file    Transportation needs:     Medical: Not on file     Non-medical: Not on file   Tobacco Use    Smoking status: Former Smoker     Packs/day: 1.00     Years: 20.00     Pack years: 20.00     Last attempt to quit: 1995     Years since quittin.9    Smokeless tobacco: Never Used   Substance and Sexual Activity    Alcohol use: No     Alcohol/week: 0.0 standard drinks    Drug use: No    Sexual activity: Yes     Partners: Female     Birth control/protection: None   Lifestyle    Physical activity:     Days per week: Not on file     Minutes per session: Not on file    Stress: Not at all   Relationships    Social connections:     Talks on phone: Not on file     Gets together: Not on file     Attends Anab[employer]ist service: Not on file     Active member of club or organization: Not on file     Attends meetings of clubs or organizations: Not on file     Relationship status: Not on file   Other Topics Concern    Not on file   Social History Narrative    Not on file       Family History:   family history includes Cancer in his sister; Hypertension in his father and mother; Melanoma in his father.    Health Maintenance   Topic Date Due    Lipid Panel  2020    Colonoscopy  2020    Aspirin/Antiplatelet Therapy  2021    TETANUS VACCINE  2028    Hepatitis C Screening  Completed       Physical Exam:    Vital Signs  Temp: 98.2 °F (36.8 °C)  Temp src: Temporal  Pulse: 93  SpO2: 97 %  BP: 136/84  BP Location: Left arm  Patient Position: Sitting  Pain Score: 0-No pain  Height and Weight  Height: 5' 10" (177.8 cm)  Weight: 96.2 kg (212 lb 1.3 oz)  BSA (Calculated - sq m): 2.18 sq meters  BMI (Calculated): 30.4  Weight in (lb) to have BMI = 25: 173.9]    Body mass index is 30.43 kg/m².    Physical " Exam   Constitutional: He is oriented to person, place, and time. He appears well-developed.   HENT:   Mouth/Throat: Oropharynx is clear and moist.   Eyes: Pupils are equal, round, and reactive to light. Conjunctivae are normal.   Neck: Normal range of motion. Neck supple.   Cardiovascular: Normal rate, regular rhythm and normal heart sounds.   No murmur heard.  Pulmonary/Chest: Effort normal and breath sounds normal. No respiratory distress. He has no wheezes. He has no rales. He exhibits no tenderness.   Abdominal: Soft. He exhibits no distension and no mass. There is no tenderness. There is no guarding.   Musculoskeletal: He exhibits no edema or tenderness.   Lymphadenopathy:     He has no cervical adenopathy.   Neurological: He is alert and oriented to person, place, and time. He has normal reflexes.   Skin: Skin is warm and dry.   Psychiatric: He has a normal mood and affect. His behavior is normal. Judgment and thought content normal.         Assessment:      ICD-10-CM ICD-9-CM   1. Well adult exam Z00.00 V70.0   2. Essential hypertension I10 401.9   3. Prolonged grief reaction F43.29 309.1   4. Coronary artery disease involving native heart without angina pectoris, unspecified vessel or lesion type I25.10 414.01   5. Chronic insomnia F51.04 780.52   6. Prostate cancer screening encounter, options and risks discussed Z12.5 V76.44   7. Hyperlipidemia, unspecified hyperlipidemia type E78.5 272.4   8. Culp's esophagus determined by biopsy K22.70 530.85         Plan:        Consult patient on grief reaction  Continue Ambien for insomnia  BPH stable  Will put an order for EGD for his underlying Culp's esophagus for which he has not kept up appointment  Coronary artery disease stable no new symptoms at this time  Hypertension stable  Follow-up 6 months        Orders Placed This Encounter   Procedures    CBC auto differential    Comprehensive metabolic panel    Hemoglobin A1c    Lipid panel    PSA,  Screening       Current Outpatient Medications   Medication Sig Dispense Refill    amLODIPine (NORVASC) 10 MG tablet TAKE 1 TABLET BY MOUTH ONCE DAILY. 90 tablet 0    aspirin 81 mg Tab Take 1 tablet by mouth Daily.      benazepril (LOTENSIN) 20 MG tablet TAKE 2 TABLETS BY MOUTH ONCE A DAY. 180 tablet 0    citalopram (CELEXA) 40 MG tablet Take 40 mg by mouth once daily.      clonazePAM (KLONOPIN) 1 MG tablet Take 1 mg by mouth once daily.       finasteride (PROSCAR) 5 mg tablet Take 1 tablet (5 mg total) by mouth once daily. 30 tablet 11    fluticasone-salmeterol (ADVAIR HFA) 115-21 mcg/actuation HFAA Inhale 2 puffs into the lungs 2 (two) times daily. 12 g 12    hydrALAZINE (APRESOLINE) 25 MG tablet Take 1 tablet (25 mg total) by mouth 3 (three) times daily. (Patient taking differently: Take 25 mg by mouth every 12 (twelve) hours. ) 90 tablet 11    hydroCHLOROthiazide (MICROZIDE) 12.5 mg capsule TAKE 1 CAPSULE BY MOUTH ONCE DAILY. 90 capsule 0    hydrOXYzine pamoate (VISTARIL) 25 MG Cap Take 25 mg by mouth.      metoprolol tartrate (LOPRESSOR) 50 MG tablet TAKE 1 TABLET BY MOUTH TWICE A DAY. 180 tablet 0    olopatadine (PATADAY) 0.2 % Drop Place 1 drop into both eyes once daily. 2.5 mL 0    omeprazole (PRILOSEC) 40 MG capsule TAKE 1 CAPSULE BY MOUTH ONCE DAILY. 60 capsule 0    tamsulosin (FLOMAX) 0.4 mg Cap Take 1 capsule (0.4 mg total) by mouth once daily. 30 capsule 11    tiotropium (SPIRIVA) 18 mcg inhalation capsule Inhale 1 capsule (18 mcg total) into the lungs once daily. Controller 30 capsule 2    tiZANidine (ZANAFLEX) 4 MG tablet TAKE 1 TABLET BY MOUTH EVERY 8 HOURS AS NEEDED. 90 tablet 0    traZODone (DESYREL) 100 MG tablet Take 1 tablet (100 mg total) by mouth every evening. 30 tablet 5    venlafaxine (EFFEXOR-XR) 37.5 MG 24 hr capsule Take 1 capsule (37.5 mg total) by mouth once daily. 30 capsule 11    zolpidem (AMBIEN) 10 mg Tab TAKE 1 TABLET BY MOUTH NIGHTLY AS NEEDED. 30 tablet 0     gabapentin (NEURONTIN) 600 MG tablet Take 1 tablet (600 mg total) by mouth 3 (three) times daily. 90 tablet 11     No current facility-administered medications for this visit.        Medications Discontinued During This Encounter   Medication Reason    hydrOXYzine pamoate (VISTARIL) 50 MG Cap Duplicate Order    omeprazole (PRILOSEC) 40 MG capsule Duplicate Order    sucralfate (CARAFATE) 100 mg/mL suspension Patient no longer taking    VITAMIN B-12 1000 MCG tablet Patient no longer taking    gabapentin (NEURONTIN) 300 MG capsule        Follow up in about 6 months (around 7/27/2020).      Yumi Roberson MD

## 2020-01-27 NOTE — PROGRESS NOTES
Chief Complaint:    Chief Complaint   Patient presents with    Discuss medication       History of Present Illness:  He is here for physical:  Doing okay he still going through significant grief process following the death of his wife.  He is on medications.    Blood pressure is normal today    Doing okay after his back surgery    He has Culp's esophagus he has not been following up on his EGD.  It was supposed to be done in 1 year.    Also has BPH and follows with Urology.      ROS:  Review of Systems   Constitutional: Negative for activity change, chills, fatigue, fever and unexpected weight change.   HENT: Negative for congestion, ear discharge, ear pain, hearing loss, postnasal drip and rhinorrhea.    Eyes: Negative for pain and visual disturbance.   Respiratory: Negative for cough, chest tightness and shortness of breath.    Cardiovascular: Negative for chest pain and palpitations.   Gastrointestinal: Negative for abdominal pain, diarrhea and vomiting.   Endocrine: Negative for heat intolerance.   Genitourinary: Negative for dysuria, flank pain, frequency and hematuria.   Musculoskeletal: Negative for back pain, gait problem and neck pain.   Skin: Negative for color change and rash.   Neurological: Negative for dizziness, tremors, seizures, numbness and headaches.   Psychiatric/Behavioral: Negative for agitation, hallucinations, self-injury, sleep disturbance and suicidal ideas. The patient is not nervous/anxious.        Past Medical History:   Diagnosis Date    Anxiety     Cancer     spot on kidney and lung    Diabetes mellitus     Borderline    Diverticulitis     GERD (gastroesophageal reflux disease)     Hyperlipidemia     not on any meds currently    Hypertension     Lung mass     Mass of kidney        Social History:  Social History     Socioeconomic History    Marital status:      Spouse name: Not on file    Number of children: Not on file    Years of education: Not on file     "Highest education level: Not on file   Occupational History    Not on file   Social Needs    Financial resource strain: Not on file    Food insecurity:     Worry: Not on file     Inability: Not on file    Transportation needs:     Medical: Not on file     Non-medical: Not on file   Tobacco Use    Smoking status: Former Smoker     Packs/day: 1.00     Years: 20.00     Pack years: 20.00     Last attempt to quit: 1995     Years since quittin.9    Smokeless tobacco: Never Used   Substance and Sexual Activity    Alcohol use: No     Alcohol/week: 0.0 standard drinks    Drug use: No    Sexual activity: Yes     Partners: Female     Birth control/protection: None   Lifestyle    Physical activity:     Days per week: Not on file     Minutes per session: Not on file    Stress: Not at all   Relationships    Social connections:     Talks on phone: Not on file     Gets together: Not on file     Attends [employer] service: Not on file     Active member of club or organization: Not on file     Attends meetings of clubs or organizations: Not on file     Relationship status: Not on file   Other Topics Concern    Not on file   Social History Narrative    Not on file       Family History:   family history includes Cancer in his sister; Hypertension in his father and mother; Melanoma in his father.    Health Maintenance   Topic Date Due    Lipid Panel  2020    Colonoscopy  2020    Aspirin/Antiplatelet Therapy  2021    TETANUS VACCINE  2028    Hepatitis C Screening  Completed       Physical Exam:    Vital Signs  Temp: 98.2 °F (36.8 °C)  Temp src: Temporal  Pulse: 93  SpO2: 97 %  BP: 136/84  BP Location: Left arm  Patient Position: Sitting  Pain Score: 0-No pain  Height and Weight  Height: 5' 10" (177.8 cm)  Weight: 96.2 kg (212 lb 1.3 oz)  BSA (Calculated - sq m): 2.18 sq meters  BMI (Calculated): 30.4  Weight in (lb) to have BMI = 25: 173.9]    Body mass index is 30.43 kg/m².    Physical " Exam   Constitutional: He is oriented to person, place, and time. He appears well-developed.   HENT:   Mouth/Throat: Oropharynx is clear and moist.   Eyes: Pupils are equal, round, and reactive to light. Conjunctivae are normal.   Neck: Normal range of motion. Neck supple.   Cardiovascular: Normal rate, regular rhythm and normal heart sounds.   No murmur heard.  Pulmonary/Chest: Effort normal and breath sounds normal. No respiratory distress. He has no wheezes. He has no rales. He exhibits no tenderness.   Abdominal: Soft. He exhibits no distension and no mass. There is no tenderness. There is no guarding.   Musculoskeletal: He exhibits no edema or tenderness.   Lymphadenopathy:     He has no cervical adenopathy.   Neurological: He is alert and oriented to person, place, and time. He has normal reflexes.   Skin: Skin is warm and dry.   Psychiatric: He has a normal mood and affect. His behavior is normal. Judgment and thought content normal.         Assessment:      ICD-10-CM ICD-9-CM   1. Well adult exam Z00.00 V70.0   2. Essential hypertension I10 401.9   3. Prolonged grief reaction F43.29 309.1   4. Coronary artery disease involving native heart without angina pectoris, unspecified vessel or lesion type I25.10 414.01   5. Chronic insomnia F51.04 780.52   6. Prostate cancer screening encounter, options and risks discussed Z12.5 V76.44   7. Hyperlipidemia, unspecified hyperlipidemia type E78.5 272.4   8. Culp's esophagus determined by biopsy K22.70 530.85         Plan:        Consult patient on grief reaction  Continue Ambien for insomnia  BPH stable  Will put an order for EGD for his underlying Culp's esophagus for which he has not kept up appointment  Coronary artery disease stable no new symptoms at this time  Hypertension stable  Follow-up 6 months        Orders Placed This Encounter   Procedures    CBC auto differential    Comprehensive metabolic panel    Hemoglobin A1c    Lipid panel    PSA,  Screening       Current Outpatient Medications   Medication Sig Dispense Refill    amLODIPine (NORVASC) 10 MG tablet TAKE 1 TABLET BY MOUTH ONCE DAILY. 90 tablet 0    aspirin 81 mg Tab Take 1 tablet by mouth Daily.      benazepril (LOTENSIN) 20 MG tablet TAKE 2 TABLETS BY MOUTH ONCE A DAY. 180 tablet 0    citalopram (CELEXA) 40 MG tablet Take 40 mg by mouth once daily.      clonazePAM (KLONOPIN) 1 MG tablet Take 1 mg by mouth once daily.       finasteride (PROSCAR) 5 mg tablet Take 1 tablet (5 mg total) by mouth once daily. 30 tablet 11    fluticasone-salmeterol (ADVAIR HFA) 115-21 mcg/actuation HFAA Inhale 2 puffs into the lungs 2 (two) times daily. 12 g 12    hydrALAZINE (APRESOLINE) 25 MG tablet Take 1 tablet (25 mg total) by mouth 3 (three) times daily. (Patient taking differently: Take 25 mg by mouth every 12 (twelve) hours. ) 90 tablet 11    hydroCHLOROthiazide (MICROZIDE) 12.5 mg capsule TAKE 1 CAPSULE BY MOUTH ONCE DAILY. 90 capsule 0    hydrOXYzine pamoate (VISTARIL) 25 MG Cap Take 25 mg by mouth.      metoprolol tartrate (LOPRESSOR) 50 MG tablet TAKE 1 TABLET BY MOUTH TWICE A DAY. 180 tablet 0    olopatadine (PATADAY) 0.2 % Drop Place 1 drop into both eyes once daily. 2.5 mL 0    omeprazole (PRILOSEC) 40 MG capsule TAKE 1 CAPSULE BY MOUTH ONCE DAILY. 60 capsule 0    tamsulosin (FLOMAX) 0.4 mg Cap Take 1 capsule (0.4 mg total) by mouth once daily. 30 capsule 11    tiotropium (SPIRIVA) 18 mcg inhalation capsule Inhale 1 capsule (18 mcg total) into the lungs once daily. Controller 30 capsule 2    tiZANidine (ZANAFLEX) 4 MG tablet TAKE 1 TABLET BY MOUTH EVERY 8 HOURS AS NEEDED. 90 tablet 0    traZODone (DESYREL) 100 MG tablet Take 1 tablet (100 mg total) by mouth every evening. 30 tablet 5    venlafaxine (EFFEXOR-XR) 37.5 MG 24 hr capsule Take 1 capsule (37.5 mg total) by mouth once daily. 30 capsule 11    zolpidem (AMBIEN) 10 mg Tab TAKE 1 TABLET BY MOUTH NIGHTLY AS NEEDED. 30 tablet 0     gabapentin (NEURONTIN) 600 MG tablet Take 1 tablet (600 mg total) by mouth 3 (three) times daily. 90 tablet 11     No current facility-administered medications for this visit.        Medications Discontinued During This Encounter   Medication Reason    hydrOXYzine pamoate (VISTARIL) 50 MG Cap Duplicate Order    omeprazole (PRILOSEC) 40 MG capsule Duplicate Order    sucralfate (CARAFATE) 100 mg/mL suspension Patient no longer taking    VITAMIN B-12 1000 MCG tablet Patient no longer taking    gabapentin (NEURONTIN) 300 MG capsule        Follow up in about 6 months (around 7/27/2020).      Yumi Roberson MD

## 2020-01-28 ENCOUNTER — TELEPHONE (OUTPATIENT)
Dept: ENDOSCOPY | Facility: HOSPITAL | Age: 65
End: 2020-01-28

## 2020-01-28 LAB
ESTIMATED AVG GLUCOSE: 114 MG/DL (ref 68–131)
HBA1C MFR BLD HPLC: 5.6 % (ref 4–5.6)

## 2020-01-29 ENCOUNTER — TELEPHONE (OUTPATIENT)
Dept: ENDOSCOPY | Facility: HOSPITAL | Age: 65
End: 2020-01-29

## 2020-01-29 NOTE — TELEPHONE ENCOUNTER

## 2020-02-03 ENCOUNTER — HOSPITAL ENCOUNTER (OUTPATIENT)
Facility: HOSPITAL | Age: 65
Discharge: HOME | End: 2020-02-04
Attending: EMERGENCY MEDICINE | Admitting: FAMILY MEDICINE
Payer: MEDICARE

## 2020-02-03 ENCOUNTER — OFFICE VISIT (OUTPATIENT)
Dept: FAMILY MEDICINE | Facility: CLINIC | Age: 65
End: 2020-02-03
Payer: MEDICARE

## 2020-02-03 VITALS
TEMPERATURE: 98 F | DIASTOLIC BLOOD PRESSURE: 80 MMHG | SYSTOLIC BLOOD PRESSURE: 138 MMHG | HEART RATE: 114 BPM | WEIGHT: 210.31 LBS | OXYGEN SATURATION: 95 % | HEIGHT: 70 IN | BODY MASS INDEX: 30.11 KG/M2 | RESPIRATION RATE: 18 BRPM

## 2020-02-03 DIAGNOSIS — R11.2 NON-INTRACTABLE VOMITING WITH NAUSEA, UNSPECIFIED VOMITING TYPE: ICD-10-CM

## 2020-02-03 DIAGNOSIS — K56.609 SMALL BOWEL OBSTRUCTION: Primary | ICD-10-CM

## 2020-02-03 DIAGNOSIS — N28.9 ACUTE RENAL INSUFFICIENCY: ICD-10-CM

## 2020-02-03 DIAGNOSIS — E86.0 DEHYDRATION: ICD-10-CM

## 2020-02-03 DIAGNOSIS — R14.0 ABDOMINAL DISTENTION: ICD-10-CM

## 2020-02-03 DIAGNOSIS — R11.10 VOMITING, INTRACTABILITY OF VOMITING NOT SPECIFIED, PRESENCE OF NAUSEA NOT SPECIFIED, UNSPECIFIED VOMITING TYPE: ICD-10-CM

## 2020-02-03 DIAGNOSIS — R10.9 ABDOMINAL PAIN: ICD-10-CM

## 2020-02-03 DIAGNOSIS — R19.7 DIARRHEA, UNSPECIFIED TYPE: ICD-10-CM

## 2020-02-03 DIAGNOSIS — K56.7 ILEUS: ICD-10-CM

## 2020-02-03 DIAGNOSIS — R10.84 GENERALIZED ABDOMINAL PAIN: Primary | ICD-10-CM

## 2020-02-03 DIAGNOSIS — E86.1 INTRAVASCULAR VOLUME DEPLETION: ICD-10-CM

## 2020-02-03 LAB
ABO + RH BLD: NORMAL
ALBUMIN SERPL BCP-MCNC: 4.1 G/DL (ref 3.5–5.2)
ALP SERPL-CCNC: 50 U/L (ref 55–135)
ALT SERPL W/O P-5'-P-CCNC: 18 U/L (ref 10–44)
ANION GAP SERPL CALC-SCNC: 12 MMOL/L (ref 8–16)
APTT BLDCRRT: 27 SEC (ref 21–32)
AST SERPL-CCNC: 16 U/L (ref 10–40)
BASOPHILS # BLD AUTO: 0.08 K/UL (ref 0–0.2)
BASOPHILS NFR BLD: 0.7 % (ref 0–1.9)
BILIRUB SERPL-MCNC: 0.4 MG/DL (ref 0.1–1)
BLD GP AB SCN CELLS X3 SERPL QL: NORMAL
BUN SERPL-MCNC: 24 MG/DL (ref 8–23)
CALCIUM SERPL-MCNC: 9.4 MG/DL (ref 8.7–10.5)
CHLORIDE SERPL-SCNC: 103 MMOL/L (ref 95–110)
CO2 SERPL-SCNC: 23 MMOL/L (ref 23–29)
CREAT SERPL-MCNC: 1.7 MG/DL (ref 0.5–1.4)
DIFFERENTIAL METHOD: ABNORMAL
EOSINOPHIL # BLD AUTO: 0.1 K/UL (ref 0–0.5)
EOSINOPHIL NFR BLD: 0.7 % (ref 0–8)
ERYTHROCYTE [DISTWIDTH] IN BLOOD BY AUTOMATED COUNT: 14.1 % (ref 11.5–14.5)
EST. GFR  (AFRICAN AMERICAN): 48 ML/MIN/1.73 M^2
EST. GFR  (NON AFRICAN AMERICAN): 42 ML/MIN/1.73 M^2
GLUCOSE SERPL-MCNC: 110 MG/DL (ref 70–110)
HCT VFR BLD AUTO: 45.2 % (ref 40–54)
HCV AB SERPL QL IA: NEGATIVE
HGB BLD-MCNC: 14.7 G/DL (ref 14–18)
IMM GRANULOCYTES # BLD AUTO: 0.06 K/UL (ref 0–0.04)
IMM GRANULOCYTES NFR BLD AUTO: 0.5 % (ref 0–0.5)
INR PPP: 1 (ref 0.8–1.2)
LIPASE SERPL-CCNC: 12 U/L (ref 4–60)
LYMPHOCYTES # BLD AUTO: 1.1 K/UL (ref 1–4.8)
LYMPHOCYTES NFR BLD: 9 % (ref 18–48)
MCH RBC QN AUTO: 28.3 PG (ref 27–31)
MCHC RBC AUTO-ENTMCNC: 32.5 G/DL (ref 32–36)
MCV RBC AUTO: 87 FL (ref 82–98)
MONOCYTES # BLD AUTO: 0.6 K/UL (ref 0.3–1)
MONOCYTES NFR BLD: 5.3 % (ref 4–15)
NEUTROPHILS # BLD AUTO: 9.9 K/UL (ref 1.8–7.7)
NEUTROPHILS NFR BLD: 83.8 % (ref 38–73)
NRBC BLD-RTO: 0 /100 WBC
PLATELET # BLD AUTO: 293 K/UL (ref 150–350)
PMV BLD AUTO: 8.7 FL (ref 9.2–12.9)
POTASSIUM SERPL-SCNC: 4.8 MMOL/L (ref 3.5–5.1)
PROT SERPL-MCNC: 7.4 G/DL (ref 6–8.4)
PROTHROMBIN TIME: 10.8 SEC (ref 9–12.5)
RBC # BLD AUTO: 5.19 M/UL (ref 4.6–6.2)
SODIUM SERPL-SCNC: 138 MMOL/L (ref 136–145)
WBC # BLD AUTO: 11.85 K/UL (ref 3.9–12.7)

## 2020-02-03 PROCEDURE — G0378 HOSPITAL OBSERVATION PER HR: HCPCS | Mod: HCNC

## 2020-02-03 PROCEDURE — 96374 THER/PROPH/DIAG INJ IV PUSH: CPT | Mod: HCNC

## 2020-02-03 PROCEDURE — 99214 PR OFFICE/OUTPT VISIT, EST, LEVL IV, 30-39 MIN: ICD-10-PCS | Mod: HCNC,S$GLB,, | Performed by: FAMILY MEDICINE

## 2020-02-03 PROCEDURE — 99999 PR PBB SHADOW E&M-EST. PATIENT-LVL III: ICD-10-PCS | Mod: PBBFAC,HCNC,, | Performed by: FAMILY MEDICINE

## 2020-02-03 PROCEDURE — 85730 THROMBOPLASTIN TIME PARTIAL: CPT | Mod: HCNC

## 2020-02-03 PROCEDURE — 3075F SYST BP GE 130 - 139MM HG: CPT | Mod: HCNC,CPTII,S$GLB, | Performed by: FAMILY MEDICINE

## 2020-02-03 PROCEDURE — 25000003 PHARM REV CODE 250: Mod: HCNC | Performed by: NURSE PRACTITIONER

## 2020-02-03 PROCEDURE — 96361 HYDRATE IV INFUSION ADD-ON: CPT | Mod: HCNC

## 2020-02-03 PROCEDURE — 63600175 PHARM REV CODE 636 W HCPCS: Mod: HCNC | Performed by: FAMILY MEDICINE

## 2020-02-03 PROCEDURE — 3008F PR BODY MASS INDEX (BMI) DOCUMENTED: ICD-10-PCS | Mod: HCNC,CPTII,S$GLB, | Performed by: FAMILY MEDICINE

## 2020-02-03 PROCEDURE — 83690 ASSAY OF LIPASE: CPT | Mod: HCNC

## 2020-02-03 PROCEDURE — 96375 TX/PRO/DX INJ NEW DRUG ADDON: CPT | Mod: HCNC

## 2020-02-03 PROCEDURE — 3075F PR MOST RECENT SYSTOLIC BLOOD PRESS GE 130-139MM HG: ICD-10-PCS | Mod: HCNC,CPTII,S$GLB, | Performed by: FAMILY MEDICINE

## 2020-02-03 PROCEDURE — 63600175 PHARM REV CODE 636 W HCPCS: Mod: HCNC | Performed by: EMERGENCY MEDICINE

## 2020-02-03 PROCEDURE — 80053 COMPREHEN METABOLIC PANEL: CPT | Mod: HCNC

## 2020-02-03 PROCEDURE — 86803 HEPATITIS C AB TEST: CPT | Mod: HCNC

## 2020-02-03 PROCEDURE — 86850 RBC ANTIBODY SCREEN: CPT | Mod: HCNC

## 2020-02-03 PROCEDURE — 85025 COMPLETE CBC W/AUTO DIFF WBC: CPT | Mod: HCNC

## 2020-02-03 PROCEDURE — 25500020 PHARM REV CODE 255: Mod: HCNC | Performed by: EMERGENCY MEDICINE

## 2020-02-03 PROCEDURE — 99214 OFFICE O/P EST MOD 30 MIN: CPT | Mod: HCNC,S$GLB,, | Performed by: FAMILY MEDICINE

## 2020-02-03 PROCEDURE — 1101F PR PT FALLS ASSESS DOC 0-1 FALLS W/OUT INJ PAST YR: ICD-10-PCS | Mod: HCNC,CPTII,S$GLB, | Performed by: FAMILY MEDICINE

## 2020-02-03 PROCEDURE — 3008F BODY MASS INDEX DOCD: CPT | Mod: HCNC,CPTII,S$GLB, | Performed by: FAMILY MEDICINE

## 2020-02-03 PROCEDURE — 1101F PT FALLS ASSESS-DOCD LE1/YR: CPT | Mod: HCNC,CPTII,S$GLB, | Performed by: FAMILY MEDICINE

## 2020-02-03 PROCEDURE — 99285 EMERGENCY DEPT VISIT HI MDM: CPT | Mod: 25,HCNC

## 2020-02-03 PROCEDURE — 85610 PROTHROMBIN TIME: CPT | Mod: HCNC

## 2020-02-03 PROCEDURE — 3079F PR MOST RECENT DIASTOLIC BLOOD PRESSURE 80-89 MM HG: ICD-10-PCS | Mod: HCNC,CPTII,S$GLB, | Performed by: FAMILY MEDICINE

## 2020-02-03 PROCEDURE — 3079F DIAST BP 80-89 MM HG: CPT | Mod: HCNC,CPTII,S$GLB, | Performed by: FAMILY MEDICINE

## 2020-02-03 PROCEDURE — 36415 COLL VENOUS BLD VENIPUNCTURE: CPT | Mod: HCNC

## 2020-02-03 PROCEDURE — 96375 TX/PRO/DX INJ NEW DRUG ADDON: CPT | Performed by: EMERGENCY MEDICINE

## 2020-02-03 PROCEDURE — 99999 PR PBB SHADOW E&M-EST. PATIENT-LVL III: CPT | Mod: PBBFAC,HCNC,, | Performed by: FAMILY MEDICINE

## 2020-02-03 RX ORDER — HYDRALAZINE HYDROCHLORIDE 20 MG/ML
10 INJECTION INTRAMUSCULAR; INTRAVENOUS EVERY 6 HOURS PRN
Status: DISCONTINUED | OUTPATIENT
Start: 2020-02-03 | End: 2020-02-04 | Stop reason: HOSPADM

## 2020-02-03 RX ORDER — MORPHINE SULFATE 4 MG/ML
4 INJECTION, SOLUTION INTRAMUSCULAR; INTRAVENOUS
Status: COMPLETED | OUTPATIENT
Start: 2020-02-03 | End: 2020-02-03

## 2020-02-03 RX ORDER — ARFORMOTEROL TARTRATE 15 UG/2ML
15 SOLUTION RESPIRATORY (INHALATION) 2 TIMES DAILY
Status: DISCONTINUED | OUTPATIENT
Start: 2020-02-04 | End: 2020-02-04 | Stop reason: HOSPADM

## 2020-02-03 RX ORDER — MORPHINE SULFATE 2 MG/ML
2 INJECTION, SOLUTION INTRAMUSCULAR; INTRAVENOUS EVERY 4 HOURS PRN
Status: DISCONTINUED | OUTPATIENT
Start: 2020-02-03 | End: 2020-02-04 | Stop reason: HOSPADM

## 2020-02-03 RX ORDER — ONDANSETRON 2 MG/ML
4 INJECTION INTRAMUSCULAR; INTRAVENOUS
Status: DISCONTINUED | OUTPATIENT
Start: 2020-02-03 | End: 2020-02-03

## 2020-02-03 RX ORDER — FLUTICASONE PROPIONATE AND SALMETEROL XINAFOATE 115; 21 UG/1; UG/1
2 AEROSOL, METERED RESPIRATORY (INHALATION) 2 TIMES DAILY
Status: DISCONTINUED | OUTPATIENT
Start: 2020-02-04 | End: 2020-02-03

## 2020-02-03 RX ORDER — SODIUM CHLORIDE 9 MG/ML
INJECTION, SOLUTION INTRAVENOUS CONTINUOUS
Status: DISCONTINUED | OUTPATIENT
Start: 2020-02-04 | End: 2020-02-04 | Stop reason: HOSPADM

## 2020-02-03 RX ORDER — BUDESONIDE 0.5 MG/2ML
0.5 INHALANT ORAL EVERY 12 HOURS
Status: DISCONTINUED | OUTPATIENT
Start: 2020-02-04 | End: 2020-02-04 | Stop reason: HOSPADM

## 2020-02-03 RX ORDER — ONDANSETRON 2 MG/ML
4 INJECTION INTRAMUSCULAR; INTRAVENOUS EVERY 6 HOURS PRN
Status: DISCONTINUED | OUTPATIENT
Start: 2020-02-03 | End: 2020-02-04 | Stop reason: HOSPADM

## 2020-02-03 RX ORDER — FAMOTIDINE 20 MG/50ML
20 INJECTION, SOLUTION INTRAVENOUS 2 TIMES DAILY
Status: DISCONTINUED | OUTPATIENT
Start: 2020-02-04 | End: 2020-02-04 | Stop reason: HOSPADM

## 2020-02-03 RX ORDER — ONDANSETRON 2 MG/ML
4 INJECTION INTRAMUSCULAR; INTRAVENOUS
Status: COMPLETED | OUTPATIENT
Start: 2020-02-03 | End: 2020-02-03

## 2020-02-03 RX ORDER — METOPROLOL TARTRATE 1 MG/ML
2.5 INJECTION, SOLUTION INTRAVENOUS EVERY 6 HOURS
Status: DISCONTINUED | OUTPATIENT
Start: 2020-02-03 | End: 2020-02-04 | Stop reason: HOSPADM

## 2020-02-03 RX ADMIN — MORPHINE SULFATE 4 MG: 4 INJECTION INTRAVENOUS at 06:02

## 2020-02-03 RX ADMIN — SODIUM CHLORIDE: 0.9 INJECTION, SOLUTION INTRAVENOUS at 11:02

## 2020-02-03 RX ADMIN — PROMETHAZINE HYDROCHLORIDE 6.25 MG: 25 INJECTION INTRAMUSCULAR; INTRAVENOUS at 10:02

## 2020-02-03 RX ADMIN — SODIUM CHLORIDE, SODIUM LACTATE, POTASSIUM CHLORIDE, AND CALCIUM CHLORIDE 2000 ML: 600; 310; 30; 20 INJECTION, SOLUTION INTRAVENOUS at 06:02

## 2020-02-03 RX ADMIN — METOPROLOL TARTRATE 2.5 MG: 5 INJECTION INTRAVENOUS at 11:02

## 2020-02-03 RX ADMIN — ONDANSETRON 4 MG: 2 INJECTION INTRAMUSCULAR; INTRAVENOUS at 06:02

## 2020-02-03 RX ADMIN — IOHEXOL 30 ML: 350 INJECTION, SOLUTION INTRAVENOUS at 06:02

## 2020-02-03 RX ADMIN — MORPHINE SULFATE 2 MG: 2 INJECTION, SOLUTION INTRAMUSCULAR; INTRAVENOUS at 10:02

## 2020-02-03 NOTE — PROGRESS NOTES
Chief Complaint:    Chief Complaint   Patient presents with    Abdominal Pain       History of Present Illness:  Presents today with a three-day history of abdominal pain and distension dark brown stools several times a day and vomiting several times a day.  Denies any fever.      ROS:  Review of Systems   Constitutional: Negative for activity change, chills, fatigue, fever and unexpected weight change.   HENT: Negative for congestion, ear discharge, ear pain, hearing loss, postnasal drip and rhinorrhea.    Eyes: Negative for pain and visual disturbance.   Respiratory: Negative for cough, chest tightness and shortness of breath.    Cardiovascular: Negative for chest pain and palpitations.   Gastrointestinal: Positive for abdominal pain, diarrhea and vomiting.   Endocrine: Negative for heat intolerance.   Genitourinary: Negative for dysuria, flank pain, frequency and hematuria.   Musculoskeletal: Negative for back pain, gait problem and neck pain.   Skin: Negative for color change and rash.   Neurological: Negative for dizziness, tremors, seizures, numbness and headaches.   Psychiatric/Behavioral: Negative for agitation, hallucinations, self-injury, sleep disturbance and suicidal ideas. The patient is not nervous/anxious.        Past Medical History:   Diagnosis Date    Anxiety     Cancer     spot on kidney and lung    Diabetes mellitus     Borderline    Diverticulitis     GERD (gastroesophageal reflux disease)     Hyperlipidemia     not on any meds currently    Hypertension     Lung mass     Mass of kidney        Social History:  Social History     Socioeconomic History    Marital status:      Spouse name: Not on file    Number of children: Not on file    Years of education: Not on file    Highest education level: Not on file   Occupational History    Not on file   Social Needs    Financial resource strain: Not on file    Food insecurity:     Worry: Not on file     Inability: Not on file  "   Transportation needs:     Medical: Not on file     Non-medical: Not on file   Tobacco Use    Smoking status: Former Smoker     Packs/day: 1.00     Years: 20.00     Pack years: 20.00     Last attempt to quit: 1995     Years since quittin.0    Smokeless tobacco: Never Used   Substance and Sexual Activity    Alcohol use: No     Alcohol/week: 0.0 standard drinks    Drug use: No    Sexual activity: Yes     Partners: Female     Birth control/protection: None   Lifestyle    Physical activity:     Days per week: Not on file     Minutes per session: Not on file    Stress: Not at all   Relationships    Social connections:     Talks on phone: Not on file     Gets together: Not on file     Attends Sabianism service: Not on file     Active member of club or organization: Not on file     Attends meetings of clubs or organizations: Not on file     Relationship status: Not on file   Other Topics Concern    Not on file   Social History Narrative    Not on file       Family History:   family history includes Cancer in his sister; Hypertension in his father and mother; Melanoma in his father.    Health Maintenance   Topic Date Due    Pneumococcal Vaccine (65+ Low/Medium Risk) (2 of 2 - PPSV23) 2020    Abdominal Aortic Aneurysm Screening  2020    Colonoscopy  2020    Lipid Panel  2021    Aspirin/Antiplatelet Therapy  2021    TETANUS VACCINE  2028    Hepatitis C Screening  Completed       Physical Exam:    Vital Signs  Temp: 98.1 °F (36.7 °C)  Temp src: Temporal  Pulse: (!) 114  Resp: 18  SpO2: 95 %  BP: 138/80  BP Location: Left arm  Patient Position: Sitting  Pain Score:   8  Pain Loc: Abdomen  Height and Weight  Height: 5' 10" (177.8 cm)  Weight: 95.4 kg (210 lb 5.1 oz)  BSA (Calculated - sq m): 2.17 sq meters  BMI (Calculated): 30.2  Weight in (lb) to have BMI = 25: 173.9]    Body mass index is 30.18 kg/m².    Physical Exam   Constitutional: He is oriented to person, " place, and time. He appears well-developed.   HENT:   Mouth/Throat: Oropharynx is clear and moist.   Eyes: Pupils are equal, round, and reactive to light. Conjunctivae are normal.   Neck: Normal range of motion. Neck supple.   Cardiovascular: Regular rhythm and normal heart sounds. Tachycardia present.   No murmur heard.  Pulmonary/Chest: Effort normal and breath sounds normal. No respiratory distress. He has no wheezes. He has no rales. He exhibits no tenderness.   Abdominal: Soft. He exhibits distension. He exhibits no mass. Bowel sounds are decreased. There is tenderness. There is guarding.   Musculoskeletal: He exhibits no edema or tenderness.   Lymphadenopathy:     He has no cervical adenopathy.   Neurological: He is alert and oriented to person, place, and time. He has normal reflexes.   Skin: Skin is warm and dry.   Psychiatric: He has a normal mood and affect. His behavior is normal. Judgment and thought content normal.         Assessment:      ICD-10-CM ICD-9-CM   1. Generalized abdominal pain R10.84 789.07   2. Vomiting, intractability of vomiting not specified, presence of nausea not specified, unspecified vomiting type R11.10 787.03   3. Diarrhea, unspecified type R19.7 787.91   4. Dehydration E86.0 276.51         Plan:         Rule out small-bowel obstruction versus gastroenteritis and dehydration.  Will recommend ED evaluation and treatment patient understood he will take himself to [facility] ED      No orders of the defined types were placed in this encounter.      No current facility-administered medications for this visit.      Current Outpatient Medications   Medication Sig Dispense Refill    amLODIPine (NORVASC) 10 MG tablet TAKE 1 TABLET BY MOUTH ONCE DAILY. 90 tablet 0    aspirin 81 mg Tab Take 1 tablet by mouth Daily.      benazepril (LOTENSIN) 20 MG tablet TAKE 2 TABLETS BY MOUTH ONCE A DAY. 180 tablet 0    citalopram (CELEXA) 40 MG tablet Take 40 mg by mouth once daily.      clonazePAM  (KLONOPIN) 1 MG tablet Take 1 mg by mouth once daily.       finasteride (PROSCAR) 5 mg tablet Take 1 tablet (5 mg total) by mouth once daily. 30 tablet 11    fluticasone-salmeterol (ADVAIR HFA) 115-21 mcg/actuation HFAA Inhale 2 puffs into the lungs 2 (two) times daily. 12 g 12    gabapentin (NEURONTIN) 600 MG tablet Take 1 tablet (600 mg total) by mouth 3 (three) times daily. 90 tablet 11    hydroCHLOROthiazide (MICROZIDE) 12.5 mg capsule TAKE 1 CAPSULE BY MOUTH ONCE DAILY. 90 capsule 0    hydrOXYzine pamoate (VISTARIL) 25 MG Cap Take 25 mg by mouth.      metoprolol tartrate (LOPRESSOR) 50 MG tablet TAKE 1 TABLET BY MOUTH TWICE A DAY. 180 tablet 0    omeprazole (PRILOSEC) 40 MG capsule TAKE 1 CAPSULE BY MOUTH ONCE DAILY. 60 capsule 0    tamsulosin (FLOMAX) 0.4 mg Cap Take 1 capsule (0.4 mg total) by mouth once daily. 30 capsule 11    tiotropium (SPIRIVA) 18 mcg inhalation capsule Inhale 1 capsule (18 mcg total) into the lungs once daily. Controller 30 capsule 2    traZODone (DESYREL) 100 MG tablet Take 1 tablet (100 mg total) by mouth every evening. 30 tablet 5    venlafaxine (EFFEXOR-XR) 37.5 MG 24 hr capsule Take 1 capsule (37.5 mg total) by mouth once daily. 30 capsule 11    zolpidem (AMBIEN) 10 mg Tab TAKE 1 TABLET BY MOUTH NIGHTLY AS NEEDED. 30 tablet 0    hydrALAZINE (APRESOLINE) 25 MG tablet Take 1 tablet (25 mg total) by mouth 3 (three) times daily. (Patient taking differently: Take 25 mg by mouth every 12 (twelve) hours. ) 90 tablet 11    olopatadine (PATADAY) 0.2 % Drop Place 1 drop into both eyes once daily. (Patient not taking: Reported on 2/3/2020) 2.5 mL 0    tiZANidine (ZANAFLEX) 4 MG tablet TAKE 1 TABLET BY MOUTH EVERY 8 HOURS AS NEEDED. (Patient not taking: Reported on 2/3/2020) 90 tablet 0     Facility-Administered Medications Ordered in Other Visits   Medication Dose Route Frequency Provider Last Rate Last Dose    ondansetron injection 4 mg  4 mg Intravenous ED 1 Time Kevin CROWDER  PHYLLIS Womack Jr.        sodium chloride 0.9% bolus 1,000 mL  1,000 mL Intravenous ED 1 Time PHYLLIS Cook Jr.           There are no discontinued medications.    No follow-ups on file.      Yumi Roberson MD

## 2020-02-03 NOTE — ED PROVIDER NOTES
2/3/2020, 5:11 PM   History obtained from the patient      History of Present Illness: Mil Sood is a 65 y.o. male patient who presents to the Emergency Department for abdominal pain and NVD which onset gradually 2 days ago.    5:12 PM: Pt understands they will be seen and dispositioned by the next available physician. Pt voices understanding and agrees with plan. Pt also understands the longer than normal wait time. I am removing myself from the care of pt and pt will now be assigned to the next available physician.     VIDAL Womack FN-C        SCRIBE #1 NOTE: I, Sánchez Monroy, am scribing for, and in the presence of, Radha Ruiz MD. I have scribed the entire note.       History     Chief Complaint   Patient presents with    Abdominal Pain     generalized abdominal pain beginning on Saturday; abd distention noted     Review of patient's allergies indicates:   Allergen Reactions    Crestor [rosuvastatin]      Joint pain          History of Present Illness     HPI    2/3/2020, 5:20 PM  History obtained from the patient      History of Present Illness: Mil Sood is a 65 y.o. male patient with a h/o cholecystectomy who presents to the Emergency Department for evaluation of diffuse abdominal pain which onset 2 days ago. Symptoms are constant and moderate in severity. No mitigating or exacerbating factors reported. Associated sxs include abdominal distention and  n/v/d. Patient denies any fever, constipation, blood in stool, dysuria, hematuria, and all other sxs at this time. No prior Tx reported. No further complaints or concerns at this time. Patient reports family history of SBO (mother). Patient reports nightly Aleve PM use. Patient reports dark/black emesis. Patient states he is unable to pass gas since onset of sx.      Arrival mode: Personal transportation    PCP: Yumi Roberson MD      Past Medical History:  Past Medical History:   Diagnosis Date    Anxiety     Cancer     spot on kidney and  lung    Diabetes mellitus     Borderline    Diverticulitis     GERD (gastroesophageal reflux disease)     Hyperlipidemia     not on any meds currently    Hypertension     Lung mass     Mass of kidney        Past Surgical History:  Past Surgical History:   Procedure Laterality Date    CHOLECYSTECTOMY      COLONOSCOPY      COLONOSCOPY N/A 2017    Procedure: COLONOSCOPY;  Surgeon: Amarilys Bravo MD;  Location: [facility];  Service: Endoscopy;  Laterality: N/A;    ESOPHAGOGASTRODUODENOSCOPY      HERNIA REPAIR Bilateral     groin    NECK SURGERY      PARTIAL NEPHRECTOMY      left    RENAL BIOPSY      TONSILLECTOMY      TRIGGER FINGER RELEASE Right     long finger         Family History:  Family History   Problem Relation Age of Onset    Hypertension Mother     Hypertension Father     Melanoma Father     Cancer Sister        Social History:   Social History     Tobacco Use    Smoking status: Former Smoker     Packs/day: 1.00     Years: 20.00     Pack years: 20.00     Last attempt to quit: 1995     Years since quittin.0    Smokeless tobacco: Never Used   Substance and Sexual Activity    Alcohol use: No     Alcohol/week: 0.0 standard drinks    Drug use: No    Sexual activity: Yes     Partners: Female     Birth control/protection: None        Review of Systems     Review of Systems   Constitutional: Negative for fever.   HENT: Negative for sore throat.    Respiratory: Negative for shortness of breath.    Cardiovascular: Negative for chest pain.   Gastrointestinal: Positive for abdominal distention, abdominal pain, diarrhea, nausea and vomiting. Negative for blood in stool and constipation.   Genitourinary: Negative for dysuria and hematuria.   Musculoskeletal: Negative for back pain.   Skin: Negative for rash.   Neurological: Negative for weakness.   Hematological: Does not bruise/bleed easily.   All other systems reviewed and are negative.       Physical Exam     Initial Vitals  [02/03/20 1648]   BP Pulse Resp Temp SpO2   137/78 (!) 118 20 98 °F (36.7 °C) 96 %      MAP       --          Physical Exam  Nursing Notes and Vital Signs Reviewed.  Constitutional: Well-developed and well-nourished. NAD  Head: Atraumatic. Normocephalic.  Eyes: PERRL. EOM intact. Conjunctivae are not pale. No scleral icterus.  ENT: Mucous membranes are moist. Oropharynx is clear and symmetric.    Neck: Supple. Full ROM. No lymphadenopathy.  Cardiovascular: Regular rate. Regular rhythm. No murmurs, rubs, or gallops. Distal pulses are 2+ and symmetric.  Pulmonary/Chest: No respiratory distress. Clear to auscultation bilaterally. No wheezing or rales.  Abdominal: Diffusely tender and distended.  No rebound, guarding, or rigidity. Diminished bowel sounds.  Genitourinary: No CVA tenderness  Musculoskeletal: Moves all extremities. No obvious deformities. No calf tenderness.  Skin: Warm and dry.  Neurological:  Alert, awake, and appropriate.  Normal speech.  No acute focal neurological deficits are appreciated.  Psychiatric: Normal affect. Good eye contact. Appropriate in content.     ED Course   Procedures  ED Vital Signs:  Vitals:    02/03/20 1810 02/03/20 1815 02/03/20 1830 02/03/20 1845   BP: (!) 149/82 (!) 141/81 (!) 144/85 (!) 151/87   Pulse: 97 92 92 88   Resp:       Temp:       TempSrc:       SpO2: 95% 95% 96% (!) 94%   Weight:       Height:        02/03/20 1902 02/03/20 1930 02/03/20 2000 02/03/20 2012   BP: (!) 157/89  (!) 179/71 (!) 152/80   Pulse: 94 92 90 95   Resp: 20 18 18 19   Temp:       TempSrc:       SpO2: 95% 96% 97% 96%   Weight:       Height:        02/03/20 2031 02/03/20 2046 02/03/20 2120 02/03/20 2343   BP: (!) 157/90 (!) 145/85 (!) 168/103 129/71   Pulse: 89 92 95 83   Resp: 19 18 18    Temp:   97.7 °F (36.5 °C)    TempSrc:   Oral    SpO2: 96% 96% 95% 96%   Weight:       Height:        02/03/20 2345 02/03/20 2346 02/03/20 2352   BP:      Pulse: 89 94    Resp:   18   Temp:   98.2 °F (36.8 °C)    TempSrc:   Oral   SpO2: 95% 95%    Weight:      Height:          Abnormal Lab Results:  Labs Reviewed   CBC W/ AUTO DIFFERENTIAL - Abnormal; Notable for the following components:       Result Value    MPV 8.7 (*)     Gran # (ANC) 9.9 (*)     Immature Grans (Abs) 0.06 (*)     Gran% 83.8 (*)     Lymph% 9.0 (*)     All other components within normal limits   COMPREHENSIVE METABOLIC PANEL - Abnormal; Notable for the following components:    BUN, Bld 24 (*)     Creatinine 1.7 (*)     Alkaline Phosphatase 50 (*)     eGFR if  48 (*)     eGFR if non  42 (*)     All other components within normal limits   HEPATITIS C ANTIBODY   PROTIME-INR   APTT   LIPASE   TYPE & SCREEN        All Lab Results:  Results for orders placed or performed during the hospital encounter of 02/03/20   Hepatitis C antibody   Result Value Ref Range    Hepatitis C Ab Negative Negative   CBC auto differential   Result Value Ref Range    WBC 11.85 3.90 - 12.70 K/uL    RBC 5.19 4.60 - 6.20 M/uL    Hemoglobin 14.7 14.0 - 18.0 g/dL    Hematocrit 45.2 40.0 - 54.0 %    Mean Corpuscular Volume 87 82 - 98 fL    Mean Corpuscular Hemoglobin 28.3 27.0 - 31.0 pg    Mean Corpuscular Hemoglobin Conc 32.5 32.0 - 36.0 g/dL    RDW 14.1 11.5 - 14.5 %    Platelets 293 150 - 350 K/uL    MPV 8.7 (L) 9.2 - 12.9 fL    Immature Granulocytes 0.5 0.0 - 0.5 %    Gran # (ANC) 9.9 (H) 1.8 - 7.7 K/uL    Immature Grans (Abs) 0.06 (H) 0.00 - 0.04 K/uL    Lymph # 1.1 1.0 - 4.8 K/uL    Mono # 0.6 0.3 - 1.0 K/uL    Eos # 0.1 0.0 - 0.5 K/uL    Baso # 0.08 0.00 - 0.20 K/uL    nRBC 0 0 /100 WBC    Gran% 83.8 (H) 38.0 - 73.0 %    Lymph% 9.0 (L) 18.0 - 48.0 %    Mono% 5.3 4.0 - 15.0 %    Eosinophil% 0.7 0.0 - 8.0 %    Basophil% 0.7 0.0 - 1.9 %    Differential Method Automated    Comprehensive metabolic panel   Result Value Ref Range    Sodium 138 136 - 145 mmol/L    Potassium 4.8 3.5 - 5.1 mmol/L    Chloride 103 95 - 110 mmol/L    CO2 23 23 - 29 mmol/L     Glucose 110 70 - 110 mg/dL    BUN, Bld 24 (H) 8 - 23 mg/dL    Creatinine 1.7 (H) 0.5 - 1.4 mg/dL    Calcium 9.4 8.7 - 10.5 mg/dL    Total Protein 7.4 6.0 - 8.4 g/dL    Albumin 4.1 3.5 - 5.2 g/dL    Total Bilirubin 0.4 0.1 - 1.0 mg/dL    Alkaline Phosphatase 50 (L) 55 - 135 U/L    AST 16 10 - 40 U/L    ALT 18 10 - 44 U/L    Anion Gap 12 8 - 16 mmol/L    eGFR if African American 48 (A) >60 mL/min/1.73 m^2    eGFR if non African American 42 (A) >60 mL/min/1.73 m^2   Protime-INR   Result Value Ref Range    Prothrombin Time 10.8 9.0 - 12.5 sec    INR 1.0 0.8 - 1.2   APTT   Result Value Ref Range    aPTT 27.0 21.0 - 32.0 sec   Lipase   Result Value Ref Range    Lipase 12 4 - 60 U/L   Type & Screen   Result Value Ref Range    Group & Rh A NEG     Indirect Burt NEG          Imaging Results          CT Abdomen Pelvis  Without Contrast (Final result)  Result time 02/03/20 20:07:14   Procedure changed from CT Abdomen Pelvis With Contrast     Final result by Kane Sheffield MD (02/03/20 20:07:14)                 Impression:      Mild small bowel dilatation with normal caliber distal small bowel.  Possible low-grade partial small bowel obstruction versus ileus.    Diverticulosis.    Status post cholecystectomy.  Otherwise see above.    All CT scans at this facility are performed  using dose modulation techniques as appropriate to performed exam including the following:  automated exposure control; adjustment of mA and/or kV according to the patients size (this includes techniques or standardized protocols for targeted exams where dose is matched to indication/reason for exam: i.e. extremities or head);  iterative reconstruction technique.      Electronically signed by: Kane Sheffield MD  Date:    02/03/2020  Time:    20:07             Narrative:    EXAMINATION:  CT ABDOMEN PELVIS WITHOUT CONTRAST    CLINICAL HISTORY:  Abdominal pain with abnormal x-ray., Bowel obstruction, high-grade;    TECHNIQUE:  Limited noncontrast  CT scan of the abdomen and pelvis.    COMPARISON:  Abdominal x-ray 02/03/2020 and abdominal CT 10/15/2019.    FINDINGS:  Minor dependent atelectasis is noted.  Cardiac size is normal with evidence of coronary artery calcification.    The liver is nonenlarged.  The gallbladder is absent with clips in the gallbladder fossa.    The spleen is nonenlarged.  Small low-density lesion with punctate calcification is again noted.    The right kidney demonstrates a fat containing exophytic lesion of the upper pole.    Somewhat similar fat containing lesion of the lower pole of the left kidney is noted as well.  No hydronephrosis is seen at this time.    Fatty infiltration of the pancreas is noted.  Small-bowel loops are mildly dilated with air-fluid levels.  The distal small bowel is normal in caliber.  Fluid filled large bowel is noted.  Several diverticula of the left colon with numerous diverticula of the sigmoid colon are noted.    Possible low-grade distal small bowel obstruction versus ileus.  The appendix appears normal.    No free fluid is present.    The prostate gland is mildly enlarged.                               X-Ray Abdomen Flat And Erect (Final result)  Result time 02/03/20 17:36:24    Final result by Kevin Geiger MD (02/03/20 17:36:24)                 Impression:      Dilated small bowel loops measuring up to 4.6 cm are seen within the upper and mid abdomen with fluid levels concerning for partial small bowel obstruction.  Consider follow-up CT with oral and IV contrast.      Electronically signed by: Kevin Geiger MD  Date:    02/03/2020  Time:    17:36             Narrative:    EXAMINATION:  XR ABDOMEN FLAT AND ERECT    CLINICAL HISTORY:  Unspecified abdominal pain    TECHNIQUE:  Single frontal view of the chest was performed.    COMPARISON:  10/15/2019    FINDINGS:  Dilated small bowel loops measuring up to 4.6 cm are seen within the upper and mid abdomen with fluid levels concerning for partial small  bowel obstruction.  Distal small bowel loops are relatively decompressed.  Small amount of air and feces is noted within the colon.  No evidence of free air.  No pneumatosis.  Lung bases are clear.  Post cholecystectomy.  Bones demonstrate degenerative changes.                                 The Emergency Provider reviewed the vital signs and test results, which are outlined above.     ED Discussion       8:15 PM: Discussed case with Candy Marc NP ([facility]). Dr. Mckeon agrees with current care and management of pt and accepts admission.   Admitting Service: [facility]  Admit to: obs / med surg    Re-evaluated pt. I have discussed test results, shared treatment plan, and the need for admission with patient and family at bedside. Pt and family express understanding at this time and agree with all information. All questions answered. Pt and family have no further questions or concerns at this time. Pt is ready for admit.       MDM        Medical Decision Making:   Clinical Tests:   Lab Tests: Ordered and Reviewed  Radiological Study: Reviewed and Ordered           ED Medication(s):  Medications   morphine injection 2 mg (2 mg Intravenous Given 2/3/20 2257)   ondansetron injection 4 mg (has no administration in time range)   promethazine (PHENERGAN) 6.25 mg in dextrose 5 % 50 mL IVPB (6.25 mg Intravenous New Bag 2/3/20 2258)   hydrALAZINE injection 10 mg (has no administration in time range)   0.9%  NaCl infusion ( Intravenous New Bag 2/3/20 2303)   famotidine IVPB 20 mg (has no administration in time range)   metoprolol injection 2.5 mg (2.5 mg Intravenous Given 2/3/20 2343)   budesonide nebulizer solution 0.5 mg (has no administration in time range)   arformoteroL nebulizer solution 15 mcg (has no administration in time range)   lactated ringers bolus 2,000 mL (0 mLs Intravenous Stopped 2/3/20 2038)   ondansetron injection 4 mg (4 mg Intravenous Given 2/3/20 1804)   morphine injection 4 mg (4 mg  Intravenous Given 2/3/20 1804)   iohexol (OMNIPAQUE 350) injection 30 mL (30 mLs Oral Given 2/3/20 1830)       Current Discharge Medication List                  Scribe Attestation:   Scribe #1: I performed the above scribed service and the documentation accurately describes the services I performed. I attest to the accuracy of the note.     Attending:   Physician Attestation Statement for Scribe #1: I, Radha Ruiz MD, personally performed the services described in this documentation, as scribed by Sánchez Monroy, in my presence, and it is both accurate and complete.           Clinical Impression       ICD-10-CM ICD-9-CM   1. Ileus K56.7 560.1   2. Abdominal pain R10.9 789.00   3. Non-intractable vomiting with nausea, unspecified vomiting type R11.2 787.01   4. Abdominal distention R14.0 787.3   5. Acute renal insufficiency N28.9 593.9   6. Intravascular volume depletion E86.1 276.52       Disposition:   Disposition: Placed in Observation  Condition: Fair         Radha Ruiz MD  02/04/20 0101

## 2020-02-04 VITALS
HEIGHT: 71 IN | TEMPERATURE: 98 F | OXYGEN SATURATION: 94 % | BODY MASS INDEX: 29.52 KG/M2 | SYSTOLIC BLOOD PRESSURE: 159 MMHG | WEIGHT: 210.88 LBS | RESPIRATION RATE: 16 BRPM | HEART RATE: 82 BPM | DIASTOLIC BLOOD PRESSURE: 98 MMHG

## 2020-02-04 PROBLEM — N17.9 AKI (ACUTE KIDNEY INJURY): Status: ACTIVE | Noted: 2020-02-04

## 2020-02-04 LAB
ALBUMIN SERPL BCP-MCNC: 3.7 G/DL (ref 3.5–5.2)
ALP SERPL-CCNC: 45 U/L (ref 55–135)
ALT SERPL W/O P-5'-P-CCNC: 15 U/L (ref 10–44)
ANION GAP SERPL CALC-SCNC: 8 MMOL/L (ref 8–16)
AST SERPL-CCNC: 15 U/L (ref 10–40)
BASOPHILS # BLD AUTO: 0.08 K/UL (ref 0–0.2)
BASOPHILS NFR BLD: 0.9 % (ref 0–1.9)
BILIRUB SERPL-MCNC: 0.6 MG/DL (ref 0.1–1)
BUN SERPL-MCNC: 16 MG/DL (ref 8–23)
CALCIUM SERPL-MCNC: 9.1 MG/DL (ref 8.7–10.5)
CHLORIDE SERPL-SCNC: 105 MMOL/L (ref 95–110)
CO2 SERPL-SCNC: 26 MMOL/L (ref 23–29)
CREAT SERPL-MCNC: 1.2 MG/DL (ref 0.5–1.4)
DIFFERENTIAL METHOD: ABNORMAL
EOSINOPHIL # BLD AUTO: 0.5 K/UL (ref 0–0.5)
EOSINOPHIL NFR BLD: 5.7 % (ref 0–8)
ERYTHROCYTE [DISTWIDTH] IN BLOOD BY AUTOMATED COUNT: 14.2 % (ref 11.5–14.5)
EST. GFR  (AFRICAN AMERICAN): >60 ML/MIN/1.73 M^2
EST. GFR  (NON AFRICAN AMERICAN): >60 ML/MIN/1.73 M^2
GLUCOSE SERPL-MCNC: 90 MG/DL (ref 70–110)
HCT VFR BLD AUTO: 43.3 % (ref 40–54)
HGB BLD-MCNC: 13.5 G/DL (ref 14–18)
IMM GRANULOCYTES # BLD AUTO: 0.03 K/UL (ref 0–0.04)
IMM GRANULOCYTES NFR BLD AUTO: 0.3 % (ref 0–0.5)
LYMPHOCYTES # BLD AUTO: 1.8 K/UL (ref 1–4.8)
LYMPHOCYTES NFR BLD: 19.7 % (ref 18–48)
MAGNESIUM SERPL-MCNC: 1.8 MG/DL (ref 1.6–2.6)
MCH RBC QN AUTO: 27.7 PG (ref 27–31)
MCHC RBC AUTO-ENTMCNC: 31.2 G/DL (ref 32–36)
MCV RBC AUTO: 89 FL (ref 82–98)
MONOCYTES # BLD AUTO: 0.9 K/UL (ref 0.3–1)
MONOCYTES NFR BLD: 10 % (ref 4–15)
NEUTROPHILS # BLD AUTO: 5.9 K/UL (ref 1.8–7.7)
NEUTROPHILS NFR BLD: 63.4 % (ref 38–73)
NRBC BLD-RTO: 0 /100 WBC
PHOSPHATE SERPL-MCNC: 3.4 MG/DL (ref 2.7–4.5)
PLATELET # BLD AUTO: 260 K/UL (ref 150–350)
PMV BLD AUTO: 8.7 FL (ref 9.2–12.9)
POTASSIUM SERPL-SCNC: 4.6 MMOL/L (ref 3.5–5.1)
PROT SERPL-MCNC: 6.5 G/DL (ref 6–8.4)
RBC # BLD AUTO: 4.87 M/UL (ref 4.6–6.2)
SODIUM SERPL-SCNC: 139 MMOL/L (ref 136–145)
WBC # BLD AUTO: 9.24 K/UL (ref 3.9–12.7)

## 2020-02-04 PROCEDURE — 96376 TX/PRO/DX INJ SAME DRUG ADON: CPT | Performed by: EMERGENCY MEDICINE

## 2020-02-04 PROCEDURE — 94640 AIRWAY INHALATION TREATMENT: CPT | Mod: HCNC

## 2020-02-04 PROCEDURE — G0378 HOSPITAL OBSERVATION PER HR: HCPCS | Mod: HCNC

## 2020-02-04 PROCEDURE — 85025 COMPLETE CBC W/AUTO DIFF WBC: CPT | Mod: HCNC

## 2020-02-04 PROCEDURE — 63600175 PHARM REV CODE 636 W HCPCS: Mod: HCNC | Performed by: NURSE PRACTITIONER

## 2020-02-04 PROCEDURE — 63600175 PHARM REV CODE 636 W HCPCS: Mod: HCNC | Performed by: FAMILY MEDICINE

## 2020-02-04 PROCEDURE — 94761 N-INVAS EAR/PLS OXIMETRY MLT: CPT | Mod: HCNC

## 2020-02-04 PROCEDURE — 25000242 PHARM REV CODE 250 ALT 637 W/ HCPCS: Mod: HCNC | Performed by: FAMILY MEDICINE

## 2020-02-04 PROCEDURE — 80053 COMPREHEN METABOLIC PANEL: CPT | Mod: HCNC

## 2020-02-04 PROCEDURE — 36415 COLL VENOUS BLD VENIPUNCTURE: CPT | Mod: HCNC

## 2020-02-04 PROCEDURE — 25000003 PHARM REV CODE 250: Mod: HCNC | Performed by: NURSE PRACTITIONER

## 2020-02-04 PROCEDURE — 96375 TX/PRO/DX INJ NEW DRUG ADDON: CPT | Performed by: EMERGENCY MEDICINE

## 2020-02-04 PROCEDURE — 83735 ASSAY OF MAGNESIUM: CPT | Mod: HCNC

## 2020-02-04 PROCEDURE — S0028 INJECTION, FAMOTIDINE, 20 MG: HCPCS | Mod: HCNC | Performed by: NURSE PRACTITIONER

## 2020-02-04 PROCEDURE — 84100 ASSAY OF PHOSPHORUS: CPT | Mod: HCNC

## 2020-02-04 RX ADMIN — FAMOTIDINE 20 MG: 20 INJECTION, SOLUTION INTRAVENOUS at 08:02

## 2020-02-04 RX ADMIN — METOPROLOL TARTRATE 2.5 MG: 5 INJECTION INTRAVENOUS at 11:02

## 2020-02-04 RX ADMIN — METOPROLOL TARTRATE 2.5 MG: 5 INJECTION INTRAVENOUS at 05:02

## 2020-02-04 RX ADMIN — MORPHINE SULFATE 2 MG: 2 INJECTION, SOLUTION INTRAMUSCULAR; INTRAVENOUS at 03:02

## 2020-02-04 RX ADMIN — BUDESONIDE 0.5 MG: 0.5 SUSPENSION RESPIRATORY (INHALATION) at 07:02

## 2020-02-04 RX ADMIN — ARFORMOTEROL TARTRATE 15 MCG: 15 SOLUTION RESPIRATORY (INHALATION) at 07:02

## 2020-02-04 NOTE — ASSESSMENT & PLAN NOTE
- Will give IV Lopressor 2.5mg Q6 while NPO.  Resume home amlodipine, benazepril, hydralazine, hydrochlorothiazide, and Lopressor once tolerating PO.

## 2020-02-04 NOTE — ASSESSMENT & PLAN NOTE
- Keep NPO.  - IV hydration.  - Antiemetics and analgesics as needed.  NG tube if patient develops uncontrolled ABD pain, N/V.  - Follow-up imaging.

## 2020-02-04 NOTE — PLAN OF CARE
Sw met with patient at bedside to assess for discharge planning.  Patient was alert and oriented.  Patient denied the use of any medical/respiratory assistive equipment and home health services before coming to the hospital.  Patient stated that he has no help at home, because he lives alone, but went on to stated that has has support from his children and grandchildren.  Patient denied the need for any assistive equipment, home health services, and all other community resources at this time.  Patient anticipates discharging with no needs. SW provided a transitional care folder, information on advanced directives, information on pharmacy bedside delivery, and discharge planning begins on admission with contact information for any needs/questions.     D/C Plan:  Home  PCP:  Dr. Yumi Roberson  Preferred Pharmacy:    [facility] [address]  [address]  [place] [address]  Phone: [phone] Fax: [phone]    Discharge transportation:  Self or Family  My [facility]:  Declined  Pharmacy Bedside Delivery:  Yes      02/04/20 1036   Discharge Assessment   Assessment Type Discharge Planning Assessment   Confirmed/corrected address and phone number on facesheet? Yes   Assessment information obtained from? Patient   Expected Length of Stay (days)   (TBD)   Communicated expected length of stay with patient/caregiver yes   Prior to hospitilization cognitive status: Alert/Oriented   Prior to hospitalization functional status: Independent   Current cognitive status: Alert/Oriented   Current Functional Status: Independent   Facility Arrived From: Home   Lives With alone   Able to Return to Prior Arrangements yes   Is patient able to care for self after discharge? Yes   Who are your caregiver(s) and their phone number(s)? Landon Sood (son) [phone] or [phone] or Royal Sood (son) [phone]   Patient's perception of discharge disposition home or selfcare   Readmission Within the Last 30  Days no previous admission in last 30 days   Patient currently being followed by outpatient case management? No   Patient currently receives any other outside agency services? No   Equipment Currently Used at Home none   Do you have any problems affording any of your prescribed medications? No   Is the patient taking medications as prescribed? yes   Does the patient have transportation home? Yes   Transportation Anticipated family or friend will provide   Dialysis Name and Scheduled days N/A   Does the patient receive services at the [facility]? No   Discharge Plan A Home   DME Needed Upon Discharge  none   Patient/Family in Agreement with Plan yes

## 2020-02-04 NOTE — HPI
Mr. Sood is a 65 y.o. male with a PMHx of HTN, HLD, CKD, GERD, chronic back pain, and anxiety.  He presented to the ED with c/o generalized abdominal pain x 2 days.  No aggravating or alleviating factors.  Associated abdominal distention, N/V/D.  Patient reports he has been unable to pass gas since the onset of symptoms.  Denies any constipation, hematemesis, melena, hematochezia, rectal pain, appetite change, CP, SOB, dysuria, hematuria, back pain, HA, lightheadedness, dizziness, fever or chills.  Work-up in the ED resulted normal WBC, creatinine 1.7, BUN 24, stable LFTs.  CT of the ABD/pelvis showed mild small bowel dilatation with normal caliber distal small bowel, consistent with possible low-grade partial small bowel obstruction versus ileus.  1L IV fluids, 4 mg IV Morphine, and 4 mg IV Zofran given in the ED with no relief in symptoms.  Hospital Medicine was called for admission.

## 2020-02-04 NOTE — ASSESSMENT & PLAN NOTE
- Initial creatinine of 1.7 (baseline 1.2-1.4), likely due to dehydration.  - Continue IV hydration.  - Avoid nephrotoxic agents.  ACEi and diuretic on hold.  - Follow labs.

## 2020-02-04 NOTE — PLAN OF CARE
VSS. Pt NPO. NSR on monitor. Morphine for pain control. Pt refused bed alarm.   Fall precautions in place. Call light and personal items within reach. Educated patient on side effects of medication administered. Pt verbalized understanding. 24 hour order check done.  Will continue to monitor.

## 2020-02-04 NOTE — SUBJECTIVE & OBJECTIVE
Past Medical History:   Diagnosis Date    Anxiety     Cancer     spot on kidney and lung    Diabetes mellitus     Borderline    Diverticulitis     GERD (gastroesophageal reflux disease)     Hyperlipidemia     not on any meds currently    Hypertension     Lung mass     Mass of kidney        Past Surgical History:   Procedure Laterality Date    CHOLECYSTECTOMY      COLONOSCOPY      COLONOSCOPY N/A 4/7/2017    Procedure: COLONOSCOPY;  Surgeon: Amarilys Bravo MD;  Location[facility];  Service: Endoscopy;  Laterality: N/A;    ESOPHAGOGASTRODUODENOSCOPY      HERNIA REPAIR Bilateral     groin    NECK SURGERY      PARTIAL NEPHRECTOMY      left    RENAL BIOPSY      TONSILLECTOMY      TRIGGER FINGER RELEASE Right     long finger       Review of patient's allergies indicates:   Allergen Reactions    Crestor [rosuvastatin]      Joint pain        No current facility-administered medications on file prior to encounter.      Current Outpatient Medications on File Prior to Encounter   Medication Sig    amLODIPine (NORVASC) 10 MG tablet TAKE 1 TABLET BY MOUTH ONCE DAILY. (Patient taking differently: Take 10 mg by mouth nightly. )    aspirin 81 mg Tab Take 1 tablet by mouth nightly.     benazepril (LOTENSIN) 20 MG tablet TAKE 2 TABLETS BY MOUTH ONCE A DAY. (Patient taking differently: Take 40 mg by mouth nightly. )    citalopram (CELEXA) 40 MG tablet Take 40 mg by mouth nightly.     clonazePAM (KLONOPIN) 1 MG tablet Take 1 mg by mouth once daily.     finasteride (PROSCAR) 5 mg tablet Take 1 tablet (5 mg total) by mouth once daily. (Patient taking differently: Take 5 mg by mouth nightly. )    fluticasone-salmeterol (ADVAIR HFA) 115-21 mcg/actuation HFAA Inhale 2 puffs into the lungs 2 (two) times daily.    gabapentin (NEURONTIN) 600 MG tablet Take 1 tablet (600 mg total) by mouth 3 (three) times daily. (Patient taking differently: Take 600 mg by mouth nightly. )    hydrALAZINE (APRESOLINE) 25 MG  tablet Take 1 tablet (25 mg total) by mouth 3 (three) times daily. (Patient taking differently: Take 25 mg by mouth nightly. )    hydroCHLOROthiazide (MICROZIDE) 12.5 mg capsule TAKE 1 CAPSULE BY MOUTH ONCE DAILY. (Patient taking differently: Take 25 mg by mouth nightly. )    hydrOXYzine pamoate (VISTARIL) 25 MG Cap Take 25 mg by mouth.    metoprolol tartrate (LOPRESSOR) 50 MG tablet TAKE 1 TABLET BY MOUTH TWICE A DAY.    omeprazole (PRILOSEC) 40 MG capsule TAKE 1 CAPSULE BY MOUTH ONCE DAILY. (Patient taking differently: Take 40 mg by mouth nightly. )    tamsulosin (FLOMAX) 0.4 mg Cap Take 1 capsule (0.4 mg total) by mouth once daily. (Patient taking differently: Take 0.4 mg by mouth nightly. )    tiotropium (SPIRIVA) 18 mcg inhalation capsule Inhale 1 capsule (18 mcg total) into the lungs once daily. Controller    traZODone (DESYREL) 100 MG tablet Take 1 tablet (100 mg total) by mouth every evening.    venlafaxine (EFFEXOR-XR) 37.5 MG 24 hr capsule Take 1 capsule (37.5 mg total) by mouth once daily. (Patient taking differently: Take 37.5 mg by mouth nightly. )    zolpidem (AMBIEN) 10 mg Tab TAKE 1 TABLET BY MOUTH NIGHTLY AS NEEDED.    [DISCONTINUED] olopatadine (PATADAY) 0.2 % Drop Place 1 drop into both eyes once daily. (Patient not taking: Reported on 2/3/2020)    [DISCONTINUED] tiZANidine (ZANAFLEX) 4 MG tablet TAKE 1 TABLET BY MOUTH EVERY 8 HOURS AS NEEDED. (Patient not taking: Reported on 2/3/2020)     Family History     Problem Relation (Age of Onset)    Cancer Sister    Hypertension Mother, Father    Melanoma Father        Tobacco Use    Smoking status: Former Smoker     Packs/day: 1.00     Years: 20.00     Pack years: 20.00     Last attempt to quit: 1995     Years since quittin.0    Smokeless tobacco: Never Used   Substance and Sexual Activity    Alcohol use: No     Alcohol/week: 0.0 standard drinks    Drug use: No    Sexual activity: Yes     Partners: Female     Birth  control/protection: None     Review of Systems   Constitutional: Negative for appetite change, chills, diaphoresis, fatigue, fever and unexpected weight change.   Respiratory: Negative for cough, shortness of breath and wheezing.    Cardiovascular: Negative for chest pain, palpitations and leg swelling.   Gastrointestinal: Positive for abdominal distention, abdominal pain, diarrhea, nausea and vomiting. Negative for blood in stool and constipation.   Genitourinary: Negative for decreased urine volume, difficulty urinating, dysuria, flank pain, frequency, hematuria and urgency.   Musculoskeletal: Negative for arthralgias, back pain and myalgias.   Skin: Negative for pallor and rash.   Neurological: Negative for dizziness, weakness, light-headedness, numbness and headaches.   Psychiatric/Behavioral: Negative for confusion.   All other systems reviewed and are negative.    Objective:     Vital Signs (Most Recent):  Temp: 97.7 °F (36.5 °C) (02/03/20 2120)  Pulse: 95 (02/03/20 2120)  Resp: 18 (02/03/20 2120)  BP: (!) 168/103 (02/03/20 2120)  SpO2: 95 % (02/03/20 2120) Vital Signs (24h Range):  Temp:  [97.7 °F (36.5 °C)-98.1 °F (36.7 °C)] 97.7 °F (36.5 °C)  Pulse:  [] 95  Resp:  [18-20] 18  SpO2:  [94 %-97 %] 95 %  BP: (137-179)/() 168/103     Weight: 95.7 kg (210 lb 13.9 oz)  Body mass index is 29.41 kg/m².    Physical Exam   Constitutional: He is oriented to person, place, and time. He appears well-developed and well-nourished. No distress.   HENT:   Head: Normocephalic and atraumatic.   Eyes: Conjunctivae are normal.   PERRL; EOM intact.   Neck: Normal range of motion. Neck supple.   Cardiovascular: Normal rate, regular rhythm, S1 normal, S2 normal and intact distal pulses.  No extrasystoles are present. Exam reveals no gallop.   No murmur heard.  Pulses:       Radial pulses are 2+ on the right side, and 2+ on the left side.        Dorsalis pedis pulses are 2+ on the right side, and 2+ on the left side.         Posterior tibial pulses are 2+ on the right side, and 2+ on the left side.   Pulmonary/Chest: Effort normal and breath sounds normal. No accessory muscle usage. No tachypnea. No respiratory distress. He has no wheezes. He has no rhonchi. He has no rales.   Abdominal: Soft. He exhibits distension. Bowel sounds are decreased. There is no hepatosplenomegaly. There is generalized tenderness. There is no rigidity, no rebound, no guarding, no CVA tenderness and negative Hopkins's sign.   Musculoskeletal: Normal range of motion. He exhibits no edema, tenderness or deformity.   Neurological: He is alert and oriented to person, place, and time. No cranial nerve deficit or sensory deficit.   Skin: Skin is warm, dry and intact. No rash noted. He is not diaphoretic. No cyanosis or erythema.   Psychiatric: He has a normal mood and affect. His speech is normal and behavior is normal. Cognition and memory are normal.   Nursing note and vitals reviewed.          Significant Labs:  Results for orders placed or performed during the hospital encounter of 02/03/20   Hepatitis C antibody   Result Value Ref Range    Hepatitis C Ab Negative Negative   CBC auto differential   Result Value Ref Range    WBC 11.85 3.90 - 12.70 K/uL    RBC 5.19 4.60 - 6.20 M/uL    Hemoglobin 14.7 14.0 - 18.0 g/dL    Hematocrit 45.2 40.0 - 54.0 %    Mean Corpuscular Volume 87 82 - 98 fL    Mean Corpuscular Hemoglobin 28.3 27.0 - 31.0 pg    Mean Corpuscular Hemoglobin Conc 32.5 32.0 - 36.0 g/dL    RDW 14.1 11.5 - 14.5 %    Platelets 293 150 - 350 K/uL    MPV 8.7 (L) 9.2 - 12.9 fL    Immature Granulocytes 0.5 0.0 - 0.5 %    Gran # (ANC) 9.9 (H) 1.8 - 7.7 K/uL    Immature Grans (Abs) 0.06 (H) 0.00 - 0.04 K/uL    Lymph # 1.1 1.0 - 4.8 K/uL    Mono # 0.6 0.3 - 1.0 K/uL    Eos # 0.1 0.0 - 0.5 K/uL    Baso # 0.08 0.00 - 0.20 K/uL    nRBC 0 0 /100 WBC    Gran% 83.8 (H) 38.0 - 73.0 %    Lymph% 9.0 (L) 18.0 - 48.0 %    Mono% 5.3 4.0 - 15.0 %    Eosinophil% 0.7 0.0 -  8.0 %    Basophil% 0.7 0.0 - 1.9 %    Differential Method Automated    Comprehensive metabolic panel   Result Value Ref Range    Sodium 138 136 - 145 mmol/L    Potassium 4.8 3.5 - 5.1 mmol/L    Chloride 103 95 - 110 mmol/L    CO2 23 23 - 29 mmol/L    Glucose 110 70 - 110 mg/dL    BUN, Bld 24 (H) 8 - 23 mg/dL    Creatinine 1.7 (H) 0.5 - 1.4 mg/dL    Calcium 9.4 8.7 - 10.5 mg/dL    Total Protein 7.4 6.0 - 8.4 g/dL    Albumin 4.1 3.5 - 5.2 g/dL    Total Bilirubin 0.4 0.1 - 1.0 mg/dL    Alkaline Phosphatase 50 (L) 55 - 135 U/L    AST 16 10 - 40 U/L    ALT 18 10 - 44 U/L    Anion Gap 12 8 - 16 mmol/L    eGFR if African American 48 (A) >60 mL/min/1.73 m^2    eGFR if non African American 42 (A) >60 mL/min/1.73 m^2   Protime-INR   Result Value Ref Range    Prothrombin Time 10.8 9.0 - 12.5 sec    INR 1.0 0.8 - 1.2   APTT   Result Value Ref Range    aPTT 27.0 21.0 - 32.0 sec   Lipase   Result Value Ref Range    Lipase 12 4 - 60 U/L   Type & Screen   Result Value Ref Range    Group & Rh A NEG     Indirect Burt NEG       All pertinent labs within the past 24 hours have been reviewed.    Significant Imaging:  Imaging Results          CT Abdomen Pelvis  Without Contrast (Final result)  Result time 02/03/20 20:07:14   Procedure changed from CT Abdomen Pelvis With Contrast     Final result by Kane Sheffield MD (02/03/20 20:07:14)                 Impression:      Mild small bowel dilatation with normal caliber distal small bowel.  Possible low-grade partial small bowel obstruction versus ileus.    Diverticulosis.    Status post cholecystectomy.  Otherwise see above.    All CT scans at this facility are performed  using dose modulation techniques as appropriate to performed exam including the following:  automated exposure control; adjustment of mA and/or kV according to the patients size (this includes techniques or standardized protocols for targeted exams where dose is matched to indication/reason for exam: i.e.  extremities or head);  iterative reconstruction technique.      Electronically signed by: Kane Sheffield MD  Date:    02/03/2020  Time:    20:07             Narrative:    EXAMINATION:  CT ABDOMEN PELVIS WITHOUT CONTRAST    CLINICAL HISTORY:  Abdominal pain with abnormal x-ray., Bowel obstruction, high-grade;    TECHNIQUE:  Limited noncontrast CT scan of the abdomen and pelvis.    COMPARISON:  Abdominal x-ray 02/03/2020 and abdominal CT 10/15/2019.    FINDINGS:  Minor dependent atelectasis is noted.  Cardiac size is normal with evidence of coronary artery calcification.    The liver is nonenlarged.  The gallbladder is absent with clips in the gallbladder fossa.    The spleen is nonenlarged.  Small low-density lesion with punctate calcification is again noted.    The right kidney demonstrates a fat containing exophytic lesion of the upper pole.    Somewhat similar fat containing lesion of the lower pole of the left kidney is noted as well.  No hydronephrosis is seen at this time.    Fatty infiltration of the pancreas is noted.  Small-bowel loops are mildly dilated with air-fluid levels.  The distal small bowel is normal in caliber.  Fluid filled large bowel is noted.  Several diverticula of the left colon with numerous diverticula of the sigmoid colon are noted.    Possible low-grade distal small bowel obstruction versus ileus.  The appendix appears normal.    No free fluid is present.    The prostate gland is mildly enlarged.                               X-Ray Abdomen Flat And Erect (Final result)  Result time 02/03/20 17:36:24    Final result by Kevin Geiger MD (02/03/20 17:36:24)                 Impression:      Dilated small bowel loops measuring up to 4.6 cm are seen within the upper and mid abdomen with fluid levels concerning for partial small bowel obstruction.  Consider follow-up CT with oral and IV contrast.      Electronically signed by: Kevin Geiger MD  Date:    02/03/2020  Time:    17:36              Narrative:    EXAMINATION:  XR ABDOMEN FLAT AND ERECT    CLINICAL HISTORY:  Unspecified abdominal pain    TECHNIQUE:  Single frontal view of the chest was performed.    COMPARISON:  10/15/2019    FINDINGS:  Dilated small bowel loops measuring up to 4.6 cm are seen within the upper and mid abdomen with fluid levels concerning for partial small bowel obstruction.  Distal small bowel loops are relatively decompressed.  Small amount of air and feces is noted within the colon.  No evidence of free air.  No pneumatosis.  Lung bases are clear.  Post cholecystectomy.  Bones demonstrate degenerative changes.                               I have reviewed all pertinent imaging results/findings within the past 24 hours.

## 2020-02-04 NOTE — H&P
[facility] Hospital Medicine  History & Physical    Patient Name: Mil Sood  MRN: [id number]  Admission Date: 2/3/2020  Attending Physician: Zion Mckeon MD   Primary Care Provider: Yumi Roberson MD         Patient information was obtained from patient, past medical records and ER records.     Subjective:     Principal Problem:Small bowel obstruction    Chief Complaint:   Chief Complaint   Patient presents with    Abdominal Pain     generalized abdominal pain beginning on Saturday; abd distention noted        HPI: Mr. Sood is a 65 y.o. male  with a PMHx of HTN, HLD, CKD, GERD, chronic back pain, and anxiety.  He presented to the ED with c/o generalized abdominal pain  x 2 days.  No aggravating or alleviating factors.  Associated abdominal distention, N/V/D.  Patient reports he has been unable to pass gas since the onset of symptoms.  Denies any constipation, hematemesis, melena, hematochezia, rectal pain, appetite change, CP, SOB, dysuria, hematuria, back pain, HA, lightheadedness, dizziness, fever or chills.  Work-up in the ED resulted normal WBC, creatinine 1.7, BUN 24, stable LFTs.  CT of the ABD/pelvis showed mild small bowel dilatation with normal caliber distal small bowel, consistent with possible low-grade partial small bowel obstruction versus ileus.  1L IV fluids, 4 mg IV Morphine, and 4 mg IV Zofran given in the ED with no relief in symptoms.  Hospital Medicine was called for admission.      Past Medical History:   Diagnosis Date    Anxiety     Cancer     spot on kidney and lung    Diabetes mellitus     Borderline    Diverticulitis     GERD (gastroesophageal reflux disease)     Hyperlipidemia     not on any meds currently    Hypertension     Lung mass     Mass of kidney        Past Surgical History:   Procedure Laterality Date    CHOLECYSTECTOMY      COLONOSCOPY      COLONOSCOPY N/A 4/7/2017    Procedure: COLONOSCOPY;  Surgeon: Amarilys Bravo MD;  Location: [facility];   Service: Endoscopy;  Laterality: N/A;    ESOPHAGOGASTRODUODENOSCOPY      HERNIA REPAIR Bilateral     groin    NECK SURGERY      PARTIAL NEPHRECTOMY      left    RENAL BIOPSY      TONSILLECTOMY      TRIGGER FINGER RELEASE Right     long finger       Review of patient's allergies indicates:   Allergen Reactions    Crestor [rosuvastatin]      Joint pain        No current facility-administered medications on file prior to encounter.      Current Outpatient Medications on File Prior to Encounter   Medication Sig    amLODIPine (NORVASC) 10 MG tablet TAKE 1 TABLET BY MOUTH ONCE DAILY. (Patient taking differently: Take 10 mg by mouth nightly. )    aspirin 81 mg Tab Take 1 tablet by mouth nightly.     benazepril (LOTENSIN) 20 MG tablet TAKE 2 TABLETS BY MOUTH ONCE A DAY. (Patient taking differently: Take 40 mg by mouth nightly. )    citalopram (CELEXA) 40 MG tablet Take 40 mg by mouth nightly.     clonazePAM (KLONOPIN) 1 MG tablet Take 1 mg by mouth once daily.     finasteride (PROSCAR) 5 mg tablet Take 1 tablet (5 mg total) by mouth once daily. (Patient taking differently: Take 5 mg by mouth nightly. )    fluticasone-salmeterol (ADVAIR HFA) 115-21 mcg/actuation HFAA Inhale 2 puffs into the lungs 2 (two) times daily.    gabapentin (NEURONTIN) 600 MG tablet Take 1 tablet (600 mg total) by mouth 3 (three) times daily. (Patient taking differently: Take 600 mg by mouth nightly. )    hydrALAZINE (APRESOLINE) 25 MG tablet Take 1 tablet (25 mg total) by mouth 3 (three) times daily. (Patient taking differently: Take 25 mg by mouth nightly. )    hydroCHLOROthiazide (MICROZIDE) 12.5 mg capsule TAKE 1 CAPSULE BY MOUTH ONCE DAILY. (Patient taking differently: Take 25 mg by mouth nightly. )    hydrOXYzine pamoate (VISTARIL) 25 MG Cap Take 25 mg by mouth.    metoprolol tartrate (LOPRESSOR) 50 MG tablet TAKE 1 TABLET BY MOUTH TWICE A DAY.    omeprazole (PRILOSEC) 40 MG capsule TAKE 1 CAPSULE BY MOUTH ONCE DAILY.  (Patient taking differently: Take 40 mg by mouth nightly. )    tamsulosin (FLOMAX) 0.4 mg Cap Take 1 capsule (0.4 mg total) by mouth once daily. (Patient taking differently: Take 0.4 mg by mouth nightly. )    tiotropium (SPIRIVA) 18 mcg inhalation capsule Inhale 1 capsule (18 mcg total) into the lungs once daily. Controller    traZODone (DESYREL) 100 MG tablet Take 1 tablet (100 mg total) by mouth every evening.    venlafaxine (EFFEXOR-XR) 37.5 MG 24 hr capsule Take 1 capsule (37.5 mg total) by mouth once daily. (Patient taking differently: Take 37.5 mg by mouth nightly. )    zolpidem (AMBIEN) 10 mg Tab TAKE 1 TABLET BY MOUTH NIGHTLY AS NEEDED.    [DISCONTINUED] olopatadine (PATADAY) 0.2 % Drop Place 1 drop into both eyes once daily. (Patient not taking: Reported on 2/3/2020)    [DISCONTINUED] tiZANidine (ZANAFLEX) 4 MG tablet TAKE 1 TABLET BY MOUTH EVERY 8 HOURS AS NEEDED. (Patient not taking: Reported on 2/3/2020)     Family History     Problem Relation (Age of Onset)    Cancer Sister    Hypertension Mother, Father    Melanoma Father        Tobacco Use    Smoking status: Former Smoker     Packs/day: 1.00     Years: 20.00     Pack years: 20.00     Last attempt to quit: 1995     Years since quittin.0    Smokeless tobacco: Never Used   Substance and Sexual Activity    Alcohol use: No     Alcohol/week: 0.0 standard drinks    Drug use: No    Sexual activity: Yes     Partners: Female     Birth control/protection: None     Review of Systems   Constitutional: Negative for appetite change, chills, diaphoresis, fatigue, fever and unexpected weight change.   Respiratory: Negative for cough, shortness of breath and wheezing.    Cardiovascular: Negative for chest pain, palpitations and leg swelling.   Gastrointestinal: Positive for abdominal distention, abdominal pain, diarrhea, nausea and vomiting. Negative for blood in stool and constipation.   Genitourinary: Negative for decreased urine volume,  difficulty urinating, dysuria, flank pain, frequency, hematuria and urgency.   Musculoskeletal: Negative for arthralgias, back pain and myalgias.   Skin: Negative for pallor and rash.   Neurological: Negative for dizziness, weakness, light-headedness, numbness and headaches.   Psychiatric/Behavioral: Negative for confusion.   All other systems reviewed and are negative.    Objective:     Vital Signs (Most Recent):  Temp: 97.7 °F (36.5 °C) (02/03/20 2120)  Pulse: 95 (02/03/20 2120)  Resp: 18 (02/03/20 2120)  BP: (!) 168/103 (02/03/20 2120)  SpO2: 95 % (02/03/20 2120) Vital Signs (24h Range):  Temp:  [97.7 °F (36.5 °C)-98.1 °F (36.7 °C)] 97.7 °F (36.5 °C)  Pulse:  [] 95  Resp:  [18-20] 18  SpO2:  [94 %-97 %] 95 %  BP: (137-179)/() 168/103     Weight: 95.7 kg (210 lb 13.9 oz)  Body mass index is 29.41 kg/m².    Physical Exam   Constitutional: He is oriented to person, place, and time. He appears well-developed and well-nourished. No distress.   HENT:   Head: Normocephalic and atraumatic.   Eyes: Conjunctivae are normal.   PERRL; EOM intact.   Neck: Normal range of motion. Neck supple.   Cardiovascular: Normal rate, regular rhythm, S1 normal, S2 normal and intact distal pulses.  No extrasystoles are present. Exam reveals no gallop.   No murmur heard.  Pulses:       Radial pulses are 2+ on the right side, and 2+ on the left side.        Dorsalis pedis pulses are 2+ on the right side, and 2+ on the left side.        Posterior tibial pulses are 2+ on the right side, and 2+ on the left side.   Pulmonary/Chest: Effort normal and breath sounds normal. No accessory muscle usage. No tachypnea. No respiratory distress. He has no wheezes. He has no rhonchi. He has no rales.   Abdominal: Soft. He exhibits distension. Bowel sounds are decreased. There is no hepatosplenomegaly. There is generalized tenderness. There is no rigidity, no rebound, no guarding, no CVA tenderness and negative Hopkins's sign.   Musculoskeletal:  Normal range of motion. He exhibits no edema, tenderness or deformity.   Neurological: He is alert and oriented to person, place, and time. No cranial nerve deficit or sensory deficit.   Skin: Skin is warm, dry and intact. No rash noted. He is not diaphoretic. No cyanosis or erythema.   Psychiatric: He has a normal mood and affect. His speech is normal and behavior is normal. Cognition and memory are normal.   Nursing note and vitals reviewed.          Significant Labs:  Results for orders placed or performed during the hospital encounter of 02/03/20   Hepatitis C antibody   Result Value Ref Range    Hepatitis C Ab Negative Negative   CBC auto differential   Result Value Ref Range    WBC 11.85 3.90 - 12.70 K/uL    RBC 5.19 4.60 - 6.20 M/uL    Hemoglobin 14.7 14.0 - 18.0 g/dL    Hematocrit 45.2 40.0 - 54.0 %    Mean Corpuscular Volume 87 82 - 98 fL    Mean Corpuscular Hemoglobin 28.3 27.0 - 31.0 pg    Mean Corpuscular Hemoglobin Conc 32.5 32.0 - 36.0 g/dL    RDW 14.1 11.5 - 14.5 %    Platelets 293 150 - 350 K/uL    MPV 8.7 (L) 9.2 - 12.9 fL    Immature Granulocytes 0.5 0.0 - 0.5 %    Gran # (ANC) 9.9 (H) 1.8 - 7.7 K/uL    Immature Grans (Abs) 0.06 (H) 0.00 - 0.04 K/uL    Lymph # 1.1 1.0 - 4.8 K/uL    Mono # 0.6 0.3 - 1.0 K/uL    Eos # 0.1 0.0 - 0.5 K/uL    Baso # 0.08 0.00 - 0.20 K/uL    nRBC 0 0 /100 WBC    Gran% 83.8 (H) 38.0 - 73.0 %    Lymph% 9.0 (L) 18.0 - 48.0 %    Mono% 5.3 4.0 - 15.0 %    Eosinophil% 0.7 0.0 - 8.0 %    Basophil% 0.7 0.0 - 1.9 %    Differential Method Automated    Comprehensive metabolic panel   Result Value Ref Range    Sodium 138 136 - 145 mmol/L    Potassium 4.8 3.5 - 5.1 mmol/L    Chloride 103 95 - 110 mmol/L    CO2 23 23 - 29 mmol/L    Glucose 110 70 - 110 mg/dL    BUN, Bld 24 (H) 8 - 23 mg/dL    Creatinine 1.7 (H) 0.5 - 1.4 mg/dL    Calcium 9.4 8.7 - 10.5 mg/dL    Total Protein 7.4 6.0 - 8.4 g/dL    Albumin 4.1 3.5 - 5.2 g/dL    Total Bilirubin 0.4 0.1 - 1.0 mg/dL    Alkaline Phosphatase  50 (L) 55 - 135 U/L    AST 16 10 - 40 U/L    ALT 18 10 - 44 U/L    Anion Gap 12 8 - 16 mmol/L    eGFR if African American 48 (A) >60 mL/min/1.73 m^2    eGFR if non African American 42 (A) >60 mL/min/1.73 m^2   Protime-INR   Result Value Ref Range    Prothrombin Time 10.8 9.0 - 12.5 sec    INR 1.0 0.8 - 1.2   APTT   Result Value Ref Range    aPTT 27.0 21.0 - 32.0 sec   Lipase   Result Value Ref Range    Lipase 12 4 - 60 U/L   Type & Screen   Result Value Ref Range    Group & Rh A NEG     Indirect Burt NEG       All pertinent labs within the past 24 hours have been reviewed.    Significant Imaging:  Imaging Results          CT Abdomen Pelvis  Without Contrast (Final result)  Result time 02/03/20 20:07:14   Procedure changed from CT Abdomen Pelvis With Contrast     Final result by Kane Sheffield MD (02/03/20 20:07:14)                 Impression:      Mild small bowel dilatation with normal caliber distal small bowel.  Possible low-grade partial small bowel obstruction versus ileus.    Diverticulosis.    Status post cholecystectomy.  Otherwise see above.    All CT scans at this facility are performed  using dose modulation techniques as appropriate to performed exam including the following:  automated exposure control; adjustment of mA and/or kV according to the patients size (this includes techniques or standardized protocols for targeted exams where dose is matched to indication/reason for exam: i.e. extremities or head);  iterative reconstruction technique.      Electronically signed by: Kane Sheffield MD  Date:    02/03/2020  Time:    20:07             Narrative:    EXAMINATION:  CT ABDOMEN PELVIS WITHOUT CONTRAST    CLINICAL HISTORY:  Abdominal pain with abnormal x-ray., Bowel obstruction, high-grade;    TECHNIQUE:  Limited noncontrast CT scan of the abdomen and pelvis.    COMPARISON:  Abdominal x-ray 02/03/2020 and abdominal CT 10/15/2019.    FINDINGS:  Minor dependent atelectasis is noted.  Cardiac  size is normal with evidence of coronary artery calcification.    The liver is nonenlarged.  The gallbladder is absent with clips in the gallbladder fossa.    The spleen is nonenlarged.  Small low-density lesion with punctate calcification is again noted.    The right kidney demonstrates a fat containing exophytic lesion of the upper pole.    Somewhat similar fat containing lesion of the lower pole of the left kidney is noted as well.  No hydronephrosis is seen at this time.    Fatty infiltration of the pancreas is noted.  Small-bowel loops are mildly dilated with air-fluid levels.  The distal small bowel is normal in caliber.  Fluid filled large bowel is noted.  Several diverticula of the left colon with numerous diverticula of the sigmoid colon are noted.    Possible low-grade distal small bowel obstruction versus ileus.  The appendix appears normal.    No free fluid is present.    The prostate gland is mildly enlarged.                               X-Ray Abdomen Flat And Erect (Final result)  Result time 02/03/20 17:36:24    Final result by Kevin Geiger MD (02/03/20 17:36:24)                 Impression:      Dilated small bowel loops measuring up to 4.6 cm are seen within the upper and mid abdomen with fluid levels concerning for partial small bowel obstruction.  Consider follow-up CT with oral and IV contrast.      Electronically signed by: Kevin Geiger MD  Date:    02/03/2020  Time:    17:36             Narrative:    EXAMINATION:  XR ABDOMEN FLAT AND ERECT    CLINICAL HISTORY:  Unspecified abdominal pain    TECHNIQUE:  Single frontal view of the chest was performed.    COMPARISON:  10/15/2019    FINDINGS:  Dilated small bowel loops measuring up to 4.6 cm are seen within the upper and mid abdomen with fluid levels concerning for partial small bowel obstruction.  Distal small bowel loops are relatively decompressed.  Small amount of air and feces is noted within the colon.  No evidence of free air.  No  pneumatosis.  Lung bases are clear.  Post cholecystectomy.  Bones demonstrate degenerative changes.                               I have reviewed all pertinent imaging results/findings within the past 24 hours.             Assessment/Plan:     * Small bowel obstruction  - Keep NPO.  - IV hydration.  - Antiemetics and analgesics as needed.  NG tube if patient develops uncontrolled ABD pain, N/V.  - Follow-up imaging.    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  - Continue IV hydration.  - Avoid nephrotoxic agents.  ACEi and diuretic on hold.  - Follow labs.    Essential hypertension  - Will give IV Lopressor 2.5mg Q6 while NPO.  Resume home amlodipine, benazepril, hydralazine, hydrochlorothiazide, and Lopressor once tolerating PO.      VTE Risk Mitigation (From admission, onward)         Ordered     IP VTE HIGH RISK PATIENT  Once      02/03/20 2257     Reason for No Pharmacological VTE Prophylaxis  Once     Question:  Reasons:  Answer:  Risk of Bleeding    02/03/20 2257     Place sequential compression device  Until discontinued      02/03/20 2257                   Candy Marc NP  Department of Hospital Medicine   [facility]

## 2020-02-05 NOTE — HOSPITAL COURSE
Pt admitted to Observation Unit for small bowel obstruction.  CT of abdomen/pelvis showed mild small bowel dilatation with normal caliber distal small bowel and  possible low-grade partial small bowel obstruction versus ileus.  Pt treated with bowel rest, IV hydration, antiemetics, and analgesia as needed.  Pt reported bowel movements x 2 and + flatulence.  Repeat imaging showed marked improvement.  Pt tolerated advanced diet.   Pt denied abdominal pain, nausea, and vomiting with no signs of acute distress noted.  YANNI resolved.  Pt seen and examined on the date of discharge and deemed suitable for discharge to home.  Current medications resumed.  Pt instructed to follow up with PCP upon discharge for further evaluation.

## 2020-02-05 NOTE — DISCHARGE SUMMARY
[facility] Hospital Medicine  Discharge Summary      Patient Name: Mil Sood  MRN: [id number]  Admission Date: 2/3/2020  Hospital Length of Stay: 0 days  Discharge Date and Time: 2/4/2020  7:50 PM  Attending Physician: Dr. Olivia Parham    Discharging Provider: Rosa Isela Bedolla NP  Primary Care Provider: Yumi Roberson MD      HPI:   Mr. Sood is a 65 y.o. male  with a PMHx of HTN, HLD, CKD, GERD, chronic back pain, and anxiety.  He presented to the ED with c/o generalized abdominal pain  x 2 days.  No aggravating or alleviating factors.  Associated abdominal distention, N/V/D.  Patient reports he has been unable to pass gas since the onset of symptoms.  Denies any constipation, hematemesis, melena, hematochezia, rectal pain, appetite change, CP, SOB, dysuria, hematuria, back pain, HA, lightheadedness, dizziness, fever or chills.  Work-up in the ED resulted normal WBC, creatinine 1.7, BUN 24, stable LFTs.  CT of the ABD/pelvis showed mild small bowel dilatation with normal caliber distal small bowel, consistent with possible low-grade partial small bowel obstruction versus ileus.  1L IV fluids, 4 mg IV Morphine, and 4 mg IV Zofran given in the ED with no relief in symptoms.  Hospital Medicine was called for admission.      * No surgery found *      Hospital Course:   Pt admitted to Observation Unit for small bowel obstruction.  CT of abdomen/pelvis showed mild small bowel dilatation with normal caliber distal small bowel and  possible low-grade partial small bowel obstruction versus ileus.  Pt treated with bowel rest, IV hydration, antiemetics, and analgesia as needed.  Pt reported bowel movements x 2 and + flatulence.  Repeat imaging showed marked improvement.  Pt tolerated advanced diet.   Pt denied abdominal pain, nausea, and vomiting with no signs of acute distress noted.  YANNI resolved.  Pt seen and examined on the date of discharge and deemed suitable for discharge to home.  Current medications  resumed.  Pt instructed to follow up with PCP upon discharge for further evaluation.      Consults:   Consults (From admission, onward)        Status Ordering Provider     Inpatient consult to Social Work/Case Management  Once     Provider:  (Not yet assigned)    Completed NANCY MYERS          Final Active Diagnoses:    Diagnosis Date Noted POA    PRINCIPAL PROBLEM:  Small bowel obstruction [K56.609] 02/03/2020 Yes    YANNI on CKD, stage II-III [N17.9] 02/04/2020 Yes    Essential hypertension [I10] 01/02/2014 Yes     Chronic      Problems Resolved During this Admission:       Discharged Condition: stable    Disposition: Home or Self Care    Follow Up:  Follow-up Information     Yumi Roberson MD In 3 days.    Specialty:  Family Medicine  Why:  -hospital follow up   Contact information:  [address] [address] [address] [phone]                 Patient Instructions:      Diet diabetic     Diet Cardiac     Notify your health care provider if you experience any of the following:  temperature >100.4     Notify your health care provider if you experience any of the following:  persistent nausea and vomiting or diarrhea     Notify your health care provider if you experience any of the following:  increased confusion or weakness     Notify your health care provider if you experience any of the following:  persistent dizziness, light-headedness, or visual disturbances     Notify your health care provider if you experience any of the following:  difficulty breathing or increased cough     Notify your health care provider if you experience any of the following:  severe uncontrolled pain     Activity as tolerated       Significant Diagnostic Studies: Labs: All labs within the past 24 hours have been reviewed    Pending Diagnostic Studies:     None         Medications:  Reconciled Home Medications:      Medication List      CHANGE how you take these medications    amLODIPine 10 MG tablet  Commonly known as:   NORVASC  TAKE 1 TABLET BY MOUTH ONCE DAILY.  What changed:  when to take this     benazepriL 20 MG tablet  Commonly known as:  LOTENSIN  TAKE 2 TABLETS BY MOUTH ONCE A DAY.  What changed:  when to take this     finasteride 5 mg tablet  Commonly known as:  PROSCAR  Take 1 tablet (5 mg total) by mouth once daily.  What changed:  when to take this     gabapentin 600 MG tablet  Commonly known as:  NEURONTIN  Take 1 tablet (600 mg total) by mouth 3 (three) times daily.  What changed:  when to take this     hydrALAZINE 25 MG tablet  Commonly known as:  APRESOLINE  Take 1 tablet (25 mg total) by mouth 3 (three) times daily.  What changed:  when to take this     hydroCHLOROthiazide 12.5 mg capsule  Commonly known as:  MICROZIDE  TAKE 1 CAPSULE BY MOUTH ONCE DAILY.  What changed:    · how much to take  · when to take this  · additional instructions     omeprazole 40 MG capsule  Commonly known as:  PRILOSEC  TAKE 1 CAPSULE BY MOUTH ONCE DAILY.  What changed:    · when to take this  · additional instructions     tamsulosin 0.4 mg Cap  Commonly known as:  FLOMAX  Take 1 capsule (0.4 mg total) by mouth once daily.  What changed:  when to take this     venlafaxine 37.5 MG 24 hr capsule  Commonly known as:  EFFEXOR-XR  Take 1 capsule (37.5 mg total) by mouth once daily.  What changed:  when to take this        CONTINUE taking these medications    aspirin 81 mg Tab  Take 1 tablet by mouth nightly.     citalopram 40 MG tablet  Commonly known as:  CELEXA  Take 40 mg by mouth nightly.     clonazePAM 1 MG tablet  Commonly known as:  KLONOPIN  Take 1 mg by mouth once daily.     fluticasone propion-salmeterol 115-21 mcg/dose 115-21 mcg/actuation Hfaa inhaler  Commonly known as:  ADVAIR HFA  Inhale 2 puffs into the lungs 2 (two) times daily.     hydrOXYzine pamoate 25 MG Cap  Commonly known as:  VISTARIL  Take 25 mg by mouth.     metoprolol tartrate 50 MG tablet  Commonly known as:  LOPRESSOR  TAKE 1 TABLET BY MOUTH TWICE A DAY.      tiotropium 18 mcg inhalation capsule  Commonly known as:  SPIRIVA  Inhale 1 capsule (18 mcg total) into the lungs once daily. Controller     traZODone 100 MG tablet  Commonly known as:  DESYREL  Take 1 tablet (100 mg total) by mouth every evening.     zolpidem 10 mg Tab  Commonly known as:  AMBIEN  TAKE 1 TABLET BY MOUTH NIGHTLY AS NEEDED.            Indwelling Lines/Drains at time of discharge:   Lines/Drains/Airways     None                 Time spent on the discharge of patient: > 31 minutes  Patient was seen and examined on the date of discharge and determined to be suitable for discharge.         Rosa Isela Bedolla NP  Department of Hospital Medicine  [facility]

## 2020-02-05 NOTE — NURSING
Discharge instructions given, pt verbalized understanding. Pt leaving via family. No s/s acute distress.

## 2020-02-06 ENCOUNTER — TELEPHONE (OUTPATIENT)
Dept: ENDOSCOPY | Facility: HOSPITAL | Age: 65
End: 2020-02-06

## 2020-02-06 NOTE — TELEPHONE ENCOUNTER
Pt called to confirm that he will be here on 02- for procedure. Left message to call if any questions. dw

## 2020-02-10 RX ORDER — HYDRALAZINE HYDROCHLORIDE 25 MG/1
25 TABLET, FILM COATED ORAL NIGHTLY
Qty: 90 TABLET | Refills: 4 | Status: SHIPPED | OUTPATIENT
Start: 2020-02-10 | End: 2020-07-27 | Stop reason: SDUPTHER

## 2020-02-13 ENCOUNTER — ANESTHESIA (OUTPATIENT)
Dept: ENDOSCOPY | Facility: HOSPITAL | Age: 65
End: 2020-02-13
Payer: MEDICARE

## 2020-02-13 ENCOUNTER — ANESTHESIA EVENT (OUTPATIENT)
Dept: ENDOSCOPY | Facility: HOSPITAL | Age: 65
End: 2020-02-13
Payer: MEDICARE

## 2020-02-13 ENCOUNTER — HOSPITAL ENCOUNTER (OUTPATIENT)
Facility: HOSPITAL | Age: 65
Discharge: HOME | End: 2020-02-13
Attending: INTERNAL MEDICINE | Admitting: INTERNAL MEDICINE
Payer: MEDICARE

## 2020-02-13 VITALS
DIASTOLIC BLOOD PRESSURE: 58 MMHG | WEIGHT: 207.25 LBS | RESPIRATION RATE: 18 BRPM | SYSTOLIC BLOOD PRESSURE: 120 MMHG | BODY MASS INDEX: 28.9 KG/M2 | HEART RATE: 66 BPM | TEMPERATURE: 98 F | OXYGEN SATURATION: 98 %

## 2020-02-13 DIAGNOSIS — K22.70 BARRETT'S ESOPHAGUS: ICD-10-CM

## 2020-02-13 DIAGNOSIS — K21.9 GASTROESOPHAGEAL REFLUX DISEASE WITHOUT ESOPHAGITIS: Primary | ICD-10-CM

## 2020-02-13 DIAGNOSIS — K22.70 BARRETT'S ESOPHAGUS DETERMINED BY BIOPSY: ICD-10-CM

## 2020-02-13 PROCEDURE — 88341 IMHCHEM/IMCYTCHM EA ADD ANTB: CPT | Mod: 26,HCNC,, | Performed by: PATHOLOGY

## 2020-02-13 PROCEDURE — 88305 TISSUE EXAM BY PATHOLOGIST: ICD-10-PCS | Mod: 26,HCNC,, | Performed by: PATHOLOGY

## 2020-02-13 PROCEDURE — 25000003 PHARM REV CODE 250: Mod: HCNC | Performed by: NURSE ANESTHETIST, CERTIFIED REGISTERED

## 2020-02-13 PROCEDURE — 43239 EGD BIOPSY SINGLE/MULTIPLE: CPT | Mod: HCNC,,, | Performed by: INTERNAL MEDICINE

## 2020-02-13 PROCEDURE — 88341 PR IHC OR ICC EACH ADD'L SINGLE ANTIBODY  STAINPR: ICD-10-PCS | Mod: 26,HCNC,, | Performed by: PATHOLOGY

## 2020-02-13 PROCEDURE — 63600175 PHARM REV CODE 636 W HCPCS: Mod: HCNC | Performed by: INTERNAL MEDICINE

## 2020-02-13 PROCEDURE — 37000009 HC ANESTHESIA EA ADD 15 MINS: Mod: HCNC | Performed by: INTERNAL MEDICINE

## 2020-02-13 PROCEDURE — 88342 IMHCHEM/IMCYTCHM 1ST ANTB: CPT | Mod: 26,HCNC,, | Performed by: PATHOLOGY

## 2020-02-13 PROCEDURE — 88341 IMHCHEM/IMCYTCHM EA ADD ANTB: CPT | Mod: HCNC | Performed by: PATHOLOGY

## 2020-02-13 PROCEDURE — 88342 IMHCHEM/IMCYTCHM 1ST ANTB: CPT | Mod: HCNC | Performed by: PATHOLOGY

## 2020-02-13 PROCEDURE — 88342 CHG IMMUNOCYTOCHEMISTRY: ICD-10-PCS | Mod: 26,HCNC,, | Performed by: PATHOLOGY

## 2020-02-13 PROCEDURE — 43239 EGD BIOPSY SINGLE/MULTIPLE: CPT | Mod: HCNC | Performed by: INTERNAL MEDICINE

## 2020-02-13 PROCEDURE — 88305 TISSUE EXAM BY PATHOLOGIST: CPT | Mod: 26,HCNC,, | Performed by: PATHOLOGY

## 2020-02-13 PROCEDURE — 88305 TISSUE EXAM BY PATHOLOGIST: CPT | Mod: HCNC | Performed by: PATHOLOGY

## 2020-02-13 PROCEDURE — 63600175 PHARM REV CODE 636 W HCPCS: Mod: HCNC | Performed by: NURSE ANESTHETIST, CERTIFIED REGISTERED

## 2020-02-13 PROCEDURE — 27201012 HC FORCEPS, HOT/COLD, DISP: Mod: HCNC | Performed by: INTERNAL MEDICINE

## 2020-02-13 PROCEDURE — 43239 PR EGD, FLEX, W/BIOPSY, SGL/MULTI: ICD-10-PCS | Mod: HCNC,,, | Performed by: INTERNAL MEDICINE

## 2020-02-13 PROCEDURE — 37000008 HC ANESTHESIA 1ST 15 MINUTES: Mod: HCNC | Performed by: INTERNAL MEDICINE

## 2020-02-13 RX ORDER — SODIUM CHLORIDE, SODIUM LACTATE, POTASSIUM CHLORIDE, CALCIUM CHLORIDE 600; 310; 30; 20 MG/100ML; MG/100ML; MG/100ML; MG/100ML
INJECTION, SOLUTION INTRAVENOUS CONTINUOUS
Status: DISCONTINUED | OUTPATIENT
Start: 2020-02-13 | End: 2020-02-13 | Stop reason: HOSPADM

## 2020-02-13 RX ORDER — PROPOFOL 10 MG/ML
VIAL (ML) INTRAVENOUS
Status: DISCONTINUED | OUTPATIENT
Start: 2020-02-13 | End: 2020-02-13

## 2020-02-13 RX ORDER — SODIUM CHLORIDE 0.9 % (FLUSH) 0.9 %
10 SYRINGE (ML) INJECTION
Status: DISCONTINUED | OUTPATIENT
Start: 2020-02-13 | End: 2020-02-13 | Stop reason: HOSPADM

## 2020-02-13 RX ORDER — LIDOCAINE HYDROCHLORIDE 10 MG/ML
INJECTION, SOLUTION EPIDURAL; INFILTRATION; INTRACAUDAL; PERINEURAL
Status: DISCONTINUED | OUTPATIENT
Start: 2020-02-13 | End: 2020-02-13

## 2020-02-13 RX ADMIN — PROPOFOL 30 MG: 10 INJECTION, EMULSION INTRAVENOUS at 08:02

## 2020-02-13 RX ADMIN — PROPOFOL 120 MG: 10 INJECTION, EMULSION INTRAVENOUS at 08:02

## 2020-02-13 RX ADMIN — LIDOCAINE HYDROCHLORIDE 50 MG: 10 INJECTION, SOLUTION EPIDURAL; INFILTRATION; INTRACAUDAL; PERINEURAL at 08:02

## 2020-02-13 RX ADMIN — SODIUM CHLORIDE, SODIUM LACTATE, POTASSIUM CHLORIDE, AND CALCIUM CHLORIDE: 600; 310; 30; 20 INJECTION, SOLUTION INTRAVENOUS at 07:02

## 2020-02-13 NOTE — ANESTHESIA POSTPROCEDURE EVALUATION
Anesthesia Post Evaluation    Patient: Mil Sood    Procedure(s) Performed: Procedure(s) (LRB):  ESOPHAGOGASTRODUODENOSCOPY (EGD) (N/A)    Final Anesthesia Type: MAC    Patient location during evaluation: PACU  Patient participation: Yes- Able to Participate  Level of consciousness: awake and alert  Post-procedure vital signs: reviewed and stable  Pain management: adequate  Airway patency: patent  COLLEEN mitigation strategies: Extubation and recovery carried out in lateral, semiupright, or other nonsupine position  PONV status at discharge: No PONV  Anesthetic complications: no      Cardiovascular status: blood pressure returned to baseline  Respiratory status: unassisted  Hydration status: euvolemic  Follow-up not needed.          Vitals Value Taken Time   /74 2/13/2020  8:52 AM   Temp 36.5 °C (97.7 °F) 2/13/2020  8:52 AM   Pulse 63 2/13/2020  8:52 AM   Resp 17 2/13/2020  8:52 AM   SpO2 93 % 2/13/2020  8:52 AM         No case tracking events are documented in the log.      Pain/Doug Score: No data recorded

## 2020-02-13 NOTE — DISCHARGE INSTRUCTIONS

## 2020-02-13 NOTE — ANESTHESIA RELEASE NOTE
Anesthesia Release from PACU Note    Patient: Mil Sood    Procedure(s) Performed: Procedure(s) (LRB):  ESOPHAGOGASTRODUODENOSCOPY (EGD) (N/A)    Anesthesia type: MAC    Post pain: Adequate analgesia    Post assessment: no apparent anesthetic complications    Last Vitals:   Visit Vitals  BP (!) 146/97 (BP Location: Left arm, Patient Position: Lying)   Pulse 86   Temp 37 °C (98.6 °F) (Temporal)   Resp 20   Wt 94 kg (207 lb 3.7 oz)   SpO2 96%   BMI 28.90 kg/m²       Post vital signs: stable    Level of consciousness: awake    Nausea/Vomiting: no nausea/no vomiting    Complications: none    Airway Patency: patent    Respiratory: unassisted    Cardiovascular: stable and blood pressure at baseline    Hydration: euvolemic

## 2020-02-13 NOTE — PLAN OF CARE
Dr. Aquino  At bedside; findings discussed with patient Patient did not want to have family back to recovery. Vital signs stable.

## 2020-02-13 NOTE — [employer] PATIENT INSTRUCTIONS
Discharge Summary/Instructions after an Endoscopic Procedure  Patient Name: Mil Sood  Patient MRN: [id number]  Patient YOB: 1955  Thursday, February 13, 2020 Shaji Aquino III, MD  RESTRICTIONS:  During your procedure today, you received medications for sedation.  These   medications may affect your judgment, balance and coordination.  Therefore,   for 24 hours, you have the following restrictions:   - DO NOT drive a car, operate machinery, make legal/financial decisions,   sign important papers or drink alcohol.    ACTIVITY:  Today: no heavy lifting, straining or running due to procedural   sedation/anesthesia.  The following day: return to full activity including work.  DIET:  Eat and drink normally unless instructed otherwise.     TREATMENT FOR COMMON SIDE EFFECTS:  - Mild abdominal pain, nausea, belching, bloating or excessive gas:  rest,   eat lightly and use a heating pad.  - Sore Throat: treat with throat lozenges and/or gargle with warm salt   water.  - Because air was used during the procedure, expelling large amounts of air   from your rectum or belching is normal.  - If a bowel prep was taken, you may not have a bowel movement for 1-3 days.    This is normal.  SYMPTOMS TO WATCH FOR AND REPORT TO YOUR PHYSICIAN:  1. Abdominal pain or bloating, other than gas cramps.  2. Chest pain.  3. Back pain.  4. Signs of infection such as: chills or fever occurring within 24 hours   after the procedure.  5. Rectal bleeding, which would show as bright red, maroon, or black stools.   (A tablespoon of blood from the rectum is not serious, especially if   hemorrhoids are present.)  6. Vomiting.  7. Weakness or dizziness.  GO DIRECTLY TO THE NEAREST EMERGENCY ROOM IF YOU HAVE ANY OF THE FOLLOWING:      Difficulty breathing              Chills and/or fever over 101 F   Persistent vomiting and/or vomiting blood   Severe abdominal pain   Severe chest pain   Black, tarry stools   Bleeding- more than one  tablespoon   Any other symptom or condition that you feel may need urgent attention  Your doctor recommends these additional instructions:  If any biopsies were taken, your doctors clinic will contact you in 1 to 2   weeks with any results.  - Discharge patient to home (via wheelchair).   - Resume previous diet.   - Continue present medications.   - Await pathology results.   - Return to primary care physician as previously scheduled.   - Repeat upper endoscopy in 2 years for surveillance based on pathology   results.  For questions, problems or results please call your physician Shaji Aquino III, MD at Work:  [phone]  If you have any questions about the above instructions, call the GI   department at [phone] or call the endoscopy unit at [phone]   from 7am until 3 pm.  [facility] [place], EMERGENCY ROOM PHONE NUMBER:   [phone]  IF A COMPLICATION OR EMERGENCY SITUATION ARISES AND YOU ARE UNABLE TO REACH   YOUR PHYSICIAN - GO DIRECTLY TO THE EMERGENCY ROOM.  I have read or have had read to me these discharge instructions for my   procedure and have received a written copy.  I understand these   instructions and will follow-up with my physician if I have any questions.     __________________________________       _____________________________________  Nurse Signature                                          Patient/Designated   Responsible Party Signature  Shaji Aquino III, MD  2/13/2020 8:58:50 AM  This report has been verified and signed electronically.  [employer]

## 2020-02-13 NOTE — ANESTHESIA PREPROCEDURE EVALUATION
02/13/2020  Mil Sood is a 65 y.o., male.    Anesthesia Evaluation    I have reviewed the Patient Summary Reports.    I have reviewed the Nursing Notes.   I have reviewed the Medications.     Review of Systems  Anesthesia Hx:  No problems with previous Anesthesia    Social:  Former Smoker    Hematology/Oncology:  Hematology Normal       Kidney Current/Recent Cancer.   EENT/Dental:EENT/Dental Normal   Cardiovascular:   Hypertension, well controlled CAD   hyperlipidemia    Pulmonary:  Pulmonary Normal    Renal/:   Chronic Renal Disease    Hepatic/GI:   GERD, poorly controlled  Esophageal / Stomach Disorders Gerd, Esophageal Disorder, Culp's Esophagus    Musculoskeletal:   Arthritis     Neurological:  Neurology Normal    Endocrine:   Diabetes, well controlled, type 2    Dermatological:  Skin Normal    Psych:   Psychiatric History anxiety depression          Physical Exam  General:  Well nourished    Airway/Jaw/Neck:  Airway Findings: Mouth Opening: Normal Tongue: Normal  General Airway Assessment: Adult       Chest/Lungs:  Chest/Lungs Findings: Clear to auscultation     Heart/Vascular:  Heart Findings: Rate: Normal             Anesthesia Plan  Type of Anesthesia, risks & benefits discussed:  Anesthesia Type:  MAC  Patient's Preference:   Intra-op Monitoring Plan: standard ASA monitors  Intra-op Monitoring Plan Comments:   Post Op Pain Control Plan:   Post Op Pain Control Plan Comments:   Induction:   IV  Beta Blocker:  Patient is not currently on a Beta-Blocker (No further documentation required).       Informed Consent: Patient understands risks and agrees with Anesthesia plan.  Questions answered. Anesthesia consent signed with patient.  ASA Score: 2     Day of Surgery Review of History & Physical: I have interviewed and examined the patient. I have reviewed the patient's H&P dated:  There are no  significant changes.          Ready For Surgery From Anesthesia Perspective.

## 2020-02-13 NOTE — TRANSFER OF CARE
Anesthesia Transfer of Care Note    Patient: Mil Sood    Procedure(s) Performed: Procedure(s) (LRB):  ESOPHAGOGASTRODUODENOSCOPY (EGD) (N/A)    Patient location: PACU    Anesthesia Type: MAC    Transport from OR: Transported from OR on room air with adequate spontaneous ventilation    Post pain: adequate analgesia    Post assessment: no apparent anesthetic complications    Post vital signs: stable    Level of consciousness: awake    Nausea/Vomiting: no nausea/vomiting    Complications: none    Transfer of care protocol was followed      Last vitals:   Visit Vitals  BP (!) 146/97 (BP Location: Left arm, Patient Position: Lying)   Pulse 86   Temp 37 °C (98.6 °F) (Temporal)   Resp 20   Wt 94 kg (207 lb 3.7 oz)   SpO2 96%   BMI 28.90 kg/m²

## 2020-02-18 LAB
FINAL PATHOLOGIC DIAGNOSIS: NORMAL
GROSS: NORMAL
Lab: NORMAL
MICROSCOPIC EXAM: NORMAL

## 2020-02-19 RX ORDER — ZOLPIDEM TARTRATE 10 MG/1
TABLET ORAL
Qty: 30 TABLET | Refills: 4 | Status: SHIPPED | OUTPATIENT
Start: 2020-02-19 | End: 2020-07-02 | Stop reason: SDUPTHER

## 2020-02-21 ENCOUNTER — OFFICE VISIT (OUTPATIENT)
Dept: FAMILY MEDICINE | Facility: CLINIC | Age: 65
End: 2020-02-21
Payer: MEDICARE

## 2020-02-21 VITALS
DIASTOLIC BLOOD PRESSURE: 78 MMHG | RESPIRATION RATE: 18 BRPM | TEMPERATURE: 98 F | HEIGHT: 71 IN | HEART RATE: 89 BPM | SYSTOLIC BLOOD PRESSURE: 130 MMHG | BODY MASS INDEX: 29.48 KG/M2 | OXYGEN SATURATION: 96 % | WEIGHT: 210.56 LBS

## 2020-02-21 DIAGNOSIS — T16.2XXA FOREIGN BODY OF LEFT EAR, INITIAL ENCOUNTER: Primary | ICD-10-CM

## 2020-02-21 DIAGNOSIS — F41.8 DEPRESSION WITH ANXIETY: ICD-10-CM

## 2020-02-21 PROCEDURE — 3008F BODY MASS INDEX DOCD: CPT | Mod: HCNC,CPTII,S$GLB, | Performed by: FAMILY MEDICINE

## 2020-02-21 PROCEDURE — 3078F PR MOST RECENT DIASTOLIC BLOOD PRESSURE < 80 MM HG: ICD-10-PCS | Mod: HCNC,CPTII,S$GLB, | Performed by: FAMILY MEDICINE

## 2020-02-21 PROCEDURE — 99999 PR PBB SHADOW E&M-EST. PATIENT-LVL III: CPT | Mod: PBBFAC,HCNC,, | Performed by: FAMILY MEDICINE

## 2020-02-21 PROCEDURE — 1101F PR PT FALLS ASSESS DOC 0-1 FALLS W/OUT INJ PAST YR: ICD-10-PCS | Mod: HCNC,CPTII,S$GLB, | Performed by: FAMILY MEDICINE

## 2020-02-21 PROCEDURE — 3008F PR BODY MASS INDEX (BMI) DOCUMENTED: ICD-10-PCS | Mod: HCNC,CPTII,S$GLB, | Performed by: FAMILY MEDICINE

## 2020-02-21 PROCEDURE — 1101F PT FALLS ASSESS-DOCD LE1/YR: CPT | Mod: HCNC,CPTII,S$GLB, | Performed by: FAMILY MEDICINE

## 2020-02-21 PROCEDURE — 3075F PR MOST RECENT SYSTOLIC BLOOD PRESS GE 130-139MM HG: ICD-10-PCS | Mod: HCNC,CPTII,S$GLB, | Performed by: FAMILY MEDICINE

## 2020-02-21 PROCEDURE — 99999 PR PBB SHADOW E&M-EST. PATIENT-LVL III: ICD-10-PCS | Mod: PBBFAC,HCNC,, | Performed by: FAMILY MEDICINE

## 2020-02-21 PROCEDURE — 3075F SYST BP GE 130 - 139MM HG: CPT | Mod: HCNC,CPTII,S$GLB, | Performed by: FAMILY MEDICINE

## 2020-02-21 PROCEDURE — 99214 PR OFFICE/OUTPT VISIT, EST, LEVL IV, 30-39 MIN: ICD-10-PCS | Mod: HCNC,S$GLB,, | Performed by: FAMILY MEDICINE

## 2020-02-21 PROCEDURE — 3078F DIAST BP <80 MM HG: CPT | Mod: HCNC,CPTII,S$GLB, | Performed by: FAMILY MEDICINE

## 2020-02-21 PROCEDURE — 99214 OFFICE O/P EST MOD 30 MIN: CPT | Mod: HCNC,S$GLB,, | Performed by: FAMILY MEDICINE

## 2020-02-21 RX ORDER — VENLAFAXINE 75 MG/1
75 TABLET ORAL 2 TIMES DAILY
Qty: 60 TABLET | Refills: 2 | Status: SHIPPED | OUTPATIENT
Start: 2020-02-21 | End: 2020-07-27 | Stop reason: SDUPTHER

## 2020-02-21 NOTE — PROGRESS NOTES
Subjective:       Patient ID: Mil Sood is a 65 y.o. male.    Chief Complaint: Foreign Body in Ear and depression with anxiety      History of Present Illness:   Mil Sood 65 y.o. male presents today with   Left ear foreign body: The tip of the hearing aid inside the ear.  Depression with anxiety: Recurrent. Uncontrolled. Symptoms include depressed mood, tired, anhedonia, lack of concentration, lack of motivation, anxiety, insomnia and irritability. Affecting everyday activity and social life.Worsening. Psychosocial trigger-wife  9 months ago. Denies SI/HI. Treatment in the past includes-effexor, clonazepam, trazodone and celexa. Takes all his med at night. Reports that he has a snow cone biz that keeps him busy during the spring and summer.       Past Medical History:   Diagnosis Date    Anxiety     Cancer     spot on kidney and lung    Diabetes mellitus     Borderline    Diverticulitis     GERD (gastroesophageal reflux disease)     Hyperlipidemia     not on any meds currently    Hypertension     Lung mass     Mass of kidney      Family History   Problem Relation Age of Onset    Hypertension Mother     Hypertension Father     Melanoma Father     Cancer Sister      Social History     Socioeconomic History    Marital status:      Spouse name: Not on file    Number of children: Not on file    Years of education: Not on file    Highest education level: Not on file   Occupational History    Not on file   Social Needs    Financial resource strain: Not on file    Food insecurity:     Worry: Not on file     Inability: Not on file    Transportation needs:     Medical: Not on file     Non-medical: Not on file   Tobacco Use    Smoking status: Former Smoker     Packs/day: 1.00     Years: 20.00     Pack years: 20.00     Last attempt to quit: 1995     Years since quittin.0    Smokeless tobacco: Never Used   Substance and Sexual Activity    Alcohol use: No     Alcohol/week: 0.0  standard drinks    Drug use: No    Sexual activity: Yes     Partners: Female     Birth control/protection: None   Lifestyle    Physical activity:     Days per week: Not on file     Minutes per session: Not on file    Stress: Not at all   Relationships    Social connections:     Talks on phone: Not on file     Gets together: Not on file     Attends [employer] service: Not on file     Active member of club or organization: Not on file     Attends meetings of clubs or organizations: Not on file     Relationship status: Not on file   Other Topics Concern    Not on file   Social History Narrative    Not on file     Outpatient Encounter Medications as of 2/21/2020   Medication Sig Dispense Refill    amLODIPine (NORVASC) 10 MG tablet TAKE 1 TABLET BY MOUTH ONCE DAILY. (Patient taking differently: Take 10 mg by mouth nightly. ) 90 tablet 0    aspirin 81 mg Tab Take 1 tablet by mouth nightly.       benazepril (LOTENSIN) 20 MG tablet TAKE 2 TABLETS BY MOUTH ONCE A DAY. (Patient taking differently: Take 40 mg by mouth nightly. ) 180 tablet 0    citalopram (CELEXA) 40 MG tablet Take 40 mg by mouth nightly.       clonazePAM (KLONOPIN) 1 MG tablet Take 1 mg by mouth once daily.       finasteride (PROSCAR) 5 mg tablet Take 1 tablet (5 mg total) by mouth once daily. (Patient taking differently: Take 5 mg by mouth nightly. ) 30 tablet 11    fluticasone-salmeterol (ADVAIR HFA) 115-21 mcg/actuation HFAA Inhale 2 puffs into the lungs 2 (two) times daily. 12 g 12    gabapentin (NEURONTIN) 600 MG tablet Take 1 tablet (600 mg total) by mouth 3 (three) times daily. (Patient taking differently: Take 600 mg by mouth nightly. ) 90 tablet 11    hydrALAZINE (APRESOLINE) 25 MG tablet Take 1 tablet (25 mg total) by mouth nightly. 90 tablet 4    hydroCHLOROthiazide (MICROZIDE) 12.5 mg capsule TAKE 1 CAPSULE BY MOUTH ONCE DAILY. (Patient taking differently: Take 25 mg by mouth nightly. ) 90 capsule 0    hydrOXYzine pamoate  (VISTARIL) 25 MG Cap Take 25 mg by mouth.      metoprolol tartrate (LOPRESSOR) 50 MG tablet TAKE 1 TABLET BY MOUTH TWICE A DAY. 180 tablet 0    omeprazole (PRILOSEC) 40 MG capsule TAKE 1 CAPSULE BY MOUTH ONCE DAILY. (Patient taking differently: Take 40 mg by mouth nightly. ) 60 capsule 0    tamsulosin (FLOMAX) 0.4 mg Cap Take 1 capsule (0.4 mg total) by mouth once daily. (Patient taking differently: Take 0.4 mg by mouth nightly. ) 30 capsule 11    tiotropium (SPIRIVA) 18 mcg inhalation capsule Inhale 1 capsule (18 mcg total) into the lungs once daily. Controller 30 capsule 2    traZODone (DESYREL) 100 MG tablet Take 1 tablet (100 mg total) by mouth every evening. 30 tablet 5    zolpidem (AMBIEN) 10 mg Tab TAKE 1 TABLET BY MOUTH AT BEDTIME AS NEEDED. 30 tablet 4    [DISCONTINUED] venlafaxine (EFFEXOR-XR) 37.5 MG 24 hr capsule Take 1 capsule (37.5 mg total) by mouth once daily. (Patient taking differently: Take 37.5 mg by mouth nightly. ) 30 capsule 11    venlafaxine (EFFEXOR) 75 MG tablet Take 1 tablet (75 mg total) by mouth 2 (two) times daily. 60 tablet 2     No facility-administered encounter medications on file as of 2/21/2020.        Review of Systems   Constitutional: Negative for appetite change and fever.   HENT: Negative for congestion, facial swelling and voice change.    Eyes: Negative for discharge and itching.   Respiratory: Negative for cough, chest tightness and wheezing.    Cardiovascular: Negative.  Negative for chest pain and leg swelling.   Gastrointestinal: Negative for abdominal pain, nausea and vomiting.   Endocrine: Negative for cold intolerance and heat intolerance.   Genitourinary: Negative for dysuria and flank pain.   Musculoskeletal: Negative for myalgias and neck stiffness.   Skin: Negative for pallor and rash.   Neurological: Negative for facial asymmetry and weakness.   Psychiatric/Behavioral: Negative for agitation, confusion, self-injury, sleep disturbance and suicidal ideas.  "The patient is nervous/anxious.        Objective:      /78 (BP Location: Right arm, Patient Position: Sitting, BP Method: Large (Manual))   Pulse 89   Temp 98.2 °F (36.8 °C) (Oral)   Resp 18   Ht 5' 11" (1.803 m)   Wt 95.5 kg (210 lb 8.6 oz)   SpO2 96%   BMI 29.36 kg/m²   Physical Exam   Constitutional: He is oriented to person, place, and time. He appears well-developed. No distress.   HENT:   Head: Normocephalic and atraumatic.   Right Ear: External ear normal.   Left Ear: External ear normal.   Eyes: Conjunctivae and EOM are normal.   Neck: Neck supple.   Cardiovascular: Normal rate and regular rhythm.   Pulmonary/Chest: Effort normal. No respiratory distress.   Abdominal: Soft. Normal appearance and bowel sounds are normal. There is no hepatosplenomegaly.   Genitourinary:   Genitourinary Comments: deferred   Musculoskeletal: He exhibits no edema.   Neurological: He is alert and oriented to person, place, and time.   Skin: Skin is warm and dry.   Psychiatric: His speech is normal and behavior is normal. Judgment normal. His mood appears anxious. Cognition and memory are normal. He exhibits a depressed mood. He expresses no homicidal and no suicidal ideation. He expresses no suicidal plans and no homicidal plans.   Crying   Nursing note and vitals reviewed.      Results for orders placed or performed during the hospital encounter of 02/13/20   Specimen to Pathology, Surgery Gastrointestinal tract   Result Value Ref Range    Final Pathologic Diagnosis       ESOPHAGOGASTRIC JUNCTION, BIOPSY:  Squamous and glandular mucosa (esophagogastric junctional) with mild chronic  inflammation and reactive changes  No evidence of intestinal metaplasia, dysplasia or malignancy  No evidence of significant eosinophils present      Gross       Surgery ID [id number];  Pathology ID:  [id number]  1.  Received in formalin labeled "GE junction biopsy" are 2 pale tan tissue  fragments, 0.1-0.2 cm in greatest dimension.  The " specimen is entirely  embedded in cassette 850, 1A.    Grossed by: Benny Salinas      Microscopic Exam       There is an extremely minute fragment of benign squamous mucosa with few  multinucleated giant cells raising the possibility of HSV viral cytopathic  effect however this fragment is lost in deeper sections of HSV 1 and 2  immunostains. Please correlate clinically if an infectious process is  suggested.      Disclaimer       This test was developed and its performance characteristics determined by  [facility], Department of Pathology and Laboratory Medicine. It  has not been cleared or approved by the [place] Food and Drug Administration. The  FDA has determined that such clearance or approval is not necessary. This  test is used for clinical purposes. It should not be regarded as  investigational or for research. This laboratory is certified under the  Clinical Laboratory Improvement Admendments (CLIA) as qualified to perform  such high complexity clinical laboratory testing       Lab Results   Component Value Date    TSH 1.415 11/01/2018     Lab Results   Component Value Date    WBC 9.24 02/04/2020    HGB 13.5 (L) 02/04/2020    HCT 43.3 02/04/2020    MCV 89 02/04/2020     02/04/2020       Assessment:       1. Foreign body of left ear, initial encounter    2. Depression with anxiety        Plan:   Foreign body of left ear, initial encounter;Foreign body removed with a pair of tweezers per student, I was present the entire time. No complication.    Depression with anxiety; Not uncontrolled. Increase dose of Effexor to 75 using BID dosage. Advised to talk to a counselor.  -     venlafaxine (EFFEXOR) 75 MG tablet; Take 1 tablet (75 mg total) by mouth 2 (two) times daily.  Dispense: 60 tablet; Refill: 2    [id number]  Total time spent in face to face counseling and coordination of care - 25 minutes over 50% of time was used in discussion of prognosis, risks, benefits of treatment, instructions and  compliance with regimen .

## 2020-02-21 NOTE — PATIENT INSTRUCTIONS
Foreign Object in the Ear or Nose     The healthcare provider uses a lighted instrument to examine an object stuck in the ear or nose.     Children may put objects into their nose and ears as part of their exploration of the world. Unfortunately, these items can injure fragile tissue and may be hard to remove. A foreign object in the nose can cause infection and affect breathing. An object in the ear may puncture the eardrum or lead to hearing loss.  When to go to the emergency room (ER)  If you can't see or easily grasp the object, call your pediatrician or go to the emergency room. Avoid poking the object with cotton swabs or other tools. This may cause further damage.  What to expect in the ER  The healthcare provider will examine your child's nose or ear using a lighted instrument. A speculum may be used to widen the opening to the ear or nose. Removal of the object will depend on the object and its location. Your child may be asked to expel an object from the nose with hard blowing. To remove an object, your child is given local anesthetic to block pain. Then, the healthcare provider removes the object with a tiny clamp or hook. A gentle stream of water may dislodge an object in the ear. If the object cannot be removed by these means, the healthcare provider may use suction or a small hook. In that case, general anesthesia may be given to your child to help him or her sleep through the procedure.  When to call your child's healthcare provider  Call your healthcare provider if your otherwise healthy child has any of the signs or symptoms below:  · Fever (see Fever and children, below)  · Redness or swelling  · Pain  · Pus draining from the nose or ear     Fever and children  Always use a digital thermometer to check your childs temperature. Never use a mercury thermometer.  For infants and toddlers, be sure to use a rectal thermometer correctly. A rectal thermometer may accidentally poke a hole in (perforate)  the rectum. It may also pass on germs from the stool. Always follow the product makers directions for proper use. If you dont feel comfortable taking a rectal temperature, use another method. When you talk to your childs healthcare provider, tell him or her which method you used to take your childs temperature.  Here are guidelines for fever temperature. Ear temperatures arent accurate before 6 months of age. Dont take an oral temperature until your child is at least 4 years old.  Infant under 3 months old:  · Ask your childs healthcare provider how you should take the temperature.  · Rectal or forehead (temporal artery) temperature of 100.4°F (38°C) or higher, or as directed by the provider  · Armpit temperature of 99°F (37.2°C) or higher, or as directed by the provider  Child age 3 to 36 months:  · Rectal, forehead (temporal artery), or ear temperature of 102°F (38.9°C) or higher, or as directed by the provider  · Armpit temperature of 101°F (38.3°C) or higher, or as directed by the provider  Child of any age:  · Repeated temperature of 104°F (40°C) or higher, or as directed by the provider  · Fever that lasts more than 24 hours in a child under 2 years old. Or a fever that lasts for 3 days in a child 2 years or older.   Date Last Reviewed: 12/1/2016 © 2000-2017 [employer]. [address] [place] [address]. All rights reserved. This information is not intended as a substitute for professional medical care. Always follow your healthcare professional's instructions.        Foreign Body: Ear Canal (Removed)    An object has been removed from the ear canal. A foreign body in the ear can lead to irritation. Sometimes this can cause infection in the outer ear canal.  Home care  · If prescription eardrops have been given, use these as directed. Do not get water in your ear for the next five days. (Do not go swimming for 5 days.)  · You may use acetaminophen or ibuprofen to control pain,  unless another pain medicine was prescribed. Note: If you have chronic liver or kidney disease, or if you have ever had a stomach ulcer or gastrointestinal bleeding, talk with your healthcare provider before using these medicines.  Follow-up care  Follow up with your healthcare provider, or as advised.  When to seek medical advice  Call your healthcare provider right away if any of these occur  · Ear pain, itching, or discharge  · Redness or swelling of the outer ear  · Blood or fluid draining from the ear  · Persistent hearing loss  · Fever of 100.4°F (38°C) or higher, or as directed by your healthcare provider  Date Last Reviewed: 5/1/2017 © 2000-2017 [employer] [address] [place] [address], [place] [address]. All rights reserved. This information is not intended as a substitute for professional medical care. Always follow your healthcare professional's instructions.        Depression: Tips to Help Yourself  As your healthcare providers help treat your depression, you can also help yourself. Keep in mind that your illness affects you emotionally, physically, mentally, and socially. So full recovery will take time. Take care of your body and your soul, and be patient with yourself as you get better.    Self-care  · Educate yourself. Read about treatment and medicine options. If you have the energy, attend local conferences or support groups. Keep a list of useful websites and helpful books and use them as needed. This illness is not your fault. Dont blame yourself for your depression.  · Manage early symptoms. If you notice symptoms returning, experience triggers, or identify other factors that may lead to a depressive episode, get help as soon as possible. Ask trusted friends and family to monitor your behavior and let you know if they see anything of concern.  · Work with your provider. Find a provider you can trust. Communicate honestly with that person and share information on your treatment for  depression and your reaction to medicines.  · Be prepared for a crisis. Know what to do if you experience a crisis. Keep the phone number of a crisis hotline and know the location of your community's urgent care centers and the closest emergency department.  · Hold off on big decisions. Depression can cloud your judgment. So wait until you feel better before making major life decisions, such as changing jobs, moving, or getting  or .  · Be patient. Recovering from depression is a process. Dont be discouraged if it takes some time to feel better.  · Keep it simple. Depression saps your energy and concentration. So you wont be able to do all the things you used to do. Set small goals and do what you can.  · Be with others. Dont isolate yourself--youll only feel worse. Try to be with other people. And take part in fun activities when you can. Go to a movie, ballgame, [employer] service, or social event. Talk openly with people you can trust. And accept help when its offered.  Take care of your body  People with depression often lose the desire to take care of themselves. That only makes their problems worse. During treatment and afterward, make a point to:  · Exercise. Its a great way to take care of your body. And studies have shown that exercise helps fight depression.  · Avoid drugs and alcohol. These may ease the pain in the short term. But theyll only make your problems worse in the long run.  · Get relief from stress. Ask your healthcare provider for relaxation exercises and techniques to help relieve stress.  · Eat right. A balanced and healthy diet helps keep your body healthy.  Date Last Reviewed: 1/1/2017 © 2000-2017 [employer]. [address] [place] [address], [place] [address]. All rights reserved. This information is not intended as a substitute for professional medical care. Always follow your healthcare professional's instructions.        Counseling for Depression  For some  people, counseling, also called talk therapy, has been found to be as effective as medicine for mild to moderate depression. When done by a trained professional, this treatment is a powerful way to better understand your thoughts and feelings. Like medicine, it may take time before you notice how much counseling is helping.    Kinds of talk therapy  Different counselors use different methods for talk therapy. But all therapy aims to help change how you think about your problem. Most therapy for depression is often done one-on-one. But it may also be done in a group setting. You and your healthcare provider can discuss the type of therapy you think would work best for you. You can also discuss who the best person is to provide the therapy.  How therapy helps  Talking about your problems can help them seem less overwhelming. It can help work through problems you have with your life and your relationships. It can also help you understand how depression is clouding your thinking, not letting you see the world the way it really is. Therapy can give you:  · Insight about your emotions  · New tools for dealing with your problems  · Emotional support for making progress  Getting better takes time  Talk therapy can help you feel better. But change doesnt happen right away. Depression takes away your energy and motivation. So it can be hard to feel like going to therapy and sticking with it. But therapy has been proven to be very valuable in the treatment of depression. Therapy for depression is often done for a set number of sessions. In other cases, you and your therapist decide together at what point you no longer need therapy.  Additional sources of help  In addition to a professional counselor, it may help to talk to other people in your life. You may find support and insight from:  · A close friend or family member  · A  trained in counseling  · A local support group or community group  · A 12-step program  (such as Alcoholics Anonymous) for dealing with problems that can contribute to depression, such as alcohol or drug addiction  Date Last Reviewed: 1/1/2017  © 9007-0979 [employer]. [address] [place] [address], [place] [address]. All rights reserved. This information is not intended as a substitute for professional medical care. Always follow your healthcare professional's instructions.

## 2020-03-04 RX ORDER — AMLODIPINE BESYLATE 10 MG/1
10 TABLET ORAL DAILY
Qty: 90 TABLET | Refills: 1 | Status: SHIPPED | OUTPATIENT
Start: 2020-03-04 | End: 2020-07-27 | Stop reason: SDUPTHER

## 2020-03-04 NOTE — TELEPHONE ENCOUNTER
----- Message from Tia Mendes sent at 3/4/2020 11:30 AM CST -----  Contact: Patient  Type:  RX Refill Request    Who Called: patient  Refill or New Rx:refill  RX Name and Strength:Amlodipine, Rehan 10mg  How is the patient currently taking it? (ex. 1XDay):once daily  Is this a 30 day or 90 day RX:90  Preferred Pharmacy with phone number:[facility]  Local or Mail Order:local  Ordering Provider:Dr Roberson  Would the patient rather a call back or a response via [facility]? call  Best Call Back Number:[phone]  Additional Information:

## 2020-03-12 ENCOUNTER — OFFICE VISIT (OUTPATIENT)
Dept: FAMILY MEDICINE | Facility: CLINIC | Age: 65
End: 2020-03-12
Payer: MEDICARE

## 2020-03-12 VITALS
BODY MASS INDEX: 28.75 KG/M2 | RESPIRATION RATE: 18 BRPM | SYSTOLIC BLOOD PRESSURE: 138 MMHG | OXYGEN SATURATION: 96 % | HEART RATE: 79 BPM | DIASTOLIC BLOOD PRESSURE: 68 MMHG | TEMPERATURE: 99 F | WEIGHT: 206.13 LBS

## 2020-03-12 DIAGNOSIS — T16.2XXA FOREIGN BODY IN EAR, LEFT, INITIAL ENCOUNTER: Primary | ICD-10-CM

## 2020-03-12 PROCEDURE — 99999 PR PBB SHADOW E&M-EST. PATIENT-LVL IV: CPT | Mod: PBBFAC,HCNC,, | Performed by: FAMILY MEDICINE

## 2020-03-12 PROCEDURE — 3075F SYST BP GE 130 - 139MM HG: CPT | Mod: HCNC,CPTII,S$GLB, | Performed by: FAMILY MEDICINE

## 2020-03-12 PROCEDURE — 3075F PR MOST RECENT SYSTOLIC BLOOD PRESS GE 130-139MM HG: ICD-10-PCS | Mod: HCNC,CPTII,S$GLB, | Performed by: FAMILY MEDICINE

## 2020-03-12 PROCEDURE — 99999 PR PBB SHADOW E&M-EST. PATIENT-LVL IV: ICD-10-PCS | Mod: PBBFAC,HCNC,, | Performed by: FAMILY MEDICINE

## 2020-03-12 PROCEDURE — 3078F PR MOST RECENT DIASTOLIC BLOOD PRESSURE < 80 MM HG: ICD-10-PCS | Mod: HCNC,CPTII,S$GLB, | Performed by: FAMILY MEDICINE

## 2020-03-12 PROCEDURE — 3008F BODY MASS INDEX DOCD: CPT | Mod: HCNC,CPTII,S$GLB, | Performed by: FAMILY MEDICINE

## 2020-03-12 PROCEDURE — 1101F PT FALLS ASSESS-DOCD LE1/YR: CPT | Mod: HCNC,CPTII,S$GLB, | Performed by: FAMILY MEDICINE

## 2020-03-12 PROCEDURE — 3078F DIAST BP <80 MM HG: CPT | Mod: HCNC,CPTII,S$GLB, | Performed by: FAMILY MEDICINE

## 2020-03-12 PROCEDURE — 1101F PR PT FALLS ASSESS DOC 0-1 FALLS W/OUT INJ PAST YR: ICD-10-PCS | Mod: HCNC,CPTII,S$GLB, | Performed by: FAMILY MEDICINE

## 2020-03-12 PROCEDURE — 99213 OFFICE O/P EST LOW 20 MIN: CPT | Mod: HCNC,S$GLB,, | Performed by: FAMILY MEDICINE

## 2020-03-12 PROCEDURE — 3008F PR BODY MASS INDEX (BMI) DOCUMENTED: ICD-10-PCS | Mod: HCNC,CPTII,S$GLB, | Performed by: FAMILY MEDICINE

## 2020-03-12 PROCEDURE — 99213 PR OFFICE/OUTPT VISIT, EST, LEVL III, 20-29 MIN: ICD-10-PCS | Mod: HCNC,S$GLB,, | Performed by: FAMILY MEDICINE

## 2020-03-12 NOTE — PATIENT INSTRUCTIONS
Foreign Body: Ear Canal (Removed)    An object has been removed from the ear canal. A foreign body in the ear can lead to irritation. Sometimes this can cause infection in the outer ear canal.  Home care  · If prescription eardrops have been given, use these as directed. Do not get water in your ear for the next five days. (Do not go swimming for 5 days.)  · You may use acetaminophen or ibuprofen to control pain, unless another pain medicine was prescribed. Note: If you have chronic liver or kidney disease, or if you have ever had a stomach ulcer or gastrointestinal bleeding, talk with your healthcare provider before using these medicines.  Follow-up care  Follow up with your healthcare provider, or as advised.  When to seek medical advice  Call your healthcare provider right away if any of these occur  · Ear pain, itching, or discharge  · Redness or swelling of the outer ear  · Blood or fluid draining from the ear  · Persistent hearing loss  · Fever of 100.4°F (38°C) or higher, or as directed by your healthcare provider  Date Last Reviewed: 5/1/2017  © 0675-8618 [employer]. [address] [place] [address], [place] [address]. All rights reserved. This information is not intended as a substitute for professional medical care. Always follow your healthcare professional's instructions.

## 2020-03-12 NOTE — PROGRESS NOTES
Subjective:       Patient ID: Mil Sood is a 65 y.o. male.    Chief Complaint: Foreign Body in Ear      History of Present Illness:   Mil Sood 65 y.o. male presents today with      Left ear foreign body: The tip of the hearing aid inside the ear since last night.    Past Medical History:   Diagnosis Date    Anxiety     Cancer     spot on kidney and lung    Diabetes mellitus     Borderline    Diverticulitis     GERD (gastroesophageal reflux disease)     Hyperlipidemia     not on any meds currently    Hypertension     Lung mass     Mass of kidney      Family History   Problem Relation Age of Onset    Hypertension Mother     Hypertension Father     Melanoma Father     Cancer Sister      Social History     Socioeconomic History    Marital status:      Spouse name: Not on file    Number of children: Not on file    Years of education: Not on file    Highest education level: Not on file   Occupational History    Not on file   Social Needs    Financial resource strain: Not on file    Food insecurity:     Worry: Not on file     Inability: Not on file    Transportation needs:     Medical: Not on file     Non-medical: Not on file   Tobacco Use    Smoking status: Former Smoker     Packs/day: 1.00     Years: 20.00     Pack years: 20.00     Last attempt to quit: 1995     Years since quittin.1    Smokeless tobacco: Never Used   Substance and Sexual Activity    Alcohol use: No     Alcohol/week: 0.0 standard drinks    Drug use: No    Sexual activity: Yes     Partners: Female     Birth control/protection: None   Lifestyle    Physical activity:     Days per week: Not on file     Minutes per session: Not on file    Stress: Not at all   Relationships    Social connections:     Talks on phone: Not on file     Gets together: Not on file     Attends Sabianist service: Not on file     Active member of club or organization: Not on file     Attends meetings of clubs or organizations: Not  on file     Relationship status: Not on file   Other Topics Concern    Not on file   Social History Narrative    Not on file     Outpatient Encounter Medications as of 3/12/2020   Medication Sig Dispense Refill    amLODIPine (NORVASC) 10 MG tablet Take 1 tablet (10 mg total) by mouth once daily. 90 tablet 1    aspirin 81 mg Tab Take 1 tablet by mouth nightly.       benazepril (LOTENSIN) 20 MG tablet TAKE 2 TABLETS BY MOUTH ONCE A DAY. (Patient taking differently: Take 40 mg by mouth nightly. ) 180 tablet 0    citalopram (CELEXA) 40 MG tablet Take 40 mg by mouth nightly.       clonazePAM (KLONOPIN) 1 MG tablet Take 1 mg by mouth once daily.       finasteride (PROSCAR) 5 mg tablet Take 1 tablet (5 mg total) by mouth once daily. (Patient taking differently: Take 5 mg by mouth nightly. ) 30 tablet 11    fluticasone-salmeterol (ADVAIR HFA) 115-21 mcg/actuation HFAA Inhale 2 puffs into the lungs 2 (two) times daily. 12 g 12    gabapentin (NEURONTIN) 600 MG tablet Take 1 tablet (600 mg total) by mouth 3 (three) times daily. (Patient taking differently: Take 600 mg by mouth nightly. ) 90 tablet 11    hydrALAZINE (APRESOLINE) 25 MG tablet Take 1 tablet (25 mg total) by mouth nightly. 90 tablet 4    hydroCHLOROthiazide (MICROZIDE) 12.5 mg capsule TAKE 1 CAPSULE BY MOUTH ONCE DAILY. (Patient taking differently: Take 25 mg by mouth nightly. ) 90 capsule 0    hydrOXYzine pamoate (VISTARIL) 25 MG Cap Take 25 mg by mouth.      metoprolol tartrate (LOPRESSOR) 50 MG tablet TAKE 1 TABLET BY MOUTH TWICE A DAY. 180 tablet 0    omeprazole (PRILOSEC) 40 MG capsule TAKE 1 CAPSULE BY MOUTH ONCE DAILY. (Patient taking differently: Take 40 mg by mouth nightly. ) 60 capsule 0    tamsulosin (FLOMAX) 0.4 mg Cap Take 1 capsule (0.4 mg total) by mouth once daily. (Patient taking differently: Take 0.4 mg by mouth nightly. ) 30 capsule 11    tiotropium (SPIRIVA) 18 mcg inhalation capsule Inhale 1 capsule (18 mcg total) into the  lungs once daily. Controller 30 capsule 2    traZODone (DESYREL) 100 MG tablet Take 1 tablet (100 mg total) by mouth every evening. 30 tablet 5    venlafaxine (EFFEXOR) 75 MG tablet Take 1 tablet (75 mg total) by mouth 2 (two) times daily. 60 tablet 2    zolpidem (AMBIEN) 10 mg Tab TAKE 1 TABLET BY MOUTH AT BEDTIME AS NEEDED. 30 tablet 4     No facility-administered encounter medications on file as of 3/12/2020.        Review of Systems   Constitutional: Negative for appetite change and fever.   HENT: Negative for congestion, facial swelling and voice change.    Eyes: Negative for discharge and itching.   Respiratory: Negative for cough, chest tightness and wheezing.    Cardiovascular: Negative.  Negative for chest pain and leg swelling.   Gastrointestinal: Negative for abdominal pain, nausea and vomiting.   Endocrine: Negative for cold intolerance and heat intolerance.   Genitourinary: Negative for dysuria and flank pain.   Musculoskeletal: Negative for myalgias and neck stiffness.   Skin: Negative for pallor and rash.   Neurological: Negative for facial asymmetry and weakness.   Psychiatric/Behavioral: Negative for agitation and confusion.       Objective:      /68 (BP Location: Right arm, Patient Position: Sitting, BP Method: Large (Manual))   Pulse 79   Temp 98.8 °F (37.1 °C) (Oral)   Resp 18   Wt 93.5 kg (206 lb 2.1 oz)   SpO2 96%   BMI 28.75 kg/m²   Physical Exam   Constitutional: He is oriented to person, place, and time. He appears well-developed. No distress.   HENT:   Head: Normocephalic and atraumatic.   Right Ear: External ear normal.   Left Ear: A foreign body is present.   Eyes: Conjunctivae and EOM are normal.   Neck: Neck supple.   Cardiovascular: Normal rate and regular rhythm.   Pulmonary/Chest: Effort normal. No respiratory distress.   Abdominal: Soft. Normal appearance and bowel sounds are normal. There is no hepatosplenomegaly.   Genitourinary:   Genitourinary Comments: deferred    Musculoskeletal: He exhibits no edema.   Neurological: He is alert and oriented to person, place, and time.   Skin: Skin is warm and dry.   Psychiatric: He has a normal mood and affect. His behavior is normal.   Nursing note and vitals reviewed.        Assessment:       1. Foreign body in ear, left, initial encounter        Plan:   Foreign body in ear, left, initial encounter    Hearing aid tip removed with a forceps.   Patient tolerated procedure.

## 2020-05-13 RX ORDER — INSULIN PUMP SYRINGE, 3 ML
EACH MISCELLANEOUS
Qty: 1 EACH | Refills: 0 | Status: SHIPPED | OUTPATIENT
Start: 2020-05-13 | End: 2021-05-13

## 2020-05-13 NOTE — TELEPHONE ENCOUNTER
----- Message from Reginaldo Trevizo sent at 5/13/2020 10:21 AM CDT -----  Contact: pt  Type:  Diabetic/Medical Supplies Request    Name of Caller:  pt  What supplies are needed:  Blood sugar monitar  What is the brand of the supplies:    Refill or New Rx:  NEW  If checking glucose, how many times do they check it?:  2x  Who prescribed the original supplies:  Da  Pharmacy/Company Name, Phone #, Location:    [facility] [address]  [address]  [place] [address]  Phone: [phone] Fax: [phone]    Requesting a Call Back:  yes  Best Call Back Number:  [phone]   Additional Information:  Pt current niels is obsolete and they dont make strips for it any longer. Please call to discuss if necessary.

## 2020-06-19 ENCOUNTER — PATIENT OUTREACH (OUTPATIENT)
Dept: ADMINISTRATIVE | Facility: HOSPITAL | Age: 65
End: 2020-06-19

## 2020-06-19 NOTE — PROGRESS NOTES
Health Maintenance Updated.   Immunizations: Abstracted.  Care Everywhere: Abstracted:Results Updated.  [facility] Search: Pt not found. No results.  Health Maintenance Due   Topic    Pneumococcal Vaccine (65+ Low/Medium Risk) (2 of 2 - PPSV23)    Abdominal Aortic Aneurysm Screening      AAA:DUE COLON:DUE

## 2020-07-02 ENCOUNTER — OFFICE VISIT (OUTPATIENT)
Dept: FAMILY MEDICINE | Facility: CLINIC | Age: 65
End: 2020-07-02
Payer: MEDICARE

## 2020-07-02 ENCOUNTER — CLINICAL SUPPORT (OUTPATIENT)
Dept: CARDIOLOGY | Facility: CLINIC | Age: 65
End: 2020-07-02
Payer: MEDICARE

## 2020-07-02 VITALS
BODY MASS INDEX: 28 KG/M2 | TEMPERATURE: 98 F | DIASTOLIC BLOOD PRESSURE: 60 MMHG | RESPIRATION RATE: 18 BRPM | SYSTOLIC BLOOD PRESSURE: 132 MMHG | HEIGHT: 71 IN | WEIGHT: 200 LBS | HEART RATE: 100 BPM | OXYGEN SATURATION: 97 %

## 2020-07-02 DIAGNOSIS — H26.9 TOTAL CATARACT OF LEFT EYE: Primary | ICD-10-CM

## 2020-07-02 DIAGNOSIS — Z01.818 PRE-OP EVALUATION: ICD-10-CM

## 2020-07-02 DIAGNOSIS — Z13.6 ENCOUNTER FOR ABDOMINAL AORTIC ANEURYSM (AAA) SCREENING: ICD-10-CM

## 2020-07-02 DIAGNOSIS — F51.05 INSOMNIA DUE TO ANXIETY AND FEAR: ICD-10-CM

## 2020-07-02 DIAGNOSIS — F40.9 INSOMNIA DUE TO ANXIETY AND FEAR: ICD-10-CM

## 2020-07-02 PROCEDURE — 93005 EKG 12-LEAD: ICD-10-PCS | Mod: HCNC,S$GLB,, | Performed by: FAMILY MEDICINE

## 2020-07-02 PROCEDURE — 3075F PR MOST RECENT SYSTOLIC BLOOD PRESS GE 130-139MM HG: ICD-10-PCS | Mod: HCNC,CPTII,S$GLB, | Performed by: FAMILY MEDICINE

## 2020-07-02 PROCEDURE — 3008F BODY MASS INDEX DOCD: CPT | Mod: HCNC,CPTII,S$GLB, | Performed by: FAMILY MEDICINE

## 2020-07-02 PROCEDURE — 99999 PR PBB SHADOW E&M-EST. PATIENT-LVL III: ICD-10-PCS | Mod: PBBFAC,HCNC,, | Performed by: FAMILY MEDICINE

## 2020-07-02 PROCEDURE — 1101F PT FALLS ASSESS-DOCD LE1/YR: CPT | Mod: HCNC,CPTII,S$GLB, | Performed by: FAMILY MEDICINE

## 2020-07-02 PROCEDURE — 99214 OFFICE O/P EST MOD 30 MIN: CPT | Mod: HCNC,S$GLB,, | Performed by: FAMILY MEDICINE

## 2020-07-02 PROCEDURE — 99214 PR OFFICE/OUTPT VISIT, EST, LEVL IV, 30-39 MIN: ICD-10-PCS | Mod: HCNC,S$GLB,, | Performed by: FAMILY MEDICINE

## 2020-07-02 PROCEDURE — 93010 ELECTROCARDIOGRAM REPORT: CPT | Mod: HCNC,S$GLB,, | Performed by: INTERNAL MEDICINE

## 2020-07-02 PROCEDURE — 3078F DIAST BP <80 MM HG: CPT | Mod: HCNC,CPTII,S$GLB, | Performed by: FAMILY MEDICINE

## 2020-07-02 PROCEDURE — 93005 ELECTROCARDIOGRAM TRACING: CPT | Mod: HCNC,S$GLB,, | Performed by: FAMILY MEDICINE

## 2020-07-02 PROCEDURE — 1101F PR PT FALLS ASSESS DOC 0-1 FALLS W/OUT INJ PAST YR: ICD-10-PCS | Mod: HCNC,CPTII,S$GLB, | Performed by: FAMILY MEDICINE

## 2020-07-02 PROCEDURE — 3008F PR BODY MASS INDEX (BMI) DOCUMENTED: ICD-10-PCS | Mod: HCNC,CPTII,S$GLB, | Performed by: FAMILY MEDICINE

## 2020-07-02 PROCEDURE — 3075F SYST BP GE 130 - 139MM HG: CPT | Mod: HCNC,CPTII,S$GLB, | Performed by: FAMILY MEDICINE

## 2020-07-02 PROCEDURE — 93010 EKG 12-LEAD: ICD-10-PCS | Mod: HCNC,S$GLB,, | Performed by: INTERNAL MEDICINE

## 2020-07-02 PROCEDURE — 3078F PR MOST RECENT DIASTOLIC BLOOD PRESSURE < 80 MM HG: ICD-10-PCS | Mod: HCNC,CPTII,S$GLB, | Performed by: FAMILY MEDICINE

## 2020-07-02 PROCEDURE — 99999 PR PBB SHADOW E&M-EST. PATIENT-LVL III: CPT | Mod: PBBFAC,HCNC,, | Performed by: FAMILY MEDICINE

## 2020-07-02 RX ORDER — ZOLPIDEM TARTRATE 10 MG/1
TABLET ORAL
Qty: 30 TABLET | Refills: 2 | Status: SHIPPED | OUTPATIENT
Start: 2020-07-02 | End: 2020-07-27 | Stop reason: SDUPTHER

## 2020-07-02 RX ORDER — TRAZODONE HYDROCHLORIDE 100 MG/1
100 TABLET ORAL NIGHTLY
Qty: 30 TABLET | Refills: 2 | Status: SHIPPED | OUTPATIENT
Start: 2020-07-02 | End: 2020-07-27 | Stop reason: SDUPTHER

## 2020-07-02 NOTE — PATIENT INSTRUCTIONS
Presurgery Checklist  You are scheduled to have surgery. The healthcare staff will try to make your stay comfortable. Use the guidelines below to remind yourself what to do before surgery. Be sure to follow any specific pre-op instructions from your surgeon or nurse.  Preparing for surgery  · If you are having abdominal surgery, ask what you need to do to clear your bowel.  · Tell your surgeon if you have allergies to any medicines, latex, or foods.  · Ask your surgeon if you might need a blood transfusion during surgery and if so, how to prepare for it. In some cases, you can donate blood before surgery. If needed, this blood can be given back (transfused) to you during or after surgery.  · Arrange for an adult family member or friend to drive you home after surgery. If possible, have someone ready to help you at home as you recover.  · Call the surgeon if you get a cold, fever, sore throat, diarrhea, or other health problem just before surgery. Your surgeon can decide whether or not to postpone the surgery.  Medicines  · Tell your surgeon about all medicines you take, including prescription and over-the-counter products such as herbal remedies and vitamins. Ask if you should continue taking them.  · If you take ibuprofen, naproxen, or blood thinners (anticoagulants) such as aspirin, clopidogrel, or warfarin, ask your surgeon whether you should stop taking them and how long before surgery you should stop.  · You may be told to take antibiotics just before surgery to prevent infection. If so, follow instructions carefully on how to take them.  · If you are told to take blood thinners to help prevent blood clots after surgery, be sure to follow the instructions on how to take them.  Stop smoking  If you smoke, healing may take longer. So at least 2 week(s) before surgery, stop smoking.  Bathing or showering before surgery  · If instructed, wash with antibacterial soap. Afterward, do not use lotions, oils, or  powders.  · If you are having surgery on the head, you may be asked to shampoo with antibacterial soap. Follow instructions for doing so.  Do not remove hair from the surgery site  Do not shave hair from the incision site, unless you are given specific instructions to do so. Usually, if hair needs to be removed, it will be done at the hospital right before surgery.  Dont eat or drink  · Follow any directions you are given for not eating or drinking before surgery. If you don't follow instructions about when to stop eating and drinking, your procedure may be postponed or rescheduled for another day. This is a safety issue.  · You can brush your teeth and rinse your mouth, but dont swallow any water.  Day of surgery  · Don't wear makeup. Don't use perfume, deodorant, or hairspray. Remove nail polish and artificial nails.  · Leave jewelry (including rings), watches, and other valuables at home.  · Be sure to bring health insurance cards or forms and a photo ID.  · Bring a list of your medicines (include the name, dose, how often you take them, and the time last dose was taken).  · Arrive on time at the hospital or surgery facility.  Date Last Reviewed: 12/1/2016  © 9186-1637 [employer]. [address] [place] [address], [place] [address]. All rights reserved. This information is not intended as a substitute for professional medical care. Always follow your healthcare professional's instructions.

## 2020-07-02 NOTE — PROGRESS NOTES
Subjective:       Patient ID: Mil Sood is a 65 y.o. male.    Chief Complaint: Left eye cataract and preop evaluation, insomnia        History of Present Illness:      Mil Sood is a 65 y.o. male who presents to the office today for a preoperative consultation at the request of Dr Whitley who plans on performing Phaco with IOL OS on July 7.     Functional Status:      The patient is able to climb a flight of stairs. The patient is able to ambulate  without difficulty. The patient's functional status is affected by the surgical problem. The patient's functional status is not affected by shortness of breath, chest pain, dyspnea on exertion and fatigue.    MET score greater than 4    Predictors of intubation difficulty:       Morbid obesity? no   Anatomically abnormal facies? no   Prominent incisors? no   Receding mandible? no   Short, thick neck? no   Neck range of motion: normal   Dentition: dentures  Cardiographics:      ECG:  EKG: normal EKG, normal sinus rhythm.    Imaging:                    NA  Past Medical History:   Diagnosis Date    Anxiety     Cancer     spot on kidney and lung    Diabetes mellitus     Borderline    Diverticulitis     GERD (gastroesophageal reflux disease)     Hyperlipidemia     not on any meds currently    Hypertension     Lung mass     Mass of kidney      Family History   Problem Relation Age of Onset    Hypertension Mother     Hypertension Father     Melanoma Father     Cancer Sister      Social History     Socioeconomic History    Marital status:      Spouse name: Not on file    Number of children: Not on file    Years of education: Not on file    Highest education level: Not on file   Occupational History    Not on file   Social Needs    Financial resource strain: Not on file    Food insecurity     Worry: Not on file     Inability: Not on file    Transportation needs     Medical: Not on file     Non-medical: Not on file   Tobacco Use    Smoking  status: Former Smoker     Packs/day: 1.00     Years: 20.00     Pack years: 20.00     Quit date: 1995     Years since quittin.4    Smokeless tobacco: Never Used   Substance and Sexual Activity    Alcohol use: No     Alcohol/week: 0.0 standard drinks    Drug use: No    Sexual activity: Yes     Partners: Female     Birth control/protection: None   Lifestyle    Physical activity     Days per week: Not on file     Minutes per session: Not on file    Stress: Not at all   Relationships    Social connections     Talks on phone: Not on file     Gets together: Not on file     Attends Alevism service: Not on file     Active member of club or organization: Not on file     Attends meetings of clubs or organizations: Not on file     Relationship status: Not on file   Other Topics Concern    Not on file   Social History Narrative    Not on file     Outpatient Encounter Medications as of 2020   Medication Sig Dispense Refill    amLODIPine (NORVASC) 10 MG tablet Take 1 tablet (10 mg total) by mouth once daily. 90 tablet 1    aspirin 81 mg Tab Take 1 tablet by mouth nightly.       benazepril (LOTENSIN) 20 MG tablet TAKE 2 TABLETS BY MOUTH ONCE A DAY. (Patient taking differently: Take 40 mg by mouth nightly. ) 180 tablet 0    blood sugar diagnostic Strp Use to check blood sugars  strip 3    blood-glucose meter kit Use as instructed 1 each 0    citalopram (CELEXA) 40 MG tablet Take 40 mg by mouth nightly.       clonazePAM (KLONOPIN) 1 MG tablet Take 1 mg by mouth once daily.       finasteride (PROSCAR) 5 mg tablet Take 1 tablet (5 mg total) by mouth once daily. (Patient taking differently: Take 5 mg by mouth nightly. ) 30 tablet 11    fluticasone-salmeterol (ADVAIR HFA) 115-21 mcg/actuation HFAA Inhale 2 puffs into the lungs 2 (two) times daily. 12 g 12    gabapentin (NEURONTIN) 600 MG tablet Take 1 tablet (600 mg total) by mouth 3 (three) times daily. (Patient taking differently: Take 600 mg by  mouth nightly. ) 90 tablet 11    hydrALAZINE (APRESOLINE) 25 MG tablet Take 1 tablet (25 mg total) by mouth nightly. 90 tablet 4    hydroCHLOROthiazide (MICROZIDE) 12.5 mg capsule TAKE 1 CAPSULE BY MOUTH ONCE DAILY. (Patient taking differently: Take 25 mg by mouth nightly. ) 90 capsule 0    hydrOXYzine pamoate (VISTARIL) 25 MG Cap Take 25 mg by mouth.      metoprolol tartrate (LOPRESSOR) 50 MG tablet TAKE 1 TABLET BY MOUTH TWICE A DAY. 180 tablet 0    omeprazole (PRILOSEC) 40 MG capsule TAKE 1 CAPSULE BY MOUTH ONCE DAILY. (Patient taking differently: Take 40 mg by mouth nightly. ) 60 capsule 0    tamsulosin (FLOMAX) 0.4 mg Cap Take 1 capsule (0.4 mg total) by mouth once daily. (Patient taking differently: Take 0.4 mg by mouth nightly. ) 30 capsule 11    tiotropium (SPIRIVA) 18 mcg inhalation capsule Inhale 1 capsule (18 mcg total) into the lungs once daily. Controller 30 capsule 2    traZODone (DESYREL) 100 MG tablet Take 1 tablet (100 mg total) by mouth every evening. 30 tablet 2    venlafaxine (EFFEXOR) 75 MG tablet Take 1 tablet (75 mg total) by mouth 2 (two) times daily. 60 tablet 2    zolpidem (AMBIEN) 10 mg Tab TAKE 1 TABLET BY MOUTH AT BEDTIME AS NEEDED. 30 tablet 2    [DISCONTINUED] traZODone (DESYREL) 100 MG tablet Take 1 tablet (100 mg total) by mouth every evening. 30 tablet 5    [DISCONTINUED] zolpidem (AMBIEN) 10 mg Tab TAKE 1 TABLET BY MOUTH AT BEDTIME AS NEEDED. 30 tablet 4     No facility-administered encounter medications on file as of 7/2/2020.      Lab Results   Component Value Date    WBC 9.24 02/04/2020    HGB 13.5 (L) 02/04/2020    HCT 43.3 02/04/2020    MCV 89 02/04/2020     02/04/2020     CMP  Sodium   Date Value Ref Range Status   02/04/2020 139 136 - 145 mmol/L Final     Potassium   Date Value Ref Range Status   02/04/2020 4.6 3.5 - 5.1 mmol/L Final     Chloride   Date Value Ref Range Status   02/04/2020 105 95 - 110 mmol/L Final     CO2   Date Value Ref Range Status    02/04/2020 26 23 - 29 mmol/L Final     Glucose   Date Value Ref Range Status   02/04/2020 90 70 - 110 mg/dL Final     BUN, Bld   Date Value Ref Range Status   02/04/2020 16 8 - 23 mg/dL Final     Creatinine   Date Value Ref Range Status   02/04/2020 1.2 0.5 - 1.4 mg/dL Final     Calcium   Date Value Ref Range Status   02/04/2020 9.1 8.7 - 10.5 mg/dL Final     Total Protein   Date Value Ref Range Status   02/04/2020 6.5 6.0 - 8.4 g/dL Final     Albumin   Date Value Ref Range Status   02/04/2020 3.7 3.5 - 5.2 g/dL Final     Total Bilirubin   Date Value Ref Range Status   02/04/2020 0.6 0.1 - 1.0 mg/dL Final     Comment:     For infants and newborns, interpretation of results should be based  on gestational age, weight and in agreement with clinical  observations.  Premature Infant recommended reference ranges:  Up to 24 hours.............<8.0 mg/dL  Up to 48 hours............<12.0 mg/dL  3-5 days..................<15.0 mg/dL  6-29 days.................<15.0 mg/dL       Alkaline Phosphatase   Date Value Ref Range Status   02/04/2020 45 (L) 55 - 135 U/L Final     AST   Date Value Ref Range Status   02/04/2020 15 10 - 40 U/L Final     ALT   Date Value Ref Range Status   02/04/2020 15 10 - 44 U/L Final     Anion Gap   Date Value Ref Range Status   02/04/2020 8 8 - 16 mmol/L Final     eGFR if    Date Value Ref Range Status   02/04/2020 >60 >60 mL/min/1.73 m^2 Final     eGFR if non    Date Value Ref Range Status   02/04/2020 >60 >60 mL/min/1.73 m^2 Final     Comment:     Calculation used to obtain the estimated glomerular filtration  rate (eGFR) is the CKD-EPI equation.        Review of Systems   Constitutional: Negative for appetite change and fever.   HENT: Negative for congestion, facial swelling and voice change.    Eyes: Negative for discharge and itching.   Respiratory: Negative for cough, chest tightness and wheezing.    Cardiovascular: Negative.  Negative for chest pain and leg  "swelling.   Gastrointestinal: Negative for abdominal pain, nausea and vomiting.   Endocrine: Negative for cold intolerance and heat intolerance.   Genitourinary: Negative for dysuria and flank pain.   Musculoskeletal: Negative for myalgias and neck stiffness.   Skin: Negative for pallor and rash.   Neurological: Negative for facial asymmetry and weakness.   Psychiatric/Behavioral: Negative for agitation and confusion.       Objective:      /60 (BP Location: Right arm, Patient Position: Sitting, BP Method: Large (Manual))   Pulse 100   Temp 98.2 °F (36.8 °C) (Oral)   Resp 18   Ht 5' 11" (1.803 m)   Wt 90.7 kg (200 lb)   SpO2 97%   BMI 27.89 kg/m²   Physical Exam  Vitals signs and nursing note reviewed.   Constitutional:       General: He is not in acute distress.     Appearance: Normal appearance. He is well-developed.   HENT:      Head: Normocephalic and atraumatic.      Right Ear: External ear normal.      Left Ear: External ear normal.   Eyes:      Conjunctiva/sclera: Conjunctivae normal.   Neck:      Musculoskeletal: Neck supple.   Cardiovascular:      Rate and Rhythm: Normal rate and regular rhythm.      Heart sounds: Normal heart sounds. No murmur.   Pulmonary:      Effort: Pulmonary effort is normal. No respiratory distress.      Breath sounds: Normal breath sounds. No stridor. No wheezing or rhonchi.   Abdominal:      General: Bowel sounds are normal.      Palpations: Abdomen is soft.   Genitourinary:     Comments: deferred  Skin:     General: Skin is warm and dry.   Neurological:      Mental Status: He is alert and oriented to person, place, and time.   Psychiatric:         Behavior: Behavior normal.           Assessment:       1. Total cataract of left eye    2. Insomnia due to anxiety and fear    3. Pre-op evaluation        Plan:   Total cataract of left eye: managed by Dr Whitley  Insomnia due to anxiety and fear  -     traZODone (DESYREL) 100 MG tablet; Take 1 tablet (100 mg total) by mouth " every evening.  Dispense: 30 tablet; Refill: 2  -     zolpidem (AMBIEN) 10 mg Tab; TAKE 1 TABLET BY MOUTH AT BEDTIME AS NEEDED.  Dispense: 30 tablet; Refill: 2    Pre-op evaluation:  Low risk surgery on a low risk pt.  Labs reviewed, more than a month but they were good, will get and EKG and if all right will give the final clearance.  Stop ASA  5 days before surgery.

## 2020-07-15 ENCOUNTER — OFFICE VISIT (OUTPATIENT)
Dept: FAMILY MEDICINE | Facility: CLINIC | Age: 65
End: 2020-07-15
Payer: MEDICARE

## 2020-07-15 ENCOUNTER — HOSPITAL ENCOUNTER (OUTPATIENT)
Dept: RADIOLOGY | Facility: HOSPITAL | Age: 65
Discharge: HOME | End: 2020-07-15
Attending: FAMILY MEDICINE
Payer: MEDICARE

## 2020-07-15 VITALS
HEIGHT: 71 IN | TEMPERATURE: 97 F | HEART RATE: 96 BPM | SYSTOLIC BLOOD PRESSURE: 130 MMHG | WEIGHT: 206.44 LBS | OXYGEN SATURATION: 96 % | BODY MASS INDEX: 28.9 KG/M2 | DIASTOLIC BLOOD PRESSURE: 76 MMHG

## 2020-07-15 DIAGNOSIS — M25.561 ACUTE PAIN OF RIGHT KNEE: ICD-10-CM

## 2020-07-15 DIAGNOSIS — M25.561 ACUTE PAIN OF RIGHT KNEE: Primary | ICD-10-CM

## 2020-07-15 DIAGNOSIS — R29.6 MULTIPLE FALLS: ICD-10-CM

## 2020-07-15 DIAGNOSIS — I95.1 SYNCOPE DUE TO ORTHOSTATIC HYPOTENSION: ICD-10-CM

## 2020-07-15 PROCEDURE — 73560 X-RAY EXAM OF KNEE 1 OR 2: CPT | Mod: 26,HCNC,59,LT | Performed by: RADIOLOGY

## 2020-07-15 PROCEDURE — 99999 PR PBB SHADOW E&M-EST. PATIENT-LVL V: CPT | Mod: PBBFAC,HCNC,, | Performed by: FAMILY MEDICINE

## 2020-07-15 PROCEDURE — 3078F PR MOST RECENT DIASTOLIC BLOOD PRESSURE < 80 MM HG: ICD-10-PCS | Mod: HCNC,CPTII,S$GLB, | Performed by: FAMILY MEDICINE

## 2020-07-15 PROCEDURE — 99999 PR PBB SHADOW E&M-EST. PATIENT-LVL V: ICD-10-PCS | Mod: PBBFAC,HCNC,, | Performed by: FAMILY MEDICINE

## 2020-07-15 PROCEDURE — 99214 OFFICE O/P EST MOD 30 MIN: CPT | Mod: HCNC,S$GLB,, | Performed by: FAMILY MEDICINE

## 2020-07-15 PROCEDURE — 73560 X-RAY EXAM OF KNEE 1 OR 2: CPT | Mod: TC,HCNC,FY,PO,LT

## 2020-07-15 PROCEDURE — 3078F DIAST BP <80 MM HG: CPT | Mod: HCNC,CPTII,S$GLB, | Performed by: FAMILY MEDICINE

## 2020-07-15 PROCEDURE — 3075F SYST BP GE 130 - 139MM HG: CPT | Mod: HCNC,CPTII,S$GLB, | Performed by: FAMILY MEDICINE

## 2020-07-15 PROCEDURE — 73562 XR KNEE ORTHO RIGHT: ICD-10-PCS | Mod: 26,HCNC,RT, | Performed by: RADIOLOGY

## 2020-07-15 PROCEDURE — 1101F PR PT FALLS ASSESS DOC 0-1 FALLS W/OUT INJ PAST YR: ICD-10-PCS | Mod: HCNC,CPTII,S$GLB, | Performed by: FAMILY MEDICINE

## 2020-07-15 PROCEDURE — 73562 X-RAY EXAM OF KNEE 3: CPT | Mod: 26,HCNC,RT, | Performed by: RADIOLOGY

## 2020-07-15 PROCEDURE — 3075F PR MOST RECENT SYSTOLIC BLOOD PRESS GE 130-139MM HG: ICD-10-PCS | Mod: HCNC,CPTII,S$GLB, | Performed by: FAMILY MEDICINE

## 2020-07-15 PROCEDURE — 1101F PT FALLS ASSESS-DOCD LE1/YR: CPT | Mod: HCNC,CPTII,S$GLB, | Performed by: FAMILY MEDICINE

## 2020-07-15 PROCEDURE — 3008F PR BODY MASS INDEX (BMI) DOCUMENTED: ICD-10-PCS | Mod: HCNC,CPTII,S$GLB, | Performed by: FAMILY MEDICINE

## 2020-07-15 PROCEDURE — 73560 XR KNEE ORTHO RIGHT: ICD-10-PCS | Mod: 26,HCNC,59,LT | Performed by: RADIOLOGY

## 2020-07-15 PROCEDURE — 3008F BODY MASS INDEX DOCD: CPT | Mod: HCNC,CPTII,S$GLB, | Performed by: FAMILY MEDICINE

## 2020-07-15 PROCEDURE — 99214 PR OFFICE/OUTPT VISIT, EST, LEVL IV, 30-39 MIN: ICD-10-PCS | Mod: HCNC,S$GLB,, | Performed by: FAMILY MEDICINE

## 2020-07-15 RX ORDER — MELOXICAM 15 MG/1
15 TABLET ORAL DAILY PRN
Qty: 30 TABLET | Refills: 0 | Status: SHIPPED | OUTPATIENT
Start: 2020-07-15 | End: 2020-07-27

## 2020-07-15 NOTE — PATIENT INSTRUCTIONS
Knee Pain  Knee pain is very common. Its especially common in active people who put a lot of pressure on their knees, like runners. It affects women more often than men.  Your kneecap (patella) is a thick, round bone. It covers and protects the front portion of your knee joint. It moves along a groove in your thighbone (femur) as part of the patellofemoral joint. A layer of cartilage surrounds the underside of your kneecap. This layer protects it from grinding against your femur.  When this cartilage softens and breaks down, it can cause knee pain. This is partly because of repetitive stress. The stress irritates the lining of the joint. This causes pain in the underlying bone.  What causes knee pain?  Many things can cause knee pain. You may have more than one cause. Some of these include:  · Overuse of the knee joint  · The kneecap doesnt line up with the tissue around it  · Damage to small nerves in the area  · Damage to the ligament-like structure that holds the kneecap in place (retinaculum)  · Breakdown of the bone under the cartilage  · Swelling in the soft tissues around the kneecap  · Injury  You might be more likely to have knee pain if you:  · Exercise a lot  · Recently increased the intensity of your workouts  · Have a body mass index (BMI) greater than 25  · Have poor alignment of your kneecap  · Walk with your feet turned overly outward or inward  · Have weakness in surrounding muscle groups (inner quad or hip adductor muscles)  · Have too much tightness in surrounding muscle groups (hamstrings or iliotibial band)  · Have a recent history of injury to the area  · Are female  Symptoms of knee pain  This type of knee pain is a dull, aching pain in the front of the knee in the area under and around the kneecap. This pain may start quickly or slowly. Your pain might be worse when you squat, run, or sit for a long time. You might also sometimes feel like your knee is giving out. You may have symptoms in  PATIENT IS TO KEEP FOLLOW UP WITH DR. Yogesh Lester AS SCHEDULED FOR [facility]. [facility] DISCUSSED W/PATIENT THE NEED TO BRING IN HIS MACHINE TO THAT VISIT. one or both of your knees.  Diagnosing knee pain  Your healthcare provider will ask about your medical history and your symptoms. Be sure to describe any activities that make your knee pain worse. He or she will look at your knee. This will include tests of your range of motion, strength, and areas of pain of your knee. Your knee alignment will be checked.  Your healthcare provider will need to rule out other causes of your knee pain, such as arthritis. You may need an imaging test, such as an X-ray or MRI.  Treatment for knee pain  Treatments that can help ease your symptoms may include:  · Avoiding activities for a while that make your pain worse, returning to activity over time  · Icing the outside of your knee when it causes you pain  · Taking over-the-counter pain medicine  · Wearing a knee brace or taping your knee to support it  · Wearing special shoe inserts to help keep your feet in the proper alignment  · Doing special exercises to stretch and strengthen the muscles around your hip and your knee  These steps help most people manage knee pain. But some cases of knee pain need to be treated with surgery. You may need surgery right away. Or you may need it later if other treatments dont work. Your healthcare provider may refer you to an orthopedic surgeon. He or she will talk with you about your choices.  Preventing knee pain  Losing weight and correcting excess muscle tightness or muscle weakness may help lower your risk.  In some cases, you can prevent knee pain. To help prevent a flare-up of knee pain, you do these things:  · Regularly do all the exercises your doctor or physical therapist advises  · Support your knee as advised by your doctor or physical therapist  · Increase training gradually, and ease up on training when needed  · Have an expert check your gait for running or other sporting activities  · Stretch properly before and after exercise  · Replace your running shoes regularly  · Lose excess  weight     When to call your healthcare provider  Call your healthcare provider right away if:  · Your symptoms dont get better after a few weeks of treatment  · You have any new symptoms   Date Last Reviewed: 4/1/2017 © 2000-2017 [employer]. [address] [place] [address]. All rights reserved. This information is not intended as a substitute for professional medical care. Always follow your healthcare professional's instructions.        Complementary Care for Pain  You may find pain relief with complementary care. Look for a licensed or certified professional. And always tell your healthcare professional that you are using complementary care.    Massage  Massage can increase circulation and relaxation. This can help relieve stress and pain.  Biofeedback  Biofeedback uses instruments to measure the body's physiological activity, like heart rate and muscle activity. This information is used to help you learn to control certain functions, such as relaxing muscles and helping reduce pain.   Chiropractic  Chiropractic adjusts the spine and joints. It may help reduce back, neck, or joint pain. Chiropractic may also use mild electrical stimulation, massage, heat, or ultrasound (sound waves).  Acupuncture  Acupuncture uses thin needles to help treat pain. The treatment may release the bodys own painkillers.  Distraction  Distraction helps you focus on something besides pain. Try reading a book, watching a movie, or talking with family. Or visit a local attraction.  Meditation  Meditation helps you focus on words, objects, or ideas. Doing this can calm you and decrease stress.  Relaxation  Relaxation includes methods like listening to soothing music or relaxation tapes. You might try slow, deep breathing. Imagine a calm scene, like an ocean or mountain, as you breathe.  Date Last Reviewed: 5/1/2017 © 2000-2017 [employer]. [address] [place] [id number]. All rights  reserved. This information is not intended as a substitute for professional medical care. Always follow your healthcare professional's instructions.

## 2020-07-15 NOTE — PROGRESS NOTES
Subjective:       Patient ID: Mil Sood is a 65 y.o. male.    Chief Complaint: Knee pain      History of Present Illness:   Mil Sood 65 y.o. male presents today with   Right knee pain x 3 weeks.  He fell down 3 weeks ago and the next day started having pain to the right knee.   Reported that he has fallen 3 more times before then.  Posterior and lateral knee  Intermittent Throbbing pain, tenderness and asso with dec flexion. Waking him up at night. Denies fever.  He has topical anesthesia at home. He is able to walk    With the falls, he says he is passing out from low blood pressure, after one of the falls, his son measured his BP and it was in the 90s/40s. Also, it all happened with postural changes. Did not seek any medical attention and does not want any further attention to it.    Past Medical History:   Diagnosis Date    Anxiety     Cancer     spot on kidney and lung    Diabetes mellitus     Borderline    Diverticulitis     GERD (gastroesophageal reflux disease)     Hyperlipidemia     not on any meds currently    Hypertension     Lung mass     Mass of kidney      Family History   Problem Relation Age of Onset    Hypertension Mother     Hypertension Father     Melanoma Father     Cancer Sister      Social History     Socioeconomic History    Marital status:      Spouse name: Not on file    Number of children: Not on file    Years of education: Not on file    Highest education level: Not on file   Occupational History    Not on file   Social Needs    Financial resource strain: Not on file    Food insecurity     Worry: Not on file     Inability: Not on file    Transportation needs     Medical: Not on file     Non-medical: Not on file   Tobacco Use    Smoking status: Former Smoker     Packs/day: 1.00     Years: 20.00     Pack years: 20.00     Quit date: 1995     Years since quittin.4    Smokeless tobacco: Never Used   Substance and Sexual Activity    Alcohol use: No      Alcohol/week: 0.0 standard drinks    Drug use: No    Sexual activity: Yes     Partners: Female     Birth control/protection: None   Lifestyle    Physical activity     Days per week: Not on file     Minutes per session: Not on file    Stress: Not at all   Relationships    Social connections     Talks on phone: Not on file     Gets together: Not on file     Attends Christianity service: Not on file     Active member of club or organization: Not on file     Attends meetings of clubs or organizations: Not on file     Relationship status: Not on file   Other Topics Concern    Not on file   Social History Narrative    Not on file     Outpatient Encounter Medications as of 7/15/2020   Medication Sig Dispense Refill    amLODIPine (NORVASC) 10 MG tablet Take 1 tablet (10 mg total) by mouth once daily. 90 tablet 1    aspirin 81 mg Tab Take 1 tablet by mouth nightly.       benazepril (LOTENSIN) 20 MG tablet TAKE 2 TABLETS BY MOUTH ONCE A DAY. (Patient taking differently: Take 40 mg by mouth nightly. ) 180 tablet 0    blood sugar diagnostic Strp Use to check blood sugars  strip 3    blood-glucose meter kit Use as instructed 1 each 0    citalopram (CELEXA) 40 MG tablet Take 40 mg by mouth nightly.       clonazePAM (KLONOPIN) 1 MG tablet Take 1 mg by mouth once daily.       finasteride (PROSCAR) 5 mg tablet Take 1 tablet (5 mg total) by mouth once daily. (Patient taking differently: Take 5 mg by mouth nightly. ) 30 tablet 11    fluticasone-salmeterol (ADVAIR HFA) 115-21 mcg/actuation HFAA Inhale 2 puffs into the lungs 2 (two) times daily. 12 g 12    gabapentin (NEURONTIN) 600 MG tablet Take 1 tablet (600 mg total) by mouth 3 (three) times daily. (Patient taking differently: Take 600 mg by mouth nightly. ) 90 tablet 11    hydrALAZINE (APRESOLINE) 25 MG tablet Take 1 tablet (25 mg total) by mouth nightly. 90 tablet 4    hydroCHLOROthiazide (MICROZIDE) 12.5 mg capsule TAKE 1 CAPSULE BY MOUTH ONCE DAILY.  (Patient taking differently: Take 25 mg by mouth nightly. ) 90 capsule 0    hydrOXYzine pamoate (VISTARIL) 25 MG Cap Take 25 mg by mouth.      metoprolol tartrate (LOPRESSOR) 50 MG tablet TAKE 1 TABLET BY MOUTH TWICE A DAY. 180 tablet 0    omeprazole (PRILOSEC) 40 MG capsule TAKE 1 CAPSULE BY MOUTH ONCE DAILY. (Patient taking differently: Take 40 mg by mouth nightly. ) 60 capsule 0    tamsulosin (FLOMAX) 0.4 mg Cap Take 1 capsule (0.4 mg total) by mouth once daily. (Patient taking differently: Take 0.4 mg by mouth nightly. ) 30 capsule 11    tiotropium (SPIRIVA) 18 mcg inhalation capsule Inhale 1 capsule (18 mcg total) into the lungs once daily. Controller 30 capsule 2    traZODone (DESYREL) 100 MG tablet Take 1 tablet (100 mg total) by mouth every evening. 30 tablet 2    venlafaxine (EFFEXOR) 75 MG tablet Take 1 tablet (75 mg total) by mouth 2 (two) times daily. 60 tablet 2    zolpidem (AMBIEN) 10 mg Tab TAKE 1 TABLET BY MOUTH AT BEDTIME AS NEEDED. 30 tablet 2    meloxicam (MOBIC) 15 MG tablet Take 1 tablet (15 mg total) by mouth daily as needed for Pain. 30 tablet 0     No facility-administered encounter medications on file as of 7/15/2020.        Review of Systems   Constitutional: Negative for appetite change and fever.   HENT: Negative for congestion, facial swelling and voice change.    Eyes: Negative for discharge and itching.   Respiratory: Negative for cough, chest tightness and wheezing.    Cardiovascular: Negative.  Negative for chest pain and leg swelling.   Gastrointestinal: Negative for abdominal pain, nausea and vomiting.   Endocrine: Negative for cold intolerance and heat intolerance.   Genitourinary: Negative for dysuria and flank pain.   Musculoskeletal: Negative for gait problem, myalgias and neck stiffness.   Skin: Negative for pallor and rash.   Neurological: Negative for facial asymmetry and weakness.   Psychiatric/Behavioral: Negative for agitation and confusion.       Objective:     "  /76 (BP Location: Right arm, Patient Position: Sitting, BP Method: Large (Manual))   Pulse 96   Temp 97.1 °F (36.2 °C) (Temporal)   Ht 5' 11" (1.803 m)   Wt 93.6 kg (206 lb 7.4 oz)   SpO2 96%   BMI 28.80 kg/m²   Physical Exam  Musculoskeletal:      Right knee: He exhibits decreased range of motion. He exhibits no swelling, no effusion, no erythema, normal alignment, no LCL laxity and normal patellar mobility. Tenderness found. Medial joint line tenderness: with flexion, extension is all right.        Legs:        X-ray Knee Ortho Right  Narrative: EXAMINATION:  XR KNEE ORTHO RIGHT    CLINICAL HISTORY:  Pain in right knee    TECHNIQUE:  AP standing of both knees, Merchant views of both knees as well as a lateral view of the right knee were performed.    COMPARISON:  01/25/2016    FINDINGS:  No acute osseous abnormality.  Joint spaces relatively maintained.  Right knee suprapatellar joint effusion present with prepatellar soft tissue edema.  Arterial vascular calcifications noted.  Impression: As above    Electronically signed by: Gautam Quintero MD  Date:    07/15/2020  Time:    09:29    Assessment:       1. Acute pain of right knee    2. Multiple falls    3. Syncope due to orthostatic hypotension        Plan:   Acute pain of right knee; new problem, due to  trauma, needs further eval  -     X-Ray Knee ortho Right; Future; Expected date: 07/15/2020: I have reviewed the xray, there is no acute fracture, joint spaces are maintained.   Conservative therapy for 2 weeks. RTC if pain persists  -     meloxicam (MOBIC) 15 MG tablet; Take 1 tablet (15 mg total) by mouth daily as needed for Pain.  Dispense: 30 tablet; Refill: 0    Syncope: declined further eval and cardiology referral.  "

## 2020-07-27 ENCOUNTER — OFFICE VISIT (OUTPATIENT)
Dept: FAMILY MEDICINE | Facility: CLINIC | Age: 65
End: 2020-07-27
Payer: MEDICARE

## 2020-07-27 VITALS
OXYGEN SATURATION: 99 % | SYSTOLIC BLOOD PRESSURE: 128 MMHG | HEART RATE: 102 BPM | TEMPERATURE: 98 F | DIASTOLIC BLOOD PRESSURE: 78 MMHG | BODY MASS INDEX: 28.61 KG/M2 | WEIGHT: 205.13 LBS

## 2020-07-27 DIAGNOSIS — F51.05 INSOMNIA DUE TO ANXIETY AND FEAR: ICD-10-CM

## 2020-07-27 DIAGNOSIS — Z13.6 SCREENING FOR AAA (ABDOMINAL AORTIC ANEURYSM): ICD-10-CM

## 2020-07-27 DIAGNOSIS — K22.70 BARRETT'S ESOPHAGUS DETERMINED BY BIOPSY: Primary | ICD-10-CM

## 2020-07-27 DIAGNOSIS — F40.9 INSOMNIA DUE TO ANXIETY AND FEAR: ICD-10-CM

## 2020-07-27 DIAGNOSIS — Z12.11 COLON CANCER SCREENING: ICD-10-CM

## 2020-07-27 DIAGNOSIS — I10 ESSENTIAL HYPERTENSION: ICD-10-CM

## 2020-07-27 DIAGNOSIS — R73.03 PREDIABETES: ICD-10-CM

## 2020-07-27 DIAGNOSIS — F41.8 DEPRESSION WITH ANXIETY: ICD-10-CM

## 2020-07-27 PROCEDURE — 99214 OFFICE O/P EST MOD 30 MIN: CPT | Mod: HCNC,S$GLB,, | Performed by: FAMILY MEDICINE

## 2020-07-27 PROCEDURE — 99214 PR OFFICE/OUTPT VISIT, EST, LEVL IV, 30-39 MIN: ICD-10-PCS | Mod: HCNC,S$GLB,, | Performed by: FAMILY MEDICINE

## 2020-07-27 PROCEDURE — 99999 PR PBB SHADOW E&M-EST. PATIENT-LVL III: CPT | Mod: PBBFAC,HCNC,, | Performed by: FAMILY MEDICINE

## 2020-07-27 PROCEDURE — 3074F SYST BP LT 130 MM HG: CPT | Mod: HCNC,CPTII,S$GLB, | Performed by: FAMILY MEDICINE

## 2020-07-27 PROCEDURE — 3074F PR MOST RECENT SYSTOLIC BLOOD PRESSURE < 130 MM HG: ICD-10-PCS | Mod: HCNC,CPTII,S$GLB, | Performed by: FAMILY MEDICINE

## 2020-07-27 PROCEDURE — 3008F PR BODY MASS INDEX (BMI) DOCUMENTED: ICD-10-PCS | Mod: HCNC,CPTII,S$GLB, | Performed by: FAMILY MEDICINE

## 2020-07-27 PROCEDURE — 3078F PR MOST RECENT DIASTOLIC BLOOD PRESSURE < 80 MM HG: ICD-10-PCS | Mod: HCNC,CPTII,S$GLB, | Performed by: FAMILY MEDICINE

## 2020-07-27 PROCEDURE — 1101F PR PT FALLS ASSESS DOC 0-1 FALLS W/OUT INJ PAST YR: ICD-10-PCS | Mod: HCNC,CPTII,S$GLB, | Performed by: FAMILY MEDICINE

## 2020-07-27 PROCEDURE — 99999 PR PBB SHADOW E&M-EST. PATIENT-LVL III: ICD-10-PCS | Mod: PBBFAC,HCNC,, | Performed by: FAMILY MEDICINE

## 2020-07-27 PROCEDURE — 3008F BODY MASS INDEX DOCD: CPT | Mod: HCNC,CPTII,S$GLB, | Performed by: FAMILY MEDICINE

## 2020-07-27 PROCEDURE — 1101F PT FALLS ASSESS-DOCD LE1/YR: CPT | Mod: HCNC,CPTII,S$GLB, | Performed by: FAMILY MEDICINE

## 2020-07-27 PROCEDURE — 3078F DIAST BP <80 MM HG: CPT | Mod: HCNC,CPTII,S$GLB, | Performed by: FAMILY MEDICINE

## 2020-07-27 RX ORDER — FLUTICASONE PROPIONATE AND SALMETEROL XINAFOATE 115; 21 UG/1; UG/1
2 AEROSOL, METERED RESPIRATORY (INHALATION) 2 TIMES DAILY
Qty: 12 G | Refills: 12 | Status: SHIPPED | OUTPATIENT
Start: 2020-07-27 | End: 2021-07-29 | Stop reason: SDUPTHER

## 2020-07-27 RX ORDER — BENAZEPRIL HYDROCHLORIDE 20 MG/1
40 TABLET ORAL NIGHTLY
Qty: 60 TABLET | Refills: 5 | Status: SHIPPED | OUTPATIENT
Start: 2020-07-27 | End: 2021-02-19

## 2020-07-27 RX ORDER — TIOTROPIUM BROMIDE 18 UG/1
18 CAPSULE ORAL; RESPIRATORY (INHALATION) DAILY
Qty: 30 CAPSULE | Refills: 5 | Status: SHIPPED | OUTPATIENT
Start: 2020-07-27 | End: 2021-07-29 | Stop reason: SDUPTHER

## 2020-07-27 RX ORDER — OMEPRAZOLE 40 MG/1
40 CAPSULE, DELAYED RELEASE ORAL NIGHTLY
Qty: 30 CAPSULE | Refills: 5 | Status: SHIPPED | OUTPATIENT
Start: 2020-07-27 | End: 2021-02-08

## 2020-07-27 RX ORDER — GABAPENTIN 600 MG/1
600 TABLET ORAL NIGHTLY
Qty: 30 TABLET | Refills: 5 | Status: SHIPPED | OUTPATIENT
Start: 2020-07-27 | End: 2021-03-29

## 2020-07-27 RX ORDER — BENAZEPRIL HYDROCHLORIDE 20 MG/1
40 TABLET ORAL NIGHTLY
Qty: 30 TABLET | Refills: 5 | Status: SHIPPED | OUTPATIENT
Start: 2020-07-27 | End: 2020-07-27 | Stop reason: SDUPTHER

## 2020-07-27 RX ORDER — TAMSULOSIN HYDROCHLORIDE 0.4 MG/1
0.4 CAPSULE ORAL NIGHTLY
Qty: 30 CAPSULE | Refills: 5 | Status: SHIPPED | OUTPATIENT
Start: 2020-07-27 | End: 2021-03-29

## 2020-07-27 RX ORDER — CLONAZEPAM 1 MG/1
1 TABLET ORAL DAILY
Qty: 30 TABLET | Refills: 1 | Status: CANCELLED | OUTPATIENT
Start: 2020-07-27

## 2020-07-27 RX ORDER — ZOLPIDEM TARTRATE 10 MG/1
TABLET ORAL
Qty: 30 TABLET | Refills: 2 | Status: SHIPPED | OUTPATIENT
Start: 2020-07-27 | End: 2020-10-28

## 2020-07-27 RX ORDER — AMLODIPINE BESYLATE 10 MG/1
10 TABLET ORAL DAILY
Qty: 30 TABLET | Refills: 5 | Status: SHIPPED | OUTPATIENT
Start: 2020-07-27 | End: 2021-02-08

## 2020-07-27 RX ORDER — CITALOPRAM 40 MG/1
40 TABLET, FILM COATED ORAL NIGHTLY
Qty: 30 TABLET | Refills: 5 | Status: SHIPPED | OUTPATIENT
Start: 2020-07-27 | End: 2021-07-29 | Stop reason: SDUPTHER

## 2020-07-27 RX ORDER — FINASTERIDE 5 MG/1
5 TABLET, FILM COATED ORAL NIGHTLY
Qty: 30 TABLET | Refills: 5 | Status: SHIPPED | OUTPATIENT
Start: 2020-07-27 | End: 2021-03-29

## 2020-07-27 RX ORDER — VENLAFAXINE 75 MG/1
75 TABLET ORAL 2 TIMES DAILY
Qty: 60 TABLET | Refills: 5 | Status: SHIPPED | OUTPATIENT
Start: 2020-07-27 | End: 2021-01-22

## 2020-07-27 RX ORDER — METOPROLOL TARTRATE 50 MG/1
50 TABLET ORAL 2 TIMES DAILY
Qty: 60 TABLET | Refills: 5 | Status: SHIPPED | OUTPATIENT
Start: 2020-07-27 | End: 2021-02-19

## 2020-07-27 RX ORDER — TRAZODONE HYDROCHLORIDE 100 MG/1
100 TABLET ORAL NIGHTLY
Qty: 30 TABLET | Refills: 2 | Status: SHIPPED | OUTPATIENT
Start: 2020-07-27 | End: 2021-03-23

## 2020-07-27 RX ORDER — HYDRALAZINE HYDROCHLORIDE 25 MG/1
25 TABLET, FILM COATED ORAL NIGHTLY
Qty: 30 TABLET | Refills: 5 | Status: SHIPPED | OUTPATIENT
Start: 2020-07-27 | End: 2021-10-08

## 2020-07-27 NOTE — PROGRESS NOTES
Chief Complaint:    Chief Complaint   Patient presents with    Follow-up     6 mo f/u        History of Present Illness:  He is here for physical:  Depression anxiety stable    Chronic insomnia takes trazodone or Ambien as needed but does not combined them.  Does not take Klonopin when he is taking Ambien.    Blood pressure is normal at home    Doing okay after his back surgery    He has Culp's esophagus EGD done in January 2020 to repeat in 2 years.    Also has BPH and follows with Urology.      ROS:  Review of Systems   Constitutional: Negative for activity change, chills, fatigue, fever and unexpected weight change.   HENT: Negative for congestion, ear discharge, ear pain, hearing loss, postnasal drip and rhinorrhea.    Eyes: Negative for pain and visual disturbance.   Respiratory: Negative for cough, chest tightness and shortness of breath.    Cardiovascular: Negative for chest pain and palpitations.   Gastrointestinal: Negative for abdominal pain, diarrhea and vomiting.   Endocrine: Negative for heat intolerance.   Genitourinary: Negative for dysuria, flank pain, frequency and hematuria.   Musculoskeletal: Negative for back pain, gait problem and neck pain.   Skin: Negative for color change and rash.   Neurological: Negative for dizziness, tremors, seizures, numbness and headaches.   Psychiatric/Behavioral: Negative for agitation, hallucinations, self-injury, sleep disturbance and suicidal ideas. The patient is not nervous/anxious.        Past Medical History:   Diagnosis Date    Anxiety     Cancer     spot on kidney and lung    Diabetes mellitus     Borderline    Diverticulitis     GERD (gastroesophageal reflux disease)     Hyperlipidemia     not on any meds currently    Hypertension     Lung mass     Mass of kidney        Social History:  Social History     Socioeconomic History    Marital status:      Spouse name: Not on file    Number of children: Not on file    Years of education:  Not on file    Highest education level: Not on file   Occupational History    Not on file   Social Needs    Financial resource strain: Not on file    Food insecurity     Worry: Not on file     Inability: Not on file    Transportation needs     Medical: Not on file     Non-medical: Not on file   Tobacco Use    Smoking status: Former Smoker     Packs/day: 1.00     Years: 20.00     Pack years: 20.00     Quit date: 1995     Years since quittin.4    Smokeless tobacco: Never Used   Substance and Sexual Activity    Alcohol use: No     Alcohol/week: 0.0 standard drinks    Drug use: No    Sexual activity: Yes     Partners: Female     Birth control/protection: None   Lifestyle    Physical activity     Days per week: Not on file     Minutes per session: Not on file    Stress: Not at all   Relationships    Social connections     Talks on phone: Not on file     Gets together: Not on file     Attends Orthodoxy service: Not on file     Active member of club or organization: Not on file     Attends meetings of clubs or organizations: Not on file     Relationship status: Not on file   Other Topics Concern    Not on file   Social History Narrative    Not on file       Family History:   family history includes Cancer in his sister; Hypertension in his father and mother; Melanoma in his father.    Health Maintenance   Topic Date Due    Pneumococcal Vaccine (65+ Low/Medium Risk) (2 of 2 - PPSV23) 2020    Abdominal Aortic Aneurysm Screening  2020    Lipid Panel  2021    Aspirin/Antiplatelet Therapy  2021    TETANUS VACCINE  2028    Hepatitis C Screening  Completed       Physical Exam:    Vital Signs  Temp: 98.1 °F (36.7 °C)  Temp src: Temporal  Pulse: 102  SpO2: 99 %  BP: 128/78  BP Location: Right leg  Patient Position: Sitting  Pain Score: 0-No pain  Height and Weight  Weight: 93.1 kg (205 lb 2.2 oz)]    Body mass index is 28.61 kg/m².    Physical Exam  Constitutional:        Appearance: He is well-developed.   Eyes:      Conjunctiva/sclera: Conjunctivae normal.      Pupils: Pupils are equal, round, and reactive to light.   Neck:      Musculoskeletal: Normal range of motion and neck supple.   Cardiovascular:      Rate and Rhythm: Normal rate and regular rhythm.      Heart sounds: Normal heart sounds. No murmur.   Pulmonary:      Effort: Pulmonary effort is normal. No respiratory distress.      Breath sounds: Normal breath sounds. No wheezing or rales.   Chest:      Chest wall: No tenderness.   Abdominal:      General: There is no distension.      Palpations: Abdomen is soft. There is no mass.      Tenderness: There is no abdominal tenderness. There is no guarding.   Musculoskeletal:         General: No tenderness.   Lymphadenopathy:      Cervical: No cervical adenopathy.   Skin:     General: Skin is warm and dry.   Neurological:      Mental Status: He is alert and oriented to person, place, and time.      Deep Tendon Reflexes: Reflexes are normal and symmetric.   Psychiatric:         Behavior: Behavior normal.         Thought Content: Thought content normal.         Judgment: Judgment normal.           Assessment:      ICD-10-CM ICD-9-CM   1. Culp's esophagus determined by biopsy  K22.70 530.85   2. Insomnia due to anxiety and fear  F51.05 300.20    F40.9 327.02   3. Depression with anxiety  F41.8 300.4   4. Prediabetes  R73.03 790.29   5. Essential hypertension  I10 401.9   6. Colon cancer screening  Z12.11 V76.51   7. Screening for AAA (abdominal aortic aneurysm)  Z13.6 V81.2         Plan:        he will need a screening colonoscopy and AAA screen  Other medical problems stable continue current meds and plan   Hypertension stable  Follow-up 6 months        Orders Placed This Encounter   Procedures    US Abdominal Aorta    Hemoglobin A1C    Comprehensive metabolic panel    CBC auto differential    Lipid Panel       Current Outpatient Medications   Medication Sig Dispense Refill     amLODIPine (NORVASC) 10 MG tablet Take 1 tablet (10 mg total) by mouth once daily. 30 tablet 5    aspirin 81 mg Tab Take 1 tablet by mouth nightly.       benazepriL (LOTENSIN) 20 MG tablet Take 2 tablets (40 mg total) by mouth nightly. 30 tablet 5    blood sugar diagnostic Strp Use to check blood sugars  strip 3    blood-glucose meter kit Use as instructed 1 each 0    citalopram (CELEXA) 40 MG tablet Take 1 tablet (40 mg total) by mouth nightly. 30 tablet 5    clonazePAM (KLONOPIN) 1 MG tablet Take 1 mg by mouth once daily.       finasteride (PROSCAR) 5 mg tablet Take 1 tablet (5 mg total) by mouth nightly. 30 tablet 5    fluticasone propion-salmeterol 115-21 mcg/dose (ADVAIR HFA) 115-21 mcg/actuation HFAA inhaler Inhale 2 puffs into the lungs 2 (two) times daily. 12 g 12    gabapentin (NEURONTIN) 600 MG tablet Take 1 tablet (600 mg total) by mouth nightly. 30 tablet 5    hydrALAZINE (APRESOLINE) 25 MG tablet Take 1 tablet (25 mg total) by mouth nightly. 30 tablet 5    hydroCHLOROthiazide (MICROZIDE) 12.5 mg capsule TAKE 1 CAPSULE BY MOUTH ONCE DAILY. (Patient taking differently: Take 25 mg by mouth nightly. ) 90 capsule 0    meloxicam (MOBIC) 15 MG tablet Take 1 tablet (15 mg total) by mouth daily as needed for Pain. 30 tablet 0    metoprolol tartrate (LOPRESSOR) 50 MG tablet Take 1 tablet (50 mg total) by mouth 2 (two) times daily. 60 tablet 5    omeprazole (PRILOSEC) 40 MG capsule Take 1 capsule (40 mg total) by mouth nightly. 30 capsule 5    tamsulosin (FLOMAX) 0.4 mg Cap Take 1 capsule (0.4 mg total) by mouth nightly. 30 capsule 5    tiotropium (SPIRIVA) 18 mcg inhalation capsule Inhale 1 capsule (18 mcg total) into the lungs once daily. Controller 30 capsule 5    traZODone (DESYREL) 100 MG tablet Take 1 tablet (100 mg total) by mouth every evening. 30 tablet 2    venlafaxine (EFFEXOR) 75 MG tablet Take 1 tablet (75 mg total) by mouth 2 (two) times daily. 60 tablet 5    zolpidem  (AMBIEN) 10 mg Tab TAKE 1 TABLET BY MOUTH AT BEDTIME AS NEEDED. 30 tablet 2    hydrOXYzine pamoate (VISTARIL) 25 MG Cap Take 25 mg by mouth.       No current facility-administered medications for this visit.        Medications Discontinued During This Encounter   Medication Reason    tiotropium (SPIRIVA) 18 mcg inhalation capsule Reorder    fluticasone-salmeterol (ADVAIR HFA) 115-21 mcg/actuation HFAA Reorder    benazepril (LOTENSIN) 20 MG tablet Reorder    metoprolol tartrate (LOPRESSOR) 50 MG tablet Reorder    tamsulosin (FLOMAX) 0.4 mg Cap Reorder    omeprazole (PRILOSEC) 40 MG capsule Reorder    finasteride (PROSCAR) 5 mg tablet Reorder    citalopram (CELEXA) 40 MG tablet Reorder    gabapentin (NEURONTIN) 600 MG tablet Reorder    hydrALAZINE (APRESOLINE) 25 MG tablet Reorder    venlafaxine (EFFEXOR) 75 MG tablet Reorder    amLODIPine (NORVASC) 10 MG tablet Reorder    traZODone (DESYREL) 100 MG tablet Reorder    zolpidem (AMBIEN) 10 mg Tab Reorder       Follow up in about 6 months (around 1/27/2021).      Yumi Roberson MD

## 2020-07-27 NOTE — TELEPHONE ENCOUNTER
----- Message from Wilian Lieberman sent at 7/27/2020 11:03 AM CDT -----  Pharmacy is calling to clarify an RX.  RX name:  benazepriL (LOTENSIN) 20 MG tablet  What do they need to clarify:  the quantity and the direction doesn't add up  Comments:

## 2020-08-24 ENCOUNTER — OFFICE VISIT (OUTPATIENT)
Dept: FAMILY MEDICINE | Facility: CLINIC | Age: 65
End: 2020-08-24
Payer: MEDICARE

## 2020-08-24 VITALS
SYSTOLIC BLOOD PRESSURE: 120 MMHG | BODY MASS INDEX: 28.57 KG/M2 | DIASTOLIC BLOOD PRESSURE: 78 MMHG | WEIGHT: 204.06 LBS | OXYGEN SATURATION: 95 % | TEMPERATURE: 99 F | HEART RATE: 68 BPM | HEIGHT: 71 IN

## 2020-08-24 DIAGNOSIS — T16.2XXA FOREIGN BODY OF LEFT EAR, INITIAL ENCOUNTER: Primary | ICD-10-CM

## 2020-08-24 PROCEDURE — 3074F SYST BP LT 130 MM HG: CPT | Mod: HCNC,CPTII,S$GLB, | Performed by: FAMILY MEDICINE

## 2020-08-24 PROCEDURE — 99212 PR OFFICE/OUTPT VISIT, EST, LEVL II, 10-19 MIN: ICD-10-PCS | Mod: 25,HCNC,S$GLB, | Performed by: FAMILY MEDICINE

## 2020-08-24 PROCEDURE — 1101F PR PT FALLS ASSESS DOC 0-1 FALLS W/OUT INJ PAST YR: ICD-10-PCS | Mod: HCNC,CPTII,S$GLB, | Performed by: FAMILY MEDICINE

## 2020-08-24 PROCEDURE — 69200 PR REMV EXT CANAL FOREIGN BODY: ICD-10-PCS | Mod: HCNC,LT,S$GLB, | Performed by: FAMILY MEDICINE

## 2020-08-24 PROCEDURE — 3074F PR MOST RECENT SYSTOLIC BLOOD PRESSURE < 130 MM HG: ICD-10-PCS | Mod: HCNC,CPTII,S$GLB, | Performed by: FAMILY MEDICINE

## 2020-08-24 PROCEDURE — 3008F PR BODY MASS INDEX (BMI) DOCUMENTED: ICD-10-PCS | Mod: HCNC,CPTII,S$GLB, | Performed by: FAMILY MEDICINE

## 2020-08-24 PROCEDURE — 99999 PR PBB SHADOW E&M-EST. PATIENT-LVL IV: ICD-10-PCS | Mod: PBBFAC,HCNC,, | Performed by: FAMILY MEDICINE

## 2020-08-24 PROCEDURE — 1101F PT FALLS ASSESS-DOCD LE1/YR: CPT | Mod: HCNC,CPTII,S$GLB, | Performed by: FAMILY MEDICINE

## 2020-08-24 PROCEDURE — 69200 CLEAR OUTER EAR CANAL: CPT | Mod: HCNC,LT,S$GLB, | Performed by: FAMILY MEDICINE

## 2020-08-24 PROCEDURE — 3078F DIAST BP <80 MM HG: CPT | Mod: HCNC,CPTII,S$GLB, | Performed by: FAMILY MEDICINE

## 2020-08-24 PROCEDURE — 3008F BODY MASS INDEX DOCD: CPT | Mod: HCNC,CPTII,S$GLB, | Performed by: FAMILY MEDICINE

## 2020-08-24 PROCEDURE — 99212 OFFICE O/P EST SF 10 MIN: CPT | Mod: 25,HCNC,S$GLB, | Performed by: FAMILY MEDICINE

## 2020-08-24 PROCEDURE — 99999 PR PBB SHADOW E&M-EST. PATIENT-LVL IV: CPT | Mod: PBBFAC,HCNC,, | Performed by: FAMILY MEDICINE

## 2020-08-24 PROCEDURE — 3078F PR MOST RECENT DIASTOLIC BLOOD PRESSURE < 80 MM HG: ICD-10-PCS | Mod: HCNC,CPTII,S$GLB, | Performed by: FAMILY MEDICINE

## 2020-08-24 NOTE — PROGRESS NOTES
Chief Complaint:    Chief Complaint   Patient presents with    Foreign object stuck in left ear       History of Present Illness:    Complaining of retained foreign body in the left ear says it is a piece of his hearing aid.    ROS:  Review of Systems    Past Medical History:   Diagnosis Date    Anxiety     Cancer     spot on kidney and lung    Diabetes mellitus     Borderline    Diverticulitis     GERD (gastroesophageal reflux disease)     Hyperlipidemia     not on any meds currently    Hypertension     Lung mass     Mass of kidney        Social History:  Social History     Socioeconomic History    Marital status:      Spouse name: Not on file    Number of children: Not on file    Years of education: Not on file    Highest education level: Not on file   Occupational History    Not on file   Social Needs    Financial resource strain: Not on file    Food insecurity     Worry: Not on file     Inability: Not on file    Transportation needs     Medical: Not on file     Non-medical: Not on file   Tobacco Use    Smoking status: Former Smoker     Packs/day: 1.00     Years: 20.00     Pack years: 20.00     Quit date: 1995     Years since quittin.5    Smokeless tobacco: Never Used   Substance and Sexual Activity    Alcohol use: No     Alcohol/week: 0.0 standard drinks    Drug use: No    Sexual activity: Yes     Partners: Female     Birth control/protection: None   Lifestyle    Physical activity     Days per week: Not on file     Minutes per session: Not on file    Stress: Not at all   Relationships    Social connections     Talks on phone: Not on file     Gets together: Not on file     Attends Oriental orthodox service: Not on file     Active member of club or organization: Not on file     Attends meetings of clubs or organizations: Not on file     Relationship status: Not on file   Other Topics Concern    Not on file   Social History Narrative    Not on file       Family History:    "family history includes Cancer in his sister; Hypertension in his father and mother; Melanoma in his father.    Health Maintenance   Topic Date Due    Pneumococcal Vaccine (65+ Low/Medium Risk) (2 of 2 - PPSV23) 02/03/2020    Abdominal Aortic Aneurysm Screening  02/03/2020    Lipid Panel  01/27/2021    Aspirin/Antiplatelet Therapy  08/24/2021    TETANUS VACCINE  04/26/2028    Hepatitis C Screening  Completed       Physical Exam:    Vital Signs  Temp: 99.3 °F (37.4 °C)  Temp src: Temporal  Pulse: 68  SpO2: 95 %  BP: 120/78  BP Location: Left arm  Patient Position: Sitting  Pain Score: 0-No pain  Height and Weight  Height: 5' 11" (180.3 cm)  Weight: 92.5 kg (204 lb 0.6 oz)  BSA (Calculated - sq m): 2.15 sq meters  BMI (Calculated): 28.5  Weight in (lb) to have BMI = 25: 178.9]    Body mass index is 28.46 kg/m².    Physical Exam  HENT:      Head:      Comments: Foreign body was successfully removed using alligator forceps          Assessment:      ICD-10-CM ICD-9-CM   1. Foreign body of left ear, initial encounter  T16.2XXA 931     E915         Plan:        Foreign body was a rubber piece of from his ear piece which was successfully removed using alligator forceps.  The TM and the ear looked okay thereafter.        No orders of the defined types were placed in this encounter.      Current Outpatient Medications   Medication Sig Dispense Refill    amLODIPine (NORVASC) 10 MG tablet Take 1 tablet (10 mg total) by mouth once daily. 30 tablet 5    aspirin 81 mg Tab Take 1 tablet by mouth nightly.       benazepriL (LOTENSIN) 20 MG tablet Take 2 tablets (40 mg total) by mouth nightly. 60 tablet 5    blood sugar diagnostic Strp Use to check blood sugars  strip 3    blood-glucose meter kit Use as instructed 1 each 0    citalopram (CELEXA) 40 MG tablet Take 1 tablet (40 mg total) by mouth nightly. 30 tablet 5    clonazePAM (KLONOPIN) 1 MG tablet Take 1 mg by mouth once daily.       finasteride (PROSCAR) 5 mg " tablet Take 1 tablet (5 mg total) by mouth nightly. 30 tablet 5    fluticasone propion-salmeterol 115-21 mcg/dose (ADVAIR HFA) 115-21 mcg/actuation HFAA inhaler Inhale 2 puffs into the lungs 2 (two) times daily. 12 g 12    gabapentin (NEURONTIN) 600 MG tablet Take 1 tablet (600 mg total) by mouth nightly. 30 tablet 5    hydrALAZINE (APRESOLINE) 25 MG tablet Take 1 tablet (25 mg total) by mouth nightly. 30 tablet 5    hydroCHLOROthiazide (MICROZIDE) 12.5 mg capsule TAKE 1 CAPSULE BY MOUTH ONCE DAILY. (Patient taking differently: Take 25 mg by mouth nightly. ) 90 capsule 0    hydrOXYzine pamoate (VISTARIL) 25 MG Cap Take 25 mg by mouth.      meloxicam (MOBIC) 15 MG tablet TAKE 1 TABLET BY MOUTH ONCE DAILY AS NEEDED FOR PAIN. 30 tablet 0    metoprolol tartrate (LOPRESSOR) 50 MG tablet Take 1 tablet (50 mg total) by mouth 2 (two) times daily. 60 tablet 5    omeprazole (PRILOSEC) 40 MG capsule Take 1 capsule (40 mg total) by mouth nightly. 30 capsule 5    tamsulosin (FLOMAX) 0.4 mg Cap Take 1 capsule (0.4 mg total) by mouth nightly. 30 capsule 5    tiotropium (SPIRIVA) 18 mcg inhalation capsule Inhale 1 capsule (18 mcg total) into the lungs once daily. Controller 30 capsule 5    traZODone (DESYREL) 100 MG tablet Take 1 tablet (100 mg total) by mouth every evening. 30 tablet 2    venlafaxine (EFFEXOR) 75 MG tablet Take 1 tablet (75 mg total) by mouth 2 (two) times daily. 60 tablet 5    zolpidem (AMBIEN) 10 mg Tab TAKE 1 TABLET BY MOUTH AT BEDTIME AS NEEDED. 30 tablet 2     No current facility-administered medications for this visit.        There are no discontinued medications.    No follow-ups on file.      Yumi Roberson MD

## 2020-10-28 DIAGNOSIS — F51.05 INSOMNIA DUE TO ANXIETY AND FEAR: ICD-10-CM

## 2020-10-28 DIAGNOSIS — F40.9 INSOMNIA DUE TO ANXIETY AND FEAR: ICD-10-CM

## 2020-10-28 RX ORDER — ZOLPIDEM TARTRATE 10 MG/1
TABLET ORAL
Qty: 30 TABLET | Refills: 5 | Status: SHIPPED | OUTPATIENT
Start: 2020-10-28 | End: 2021-04-23

## 2020-10-30 ENCOUNTER — OFFICE VISIT (OUTPATIENT)
Dept: FAMILY MEDICINE | Facility: CLINIC | Age: 65
End: 2020-10-30
Payer: MEDICARE

## 2020-10-30 VITALS
BODY MASS INDEX: 28.4 KG/M2 | WEIGHT: 203.63 LBS | DIASTOLIC BLOOD PRESSURE: 90 MMHG | HEART RATE: 57 BPM | SYSTOLIC BLOOD PRESSURE: 142 MMHG | OXYGEN SATURATION: 99 % | TEMPERATURE: 98 F

## 2020-10-30 DIAGNOSIS — I10 ESSENTIAL HYPERTENSION: ICD-10-CM

## 2020-10-30 DIAGNOSIS — T16.2XXA FOREIGN BODY OF LEFT EAR, INITIAL ENCOUNTER: Primary | ICD-10-CM

## 2020-10-30 PROCEDURE — 99999 PR PBB SHADOW E&M-EST. PATIENT-LVL IV: CPT | Mod: PBBFAC,HCNC,, | Performed by: FAMILY MEDICINE

## 2020-10-30 PROCEDURE — 1101F PR PT FALLS ASSESS DOC 0-1 FALLS W/OUT INJ PAST YR: ICD-10-PCS | Mod: HCNC,CPTII,S$GLB, | Performed by: FAMILY MEDICINE

## 2020-10-30 PROCEDURE — 3008F BODY MASS INDEX DOCD: CPT | Mod: HCNC,CPTII,S$GLB, | Performed by: FAMILY MEDICINE

## 2020-10-30 PROCEDURE — 99213 PR OFFICE/OUTPT VISIT, EST, LEVL III, 20-29 MIN: ICD-10-PCS | Mod: HCNC,S$GLB,, | Performed by: FAMILY MEDICINE

## 2020-10-30 PROCEDURE — 3080F DIAST BP >= 90 MM HG: CPT | Mod: HCNC,CPTII,S$GLB, | Performed by: FAMILY MEDICINE

## 2020-10-30 PROCEDURE — 1101F PT FALLS ASSESS-DOCD LE1/YR: CPT | Mod: HCNC,CPTII,S$GLB, | Performed by: FAMILY MEDICINE

## 2020-10-30 PROCEDURE — 99213 OFFICE O/P EST LOW 20 MIN: CPT | Mod: HCNC,S$GLB,, | Performed by: FAMILY MEDICINE

## 2020-10-30 PROCEDURE — 3077F PR MOST RECENT SYSTOLIC BLOOD PRESSURE >= 140 MM HG: ICD-10-PCS | Mod: HCNC,CPTII,S$GLB, | Performed by: FAMILY MEDICINE

## 2020-10-30 PROCEDURE — 3077F SYST BP >= 140 MM HG: CPT | Mod: HCNC,CPTII,S$GLB, | Performed by: FAMILY MEDICINE

## 2020-10-30 PROCEDURE — 99999 PR PBB SHADOW E&M-EST. PATIENT-LVL IV: ICD-10-PCS | Mod: PBBFAC,HCNC,, | Performed by: FAMILY MEDICINE

## 2020-10-30 PROCEDURE — 3008F PR BODY MASS INDEX (BMI) DOCUMENTED: ICD-10-PCS | Mod: HCNC,CPTII,S$GLB, | Performed by: FAMILY MEDICINE

## 2020-10-30 PROCEDURE — 3080F PR MOST RECENT DIASTOLIC BLOOD PRESSURE >= 90 MM HG: ICD-10-PCS | Mod: HCNC,CPTII,S$GLB, | Performed by: FAMILY MEDICINE

## 2020-10-30 NOTE — PROCEDURES
Foreign bbody Removal to left ear    Date/Time: 10/30/2020 9:00 AM  Performed by: Karyna Tellez MD  Authorized by: Karyna Tellez MD     Consent Done?:  Yes (Verbal)    Local anesthetic:  None  Location details:  Left ear  Procedure type comment:  White cap extracted with a forcep  Patient tolerance:  Patient tolerated the procedure well with no immediate complications

## 2020-10-30 NOTE — PROGRESS NOTES
Subjective:       Patient ID: Mil Sood is a 65 y.o. male.    Chief Complaint:Foreign body in ear      History of Present Illness:   iMl Sood 65 y.o. male presents today with     Foreign body in left ear canal: his hearing aid.    Blood pressure was found to be elevated today. He has history of HTN on hydralazine and amlodipine. Denies chest pain, palpitations, shortness of breath, dyspnea on exertion, left arm or neck pain, nausea, vomiting, diaphoresis, PND and orthopnea.      Past Medical History:   Diagnosis Date    Anxiety     Cancer     spot on kidney and lung    Diabetes mellitus     Borderline    Diverticulitis     GERD (gastroesophageal reflux disease)     Hyperlipidemia     not on any meds currently    Hypertension     Lung mass     Mass of kidney      Family History   Problem Relation Age of Onset    Hypertension Mother     Hypertension Father     Melanoma Father     Cancer Sister      Social History     Socioeconomic History    Marital status:      Spouse name: Not on file    Number of children: Not on file    Years of education: Not on file    Highest education level: Not on file   Occupational History    Not on file   Social Needs    Financial resource strain: Not on file    Food insecurity     Worry: Not on file     Inability: Not on file    Transportation needs     Medical: Not on file     Non-medical: Not on file   Tobacco Use    Smoking status: Former Smoker     Packs/day: 1.00     Years: 20.00     Pack years: 20.00     Quit date: 1995     Years since quittin.7    Smokeless tobacco: Never Used   Substance and Sexual Activity    Alcohol use: No     Alcohol/week: 0.0 standard drinks    Drug use: No    Sexual activity: Yes     Partners: Female     Birth control/protection: None   Lifestyle    Physical activity     Days per week: Not on file     Minutes per session: Not on file    Stress: Not at all   Relationships    Social connections     Talks on  phone: Not on file     Gets together: Not on file     Attends Evangelical service: Not on file     Active member of club or organization: Not on file     Attends meetings of clubs or organizations: Not on file     Relationship status: Not on file   Other Topics Concern    Not on file   Social History Narrative    Not on file     Outpatient Encounter Medications as of 10/30/2020   Medication Sig Dispense Refill    amLODIPine (NORVASC) 10 MG tablet Take 1 tablet (10 mg total) by mouth once daily. 30 tablet 5    aspirin 81 mg Tab Take 1 tablet by mouth nightly.       benazepriL (LOTENSIN) 20 MG tablet Take 2 tablets (40 mg total) by mouth nightly. 60 tablet 5    blood sugar diagnostic Strp Use to check blood sugars  strip 3    blood-glucose meter kit Use as instructed 1 each 0    citalopram (CELEXA) 40 MG tablet Take 1 tablet (40 mg total) by mouth nightly. 30 tablet 5    clonazePAM (KLONOPIN) 1 MG tablet Take 1 mg by mouth once daily.       finasteride (PROSCAR) 5 mg tablet Take 1 tablet (5 mg total) by mouth nightly. 30 tablet 5    fluticasone propion-salmeterol 115-21 mcg/dose (ADVAIR HFA) 115-21 mcg/actuation HFAA inhaler Inhale 2 puffs into the lungs 2 (two) times daily. 12 g 12    gabapentin (NEURONTIN) 600 MG tablet Take 1 tablet (600 mg total) by mouth nightly. 30 tablet 5    hydrALAZINE (APRESOLINE) 25 MG tablet Take 1 tablet (25 mg total) by mouth nightly. 30 tablet 5    hydroCHLOROthiazide (MICROZIDE) 12.5 mg capsule TAKE 1 CAPSULE BY MOUTH ONCE DAILY. (Patient taking differently: Take 25 mg by mouth nightly. ) 90 capsule 0    hydrOXYzine pamoate (VISTARIL) 25 MG Cap Take 25 mg by mouth.      meloxicam (MOBIC) 15 MG tablet TAKE 1 TABLET BY MOUTH ONCE DAILY AS NEEDED FOR PAIN. 30 tablet 0    metoprolol tartrate (LOPRESSOR) 50 MG tablet Take 1 tablet (50 mg total) by mouth 2 (two) times daily. 60 tablet 5    omeprazole (PRILOSEC) 40 MG capsule Take 1 capsule (40 mg total) by mouth  nightly. 30 capsule 5    tamsulosin (FLOMAX) 0.4 mg Cap Take 1 capsule (0.4 mg total) by mouth nightly. 30 capsule 5    tiotropium (SPIRIVA) 18 mcg inhalation capsule Inhale 1 capsule (18 mcg total) into the lungs once daily. Controller 30 capsule 5    traZODone (DESYREL) 100 MG tablet Take 1 tablet (100 mg total) by mouth every evening. 30 tablet 2    venlafaxine (EFFEXOR) 75 MG tablet Take 1 tablet (75 mg total) by mouth 2 (two) times daily. 60 tablet 5    zolpidem (AMBIEN) 10 mg Tab TAKE 1 TABLET BY MOUTH AT BEDTIME AS NEEDED. 30 tablet 5     No facility-administered encounter medications on file as of 10/30/2020.        Review of Systems   Constitutional: Negative for appetite change and fever.   HENT: Negative for congestion, facial swelling and voice change.    Eyes: Negative for discharge and itching.   Respiratory: Negative for cough, chest tightness and wheezing.    Cardiovascular: Negative.  Negative for chest pain and leg swelling.   Gastrointestinal: Negative for abdominal pain, nausea and vomiting.   Endocrine: Negative for cold intolerance and heat intolerance.   Genitourinary: Negative for dysuria and flank pain.   Musculoskeletal: Negative for myalgias and neck stiffness.   Skin: Negative for pallor and rash.   Neurological: Negative for facial asymmetry and weakness.   Psychiatric/Behavioral: Negative for agitation and confusion.       Objective:      BP (!) 142/90   Pulse (!) 57   Temp 98.1 °F (36.7 °C) (Temporal)   Wt 92.4 kg (203 lb 9.5 oz)   SpO2 99%   BMI 28.40 kg/m²   Physical Exam    CONSTITUTIONAL: No apparent distress. Appears comfortable. Does not appear acutely ill or septic. Appears adequately hydrated.  HEENT:white cap seen in the ear canal  CARDIOVASCULAR: No perioral cyanosis  PULMONARY: Breathing unlabored. No retractions Chest expansion grossly normal.  PSYCHIATRIC: Alert and conversant and grossly oriented. Mood is grossly neutral. Affect appropriate. Judgment and insight  grossly intact.  NEUROLOGIC: No focal sensory deficits reported.   Assessment:       1. Foreign body of left ear, initial encounter    2. Essential hypertension        Plan:   Foreign body of left ear, initial encounter    Essential hypertension: Allow to continue current meds and fU with PCP

## 2020-12-09 ENCOUNTER — PATIENT OUTREACH (OUTPATIENT)
Dept: ADMINISTRATIVE | Facility: HOSPITAL | Age: 65
End: 2020-12-09

## 2020-12-09 NOTE — PROGRESS NOTES
Statin excluded last year report: Pt has allergy noted in allergy list, correct dx noted in Problem list, & HM has modifier added.

## 2021-01-27 ENCOUNTER — OFFICE VISIT (OUTPATIENT)
Dept: FAMILY MEDICINE | Facility: CLINIC | Age: 66
End: 2021-01-27
Payer: MEDICARE

## 2021-01-27 ENCOUNTER — LAB VISIT (OUTPATIENT)
Dept: LAB | Facility: HOSPITAL | Age: 66
End: 2021-01-27
Attending: FAMILY MEDICINE
Payer: MEDICARE

## 2021-01-27 VITALS
TEMPERATURE: 98 F | BODY MASS INDEX: 29.02 KG/M2 | HEART RATE: 60 BPM | SYSTOLIC BLOOD PRESSURE: 118 MMHG | WEIGHT: 207.31 LBS | HEIGHT: 71 IN | DIASTOLIC BLOOD PRESSURE: 76 MMHG | OXYGEN SATURATION: 98 %

## 2021-01-27 DIAGNOSIS — R73.03 PREDIABETES: ICD-10-CM

## 2021-01-27 DIAGNOSIS — I10 ESSENTIAL HYPERTENSION: ICD-10-CM

## 2021-01-27 DIAGNOSIS — T16.2XXA FOREIGN BODY OF LEFT EAR, INITIAL ENCOUNTER: Primary | ICD-10-CM

## 2021-01-27 LAB
ALBUMIN SERPL BCP-MCNC: 4.2 G/DL (ref 3.5–5.2)
ALP SERPL-CCNC: 52 U/L (ref 55–135)
ALT SERPL W/O P-5'-P-CCNC: 19 U/L (ref 10–44)
ANION GAP SERPL CALC-SCNC: 9 MMOL/L (ref 8–16)
AST SERPL-CCNC: 22 U/L (ref 10–40)
BASOPHILS # BLD AUTO: 0.12 K/UL (ref 0–0.2)
BASOPHILS NFR BLD: 1.7 % (ref 0–1.9)
BILIRUB SERPL-MCNC: 0.6 MG/DL (ref 0.1–1)
BUN SERPL-MCNC: 27 MG/DL (ref 8–23)
CALCIUM SERPL-MCNC: 9.1 MG/DL (ref 8.7–10.5)
CHLORIDE SERPL-SCNC: 99 MMOL/L (ref 95–110)
CHOLEST SERPL-MCNC: 236 MG/DL (ref 120–199)
CHOLEST/HDLC SERPL: 4.4 {RATIO} (ref 2–5)
CO2 SERPL-SCNC: 29 MMOL/L (ref 23–29)
CREAT SERPL-MCNC: 1.4 MG/DL (ref 0.5–1.4)
DIFFERENTIAL METHOD: ABNORMAL
EOSINOPHIL # BLD AUTO: 0.4 K/UL (ref 0–0.5)
EOSINOPHIL NFR BLD: 6.3 % (ref 0–8)
ERYTHROCYTE [DISTWIDTH] IN BLOOD BY AUTOMATED COUNT: 12.9 % (ref 11.5–14.5)
EST. GFR  (AFRICAN AMERICAN): >60 ML/MIN/1.73 M^2
EST. GFR  (NON AFRICAN AMERICAN): 52.4 ML/MIN/1.73 M^2
GLUCOSE SERPL-MCNC: 92 MG/DL (ref 70–110)
HCT VFR BLD AUTO: 47.9 % (ref 40–54)
HDLC SERPL-MCNC: 54 MG/DL (ref 40–75)
HDLC SERPL: 22.9 % (ref 20–50)
HGB BLD-MCNC: 15.2 G/DL (ref 14–18)
IMM GRANULOCYTES # BLD AUTO: 0.02 K/UL (ref 0–0.04)
IMM GRANULOCYTES NFR BLD AUTO: 0.3 % (ref 0–0.5)
LDLC SERPL CALC-MCNC: 146.8 MG/DL (ref 63–159)
LYMPHOCYTES # BLD AUTO: 1.5 K/UL (ref 1–4.8)
LYMPHOCYTES NFR BLD: 20.9 % (ref 18–48)
MCH RBC QN AUTO: 28.6 PG (ref 27–31)
MCHC RBC AUTO-ENTMCNC: 31.7 G/DL (ref 32–36)
MCV RBC AUTO: 90 FL (ref 82–98)
MONOCYTES # BLD AUTO: 0.8 K/UL (ref 0.3–1)
MONOCYTES NFR BLD: 11.8 % (ref 4–15)
NEUTROPHILS # BLD AUTO: 4.1 K/UL (ref 1.8–7.7)
NEUTROPHILS NFR BLD: 59 % (ref 38–73)
NONHDLC SERPL-MCNC: 182 MG/DL
NRBC BLD-RTO: 0 /100 WBC
PLATELET # BLD AUTO: 340 K/UL (ref 150–350)
PMV BLD AUTO: 9.2 FL (ref 9.2–12.9)
POTASSIUM SERPL-SCNC: 5.2 MMOL/L (ref 3.5–5.1)
PROT SERPL-MCNC: 7.5 G/DL (ref 6–8.4)
RBC # BLD AUTO: 5.32 M/UL (ref 4.6–6.2)
SODIUM SERPL-SCNC: 137 MMOL/L (ref 136–145)
TRIGL SERPL-MCNC: 176 MG/DL (ref 30–150)
WBC # BLD AUTO: 6.95 K/UL (ref 3.9–12.7)

## 2021-01-27 PROCEDURE — 1101F PR PT FALLS ASSESS DOC 0-1 FALLS W/OUT INJ PAST YR: ICD-10-PCS | Mod: CPTII,S$GLB,, | Performed by: FAMILY MEDICINE

## 2021-01-27 PROCEDURE — 3078F PR MOST RECENT DIASTOLIC BLOOD PRESSURE < 80 MM HG: ICD-10-PCS | Mod: CPTII,S$GLB,, | Performed by: FAMILY MEDICINE

## 2021-01-27 PROCEDURE — 80061 LIPID PANEL: CPT

## 2021-01-27 PROCEDURE — 3008F BODY MASS INDEX DOCD: CPT | Mod: CPTII,S$GLB,, | Performed by: FAMILY MEDICINE

## 2021-01-27 PROCEDURE — 85025 COMPLETE CBC W/AUTO DIFF WBC: CPT

## 2021-01-27 PROCEDURE — 99213 PR OFFICE/OUTPT VISIT, EST, LEVL III, 20-29 MIN: ICD-10-PCS | Mod: S$GLB,,, | Performed by: FAMILY MEDICINE

## 2021-01-27 PROCEDURE — 83036 HEMOGLOBIN GLYCOSYLATED A1C: CPT

## 2021-01-27 PROCEDURE — 3288F FALL RISK ASSESSMENT DOCD: CPT | Mod: CPTII,S$GLB,, | Performed by: FAMILY MEDICINE

## 2021-01-27 PROCEDURE — 3074F PR MOST RECENT SYSTOLIC BLOOD PRESSURE < 130 MM HG: ICD-10-PCS | Mod: CPTII,S$GLB,, | Performed by: FAMILY MEDICINE

## 2021-01-27 PROCEDURE — 3288F PR FALLS RISK ASSESSMENT DOCUMENTED: ICD-10-PCS | Mod: CPTII,S$GLB,, | Performed by: FAMILY MEDICINE

## 2021-01-27 PROCEDURE — 36415 COLL VENOUS BLD VENIPUNCTURE: CPT | Mod: PO

## 2021-01-27 PROCEDURE — 1101F PT FALLS ASSESS-DOCD LE1/YR: CPT | Mod: CPTII,S$GLB,, | Performed by: FAMILY MEDICINE

## 2021-01-27 PROCEDURE — 80053 COMPREHEN METABOLIC PANEL: CPT

## 2021-01-27 PROCEDURE — 3008F PR BODY MASS INDEX (BMI) DOCUMENTED: ICD-10-PCS | Mod: CPTII,S$GLB,, | Performed by: FAMILY MEDICINE

## 2021-01-27 PROCEDURE — 99999 PR PBB SHADOW E&M-EST. PATIENT-LVL IV: CPT | Mod: PBBFAC,,, | Performed by: FAMILY MEDICINE

## 2021-01-27 PROCEDURE — 99213 OFFICE O/P EST LOW 20 MIN: CPT | Mod: S$GLB,,, | Performed by: FAMILY MEDICINE

## 2021-01-27 PROCEDURE — 99999 PR PBB SHADOW E&M-EST. PATIENT-LVL IV: ICD-10-PCS | Mod: PBBFAC,,, | Performed by: FAMILY MEDICINE

## 2021-01-27 PROCEDURE — 3074F SYST BP LT 130 MM HG: CPT | Mod: CPTII,S$GLB,, | Performed by: FAMILY MEDICINE

## 2021-01-27 PROCEDURE — 3078F DIAST BP <80 MM HG: CPT | Mod: CPTII,S$GLB,, | Performed by: FAMILY MEDICINE

## 2021-01-28 LAB
ESTIMATED AVG GLUCOSE: 117 MG/DL (ref 68–131)
HBA1C MFR BLD HPLC: 5.7 % (ref 4–5.6)

## 2021-02-08 ENCOUNTER — OFFICE VISIT (OUTPATIENT)
Dept: FAMILY MEDICINE | Facility: CLINIC | Age: 66
End: 2021-02-08
Payer: MEDICARE

## 2021-02-08 VITALS
BODY MASS INDEX: 29.34 KG/M2 | SYSTOLIC BLOOD PRESSURE: 138 MMHG | WEIGHT: 204.94 LBS | TEMPERATURE: 98 F | HEIGHT: 70 IN | DIASTOLIC BLOOD PRESSURE: 70 MMHG | HEART RATE: 72 BPM | OXYGEN SATURATION: 99 %

## 2021-02-08 DIAGNOSIS — R73.03 PREDIABETES: ICD-10-CM

## 2021-02-08 DIAGNOSIS — F32.5 DEPRESSION, MAJOR, IN REMISSION: ICD-10-CM

## 2021-02-08 DIAGNOSIS — Z00.00 WELL ADULT EXAM: Primary | ICD-10-CM

## 2021-02-08 DIAGNOSIS — F51.04 CHRONIC INSOMNIA: ICD-10-CM

## 2021-02-08 DIAGNOSIS — N40.0 BENIGN PROSTATIC HYPERPLASIA, UNSPECIFIED WHETHER LOWER URINARY TRACT SYMPTOMS PRESENT: ICD-10-CM

## 2021-02-08 DIAGNOSIS — I10 ESSENTIAL HYPERTENSION: Chronic | ICD-10-CM

## 2021-02-08 DIAGNOSIS — Z12.11 COLON CANCER SCREENING: ICD-10-CM

## 2021-02-08 DIAGNOSIS — K22.711 BARRETT'S ESOPHAGUS WITH HIGH GRADE DYSPLASIA: ICD-10-CM

## 2021-02-08 PROBLEM — N17.9 AKI (ACUTE KIDNEY INJURY): Status: RESOLVED | Noted: 2020-02-04 | Resolved: 2021-02-08

## 2021-02-08 PROCEDURE — 3288F PR FALLS RISK ASSESSMENT DOCUMENTED: ICD-10-PCS | Mod: CPTII,S$GLB,, | Performed by: FAMILY MEDICINE

## 2021-02-08 PROCEDURE — 99397 PER PM REEVAL EST PAT 65+ YR: CPT | Mod: 25,S$GLB,, | Performed by: FAMILY MEDICINE

## 2021-02-08 PROCEDURE — 3008F PR BODY MASS INDEX (BMI) DOCUMENTED: ICD-10-PCS | Mod: CPTII,S$GLB,, | Performed by: FAMILY MEDICINE

## 2021-02-08 PROCEDURE — 1101F PT FALLS ASSESS-DOCD LE1/YR: CPT | Mod: CPTII,S$GLB,, | Performed by: FAMILY MEDICINE

## 2021-02-08 PROCEDURE — 1126F AMNT PAIN NOTED NONE PRSNT: CPT | Mod: S$GLB,,, | Performed by: FAMILY MEDICINE

## 2021-02-08 PROCEDURE — 3288F FALL RISK ASSESSMENT DOCD: CPT | Mod: CPTII,S$GLB,, | Performed by: FAMILY MEDICINE

## 2021-02-08 PROCEDURE — 3075F SYST BP GE 130 - 139MM HG: CPT | Mod: CPTII,S$GLB,, | Performed by: FAMILY MEDICINE

## 2021-02-08 PROCEDURE — 1101F PR PT FALLS ASSESS DOC 0-1 FALLS W/OUT INJ PAST YR: ICD-10-PCS | Mod: CPTII,S$GLB,, | Performed by: FAMILY MEDICINE

## 2021-02-08 PROCEDURE — 90732 PNEUMOCOCCAL POLYSACCHARIDE VACCINE 23-VALENT =>2YO SQ IM: ICD-10-PCS | Mod: S$GLB,,, | Performed by: FAMILY MEDICINE

## 2021-02-08 PROCEDURE — 3078F PR MOST RECENT DIASTOLIC BLOOD PRESSURE < 80 MM HG: ICD-10-PCS | Mod: CPTII,S$GLB,, | Performed by: FAMILY MEDICINE

## 2021-02-08 PROCEDURE — 99999 PR PBB SHADOW E&M-EST. PATIENT-LVL V: CPT | Mod: PBBFAC,,, | Performed by: FAMILY MEDICINE

## 2021-02-08 PROCEDURE — 3078F DIAST BP <80 MM HG: CPT | Mod: CPTII,S$GLB,, | Performed by: FAMILY MEDICINE

## 2021-02-08 PROCEDURE — 99499 UNLISTED E&M SERVICE: CPT | Mod: S$GLB,,, | Performed by: FAMILY MEDICINE

## 2021-02-08 PROCEDURE — 3008F BODY MASS INDEX DOCD: CPT | Mod: CPTII,S$GLB,, | Performed by: FAMILY MEDICINE

## 2021-02-08 PROCEDURE — G0009 ADMIN PNEUMOCOCCAL VACCINE: HCPCS | Mod: S$GLB,,, | Performed by: FAMILY MEDICINE

## 2021-02-08 PROCEDURE — 99999 PR PBB SHADOW E&M-EST. PATIENT-LVL V: ICD-10-PCS | Mod: PBBFAC,,, | Performed by: FAMILY MEDICINE

## 2021-02-08 PROCEDURE — 1126F PR PAIN SEVERITY QUANTIFIED, NO PAIN PRESENT: ICD-10-PCS | Mod: S$GLB,,, | Performed by: FAMILY MEDICINE

## 2021-02-08 PROCEDURE — G0009 PNEUMOCOCCAL POLYSACCHARIDE VACCINE 23-VALENT =>2YO SQ IM: ICD-10-PCS | Mod: S$GLB,,, | Performed by: FAMILY MEDICINE

## 2021-02-08 PROCEDURE — 90732 PPSV23 VACC 2 YRS+ SUBQ/IM: CPT | Mod: S$GLB,,, | Performed by: FAMILY MEDICINE

## 2021-02-08 PROCEDURE — 99397 PR PREVENTIVE VISIT,EST,65 & OVER: ICD-10-PCS | Mod: 25,S$GLB,, | Performed by: FAMILY MEDICINE

## 2021-02-08 PROCEDURE — 99499 RISK ADDL DX/OHS AUDIT: ICD-10-PCS | Mod: S$GLB,,, | Performed by: FAMILY MEDICINE

## 2021-02-08 PROCEDURE — 3075F PR MOST RECENT SYSTOLIC BLOOD PRESS GE 130-139MM HG: ICD-10-PCS | Mod: CPTII,S$GLB,, | Performed by: FAMILY MEDICINE

## 2021-02-08 RX ORDER — INSULIN PUMP SYRINGE, 3 ML
EACH MISCELLANEOUS
Qty: 1 EACH | Refills: 1 | Status: SHIPPED | OUTPATIENT
Start: 2021-02-08 | End: 2023-05-16

## 2021-02-08 RX ORDER — LANCETS
1 EACH MISCELLANEOUS 3 TIMES DAILY
Qty: 100 EACH | Refills: 12 | Status: SHIPPED | OUTPATIENT
Start: 2021-02-08 | End: 2023-05-16

## 2021-02-22 ENCOUNTER — PES CALL (OUTPATIENT)
Dept: ADMINISTRATIVE | Facility: CLINIC | Age: 66
End: 2021-02-22

## 2021-04-26 ENCOUNTER — OFFICE VISIT (OUTPATIENT)
Dept: FAMILY MEDICINE | Facility: CLINIC | Age: 66
End: 2021-04-26
Payer: MEDICARE

## 2021-04-26 VITALS
BODY MASS INDEX: 29.61 KG/M2 | WEIGHT: 206.81 LBS | TEMPERATURE: 99 F | OXYGEN SATURATION: 98 % | HEIGHT: 70 IN | HEART RATE: 117 BPM

## 2021-04-26 DIAGNOSIS — K43.9 ABDOMINAL WALL HERNIA: Primary | ICD-10-CM

## 2021-04-26 PROCEDURE — 3008F BODY MASS INDEX DOCD: CPT | Mod: CPTII,S$GLB,, | Performed by: FAMILY MEDICINE

## 2021-04-26 PROCEDURE — 1159F MED LIST DOCD IN RCRD: CPT | Mod: S$GLB,,, | Performed by: FAMILY MEDICINE

## 2021-04-26 PROCEDURE — 3008F PR BODY MASS INDEX (BMI) DOCUMENTED: ICD-10-PCS | Mod: CPTII,S$GLB,, | Performed by: FAMILY MEDICINE

## 2021-04-26 PROCEDURE — 99999 PR PBB SHADOW E&M-EST. PATIENT-LVL V: CPT | Mod: PBBFAC,,, | Performed by: FAMILY MEDICINE

## 2021-04-26 PROCEDURE — 99213 PR OFFICE/OUTPT VISIT, EST, LEVL III, 20-29 MIN: ICD-10-PCS | Mod: S$GLB,,, | Performed by: FAMILY MEDICINE

## 2021-04-26 PROCEDURE — 1125F PR PAIN SEVERITY QUANTIFIED, PAIN PRESENT: ICD-10-PCS | Mod: S$GLB,,, | Performed by: FAMILY MEDICINE

## 2021-04-26 PROCEDURE — 99213 OFFICE O/P EST LOW 20 MIN: CPT | Mod: S$GLB,,, | Performed by: FAMILY MEDICINE

## 2021-04-26 PROCEDURE — 1125F AMNT PAIN NOTED PAIN PRSNT: CPT | Mod: S$GLB,,, | Performed by: FAMILY MEDICINE

## 2021-04-26 PROCEDURE — 99999 PR PBB SHADOW E&M-EST. PATIENT-LVL V: ICD-10-PCS | Mod: PBBFAC,,, | Performed by: FAMILY MEDICINE

## 2021-04-26 PROCEDURE — 1159F PR MEDICATION LIST DOCUMENTED IN MEDICAL RECORD: ICD-10-PCS | Mod: S$GLB,,, | Performed by: FAMILY MEDICINE

## 2021-04-28 ENCOUNTER — PATIENT OUTREACH (OUTPATIENT)
Dept: ADMINISTRATIVE | Facility: OTHER | Age: 66
End: 2021-04-28

## 2021-04-29 ENCOUNTER — OFFICE VISIT (OUTPATIENT)
Dept: SURGERY | Facility: CLINIC | Age: 66
End: 2021-04-29
Payer: MEDICARE

## 2021-04-29 ENCOUNTER — HOSPITAL ENCOUNTER (OUTPATIENT)
Dept: CARDIOLOGY | Facility: HOSPITAL | Age: 66
Discharge: HOME | End: 2021-04-29
Attending: SURGERY
Payer: MEDICARE

## 2021-04-29 VITALS
BODY MASS INDEX: 29.49 KG/M2 | TEMPERATURE: 98 F | HEART RATE: 120 BPM | SYSTOLIC BLOOD PRESSURE: 140 MMHG | WEIGHT: 206 LBS | HEIGHT: 70 IN | DIASTOLIC BLOOD PRESSURE: 91 MMHG

## 2021-04-29 DIAGNOSIS — K43.2 INCISIONAL HERNIA, WITHOUT OBSTRUCTION OR GANGRENE: Primary | ICD-10-CM

## 2021-04-29 DIAGNOSIS — K43.9 ABDOMINAL WALL HERNIA: ICD-10-CM

## 2021-04-29 DIAGNOSIS — Z01.818 PRE-OP TESTING: ICD-10-CM

## 2021-04-29 DIAGNOSIS — K43.2 INCISIONAL HERNIA, WITHOUT OBSTRUCTION OR GANGRENE: ICD-10-CM

## 2021-04-29 PROCEDURE — 3008F BODY MASS INDEX DOCD: CPT | Mod: CPTII,S$GLB,, | Performed by: SURGERY

## 2021-04-29 PROCEDURE — 99999 PR PBB SHADOW E&M-EST. PATIENT-LVL V: ICD-10-PCS | Mod: PBBFAC,,, | Performed by: SURGERY

## 2021-04-29 PROCEDURE — 93010 ELECTROCARDIOGRAM REPORT: CPT | Mod: ,,, | Performed by: INTERNAL MEDICINE

## 2021-04-29 PROCEDURE — 99214 OFFICE O/P EST MOD 30 MIN: CPT | Mod: S$GLB,CS,, | Performed by: SURGERY

## 2021-04-29 PROCEDURE — 93005 ELECTROCARDIOGRAM TRACING: CPT

## 2021-04-29 PROCEDURE — 1159F MED LIST DOCD IN RCRD: CPT | Mod: S$GLB,,, | Performed by: SURGERY

## 2021-04-29 PROCEDURE — 1125F PR PAIN SEVERITY QUANTIFIED, PAIN PRESENT: ICD-10-PCS | Mod: S$GLB,,, | Performed by: SURGERY

## 2021-04-29 PROCEDURE — 93010 EKG 12-LEAD: ICD-10-PCS | Mod: ,,, | Performed by: INTERNAL MEDICINE

## 2021-04-29 PROCEDURE — 1125F AMNT PAIN NOTED PAIN PRSNT: CPT | Mod: S$GLB,,, | Performed by: SURGERY

## 2021-04-29 PROCEDURE — 99999 PR PBB SHADOW E&M-EST. PATIENT-LVL V: CPT | Mod: PBBFAC,,, | Performed by: SURGERY

## 2021-04-29 PROCEDURE — 99214 PR OFFICE/OUTPT VISIT, EST, LEVL IV, 30-39 MIN: ICD-10-PCS | Mod: S$GLB,CS,, | Performed by: SURGERY

## 2021-04-29 PROCEDURE — 1159F PR MEDICATION LIST DOCUMENTED IN MEDICAL RECORD: ICD-10-PCS | Mod: S$GLB,,, | Performed by: SURGERY

## 2021-04-29 PROCEDURE — 3008F PR BODY MASS INDEX (BMI) DOCUMENTED: ICD-10-PCS | Mod: CPTII,S$GLB,, | Performed by: SURGERY

## 2021-04-29 RX ORDER — ONDANSETRON 4 MG/1
8 TABLET, ORALLY DISINTEGRATING ORAL EVERY 8 HOURS PRN
Status: CANCELLED | OUTPATIENT
Start: 2021-04-29

## 2021-04-29 RX ORDER — LIDOCAINE HYDROCHLORIDE 10 MG/ML
1 INJECTION, SOLUTION EPIDURAL; INFILTRATION; INTRACAUDAL; PERINEURAL ONCE
Status: DISCONTINUED | OUTPATIENT
Start: 2021-04-29 | End: 2021-05-04 | Stop reason: HOSPADM

## 2021-04-30 ENCOUNTER — LAB VISIT (OUTPATIENT)
Dept: LAB | Facility: HOSPITAL | Age: 66
End: 2021-04-30
Attending: SURGERY
Payer: MEDICARE

## 2021-04-30 DIAGNOSIS — Z01.818 PREOP TESTING: Primary | ICD-10-CM

## 2021-04-30 DIAGNOSIS — Z01.818 PREOP TESTING: ICD-10-CM

## 2021-04-30 LAB
ALBUMIN SERPL BCP-MCNC: 4.3 G/DL (ref 3.5–5.2)
ALP SERPL-CCNC: 54 U/L (ref 55–135)
ALT SERPL W/O P-5'-P-CCNC: 22 U/L (ref 10–44)
ANION GAP SERPL CALC-SCNC: 9 MMOL/L (ref 8–16)
AST SERPL-CCNC: 21 U/L (ref 10–40)
BASOPHILS # BLD AUTO: 0.09 K/UL (ref 0–0.2)
BASOPHILS NFR BLD: 1.2 % (ref 0–1.9)
BILIRUB SERPL-MCNC: 0.5 MG/DL (ref 0.1–1)
BUN SERPL-MCNC: 26 MG/DL (ref 8–23)
CALCIUM SERPL-MCNC: 9.7 MG/DL (ref 8.7–10.5)
CHLORIDE SERPL-SCNC: 101 MMOL/L (ref 95–110)
CO2 SERPL-SCNC: 25 MMOL/L (ref 23–29)
CREAT SERPL-MCNC: 1.2 MG/DL (ref 0.5–1.4)
DIFFERENTIAL METHOD: ABNORMAL
EOSINOPHIL # BLD AUTO: 0.3 K/UL (ref 0–0.5)
EOSINOPHIL NFR BLD: 4.3 % (ref 0–8)
ERYTHROCYTE [DISTWIDTH] IN BLOOD BY AUTOMATED COUNT: 13.4 % (ref 11.5–14.5)
EST. GFR  (AFRICAN AMERICAN): >60 ML/MIN/1.73 M^2
EST. GFR  (NON AFRICAN AMERICAN): >60 ML/MIN/1.73 M^2
GLUCOSE SERPL-MCNC: 96 MG/DL (ref 70–110)
HCT VFR BLD AUTO: 45 % (ref 40–54)
HGB BLD-MCNC: 14.9 G/DL (ref 14–18)
IMM GRANULOCYTES # BLD AUTO: 0.01 K/UL (ref 0–0.04)
IMM GRANULOCYTES NFR BLD AUTO: 0.1 % (ref 0–0.5)
LYMPHOCYTES # BLD AUTO: 1.4 K/UL (ref 1–4.8)
LYMPHOCYTES NFR BLD: 18.3 % (ref 18–48)
MCH RBC QN AUTO: 28.3 PG (ref 27–31)
MCHC RBC AUTO-ENTMCNC: 33.1 G/DL (ref 32–36)
MCV RBC AUTO: 86 FL (ref 82–98)
MONOCYTES # BLD AUTO: 0.9 K/UL (ref 0.3–1)
MONOCYTES NFR BLD: 11.2 % (ref 4–15)
NEUTROPHILS # BLD AUTO: 5 K/UL (ref 1.8–7.7)
NEUTROPHILS NFR BLD: 64.9 % (ref 38–73)
NRBC BLD-RTO: 0 /100 WBC
PLATELET # BLD AUTO: 331 K/UL (ref 150–450)
PMV BLD AUTO: 8.8 FL (ref 9.2–12.9)
POTASSIUM SERPL-SCNC: 4.1 MMOL/L (ref 3.5–5.1)
PROT SERPL-MCNC: 7.5 G/DL (ref 6–8.4)
RBC # BLD AUTO: 5.26 M/UL (ref 4.6–6.2)
SODIUM SERPL-SCNC: 135 MMOL/L (ref 136–145)
WBC # BLD AUTO: 7.7 K/UL (ref 3.9–12.7)

## 2021-04-30 PROCEDURE — 36415 COLL VENOUS BLD VENIPUNCTURE: CPT | Mod: PO | Performed by: SURGERY

## 2021-04-30 PROCEDURE — 80053 COMPREHEN METABOLIC PANEL: CPT | Performed by: SURGERY

## 2021-04-30 PROCEDURE — 85025 COMPLETE CBC W/AUTO DIFF WBC: CPT | Performed by: SURGERY

## 2021-05-01 ENCOUNTER — LAB VISIT (OUTPATIENT)
Dept: OTOLARYNGOLOGY | Facility: CLINIC | Age: 66
End: 2021-05-01
Payer: MEDICARE

## 2021-05-01 DIAGNOSIS — K43.2 INCISIONAL HERNIA, WITHOUT OBSTRUCTION OR GANGRENE: ICD-10-CM

## 2021-05-01 DIAGNOSIS — Z01.818 PRE-OP TESTING: ICD-10-CM

## 2021-05-01 PROCEDURE — U0005 INFEC AGEN DETEC AMPLI PROBE: HCPCS | Performed by: SURGERY

## 2021-05-01 PROCEDURE — U0003 INFECTIOUS AGENT DETECTION BY NUCLEIC ACID (DNA OR RNA); SEVERE ACUTE RESPIRATORY SYNDROME CORONAVIRUS 2 (SARS-COV-2) (CORONAVIRUS DISEASE [COVID-19]), AMPLIFIED PROBE TECHNIQUE, MAKING USE OF HIGH THROUGHPUT TECHNOLOGIES AS DESCRIBED BY CMS-2020-01-R: HCPCS | Performed by: SURGERY

## 2021-05-02 LAB — SARS-COV-2 RNA RESP QL NAA+PROBE: NOT DETECTED

## 2021-05-04 ENCOUNTER — ANESTHESIA EVENT (OUTPATIENT)
Dept: SURGERY | Facility: HOSPITAL | Age: 66
End: 2021-05-04
Payer: MEDICARE

## 2021-05-04 ENCOUNTER — ANESTHESIA (OUTPATIENT)
Dept: SURGERY | Facility: HOSPITAL | Age: 66
End: 2021-05-04
Payer: MEDICARE

## 2021-05-04 ENCOUNTER — HOSPITAL ENCOUNTER (OUTPATIENT)
Facility: HOSPITAL | Age: 66
Discharge: HOME | End: 2021-05-04
Attending: SURGERY | Admitting: SURGERY
Payer: MEDICARE

## 2021-05-04 VITALS
BODY MASS INDEX: 29.38 KG/M2 | TEMPERATURE: 98 F | DIASTOLIC BLOOD PRESSURE: 84 MMHG | HEART RATE: 75 BPM | HEIGHT: 70 IN | RESPIRATION RATE: 23 BRPM | SYSTOLIC BLOOD PRESSURE: 145 MMHG | WEIGHT: 205.25 LBS | OXYGEN SATURATION: 99 %

## 2021-05-04 DIAGNOSIS — K43.9 ABDOMINAL WALL HERNIA: ICD-10-CM

## 2021-05-04 DIAGNOSIS — K43.2 INCISIONAL HERNIA, WITHOUT OBSTRUCTION OR GANGRENE: ICD-10-CM

## 2021-05-04 PROCEDURE — 49585 PR REPAIR UMBILICAL HERN,5+Y/O,REDUC: ICD-10-PCS | Mod: ,,, | Performed by: SURGERY

## 2021-05-04 PROCEDURE — 63600175 PHARM REV CODE 636 W HCPCS: Performed by: ANESTHESIOLOGY

## 2021-05-04 PROCEDURE — 49320 PR LAP,DIAGNOSTIC ABDOMEN: ICD-10-PCS | Mod: 59,,, | Performed by: SURGERY

## 2021-05-04 PROCEDURE — 25000003 PHARM REV CODE 250: Performed by: SURGERY

## 2021-05-04 PROCEDURE — 37000009 HC ANESTHESIA EA ADD 15 MINS: Performed by: SURGERY

## 2021-05-04 PROCEDURE — 71000033 HC RECOVERY, INTIAL HOUR: Performed by: SURGERY

## 2021-05-04 PROCEDURE — 27201423 OPTIME MED/SURG SUP & DEVICES STERILE SUPPLY: Performed by: SURGERY

## 2021-05-04 PROCEDURE — 36000710: Performed by: SURGERY

## 2021-05-04 PROCEDURE — 49320 DIAG LAPARO SEPARATE PROC: CPT | Mod: 59,,, | Performed by: SURGERY

## 2021-05-04 PROCEDURE — 37000008 HC ANESTHESIA 1ST 15 MINUTES: Performed by: SURGERY

## 2021-05-04 PROCEDURE — 49585 PR REPAIR UMBILICAL HERN,5+Y/O,REDUC: CPT | Mod: ,,, | Performed by: SURGERY

## 2021-05-04 PROCEDURE — 63600175 PHARM REV CODE 636 W HCPCS: Performed by: NURSE ANESTHETIST, CERTIFIED REGISTERED

## 2021-05-04 PROCEDURE — 36000711: Performed by: SURGERY

## 2021-05-04 PROCEDURE — 71000015 HC POSTOP RECOV 1ST HR: Performed by: SURGERY

## 2021-05-04 PROCEDURE — 25000003 PHARM REV CODE 250: Performed by: NURSE ANESTHETIST, CERTIFIED REGISTERED

## 2021-05-04 PROCEDURE — 25000003 PHARM REV CODE 250: Performed by: ANESTHESIOLOGY

## 2021-05-04 RX ORDER — ONDANSETRON 2 MG/ML
INJECTION INTRAMUSCULAR; INTRAVENOUS
Status: DISCONTINUED | OUTPATIENT
Start: 2021-05-04 | End: 2021-05-04

## 2021-05-04 RX ORDER — LIDOCAINE HCL/PF 100 MG/5ML
SYRINGE (ML) INTRAVENOUS
Status: DISCONTINUED | OUTPATIENT
Start: 2021-05-04 | End: 2021-05-04

## 2021-05-04 RX ORDER — PROPOFOL 10 MG/ML
VIAL (ML) INTRAVENOUS
Status: DISCONTINUED | OUTPATIENT
Start: 2021-05-04 | End: 2021-05-04

## 2021-05-04 RX ORDER — HYDROCODONE BITARTRATE AND ACETAMINOPHEN 7.5; 325 MG/1; MG/1
1 TABLET ORAL EVERY 6 HOURS PRN
Status: DISCONTINUED | OUTPATIENT
Start: 2021-05-04 | End: 2021-05-04 | Stop reason: HOSPADM

## 2021-05-04 RX ORDER — ONDANSETRON 8 MG/1
8 TABLET, ORALLY DISINTEGRATING ORAL EVERY 8 HOURS PRN
Status: DISCONTINUED | OUTPATIENT
Start: 2021-05-04 | End: 2021-05-04 | Stop reason: HOSPADM

## 2021-05-04 RX ORDER — HYDROMORPHONE HYDROCHLORIDE 2 MG/ML
0.2 INJECTION, SOLUTION INTRAMUSCULAR; INTRAVENOUS; SUBCUTANEOUS EVERY 5 MIN PRN
Status: DISCONTINUED | OUTPATIENT
Start: 2021-05-04 | End: 2021-05-04 | Stop reason: HOSPADM

## 2021-05-04 RX ORDER — SODIUM CHLORIDE, SODIUM LACTATE, POTASSIUM CHLORIDE, CALCIUM CHLORIDE 600; 310; 30; 20 MG/100ML; MG/100ML; MG/100ML; MG/100ML
INJECTION, SOLUTION INTRAVENOUS CONTINUOUS PRN
Status: DISCONTINUED | OUTPATIENT
Start: 2021-05-04 | End: 2021-05-04

## 2021-05-04 RX ORDER — HYDROCODONE BITARTRATE AND ACETAMINOPHEN 5; 325 MG/1; MG/1
TABLET ORAL
Qty: 15 TABLET | Refills: 0 | Status: SHIPPED | OUTPATIENT
Start: 2021-05-04 | End: 2021-06-28

## 2021-05-04 RX ORDER — ROCURONIUM BROMIDE 10 MG/ML
INJECTION, SOLUTION INTRAVENOUS
Status: DISCONTINUED | OUTPATIENT
Start: 2021-05-04 | End: 2021-05-04

## 2021-05-04 RX ORDER — ONDANSETRON 2 MG/ML
4 INJECTION INTRAMUSCULAR; INTRAVENOUS DAILY PRN
Status: DISCONTINUED | OUTPATIENT
Start: 2021-05-04 | End: 2021-05-04 | Stop reason: HOSPADM

## 2021-05-04 RX ORDER — HYDROCODONE BITARTRATE AND ACETAMINOPHEN 5; 325 MG/1; MG/1
1 TABLET ORAL EVERY 6 HOURS PRN
Qty: 15 TABLET | Refills: 0 | Status: SHIPPED | OUTPATIENT
Start: 2021-05-04 | End: 2021-05-17

## 2021-05-04 RX ORDER — HYDROMORPHONE HYDROCHLORIDE 2 MG/ML
1 INJECTION, SOLUTION INTRAMUSCULAR; INTRAVENOUS; SUBCUTANEOUS EVERY 4 HOURS PRN
Status: DISCONTINUED | OUTPATIENT
Start: 2021-05-04 | End: 2021-05-04 | Stop reason: HOSPADM

## 2021-05-04 RX ORDER — MIDAZOLAM HYDROCHLORIDE 1 MG/ML
INJECTION INTRAMUSCULAR; INTRAVENOUS
Status: DISCONTINUED | OUTPATIENT
Start: 2021-05-04 | End: 2021-05-04

## 2021-05-04 RX ORDER — SUCCINYLCHOLINE CHLORIDE 20 MG/ML
INJECTION INTRAMUSCULAR; INTRAVENOUS
Status: DISCONTINUED | OUTPATIENT
Start: 2021-05-04 | End: 2021-05-04

## 2021-05-04 RX ORDER — BUPIVACAINE HYDROCHLORIDE 2.5 MG/ML
INJECTION, SOLUTION EPIDURAL; INFILTRATION; INTRACAUDAL
Status: DISCONTINUED | OUTPATIENT
Start: 2021-05-04 | End: 2021-05-04 | Stop reason: HOSPADM

## 2021-05-04 RX ORDER — HYDROCODONE BITARTRATE AND ACETAMINOPHEN 5; 325 MG/1; MG/1
1 TABLET ORAL EVERY 4 HOURS PRN
Status: DISCONTINUED | OUTPATIENT
Start: 2021-05-04 | End: 2021-05-04 | Stop reason: HOSPADM

## 2021-05-04 RX ORDER — FENTANYL CITRATE 50 UG/ML
INJECTION, SOLUTION INTRAMUSCULAR; INTRAVENOUS
Status: DISCONTINUED | OUTPATIENT
Start: 2021-05-04 | End: 2021-05-04

## 2021-05-04 RX ADMIN — MIDAZOLAM HYDROCHLORIDE 2 MG: 1 INJECTION, SOLUTION INTRAMUSCULAR; INTRAVENOUS at 11:05

## 2021-05-04 RX ADMIN — SUGAMMADEX 200 MG: 100 INJECTION, SOLUTION INTRAVENOUS at 12:05

## 2021-05-04 RX ADMIN — FENTANYL CITRATE 50 MCG: 50 INJECTION, SOLUTION INTRAMUSCULAR; INTRAVENOUS at 11:05

## 2021-05-04 RX ADMIN — FENTANYL CITRATE 50 MCG: 50 INJECTION, SOLUTION INTRAMUSCULAR; INTRAVENOUS at 12:05

## 2021-05-04 RX ADMIN — LIDOCAINE HYDROCHLORIDE 100 MG: 20 INJECTION, SOLUTION INTRAVENOUS at 11:05

## 2021-05-04 RX ADMIN — ROCURONIUM BROMIDE 45 MG: 10 INJECTION, SOLUTION INTRAVENOUS at 11:05

## 2021-05-04 RX ADMIN — PROPOFOL 130 MG: 10 INJECTION, EMULSION INTRAVENOUS at 11:05

## 2021-05-04 RX ADMIN — SUCCINYLCHOLINE CHLORIDE 180 MG: 20 INJECTION, SOLUTION INTRAMUSCULAR; INTRAVENOUS at 11:05

## 2021-05-04 RX ADMIN — ROCURONIUM BROMIDE 5 MG: 10 INJECTION, SOLUTION INTRAVENOUS at 11:05

## 2021-05-04 RX ADMIN — ONDANSETRON 4 MG: 2 INJECTION, SOLUTION INTRAMUSCULAR; INTRAVENOUS at 11:05

## 2021-05-04 RX ADMIN — SODIUM CHLORIDE, SODIUM LACTATE, POTASSIUM CHLORIDE, AND CALCIUM CHLORIDE: 600; 310; 30; 20 INJECTION, SOLUTION INTRAVENOUS at 11:05

## 2021-05-04 RX ADMIN — CEFAZOLIN 2 G: 1 INJECTION, POWDER, FOR SOLUTION INTRAMUSCULAR; INTRAVENOUS at 11:05

## 2021-05-17 ENCOUNTER — OFFICE VISIT (OUTPATIENT)
Dept: SURGERY | Facility: CLINIC | Age: 66
End: 2021-05-17
Payer: MEDICARE

## 2021-05-17 VITALS
SYSTOLIC BLOOD PRESSURE: 128 MMHG | TEMPERATURE: 98 F | HEART RATE: 92 BPM | BODY MASS INDEX: 26.86 KG/M2 | WEIGHT: 187.63 LBS | HEIGHT: 70 IN | DIASTOLIC BLOOD PRESSURE: 81 MMHG

## 2021-05-17 DIAGNOSIS — Z09 POSTOP CHECK: Primary | ICD-10-CM

## 2021-05-17 PROCEDURE — 3008F BODY MASS INDEX DOCD: CPT | Mod: CPTII,S$GLB,, | Performed by: SURGERY

## 2021-05-17 PROCEDURE — 1126F PR PAIN SEVERITY QUANTIFIED, NO PAIN PRESENT: ICD-10-PCS | Mod: S$GLB,,, | Performed by: SURGERY

## 2021-05-17 PROCEDURE — 99999 PR PBB SHADOW E&M-EST. PATIENT-LVL IV: ICD-10-PCS | Mod: PBBFAC,,, | Performed by: SURGERY

## 2021-05-17 PROCEDURE — 1126F AMNT PAIN NOTED NONE PRSNT: CPT | Mod: S$GLB,,, | Performed by: SURGERY

## 2021-05-17 PROCEDURE — 99024 POSTOP FOLLOW-UP VISIT: CPT | Mod: S$GLB,,, | Performed by: SURGERY

## 2021-05-17 PROCEDURE — 3008F PR BODY MASS INDEX (BMI) DOCUMENTED: ICD-10-PCS | Mod: CPTII,S$GLB,, | Performed by: SURGERY

## 2021-05-17 PROCEDURE — 99999 PR PBB SHADOW E&M-EST. PATIENT-LVL IV: CPT | Mod: PBBFAC,,, | Performed by: SURGERY

## 2021-05-17 PROCEDURE — 99024 PR POST-OP FOLLOW-UP VISIT: ICD-10-PCS | Mod: S$GLB,,, | Performed by: SURGERY

## 2021-06-03 ENCOUNTER — NURSE TRIAGE (OUTPATIENT)
Dept: ADMINISTRATIVE | Facility: CLINIC | Age: 66
End: 2021-06-03

## 2021-06-03 ENCOUNTER — HOSPITAL ENCOUNTER (EMERGENCY)
Facility: HOSPITAL | Age: 66
Discharge: LEFT BEFORE DISCHARGE | End: 2021-06-03
Payer: MEDICARE

## 2021-06-03 VITALS
RESPIRATION RATE: 28 BRPM | OXYGEN SATURATION: 99 % | BODY MASS INDEX: 26.17 KG/M2 | SYSTOLIC BLOOD PRESSURE: 136 MMHG | HEART RATE: 70 BPM | DIASTOLIC BLOOD PRESSURE: 66 MMHG | TEMPERATURE: 98 F | HEIGHT: 71 IN

## 2021-06-03 DIAGNOSIS — Z53.21 PATIENT LEFT WITHOUT BEING SEEN: Primary | ICD-10-CM

## 2021-06-03 DIAGNOSIS — R07.9 CHEST PAIN: ICD-10-CM

## 2021-06-03 PROCEDURE — 93010 ELECTROCARDIOGRAM REPORT: CPT | Mod: ,,, | Performed by: INTERNAL MEDICINE

## 2021-06-03 PROCEDURE — 93005 ELECTROCARDIOGRAM TRACING: CPT

## 2021-06-03 PROCEDURE — 93010 EKG 12-LEAD: ICD-10-PCS | Mod: ,,, | Performed by: INTERNAL MEDICINE

## 2021-06-03 PROCEDURE — 99900041 HC LEFT WITHOUT BEING SEEN- EMERGENCY

## 2021-06-08 ENCOUNTER — PES CALL (OUTPATIENT)
Dept: ADMINISTRATIVE | Facility: CLINIC | Age: 66
End: 2021-06-08

## 2021-06-28 ENCOUNTER — OFFICE VISIT (OUTPATIENT)
Dept: FAMILY MEDICINE | Facility: CLINIC | Age: 66
End: 2021-06-28
Payer: MEDICARE

## 2021-06-28 VITALS
OXYGEN SATURATION: 98 % | WEIGHT: 199.5 LBS | RESPIRATION RATE: 20 BRPM | BODY MASS INDEX: 28.56 KG/M2 | DIASTOLIC BLOOD PRESSURE: 79 MMHG | HEART RATE: 50 BPM | HEIGHT: 70 IN | TEMPERATURE: 98 F | SYSTOLIC BLOOD PRESSURE: 144 MMHG

## 2021-06-28 DIAGNOSIS — M46.97 UNSPECIFIED INFLAMMATORY SPONDYLOPATHY, LUMBOSACRAL REGION: ICD-10-CM

## 2021-06-28 DIAGNOSIS — G89.29 CHRONIC THORACIC BACK PAIN, UNSPECIFIED BACK PAIN LATERALITY: Primary | ICD-10-CM

## 2021-06-28 DIAGNOSIS — M25.561 ACUTE PAIN OF RIGHT KNEE: ICD-10-CM

## 2021-06-28 DIAGNOSIS — M54.6 CHRONIC THORACIC BACK PAIN, UNSPECIFIED BACK PAIN LATERALITY: Primary | ICD-10-CM

## 2021-06-28 PROBLEM — J43.2 CENTRILOBULAR EMPHYSEMA: Status: ACTIVE | Noted: 2021-06-28

## 2021-06-28 PROCEDURE — 1159F MED LIST DOCD IN RCRD: CPT | Mod: S$GLB,,, | Performed by: NURSE PRACTITIONER

## 2021-06-28 PROCEDURE — 99499 RISK ADDL DX/OHS AUDIT: ICD-10-PCS | Mod: S$GLB,,, | Performed by: NURSE PRACTITIONER

## 2021-06-28 PROCEDURE — 99999 PR PBB SHADOW E&M-EST. PATIENT-LVL V: CPT | Mod: PBBFAC,,, | Performed by: NURSE PRACTITIONER

## 2021-06-28 PROCEDURE — 3288F PR FALLS RISK ASSESSMENT DOCUMENTED: ICD-10-PCS | Mod: CPTII,S$GLB,, | Performed by: NURSE PRACTITIONER

## 2021-06-28 PROCEDURE — 3288F FALL RISK ASSESSMENT DOCD: CPT | Mod: CPTII,S$GLB,, | Performed by: NURSE PRACTITIONER

## 2021-06-28 PROCEDURE — 99214 PR OFFICE/OUTPT VISIT, EST, LEVL IV, 30-39 MIN: ICD-10-PCS | Mod: 25,S$GLB,, | Performed by: NURSE PRACTITIONER

## 2021-06-28 PROCEDURE — 99214 OFFICE O/P EST MOD 30 MIN: CPT | Mod: 25,S$GLB,, | Performed by: NURSE PRACTITIONER

## 2021-06-28 PROCEDURE — 99999 PR PBB SHADOW E&M-EST. PATIENT-LVL V: ICD-10-PCS | Mod: PBBFAC,,, | Performed by: NURSE PRACTITIONER

## 2021-06-28 PROCEDURE — 1125F PR PAIN SEVERITY QUANTIFIED, PAIN PRESENT: ICD-10-PCS | Mod: S$GLB,,, | Performed by: NURSE PRACTITIONER

## 2021-06-28 PROCEDURE — 96372 THER/PROPH/DIAG INJ SC/IM: CPT | Mod: S$GLB,,, | Performed by: NURSE PRACTITIONER

## 2021-06-28 PROCEDURE — 3008F BODY MASS INDEX DOCD: CPT | Mod: CPTII,S$GLB,, | Performed by: NURSE PRACTITIONER

## 2021-06-28 PROCEDURE — 1125F AMNT PAIN NOTED PAIN PRSNT: CPT | Mod: S$GLB,,, | Performed by: NURSE PRACTITIONER

## 2021-06-28 PROCEDURE — 1101F PR PT FALLS ASSESS DOC 0-1 FALLS W/OUT INJ PAST YR: ICD-10-PCS | Mod: CPTII,S$GLB,, | Performed by: NURSE PRACTITIONER

## 2021-06-28 PROCEDURE — 3008F PR BODY MASS INDEX (BMI) DOCUMENTED: ICD-10-PCS | Mod: CPTII,S$GLB,, | Performed by: NURSE PRACTITIONER

## 2021-06-28 PROCEDURE — 1101F PT FALLS ASSESS-DOCD LE1/YR: CPT | Mod: CPTII,S$GLB,, | Performed by: NURSE PRACTITIONER

## 2021-06-28 PROCEDURE — 99499 UNLISTED E&M SERVICE: CPT | Mod: S$GLB,,, | Performed by: NURSE PRACTITIONER

## 2021-06-28 PROCEDURE — 1159F PR MEDICATION LIST DOCUMENTED IN MEDICAL RECORD: ICD-10-PCS | Mod: S$GLB,,, | Performed by: NURSE PRACTITIONER

## 2021-06-28 PROCEDURE — 96372 PR INJECTION,THERAP/PROPH/DIAG2ST, IM OR SUBCUT: ICD-10-PCS | Mod: S$GLB,,, | Performed by: NURSE PRACTITIONER

## 2021-06-28 RX ORDER — KETOROLAC TROMETHAMINE 30 MG/ML
60 INJECTION, SOLUTION INTRAMUSCULAR; INTRAVENOUS
Status: COMPLETED | OUTPATIENT
Start: 2021-06-28 | End: 2021-06-28

## 2021-06-28 RX ORDER — MELOXICAM 15 MG/1
15 TABLET ORAL DAILY PRN
Qty: 30 TABLET | Refills: 0 | Status: SHIPPED | OUTPATIENT
Start: 2021-06-28 | End: 2021-07-12 | Stop reason: SDUPTHER

## 2021-06-28 RX ORDER — CYCLOBENZAPRINE HCL 10 MG
10 TABLET ORAL 3 TIMES DAILY PRN
Qty: 60 TABLET | Refills: 0 | Status: SHIPPED | OUTPATIENT
Start: 2021-06-28 | End: 2021-07-08

## 2021-06-28 RX ORDER — METHYLPREDNISOLONE ACETATE 80 MG/ML
80 INJECTION, SUSPENSION INTRA-ARTICULAR; INTRALESIONAL; INTRAMUSCULAR; SOFT TISSUE
Status: COMPLETED | OUTPATIENT
Start: 2021-06-28 | End: 2021-06-28

## 2021-06-28 RX ADMIN — KETOROLAC TROMETHAMINE 60 MG: 30 INJECTION, SOLUTION INTRAMUSCULAR; INTRAVENOUS at 09:06

## 2021-06-28 RX ADMIN — METHYLPREDNISOLONE ACETATE 80 MG: 80 INJECTION, SUSPENSION INTRA-ARTICULAR; INTRALESIONAL; INTRAMUSCULAR; SOFT TISSUE at 09:06

## 2021-07-12 DIAGNOSIS — M25.561 ACUTE PAIN OF RIGHT KNEE: ICD-10-CM

## 2021-07-12 RX ORDER — MELOXICAM 15 MG/1
15 TABLET ORAL DAILY PRN
Qty: 30 TABLET | Refills: 0 | Status: SHIPPED | OUTPATIENT
Start: 2021-07-12 | End: 2021-10-25 | Stop reason: SDUPTHER

## 2021-07-13 ENCOUNTER — TELEPHONE (OUTPATIENT)
Dept: NEUROSURGERY | Facility: CLINIC | Age: 66
End: 2021-07-13

## 2021-07-15 ENCOUNTER — TELEPHONE (OUTPATIENT)
Dept: FAMILY MEDICINE | Facility: CLINIC | Age: 66
End: 2021-07-15

## 2021-07-21 RX ORDER — CYCLOBENZAPRINE HCL 10 MG
10 TABLET ORAL 3 TIMES DAILY PRN
COMMUNITY
End: 2021-07-21 | Stop reason: SDUPTHER

## 2021-07-22 RX ORDER — CYCLOBENZAPRINE HCL 10 MG
10 TABLET ORAL 3 TIMES DAILY PRN
Qty: 90 TABLET | Refills: 0 | Status: SHIPPED | OUTPATIENT
Start: 2021-07-22 | End: 2021-08-17

## 2021-07-29 DIAGNOSIS — F40.9 INSOMNIA DUE TO ANXIETY AND FEAR: ICD-10-CM

## 2021-07-29 DIAGNOSIS — F41.8 DEPRESSION WITH ANXIETY: ICD-10-CM

## 2021-07-29 DIAGNOSIS — F51.05 INSOMNIA DUE TO ANXIETY AND FEAR: ICD-10-CM

## 2021-07-29 DIAGNOSIS — K21.9 GASTROESOPHAGEAL REFLUX DISEASE WITHOUT ESOPHAGITIS: ICD-10-CM

## 2021-07-29 DIAGNOSIS — F32.A DEPRESSION, UNSPECIFIED DEPRESSION TYPE: Primary | ICD-10-CM

## 2021-07-29 DIAGNOSIS — N40.0 BENIGN PROSTATIC HYPERPLASIA, UNSPECIFIED WHETHER LOWER URINARY TRACT SYMPTOMS PRESENT: ICD-10-CM

## 2021-07-29 DIAGNOSIS — M47.812 CERVICAL SPONDYLOSIS: ICD-10-CM

## 2021-07-29 RX ORDER — TAMSULOSIN HYDROCHLORIDE 0.4 MG/1
1 CAPSULE ORAL NIGHTLY
Qty: 30 CAPSULE | Refills: 0 | Status: SHIPPED | OUTPATIENT
Start: 2021-07-29 | End: 2021-10-25

## 2021-07-29 RX ORDER — TRAZODONE HYDROCHLORIDE 100 MG/1
100 TABLET ORAL NIGHTLY
Qty: 30 TABLET | Refills: 0 | Status: SHIPPED | OUTPATIENT
Start: 2021-07-29 | End: 2021-08-27

## 2021-07-29 RX ORDER — TIOTROPIUM BROMIDE 18 UG/1
18 CAPSULE ORAL; RESPIRATORY (INHALATION) DAILY
Qty: 30 CAPSULE | Refills: 0 | Status: SHIPPED | OUTPATIENT
Start: 2021-07-29 | End: 2021-08-27

## 2021-07-29 RX ORDER — CITALOPRAM 40 MG/1
40 TABLET, FILM COATED ORAL NIGHTLY
Qty: 30 TABLET | Refills: 0 | Status: SHIPPED | OUTPATIENT
Start: 2021-07-29 | End: 2021-08-27

## 2021-07-29 RX ORDER — FLUTICASONE PROPIONATE AND SALMETEROL XINAFOATE 115; 21 UG/1; UG/1
2 AEROSOL, METERED RESPIRATORY (INHALATION) 2 TIMES DAILY
Qty: 12 G | Refills: 12 | Status: SHIPPED | OUTPATIENT
Start: 2021-07-29 | End: 2023-11-14

## 2021-07-29 RX ORDER — CLONAZEPAM 1 MG/1
1 TABLET ORAL NIGHTLY PRN
Qty: 30 TABLET | Refills: 0 | Status: SHIPPED | OUTPATIENT
Start: 2021-07-29 | End: 2022-01-25 | Stop reason: SDUPTHER

## 2021-07-29 RX ORDER — GABAPENTIN 600 MG/1
600 TABLET ORAL NIGHTLY
Qty: 30 TABLET | Refills: 0 | Status: SHIPPED | OUTPATIENT
Start: 2021-07-29 | End: 2021-10-25

## 2021-07-29 RX ORDER — FINASTERIDE 5 MG/1
5 TABLET, FILM COATED ORAL NIGHTLY
Qty: 90 TABLET | Refills: 0 | Status: SHIPPED | OUTPATIENT
Start: 2021-07-29 | End: 2021-12-30

## 2021-07-29 RX ORDER — OMEPRAZOLE 40 MG/1
40 CAPSULE, DELAYED RELEASE ORAL NIGHTLY
Qty: 30 CAPSULE | Refills: 0 | Status: SHIPPED | OUTPATIENT
Start: 2021-07-29 | End: 2021-09-19

## 2021-07-29 RX ORDER — VENLAFAXINE 75 MG/1
75 TABLET ORAL 2 TIMES DAILY
Qty: 60 TABLET | Refills: 0 | Status: SHIPPED | OUTPATIENT
Start: 2021-07-29 | End: 2021-11-04

## 2021-07-29 RX ORDER — ZOLPIDEM TARTRATE 10 MG/1
10 TABLET ORAL NIGHTLY PRN
Qty: 30 TABLET | Refills: 0 | Status: SHIPPED | OUTPATIENT
Start: 2021-07-29 | End: 2021-09-13

## 2021-08-02 ENCOUNTER — PATIENT OUTREACH (OUTPATIENT)
Dept: ADMINISTRATIVE | Facility: OTHER | Age: 66
End: 2021-08-02

## 2021-08-03 ENCOUNTER — OFFICE VISIT (OUTPATIENT)
Dept: NEUROSURGERY | Facility: CLINIC | Age: 66
End: 2021-08-03
Payer: MEDICARE

## 2021-08-03 VITALS
HEART RATE: 72 BPM | SYSTOLIC BLOOD PRESSURE: 120 MMHG | HEIGHT: 69 IN | RESPIRATION RATE: 16 BRPM | WEIGHT: 199 LBS | DIASTOLIC BLOOD PRESSURE: 81 MMHG | BODY MASS INDEX: 29.47 KG/M2

## 2021-08-03 DIAGNOSIS — M54.6 PAIN IN THORACIC SPINE: ICD-10-CM

## 2021-08-03 DIAGNOSIS — G89.29 CHRONIC THORACIC BACK PAIN, UNSPECIFIED BACK PAIN LATERALITY: ICD-10-CM

## 2021-08-03 DIAGNOSIS — M54.9 DORSALGIA, UNSPECIFIED: ICD-10-CM

## 2021-08-03 DIAGNOSIS — M47.814 SPONDYLOSIS WITHOUT MYELOPATHY OR RADICULOPATHY, THORACIC REGION: ICD-10-CM

## 2021-08-03 DIAGNOSIS — M54.16 LUMBAR RADICULOPATHY: Primary | ICD-10-CM

## 2021-08-03 DIAGNOSIS — M54.6 CHRONIC THORACIC BACK PAIN, UNSPECIFIED BACK PAIN LATERALITY: ICD-10-CM

## 2021-08-03 PROCEDURE — 3044F PR MOST RECENT HEMOGLOBIN A1C LEVEL <7.0%: ICD-10-PCS | Mod: CPTII,S$GLB,, | Performed by: PHYSICIAN ASSISTANT

## 2021-08-03 PROCEDURE — 3079F PR MOST RECENT DIASTOLIC BLOOD PRESSURE 80-89 MM HG: ICD-10-PCS | Mod: CPTII,S$GLB,, | Performed by: PHYSICIAN ASSISTANT

## 2021-08-03 PROCEDURE — 3074F SYST BP LT 130 MM HG: CPT | Mod: CPTII,S$GLB,, | Performed by: PHYSICIAN ASSISTANT

## 2021-08-03 PROCEDURE — 99999 PR PBB SHADOW E&M-EST. PATIENT-LVL V: CPT | Mod: PBBFAC,,, | Performed by: PHYSICIAN ASSISTANT

## 2021-08-03 PROCEDURE — 1125F AMNT PAIN NOTED PAIN PRSNT: CPT | Mod: CPTII,S$GLB,, | Performed by: PHYSICIAN ASSISTANT

## 2021-08-03 PROCEDURE — 1160F PR REVIEW ALL MEDS BY PRESCRIBER/CLIN PHARMACIST DOCUMENTED: ICD-10-PCS | Mod: CPTII,S$GLB,, | Performed by: PHYSICIAN ASSISTANT

## 2021-08-03 PROCEDURE — 1159F PR MEDICATION LIST DOCUMENTED IN MEDICAL RECORD: ICD-10-PCS | Mod: CPTII,S$GLB,, | Performed by: PHYSICIAN ASSISTANT

## 2021-08-03 PROCEDURE — 1159F MED LIST DOCD IN RCRD: CPT | Mod: CPTII,S$GLB,, | Performed by: PHYSICIAN ASSISTANT

## 2021-08-03 PROCEDURE — 99214 PR OFFICE/OUTPT VISIT, EST, LEVL IV, 30-39 MIN: ICD-10-PCS | Mod: S$GLB,,, | Performed by: PHYSICIAN ASSISTANT

## 2021-08-03 PROCEDURE — 1101F PT FALLS ASSESS-DOCD LE1/YR: CPT | Mod: CPTII,S$GLB,, | Performed by: PHYSICIAN ASSISTANT

## 2021-08-03 PROCEDURE — 3079F DIAST BP 80-89 MM HG: CPT | Mod: CPTII,S$GLB,, | Performed by: PHYSICIAN ASSISTANT

## 2021-08-03 PROCEDURE — 3074F PR MOST RECENT SYSTOLIC BLOOD PRESSURE < 130 MM HG: ICD-10-PCS | Mod: CPTII,S$GLB,, | Performed by: PHYSICIAN ASSISTANT

## 2021-08-03 PROCEDURE — 99214 OFFICE O/P EST MOD 30 MIN: CPT | Mod: S$GLB,,, | Performed by: PHYSICIAN ASSISTANT

## 2021-08-03 PROCEDURE — 99999 PR PBB SHADOW E&M-EST. PATIENT-LVL V: ICD-10-PCS | Mod: PBBFAC,,, | Performed by: PHYSICIAN ASSISTANT

## 2021-08-03 PROCEDURE — 3288F PR FALLS RISK ASSESSMENT DOCUMENTED: ICD-10-PCS | Mod: CPTII,S$GLB,, | Performed by: PHYSICIAN ASSISTANT

## 2021-08-03 PROCEDURE — 1125F PR PAIN SEVERITY QUANTIFIED, PAIN PRESENT: ICD-10-PCS | Mod: CPTII,S$GLB,, | Performed by: PHYSICIAN ASSISTANT

## 2021-08-03 PROCEDURE — 3008F PR BODY MASS INDEX (BMI) DOCUMENTED: ICD-10-PCS | Mod: CPTII,S$GLB,, | Performed by: PHYSICIAN ASSISTANT

## 2021-08-03 PROCEDURE — 3008F BODY MASS INDEX DOCD: CPT | Mod: CPTII,S$GLB,, | Performed by: PHYSICIAN ASSISTANT

## 2021-08-03 PROCEDURE — 1160F RVW MEDS BY RX/DR IN RCRD: CPT | Mod: CPTII,S$GLB,, | Performed by: PHYSICIAN ASSISTANT

## 2021-08-03 PROCEDURE — 1101F PR PT FALLS ASSESS DOC 0-1 FALLS W/OUT INJ PAST YR: ICD-10-PCS | Mod: CPTII,S$GLB,, | Performed by: PHYSICIAN ASSISTANT

## 2021-08-03 PROCEDURE — 3044F HG A1C LEVEL LT 7.0%: CPT | Mod: CPTII,S$GLB,, | Performed by: PHYSICIAN ASSISTANT

## 2021-08-03 PROCEDURE — 3288F FALL RISK ASSESSMENT DOCD: CPT | Mod: CPTII,S$GLB,, | Performed by: PHYSICIAN ASSISTANT

## 2021-08-06 ENCOUNTER — IMMUNIZATION (OUTPATIENT)
Dept: INTERNAL MEDICINE | Facility: CLINIC | Age: 66
End: 2021-08-06
Payer: MEDICARE

## 2021-08-06 DIAGNOSIS — Z23 NEED FOR VACCINATION: Primary | ICD-10-CM

## 2021-08-06 PROCEDURE — 91300 COVID-19, MRNA, LNP-S, PF, 30 MCG/0.3 ML DOSE VACCINE: ICD-10-PCS | Mod: ,,, | Performed by: FAMILY MEDICINE

## 2021-08-06 PROCEDURE — 91300 COVID-19, MRNA, LNP-S, PF, 30 MCG/0.3 ML DOSE VACCINE: CPT | Mod: ,,, | Performed by: FAMILY MEDICINE

## 2021-08-06 PROCEDURE — 0001A COVID-19, MRNA, LNP-S, PF, 30 MCG/0.3 ML DOSE VACCINE: ICD-10-PCS | Mod: CV19,,, | Performed by: FAMILY MEDICINE

## 2021-08-06 PROCEDURE — 0001A COVID-19, MRNA, LNP-S, PF, 30 MCG/0.3 ML DOSE VACCINE: CPT | Mod: CV19,,, | Performed by: FAMILY MEDICINE

## 2021-08-17 ENCOUNTER — OFFICE VISIT (OUTPATIENT)
Dept: FAMILY MEDICINE | Facility: CLINIC | Age: 66
End: 2021-08-17
Payer: MEDICARE

## 2021-08-17 ENCOUNTER — LAB VISIT (OUTPATIENT)
Dept: LAB | Facility: HOSPITAL | Age: 66
End: 2021-08-17
Attending: FAMILY MEDICINE
Payer: MEDICARE

## 2021-08-17 VITALS
SYSTOLIC BLOOD PRESSURE: 128 MMHG | DIASTOLIC BLOOD PRESSURE: 88 MMHG | WEIGHT: 211.56 LBS | OXYGEN SATURATION: 95 % | BODY MASS INDEX: 31.33 KG/M2 | HEIGHT: 69 IN | HEART RATE: 61 BPM | TEMPERATURE: 98 F

## 2021-08-17 DIAGNOSIS — K22.711 BARRETT'S ESOPHAGUS WITH HIGH GRADE DYSPLASIA: ICD-10-CM

## 2021-08-17 DIAGNOSIS — I10 ESSENTIAL HYPERTENSION: Chronic | ICD-10-CM

## 2021-08-17 DIAGNOSIS — Z12.5 PROSTATE CANCER SCREENING ENCOUNTER, OPTIONS AND RISKS DISCUSSED: ICD-10-CM

## 2021-08-17 DIAGNOSIS — Z13.6 SCREENING FOR AAA (ABDOMINAL AORTIC ANEURYSM): ICD-10-CM

## 2021-08-17 DIAGNOSIS — R73.03 PREDIABETES: ICD-10-CM

## 2021-08-17 DIAGNOSIS — F32.A DEPRESSION, UNSPECIFIED DEPRESSION TYPE: ICD-10-CM

## 2021-08-17 DIAGNOSIS — J43.2 CENTRILOBULAR EMPHYSEMA: ICD-10-CM

## 2021-08-17 DIAGNOSIS — I10 ESSENTIAL HYPERTENSION: Primary | Chronic | ICD-10-CM

## 2021-08-17 DIAGNOSIS — Z12.11 COLON CANCER SCREENING: ICD-10-CM

## 2021-08-17 PROBLEM — K40.20 BILATERAL INGUINAL HERNIA WITHOUT OBSTRUCTION OR GANGRENE: Status: RESOLVED | Noted: 2017-03-14 | Resolved: 2021-08-17

## 2021-08-17 PROBLEM — M46.97 UNSPECIFIED INFLAMMATORY SPONDYLOPATHY, LUMBOSACRAL REGION: Status: RESOLVED | Noted: 2021-06-28 | Resolved: 2021-08-17

## 2021-08-17 PROBLEM — N28.89 RENAL MASS: Status: RESOLVED | Noted: 2018-08-23 | Resolved: 2021-08-17

## 2021-08-17 PROBLEM — H26.9: Status: RESOLVED | Noted: 2020-07-02 | Resolved: 2021-08-17

## 2021-08-17 PROBLEM — M65.30 TRIGGER FINGER OF RIGHT HAND: Chronic | Status: RESOLVED | Noted: 2017-07-18 | Resolved: 2021-08-17

## 2021-08-17 PROBLEM — M65.30 TRIGGER FINGER, LEFT: Status: RESOLVED | Noted: 2017-08-31 | Resolved: 2021-08-17

## 2021-08-17 PROBLEM — K56.609 SMALL BOWEL OBSTRUCTION: Status: RESOLVED | Noted: 2020-02-03 | Resolved: 2021-08-17

## 2021-08-17 PROBLEM — M65.331 TRIGGER FINGER, RIGHT MIDDLE FINGER: Status: RESOLVED | Noted: 2017-06-28 | Resolved: 2021-08-17

## 2021-08-17 PROBLEM — M65.322 TRIGGER FINGER, LEFT INDEX FINGER: Status: RESOLVED | Noted: 2017-06-28 | Resolved: 2021-08-17

## 2021-08-17 LAB
ALBUMIN SERPL BCP-MCNC: 4 G/DL (ref 3.5–5.2)
ALP SERPL-CCNC: 58 U/L (ref 55–135)
ALT SERPL W/O P-5'-P-CCNC: 18 U/L (ref 10–44)
ANION GAP SERPL CALC-SCNC: 9 MMOL/L (ref 8–16)
AST SERPL-CCNC: 15 U/L (ref 10–40)
BASOPHILS # BLD AUTO: 0.09 K/UL (ref 0–0.2)
BASOPHILS NFR BLD: 1.1 % (ref 0–1.9)
BILIRUB SERPL-MCNC: 0.4 MG/DL (ref 0.1–1)
BUN SERPL-MCNC: 17 MG/DL (ref 8–23)
CALCIUM SERPL-MCNC: 10 MG/DL (ref 8.7–10.5)
CHLORIDE SERPL-SCNC: 101 MMOL/L (ref 95–110)
CHOLEST SERPL-MCNC: 235 MG/DL (ref 120–199)
CHOLEST/HDLC SERPL: 5.2 {RATIO} (ref 2–5)
CO2 SERPL-SCNC: 26 MMOL/L (ref 23–29)
COMPLEXED PSA SERPL-MCNC: 0.88 NG/ML (ref 0–4)
CREAT SERPL-MCNC: 1.3 MG/DL (ref 0.5–1.4)
DIFFERENTIAL METHOD: ABNORMAL
EOSINOPHIL # BLD AUTO: 0.4 K/UL (ref 0–0.5)
EOSINOPHIL NFR BLD: 5.4 % (ref 0–8)
ERYTHROCYTE [DISTWIDTH] IN BLOOD BY AUTOMATED COUNT: 13.2 % (ref 11.5–14.5)
EST. GFR  (AFRICAN AMERICAN): >60 ML/MIN/1.73 M^2
EST. GFR  (NON AFRICAN AMERICAN): 56.9 ML/MIN/1.73 M^2
ESTIMATED AVG GLUCOSE: 114 MG/DL (ref 68–131)
GLUCOSE SERPL-MCNC: 96 MG/DL (ref 70–110)
HBA1C MFR BLD: 5.6 % (ref 4–5.6)
HCT VFR BLD AUTO: 47.8 % (ref 40–54)
HDLC SERPL-MCNC: 45 MG/DL (ref 40–75)
HDLC SERPL: 19.1 % (ref 20–50)
HGB BLD-MCNC: 15.2 G/DL (ref 14–18)
IMM GRANULOCYTES # BLD AUTO: 0.02 K/UL (ref 0–0.04)
IMM GRANULOCYTES NFR BLD AUTO: 0.3 % (ref 0–0.5)
LDLC SERPL CALC-MCNC: 145.8 MG/DL (ref 63–159)
LYMPHOCYTES # BLD AUTO: 1.5 K/UL (ref 1–4.8)
LYMPHOCYTES NFR BLD: 18.6 % (ref 18–48)
MCH RBC QN AUTO: 27.3 PG (ref 27–31)
MCHC RBC AUTO-ENTMCNC: 31.8 G/DL (ref 32–36)
MCV RBC AUTO: 86 FL (ref 82–98)
MONOCYTES # BLD AUTO: 0.8 K/UL (ref 0.3–1)
MONOCYTES NFR BLD: 10.5 % (ref 4–15)
NEUTROPHILS # BLD AUTO: 5 K/UL (ref 1.8–7.7)
NEUTROPHILS NFR BLD: 64.1 % (ref 38–73)
NONHDLC SERPL-MCNC: 190 MG/DL
NRBC BLD-RTO: 0 /100 WBC
PLATELET # BLD AUTO: 274 K/UL (ref 150–450)
PMV BLD AUTO: 9.6 FL (ref 9.2–12.9)
POTASSIUM SERPL-SCNC: 5.9 MMOL/L (ref 3.5–5.1)
PROT SERPL-MCNC: 7.4 G/DL (ref 6–8.4)
RBC # BLD AUTO: 5.56 M/UL (ref 4.6–6.2)
SODIUM SERPL-SCNC: 136 MMOL/L (ref 136–145)
TRIGL SERPL-MCNC: 221 MG/DL (ref 30–150)
WBC # BLD AUTO: 7.83 K/UL (ref 3.9–12.7)

## 2021-08-17 PROCEDURE — 99499 RISK ADDL DX/OHS AUDIT: ICD-10-PCS | Mod: HCNC,S$GLB,, | Performed by: FAMILY MEDICINE

## 2021-08-17 PROCEDURE — 3288F PR FALLS RISK ASSESSMENT DOCUMENTED: ICD-10-PCS | Mod: CPTII,S$GLB,, | Performed by: FAMILY MEDICINE

## 2021-08-17 PROCEDURE — 1159F MED LIST DOCD IN RCRD: CPT | Mod: CPTII,S$GLB,, | Performed by: FAMILY MEDICINE

## 2021-08-17 PROCEDURE — 80061 LIPID PANEL: CPT | Performed by: FAMILY MEDICINE

## 2021-08-17 PROCEDURE — 1126F AMNT PAIN NOTED NONE PRSNT: CPT | Mod: CPTII,S$GLB,, | Performed by: FAMILY MEDICINE

## 2021-08-17 PROCEDURE — 1126F PR PAIN SEVERITY QUANTIFIED, NO PAIN PRESENT: ICD-10-PCS | Mod: CPTII,S$GLB,, | Performed by: FAMILY MEDICINE

## 2021-08-17 PROCEDURE — 3074F PR MOST RECENT SYSTOLIC BLOOD PRESSURE < 130 MM HG: ICD-10-PCS | Mod: CPTII,S$GLB,, | Performed by: FAMILY MEDICINE

## 2021-08-17 PROCEDURE — 3079F PR MOST RECENT DIASTOLIC BLOOD PRESSURE 80-89 MM HG: ICD-10-PCS | Mod: CPTII,S$GLB,, | Performed by: FAMILY MEDICINE

## 2021-08-17 PROCEDURE — 3044F PR MOST RECENT HEMOGLOBIN A1C LEVEL <7.0%: ICD-10-PCS | Mod: CPTII,S$GLB,, | Performed by: FAMILY MEDICINE

## 2021-08-17 PROCEDURE — 85025 COMPLETE CBC W/AUTO DIFF WBC: CPT | Performed by: FAMILY MEDICINE

## 2021-08-17 PROCEDURE — 80053 COMPREHEN METABOLIC PANEL: CPT | Performed by: FAMILY MEDICINE

## 2021-08-17 PROCEDURE — 99999 PR PBB SHADOW E&M-EST. PATIENT-LVL V: CPT | Mod: PBBFAC,,, | Performed by: FAMILY MEDICINE

## 2021-08-17 PROCEDURE — 99999 PR PBB SHADOW E&M-EST. PATIENT-LVL V: ICD-10-PCS | Mod: PBBFAC,,, | Performed by: FAMILY MEDICINE

## 2021-08-17 PROCEDURE — 99214 PR OFFICE/OUTPT VISIT, EST, LEVL IV, 30-39 MIN: ICD-10-PCS | Mod: S$GLB,,, | Performed by: FAMILY MEDICINE

## 2021-08-17 PROCEDURE — 3288F FALL RISK ASSESSMENT DOCD: CPT | Mod: CPTII,S$GLB,, | Performed by: FAMILY MEDICINE

## 2021-08-17 PROCEDURE — 3008F PR BODY MASS INDEX (BMI) DOCUMENTED: ICD-10-PCS | Mod: CPTII,S$GLB,, | Performed by: FAMILY MEDICINE

## 2021-08-17 PROCEDURE — 3044F HG A1C LEVEL LT 7.0%: CPT | Mod: CPTII,S$GLB,, | Performed by: FAMILY MEDICINE

## 2021-08-17 PROCEDURE — 3008F BODY MASS INDEX DOCD: CPT | Mod: CPTII,S$GLB,, | Performed by: FAMILY MEDICINE

## 2021-08-17 PROCEDURE — 83036 HEMOGLOBIN GLYCOSYLATED A1C: CPT | Performed by: FAMILY MEDICINE

## 2021-08-17 PROCEDURE — 1101F PT FALLS ASSESS-DOCD LE1/YR: CPT | Mod: CPTII,S$GLB,, | Performed by: FAMILY MEDICINE

## 2021-08-17 PROCEDURE — 99499 UNLISTED E&M SERVICE: CPT | Mod: HCNC,S$GLB,, | Performed by: FAMILY MEDICINE

## 2021-08-17 PROCEDURE — 36415 COLL VENOUS BLD VENIPUNCTURE: CPT | Mod: PO | Performed by: FAMILY MEDICINE

## 2021-08-17 PROCEDURE — 84153 ASSAY OF PSA TOTAL: CPT | Performed by: FAMILY MEDICINE

## 2021-08-17 PROCEDURE — 3079F DIAST BP 80-89 MM HG: CPT | Mod: CPTII,S$GLB,, | Performed by: FAMILY MEDICINE

## 2021-08-17 PROCEDURE — 1159F PR MEDICATION LIST DOCUMENTED IN MEDICAL RECORD: ICD-10-PCS | Mod: CPTII,S$GLB,, | Performed by: FAMILY MEDICINE

## 2021-08-17 PROCEDURE — 99214 OFFICE O/P EST MOD 30 MIN: CPT | Mod: S$GLB,,, | Performed by: FAMILY MEDICINE

## 2021-08-17 PROCEDURE — 3074F SYST BP LT 130 MM HG: CPT | Mod: CPTII,S$GLB,, | Performed by: FAMILY MEDICINE

## 2021-08-17 PROCEDURE — 1101F PR PT FALLS ASSESS DOC 0-1 FALLS W/OUT INJ PAST YR: ICD-10-PCS | Mod: CPTII,S$GLB,, | Performed by: FAMILY MEDICINE

## 2021-08-18 ENCOUNTER — HOSPITAL ENCOUNTER (OUTPATIENT)
Dept: RADIOLOGY | Facility: HOSPITAL | Age: 66
Discharge: HOME | End: 2021-08-18
Attending: FAMILY MEDICINE
Payer: MEDICARE

## 2021-08-18 ENCOUNTER — TELEPHONE (OUTPATIENT)
Dept: FAMILY MEDICINE | Facility: CLINIC | Age: 66
End: 2021-08-18

## 2021-08-18 DIAGNOSIS — Z13.6 SCREENING FOR AAA (ABDOMINAL AORTIC ANEURYSM): ICD-10-CM

## 2021-08-18 DIAGNOSIS — E87.5 SERUM POTASSIUM ELEVATED: Primary | ICD-10-CM

## 2021-08-18 PROCEDURE — 76775 US EXAM ABDO BACK WALL LIM: CPT | Mod: TC

## 2021-08-18 PROCEDURE — 76775 US EXAM ABDO BACK WALL LIM: CPT | Mod: 26,,, | Performed by: RADIOLOGY

## 2021-08-18 PROCEDURE — 76775 US ABDOMINAL AORTA: ICD-10-PCS | Mod: 26,,, | Performed by: RADIOLOGY

## 2021-08-19 ENCOUNTER — HOSPITAL ENCOUNTER (OUTPATIENT)
Dept: RADIOLOGY | Facility: HOSPITAL | Age: 66
Discharge: HOME | End: 2021-08-19
Attending: PHYSICIAN ASSISTANT
Payer: MEDICARE

## 2021-08-19 DIAGNOSIS — M54.9 DORSALGIA, UNSPECIFIED: ICD-10-CM

## 2021-08-19 DIAGNOSIS — M54.6 CHRONIC THORACIC BACK PAIN, UNSPECIFIED BACK PAIN LATERALITY: ICD-10-CM

## 2021-08-19 DIAGNOSIS — M54.6 PAIN IN THORACIC SPINE: ICD-10-CM

## 2021-08-19 DIAGNOSIS — G89.29 CHRONIC THORACIC BACK PAIN, UNSPECIFIED BACK PAIN LATERALITY: ICD-10-CM

## 2021-08-19 DIAGNOSIS — M47.814 SPONDYLOSIS WITHOUT MYELOPATHY OR RADICULOPATHY, THORACIC REGION: ICD-10-CM

## 2021-08-19 PROCEDURE — 72082 X-RAY EXAM ENTIRE SPI 2/3 VW: CPT | Mod: TC

## 2021-08-19 PROCEDURE — 72082 XR SPINE SURVEY AP AND LATERAL: ICD-10-PCS | Mod: 26,,, | Performed by: RADIOLOGY

## 2021-08-19 PROCEDURE — 72146 MRI CHEST SPINE W/O DYE: CPT | Mod: TC

## 2021-08-19 PROCEDURE — 72148 MRI LUMBAR SPINE W/O DYE: CPT | Mod: TC

## 2021-08-19 PROCEDURE — 72082 X-RAY EXAM ENTIRE SPI 2/3 VW: CPT | Mod: 26,,, | Performed by: RADIOLOGY

## 2021-08-23 ENCOUNTER — TELEPHONE (OUTPATIENT)
Dept: FAMILY MEDICINE | Facility: CLINIC | Age: 66
End: 2021-08-23

## 2021-08-23 DIAGNOSIS — N28.9 RENAL INSUFFICIENCY: ICD-10-CM

## 2021-08-23 DIAGNOSIS — E87.6 LOW SERUM POTASSIUM: Primary | ICD-10-CM

## 2021-08-25 ENCOUNTER — TELEPHONE (OUTPATIENT)
Dept: NEUROSURGERY | Facility: CLINIC | Age: 66
End: 2021-08-25

## 2021-08-25 ENCOUNTER — OFFICE VISIT (OUTPATIENT)
Dept: NEUROSURGERY | Facility: CLINIC | Age: 66
End: 2021-08-25
Payer: MEDICARE

## 2021-08-25 DIAGNOSIS — M47.817 SPONDYLOSIS WITHOUT MYELOPATHY OR RADICULOPATHY, LUMBOSACRAL REGION: ICD-10-CM

## 2021-08-25 DIAGNOSIS — M54.9 DORSALGIA, UNSPECIFIED: ICD-10-CM

## 2021-08-25 DIAGNOSIS — M54.6 CHRONIC THORACIC BACK PAIN, UNSPECIFIED BACK PAIN LATERALITY: ICD-10-CM

## 2021-08-25 DIAGNOSIS — M54.16 LUMBAR RADICULOPATHY: ICD-10-CM

## 2021-08-25 DIAGNOSIS — M51.36 DDD (DEGENERATIVE DISC DISEASE), LUMBAR: Primary | ICD-10-CM

## 2021-08-25 DIAGNOSIS — G89.29 CHRONIC THORACIC BACK PAIN, UNSPECIFIED BACK PAIN LATERALITY: ICD-10-CM

## 2021-08-25 PROCEDURE — 99442 PR PHYSICIAN TELEPHONE EVALUATION 11-20 MIN: CPT | Mod: 95,,, | Performed by: PHYSICIAN ASSISTANT

## 2021-08-25 PROCEDURE — 3044F PR MOST RECENT HEMOGLOBIN A1C LEVEL <7.0%: ICD-10-PCS | Mod: CPTII,95,, | Performed by: PHYSICIAN ASSISTANT

## 2021-08-25 PROCEDURE — 99442 PR PHYSICIAN TELEPHONE EVALUATION 11-20 MIN: ICD-10-PCS | Mod: 95,,, | Performed by: PHYSICIAN ASSISTANT

## 2021-08-25 PROCEDURE — 3044F HG A1C LEVEL LT 7.0%: CPT | Mod: CPTII,95,, | Performed by: PHYSICIAN ASSISTANT

## 2021-08-27 ENCOUNTER — IMMUNIZATION (OUTPATIENT)
Dept: PRIMARY CARE CLINIC | Facility: CLINIC | Age: 66
End: 2021-08-27
Payer: MEDICARE

## 2021-08-27 DIAGNOSIS — Z23 NEED FOR VACCINATION: Primary | ICD-10-CM

## 2021-08-27 PROCEDURE — 91300 COVID-19, MRNA, LNP-S, PF, 30 MCG/0.3 ML DOSE VACCINE: ICD-10-PCS | Mod: S$GLB,,, | Performed by: FAMILY MEDICINE

## 2021-08-27 PROCEDURE — 0002A COVID-19, MRNA, LNP-S, PF, 30 MCG/0.3 ML DOSE VACCINE: ICD-10-PCS | Mod: CV19,S$GLB,, | Performed by: FAMILY MEDICINE

## 2021-08-27 PROCEDURE — 91300 COVID-19, MRNA, LNP-S, PF, 30 MCG/0.3 ML DOSE VACCINE: CPT | Mod: S$GLB,,, | Performed by: FAMILY MEDICINE

## 2021-08-27 PROCEDURE — 0002A COVID-19, MRNA, LNP-S, PF, 30 MCG/0.3 ML DOSE VACCINE: CPT | Mod: CV19,S$GLB,, | Performed by: FAMILY MEDICINE

## 2021-09-10 ENCOUNTER — HOSPITAL ENCOUNTER (OUTPATIENT)
Dept: RADIOLOGY | Facility: HOSPITAL | Age: 66
Discharge: HOME | End: 2021-09-10
Attending: PHYSICIAN ASSISTANT
Payer: MEDICARE

## 2021-09-10 DIAGNOSIS — M54.9 DORSALGIA, UNSPECIFIED: ICD-10-CM

## 2021-09-10 DIAGNOSIS — M47.817 SPONDYLOSIS WITHOUT MYELOPATHY OR RADICULOPATHY, LUMBOSACRAL REGION: ICD-10-CM

## 2021-09-10 PROCEDURE — 72131 CT LUMBAR SPINE W/O DYE: CPT | Mod: TC

## 2021-09-16 ENCOUNTER — OFFICE VISIT (OUTPATIENT)
Dept: NEUROSURGERY | Facility: CLINIC | Age: 66
End: 2021-09-16
Payer: MEDICARE

## 2021-09-16 VITALS
SYSTOLIC BLOOD PRESSURE: 137 MMHG | RESPIRATION RATE: 18 BRPM | WEIGHT: 209.44 LBS | HEART RATE: 105 BPM | HEIGHT: 69 IN | BODY MASS INDEX: 31.02 KG/M2 | DIASTOLIC BLOOD PRESSURE: 88 MMHG

## 2021-09-16 DIAGNOSIS — M47.817 SPONDYLOSIS WITHOUT MYELOPATHY OR RADICULOPATHY, LUMBOSACRAL REGION: ICD-10-CM

## 2021-09-16 DIAGNOSIS — M51.36 DDD (DEGENERATIVE DISC DISEASE), LUMBAR: ICD-10-CM

## 2021-09-16 DIAGNOSIS — M54.9 DORSALGIA, UNSPECIFIED: Primary | ICD-10-CM

## 2021-09-16 PROCEDURE — 1159F PR MEDICATION LIST DOCUMENTED IN MEDICAL RECORD: ICD-10-PCS | Mod: CPTII,S$GLB,, | Performed by: PHYSICIAN ASSISTANT

## 2021-09-16 PROCEDURE — 1125F AMNT PAIN NOTED PAIN PRSNT: CPT | Mod: CPTII,S$GLB,, | Performed by: PHYSICIAN ASSISTANT

## 2021-09-16 PROCEDURE — 1125F PR PAIN SEVERITY QUANTIFIED, PAIN PRESENT: ICD-10-PCS | Mod: CPTII,S$GLB,, | Performed by: PHYSICIAN ASSISTANT

## 2021-09-16 PROCEDURE — 99213 PR OFFICE/OUTPT VISIT, EST, LEVL III, 20-29 MIN: ICD-10-PCS | Mod: S$GLB,,, | Performed by: PHYSICIAN ASSISTANT

## 2021-09-16 PROCEDURE — 99999 PR PBB SHADOW E&M-EST. PATIENT-LVL IV: CPT | Mod: PBBFAC,,, | Performed by: PHYSICIAN ASSISTANT

## 2021-09-16 PROCEDURE — 3079F DIAST BP 80-89 MM HG: CPT | Mod: CPTII,S$GLB,, | Performed by: PHYSICIAN ASSISTANT

## 2021-09-16 PROCEDURE — 99213 OFFICE O/P EST LOW 20 MIN: CPT | Mod: S$GLB,,, | Performed by: PHYSICIAN ASSISTANT

## 2021-09-16 PROCEDURE — 99499 UNLISTED E&M SERVICE: CPT | Mod: HCNC,S$GLB,, | Performed by: PHYSICIAN ASSISTANT

## 2021-09-16 PROCEDURE — 3288F FALL RISK ASSESSMENT DOCD: CPT | Mod: CPTII,S$GLB,, | Performed by: PHYSICIAN ASSISTANT

## 2021-09-16 PROCEDURE — 3075F PR MOST RECENT SYSTOLIC BLOOD PRESS GE 130-139MM HG: ICD-10-PCS | Mod: CPTII,S$GLB,, | Performed by: PHYSICIAN ASSISTANT

## 2021-09-16 PROCEDURE — 99499 RISK ADDL DX/OHS AUDIT: ICD-10-PCS | Mod: HCNC,S$GLB,, | Performed by: PHYSICIAN ASSISTANT

## 2021-09-16 PROCEDURE — 1101F PR PT FALLS ASSESS DOC 0-1 FALLS W/OUT INJ PAST YR: ICD-10-PCS | Mod: CPTII,S$GLB,, | Performed by: PHYSICIAN ASSISTANT

## 2021-09-16 PROCEDURE — 99999 PR PBB SHADOW E&M-EST. PATIENT-LVL IV: ICD-10-PCS | Mod: PBBFAC,,, | Performed by: PHYSICIAN ASSISTANT

## 2021-09-16 PROCEDURE — 3008F PR BODY MASS INDEX (BMI) DOCUMENTED: ICD-10-PCS | Mod: CPTII,S$GLB,, | Performed by: PHYSICIAN ASSISTANT

## 2021-09-16 PROCEDURE — 3008F BODY MASS INDEX DOCD: CPT | Mod: CPTII,S$GLB,, | Performed by: PHYSICIAN ASSISTANT

## 2021-09-16 PROCEDURE — 3075F SYST BP GE 130 - 139MM HG: CPT | Mod: CPTII,S$GLB,, | Performed by: PHYSICIAN ASSISTANT

## 2021-09-16 PROCEDURE — 4010F PR ACE/ARB THEARPY RXD/TAKEN: ICD-10-PCS | Mod: CPTII,S$GLB,, | Performed by: PHYSICIAN ASSISTANT

## 2021-09-16 PROCEDURE — 3044F PR MOST RECENT HEMOGLOBIN A1C LEVEL <7.0%: ICD-10-PCS | Mod: CPTII,S$GLB,, | Performed by: PHYSICIAN ASSISTANT

## 2021-09-16 PROCEDURE — 1159F MED LIST DOCD IN RCRD: CPT | Mod: CPTII,S$GLB,, | Performed by: PHYSICIAN ASSISTANT

## 2021-09-16 PROCEDURE — 3288F PR FALLS RISK ASSESSMENT DOCUMENTED: ICD-10-PCS | Mod: CPTII,S$GLB,, | Performed by: PHYSICIAN ASSISTANT

## 2021-09-16 PROCEDURE — 3044F HG A1C LEVEL LT 7.0%: CPT | Mod: CPTII,S$GLB,, | Performed by: PHYSICIAN ASSISTANT

## 2021-09-16 PROCEDURE — 4010F ACE/ARB THERAPY RXD/TAKEN: CPT | Mod: CPTII,S$GLB,, | Performed by: PHYSICIAN ASSISTANT

## 2021-09-16 PROCEDURE — 1101F PT FALLS ASSESS-DOCD LE1/YR: CPT | Mod: CPTII,S$GLB,, | Performed by: PHYSICIAN ASSISTANT

## 2021-09-16 PROCEDURE — 3079F PR MOST RECENT DIASTOLIC BLOOD PRESSURE 80-89 MM HG: ICD-10-PCS | Mod: CPTII,S$GLB,, | Performed by: PHYSICIAN ASSISTANT

## 2021-10-21 ENCOUNTER — HOSPITAL ENCOUNTER (OUTPATIENT)
Dept: RADIOLOGY | Facility: HOSPITAL | Age: 66
Discharge: HOME | End: 2021-10-21
Attending: FAMILY MEDICINE
Payer: MEDICARE

## 2021-10-21 ENCOUNTER — OFFICE VISIT (OUTPATIENT)
Dept: FAMILY MEDICINE | Facility: CLINIC | Age: 66
End: 2021-10-21
Payer: MEDICARE

## 2021-10-21 VITALS
OXYGEN SATURATION: 97 % | BODY MASS INDEX: 30.74 KG/M2 | DIASTOLIC BLOOD PRESSURE: 74 MMHG | WEIGHT: 207.56 LBS | TEMPERATURE: 98 F | HEART RATE: 100 BPM | SYSTOLIC BLOOD PRESSURE: 130 MMHG | HEIGHT: 69 IN

## 2021-10-21 DIAGNOSIS — M79.645 PAIN OF LEFT THUMB: ICD-10-CM

## 2021-10-21 DIAGNOSIS — S46.209A INJURY OF TENDON OF BICEPS: Primary | ICD-10-CM

## 2021-10-21 PROCEDURE — 1101F PT FALLS ASSESS-DOCD LE1/YR: CPT | Mod: HCNC,CPTII,S$GLB, | Performed by: FAMILY MEDICINE

## 2021-10-21 PROCEDURE — 1125F AMNT PAIN NOTED PAIN PRSNT: CPT | Mod: HCNC,CPTII,S$GLB, | Performed by: FAMILY MEDICINE

## 2021-10-21 PROCEDURE — 1101F PR PT FALLS ASSESS DOC 0-1 FALLS W/OUT INJ PAST YR: ICD-10-PCS | Mod: HCNC,CPTII,S$GLB, | Performed by: FAMILY MEDICINE

## 2021-10-21 PROCEDURE — 1160F RVW MEDS BY RX/DR IN RCRD: CPT | Mod: HCNC,CPTII,S$GLB, | Performed by: FAMILY MEDICINE

## 2021-10-21 PROCEDURE — 73130 XR HAND COMPLETE 3 VIEW LEFT: ICD-10-PCS | Mod: 26,HCNC,LT, | Performed by: RADIOLOGY

## 2021-10-21 PROCEDURE — 99214 OFFICE O/P EST MOD 30 MIN: CPT | Mod: HCNC,S$GLB,, | Performed by: FAMILY MEDICINE

## 2021-10-21 PROCEDURE — 3075F PR MOST RECENT SYSTOLIC BLOOD PRESS GE 130-139MM HG: ICD-10-PCS | Mod: HCNC,CPTII,S$GLB, | Performed by: FAMILY MEDICINE

## 2021-10-21 PROCEDURE — 1159F PR MEDICATION LIST DOCUMENTED IN MEDICAL RECORD: ICD-10-PCS | Mod: HCNC,CPTII,S$GLB, | Performed by: FAMILY MEDICINE

## 2021-10-21 PROCEDURE — 99999 PR PBB SHADOW E&M-EST. PATIENT-LVL V: CPT | Mod: PBBFAC,HCNC,, | Performed by: FAMILY MEDICINE

## 2021-10-21 PROCEDURE — 3008F PR BODY MASS INDEX (BMI) DOCUMENTED: ICD-10-PCS | Mod: HCNC,CPTII,S$GLB, | Performed by: FAMILY MEDICINE

## 2021-10-21 PROCEDURE — 3288F PR FALLS RISK ASSESSMENT DOCUMENTED: ICD-10-PCS | Mod: HCNC,CPTII,S$GLB, | Performed by: FAMILY MEDICINE

## 2021-10-21 PROCEDURE — 99999 PR PBB SHADOW E&M-EST. PATIENT-LVL V: ICD-10-PCS | Mod: PBBFAC,HCNC,, | Performed by: FAMILY MEDICINE

## 2021-10-21 PROCEDURE — 3078F DIAST BP <80 MM HG: CPT | Mod: HCNC,CPTII,S$GLB, | Performed by: FAMILY MEDICINE

## 2021-10-21 PROCEDURE — 4010F ACE/ARB THERAPY RXD/TAKEN: CPT | Mod: HCNC,CPTII,S$GLB, | Performed by: FAMILY MEDICINE

## 2021-10-21 PROCEDURE — 3044F PR MOST RECENT HEMOGLOBIN A1C LEVEL <7.0%: ICD-10-PCS | Mod: HCNC,CPTII,S$GLB, | Performed by: FAMILY MEDICINE

## 2021-10-21 PROCEDURE — 3288F FALL RISK ASSESSMENT DOCD: CPT | Mod: HCNC,CPTII,S$GLB, | Performed by: FAMILY MEDICINE

## 2021-10-21 PROCEDURE — 3008F BODY MASS INDEX DOCD: CPT | Mod: HCNC,CPTII,S$GLB, | Performed by: FAMILY MEDICINE

## 2021-10-21 PROCEDURE — 1159F MED LIST DOCD IN RCRD: CPT | Mod: HCNC,CPTII,S$GLB, | Performed by: FAMILY MEDICINE

## 2021-10-21 PROCEDURE — 73130 X-RAY EXAM OF HAND: CPT | Mod: TC,HCNC,FY,PO,LT

## 2021-10-21 PROCEDURE — 3044F HG A1C LEVEL LT 7.0%: CPT | Mod: HCNC,CPTII,S$GLB, | Performed by: FAMILY MEDICINE

## 2021-10-21 PROCEDURE — 3075F SYST BP GE 130 - 139MM HG: CPT | Mod: HCNC,CPTII,S$GLB, | Performed by: FAMILY MEDICINE

## 2021-10-21 PROCEDURE — 1160F PR REVIEW ALL MEDS BY PRESCRIBER/CLIN PHARMACIST DOCUMENTED: ICD-10-PCS | Mod: HCNC,CPTII,S$GLB, | Performed by: FAMILY MEDICINE

## 2021-10-21 PROCEDURE — 73130 X-RAY EXAM OF HAND: CPT | Mod: 26,HCNC,LT, | Performed by: RADIOLOGY

## 2021-10-21 PROCEDURE — 99214 PR OFFICE/OUTPT VISIT, EST, LEVL IV, 30-39 MIN: ICD-10-PCS | Mod: HCNC,S$GLB,, | Performed by: FAMILY MEDICINE

## 2021-10-21 PROCEDURE — 1125F PR PAIN SEVERITY QUANTIFIED, PAIN PRESENT: ICD-10-PCS | Mod: HCNC,CPTII,S$GLB, | Performed by: FAMILY MEDICINE

## 2021-10-21 PROCEDURE — 4010F PR ACE/ARB THEARPY RXD/TAKEN: ICD-10-PCS | Mod: HCNC,CPTII,S$GLB, | Performed by: FAMILY MEDICINE

## 2021-10-21 PROCEDURE — 3078F PR MOST RECENT DIASTOLIC BLOOD PRESSURE < 80 MM HG: ICD-10-PCS | Mod: HCNC,CPTII,S$GLB, | Performed by: FAMILY MEDICINE

## 2021-10-25 ENCOUNTER — TELEPHONE (OUTPATIENT)
Dept: FAMILY MEDICINE | Facility: CLINIC | Age: 66
End: 2021-10-25
Payer: MEDICARE

## 2021-10-25 DIAGNOSIS — M25.561 ACUTE PAIN OF RIGHT KNEE: ICD-10-CM

## 2021-10-25 DIAGNOSIS — M79.601 RIGHT ARM PAIN: Primary | ICD-10-CM

## 2021-10-25 RX ORDER — MELOXICAM 15 MG/1
15 TABLET ORAL DAILY PRN
Qty: 30 TABLET | Refills: 0 | Status: CANCELLED | OUTPATIENT
Start: 2021-10-25

## 2021-10-25 RX ORDER — MELOXICAM 15 MG/1
15 TABLET ORAL DAILY PRN
Qty: 30 TABLET | Refills: 0 | Status: SHIPPED | OUTPATIENT
Start: 2021-10-25 | End: 2021-11-04

## 2021-11-04 ENCOUNTER — OFFICE VISIT (OUTPATIENT)
Dept: FAMILY MEDICINE | Facility: CLINIC | Age: 66
End: 2021-11-04
Payer: MEDICARE

## 2021-11-04 VITALS
HEART RATE: 86 BPM | BODY MASS INDEX: 29.54 KG/M2 | OXYGEN SATURATION: 97 % | HEIGHT: 69 IN | TEMPERATURE: 97 F | WEIGHT: 199.44 LBS | SYSTOLIC BLOOD PRESSURE: 118 MMHG | DIASTOLIC BLOOD PRESSURE: 82 MMHG

## 2021-11-04 DIAGNOSIS — H92.02 LEFT EAR PAIN: ICD-10-CM

## 2021-11-04 DIAGNOSIS — S46.211A TEAR OF RIGHT BICEPS MUSCLE, INITIAL ENCOUNTER: Primary | ICD-10-CM

## 2021-11-04 PROCEDURE — 3288F FALL RISK ASSESSMENT DOCD: CPT | Mod: HCNC,CPTII,S$GLB, | Performed by: NURSE PRACTITIONER

## 2021-11-04 PROCEDURE — 99999 PR PBB SHADOW E&M-EST. PATIENT-LVL V: ICD-10-PCS | Mod: PBBFAC,HCNC,, | Performed by: NURSE PRACTITIONER

## 2021-11-04 PROCEDURE — 99999 PR PBB SHADOW E&M-EST. PATIENT-LVL V: CPT | Mod: PBBFAC,HCNC,, | Performed by: NURSE PRACTITIONER

## 2021-11-04 PROCEDURE — 1159F PR MEDICATION LIST DOCUMENTED IN MEDICAL RECORD: ICD-10-PCS | Mod: HCNC,CPTII,S$GLB, | Performed by: NURSE PRACTITIONER

## 2021-11-04 PROCEDURE — 3288F PR FALLS RISK ASSESSMENT DOCUMENTED: ICD-10-PCS | Mod: HCNC,CPTII,S$GLB, | Performed by: NURSE PRACTITIONER

## 2021-11-04 PROCEDURE — 3079F PR MOST RECENT DIASTOLIC BLOOD PRESSURE 80-89 MM HG: ICD-10-PCS | Mod: HCNC,CPTII,S$GLB, | Performed by: NURSE PRACTITIONER

## 2021-11-04 PROCEDURE — 3079F DIAST BP 80-89 MM HG: CPT | Mod: HCNC,CPTII,S$GLB, | Performed by: NURSE PRACTITIONER

## 2021-11-04 PROCEDURE — 99214 OFFICE O/P EST MOD 30 MIN: CPT | Mod: HCNC,S$GLB,, | Performed by: NURSE PRACTITIONER

## 2021-11-04 PROCEDURE — 1101F PR PT FALLS ASSESS DOC 0-1 FALLS W/OUT INJ PAST YR: ICD-10-PCS | Mod: HCNC,CPTII,S$GLB, | Performed by: NURSE PRACTITIONER

## 2021-11-04 PROCEDURE — 1159F MED LIST DOCD IN RCRD: CPT | Mod: HCNC,CPTII,S$GLB, | Performed by: NURSE PRACTITIONER

## 2021-11-04 PROCEDURE — 3074F PR MOST RECENT SYSTOLIC BLOOD PRESSURE < 130 MM HG: ICD-10-PCS | Mod: HCNC,CPTII,S$GLB, | Performed by: NURSE PRACTITIONER

## 2021-11-04 PROCEDURE — 3044F HG A1C LEVEL LT 7.0%: CPT | Mod: HCNC,CPTII,S$GLB, | Performed by: NURSE PRACTITIONER

## 2021-11-04 PROCEDURE — 3008F PR BODY MASS INDEX (BMI) DOCUMENTED: ICD-10-PCS | Mod: HCNC,CPTII,S$GLB, | Performed by: NURSE PRACTITIONER

## 2021-11-04 PROCEDURE — 1126F AMNT PAIN NOTED NONE PRSNT: CPT | Mod: HCNC,CPTII,S$GLB, | Performed by: NURSE PRACTITIONER

## 2021-11-04 PROCEDURE — 3074F SYST BP LT 130 MM HG: CPT | Mod: HCNC,CPTII,S$GLB, | Performed by: NURSE PRACTITIONER

## 2021-11-04 PROCEDURE — 1101F PT FALLS ASSESS-DOCD LE1/YR: CPT | Mod: HCNC,CPTII,S$GLB, | Performed by: NURSE PRACTITIONER

## 2021-11-04 PROCEDURE — 1126F PR PAIN SEVERITY QUANTIFIED, NO PAIN PRESENT: ICD-10-PCS | Mod: HCNC,CPTII,S$GLB, | Performed by: NURSE PRACTITIONER

## 2021-11-04 PROCEDURE — 4010F ACE/ARB THERAPY RXD/TAKEN: CPT | Mod: HCNC,CPTII,S$GLB, | Performed by: NURSE PRACTITIONER

## 2021-11-04 PROCEDURE — 99214 PR OFFICE/OUTPT VISIT, EST, LEVL IV, 30-39 MIN: ICD-10-PCS | Mod: HCNC,S$GLB,, | Performed by: NURSE PRACTITIONER

## 2021-11-04 PROCEDURE — 4010F PR ACE/ARB THEARPY RXD/TAKEN: ICD-10-PCS | Mod: HCNC,CPTII,S$GLB, | Performed by: NURSE PRACTITIONER

## 2021-11-04 PROCEDURE — 3044F PR MOST RECENT HEMOGLOBIN A1C LEVEL <7.0%: ICD-10-PCS | Mod: HCNC,CPTII,S$GLB, | Performed by: NURSE PRACTITIONER

## 2021-11-04 PROCEDURE — 3008F BODY MASS INDEX DOCD: CPT | Mod: HCNC,CPTII,S$GLB, | Performed by: NURSE PRACTITIONER

## 2021-11-04 RX ORDER — NEOMYCIN SULFATE, POLYMYXIN B SULFATE AND DEXAMETHASONE 3.5; 10000; 1 MG/ML; [USP'U]/ML; MG/ML
SUSPENSION/ DROPS OPHTHALMIC
COMMUNITY
Start: 2021-10-25 | End: 2022-01-18

## 2021-11-05 ENCOUNTER — TELEPHONE (OUTPATIENT)
Dept: ORTHOPEDICS | Facility: CLINIC | Age: 66
End: 2021-11-05
Payer: MEDICARE

## 2021-11-08 ENCOUNTER — TELEPHONE (OUTPATIENT)
Dept: ORTHOPEDICS | Facility: CLINIC | Age: 66
End: 2021-11-08
Payer: MEDICARE

## 2021-11-10 DIAGNOSIS — M25.511 RIGHT SHOULDER PAIN, UNSPECIFIED CHRONICITY: Primary | ICD-10-CM

## 2021-11-11 ENCOUNTER — HOSPITAL ENCOUNTER (OUTPATIENT)
Dept: RADIOLOGY | Facility: HOSPITAL | Age: 66
Discharge: HOME | End: 2021-11-11
Attending: PHYSICAL MEDICINE & REHABILITATION
Payer: MEDICARE

## 2021-11-11 ENCOUNTER — OFFICE VISIT (OUTPATIENT)
Dept: SPORTS MEDICINE | Facility: CLINIC | Age: 66
End: 2021-11-11
Payer: MEDICARE

## 2021-11-11 VITALS — WEIGHT: 199 LBS | HEIGHT: 69 IN | BODY MASS INDEX: 29.47 KG/M2

## 2021-11-11 DIAGNOSIS — S46.219A TEAR OF BICEPS TENDON: Primary | ICD-10-CM

## 2021-11-11 DIAGNOSIS — M79.601 RIGHT ARM PAIN: ICD-10-CM

## 2021-11-11 DIAGNOSIS — M25.511 RIGHT SHOULDER PAIN, UNSPECIFIED CHRONICITY: ICD-10-CM

## 2021-11-11 PROCEDURE — 99999 PR PBB SHADOW E&M-EST. PATIENT-LVL IV: CPT | Mod: PBBFAC,HCNC,, | Performed by: PHYSICAL MEDICINE & REHABILITATION

## 2021-11-11 PROCEDURE — 73030 X-RAY EXAM OF SHOULDER: CPT | Mod: TC,HCNC,RT

## 2021-11-11 PROCEDURE — 1159F PR MEDICATION LIST DOCUMENTED IN MEDICAL RECORD: ICD-10-PCS | Mod: HCNC,CPTII,S$GLB, | Performed by: PHYSICAL MEDICINE & REHABILITATION

## 2021-11-11 PROCEDURE — 99999 PR PBB SHADOW E&M-EST. PATIENT-LVL IV: ICD-10-PCS | Mod: PBBFAC,HCNC,, | Performed by: PHYSICAL MEDICINE & REHABILITATION

## 2021-11-11 PROCEDURE — 1101F PT FALLS ASSESS-DOCD LE1/YR: CPT | Mod: HCNC,CPTII,S$GLB, | Performed by: PHYSICAL MEDICINE & REHABILITATION

## 2021-11-11 PROCEDURE — 1125F AMNT PAIN NOTED PAIN PRSNT: CPT | Mod: HCNC,CPTII,S$GLB, | Performed by: PHYSICAL MEDICINE & REHABILITATION

## 2021-11-11 PROCEDURE — 1101F PR PT FALLS ASSESS DOC 0-1 FALLS W/OUT INJ PAST YR: ICD-10-PCS | Mod: HCNC,CPTII,S$GLB, | Performed by: PHYSICAL MEDICINE & REHABILITATION

## 2021-11-11 PROCEDURE — 1125F PR PAIN SEVERITY QUANTIFIED, PAIN PRESENT: ICD-10-PCS | Mod: HCNC,CPTII,S$GLB, | Performed by: PHYSICAL MEDICINE & REHABILITATION

## 2021-11-11 PROCEDURE — 99203 PR OFFICE/OUTPT VISIT, NEW, LEVL III, 30-44 MIN: ICD-10-PCS | Mod: HCNC,S$GLB,, | Performed by: PHYSICAL MEDICINE & REHABILITATION

## 2021-11-11 PROCEDURE — 3008F BODY MASS INDEX DOCD: CPT | Mod: HCNC,CPTII,S$GLB, | Performed by: PHYSICAL MEDICINE & REHABILITATION

## 2021-11-11 PROCEDURE — 1160F RVW MEDS BY RX/DR IN RCRD: CPT | Mod: HCNC,CPTII,S$GLB, | Performed by: PHYSICAL MEDICINE & REHABILITATION

## 2021-11-11 PROCEDURE — 3288F FALL RISK ASSESSMENT DOCD: CPT | Mod: HCNC,CPTII,S$GLB, | Performed by: PHYSICAL MEDICINE & REHABILITATION

## 2021-11-11 PROCEDURE — 3044F HG A1C LEVEL LT 7.0%: CPT | Mod: HCNC,CPTII,S$GLB, | Performed by: PHYSICAL MEDICINE & REHABILITATION

## 2021-11-11 PROCEDURE — 1159F MED LIST DOCD IN RCRD: CPT | Mod: HCNC,CPTII,S$GLB, | Performed by: PHYSICAL MEDICINE & REHABILITATION

## 2021-11-11 PROCEDURE — 4010F ACE/ARB THERAPY RXD/TAKEN: CPT | Mod: HCNC,CPTII,S$GLB, | Performed by: PHYSICAL MEDICINE & REHABILITATION

## 2021-11-11 PROCEDURE — 1160F PR REVIEW ALL MEDS BY PRESCRIBER/CLIN PHARMACIST DOCUMENTED: ICD-10-PCS | Mod: HCNC,CPTII,S$GLB, | Performed by: PHYSICAL MEDICINE & REHABILITATION

## 2021-11-11 PROCEDURE — 73030 XR SHOULDER COMPLETE 2 OR MORE VIEWS RIGHT: ICD-10-PCS | Mod: 26,HCNC,RT, | Performed by: RADIOLOGY

## 2021-11-11 PROCEDURE — 73030 X-RAY EXAM OF SHOULDER: CPT | Mod: 26,HCNC,RT, | Performed by: RADIOLOGY

## 2021-11-11 PROCEDURE — 4010F PR ACE/ARB THEARPY RXD/TAKEN: ICD-10-PCS | Mod: HCNC,CPTII,S$GLB, | Performed by: PHYSICAL MEDICINE & REHABILITATION

## 2021-11-11 PROCEDURE — 3288F PR FALLS RISK ASSESSMENT DOCUMENTED: ICD-10-PCS | Mod: HCNC,CPTII,S$GLB, | Performed by: PHYSICAL MEDICINE & REHABILITATION

## 2021-11-11 PROCEDURE — 3008F PR BODY MASS INDEX (BMI) DOCUMENTED: ICD-10-PCS | Mod: HCNC,CPTII,S$GLB, | Performed by: PHYSICAL MEDICINE & REHABILITATION

## 2021-11-11 PROCEDURE — 3044F PR MOST RECENT HEMOGLOBIN A1C LEVEL <7.0%: ICD-10-PCS | Mod: HCNC,CPTII,S$GLB, | Performed by: PHYSICAL MEDICINE & REHABILITATION

## 2021-11-11 PROCEDURE — 99203 OFFICE O/P NEW LOW 30 MIN: CPT | Mod: HCNC,S$GLB,, | Performed by: PHYSICAL MEDICINE & REHABILITATION

## 2021-11-11 RX ORDER — CYCLOSPORINE 0.5 MG/ML
EMULSION OPHTHALMIC
COMMUNITY
Start: 2021-11-02 | End: 2023-05-16

## 2021-11-22 ENCOUNTER — OFFICE VISIT (OUTPATIENT)
Dept: FAMILY MEDICINE | Facility: CLINIC | Age: 66
End: 2021-11-22
Payer: MEDICARE

## 2021-11-22 VITALS
HEIGHT: 69 IN | DIASTOLIC BLOOD PRESSURE: 60 MMHG | TEMPERATURE: 99 F | SYSTOLIC BLOOD PRESSURE: 132 MMHG | OXYGEN SATURATION: 97 % | BODY MASS INDEX: 28.98 KG/M2 | WEIGHT: 195.69 LBS | HEART RATE: 103 BPM

## 2021-11-22 DIAGNOSIS — R07.9 CHEST PAIN, UNSPECIFIED TYPE: Primary | ICD-10-CM

## 2021-11-22 DIAGNOSIS — I10 ESSENTIAL HYPERTENSION: Chronic | ICD-10-CM

## 2021-11-22 DIAGNOSIS — I25.10 CORONARY ARTERY DISEASE INVOLVING NATIVE HEART WITHOUT ANGINA PECTORIS, UNSPECIFIED VESSEL OR LESION TYPE: ICD-10-CM

## 2021-11-22 DIAGNOSIS — M18.12 DEGENERATIVE ARTHRITIS OF THUMB, LEFT: ICD-10-CM

## 2021-11-22 PROCEDURE — 99999 PR PBB SHADOW E&M-EST. PATIENT-LVL V: ICD-10-PCS | Mod: PBBFAC,HCNC,, | Performed by: FAMILY MEDICINE

## 2021-11-22 PROCEDURE — 4010F ACE/ARB THERAPY RXD/TAKEN: CPT | Mod: HCNC,CPTII,S$GLB, | Performed by: FAMILY MEDICINE

## 2021-11-22 PROCEDURE — 99214 OFFICE O/P EST MOD 30 MIN: CPT | Mod: HCNC,S$GLB,, | Performed by: FAMILY MEDICINE

## 2021-11-22 PROCEDURE — 99999 PR PBB SHADOW E&M-EST. PATIENT-LVL V: CPT | Mod: PBBFAC,HCNC,, | Performed by: FAMILY MEDICINE

## 2021-11-22 PROCEDURE — 4010F PR ACE/ARB THEARPY RXD/TAKEN: ICD-10-PCS | Mod: HCNC,CPTII,S$GLB, | Performed by: FAMILY MEDICINE

## 2021-11-22 PROCEDURE — 99214 PR OFFICE/OUTPT VISIT, EST, LEVL IV, 30-39 MIN: ICD-10-PCS | Mod: HCNC,S$GLB,, | Performed by: FAMILY MEDICINE

## 2021-11-23 ENCOUNTER — HOSPITAL ENCOUNTER (OUTPATIENT)
Dept: RADIOLOGY | Facility: HOSPITAL | Age: 66
Discharge: HOME | End: 2021-11-23
Attending: FAMILY MEDICINE
Payer: MEDICARE

## 2021-11-23 DIAGNOSIS — R07.9 CHEST PAIN, UNSPECIFIED TYPE: ICD-10-CM

## 2021-11-23 PROCEDURE — 71046 XR CHEST PA AND LATERAL: ICD-10-PCS | Mod: 26,HCNC,, | Performed by: RADIOLOGY

## 2021-11-23 PROCEDURE — 71046 X-RAY EXAM CHEST 2 VIEWS: CPT | Mod: TC,HCNC,FY,PO

## 2021-11-23 PROCEDURE — 71046 X-RAY EXAM CHEST 2 VIEWS: CPT | Mod: 26,HCNC,, | Performed by: RADIOLOGY

## 2021-11-24 ENCOUNTER — TELEPHONE (OUTPATIENT)
Dept: ORTHOPEDICS | Facility: CLINIC | Age: 66
End: 2021-11-24
Payer: MEDICARE

## 2021-11-26 ENCOUNTER — TELEPHONE (OUTPATIENT)
Dept: INTERNAL MEDICINE | Facility: CLINIC | Age: 66
End: 2021-11-26
Payer: MEDICARE

## 2021-11-26 DIAGNOSIS — R91.1 SOLITARY PULMONARY NODULE: ICD-10-CM

## 2021-12-08 ENCOUNTER — OFFICE VISIT (OUTPATIENT)
Dept: SPORTS MEDICINE | Facility: CLINIC | Age: 66
End: 2021-12-08
Payer: MEDICARE

## 2021-12-08 VITALS
HEART RATE: 99 BPM | SYSTOLIC BLOOD PRESSURE: 118 MMHG | BODY MASS INDEX: 28.88 KG/M2 | HEIGHT: 69 IN | DIASTOLIC BLOOD PRESSURE: 79 MMHG | WEIGHT: 195 LBS

## 2021-12-08 DIAGNOSIS — M18.12 DEGENERATIVE ARTHRITIS OF THUMB, LEFT: ICD-10-CM

## 2021-12-08 DIAGNOSIS — F41.8 DEPRESSION WITH ANXIETY: ICD-10-CM

## 2021-12-08 DIAGNOSIS — K21.9 GASTROESOPHAGEAL REFLUX DISEASE WITHOUT ESOPHAGITIS: ICD-10-CM

## 2021-12-08 DIAGNOSIS — M79.601 RIGHT ARM PAIN: ICD-10-CM

## 2021-12-08 DIAGNOSIS — F32.A DEPRESSION, UNSPECIFIED DEPRESSION TYPE: ICD-10-CM

## 2021-12-08 DIAGNOSIS — M19.042 PRIMARY OSTEOARTHRITIS OF LEFT HAND: Primary | ICD-10-CM

## 2021-12-08 DIAGNOSIS — F40.9 INSOMNIA DUE TO ANXIETY AND FEAR: ICD-10-CM

## 2021-12-08 DIAGNOSIS — F51.05 INSOMNIA DUE TO ANXIETY AND FEAR: ICD-10-CM

## 2021-12-08 PROCEDURE — 99214 PR OFFICE/OUTPT VISIT, EST, LEVL IV, 30-39 MIN: ICD-10-PCS | Mod: HCNC,S$GLB,, | Performed by: FAMILY MEDICINE

## 2021-12-08 PROCEDURE — 4010F PR ACE/ARB THEARPY RXD/TAKEN: ICD-10-PCS | Mod: HCNC,CPTII,S$GLB, | Performed by: FAMILY MEDICINE

## 2021-12-08 PROCEDURE — 99214 OFFICE O/P EST MOD 30 MIN: CPT | Mod: HCNC,S$GLB,, | Performed by: FAMILY MEDICINE

## 2021-12-08 PROCEDURE — 99999 PR PBB SHADOW E&M-EST. PATIENT-LVL V: ICD-10-PCS | Mod: PBBFAC,HCNC,, | Performed by: FAMILY MEDICINE

## 2021-12-08 PROCEDURE — 4010F ACE/ARB THERAPY RXD/TAKEN: CPT | Mod: HCNC,CPTII,S$GLB, | Performed by: FAMILY MEDICINE

## 2021-12-08 PROCEDURE — 99999 PR PBB SHADOW E&M-EST. PATIENT-LVL V: CPT | Mod: PBBFAC,HCNC,, | Performed by: FAMILY MEDICINE

## 2021-12-08 RX ORDER — CITALOPRAM 40 MG/1
TABLET, FILM COATED ORAL
Qty: 30 TABLET | Refills: 3 | Status: SHIPPED | OUTPATIENT
Start: 2021-12-08 | End: 2022-01-25 | Stop reason: SDUPTHER

## 2021-12-08 RX ORDER — METHYLPREDNISOLONE 4 MG/1
TABLET ORAL
Qty: 1 EACH | Refills: 0 | Status: SHIPPED | OUTPATIENT
Start: 2021-12-08 | End: 2022-01-18

## 2021-12-08 RX ORDER — MELOXICAM 15 MG/1
TABLET ORAL
Qty: 30 TABLET | Refills: 0 | Status: SHIPPED | OUTPATIENT
Start: 2021-12-08 | End: 2022-01-09

## 2021-12-08 RX ORDER — TRAZODONE HYDROCHLORIDE 100 MG/1
TABLET ORAL
Qty: 30 TABLET | Refills: 0 | Status: SHIPPED | OUTPATIENT
Start: 2021-12-08 | End: 2022-01-09

## 2021-12-08 RX ORDER — VENLAFAXINE 75 MG/1
TABLET ORAL
Qty: 60 TABLET | Refills: 3 | Status: SHIPPED | OUTPATIENT
Start: 2021-12-08 | End: 2022-01-25 | Stop reason: SDUPTHER

## 2021-12-08 RX ORDER — OMEPRAZOLE 40 MG/1
CAPSULE, DELAYED RELEASE ORAL
Qty: 30 CAPSULE | Refills: 3 | Status: SHIPPED | OUTPATIENT
Start: 2021-12-08 | End: 2022-01-25 | Stop reason: SDUPTHER

## 2021-12-10 ENCOUNTER — TELEPHONE (OUTPATIENT)
Dept: FAMILY MEDICINE | Facility: CLINIC | Age: 66
End: 2021-12-10
Payer: MEDICARE

## 2021-12-10 DIAGNOSIS — N28.9 RENAL INSUFFICIENCY: Primary | ICD-10-CM

## 2021-12-10 PROBLEM — M18.12 DEGENERATIVE ARTHRITIS OF THUMB, LEFT: Status: ACTIVE | Noted: 2021-12-10

## 2021-12-10 PROBLEM — M19.042 PRIMARY OSTEOARTHRITIS OF LEFT HAND: Status: ACTIVE | Noted: 2021-12-10

## 2021-12-13 ENCOUNTER — LAB VISIT (OUTPATIENT)
Dept: LAB | Facility: HOSPITAL | Age: 66
End: 2021-12-13
Attending: FAMILY MEDICINE
Payer: MEDICARE

## 2021-12-13 DIAGNOSIS — N28.9 RENAL INSUFFICIENCY: ICD-10-CM

## 2021-12-13 LAB
CREAT SERPL-MCNC: 1.1 MG/DL (ref 0.5–1.4)
EST. GFR  (AFRICAN AMERICAN): >60 ML/MIN/1.73 M^2
EST. GFR  (NON AFRICAN AMERICAN): >60 ML/MIN/1.73 M^2

## 2021-12-13 PROCEDURE — 82565 ASSAY OF CREATININE: CPT | Mod: HCNC | Performed by: FAMILY MEDICINE

## 2021-12-13 PROCEDURE — 36415 COLL VENOUS BLD VENIPUNCTURE: CPT | Mod: HCNC,PO | Performed by: FAMILY MEDICINE

## 2021-12-14 ENCOUNTER — TELEPHONE (OUTPATIENT)
Dept: RADIOLOGY | Facility: HOSPITAL | Age: 66
End: 2021-12-14
Payer: MEDICARE

## 2021-12-15 ENCOUNTER — HOSPITAL ENCOUNTER (OUTPATIENT)
Dept: RADIOLOGY | Facility: HOSPITAL | Age: 66
Discharge: HOME | End: 2021-12-15
Attending: FAMILY MEDICINE
Payer: MEDICARE

## 2021-12-15 ENCOUNTER — PES CALL (OUTPATIENT)
Dept: ADMINISTRATIVE | Facility: CLINIC | Age: 66
End: 2021-12-15
Payer: MEDICARE

## 2021-12-15 DIAGNOSIS — R91.1 SOLITARY PULMONARY NODULE: ICD-10-CM

## 2021-12-15 PROCEDURE — 71260 CT THORAX DX C+: CPT | Mod: TC,HCNC

## 2021-12-15 PROCEDURE — 71260 CT THORAX DX C+: CPT | Mod: 26,HCNC,, | Performed by: RADIOLOGY

## 2021-12-15 PROCEDURE — 25500020 PHARM REV CODE 255: Mod: HCNC | Performed by: FAMILY MEDICINE

## 2021-12-15 PROCEDURE — 71260 CT CHEST WITH CONTRAST: ICD-10-PCS | Mod: 26,HCNC,, | Performed by: RADIOLOGY

## 2021-12-15 RX ADMIN — IOHEXOL 75 ML: 350 INJECTION, SOLUTION INTRAVENOUS at 10:12

## 2021-12-22 ENCOUNTER — TELEPHONE (OUTPATIENT)
Dept: FAMILY MEDICINE | Facility: CLINIC | Age: 66
End: 2021-12-22
Payer: MEDICARE

## 2022-01-06 DIAGNOSIS — F40.9 INSOMNIA DUE TO ANXIETY AND FEAR: ICD-10-CM

## 2022-01-06 DIAGNOSIS — F51.05 INSOMNIA DUE TO ANXIETY AND FEAR: ICD-10-CM

## 2022-01-06 DIAGNOSIS — M79.601 RIGHT ARM PAIN: ICD-10-CM

## 2022-01-06 NOTE — TELEPHONE ENCOUNTER
No new care gaps identified.  Powered by [employer]. Reference number: [id number].   1/06/2022 12:18:56 PM CST

## 2022-01-09 RX ORDER — HYDRALAZINE HYDROCHLORIDE 25 MG/1
TABLET, FILM COATED ORAL
Qty: 90 TABLET | Refills: 0 | Status: SHIPPED | OUTPATIENT
Start: 2022-01-09 | End: 2022-01-26 | Stop reason: SDUPTHER

## 2022-01-09 RX ORDER — MELOXICAM 15 MG/1
TABLET ORAL
Qty: 30 TABLET | Refills: 0 | Status: SHIPPED | OUTPATIENT
Start: 2022-01-09 | End: 2022-02-28

## 2022-01-09 RX ORDER — TRAZODONE HYDROCHLORIDE 100 MG/1
TABLET ORAL
Qty: 30 TABLET | Refills: 0 | Status: SHIPPED | OUTPATIENT
Start: 2022-01-09 | End: 2022-01-25 | Stop reason: SDUPTHER

## 2022-01-13 ENCOUNTER — PATIENT OUTREACH (OUTPATIENT)
Dept: ADMINISTRATIVE | Facility: OTHER | Age: 67
End: 2022-01-13
Payer: MEDICARE

## 2022-01-14 NOTE — PROGRESS NOTES
Health Maintenance Due   Topic Date Due    Shingles Vaccine (1 of 2) Never done    Colorectal Cancer Screening  04/07/2020     Updates were requested from care everywhere.  Chart was reviewed for overdue Proactive [facility] Encounters (DENISE) topics (CRS, Breast Cancer Screening, Eye exam)  Health Maintenance has been updated.  [facility] immunization registry triggered.  Immunizations were reconciled.

## 2022-01-18 ENCOUNTER — OFFICE VISIT (OUTPATIENT)
Dept: ORTHOPEDICS | Facility: CLINIC | Age: 67
End: 2022-01-18
Payer: MEDICARE

## 2022-01-18 VITALS — DIASTOLIC BLOOD PRESSURE: 89 MMHG | SYSTOLIC BLOOD PRESSURE: 132 MMHG | HEART RATE: 73 BPM

## 2022-01-18 DIAGNOSIS — M18.12 ARTHRITIS OF CARPOMETACARPAL (CMC) JOINT OF LEFT THUMB: Primary | ICD-10-CM

## 2022-01-18 PROCEDURE — 1160F RVW MEDS BY RX/DR IN RCRD: CPT | Mod: HCNC,CPTII,S$GLB, | Performed by: ORTHOPAEDIC SURGERY

## 2022-01-18 PROCEDURE — 3075F SYST BP GE 130 - 139MM HG: CPT | Mod: HCNC,CPTII,S$GLB, | Performed by: ORTHOPAEDIC SURGERY

## 2022-01-18 PROCEDURE — 99203 OFFICE O/P NEW LOW 30 MIN: CPT | Mod: HCNC,S$GLB,, | Performed by: ORTHOPAEDIC SURGERY

## 2022-01-18 PROCEDURE — 3075F PR MOST RECENT SYSTOLIC BLOOD PRESS GE 130-139MM HG: ICD-10-PCS | Mod: HCNC,CPTII,S$GLB, | Performed by: ORTHOPAEDIC SURGERY

## 2022-01-18 PROCEDURE — 3079F PR MOST RECENT DIASTOLIC BLOOD PRESSURE 80-89 MM HG: ICD-10-PCS | Mod: HCNC,CPTII,S$GLB, | Performed by: ORTHOPAEDIC SURGERY

## 2022-01-18 PROCEDURE — 99999 PR PBB SHADOW E&M-EST. PATIENT-LVL III: ICD-10-PCS | Mod: PBBFAC,HCNC,, | Performed by: ORTHOPAEDIC SURGERY

## 2022-01-18 PROCEDURE — 1160F PR REVIEW ALL MEDS BY PRESCRIBER/CLIN PHARMACIST DOCUMENTED: ICD-10-PCS | Mod: HCNC,CPTII,S$GLB, | Performed by: ORTHOPAEDIC SURGERY

## 2022-01-18 PROCEDURE — 99203 PR OFFICE/OUTPT VISIT, NEW, LEVL III, 30-44 MIN: ICD-10-PCS | Mod: HCNC,S$GLB,, | Performed by: ORTHOPAEDIC SURGERY

## 2022-01-18 PROCEDURE — 3288F PR FALLS RISK ASSESSMENT DOCUMENTED: ICD-10-PCS | Mod: HCNC,CPTII,S$GLB, | Performed by: ORTHOPAEDIC SURGERY

## 2022-01-18 PROCEDURE — 3288F FALL RISK ASSESSMENT DOCD: CPT | Mod: HCNC,CPTII,S$GLB, | Performed by: ORTHOPAEDIC SURGERY

## 2022-01-18 PROCEDURE — 1159F PR MEDICATION LIST DOCUMENTED IN MEDICAL RECORD: ICD-10-PCS | Mod: HCNC,CPTII,S$GLB, | Performed by: ORTHOPAEDIC SURGERY

## 2022-01-18 PROCEDURE — 99999 PR PBB SHADOW E&M-EST. PATIENT-LVL III: CPT | Mod: PBBFAC,HCNC,, | Performed by: ORTHOPAEDIC SURGERY

## 2022-01-18 PROCEDURE — 1101F PT FALLS ASSESS-DOCD LE1/YR: CPT | Mod: HCNC,CPTII,S$GLB, | Performed by: ORTHOPAEDIC SURGERY

## 2022-01-18 PROCEDURE — 1159F MED LIST DOCD IN RCRD: CPT | Mod: HCNC,CPTII,S$GLB, | Performed by: ORTHOPAEDIC SURGERY

## 2022-01-18 PROCEDURE — 3079F DIAST BP 80-89 MM HG: CPT | Mod: HCNC,CPTII,S$GLB, | Performed by: ORTHOPAEDIC SURGERY

## 2022-01-18 PROCEDURE — 1125F PR PAIN SEVERITY QUANTIFIED, PAIN PRESENT: ICD-10-PCS | Mod: HCNC,CPTII,S$GLB, | Performed by: ORTHOPAEDIC SURGERY

## 2022-01-18 PROCEDURE — 1125F AMNT PAIN NOTED PAIN PRSNT: CPT | Mod: HCNC,CPTII,S$GLB, | Performed by: ORTHOPAEDIC SURGERY

## 2022-01-18 PROCEDURE — 1101F PR PT FALLS ASSESS DOC 0-1 FALLS W/OUT INJ PAST YR: ICD-10-PCS | Mod: HCNC,CPTII,S$GLB, | Performed by: ORTHOPAEDIC SURGERY

## 2022-01-18 RX ORDER — LEVOTHYROXINE SODIUM 0.12 MG/1
1 TABLET ORAL 4 TIMES DAILY
COMMUNITY
Start: 2021-10-25 | End: 2022-01-18

## 2022-01-18 NOTE — PROGRESS NOTES
Subjective:     Patient ID: Mil Sood is a 66 y.o. male.    Chief Complaint: Pain of the Left Hand      HPI:  The patient is a 66-year-old male with left thumb basal joint arthritis.  He has tried a thumb spica splint but has not tried injection.  Past Medical History:   Diagnosis Date    Anxiety     Cancer     spot on kidney and lung    Diabetes mellitus     Borderline diet controlled    Diverticulitis     GERD (gastroesophageal reflux disease)     Hyperlipidemia     not on any meds currently    Hypertension     Lung mass     Mass of kidney      Past Surgical History:   Procedure Laterality Date    CHOLECYSTECTOMY      COLONOSCOPY      COLONOSCOPY N/A 4/7/2017    Procedure: COLONOSCOPY;  Surgeon: Amarilys Bravo MD;  Location: [facility];  Service: Endoscopy;  Laterality: N/A;    DIAGNOSTIC LAPAROSCOPY N/A 5/4/2021    Procedure: LAPAROSCOPY, DIAGNOSTIC;  Surgeon: Shaji Jauregui MD;  Location: [facility];  Service: General;  Laterality: N/A;    ESOPHAGOGASTRODUODENOSCOPY      ESOPHAGOGASTRODUODENOSCOPY N/A 2/13/2020    Procedure: ESOPHAGOGASTRODUODENOSCOPY (EGD);  Surgeon: Shaji Aquino III, MD;  Location: [facility];  Service: Endoscopy;  Laterality: N/A;    HERNIA REPAIR Bilateral     groin    NECK SURGERY      PARTIAL NEPHRECTOMY      left    RENAL BIOPSY      ROBOT-ASSISTED REPAIR OF UMBILICAL HERNIA USING DA NEL XI N/A 5/4/2021    Procedure: XI ROBOTIC REPAIR, HERNIA, UMBILICAL;  Surgeon: Shaji Jauregui MD;  Location: [facility];  Service: General;  Laterality: N/A;  converted to open    TONSILLECTOMY      TRIGGER FINGER RELEASE Right     long finger     Family History   Problem Relation Age of Onset    Hypertension Mother     Hypertension Father     Melanoma Father     Cancer Sister      Social History     Socioeconomic History    Marital status:    Tobacco Use    Smoking status: Former Smoker     Packs/day: 1.00     Years: 20.00     Pack years: 20.00     Quit  date: 1995     Years since quittin.9    Smokeless tobacco: Never Used   Substance and Sexual Activity    Alcohol use: Never     Alcohol/week: 0.0 standard drinks    Drug use: No    Sexual activity: Yes     Partners: Female     Birth control/protection: None     Medication List with Changes/Refills   Current Medications    AMLODIPINE (NORVASC) 10 MG TABLET    Take 1 tablet (10 mg total) by mouth every evening.    ASPIRIN 81 MG TAB    Take 1 tablet by mouth nightly.     BENAZEPRIL (LOTENSIN) 20 MG TABLET    TAKE 2 TABLETS BY MOUTH AT BEDTIME.    BLOOD SUGAR DIAGNOSTIC STRP    Use to check blood sugars BID    BLOOD SUGAR DIAGNOSTIC STRP    1 each by Misc.(Non-Drug; Combo Route) route 3 (three) times daily.    BLOOD-GLUCOSE METER KIT    Use as instructed    CITALOPRAM (CELEXA) 40 MG TABLET    TAKE ONE TABLET BY MOUTH AT BEDTIME.    CLONAZEPAM (KLONOPIN) 1 MG TABLET    Take 1 tablet (1 mg total) by mouth nightly as needed for Anxiety.    CYCLOBENZAPRINE (FLEXERIL) 10 MG TABLET    TAKE 1 TABLET BY MOUTH THREE TIMES DAILY AS NEEDED FOR MUSCLE SPASMS.    FINASTERIDE (PROSCAR) 5 MG TABLET    Take 1 tablet (5 mg total) by mouth nightly.    FLUTICASONE PROPION-SALMETEROL 115-21 MCG/DOSE (ADVAIR HFA) 115-21 MCG/ACTUATION HFAA INHALER    Inhale 2 puffs into the lungs 2 (two) times daily.    GABAPENTIN (NEURONTIN) 600 MG TABLET    TAKE ONE TABLET BY MOUTH AT BEDTIME.    HYDRALAZINE (APRESOLINE) 25 MG TABLET    TAKE ONE TABLET BY MOUTH AT BEDTIME.    HYDROCHLOROTHIAZIDE (MICROZIDE) 12.5 MG CAPSULE    TAKE 1 CAPSULE BY MOUTH ONCE DAILY.    HYDROXYZINE PAMOATE (VISTARIL) 25 MG CAP    Take 25 mg by mouth every evening.     LANCETS (ACCU-CHEK SOFTCLIX LANCETS) MISC    1 each by Misc.(Non-Drug; Combo Route) route 3 (three) times daily.    MELOXICAM (MOBIC) 15 MG TABLET    TAKE 1 TABLET BY MOUTH ONCE DAILY FOR 5 DAYS.THEN ONCE DAILY AS NEEDED FOR BACK PAIN.    METOPROLOL TARTRATE (LOPRESSOR) 50 MG TABLET    Take 1 tablet  (50 mg total) by mouth every evening.    OMEPRAZOLE (PRILOSEC) 40 MG CAPSULE    TAKE 1 CAPSULE BY MOUTH EVERY EVENING.    RESTASIS MULTIDOSE 0.05 % DROP        SPIRIVA WITH HANDIHALER 18 MCG INHALATION CAPSULE    INHALE THE CONTENTS OF ONE CAPSULE DAILY.    TAMSULOSIN (FLOMAX) 0.4 MG CAP    Take 1 capsule (0.4 mg total) by mouth every evening.    TRAZODONE (DESYREL) 100 MG TABLET    TAKE 1 TABLET BY MOUTH IN THE EVENING.    VENLAFAXINE (EFFEXOR) 75 MG TABLET    TAKE 1 TABLET BY MOUTH TWICE A DAY.    ZOLPIDEM (AMBIEN) 10 MG TAB    TAKE 1 TABLET BY MOUTH AT BEDTIME AS NEEDED FOR INSOMNIA.   Discontinued Medications    METHYLPREDNISOLONE (MEDROL DOSEPACK) 4 MG TABLET    use as directed    NEOMYCIN-POLYMYXIN-DEXAMETHASONE (MAXITROL) 3.5MG/ML-10,000 UNIT/ML-0.1 % DRPS        TOBRADEX 0.3-0.1 % DRPS    Place 1 drop into both eyes 4 (four) times daily.     Review of patient's allergies indicates:  No Known Allergies  Review of Systems   Constitutional: Negative for malaise/fatigue.   HENT: Negative for hearing loss.    Eyes: Negative for double vision and visual disturbance.   Cardiovascular: Positive for chest pain.   Respiratory: Positive for shortness of breath.    Endocrine: Negative for cold intolerance.   Hematologic/Lymphatic: Does not bruise/bleed easily.   Skin: Negative for poor wound healing and suspicious lesions.   Musculoskeletal: Positive for arthritis, joint pain, joint swelling and neck pain. Negative for gout.   Gastrointestinal: Positive for abdominal pain. Negative for nausea and vomiting.   Genitourinary: Negative for dysuria.   Neurological: Negative for numbness, paresthesias and sensory change.   Psychiatric/Behavioral: Positive for altered mental status and depression. Negative for memory loss and substance abuse. The patient has insomnia and is nervous/anxious.    Allergic/Immunologic: Negative for persistent infections.       Objective:   There is no height or weight on file to calculate  BMI.  Vitals:    01/18/22 1410   BP: 132/89   Pulse: 73                General    Constitutional: He is oriented to person, place, and time. He appears well-developed and well-nourished. No distress.   HENT:   Head: Normocephalic.   Eyes: EOM are normal.   Pulmonary/Chest: Effort normal.   Neurological: He is oriented to person, place, and time.   Psychiatric: He has a normal mood and affect.         Left Hand/Wrist Exam     Inspection   Scars: Wrist - absent Hand -  absent  Effusion: Wrist - absent Hand -  absent    Pain   Wrist - The patient exhibits pain of the CMC.    Other     Sensory Exam  Median Distribution: normal  Ulnar Distribution: normal  Radial Distribution: normal    Comments:  The patient has left thumb basal joint tenderness with a positive axial circumduction grind test.  There are no motor or sensory deficits.          Vascular Exam       Capillary Refill  Left Hand: normal capillary refill      Relevant imaging results reviewed and interpreted by me, discussed with the patient and / or family today radiographs left thumb showed basal joint arthritis and is otherwise unrevealing  Assessment:     Encounter Diagnosis   Name Primary?    Arthritis of carpometacarpal (CMC) joint of left thumb Yes        Plan:       The patient injected left thumb basal joint with 0.5 cc of Kenalog and 0.5 cc of 2% plain lidocaine under sterile technique.  He was given a thumb spica splint.  He will return in 3 weeks if not improved.              Disclaimer: This note was prepared using a voice recognition system and is likely to have sound alike errors within the text.

## 2022-01-20 ENCOUNTER — HOSPITAL ENCOUNTER (OUTPATIENT)
Dept: PULMONOLOGY | Facility: HOSPITAL | Age: 67
Discharge: HOME | End: 2022-01-20
Attending: FAMILY MEDICINE
Payer: MEDICARE

## 2022-01-20 ENCOUNTER — HOSPITAL ENCOUNTER (OUTPATIENT)
Dept: RADIOLOGY | Facility: HOSPITAL | Age: 67
Discharge: HOME | End: 2022-01-20
Attending: FAMILY MEDICINE
Payer: MEDICARE

## 2022-01-20 DIAGNOSIS — I25.10 CORONARY ARTERY DISEASE INVOLVING NATIVE HEART WITHOUT ANGINA PECTORIS, UNSPECIFIED VESSEL OR LESION TYPE: ICD-10-CM

## 2022-01-20 DIAGNOSIS — R07.9 CHEST PAIN, UNSPECIFIED TYPE: ICD-10-CM

## 2022-01-20 PROCEDURE — 93018 STRESS TEST WITH MYOCARDIAL PERFUSION (CUPID ONLY): ICD-10-PCS | Mod: HCNC,,, | Performed by: STUDENT IN AN ORGANIZED HEALTH CARE EDUCATION/TRAINING PROGRAM

## 2022-01-20 PROCEDURE — A9502 TC99M TETROFOSMIN: HCPCS | Mod: HCNC

## 2022-01-20 PROCEDURE — 78452 STRESS TEST WITH MYOCARDIAL PERFUSION (CUPID ONLY): ICD-10-PCS | Mod: 26,HCNC,, | Performed by: STUDENT IN AN ORGANIZED HEALTH CARE EDUCATION/TRAINING PROGRAM

## 2022-01-20 PROCEDURE — 93018 CV STRESS TEST I&R ONLY: CPT | Mod: HCNC,,, | Performed by: STUDENT IN AN ORGANIZED HEALTH CARE EDUCATION/TRAINING PROGRAM

## 2022-01-20 PROCEDURE — 63600175 PHARM REV CODE 636 W HCPCS: Mod: HCNC | Performed by: FAMILY MEDICINE

## 2022-01-20 PROCEDURE — 93016 STRESS TEST WITH MYOCARDIAL PERFUSION (CUPID ONLY): ICD-10-PCS | Mod: HCNC,,, | Performed by: STUDENT IN AN ORGANIZED HEALTH CARE EDUCATION/TRAINING PROGRAM

## 2022-01-20 PROCEDURE — 93016 CV STRESS TEST SUPVJ ONLY: CPT | Mod: HCNC,,, | Performed by: STUDENT IN AN ORGANIZED HEALTH CARE EDUCATION/TRAINING PROGRAM

## 2022-01-20 PROCEDURE — 78452 HT MUSCLE IMAGE SPECT MULT: CPT | Mod: 26,HCNC,, | Performed by: STUDENT IN AN ORGANIZED HEALTH CARE EDUCATION/TRAINING PROGRAM

## 2022-01-20 RX ORDER — REGADENOSON 0.08 MG/ML
0.4 INJECTION, SOLUTION INTRAVENOUS ONCE
Status: COMPLETED | OUTPATIENT
Start: 2022-01-20 | End: 2022-01-20

## 2022-01-20 RX ADMIN — REGADENOSON 0.4 MG: 0.08 INJECTION, SOLUTION INTRAVENOUS at 02:01

## 2022-01-22 DIAGNOSIS — R94.39 ABNORMAL CARDIOVASCULAR STRESS TEST: Primary | ICD-10-CM

## 2022-01-22 LAB
CV STRESS BASE HR: 60 BPM
DIASTOLIC BLOOD PRESSURE: 84 MMHG
EJECTION FRACTION- HIGH: 73 %
END DIASTOLIC INDEX-HIGH: 165 ML/M2
END DIASTOLIC INDEX-LOW: 101 ML/M2
END SYSTOLIC INDEX-HIGH: 64 ML/M2
END SYSTOLIC INDEX-LOW: 28 ML/M2
NUC REST DIASTOLIC VOLUME INDEX: 80
NUC REST EJECTION FRACTION: 66
NUC REST SYSTOLIC VOLUME INDEX: 27
NUC STRESS DIASTOLIC VOLUME INDEX: 89
NUC STRESS EJECTION FRACTION: 71 %
NUC STRESS SYSTOLIC VOLUME INDEX: 26
OHS CV CPX 85 PERCENT MAX PREDICTED HEART RATE MALE: 131
OHS CV CPX MAX PREDICTED HEART RATE: 154
OHS CV CPX PATIENT IS FEMALE: 0
OHS CV CPX PATIENT IS MALE: 1
OHS CV CPX PEAK DIASTOLIC BLOOD PRESSURE: 84 MMHG
OHS CV CPX PEAK HEAR RATE: 100 BPM
OHS CV CPX PEAK RATE PRESSURE PRODUCT: NORMAL
OHS CV CPX PEAK SYSTOLIC BLOOD PRESSURE: 141 MMHG
OHS CV CPX PERCENT MAX PREDICTED HEART RATE ACHIEVED: 65
OHS CV CPX RATE PRESSURE PRODUCT PRESENTING: 8460
RETIRED EF AND QEF - SEE NOTES: 59 %
SYSTOLIC BLOOD PRESSURE: 141 MMHG

## 2022-01-25 ENCOUNTER — TELEPHONE (OUTPATIENT)
Dept: FAMILY MEDICINE | Facility: CLINIC | Age: 67
End: 2022-01-25
Payer: MEDICARE

## 2022-01-25 DIAGNOSIS — F40.9 INSOMNIA DUE TO ANXIETY AND FEAR: ICD-10-CM

## 2022-01-25 DIAGNOSIS — M47.812 CERVICAL SPONDYLOSIS: ICD-10-CM

## 2022-01-25 DIAGNOSIS — F41.8 DEPRESSION WITH ANXIETY: ICD-10-CM

## 2022-01-25 DIAGNOSIS — F32.A DEPRESSION, UNSPECIFIED DEPRESSION TYPE: ICD-10-CM

## 2022-01-25 DIAGNOSIS — F51.05 INSOMNIA DUE TO ANXIETY AND FEAR: ICD-10-CM

## 2022-01-25 DIAGNOSIS — K21.9 GASTROESOPHAGEAL REFLUX DISEASE WITHOUT ESOPHAGITIS: ICD-10-CM

## 2022-01-25 DIAGNOSIS — N40.0 BENIGN PROSTATIC HYPERPLASIA, UNSPECIFIED WHETHER LOWER URINARY TRACT SYMPTOMS PRESENT: ICD-10-CM

## 2022-01-25 RX ORDER — CITALOPRAM 40 MG/1
TABLET, FILM COATED ORAL
Qty: 90 TABLET | Refills: 1 | Status: SHIPPED | OUTPATIENT
Start: 2022-01-25 | End: 2022-01-26 | Stop reason: SDUPTHER

## 2022-01-25 RX ORDER — OMEPRAZOLE 40 MG/1
40 CAPSULE, DELAYED RELEASE ORAL NIGHTLY
Qty: 90 CAPSULE | Refills: 1 | Status: SHIPPED | OUTPATIENT
Start: 2022-01-25 | End: 2022-01-26 | Stop reason: SDUPTHER

## 2022-01-25 RX ORDER — METOPROLOL TARTRATE 50 MG/1
50 TABLET ORAL NIGHTLY
Qty: 60 TABLET | Refills: 4 | Status: SHIPPED | OUTPATIENT
Start: 2022-01-25 | End: 2022-01-26 | Stop reason: SDUPTHER

## 2022-01-25 RX ORDER — TIOTROPIUM BROMIDE 18 UG/1
CAPSULE ORAL; RESPIRATORY (INHALATION)
Qty: 90 CAPSULE | Refills: 1 | Status: SHIPPED | OUTPATIENT
Start: 2022-01-25 | End: 2023-11-14

## 2022-01-25 RX ORDER — TAMSULOSIN HYDROCHLORIDE 0.4 MG/1
1 CAPSULE ORAL NIGHTLY
Qty: 90 CAPSULE | Refills: 3 | Status: SHIPPED | OUTPATIENT
Start: 2022-01-25 | End: 2022-01-26 | Stop reason: SDUPTHER

## 2022-01-25 RX ORDER — FINASTERIDE 5 MG/1
5 TABLET, FILM COATED ORAL NIGHTLY
Qty: 90 TABLET | Refills: 1 | Status: SHIPPED | OUTPATIENT
Start: 2022-01-25 | End: 2022-01-26 | Stop reason: SDUPTHER

## 2022-01-25 RX ORDER — CLONAZEPAM 1 MG/1
1 TABLET ORAL NIGHTLY PRN
Qty: 30 TABLET | Refills: 1 | Status: SHIPPED | OUTPATIENT
Start: 2022-01-25 | End: 2022-01-26 | Stop reason: SDUPTHER

## 2022-01-25 RX ORDER — TRAZODONE HYDROCHLORIDE 100 MG/1
100 TABLET ORAL NIGHTLY
Qty: 90 TABLET | Refills: 1 | Status: SHIPPED | OUTPATIENT
Start: 2022-01-25 | End: 2022-01-26 | Stop reason: SDUPTHER

## 2022-01-25 RX ORDER — ZOLPIDEM TARTRATE 10 MG/1
TABLET ORAL
Qty: 30 TABLET | Refills: 2 | Status: SHIPPED | OUTPATIENT
Start: 2022-01-25 | End: 2022-01-26 | Stop reason: SDUPTHER

## 2022-01-25 RX ORDER — VENLAFAXINE 75 MG/1
75 TABLET ORAL 2 TIMES DAILY
Qty: 180 TABLET | Refills: 1 | Status: SHIPPED | OUTPATIENT
Start: 2022-01-25 | End: 2022-01-26 | Stop reason: SDUPTHER

## 2022-01-25 RX ORDER — GABAPENTIN 600 MG/1
600 TABLET ORAL NIGHTLY
Qty: 90 TABLET | Refills: 1 | Status: SHIPPED | OUTPATIENT
Start: 2022-01-25 | End: 2022-01-26 | Stop reason: SDUPTHER

## 2022-01-25 NOTE — TELEPHONE ENCOUNTER
Call forwarded to Voicemail. Left detailed voicemail with directions to present to appointment tomorrow.

## 2022-01-25 NOTE — TELEPHONE ENCOUNTER
No new care gaps identified.  Powered by [employer]. Reference number: [id number].   1/25/2022 9:16:21 AM CST

## 2022-01-25 NOTE — TELEPHONE ENCOUNTER
----- Message from Heather Bajwa sent at 1/25/2022  9:23 AM CST -----  .Type:  Patient Returning Call    Who Called:CHRISTIN BEDOLLA [[id number]]  Who Left Message for Patient:nurse  Does the patient know what this is regarding?:test results  Would the patient rather a call back or a response via [facility]? call  Best Call Back Number:[phone]   Additional Information:

## 2022-01-25 NOTE — TELEPHONE ENCOUNTER
----- Message from Ashley Bravo sent at 1/24/2022  5:49 PM CST -----  Contact: CHRISTIN BEDOLLA [[id number]] [phone]  GENERAL CALL BACK    Contact Preference?:    Phone call [phone]    What is the nature of the call?:    Pt called indicating he needs a refill on all his medications except the blood pressure medicine (pt declined going into detail because there were so many). Has an appt to see Dr. Roberson 2/18/22, and we could not load any sooner appts.     [facility] [address], [phone], Local

## 2022-01-26 ENCOUNTER — LAB VISIT (OUTPATIENT)
Dept: LAB | Facility: HOSPITAL | Age: 67
End: 2022-01-26
Attending: INTERNAL MEDICINE
Payer: MEDICARE

## 2022-01-26 ENCOUNTER — OFFICE VISIT (OUTPATIENT)
Dept: CARDIOLOGY | Facility: CLINIC | Age: 67
End: 2022-01-26
Payer: MEDICARE

## 2022-01-26 VITALS
BODY MASS INDEX: 27.32 KG/M2 | HEIGHT: 70 IN | OXYGEN SATURATION: 97 % | HEART RATE: 92 BPM | SYSTOLIC BLOOD PRESSURE: 114 MMHG | WEIGHT: 190.81 LBS | DIASTOLIC BLOOD PRESSURE: 72 MMHG

## 2022-01-26 DIAGNOSIS — I10 ESSENTIAL HYPERTENSION: Chronic | ICD-10-CM

## 2022-01-26 DIAGNOSIS — Z01.818 PRE-OP EVALUATION: ICD-10-CM

## 2022-01-26 DIAGNOSIS — I25.10 CORONARY ARTERY DISEASE INVOLVING NATIVE HEART WITHOUT ANGINA PECTORIS, UNSPECIFIED VESSEL OR LESION TYPE: Primary | ICD-10-CM

## 2022-01-26 DIAGNOSIS — Z78.9 STATIN INTOLERANCE: ICD-10-CM

## 2022-01-26 DIAGNOSIS — R94.39 ABNORMAL CARDIOVASCULAR STRESS TEST: ICD-10-CM

## 2022-01-26 DIAGNOSIS — I10 ESSENTIAL HYPERTENSION: ICD-10-CM

## 2022-01-26 DIAGNOSIS — R94.39 ABNORMAL NUCLEAR STRESS TEST: ICD-10-CM

## 2022-01-26 DIAGNOSIS — M79.10 MYALGIA DUE TO HMG COA REDUCTASE INHIBITOR: ICD-10-CM

## 2022-01-26 DIAGNOSIS — R07.89 OTHER CHEST PAIN: ICD-10-CM

## 2022-01-26 DIAGNOSIS — R94.39 ABNORMAL NUCLEAR STRESS TEST: Primary | ICD-10-CM

## 2022-01-26 DIAGNOSIS — T46.6X5A MYALGIA DUE TO HMG COA REDUCTASE INHIBITOR: ICD-10-CM

## 2022-01-26 DIAGNOSIS — I25.10 CORONARY ARTERY DISEASE INVOLVING NATIVE HEART WITHOUT ANGINA PECTORIS, UNSPECIFIED VESSEL OR LESION TYPE: ICD-10-CM

## 2022-01-26 LAB
ALBUMIN SERPL BCP-MCNC: 4 G/DL (ref 3.5–5.2)
ALP SERPL-CCNC: 61 U/L (ref 55–135)
ALT SERPL W/O P-5'-P-CCNC: 14 U/L (ref 10–44)
ANION GAP SERPL CALC-SCNC: 5 MMOL/L (ref 8–16)
ANION GAP SERPL CALC-SCNC: 5 MMOL/L (ref 8–16)
APTT BLDCRRT: 25 SEC (ref 21–32)
AST SERPL-CCNC: 15 U/L (ref 10–40)
BASOPHILS # BLD AUTO: 0.08 K/UL (ref 0–0.2)
BASOPHILS NFR BLD: 1.1 % (ref 0–1.9)
BILIRUB SERPL-MCNC: 0.4 MG/DL (ref 0.1–1)
BUN SERPL-MCNC: 23 MG/DL (ref 8–23)
BUN SERPL-MCNC: 23 MG/DL (ref 8–23)
CALCIUM SERPL-MCNC: 9.4 MG/DL (ref 8.7–10.5)
CALCIUM SERPL-MCNC: 9.4 MG/DL (ref 8.7–10.5)
CHLORIDE SERPL-SCNC: 101 MMOL/L (ref 95–110)
CHLORIDE SERPL-SCNC: 101 MMOL/L (ref 95–110)
CO2 SERPL-SCNC: 29 MMOL/L (ref 23–29)
CO2 SERPL-SCNC: 29 MMOL/L (ref 23–29)
CREAT SERPL-MCNC: 1.3 MG/DL (ref 0.5–1.4)
CREAT SERPL-MCNC: 1.3 MG/DL (ref 0.5–1.4)
DIFFERENTIAL METHOD: ABNORMAL
EOSINOPHIL # BLD AUTO: 0.3 K/UL (ref 0–0.5)
EOSINOPHIL NFR BLD: 3.9 % (ref 0–8)
ERYTHROCYTE [DISTWIDTH] IN BLOOD BY AUTOMATED COUNT: 13.2 % (ref 11.5–14.5)
EST. GFR  (AFRICAN AMERICAN): >60 ML/MIN/1.73 M^2
EST. GFR  (AFRICAN AMERICAN): >60 ML/MIN/1.73 M^2
EST. GFR  (NON AFRICAN AMERICAN): 56.9 ML/MIN/1.73 M^2
EST. GFR  (NON AFRICAN AMERICAN): 56.9 ML/MIN/1.73 M^2
GLUCOSE SERPL-MCNC: 85 MG/DL (ref 70–110)
GLUCOSE SERPL-MCNC: 85 MG/DL (ref 70–110)
HCT VFR BLD AUTO: 43.2 % (ref 40–54)
HGB BLD-MCNC: 13.9 G/DL (ref 14–18)
IMM GRANULOCYTES # BLD AUTO: 0.02 K/UL (ref 0–0.04)
IMM GRANULOCYTES NFR BLD AUTO: 0.3 % (ref 0–0.5)
INR PPP: 1 (ref 0.8–1.2)
LYMPHOCYTES # BLD AUTO: 1.6 K/UL (ref 1–4.8)
LYMPHOCYTES NFR BLD: 20.8 % (ref 18–48)
MCH RBC QN AUTO: 28.2 PG (ref 27–31)
MCHC RBC AUTO-ENTMCNC: 32.2 G/DL (ref 32–36)
MCV RBC AUTO: 88 FL (ref 82–98)
MONOCYTES # BLD AUTO: 0.8 K/UL (ref 0.3–1)
MONOCYTES NFR BLD: 10.2 % (ref 4–15)
NEUTROPHILS # BLD AUTO: 4.7 K/UL (ref 1.8–7.7)
NEUTROPHILS NFR BLD: 63.7 % (ref 38–73)
NRBC BLD-RTO: 0 /100 WBC
PLATELET # BLD AUTO: 339 K/UL (ref 150–450)
PMV BLD AUTO: 9.1 FL (ref 9.2–12.9)
POTASSIUM SERPL-SCNC: 4.8 MMOL/L (ref 3.5–5.1)
POTASSIUM SERPL-SCNC: 4.8 MMOL/L (ref 3.5–5.1)
PROT SERPL-MCNC: 6.7 G/DL (ref 6–8.4)
PROTHROMBIN TIME: 10.9 SEC (ref 9–12.5)
RBC # BLD AUTO: 4.93 M/UL (ref 4.6–6.2)
SODIUM SERPL-SCNC: 135 MMOL/L (ref 136–145)
SODIUM SERPL-SCNC: 135 MMOL/L (ref 136–145)
WBC # BLD AUTO: 7.44 K/UL (ref 3.9–12.7)

## 2022-01-26 PROCEDURE — 3288F PR FALLS RISK ASSESSMENT DOCUMENTED: ICD-10-PCS | Mod: HCNC,CPTII,S$GLB, | Performed by: INTERNAL MEDICINE

## 2022-01-26 PROCEDURE — 3078F PR MOST RECENT DIASTOLIC BLOOD PRESSURE < 80 MM HG: ICD-10-PCS | Mod: HCNC,CPTII,S$GLB, | Performed by: INTERNAL MEDICINE

## 2022-01-26 PROCEDURE — 4010F PR ACE/ARB THEARPY RXD/TAKEN: ICD-10-PCS | Mod: HCNC,CPTII,S$GLB, | Performed by: INTERNAL MEDICINE

## 2022-01-26 PROCEDURE — 85730 THROMBOPLASTIN TIME PARTIAL: CPT | Mod: HCNC | Performed by: INTERNAL MEDICINE

## 2022-01-26 PROCEDURE — 1101F PR PT FALLS ASSESS DOC 0-1 FALLS W/OUT INJ PAST YR: ICD-10-PCS | Mod: HCNC,CPTII,S$GLB, | Performed by: INTERNAL MEDICINE

## 2022-01-26 PROCEDURE — 36415 COLL VENOUS BLD VENIPUNCTURE: CPT | Mod: HCNC,PO | Performed by: INTERNAL MEDICINE

## 2022-01-26 PROCEDURE — 1126F PR PAIN SEVERITY QUANTIFIED, NO PAIN PRESENT: ICD-10-PCS | Mod: HCNC,CPTII,S$GLB, | Performed by: INTERNAL MEDICINE

## 2022-01-26 PROCEDURE — 4010F ACE/ARB THERAPY RXD/TAKEN: CPT | Mod: HCNC,CPTII,S$GLB, | Performed by: INTERNAL MEDICINE

## 2022-01-26 PROCEDURE — 3008F PR BODY MASS INDEX (BMI) DOCUMENTED: ICD-10-PCS | Mod: HCNC,CPTII,S$GLB, | Performed by: INTERNAL MEDICINE

## 2022-01-26 PROCEDURE — 1160F RVW MEDS BY RX/DR IN RCRD: CPT | Mod: HCNC,CPTII,S$GLB, | Performed by: INTERNAL MEDICINE

## 2022-01-26 PROCEDURE — 85610 PROTHROMBIN TIME: CPT | Mod: HCNC | Performed by: INTERNAL MEDICINE

## 2022-01-26 PROCEDURE — 3008F BODY MASS INDEX DOCD: CPT | Mod: HCNC,CPTII,S$GLB, | Performed by: INTERNAL MEDICINE

## 2022-01-26 PROCEDURE — 85025 COMPLETE CBC W/AUTO DIFF WBC: CPT | Mod: HCNC | Performed by: INTERNAL MEDICINE

## 2022-01-26 PROCEDURE — 99205 OFFICE O/P NEW HI 60 MIN: CPT | Mod: HCNC,S$GLB,, | Performed by: INTERNAL MEDICINE

## 2022-01-26 PROCEDURE — 3288F FALL RISK ASSESSMENT DOCD: CPT | Mod: HCNC,CPTII,S$GLB, | Performed by: INTERNAL MEDICINE

## 2022-01-26 PROCEDURE — 3074F SYST BP LT 130 MM HG: CPT | Mod: HCNC,CPTII,S$GLB, | Performed by: INTERNAL MEDICINE

## 2022-01-26 PROCEDURE — 99205 PR OFFICE/OUTPT VISIT, NEW, LEVL V, 60-74 MIN: ICD-10-PCS | Mod: HCNC,S$GLB,, | Performed by: INTERNAL MEDICINE

## 2022-01-26 PROCEDURE — 99999 PR PBB SHADOW E&M-EST. PATIENT-LVL V: CPT | Mod: PBBFAC,HCNC,, | Performed by: INTERNAL MEDICINE

## 2022-01-26 PROCEDURE — 1160F PR REVIEW ALL MEDS BY PRESCRIBER/CLIN PHARMACIST DOCUMENTED: ICD-10-PCS | Mod: HCNC,CPTII,S$GLB, | Performed by: INTERNAL MEDICINE

## 2022-01-26 PROCEDURE — 1101F PT FALLS ASSESS-DOCD LE1/YR: CPT | Mod: HCNC,CPTII,S$GLB, | Performed by: INTERNAL MEDICINE

## 2022-01-26 PROCEDURE — 1159F MED LIST DOCD IN RCRD: CPT | Mod: HCNC,CPTII,S$GLB, | Performed by: INTERNAL MEDICINE

## 2022-01-26 PROCEDURE — 3074F PR MOST RECENT SYSTOLIC BLOOD PRESSURE < 130 MM HG: ICD-10-PCS | Mod: HCNC,CPTII,S$GLB, | Performed by: INTERNAL MEDICINE

## 2022-01-26 PROCEDURE — 3078F DIAST BP <80 MM HG: CPT | Mod: HCNC,CPTII,S$GLB, | Performed by: INTERNAL MEDICINE

## 2022-01-26 PROCEDURE — 1159F PR MEDICATION LIST DOCUMENTED IN MEDICAL RECORD: ICD-10-PCS | Mod: HCNC,CPTII,S$GLB, | Performed by: INTERNAL MEDICINE

## 2022-01-26 PROCEDURE — 1126F AMNT PAIN NOTED NONE PRSNT: CPT | Mod: HCNC,CPTII,S$GLB, | Performed by: INTERNAL MEDICINE

## 2022-01-26 PROCEDURE — 99999 PR PBB SHADOW E&M-EST. PATIENT-LVL V: ICD-10-PCS | Mod: PBBFAC,HCNC,, | Performed by: INTERNAL MEDICINE

## 2022-01-26 PROCEDURE — 80053 COMPREHEN METABOLIC PANEL: CPT | Mod: HCNC | Performed by: INTERNAL MEDICINE

## 2022-01-26 RX ORDER — CITALOPRAM 40 MG/1
TABLET, FILM COATED ORAL
Qty: 90 TABLET | Refills: 1 | Status: SHIPPED | OUTPATIENT
Start: 2022-01-26 | End: 2022-07-21

## 2022-01-26 RX ORDER — VENLAFAXINE 75 MG/1
75 TABLET ORAL 2 TIMES DAILY
Qty: 180 TABLET | Refills: 1 | Status: SHIPPED | OUTPATIENT
Start: 2022-01-26 | End: 2022-07-21

## 2022-01-26 RX ORDER — OMEPRAZOLE 40 MG/1
40 CAPSULE, DELAYED RELEASE ORAL NIGHTLY
Qty: 90 CAPSULE | Refills: 1 | Status: SHIPPED | OUTPATIENT
Start: 2022-01-26 | End: 2022-07-21

## 2022-01-26 RX ORDER — CYCLOBENZAPRINE HCL 10 MG
10 TABLET ORAL 3 TIMES DAILY PRN
Qty: 90 TABLET | Refills: 0 | Status: SHIPPED | OUTPATIENT
Start: 2022-01-26 | End: 2022-05-13 | Stop reason: SDUPTHER

## 2022-01-26 RX ORDER — HYDRALAZINE HYDROCHLORIDE 25 MG/1
25 TABLET, FILM COATED ORAL NIGHTLY
Qty: 90 TABLET | Refills: 0 | Status: SHIPPED | OUTPATIENT
Start: 2022-01-26 | End: 2022-04-28

## 2022-01-26 RX ORDER — HYDROCHLOROTHIAZIDE 12.5 MG/1
25 CAPSULE ORAL NIGHTLY
Qty: 30 CAPSULE | Refills: 1 | Status: SHIPPED | OUTPATIENT
Start: 2022-01-26 | End: 2022-06-22 | Stop reason: SDUPTHER

## 2022-01-26 RX ORDER — FINASTERIDE 5 MG/1
5 TABLET, FILM COATED ORAL NIGHTLY
Qty: 90 TABLET | Refills: 1 | Status: SHIPPED | OUTPATIENT
Start: 2022-01-26 | End: 2023-03-22

## 2022-01-26 RX ORDER — CLONAZEPAM 1 MG/1
1 TABLET ORAL NIGHTLY PRN
Qty: 30 TABLET | Refills: 1 | Status: SHIPPED | OUTPATIENT
Start: 2022-01-26

## 2022-01-26 RX ORDER — METOPROLOL TARTRATE 50 MG/1
50 TABLET ORAL NIGHTLY
Qty: 60 TABLET | Refills: 4 | Status: SHIPPED | OUTPATIENT
Start: 2022-01-26 | End: 2022-05-06 | Stop reason: SDUPTHER

## 2022-01-26 RX ORDER — TAMSULOSIN HYDROCHLORIDE 0.4 MG/1
1 CAPSULE ORAL NIGHTLY
Qty: 90 CAPSULE | Refills: 3 | Status: SHIPPED | OUTPATIENT
Start: 2022-01-26 | End: 2022-05-13

## 2022-01-26 RX ORDER — ZOLPIDEM TARTRATE 10 MG/1
TABLET ORAL
Qty: 30 TABLET | Refills: 2 | Status: SHIPPED | OUTPATIENT
Start: 2022-01-26 | End: 2022-04-29 | Stop reason: SDUPTHER

## 2022-01-26 RX ORDER — BENAZEPRIL HYDROCHLORIDE 20 MG/1
40 TABLET ORAL NIGHTLY
Qty: 180 TABLET | Refills: 0 | Status: SHIPPED | OUTPATIENT
Start: 2022-01-26 | End: 2022-05-09

## 2022-01-26 RX ORDER — TRAZODONE HYDROCHLORIDE 100 MG/1
100 TABLET ORAL NIGHTLY
Qty: 90 TABLET | Refills: 1 | Status: SHIPPED | OUTPATIENT
Start: 2022-01-26 | End: 2022-07-21

## 2022-01-26 RX ORDER — GABAPENTIN 600 MG/1
600 TABLET ORAL NIGHTLY
Qty: 90 TABLET | Refills: 1 | Status: SHIPPED | OUTPATIENT
Start: 2022-01-26 | End: 2022-04-29 | Stop reason: SDUPTHER

## 2022-01-26 RX ORDER — HYDROXYZINE PAMOATE 25 MG/1
25 CAPSULE ORAL NIGHTLY
Qty: 30 CAPSULE | Refills: 1 | Status: SHIPPED | OUTPATIENT
Start: 2022-01-26 | End: 2023-05-16

## 2022-01-26 NOTE — H&P (VIEW-ONLY)
Subjective:   Patient ID:  Mil Sood is a 66 y.o. male who presents for evaluation of No chief complaint on file.      67 yo male, referred for abnormal pahr. Nuke stress test   PMH HTN HLD. PreDM. Anxiety, no smoking/drinking. Statin intolerance for bone pain.  C/o chest pain for 4 months, central chest, twice a week, stabbing pain, radiating to the left arm. Occurred at rest. Lasted for 10 minutes and resolved spontaneously  Walking caused SOB but no pain.   Phar. MPI ordered by PCP for chest pain and showed apical and inferoseptal ischemia.  ekg NSR and no acute stt change  Father had PPM and mother ok. Sister  of lung cancer        Past Medical History:   Diagnosis Date    Anxiety     Cancer     spot on kidney and lung    Diabetes mellitus     Borderline diet controlled    Diverticulitis     GERD (gastroesophageal reflux disease)     Hyperlipidemia     not on any meds currently    Hypertension     Lung mass     Mass of kidney        Past Surgical History:   Procedure Laterality Date    CHOLECYSTECTOMY      COLONOSCOPY      COLONOSCOPY N/A 2017    Procedure: COLONOSCOPY;  Surgeon: Amarilys Bravo MD;  Location: [facility];  Service: Endoscopy;  Laterality: N/A;    DIAGNOSTIC LAPAROSCOPY N/A 2021    Procedure: LAPAROSCOPY, DIAGNOSTIC;  Surgeon: Shaji Jauregui MD;  Location: [facility];  Service: General;  Laterality: N/A;    ESOPHAGOGASTRODUODENOSCOPY      ESOPHAGOGASTRODUODENOSCOPY N/A 2020    Procedure: ESOPHAGOGASTRODUODENOSCOPY (EGD);  Surgeon: Shaji Aquino III, MD;  Location: [facility];  Service: Endoscopy;  Laterality: N/A;    HERNIA REPAIR Bilateral     groin    NECK SURGERY      PARTIAL NEPHRECTOMY      left    RENAL BIOPSY      ROBOT-ASSISTED REPAIR OF UMBILICAL HERNIA USING DA NEL XI N/A 2021    Procedure: XI ROBOTIC REPAIR, HERNIA, UMBILICAL;  Surgeon: Shaji Jauregui MD;  Location: [facility];  Service: General;  Laterality: N/A;  converted to open     TONSILLECTOMY      TRIGGER FINGER RELEASE Right     long finger       Social History     Tobacco Use    Smoking status: Former Smoker     Packs/day: 1.00     Years: 20.00     Pack years: 20.00     Quit date: 1995     Years since quittin.9    Smokeless tobacco: Never Used   Substance Use Topics    Alcohol use: Never     Alcohol/week: 0.0 standard drinks    Drug use: No       Family History   Problem Relation Age of Onset    Hypertension Mother     Hypertension Father     Melanoma Father     Cancer Sister        Review of Systems   Constitutional: Negative for decreased appetite, diaphoresis, fever, malaise/fatigue and night sweats.   HENT: Negative for nosebleeds.    Eyes: Negative for blurred vision and double vision.   Cardiovascular: Positive for chest pain and dyspnea on exertion. Negative for claudication, irregular heartbeat, leg swelling, near-syncope, orthopnea, palpitations, paroxysmal nocturnal dyspnea and syncope.   Respiratory: Negative for cough, shortness of breath, sleep disturbances due to breathing, snoring, sputum production and wheezing.    Endocrine: Negative for cold intolerance and polyuria.   Hematologic/Lymphatic: Does not bruise/bleed easily.   Skin: Negative for rash.   Musculoskeletal: Negative for back pain, falls, joint pain, joint swelling and neck pain.   Gastrointestinal: Negative for abdominal pain, heartburn, nausea and vomiting.   Genitourinary: Negative for dysuria, frequency and hematuria.   Neurological: Negative for difficulty with concentration, dizziness, focal weakness, headaches, light-headedness, numbness, seizures and weakness.   Psychiatric/Behavioral: Negative for depression, memory loss and substance abuse. The patient does not have insomnia.    Allergic/Immunologic: Negative for HIV exposure and hives.       Objective:   Physical Exam  HENT:      Head: Normocephalic.   Eyes:      Pupils: Pupils are equal, round, and reactive to light.   Neck:       Thyroid: No thyromegaly.      Vascular: Normal carotid pulses. No carotid bruit or JVD.   Cardiovascular:      Rate and Rhythm: Normal rate and regular rhythm.  No extrasystoles are present.     Chest Wall: PMI is not displaced.      Pulses: Normal pulses.      Heart sounds: Normal heart sounds. No murmur heard.  No gallop. No S3 sounds.    Pulmonary:      Effort: No respiratory distress.      Breath sounds: Normal breath sounds. No stridor.   Abdominal:      General: Bowel sounds are normal.      Palpations: Abdomen is soft.      Tenderness: There is no abdominal tenderness. There is no rebound.   Musculoskeletal:         General: Normal range of motion.   Skin:     Findings: No rash.   Neurological:      Mental Status: He is alert and oriented to person, place, and time.   Psychiatric:         Behavior: Behavior normal.         Lab Results   Component Value Date    CHOL 235 (H) 08/17/2021    CHOL 236 (H) 01/27/2021    CHOL 258 (H) 01/27/2020     Lab Results   Component Value Date    HDL 45 08/17/2021    HDL 54 01/27/2021    HDL 58 01/27/2020     Lab Results   Component Value Date    LDLCALC 145.8 08/17/2021    LDLCALC 146.8 01/27/2021    LDLCALC 126.4 01/27/2020     Lab Results   Component Value Date    TRIG 221 (H) 08/17/2021    TRIG 176 (H) 01/27/2021    TRIG 368 (H) 01/27/2020     Lab Results   Component Value Date    CHOLHDL 19.1 (L) 08/17/2021    CHOLHDL 22.9 01/27/2021    CHOLHDL 22.5 01/27/2020       Chemistry        Component Value Date/Time     08/18/2021 1345    K 4.6 08/18/2021 1345     08/18/2021 1345    CO2 26 08/18/2021 1345    BUN 20 08/18/2021 1345    CREATININE 1.1 12/13/2021 0816     (H) 08/18/2021 1345        Component Value Date/Time    CALCIUM 10.1 08/18/2021 1345    ALKPHOS 58 08/17/2021 0902    AST 15 08/17/2021 0902    ALT 18 08/17/2021 0902    BILITOT 0.4 08/17/2021 0902    ESTGFRAFRICA >60 12/13/2021 0816    EGFRNONAA >60 12/13/2021 0816          Lab Results   Component  Value Date    HGBA1C 5.6 08/17/2021     Lab Results   Component Value Date    TSH 1.415 11/01/2018     Lab Results   Component Value Date    INR 1.0 02/03/2020    INR 1.0 09/04/2019    INR 1.0 07/22/2019     Lab Results   Component Value Date    WBC 7.83 08/17/2021    HGB 15.2 08/17/2021    HCT 47.8 08/17/2021    MCV 86 08/17/2021     08/17/2021     BNP  @LABRCNTIP(BNP,BNPTRIAGEBLO)@  CrCl cannot be calculated (Patient's most recent lab result is older than the maximum 7 days allowed.).  No results found in the last 24 hours.  No results found in the last 24 hours.  No results found in the last 24 hours.    Assessment:      1. Abnormal nuclear stress test    2. Abnormal cardiovascular stress test    3. Myalgia due to HMG CoA reductase inhibitor    4. Statin intolerance    5. Essential hypertension    6. Other chest pain        Plan:   Arrange C   I have explained the risks, benefits, and alternatives of the procedure in detail with patient and family. The patient voices understanding and all questions have been addressed.  The patient agrees to proceed as planned.    Continue ASA Amlodipine benazepril hctz metoprolol and hydralazone   Counseled DASH  Check Lipid profile in 6 months  Recommend heart-healthy diet, weight control and regular exercise.  Shruti. Risk modification.   I have reviewed all pertinent labs and cardiac studies independently. Plans and recommendations have been formulated under my direct supervision. All questions answered and patient voiced understanding.   If symptoms persist go to the ED  RTC after the procedure is don e

## 2022-01-26 NOTE — PROGRESS NOTES
Subjective:   Patient ID:  Mil Sood is a 66 y.o. male who presents for evaluation of No chief complaint on file.      67 yo male, referred for abnormal pahr. Nuke stress test   PMH HTN HLD. PreDM. Anxiety, no smoking/drinking. Statin intolerance for bone pain.  C/o chest pain for 4 months, central chest, twice a week, stabbing pain, radiating to the left arm. Occurred at rest. Lasted for 10 minutes and resolved spontaneously  Walking caused SOB but no pain.   Phar. MPI ordered by PCP for chest pain and showed apical and inferoseptal ischemia.  ekg NSR and no acute stt change  Father had PPM and mother ok. Sister  of lung cancer        Past Medical History:   Diagnosis Date    Anxiety     Cancer     spot on kidney and lung    Diabetes mellitus     Borderline diet controlled    Diverticulitis     GERD (gastroesophageal reflux disease)     Hyperlipidemia     not on any meds currently    Hypertension     Lung mass     Mass of kidney        Past Surgical History:   Procedure Laterality Date    CHOLECYSTECTOMY      COLONOSCOPY      COLONOSCOPY N/A 2017    Procedure: COLONOSCOPY;  Surgeon: Amarilys Bravo MD;  Location: [facility];  Service: Endoscopy;  Laterality: N/A;    DIAGNOSTIC LAPAROSCOPY N/A 2021    Procedure: LAPAROSCOPY, DIAGNOSTIC;  Surgeon: Shaji Jauregui MD;  Location: [facility];  Service: General;  Laterality: N/A;    ESOPHAGOGASTRODUODENOSCOPY      ESOPHAGOGASTRODUODENOSCOPY N/A 2020    Procedure: ESOPHAGOGASTRODUODENOSCOPY (EGD);  Surgeon: Shaji Aquino III, MD;  Location: [facility];  Service: Endoscopy;  Laterality: N/A;    HERNIA REPAIR Bilateral     groin    NECK SURGERY      PARTIAL NEPHRECTOMY      left    RENAL BIOPSY      ROBOT-ASSISTED REPAIR OF UMBILICAL HERNIA USING DA NEL XI N/A 2021    Procedure: XI ROBOTIC REPAIR, HERNIA, UMBILICAL;  Surgeon: Shaji Jauregui MD;  Location: [facility];  Service: General;  Laterality: N/A;  converted to open     TONSILLECTOMY      TRIGGER FINGER RELEASE Right     long finger       Social History     Tobacco Use    Smoking status: Former Smoker     Packs/day: 1.00     Years: 20.00     Pack years: 20.00     Quit date: 1995     Years since quittin.9    Smokeless tobacco: Never Used   Substance Use Topics    Alcohol use: Never     Alcohol/week: 0.0 standard drinks    Drug use: No       Family History   Problem Relation Age of Onset    Hypertension Mother     Hypertension Father     Melanoma Father     Cancer Sister        Review of Systems   Constitutional: Negative for decreased appetite, diaphoresis, fever, malaise/fatigue and night sweats.   HENT: Negative for nosebleeds.    Eyes: Negative for blurred vision and double vision.   Cardiovascular: Positive for chest pain and dyspnea on exertion. Negative for claudication, irregular heartbeat, leg swelling, near-syncope, orthopnea, palpitations, paroxysmal nocturnal dyspnea and syncope.   Respiratory: Negative for cough, shortness of breath, sleep disturbances due to breathing, snoring, sputum production and wheezing.    Endocrine: Negative for cold intolerance and polyuria.   Hematologic/Lymphatic: Does not bruise/bleed easily.   Skin: Negative for rash.   Musculoskeletal: Negative for back pain, falls, joint pain, joint swelling and neck pain.   Gastrointestinal: Negative for abdominal pain, heartburn, nausea and vomiting.   Genitourinary: Negative for dysuria, frequency and hematuria.   Neurological: Negative for difficulty with concentration, dizziness, focal weakness, headaches, light-headedness, numbness, seizures and weakness.   Psychiatric/Behavioral: Negative for depression, memory loss and substance abuse. The patient does not have insomnia.    Allergic/Immunologic: Negative for HIV exposure and hives.       Objective:   Physical Exam  HENT:      Head: Normocephalic.   Eyes:      Pupils: Pupils are equal, round, and reactive to light.   Neck:       Thyroid: No thyromegaly.      Vascular: Normal carotid pulses. No carotid bruit or JVD.   Cardiovascular:      Rate and Rhythm: Normal rate and regular rhythm.  No extrasystoles are present.     Chest Wall: PMI is not displaced.      Pulses: Normal pulses.      Heart sounds: Normal heart sounds. No murmur heard.  No gallop. No S3 sounds.    Pulmonary:      Effort: No respiratory distress.      Breath sounds: Normal breath sounds. No stridor.   Abdominal:      General: Bowel sounds are normal.      Palpations: Abdomen is soft.      Tenderness: There is no abdominal tenderness. There is no rebound.   Musculoskeletal:         General: Normal range of motion.   Skin:     Findings: No rash.   Neurological:      Mental Status: He is alert and oriented to person, place, and time.   Psychiatric:         Behavior: Behavior normal.         Lab Results   Component Value Date    CHOL 235 (H) 08/17/2021    CHOL 236 (H) 01/27/2021    CHOL 258 (H) 01/27/2020     Lab Results   Component Value Date    HDL 45 08/17/2021    HDL 54 01/27/2021    HDL 58 01/27/2020     Lab Results   Component Value Date    LDLCALC 145.8 08/17/2021    LDLCALC 146.8 01/27/2021    LDLCALC 126.4 01/27/2020     Lab Results   Component Value Date    TRIG 221 (H) 08/17/2021    TRIG 176 (H) 01/27/2021    TRIG 368 (H) 01/27/2020     Lab Results   Component Value Date    CHOLHDL 19.1 (L) 08/17/2021    CHOLHDL 22.9 01/27/2021    CHOLHDL 22.5 01/27/2020       Chemistry        Component Value Date/Time     08/18/2021 1345    K 4.6 08/18/2021 1345     08/18/2021 1345    CO2 26 08/18/2021 1345    BUN 20 08/18/2021 1345    CREATININE 1.1 12/13/2021 0816     (H) 08/18/2021 1345        Component Value Date/Time    CALCIUM 10.1 08/18/2021 1345    ALKPHOS 58 08/17/2021 0902    AST 15 08/17/2021 0902    ALT 18 08/17/2021 0902    BILITOT 0.4 08/17/2021 0902    ESTGFRAFRICA >60 12/13/2021 0816    EGFRNONAA >60 12/13/2021 0816          Lab Results   Component  Value Date    HGBA1C 5.6 08/17/2021     Lab Results   Component Value Date    TSH 1.415 11/01/2018     Lab Results   Component Value Date    INR 1.0 02/03/2020    INR 1.0 09/04/2019    INR 1.0 07/22/2019     Lab Results   Component Value Date    WBC 7.83 08/17/2021    HGB 15.2 08/17/2021    HCT 47.8 08/17/2021    MCV 86 08/17/2021     08/17/2021     BNP  @LABRCNTIP(BNP,BNPTRIAGEBLO)@  CrCl cannot be calculated (Patient's most recent lab result is older than the maximum 7 days allowed.).  No results found in the last 24 hours.  No results found in the last 24 hours.  No results found in the last 24 hours.    Assessment:      1. Abnormal nuclear stress test    2. Abnormal cardiovascular stress test    3. Myalgia due to HMG CoA reductase inhibitor    4. Statin intolerance    5. Essential hypertension    6. Other chest pain        Plan:   Arrange C   I have explained the risks, benefits, and alternatives of the procedure in detail with patient and family. The patient voices understanding and all questions have been addressed.  The patient agrees to proceed as planned.    Continue ASA Amlodipine benazepril hctz metoprolol and hydralazone   Counseled DASH  Check Lipid profile in 6 months  Recommend heart-healthy diet, weight control and regular exercise.  Shruti. Risk modification.   I have reviewed all pertinent labs and cardiac studies independently. Plans and recommendations have been formulated under my direct supervision. All questions answered and patient voiced understanding.   If symptoms persist go to the ED  RTC after the procedure is don e

## 2022-01-26 NOTE — TELEPHONE ENCOUNTER
eriberto into office today educated to keep cardiology appt due to abnormal stress test, patient stated okay

## 2022-01-28 ENCOUNTER — LAB VISIT (OUTPATIENT)
Dept: PRIMARY CARE CLINIC | Facility: CLINIC | Age: 67
End: 2022-01-28
Payer: MEDICARE

## 2022-01-28 DIAGNOSIS — Z01.818 PRE-OP EVALUATION: ICD-10-CM

## 2022-01-28 PROCEDURE — U0003 INFECTIOUS AGENT DETECTION BY NUCLEIC ACID (DNA OR RNA); SEVERE ACUTE RESPIRATORY SYNDROME CORONAVIRUS 2 (SARS-COV-2) (CORONAVIRUS DISEASE [COVID-19]), AMPLIFIED PROBE TECHNIQUE, MAKING USE OF HIGH THROUGHPUT TECHNOLOGIES AS DESCRIBED BY CMS-2020-01-R: HCPCS | Mod: HCNC | Performed by: INTERNAL MEDICINE

## 2022-01-28 PROCEDURE — U0005 INFEC AGEN DETEC AMPLI PROBE: HCPCS | Performed by: INTERNAL MEDICINE

## 2022-01-29 LAB — SARS-COV-2 RNA RESP QL NAA+PROBE: NOT DETECTED

## 2022-01-31 ENCOUNTER — HOSPITAL ENCOUNTER (OUTPATIENT)
Facility: HOSPITAL | Age: 67
Discharge: HOME | End: 2022-01-31
Attending: INTERNAL MEDICINE | Admitting: INTERNAL MEDICINE
Payer: MEDICARE

## 2022-01-31 VITALS
BODY MASS INDEX: 27.3 KG/M2 | HEART RATE: 54 BPM | OXYGEN SATURATION: 96 % | WEIGHT: 190.69 LBS | SYSTOLIC BLOOD PRESSURE: 130 MMHG | RESPIRATION RATE: 15 BRPM | DIASTOLIC BLOOD PRESSURE: 63 MMHG | HEIGHT: 70 IN | TEMPERATURE: 98 F

## 2022-01-31 DIAGNOSIS — R94.39 ABNORMAL STRESS TEST: ICD-10-CM

## 2022-01-31 DIAGNOSIS — I25.110 CORONARY ARTERY DISEASE INVOLVING NATIVE CORONARY ARTERY OF NATIVE HEART WITH UNSTABLE ANGINA PECTORIS: ICD-10-CM

## 2022-01-31 LAB
CATH EF QUANTITATIVE: 60 %
POC ACTIVATED CLOTTING TIME K: 184 SEC (ref 74–137)
POC ACTIVATED CLOTTING TIME K: 249 SEC (ref 74–137)
POCT GLUCOSE: 96 MG/DL (ref 70–110)
SAMPLE: ABNORMAL
SAMPLE: ABNORMAL

## 2022-01-31 PROCEDURE — 27201423 OPTIME MED/SURG SUP & DEVICES STERILE SUPPLY: Mod: HCNC | Performed by: INTERNAL MEDICINE

## 2022-01-31 PROCEDURE — 92978 ENDOLUMINL IVUS OCT C 1ST: CPT | Mod: 26,LM,HCNC, | Performed by: INTERNAL MEDICINE

## 2022-01-31 PROCEDURE — 25500020 PHARM REV CODE 255: Mod: HCNC | Performed by: INTERNAL MEDICINE

## 2022-01-31 PROCEDURE — 93458 PR CATH PLACE/CORON ANGIO, IMG SUPER/INTERP,W LEFT HEART VENTRICULOGRAPHY: ICD-10-PCS | Mod: 26,HCNC,, | Performed by: INTERNAL MEDICINE

## 2022-01-31 PROCEDURE — 92978 ENDOLUMINL IVUS OCT C 1ST: CPT | Mod: LM,HCNC | Performed by: INTERNAL MEDICINE

## 2022-01-31 PROCEDURE — 92979 PR INTRAVASC CORONARY SO2,ADDN VESSEL: ICD-10-PCS | Mod: 26,LD,LC,HCNC | Performed by: INTERNAL MEDICINE

## 2022-01-31 PROCEDURE — 63600175 PHARM REV CODE 636 W HCPCS: Mod: HCNC | Performed by: INTERNAL MEDICINE

## 2022-01-31 PROCEDURE — 99153 MOD SED SAME PHYS/QHP EA: CPT | Mod: HCNC | Performed by: INTERNAL MEDICINE

## 2022-01-31 PROCEDURE — 92978 PR IVUS, CORONARY, 1ST VESSEL: ICD-10-PCS | Mod: 26,LM,HCNC, | Performed by: INTERNAL MEDICINE

## 2022-01-31 PROCEDURE — 25000003 PHARM REV CODE 250: Mod: HCNC | Performed by: INTERNAL MEDICINE

## 2022-01-31 PROCEDURE — 99152 PR MOD CONSCIOUS SEDATION, SAME PHYS, 5+ YRS, FIRST 15 MIN: ICD-10-PCS | Mod: HCNC,,, | Performed by: INTERNAL MEDICINE

## 2022-01-31 PROCEDURE — C1769 GUIDE WIRE: HCPCS | Mod: HCNC | Performed by: INTERNAL MEDICINE

## 2022-01-31 PROCEDURE — 93458 L HRT ARTERY/VENTRICLE ANGIO: CPT | Mod: 26,HCNC,, | Performed by: INTERNAL MEDICINE

## 2022-01-31 PROCEDURE — 92979 ENDOLUMINL IVUS OCT C EA: CPT | Mod: LD,LC,HCNC | Performed by: INTERNAL MEDICINE

## 2022-01-31 PROCEDURE — C1894 INTRO/SHEATH, NON-LASER: HCPCS | Mod: HCNC | Performed by: INTERNAL MEDICINE

## 2022-01-31 PROCEDURE — C1887 CATHETER, GUIDING: HCPCS | Mod: HCNC | Performed by: INTERNAL MEDICINE

## 2022-01-31 PROCEDURE — 99152 MOD SED SAME PHYS/QHP 5/>YRS: CPT | Mod: HCNC,,, | Performed by: INTERNAL MEDICINE

## 2022-01-31 PROCEDURE — C1753 CATH, INTRAVAS ULTRASOUND: HCPCS | Mod: HCNC | Performed by: INTERNAL MEDICINE

## 2022-01-31 PROCEDURE — 99152 MOD SED SAME PHYS/QHP 5/>YRS: CPT | Mod: HCNC | Performed by: INTERNAL MEDICINE

## 2022-01-31 PROCEDURE — 93458 L HRT ARTERY/VENTRICLE ANGIO: CPT | Mod: HCNC | Performed by: INTERNAL MEDICINE

## 2022-01-31 PROCEDURE — 92979 ENDOLUMINL IVUS OCT C EA: CPT | Mod: 26,LD,LC,HCNC | Performed by: INTERNAL MEDICINE

## 2022-01-31 RX ORDER — SODIUM CHLORIDE 9 MG/ML
INJECTION, SOLUTION INTRAVENOUS CONTINUOUS
Status: ACTIVE | OUTPATIENT
Start: 2022-01-31 | End: 2022-01-31

## 2022-01-31 RX ORDER — NAPROXEN SODIUM 220 MG/1
81 TABLET, FILM COATED ORAL ONCE
Status: COMPLETED | OUTPATIENT
Start: 2022-01-31 | End: 2022-01-31

## 2022-01-31 RX ORDER — MIDAZOLAM HYDROCHLORIDE 1 MG/ML
INJECTION INTRAMUSCULAR; INTRAVENOUS
Status: DISCONTINUED | OUTPATIENT
Start: 2022-01-31 | End: 2022-01-31 | Stop reason: HOSPADM

## 2022-01-31 RX ORDER — DIPHENHYDRAMINE HYDROCHLORIDE 50 MG/ML
25 INJECTION INTRAMUSCULAR; INTRAVENOUS ONCE
Status: COMPLETED | OUTPATIENT
Start: 2022-01-31 | End: 2022-01-31

## 2022-01-31 RX ORDER — DIPHENHYDRAMINE HCL 50 MG
50 CAPSULE ORAL ONCE
Status: COMPLETED | OUTPATIENT
Start: 2022-01-31 | End: 2022-01-31

## 2022-01-31 RX ORDER — ACETAMINOPHEN 325 MG/1
650 TABLET ORAL EVERY 4 HOURS PRN
Status: DISCONTINUED | OUTPATIENT
Start: 2022-01-31 | End: 2022-01-31 | Stop reason: HOSPADM

## 2022-01-31 RX ORDER — VERAPAMIL HYDROCHLORIDE 2.5 MG/ML
INJECTION, SOLUTION INTRAVENOUS
Status: DISCONTINUED | OUTPATIENT
Start: 2022-01-31 | End: 2022-01-31 | Stop reason: HOSPADM

## 2022-01-31 RX ORDER — HEPARIN SODIUM 1000 [USP'U]/ML
INJECTION, SOLUTION INTRAVENOUS; SUBCUTANEOUS
Status: DISCONTINUED | OUTPATIENT
Start: 2022-01-31 | End: 2022-01-31 | Stop reason: HOSPADM

## 2022-01-31 RX ORDER — HYDROMORPHONE HCL 2 MG/ML
VIAL (ML) INJECTION
Status: DISCONTINUED | OUTPATIENT
Start: 2022-01-31 | End: 2022-01-31 | Stop reason: HOSPADM

## 2022-01-31 RX ORDER — SODIUM CHLORIDE 9 MG/ML
INJECTION, SOLUTION INTRAVENOUS
Status: DISCONTINUED | OUTPATIENT
Start: 2022-01-31 | End: 2022-01-31 | Stop reason: HOSPADM

## 2022-01-31 RX ORDER — FENTANYL CITRATE 50 UG/ML
INJECTION, SOLUTION INTRAMUSCULAR; INTRAVENOUS
Status: DISCONTINUED | OUTPATIENT
Start: 2022-01-31 | End: 2022-01-31 | Stop reason: HOSPADM

## 2022-01-31 RX ORDER — ONDANSETRON 8 MG/1
8 TABLET, ORALLY DISINTEGRATING ORAL EVERY 8 HOURS PRN
Status: DISCONTINUED | OUTPATIENT
Start: 2022-01-31 | End: 2022-01-31 | Stop reason: HOSPADM

## 2022-01-31 RX ORDER — LIDOCAINE HYDROCHLORIDE 20 MG/ML
INJECTION, SOLUTION EPIDURAL; INFILTRATION; INTRACAUDAL; PERINEURAL
Status: DISCONTINUED | OUTPATIENT
Start: 2022-01-31 | End: 2022-01-31 | Stop reason: HOSPADM

## 2022-01-31 RX ORDER — DIAZEPAM 5 MG/1
5 TABLET ORAL
Status: DISCONTINUED | OUTPATIENT
Start: 2022-01-31 | End: 2022-01-31 | Stop reason: HOSPADM

## 2022-01-31 RX ORDER — NITROGLYCERIN 5 MG/ML
INJECTION, SOLUTION INTRAVENOUS
Status: DISCONTINUED | OUTPATIENT
Start: 2022-01-31 | End: 2022-01-31 | Stop reason: HOSPADM

## 2022-01-31 RX ADMIN — ASPIRIN 81 MG CHEWABLE TABLET 81 MG: 81 TABLET CHEWABLE at 09:01

## 2022-01-31 RX ADMIN — DIAZEPAM 5 MG: 5 TABLET ORAL at 09:01

## 2022-01-31 RX ADMIN — DIPHENHYDRAMINE HYDROCHLORIDE 50 MG: 50 CAPSULE ORAL at 09:01

## 2022-01-31 RX ADMIN — DIPHENHYDRAMINE HYDROCHLORIDE 25 MG: 50 INJECTION, SOLUTION INTRAMUSCULAR; INTRAVENOUS at 12:01

## 2022-01-31 RX ADMIN — SODIUM CHLORIDE: 0.9 INJECTION, SOLUTION INTRAVENOUS at 09:01

## 2022-01-31 NOTE — Clinical Note
An angiography was performed of the left coronary arteries. Multiple views were taken. Catheter removed.

## 2022-01-31 NOTE — Clinical Note
115 ml of contrast were injected throughout the case. 35 mL of contrast was the total wasted during the case. 150 mL was the total amount used during the case.

## 2022-01-31 NOTE — Clinical Note
The radial band was applied to the left radial artery. 9 cc's of air were inserted into the closure device. Arterial sheath removed.

## 2022-01-31 NOTE — DISCHARGE INSTRUCTIONS
"  Post-op Heart Catheterization    1. DIET: It is advisable for you to follow a diet that limits the intake of salt, sugar, saturated fats and cholesterol.     2. DRIVING: Due to sedation you received during your procedure, DO NOT drive or operate machinery for 24 hours. Avoid making critical decisions or signing legal documents until tomorrow.    3. ACTIVITY: AVOID activities that require bending of the affected arm/wrist for 3 days and submerging the site in water for 3 days. REMOVE the dressing the day after  the procedure, and shower.  Apply a bandaid to site after shower.  WEAR wrist immobilizer until tomorrow night.    You may RESUME your normal activities or prescribed exercise program as instructed by your physician after 3 days.                                                                                                                 4. WOUND CARE: It is not unusual to have a small amount of bruising to appear at or near the puncture site. It is also common to have a tender "knot" develop beneath the skin at the puncture site of the wrist/arm. This is usually scar tissue and is not a cause for concern or alarm. This tender knot may take several weeks to fully resolve. The bruise will usually spread over several days. However, if the lump gets bigger, call your doctor immediately.    5. DISCOMFORT: For general discomfort at the puncture site, you may take 1 or 2 Acetaminophen (Tylenol) tablets every 4 - 6 hours as needed. (Do not take more than 4000 mg a day)    6. SIGNS AND SYMPTOMS TO REPORT:  Call your physician immediately if any of the following occur:                                            1. Loss of feeling, warmth or color to the affected arm/wrist                                                                                                          2. Mild beeding from the site                 3. Pain that is sudden, sharp or persistent in the affected arm/wrist                 4. " "Swelling or a change in "lump" size, increased redness or drainage at the puncture site                                                                               5. High fever (101 degrees or higher)    7. GO TO  THE EMERGENCY ROOM OR CALL 911 IF YOU HAVE: Chest pains or discomforts not relieved with 3 nitroglycerin doses (sublingual tablets or spray), numbness or severe pain of limb, if your limb becomes cold or discolored or if you develop uncontrolled bleeding from the puncture site (quickly apply firm, direct pressure above the site).  Patient Education       Moderate and Deep Sedation in Adults   About this topic   Sedation changes a persons level of consciousness or being awake. Doctors give moderate or deep sedation to help you become more comfortable when they need to do a procedure. The staff watch you closely when you are given sedation. With sedation, you may not remember the procedure when it is over. The level of your sedation may be moderate or deep.  With moderate or conscious sedation, you:  · Will be relaxed and calm.  · May seem to sleep.  · May feel only slight pain or no pain at all.  · May talk and answer questions.  · Breathe on your own.  With deep sedation, you:  · Will be relaxed and calm.  · May seem to sleep.  · May feel only slight pain or no pain at all.  · Are not able talk or answer questions.  · May need help keeping your airway open or breathing.  Sedation is given by someone with special training. This is someone like a:  · Certified registered nurse anesthetist (CRNA)  · Anesthesiologist  · Doctor  · Dentist  · Oral surgeon  Why is this procedure done?   Sedation is most often given for procedures such as:  · Minor surgeries or procedures, like taking a tissue sample or fixing a broken bone  · Colonoscopy  · Vasectomy  · Dental surgery, like an implant or taking out an impacted tooth  · Endoscopy  · Bronchoscopy  · Plastic cosmetic surgery  · Endotracheal procedure  What " happens before the procedure?   · Your doctor will take your history and do an exam. Tell the doctor if you have any drug allergy. Talk to the doctor about:  ? All the drugs you are taking. Be sure to include all prescription, over-the-counter, vitamins, and herbal supplements. Bring a list of drugs you take with you.  ? Any bleeding problems. Be sure to tell your doctor if you are taking any drugs that may cause bleeding. Some of these are warfarin, rivaroxaban, apixaban, ticagrelor, clopidogrel, ibuprofen, naproxen, or aspirin. Certain vitamins and herbs, such as garlic and fish oil, may also add to the risk for bleeding. You may need to stop these drugs as well. Talk to your doctor about them.  ? Any previous problems with sedation or anesthesia.  · Talk with the doctor about when you last ate or drank anything.  · You will not be allowed to drive after the procedure. Plan another way to get home.  What happens after the procedure?   You may feel these side effects:  · Headache  · Upset stomach or throwing up  · Hangover feeling  · Short period of no memory or cloudy memory  · Bad memories  · Confusion or hallucinations  What care is needed at home?   · Do not make major decisions or sign important papers for at least 24 hours. You may not be thinking clearly.  · Do not drive or operate machinery for 24 hours or until OK'd by your doctor.  What problems could happen?   · Low blood pressure  · Breathing problems  · Heart problems  · Allergic reactions  Last Reviewed Date   2021-05-06  Consumer Information Use and Disclaimer   This information is not specific medical advice and does not replace information you receive from your health care provider. This is only a brief summary of general information. It does NOT include all information about conditions, illnesses, injuries, tests, procedures, treatments, therapies, discharge instructions or life-style choices that may apply to you. You must talk with your health  care provider for complete information about your health and treatment options. This information should not be used to decide whether or not to accept your health care providers advice, instructions or recommendations. Only your health care provider has the knowledge and training to provide advice that is right for you.  Copyright   Copyright © 2021 [employer] and its affiliates and/or licensors. All rights reserved.

## 2022-01-31 NOTE — PLAN OF CARE
Pt and son given discharge instructions. Discussed sedation precautions and radial site precautions. Pt ambulatory to bathroom with steady gait. Dressing to L radial, CDI.

## 2022-01-31 NOTE — OP NOTE
INPATIENT Operative Note         SUMMARY     Surgery Date: 1/31/2022     Surgeon(s) and Role:     * Romeo Godfrey MD - Primary    ASSISTANT:    Pre-op Diagnosis:  Abnormal stress test [R94.39]Coronary artery disease involving native coronary artery of native heart with unstable angina pectoris [I25.110]      Post-op Diagnosis:  Abnormal stress test [R94.39]Coronary artery disease involving native coronary artery of native heart with unstable angina pectoris [I25.110]    Procedure(s) (LRB):  CATHETERIZATION, HEART, LEFT (Left)  Ultrasound-coronary (N/A)    COMPLICATION:none    Anesthesia: RN IV Sedation    Findings/Key Components:  lmca 50% eccentric calcified ivus csa 5.4  LAd HEAVILY CALCIFIED MID LAD LONG LESION 50-70% IN MAGNITUDE CSA 2 MM2.  LCX MID LONG 40% HEAVILY CALCIFIED.   OM1 90% PROX.  D1 CALCIFIED NON OBS DISEASE.   RCA CALCIFIED DIFFUSE DISEASE PDA OSTIUM 90% PLB 90% .  EF NORMAL.   Estimated Blood Loss: < 50 ML.         SPECIMEN: NONE    Devices/Prostetics: None    PLAN:  CONSULT CARDIAC SURGERY.

## 2022-01-31 NOTE — Clinical Note
The catheter was inserted into the ostium   left main. An angiography was performed of the left coronary arteries.

## 2022-01-31 NOTE — INTERVAL H&P NOTE
The patient has been examined and the H&P has been reviewed:    I concur with the findings and no changes have occurred since H&P was written.    Procedure risks, benefits and alternative options discussed and understood by patient/family.          Active Hospital Problems    Diagnosis  POA    *Abnormal nuclear stress test [R94.39]  Yes    Centrilobular emphysema [J43.2]  Yes    Culp's esophagus [K22.70]  Yes    Myalgia due to HMG CoA reductase inhibitor [M79.10, T46.6X5A]  Yes    Statin intolerance [Z78.9]  Yes    CAD (coronary artery disease) [I25.10]  Yes    Essential hypertension [I10]  Yes     Chronic    GERD (gastroesophageal reflux disease) [K21.9]  Yes    Prediabetes [R73.03]  Yes      Resolved Hospital Problems   No resolved problems to display.

## 2022-01-31 NOTE — Clinical Note
The catheter was inserted into the ostium   right coronary artery. An angiography was performed of the right coronary arteries. Multiple views were taken. Catheter removed.

## 2022-02-01 ENCOUNTER — TELEPHONE (OUTPATIENT)
Dept: CARDIOLOGY | Facility: CLINIC | Age: 67
End: 2022-02-01
Payer: MEDICARE

## 2022-02-01 NOTE — DISCHARGE SUMMARY
[facility] Cath Lab (Hospital)  Discharge Note  Short Stay    Procedure(s) (LRB):  CATHETERIZATION, HEART, LEFT (Left)  Ultrasound-coronary (N/A)    OUTCOME: Patient tolerated treatment/procedure well without complication and is now ready for discharge.    DISPOSITION: Home or Self Care    FINAL DIAGNOSIS:  Abnormal nuclear stress test    FOLLOWUP: In clinic    DISCHARGE INSTRUCTIONS:    Discharge Procedure Orders   Diet general     Call MD for:  temperature >100.4     Call MD for:  persistent nausea and vomiting     Call MD for:  severe uncontrolled pain     Call MD for:  difficulty breathing, headache or visual disturbances     Call MD for:  redness, tenderness, or signs of infection (pain, swelling, redness, odor or green/yellow discharge around incision site)     Call MD for:  hives     Call MD for:  persistent dizziness or light-headedness     Call MD for:  extreme fatigue         Clinical Reference Documents Added to Patient Instructions       Document    MODERATE AND DEEP SEDATION IN ADULTS (ENGLISH)          TIME SPENT ON DISCHARGE: 15  minutes

## 2022-02-01 NOTE — TELEPHONE ENCOUNTER
Patient contacted and was advised that     The covid test negative .    The patient stated understanding with no question or concerns.

## 2022-02-11 ENCOUNTER — LAB VISIT (OUTPATIENT)
Dept: LAB | Facility: HOSPITAL | Age: 67
End: 2022-02-11
Attending: FAMILY MEDICINE
Payer: MEDICARE

## 2022-02-11 DIAGNOSIS — I10 ESSENTIAL HYPERTENSION: Chronic | ICD-10-CM

## 2022-02-11 DIAGNOSIS — R73.03 PREDIABETES: ICD-10-CM

## 2022-02-11 DIAGNOSIS — I10 ESSENTIAL HYPERTENSION: Primary | ICD-10-CM

## 2022-02-11 LAB
BASOPHILS # BLD AUTO: 0.06 K/UL (ref 0–0.2)
BASOPHILS NFR BLD: 0.9 % (ref 0–1.9)
DIFFERENTIAL METHOD: ABNORMAL
EOSINOPHIL # BLD AUTO: 0.3 K/UL (ref 0–0.5)
EOSINOPHIL NFR BLD: 4.3 % (ref 0–8)
ERYTHROCYTE [DISTWIDTH] IN BLOOD BY AUTOMATED COUNT: 13.9 % (ref 11.5–14.5)
HCT VFR BLD AUTO: 46.1 % (ref 40–54)
HGB BLD-MCNC: 14.1 G/DL (ref 14–18)
IMM GRANULOCYTES # BLD AUTO: 0.01 K/UL (ref 0–0.04)
IMM GRANULOCYTES NFR BLD AUTO: 0.1 % (ref 0–0.5)
LYMPHOCYTES # BLD AUTO: 0.9 K/UL (ref 1–4.8)
LYMPHOCYTES NFR BLD: 12.6 % (ref 18–48)
MCH RBC QN AUTO: 28.4 PG (ref 27–31)
MCHC RBC AUTO-ENTMCNC: 30.6 G/DL (ref 32–36)
MCV RBC AUTO: 93 FL (ref 82–98)
MONOCYTES # BLD AUTO: 0.6 K/UL (ref 0.3–1)
MONOCYTES NFR BLD: 9 % (ref 4–15)
NEUTROPHILS # BLD AUTO: 5.1 K/UL (ref 1.8–7.7)
NEUTROPHILS NFR BLD: 73.1 % (ref 38–73)
NRBC BLD-RTO: 0 /100 WBC
PLATELET # BLD AUTO: 265 K/UL (ref 150–450)
PMV BLD AUTO: 9.8 FL (ref 9.2–12.9)
RBC # BLD AUTO: 4.97 M/UL (ref 4.6–6.2)
WBC # BLD AUTO: 6.97 K/UL (ref 3.9–12.7)

## 2022-02-11 PROCEDURE — 80053 COMPREHEN METABOLIC PANEL: CPT | Mod: HCNC | Performed by: FAMILY MEDICINE

## 2022-02-11 PROCEDURE — 36415 COLL VENOUS BLD VENIPUNCTURE: CPT | Mod: HCNC,PO | Performed by: FAMILY MEDICINE

## 2022-02-11 PROCEDURE — 85025 COMPLETE CBC W/AUTO DIFF WBC: CPT | Mod: HCNC | Performed by: FAMILY MEDICINE

## 2022-02-11 PROCEDURE — 83036 HEMOGLOBIN GLYCOSYLATED A1C: CPT | Mod: HCNC | Performed by: FAMILY MEDICINE

## 2022-02-11 PROCEDURE — 80061 LIPID PANEL: CPT | Mod: HCNC | Performed by: FAMILY MEDICINE

## 2022-02-12 LAB
ALBUMIN SERPL BCP-MCNC: 3.7 G/DL (ref 3.5–5.2)
ALP SERPL-CCNC: 64 U/L (ref 55–135)
ALT SERPL W/O P-5'-P-CCNC: 12 U/L (ref 10–44)
ANION GAP SERPL CALC-SCNC: 11 MMOL/L (ref 8–16)
AST SERPL-CCNC: 12 U/L (ref 10–40)
BILIRUB SERPL-MCNC: 0.4 MG/DL (ref 0.1–1)
BUN SERPL-MCNC: 17 MG/DL (ref 8–23)
CALCIUM SERPL-MCNC: 9.4 MG/DL (ref 8.7–10.5)
CHLORIDE SERPL-SCNC: 102 MMOL/L (ref 95–110)
CHOLEST SERPL-MCNC: 212 MG/DL (ref 120–199)
CHOLEST/HDLC SERPL: 4.3 {RATIO} (ref 2–5)
CO2 SERPL-SCNC: 27 MMOL/L (ref 23–29)
CREAT SERPL-MCNC: 1.2 MG/DL (ref 0.5–1.4)
EST. GFR  (AFRICAN AMERICAN): >60 ML/MIN/1.73 M^2
EST. GFR  (NON AFRICAN AMERICAN): >60 ML/MIN/1.73 M^2
ESTIMATED AVG GLUCOSE: 108 MG/DL (ref 68–131)
GLUCOSE SERPL-MCNC: 95 MG/DL (ref 70–110)
HBA1C MFR BLD: 5.4 % (ref 4–5.6)
HDLC SERPL-MCNC: 49 MG/DL (ref 40–75)
HDLC SERPL: 23.1 % (ref 20–50)
LDLC SERPL CALC-MCNC: 127.4 MG/DL (ref 63–159)
NONHDLC SERPL-MCNC: 163 MG/DL
POTASSIUM SERPL-SCNC: 4.9 MMOL/L (ref 3.5–5.1)
PROT SERPL-MCNC: 6.9 G/DL (ref 6–8.4)
SODIUM SERPL-SCNC: 140 MMOL/L (ref 136–145)
TRIGL SERPL-MCNC: 178 MG/DL (ref 30–150)

## 2022-02-16 ENCOUNTER — OFFICE VISIT (OUTPATIENT)
Dept: CARDIOLOGY | Facility: CLINIC | Age: 67
End: 2022-02-16
Payer: MEDICARE

## 2022-02-16 ENCOUNTER — TELEPHONE (OUTPATIENT)
Dept: CARDIOLOGY | Facility: CLINIC | Age: 67
End: 2022-02-16

## 2022-02-16 VITALS
OXYGEN SATURATION: 95 % | BODY MASS INDEX: 26.6 KG/M2 | SYSTOLIC BLOOD PRESSURE: 130 MMHG | HEART RATE: 112 BPM | WEIGHT: 185.44 LBS | DIASTOLIC BLOOD PRESSURE: 82 MMHG

## 2022-02-16 DIAGNOSIS — E78.2 MIXED HYPERLIPIDEMIA: ICD-10-CM

## 2022-02-16 DIAGNOSIS — I25.118 CORONARY ARTERY DISEASE OF NATIVE ARTERY OF NATIVE HEART WITH STABLE ANGINA PECTORIS: Primary | ICD-10-CM

## 2022-02-16 DIAGNOSIS — Z78.9 STATIN INTOLERANCE: ICD-10-CM

## 2022-02-16 DIAGNOSIS — I10 ESSENTIAL HYPERTENSION: Chronic | ICD-10-CM

## 2022-02-16 PROCEDURE — 3079F PR MOST RECENT DIASTOLIC BLOOD PRESSURE 80-89 MM HG: ICD-10-PCS | Mod: HCNC,CPTII,S$GLB, | Performed by: INTERNAL MEDICINE

## 2022-02-16 PROCEDURE — 3079F DIAST BP 80-89 MM HG: CPT | Mod: HCNC,CPTII,S$GLB, | Performed by: INTERNAL MEDICINE

## 2022-02-16 PROCEDURE — 1159F PR MEDICATION LIST DOCUMENTED IN MEDICAL RECORD: ICD-10-PCS | Mod: HCNC,CPTII,S$GLB, | Performed by: INTERNAL MEDICINE

## 2022-02-16 PROCEDURE — 1160F RVW MEDS BY RX/DR IN RCRD: CPT | Mod: HCNC,CPTII,S$GLB, | Performed by: INTERNAL MEDICINE

## 2022-02-16 PROCEDURE — 4010F PR ACE/ARB THEARPY RXD/TAKEN: ICD-10-PCS | Mod: HCNC,CPTII,S$GLB, | Performed by: INTERNAL MEDICINE

## 2022-02-16 PROCEDURE — 99214 PR OFFICE/OUTPT VISIT, EST, LEVL IV, 30-39 MIN: ICD-10-PCS | Mod: HCNC,S$GLB,, | Performed by: INTERNAL MEDICINE

## 2022-02-16 PROCEDURE — 99214 OFFICE O/P EST MOD 30 MIN: CPT | Mod: HCNC,S$GLB,, | Performed by: INTERNAL MEDICINE

## 2022-02-16 PROCEDURE — 3044F PR MOST RECENT HEMOGLOBIN A1C LEVEL <7.0%: ICD-10-PCS | Mod: HCNC,CPTII,S$GLB, | Performed by: INTERNAL MEDICINE

## 2022-02-16 PROCEDURE — 99999 PR PBB SHADOW E&M-EST. PATIENT-LVL IV: CPT | Mod: PBBFAC,HCNC,, | Performed by: INTERNAL MEDICINE

## 2022-02-16 PROCEDURE — 1160F PR REVIEW ALL MEDS BY PRESCRIBER/CLIN PHARMACIST DOCUMENTED: ICD-10-PCS | Mod: HCNC,CPTII,S$GLB, | Performed by: INTERNAL MEDICINE

## 2022-02-16 PROCEDURE — 99499 RISK ADDL DX/OHS AUDIT: ICD-10-PCS | Mod: HCNC,S$GLB,, | Performed by: INTERNAL MEDICINE

## 2022-02-16 PROCEDURE — 99999 PR PBB SHADOW E&M-EST. PATIENT-LVL IV: ICD-10-PCS | Mod: PBBFAC,HCNC,, | Performed by: INTERNAL MEDICINE

## 2022-02-16 PROCEDURE — 3008F PR BODY MASS INDEX (BMI) DOCUMENTED: ICD-10-PCS | Mod: HCNC,CPTII,S$GLB, | Performed by: INTERNAL MEDICINE

## 2022-02-16 PROCEDURE — 3008F BODY MASS INDEX DOCD: CPT | Mod: HCNC,CPTII,S$GLB, | Performed by: INTERNAL MEDICINE

## 2022-02-16 PROCEDURE — 99499 UNLISTED E&M SERVICE: CPT | Mod: HCNC,S$GLB,, | Performed by: INTERNAL MEDICINE

## 2022-02-16 PROCEDURE — 3044F HG A1C LEVEL LT 7.0%: CPT | Mod: HCNC,CPTII,S$GLB, | Performed by: INTERNAL MEDICINE

## 2022-02-16 PROCEDURE — 1159F MED LIST DOCD IN RCRD: CPT | Mod: HCNC,CPTII,S$GLB, | Performed by: INTERNAL MEDICINE

## 2022-02-16 PROCEDURE — 3075F SYST BP GE 130 - 139MM HG: CPT | Mod: HCNC,CPTII,S$GLB, | Performed by: INTERNAL MEDICINE

## 2022-02-16 PROCEDURE — 4010F ACE/ARB THERAPY RXD/TAKEN: CPT | Mod: HCNC,CPTII,S$GLB, | Performed by: INTERNAL MEDICINE

## 2022-02-16 PROCEDURE — 3075F PR MOST RECENT SYSTOLIC BLOOD PRESS GE 130-139MM HG: ICD-10-PCS | Mod: HCNC,CPTII,S$GLB, | Performed by: INTERNAL MEDICINE

## 2022-02-16 RX ORDER — ISOSORBIDE MONONITRATE 60 MG/1
60 TABLET, EXTENDED RELEASE ORAL DAILY
Qty: 30 TABLET | Refills: 0 | Status: SHIPPED | OUTPATIENT
Start: 2022-02-16 | End: 2022-03-10

## 2022-02-16 NOTE — PROGRESS NOTES
Subjective:   Patient ID:  Mil Sood is a 67 y.o. male who presents for follow up of No chief complaint on file.      67 yo male, came in for post LHC f/u  PMH CAD, HTN HLD. PreDM. Anxiety, no smoking/drinking. Statin intolerance for bone pain.  C/o chest pain for 4 months, central chest, twice a week, stabbing pain, radiating to the left arm. Occurred at rest. Lasted for 10 minutes and resolved spontaneously  Walking caused SOB but no pain.   Phar. MPI ordered by PCP for chest pain and showed apical and inferoseptal ischemia.  ekg NSR and no acute stt change  Father had PPM and mother ok. Sister  of lung cancer     Pt had LHC done on 2022 bu Dr. Godfrey, revealing:  lmca 50% eccentric calcified ivus csa 5.4  LAd HEAVILY CALCIFIED MID LAD LONG LESION 50-70% IN MAGNITUDE CSA 2 MM2.  LCX MID LONG 40% HEAVILY CALCIFIED.   OM1 90% PROX.  D1 CALCIFIED NON OBS DISEASE.   RCA CALCIFIED DIFFUSE DISEASE PDA OSTIUM 90% PLB 90% .  EF NORMAL.     Today pt states that still has exertional chest pain and Left arm pain. Has SOB,   Prefers to go to Dr. Mcmanus for CABG at [facility]. His wife worked before at Dr. Mcmanus's office      Past Medical History:   Diagnosis Date    Anxiety     Cancer     spot on kidney and lung    Diabetes mellitus     Borderline diet controlled    Diverticulitis     GERD (gastroesophageal reflux disease)     Hyperlipidemia     not on any meds currently    Hypertension     Lung mass     Mass of kidney        Past Surgical History:   Procedure Laterality Date    CHOLECYSTECTOMY      COLONOSCOPY      COLONOSCOPY N/A 2017    Procedure: COLONOSCOPY;  Surgeon: Amarilys Bravo MD;  Location: [facility] ENDO;  Service: Endoscopy;  Laterality: N/A;    DIAGNOSTIC LAPAROSCOPY N/A 2021    Procedure: LAPAROSCOPY, DIAGNOSTIC;  Surgeon: Shaji Jauregui MD;  Location: [facility] OR;  Service: General;  Laterality: N/A;    ESOPHAGOGASTRODUODENOSCOPY      ESOPHAGOGASTRODUODENOSCOPY N/A 2020     Procedure: ESOPHAGOGASTRODUODENOSCOPY (EGD);  Surgeon: Shaji Aquino III, MD;  Location: [facility] ENDO;  Service: Endoscopy;  Laterality: N/A;    HERNIA REPAIR Bilateral     groin    LEFT HEART CATHETERIZATION Left 2022    Procedure: CATHETERIZATION, HEART, LEFT;  Surgeon: Romeo Godfrey MD;  Location: [facility] CATH LAB;  Service: Cardiology;  Laterality: Left;    NECK SURGERY      PARTIAL NEPHRECTOMY      left    RENAL BIOPSY      ROBOT-ASSISTED REPAIR OF UMBILICAL HERNIA USING DA NEL XI N/A 2021    Procedure: XI ROBOTIC REPAIR, HERNIA, UMBILICAL;  Surgeon: Shaji Jauregui MD;  Location: [facility] OR;  Service: General;  Laterality: N/A;  converted to open    TONSILLECTOMY      TRIGGER FINGER RELEASE Right     long finger       Social History     Tobacco Use    Smoking status: Former Smoker     Packs/day: 1.00     Years: 20.00     Pack years: 20.00     Quit date: 1995     Years since quittin.0    Smokeless tobacco: Never Used   Substance Use Topics    Alcohol use: Never     Alcohol/week: 0.0 standard drinks    Drug use: No       Family History   Problem Relation Age of Onset    Hypertension Mother     Hypertension Father     Melanoma Father     Cancer Sister          Review of Systems   Constitutional: Negative for decreased appetite, diaphoresis, fever, malaise/fatigue and night sweats.   HENT: Negative for nosebleeds.    Eyes: Negative for blurred vision and double vision.   Cardiovascular: Positive for chest pain and dyspnea on exertion. Negative for claudication, irregular heartbeat, leg swelling, near-syncope, orthopnea, palpitations, paroxysmal nocturnal dyspnea and syncope.   Respiratory: Negative for cough, shortness of breath, sleep disturbances due to breathing, snoring, sputum production and wheezing.    Endocrine: Negative for cold intolerance and polyuria.   Hematologic/Lymphatic: Does not bruise/bleed easily.   Skin: Negative for rash.   Musculoskeletal: Negative for back  pain, falls, joint pain, joint swelling and neck pain.   Gastrointestinal: Negative for abdominal pain, heartburn, nausea and vomiting.   Genitourinary: Negative for dysuria, frequency and hematuria.   Neurological: Negative for difficulty with concentration, dizziness, focal weakness, headaches, light-headedness, numbness, seizures and weakness.   Psychiatric/Behavioral: Negative for depression, memory loss and substance abuse. The patient does not have insomnia.    Allergic/Immunologic: Negative for HIV exposure and hives.       Objective:   Physical Exam  HENT:      Head: Normocephalic.   Eyes:      Pupils: Pupils are equal, round, and reactive to light.   Neck:      Thyroid: No thyromegaly.      Vascular: Normal carotid pulses. No carotid bruit or JVD.   Cardiovascular:      Rate and Rhythm: Normal rate and regular rhythm.  No extrasystoles are present.     Chest Wall: PMI is not displaced.      Pulses: Normal pulses.      Heart sounds: Normal heart sounds. No murmur heard.  No gallop. No S3 sounds.    Pulmonary:      Effort: No respiratory distress.      Breath sounds: Normal breath sounds. No stridor.   Abdominal:      General: Bowel sounds are normal.      Palpations: Abdomen is soft.      Tenderness: There is no abdominal tenderness. There is no rebound.   Musculoskeletal:         General: Normal range of motion.   Skin:     Findings: No rash.   Neurological:      Mental Status: He is alert and oriented to person, place, and time.   Psychiatric:         Behavior: Behavior normal.         Lab Results   Component Value Date    CHOL 212 (H) 02/11/2022    CHOL 235 (H) 08/17/2021    CHOL 236 (H) 01/27/2021     Lab Results   Component Value Date    HDL 49 02/11/2022    HDL 45 08/17/2021    HDL 54 01/27/2021     Lab Results   Component Value Date    LDLCALC 127.4 02/11/2022    LDLCALC 145.8 08/17/2021    LDLCALC 146.8 01/27/2021     Lab Results   Component Value Date    TRIG 178 (H) 02/11/2022    TRIG 221 (H)  08/17/2021    TRIG 176 (H) 01/27/2021     Lab Results   Component Value Date    CHOLHDL 23.1 02/11/2022    CHOLHDL 19.1 (L) 08/17/2021    CHOLHDL 22.9 01/27/2021       Chemistry        Component Value Date/Time     02/11/2022 0858    K 4.9 02/11/2022 0858     02/11/2022 0858    CO2 27 02/11/2022 0858    BUN 17 02/11/2022 0858    CREATININE 1.2 02/11/2022 0858    GLU 95 02/11/2022 0858        Component Value Date/Time    CALCIUM 9.4 02/11/2022 0858    ALKPHOS 64 02/11/2022 0858    AST 12 02/11/2022 0858    ALT 12 02/11/2022 0858    BILITOT 0.4 02/11/2022 0858    ESTGFRAFRICA >60.0 02/11/2022 0858    EGFRNONAA >60.0 02/11/2022 0858          Lab Results   Component Value Date    HGBA1C 5.4 02/11/2022     Lab Results   Component Value Date    TSH 1.415 11/01/2018     Lab Results   Component Value Date    INR 1.0 01/26/2022    INR 1.0 02/03/2020    INR 1.0 09/04/2019     Lab Results   Component Value Date    WBC 6.97 02/11/2022    HGB 14.1 02/11/2022    HCT 46.1 02/11/2022    MCV 93 02/11/2022     02/11/2022     BMP  Sodium   Date Value Ref Range Status   02/11/2022 140 136 - 145 mmol/L Final     Potassium   Date Value Ref Range Status   02/11/2022 4.9 3.5 - 5.1 mmol/L Final     Chloride   Date Value Ref Range Status   02/11/2022 102 95 - 110 mmol/L Final     CO2   Date Value Ref Range Status   02/11/2022 27 23 - 29 mmol/L Final     BUN   Date Value Ref Range Status   02/11/2022 17 8 - 23 mg/dL Final     Creatinine   Date Value Ref Range Status   02/11/2022 1.2 0.5 - 1.4 mg/dL Final     Calcium   Date Value Ref Range Status   02/11/2022 9.4 8.7 - 10.5 mg/dL Final     Anion Gap   Date Value Ref Range Status   02/11/2022 11 8 - 16 mmol/L Final     eGFR if    Date Value Ref Range Status   02/11/2022 >60.0 >60 mL/min/1.73 m^2 Final     eGFR if non    Date Value Ref Range Status   02/11/2022 >60.0 >60 mL/min/1.73 m^2 Final     Comment:     Calculation used to obtain the  estimated glomerular filtration  rate (eGFR) is the CKD-EPI equation.        BNP  @LABRCNTIP(BNP,BNPTRIAGEBLO)@  @LABRCNTIP(troponini)@  Estimated Creatinine Clearance: 61.7 mL/min (based on SCr of 1.2 mg/dL).  No results found in the last 24 hours.  No results found in the last 24 hours.  No results found in the last 24 hours.    Assessment:      1. Coronary artery disease of native artery of native heart with stable angina pectoris    2. Essential hypertension    3. Mixed hyperlipidemia    4. Statin intolerance        Plan:   Add Imdur 60 mg daily  Continue ASA amlodipine metoprolol benazepril and hCTZ  Add Repatha post cabg  Pt will go to Dr. Mcmanus's office for the eval of cabg    Counseled DASH  Check Lipid profile in 6 months  Recommend heart-healthy diet, weight control and regular exercise.  Shruti. Risk modification.   I have reviewed all pertinent labs and cardiac studies independently. Plans and recommendations have been formulated under my direct supervision. All questions answered and patient voiced understanding.   If symptoms persist go to the ED  RTC in 3 months

## 2022-02-16 NOTE — TELEPHONE ENCOUNTER
----- Message from Camilla Solorzano MA sent at 2/16/2022  3:30 PM CST -----  Contact: Lou/ sister in law  The sister in law called and stated that you need to put a referral in to see the surgeon.  ----- Message -----  From: Juliet Boles  Sent: 2/16/2022   3:26 PM CST  To: Contreras Blake would like a call back in regards to getting a referral for the patient to see the surgeon. She stated that Dr. Chairez told them he does not need a referral but they surgeon is requesting they have one. Please call her at [phone]

## 2022-02-18 ENCOUNTER — OFFICE VISIT (OUTPATIENT)
Dept: FAMILY MEDICINE | Facility: CLINIC | Age: 67
End: 2022-02-18
Payer: MEDICARE

## 2022-02-18 VITALS
OXYGEN SATURATION: 97 % | DIASTOLIC BLOOD PRESSURE: 88 MMHG | HEART RATE: 99 BPM | WEIGHT: 187.81 LBS | HEIGHT: 70 IN | SYSTOLIC BLOOD PRESSURE: 136 MMHG | BODY MASS INDEX: 26.89 KG/M2 | TEMPERATURE: 98 F

## 2022-02-18 DIAGNOSIS — I10 ESSENTIAL HYPERTENSION: Chronic | ICD-10-CM

## 2022-02-18 DIAGNOSIS — I70.0 ATHEROSCLEROSIS OF AORTA: ICD-10-CM

## 2022-02-18 DIAGNOSIS — I20.0 ANGINA PECTORIS, UNSTABLE: Primary | ICD-10-CM

## 2022-02-18 DIAGNOSIS — Z12.5 SCREENING FOR PROSTATE CANCER: ICD-10-CM

## 2022-02-18 DIAGNOSIS — E78.2 MIXED HYPERLIPIDEMIA: ICD-10-CM

## 2022-02-18 DIAGNOSIS — Z12.11 COLON CANCER SCREENING: ICD-10-CM

## 2022-02-18 PROCEDURE — 3008F PR BODY MASS INDEX (BMI) DOCUMENTED: ICD-10-PCS | Mod: HCNC,CPTII,S$GLB, | Performed by: FAMILY MEDICINE

## 2022-02-18 PROCEDURE — 3044F PR MOST RECENT HEMOGLOBIN A1C LEVEL <7.0%: ICD-10-PCS | Mod: HCNC,CPTII,S$GLB, | Performed by: FAMILY MEDICINE

## 2022-02-18 PROCEDURE — 1101F PR PT FALLS ASSESS DOC 0-1 FALLS W/OUT INJ PAST YR: ICD-10-PCS | Mod: HCNC,CPTII,S$GLB, | Performed by: FAMILY MEDICINE

## 2022-02-18 PROCEDURE — 3075F PR MOST RECENT SYSTOLIC BLOOD PRESS GE 130-139MM HG: ICD-10-PCS | Mod: HCNC,CPTII,S$GLB, | Performed by: FAMILY MEDICINE

## 2022-02-18 PROCEDURE — 1126F AMNT PAIN NOTED NONE PRSNT: CPT | Mod: HCNC,CPTII,S$GLB, | Performed by: FAMILY MEDICINE

## 2022-02-18 PROCEDURE — 3044F HG A1C LEVEL LT 7.0%: CPT | Mod: HCNC,CPTII,S$GLB, | Performed by: FAMILY MEDICINE

## 2022-02-18 PROCEDURE — 99999 PR PBB SHADOW E&M-EST. PATIENT-LVL V: ICD-10-PCS | Mod: PBBFAC,HCNC,, | Performed by: FAMILY MEDICINE

## 2022-02-18 PROCEDURE — 99214 PR OFFICE/OUTPT VISIT, EST, LEVL IV, 30-39 MIN: ICD-10-PCS | Mod: HCNC,S$GLB,, | Performed by: FAMILY MEDICINE

## 2022-02-18 PROCEDURE — 3288F FALL RISK ASSESSMENT DOCD: CPT | Mod: HCNC,CPTII,S$GLB, | Performed by: FAMILY MEDICINE

## 2022-02-18 PROCEDURE — 3008F BODY MASS INDEX DOCD: CPT | Mod: HCNC,CPTII,S$GLB, | Performed by: FAMILY MEDICINE

## 2022-02-18 PROCEDURE — 4010F ACE/ARB THERAPY RXD/TAKEN: CPT | Mod: HCNC,CPTII,S$GLB, | Performed by: FAMILY MEDICINE

## 2022-02-18 PROCEDURE — 1160F RVW MEDS BY RX/DR IN RCRD: CPT | Mod: HCNC,CPTII,S$GLB, | Performed by: FAMILY MEDICINE

## 2022-02-18 PROCEDURE — 1159F PR MEDICATION LIST DOCUMENTED IN MEDICAL RECORD: ICD-10-PCS | Mod: HCNC,CPTII,S$GLB, | Performed by: FAMILY MEDICINE

## 2022-02-18 PROCEDURE — 1101F PT FALLS ASSESS-DOCD LE1/YR: CPT | Mod: HCNC,CPTII,S$GLB, | Performed by: FAMILY MEDICINE

## 2022-02-18 PROCEDURE — 4010F PR ACE/ARB THEARPY RXD/TAKEN: ICD-10-PCS | Mod: HCNC,CPTII,S$GLB, | Performed by: FAMILY MEDICINE

## 2022-02-18 PROCEDURE — 99214 OFFICE O/P EST MOD 30 MIN: CPT | Mod: HCNC,S$GLB,, | Performed by: FAMILY MEDICINE

## 2022-02-18 PROCEDURE — 3079F PR MOST RECENT DIASTOLIC BLOOD PRESSURE 80-89 MM HG: ICD-10-PCS | Mod: HCNC,CPTII,S$GLB, | Performed by: FAMILY MEDICINE

## 2022-02-18 PROCEDURE — 3288F PR FALLS RISK ASSESSMENT DOCUMENTED: ICD-10-PCS | Mod: HCNC,CPTII,S$GLB, | Performed by: FAMILY MEDICINE

## 2022-02-18 PROCEDURE — 1160F PR REVIEW ALL MEDS BY PRESCRIBER/CLIN PHARMACIST DOCUMENTED: ICD-10-PCS | Mod: HCNC,CPTII,S$GLB, | Performed by: FAMILY MEDICINE

## 2022-02-18 PROCEDURE — 3079F DIAST BP 80-89 MM HG: CPT | Mod: HCNC,CPTII,S$GLB, | Performed by: FAMILY MEDICINE

## 2022-02-18 PROCEDURE — 3075F SYST BP GE 130 - 139MM HG: CPT | Mod: HCNC,CPTII,S$GLB, | Performed by: FAMILY MEDICINE

## 2022-02-18 PROCEDURE — 99499 UNLISTED E&M SERVICE: CPT | Mod: HCNC,S$GLB,, | Performed by: FAMILY MEDICINE

## 2022-02-18 PROCEDURE — 1126F PR PAIN SEVERITY QUANTIFIED, NO PAIN PRESENT: ICD-10-PCS | Mod: HCNC,CPTII,S$GLB, | Performed by: FAMILY MEDICINE

## 2022-02-18 PROCEDURE — 1159F MED LIST DOCD IN RCRD: CPT | Mod: HCNC,CPTII,S$GLB, | Performed by: FAMILY MEDICINE

## 2022-02-18 PROCEDURE — 99499 RISK ADDL DX/OHS AUDIT: ICD-10-PCS | Mod: HCNC,S$GLB,, | Performed by: FAMILY MEDICINE

## 2022-02-18 PROCEDURE — 99999 PR PBB SHADOW E&M-EST. PATIENT-LVL V: CPT | Mod: PBBFAC,HCNC,, | Performed by: FAMILY MEDICINE

## 2022-02-18 NOTE — PROGRESS NOTES
Chief Complaint:    Chief Complaint   Patient presents with    Follow-up     6 months       History of Present Illness:  Patient with a hx of cervical spondylosis, HTN, CAD, HLD, presents today for a six month follow-up    On NTG for chest pain.  Is having CABG soon  Sometimes his CP radiates down his arm.   Chemistries stable.   Hypertension stable  Lipids chemistries stable  Depression stable        ROS:  Review of Systems   Constitutional: Negative for activity change, chills, fatigue, fever and unexpected weight change.   HENT: Negative for congestion, ear discharge, ear pain, hearing loss, postnasal drip and rhinorrhea.    Eyes: Negative for pain and visual disturbance.   Respiratory: Negative for cough, chest tightness and shortness of breath.    Cardiovascular: Positive for chest pain. Negative for palpitations.   Gastrointestinal: Negative for abdominal pain, diarrhea and vomiting.   Endocrine: Negative for heat intolerance.   Genitourinary: Negative for dysuria, flank pain, frequency and hematuria.   Musculoskeletal: Negative for back pain, gait problem and neck pain.   Skin: Negative for color change and rash.   Neurological: Negative for dizziness, tremors, seizures, numbness and headaches.   Psychiatric/Behavioral: Negative for agitation, hallucinations, self-injury, sleep disturbance and suicidal ideas. The patient is not nervous/anxious.    All other systems reviewed and are negative.      Past Medical History:   Diagnosis Date    Anxiety     Cancer     spot on kidney and lung    Diabetes mellitus     Borderline diet controlled    Diverticulitis     GERD (gastroesophageal reflux disease)     Hyperlipidemia     not on any meds currently    Hypertension     Lung mass     Mass of kidney        Social History:  Social History     Socioeconomic History    Marital status:    Tobacco Use    Smoking status: Former Smoker     Packs/day: 1.00     Years: 20.00     Pack years: 20.00     Quit  "date: 1995     Years since quittin.0    Smokeless tobacco: Never Used   Substance and Sexual Activity    Alcohol use: Never     Alcohol/week: 0.0 standard drinks    Drug use: No    Sexual activity: Yes     Partners: Female     Birth control/protection: None       Family History:   family history includes Cancer in his sister; Hypertension in his father and mother; Melanoma in his father.    Health Maintenance   Topic Date Due    Lipid Panel  2023    TETANUS VACCINE  2028    Hepatitis C Screening  Completed    Abdominal Aortic Aneurysm Screening  Completed       Physical Exam:    Vital Signs  Temp: 98.4 °F (36.9 °C)  Temp src: Temporal  Pulse: 99  SpO2: 97 %  BP: 136/88  Pain Score: 0-No pain  Height and Weight  Height: 5' 10" (177.8 cm)  Weight: 85.2 kg (187 lb 13.3 oz)  BSA (Calculated - sq m): 2.05 sq meters  BMI (Calculated): 27  Weight in (lb) to have BMI = 25: 173.9]    Body mass index is 26.95 kg/m².    Physical Exam  Vitals and nursing note reviewed.   Constitutional:       Appearance: Normal appearance. He is well-developed.   HENT:      Head: Normocephalic and atraumatic.      Right Ear: Tympanic membrane normal.      Left Ear: Tympanic membrane normal.   Eyes:      Extraocular Movements: Extraocular movements intact.      Conjunctiva/sclera: Conjunctivae normal.      Pupils: Pupils are equal, round, and reactive to light.   Cardiovascular:      Rate and Rhythm: Normal rate and regular rhythm.      Pulses: Normal pulses.      Heart sounds: Normal heart sounds. No murmur heard.      Pulmonary:      Effort: Pulmonary effort is normal. No respiratory distress.      Breath sounds: Normal breath sounds. No wheezing or rales.   Chest:      Chest wall: No tenderness.   Abdominal:      General: There is no distension.      Palpations: Abdomen is soft. There is no mass.      Tenderness: There is no abdominal tenderness. There is no guarding.   Musculoskeletal:         General: No " tenderness. Normal range of motion.      Cervical back: Normal range of motion and neck supple.      Comments: L thumb tender at CMC and PIP joint.   Thenar tendonitis        Lymphadenopathy:      Cervical: No cervical adenopathy.   Skin:     General: Skin is warm and dry.   Neurological:      General: No focal deficit present.      Mental Status: He is alert and oriented to person, place, and time.      Deep Tendon Reflexes: Reflexes are normal and symmetric.   Psychiatric:         Mood and Affect: Mood normal.         Behavior: Behavior normal.         Thought Content: Thought content normal.         Judgment: Judgment normal.           Assessment:      ICD-10-CM ICD-9-CM   1. Angina pectoris, unstable  I20.0 411.1   2. Colon cancer screening  Z12.11 V76.51   3. Essential hypertension  I10 401.9   4. Atherosclerosis of aorta  I70.0 440.0   5. Mixed hyperlipidemia  E78.2 272.2   6. Screening for prostate cancer  Z12.5 V76.44         Plan:  Don't recommend doing colonoscopy before bypass. Advise doing Cologuard test instead. Patient agrees.  Continue follow up with cardiology. Minimize activity before surgery.  See labs below.  Follow up in six months.            Orders Placed This Encounter   Procedures    Cologuard Screening (Multitarget Stool DNA)    Comprehensive Metabolic Panel    CBC Auto Differential    Lipid Panel    Hemoglobin A1C    PSA, Screening       Current Outpatient Medications   Medication Sig Dispense Refill    amLODIPine (NORVASC) 10 MG tablet Take 1 tablet (10 mg total) by mouth every evening. 90 tablet 2    aspirin 81 mg Tab Take 1 tablet by mouth nightly.       benazepriL (LOTENSIN) 20 MG tablet Take 2 tablets (40 mg total) by mouth nightly. 180 tablet 0    blood sugar diagnostic Strp Use to check blood sugars  strip 3    blood sugar diagnostic Strp 1 each by Misc.(Non-Drug; Combo Route) route 3 (three) times daily. 100 each 12    blood-glucose meter kit Use as instructed 1  each 1    citalopram (CELEXA) 40 MG tablet TAKE ONE TABLET BY MOUTH AT BEDTIME. 90 tablet 1    clonazePAM (KLONOPIN) 1 MG tablet Take 1 tablet (1 mg total) by mouth nightly as needed for Anxiety. 30 tablet 1    cyclobenzaprine (FLEXERIL) 10 MG tablet Take 1 tablet (10 mg total) by mouth 3 (three) times daily as needed. 90 tablet 0    finasteride (PROSCAR) 5 mg tablet Take 1 tablet (5 mg total) by mouth nightly. 90 tablet 1    fluticasone propion-salmeterol 115-21 mcg/dose (ADVAIR HFA) 115-21 mcg/actuation HFAA inhaler Inhale 2 puffs into the lungs 2 (two) times daily. 12 g 12    gabapentin (NEURONTIN) 600 MG tablet Take 1 tablet (600 mg total) by mouth nightly. 90 tablet 1    hydrALAZINE (APRESOLINE) 25 MG tablet Take 1 tablet (25 mg total) by mouth nightly. 90 tablet 0    hydroCHLOROthiazide (MICROZIDE) 12.5 mg capsule Take 2 capsules (25 mg total) by mouth nightly. 30 capsule 1    hydrOXYzine pamoate (VISTARIL) 25 MG Cap Take 1 capsule (25 mg total) by mouth every evening. 30 capsule 1    isosorbide mononitrate (IMDUR) 60 MG 24 hr tablet Take 1 tablet (60 mg total) by mouth once daily. 30 tablet 0    lancets (ACCU-CHEK SOFTCLIX LANCETS) Misc 1 each by Misc.(Non-Drug; Combo Route) route 3 (three) times daily. 100 each 12    meloxicam (MOBIC) 15 MG tablet TAKE 1 TABLET BY MOUTH ONCE DAILY FOR 5 DAYS.THEN ONCE DAILY AS NEEDED FOR BACK PAIN. 30 tablet 0    metoprolol tartrate (LOPRESSOR) 50 MG tablet Take 1 tablet (50 mg total) by mouth every evening. 60 tablet 4    omeprazole (PRILOSEC) 40 MG capsule Take 1 capsule (40 mg total) by mouth every evening. 90 capsule 1    RESTASIS MULTIDOSE 0.05 % Drop       tamsulosin (FLOMAX) 0.4 mg Cap Take 1 capsule (0.4 mg total) by mouth every evening. 90 capsule 3    tiotropium (SPIRIVA WITH HANDIHALER) 18 mcg inhalation capsule INHALE THE CONTENTS OF ONE CAPSULE DAILY. 90 capsule 1    traZODone (DESYREL) 100 MG tablet Take 1 tablet (100 mg total) by mouth every  evening. 90 tablet 1    venlafaxine (EFFEXOR) 75 MG tablet Take 1 tablet (75 mg total) by mouth 2 (two) times daily. 180 tablet 1    zolpidem (AMBIEN) 10 mg Tab TAKE 1 TABLET BY MOUTH AT BEDTIME AS NEEDED FOR INSOMNIA. 30 tablet 2     No current facility-administered medications for this visit.       There are no discontinued medications.    No follow-ups on file.      Yumi Roberson MD    Scribe Attestation:   I, Thelma Gonzalez, am scribing for, and in the presence of, Dr.Arif Roberson I performed the above scribed service and the documentation accurately describes the services I performed. I attest to the accuracy of the note.    I, Dr. Yumi Roberson, reviewed documentation as scribed above. I personally performed the services described in this documentation.  I agree that the record reflects my personal performance and is accurate and complete. Yumi Roberson MD.  10/04/2021

## 2022-03-04 LAB — NONINV COLON CA DNA+OCC BLD SCRN STL QL: NEGATIVE

## 2022-03-08 ENCOUNTER — HOSPITAL ENCOUNTER (OUTPATIENT)
Dept: RADIOLOGY | Facility: HOSPITAL | Age: 67
Discharge: HOME | End: 2022-03-08
Attending: THORACIC SURGERY (CARDIOTHORACIC VASCULAR SURGERY)
Payer: MEDICARE

## 2022-03-08 DIAGNOSIS — Z01.818 PRE-OP TESTING: ICD-10-CM

## 2022-03-08 DIAGNOSIS — I25.118 CORONARY ARTERY DISEASE OF NATIVE ARTERY OF NATIVE HEART WITH STABLE ANGINA PECTORIS: ICD-10-CM

## 2022-03-08 DIAGNOSIS — I25.118 CORONARY ARTERY DISEASE OF NATIVE ARTERY OF NATIVE HEART WITH STABLE ANGINA PECTORIS: Primary | ICD-10-CM

## 2022-03-08 PROCEDURE — 93880 US CAROTID BILATERAL: ICD-10-PCS | Mod: 26,,, | Performed by: RADIOLOGY

## 2022-03-08 PROCEDURE — 93880 EXTRACRANIAL BILAT STUDY: CPT | Mod: 26,,, | Performed by: RADIOLOGY

## 2022-03-08 PROCEDURE — 93880 EXTRACRANIAL BILAT STUDY: CPT | Mod: TC

## 2022-03-15 ENCOUNTER — EXTERNAL HOSPITAL ADMISSION (OUTPATIENT)
Dept: ADMINISTRATIVE | Facility: CLINIC | Age: 67
End: 2022-03-15
Payer: MEDICARE

## 2022-03-15 ENCOUNTER — PATIENT OUTREACH (OUTPATIENT)
Dept: ADMINISTRATIVE | Facility: CLINIC | Age: 67
End: 2022-03-15
Payer: MEDICARE

## 2022-03-21 ENCOUNTER — OFFICE VISIT (OUTPATIENT)
Dept: HOME HEALTH SERVICES | Facility: CLINIC | Age: 67
End: 2022-03-21
Payer: MEDICARE

## 2022-03-21 VITALS
TEMPERATURE: 98 F | HEART RATE: 68 BPM | RESPIRATION RATE: 16 BRPM | DIASTOLIC BLOOD PRESSURE: 85 MMHG | SYSTOLIC BLOOD PRESSURE: 148 MMHG

## 2022-03-21 DIAGNOSIS — Z76.89 ENCOUNTER FOR SUPPORT AND COORDINATION OF TRANSITION OF CARE: ICD-10-CM

## 2022-03-21 DIAGNOSIS — Z71.89 ADVANCED DIRECTIVES, COUNSELING/DISCUSSION: Primary | ICD-10-CM

## 2022-03-21 PROCEDURE — 3288F PR FALLS RISK ASSESSMENT DOCUMENTED: ICD-10-PCS | Mod: CPTII,S$GLB,, | Performed by: NURSE PRACTITIONER

## 2022-03-21 PROCEDURE — 1126F PR PAIN SEVERITY QUANTIFIED, NO PAIN PRESENT: ICD-10-PCS | Mod: CPTII,S$GLB,, | Performed by: NURSE PRACTITIONER

## 2022-03-21 PROCEDURE — 3077F PR MOST RECENT SYSTOLIC BLOOD PRESSURE >= 140 MM HG: ICD-10-PCS | Mod: CPTII,S$GLB,, | Performed by: NURSE PRACTITIONER

## 2022-03-21 PROCEDURE — 3044F HG A1C LEVEL LT 7.0%: CPT | Mod: CPTII,S$GLB,, | Performed by: NURSE PRACTITIONER

## 2022-03-21 PROCEDURE — 4010F PR ACE/ARB THEARPY RXD/TAKEN: ICD-10-PCS | Mod: CPTII,S$GLB,, | Performed by: NURSE PRACTITIONER

## 2022-03-21 PROCEDURE — 99497 ADVNCD CARE PLAN 30 MIN: CPT | Mod: S$GLB,,, | Performed by: NURSE PRACTITIONER

## 2022-03-21 PROCEDURE — 99496 TRANSITIONAL CARE MANAGE SERVICE 7 DAY DISCHARGE: ICD-10-PCS | Mod: S$GLB,,, | Performed by: NURSE PRACTITIONER

## 2022-03-21 PROCEDURE — 1159F PR MEDICATION LIST DOCUMENTED IN MEDICAL RECORD: ICD-10-PCS | Mod: CPTII,S$GLB,, | Performed by: NURSE PRACTITIONER

## 2022-03-21 PROCEDURE — 1160F PR REVIEW ALL MEDS BY PRESCRIBER/CLIN PHARMACIST DOCUMENTED: ICD-10-PCS | Mod: CPTII,S$GLB,, | Performed by: NURSE PRACTITIONER

## 2022-03-21 PROCEDURE — 4010F ACE/ARB THERAPY RXD/TAKEN: CPT | Mod: CPTII,S$GLB,, | Performed by: NURSE PRACTITIONER

## 2022-03-21 PROCEDURE — 1126F AMNT PAIN NOTED NONE PRSNT: CPT | Mod: CPTII,S$GLB,, | Performed by: NURSE PRACTITIONER

## 2022-03-21 PROCEDURE — 3077F SYST BP >= 140 MM HG: CPT | Mod: CPTII,S$GLB,, | Performed by: NURSE PRACTITIONER

## 2022-03-21 PROCEDURE — 3044F PR MOST RECENT HEMOGLOBIN A1C LEVEL <7.0%: ICD-10-PCS | Mod: CPTII,S$GLB,, | Performed by: NURSE PRACTITIONER

## 2022-03-21 PROCEDURE — 3079F PR MOST RECENT DIASTOLIC BLOOD PRESSURE 80-89 MM HG: ICD-10-PCS | Mod: CPTII,S$GLB,, | Performed by: NURSE PRACTITIONER

## 2022-03-21 PROCEDURE — 1159F MED LIST DOCD IN RCRD: CPT | Mod: CPTII,S$GLB,, | Performed by: NURSE PRACTITIONER

## 2022-03-21 PROCEDURE — 99497 PR ADVNCD CARE PLAN 30 MIN: ICD-10-PCS | Mod: S$GLB,,, | Performed by: NURSE PRACTITIONER

## 2022-03-21 PROCEDURE — 1101F PT FALLS ASSESS-DOCD LE1/YR: CPT | Mod: CPTII,S$GLB,, | Performed by: NURSE PRACTITIONER

## 2022-03-21 PROCEDURE — 1101F PR PT FALLS ASSESS DOC 0-1 FALLS W/OUT INJ PAST YR: ICD-10-PCS | Mod: CPTII,S$GLB,, | Performed by: NURSE PRACTITIONER

## 2022-03-21 PROCEDURE — 3079F DIAST BP 80-89 MM HG: CPT | Mod: CPTII,S$GLB,, | Performed by: NURSE PRACTITIONER

## 2022-03-21 PROCEDURE — 1160F RVW MEDS BY RX/DR IN RCRD: CPT | Mod: CPTII,S$GLB,, | Performed by: NURSE PRACTITIONER

## 2022-03-21 PROCEDURE — 99496 TRANSJ CARE MGMT HIGH F2F 7D: CPT | Mod: S$GLB,,, | Performed by: NURSE PRACTITIONER

## 2022-03-21 PROCEDURE — 3288F FALL RISK ASSESSMENT DOCD: CPT | Mod: CPTII,S$GLB,, | Performed by: NURSE PRACTITIONER

## 2022-03-21 RX ORDER — HYDROCODONE BITARTRATE AND ACETAMINOPHEN 7.5; 325 MG/1; MG/1
1 TABLET ORAL EVERY 6 HOURS PRN
COMMUNITY
End: 2022-08-06 | Stop reason: SDUPTHER

## 2022-03-21 RX ORDER — CLOPIDOGREL BISULFATE 75 MG/1
75 TABLET ORAL DAILY
COMMUNITY
End: 2022-08-10

## 2022-03-21 NOTE — PROGRESS NOTES
"[facility]  Transition of Care Home Visit    Visit Date: 3/21/22  Encounter Provider: Jeferson Markham NP  PCP:  Yumi Roberson MD    PRESENTING HISTORY   Patient ID: Mil Sood    Consult Requested By:  No ref. provider found    Reason for Consult:  Transitional Care Coordination    Chief Complaint: Transitional Care     The patient is being seen at home due to a physical debility that presents a taxing effort to leave the home, to mitigate high risk of hospital readmission or due to the limited availability of reliable or safe options for transportation to the point of access to the provider. The visit meets the criteria for medical necessity as defined by CMS as "health-care services needed to prevent, diagnose, or treat an illness, injury, condition, disease, or its symptoms and that meet accepted standards of medicine." Prior to treatment on this visit the chart was reviewed and patient consent was obtained.    History of Present Illness:   S/p CABG @ [facility]    Admit Date: 03/11/22  Discharge Date: 03/14/22  _______________________________  Today: Mr. Mil Sood is a 67 y.o. male is being seen and examined at home today for transitional care visit to the home environment post-discharge from inpatient hospitalization encounter described above. Patient presents alone at home. Hois son lives with him but is at work at the time of my visit. He reports advice to refrain from driving for 6 weeks so his son is availlable and willing to drive home to his clinic appointments. VSS. No reported or observed signs of distress noted. Denies any acute issues, concerns or complaints to address on today's visit. Reports taking all medications as prescribed. Risks of environmental exposure to coronavirus discussed including: social distancing, hand hygiene, and limiting departures from the home for necessities only. Reports understanding and willingness to comply.      Review of Systems   Constitutional: Negative for " activity change, appetite change, chills, fatigue and fever.   HENT: Negative.    Eyes: Negative.    Respiratory: Negative for cough and shortness of breath.    Cardiovascular: Negative for chest pain, palpitations and leg swelling.   Gastrointestinal: Negative.    Endocrine: Negative.    Musculoskeletal: Negative.    Skin: Positive for wound (sternum/left inner upper leg).   Neurological: Negative.    Hematological: Negative.    Psychiatric/Behavioral: Negative.      Assessments:  · Environmental: private home with adequate lighting and temperature control  · Functional Status: Requires moderate assistance with ADL's/IADL's, ambulates with assistance of a cane/walker, continent of bowel and bladder  · Safety: Fall Precautions, COVID Precautions/Social Distancing/Mask Use  · Nutritional: Adequate  · Home Health: none  · DME/Supplies: none     Ethical / Legal: Advance Care Planning   Capacity to make medical decisions: yes, Conflict: no  · Surrogate decision maker:  Name: Royal Sood, Relationship: son  · Advance Directives:  none  · HCPOA: none  · [place]:  Signed today  · Code Status: Full Code    Advanced Care Directive Informational packet and forms left in the home for family review, discussion and signing with instructions to return upon their next provider encounter for inclusion to the medical record.   PAST HISTORY:     Past Medical History:   Diagnosis Date    Anxiety     Cancer     spot on kidney and lung    Diabetes mellitus     Borderline diet controlled    Diverticulitis     GERD (gastroesophageal reflux disease)     Hyperlipidemia     not on any meds currently    Hypertension     Lung mass     Mass of kidney        Past Surgical History:   Procedure Laterality Date    CHOLECYSTECTOMY      COLONOSCOPY      COLONOSCOPY N/A 4/7/2017    Procedure: COLONOSCOPY;  Surgeon: Amarilys Bravo MD;  Location: [facility];  Service: Endoscopy;  Laterality: N/A;    DIAGNOSTIC LAPAROSCOPY N/A 5/4/2021     Procedure: LAPAROSCOPY, DIAGNOSTIC;  Surgeon: Shaji Jauregui MD;  Location: [facility] OR;  Service: General;  Laterality: N/A;    ESOPHAGOGASTRODUODENOSCOPY      ESOPHAGOGASTRODUODENOSCOPY N/A 2020    Procedure: ESOPHAGOGASTRODUODENOSCOPY (EGD);  Surgeon: Shaji Aquino III, MD;  Location: [facility] ENDO;  Service: Endoscopy;  Laterality: N/A;    HERNIA REPAIR Bilateral     groin    LEFT HEART CATHETERIZATION Left 2022    Procedure: CATHETERIZATION, HEART, LEFT;  Surgeon: Romeo Godfrey MD;  Location: [facility] CATH LAB;  Service: Cardiology;  Laterality: Left;    NECK SURGERY      PARTIAL NEPHRECTOMY      left    RENAL BIOPSY      ROBOT-ASSISTED REPAIR OF UMBILICAL HERNIA USING DA NEL XI N/A 2021    Procedure: XI ROBOTIC REPAIR, HERNIA, UMBILICAL;  Surgeon: Shaji Jauregui MD;  Location: [facility] OR;  Service: General;  Laterality: N/A;  converted to open    TONSILLECTOMY      TRIGGER FINGER RELEASE Right     long finger       Family History   Problem Relation Age of Onset    Hypertension Mother     Hypertension Father     Melanoma Father     Cancer Sister        Social History     Socioeconomic History    Marital status:    Tobacco Use    Smoking status: Former Smoker     Packs/day: 1.00     Years: 20.00     Pack years: 20.00     Quit date: 1995     Years since quittin.1    Smokeless tobacco: Never Used   Substance and Sexual Activity    Alcohol use: Never     Alcohol/week: 0.0 standard drinks    Drug use: No    Sexual activity: Yes     Partners: Female     Birth control/protection: None       MEDICATIONS & ALLERGIES:     Current Outpatient Medications on File Prior to Visit   Medication Sig Dispense Refill    amLODIPine (NORVASC) 10 MG tablet Take 1 tablet (10 mg total) by mouth every evening. (Patient taking differently: Take 5 mg by mouth once daily.) 90 tablet 2    aspirin 81 mg Tab Take 1 tablet by mouth nightly.       benazepriL (LOTENSIN) 20 MG tablet Take 2  tablets (40 mg total) by mouth nightly. 180 tablet 0    blood sugar diagnostic Strp Use to check blood sugars  strip 3    blood sugar diagnostic Strp 1 each by Misc.(Non-Drug; Combo Route) route 3 (three) times daily. 100 each 12    blood-glucose meter kit Use as instructed 1 each 1    citalopram (CELEXA) 40 MG tablet TAKE ONE TABLET BY MOUTH AT BEDTIME. 90 tablet 1    clonazePAM (KLONOPIN) 1 MG tablet Take 1 tablet (1 mg total) by mouth nightly as needed for Anxiety. 30 tablet 1    clopidogreL (PLAVIX) 75 mg tablet Take 75 mg by mouth once daily.      cyclobenzaprine (FLEXERIL) 10 MG tablet Take 1 tablet (10 mg total) by mouth 3 (three) times daily as needed. 90 tablet 0    finasteride (PROSCAR) 5 mg tablet Take 1 tablet (5 mg total) by mouth nightly. 90 tablet 1    fluticasone propion-salmeterol 115-21 mcg/dose (ADVAIR HFA) 115-21 mcg/actuation HFAA inhaler Inhale 2 puffs into the lungs 2 (two) times daily. 12 g 12    gabapentin (NEURONTIN) 600 MG tablet Take 1 tablet (600 mg total) by mouth nightly. 90 tablet 1    hydrALAZINE (APRESOLINE) 25 MG tablet Take 1 tablet (25 mg total) by mouth nightly. 90 tablet 0    hydroCHLOROthiazide (MICROZIDE) 12.5 mg capsule Take 2 capsules (25 mg total) by mouth nightly. 30 capsule 1    hydrOXYzine pamoate (VISTARIL) 25 MG Cap Take 1 capsule (25 mg total) by mouth every evening. 30 capsule 1    isosorbide mononitrate (IMDUR) 60 MG 24 hr tablet TAKE 1 TABLET BY MOUTH ONCE DAILY. 30 tablet 9    lancets (ACCU-CHEK SOFTCLIX LANCETS) Misc 1 each by Misc.(Non-Drug; Combo Route) route 3 (three) times daily. 100 each 12    meloxicam (MOBIC) 15 MG tablet TAKE 1 TABLET BY MOUTH ONCE DAILY FOR 5 DAYS.THEN ONCE DAILY AS NEEDED FOR BACK PAIN. 30 tablet 0    metoprolol tartrate (LOPRESSOR) 50 MG tablet Take 1 tablet (50 mg total) by mouth every evening. (Patient taking differently: Take 25 mg by mouth 2 (two) times daily.) 60 tablet 4    omeprazole (PRILOSEC) 40 MG  capsule Take 1 capsule (40 mg total) by mouth every evening. 90 capsule 1    RESTASIS MULTIDOSE 0.05 % Drop       tamsulosin (FLOMAX) 0.4 mg Cap Take 1 capsule (0.4 mg total) by mouth every evening. 90 capsule 3    tiotropium (SPIRIVA WITH HANDIHALER) 18 mcg inhalation capsule INHALE THE CONTENTS OF ONE CAPSULE DAILY. 90 capsule 1    traZODone (DESYREL) 100 MG tablet Take 1 tablet (100 mg total) by mouth every evening. 90 tablet 1    venlafaxine (EFFEXOR) 75 MG tablet Take 1 tablet (75 mg total) by mouth 2 (two) times daily. 180 tablet 1    zolpidem (AMBIEN) 10 mg Tab TAKE 1 TABLET BY MOUTH AT BEDTIME AS NEEDED FOR INSOMNIA. 30 tablet 2    HYDROcodone-acetaminophen (NORCO) 7.5-325 mg per tablet Take 1 tablet by mouth every 6 (six) hours as needed for Pain.       No current facility-administered medications on file prior to visit.      Review of patient's allergies indicates:  No Known Allergies  OBJECTIVE:   Vital Signs:  Vitals:    03/21/22 0820   BP: (!) 148/85   Pulse: 68   Resp: 16   Temp: 97.7 °F (36.5 °C)     There is no height or weight on file to calculate BMI.     Physical Exam:  Physical Exam    Laboratory  Lab Results   Component Value Date    WBC 6.97 02/11/2022    HGB 14.1 02/11/2022    HCT 46.1 02/11/2022    MCV 93 02/11/2022     02/11/2022     Lab Results   Component Value Date    INR 1.0 01/26/2022    INR 1.0 02/03/2020    INR 1.0 09/04/2019     Lab Results   Component Value Date    HGBA1C 5.2 03/09/2022     No results for input(s): POCTGLUCOSE in the last 72 hours.    TRANSITION OF CARE:   Family and/or Caretaker present at visit?  No.  Diagnostic tests reviewed/disposition: No diagnosic tests pending after this hospitalization.  Home health/community services discussion/referrals: Patient does not have home health established from hospital visit.  They do not need home health.  If needed, we will set up home health for the patient.   Establishment or re-establishment of referral  orders for community resources: No other necessary community resources.   Discussion with other health care providers: No discussion with other health care providers necessary.     Transition of Care Visit:  I have reviewed and updated the history and problem list. I have reconciled the medication list. I have discussed the hospitalization and current medical issues, prognosis and plans with the patient/family. I spent more than 50% of time discussing the care with the patient/family. Total Face-to-Face Encounter: 60 minutes.    Medications Reconciliation:   I have reconciled the patient's home medications and discharge medications with the patient/family. I have updated all changes. Refer to After-Visit Medication List.    Disease/illness education: Dx-specific illness, importance of compliance with discharge instructions including all prescribed medications changes, treatments, follow-up appointments and all other provider recommendations to maximally affect post-discharge recovery and return to baseline, directives to ensure patient safety including COVID precautions: social distancing, mask use in public, vigilant hand hygiene and timely compliance with recommended immunization/booster regimen as recommended by the CDC at the time of the visit.  Instructions for the patient:    Scheduled Follow-up :  Future Appointments   Date Time Provider Department Center   3/21/2022  2:40 PM Yasmani Chairez MD ON CARDIO [facility]   5/18/2022 11:20 AM Yasmani Chairez MD [facility] CARDIO [facility]   8/19/2022  8:10 AM LABORATORY, [facility] LAB [facility]oulaye Morel   8/26/2022  9:20 AM Yumi Roberson MD [facility] MED [facility]oulaye Morel     After Visit Medication List:     Medication List          Accurate as of March 21, 2022 12:36 PM. If you have any questions, ask your nurse or doctor.            CHANGE how you take these medications    amLODIPine 10 MG tablet  Commonly known as: NORVASC  Take 1 tablet (10 mg total) by mouth every  evening.  What changed:   · how much to take  · when to take this     metoprolol tartrate 50 MG tablet  Commonly known as: LOPRESSOR  Take 1 tablet (50 mg total) by mouth every evening.  What changed:   · how much to take  · when to take this        CONTINUE taking these medications    aspirin 81 mg Tab     benazepriL 20 MG tablet  Commonly known as: LOTENSIN  Take 2 tablets (40 mg total) by mouth nightly.     * blood sugar diagnostic Strp  Use to check blood sugars BID     * blood sugar diagnostic Strp  1 each by Misc.(Non-Drug; Combo Route) route 3 (three) times daily.     blood-glucose meter kit  Use as instructed     citalopram 40 MG tablet  Commonly known as: CeleXA  TAKE ONE TABLET BY MOUTH AT BEDTIME.     clonazePAM 1 MG tablet  Commonly known as: KlonoPIN  Take 1 tablet (1 mg total) by mouth nightly as needed for Anxiety.     clopidogreL 75 mg tablet  Commonly known as: PLAVIX     cyclobenzaprine 10 MG tablet  Commonly known as: FLEXERIL  Take 1 tablet (10 mg total) by mouth 3 (three) times daily as needed.     finasteride 5 mg tablet  Commonly known as: PROSCAR  Take 1 tablet (5 mg total) by mouth nightly.     fluticasone propion-salmeterol 115-21 mcg/dose 115-21 mcg/actuation Hfaa inhaler  Commonly known as: ADVAIR HFA  Inhale 2 puffs into the lungs 2 (two) times daily.     gabapentin 600 MG tablet  Commonly known as: NEURONTIN  Take 1 tablet (600 mg total) by mouth nightly.     hydrALAZINE 25 MG tablet  Commonly known as: APRESOLINE  Take 1 tablet (25 mg total) by mouth nightly.     hydroCHLOROthiazide 12.5 mg capsule  Commonly known as: MICROZIDE  Take 2 capsules (25 mg total) by mouth nightly.     HYDROcodone-acetaminophen 7.5-325 mg per tablet  Commonly known as: NORCO     hydrOXYzine pamoate 25 MG Cap  Commonly known as: VISTARIL  Take 1 capsule (25 mg total) by mouth every evening.     isosorbide mononitrate 60 MG 24 hr tablet  Commonly known as: IMDUR  TAKE 1 TABLET BY MOUTH ONCE DAILY.     lancets  Misc  Commonly known as: ACCU-CHEK SOFTCLIX LANCETS  1 each by Misc.(Non-Drug; Combo Route) route 3 (three) times daily.     meloxicam 15 MG tablet  Commonly known as: MOBIC  TAKE 1 TABLET BY MOUTH ONCE DAILY FOR 5 DAYS.THEN ONCE DAILY AS NEEDED FOR BACK PAIN.     omeprazole 40 MG capsule  Commonly known as: PRILOSEC  Take 1 capsule (40 mg total) by mouth every evening.     RESTASIS MULTIDOSE 0.05 % Drop  Generic drug: cycloSPORINE     SPIRIVA WITH HANDIHALER 18 mcg inhalation capsule  Generic drug: tiotropium  INHALE THE CONTENTS OF ONE CAPSULE DAILY.     tamsulosin 0.4 mg Cap  Commonly known as: FLOMAX  Take 1 capsule (0.4 mg total) by mouth every evening.     traZODone 100 MG tablet  Commonly known as: DESYREL  Take 1 tablet (100 mg total) by mouth every evening.     venlafaxine 75 MG tablet  Commonly known as: EFFEXOR  Take 1 tablet (75 mg total) by mouth 2 (two) times daily.     zolpidem 10 mg Tab  Commonly known as: AMBIEN  TAKE 1 TABLET BY MOUTH AT BEDTIME AS NEEDED FOR INSOMNIA.         * This list has 2 medication(s) that are the same as other medications prescribed for you. Read the directions carefully, and ask your doctor or other care provider to review them with you.              ASSESSMENT & PLAN:   Mil was seen today for transitional care.  Diagnoses and all orders for this visit:    Advanced directives, counseling/discussion  Goals of Care, counseling/discussion  Advanced Care Directives Informational Packet with forms left in the home for family review, discussion and signing with instructions to return upon their next provider encounter for inclusion to the medical record.     Encounter for Support and Coordination of Transition of Care   - [facility] Nurse Practitioner to schedule home visit with patient PRN.    Patient Instructions Given:  - Continue all medications, treatments and therapies as ordered.   - Follow all instructions, recommendations as discussed.  - Maintain Safety  Precautions at all times.  - Attend all medical appointments as scheduled.  - For worsening symptoms: call Primary Care Physician or Nurse Practitioner.  - For emergencies, call 911 or immediately report to the nearest emergency room.    Were controlled substances prescribed?  NO    Patient consent was obtained prior to treatment on this visit.    Attestation: Screening criteria to assess the level of the patient's risk for infection with COVID-19 as recommended by the CDC at the time of the above documented home visit concluded appropriateness to proceed. Universal precautions were maintained at all times, including provider use of >60% alcohol gel hand  immediately prior to entry and upon departing the patient's home as well as cleaning of equipment used in home visit with antibacterial/germicidal disposable wipes.     Signature:      Jeferson Markham, MSN, APRN, FNP-C  [facility]    Total face-to-face time was 60 min, >50% of this was spent on counseling and coordination of care. The following issues were discussed: primary and secondary diagnoses, co-morbidities, prescribed medications, treatment modalities, importance of compliance with medical advice and directives for follow-up care.    With the consent of the patient and/or caregiver(s), 20 of the total face to face minutes included a comprehensive discussion regarding Advanced Care Planning. Sources of emotional and spiritual support were identified and encouraged for involvement to assist in finalization of decisions regarding the patient's goals of care (Living Will). Topics discussed include statutory guidelines of the [facility] to determine/delineate the legal surrogate medical decision maker(s) should the patient be deemed incompetent to self-direct medical care (next of kin vs. HCPOA) and the required documentation to ensure legal validity thereof (Advanced Directives/[place] Post).

## 2022-03-21 NOTE — PATIENT INSTRUCTIONS
- [facility] to schedule follow-up visit with patient in 4-6 weeks or as needed.  - Continue all medications, treatments and therapies as ordered.   - Follow all instructions, recommendations as discussed.  - Maintain Safety Precautions at all times.  - Attend all medical appointments as scheduled.  - For worsening symptoms: call Primary Care Physician or Nurse Practitioner.  - For emergencies, call 911 or immediately report to the nearest emergency room.  - Limit Risks of environmental exposure to coronavirus as discussed including: social distancing, hand hygiene, and limiting departures from the home for necessities only.     Your care is important to us. If your provider recommended a follow-up appointment or test, we are happy to help you coordinate your recommended care. It is important that you complete your recommended follow-up. If you need help scheduling, please call [phone]. Appointments can also be made online through the patient portal.  While scheduling and attending your appointments is your responsibility, our goal is to support and empower you throughout that process.    [facility] Care Line - 24/7 Assistance  Unless otherwise directed by your provider, please contact [facility], our nurse care line that is available for 24/7 assistance.  Registered nurses in the [facility] provide: appointment scheduling, clinical advisement, health education, and other advisory services.  Call: [phone] (toll free)    COVID-19 Prevention   Avoid close contact with people and stay home if youre sick, except to get medical care.   Cover coughs and sneezes with a tissue, or use the inside of your elbow. Immediately wash your hands or use hand .  For more information, see CDC link below:  https://www.cdc.gov/coronavirus/2019-ncov/hcp/guidance-prevent-spread.html#precautions     The following information is provided to all patients.  This information is to  help you find resources for any of the problems found today that may be affecting your health:            Living healthy guide: www.[facility].[place].[facility]    Understanding Diabetes: www.diabetes.org   Eating healthy: www.cdc.gov/healthyweight   CDC home safety checklist: www.cdc.gov/steadi/patient.html  Agency on Aging: www.goea.[place].[facility]    Alcoholics anonymous (AA): www.aa.org   Physical Activity: www.bhumika.nih.gov/fc7xrkp    Tobacco use: www.quitwithusla.org

## 2022-04-28 DIAGNOSIS — M79.601 RIGHT ARM PAIN: ICD-10-CM

## 2022-04-28 RX ORDER — MELOXICAM 15 MG/1
TABLET ORAL
Qty: 30 TABLET | Refills: 0 | Status: SHIPPED | OUTPATIENT
Start: 2022-04-28 | End: 2022-05-20

## 2022-04-28 RX ORDER — HYDRALAZINE HYDROCHLORIDE 25 MG/1
TABLET, FILM COATED ORAL
Qty: 90 TABLET | Refills: 0 | Status: SHIPPED | OUTPATIENT
Start: 2022-04-28 | End: 2022-06-22

## 2022-04-29 DIAGNOSIS — F51.05 INSOMNIA DUE TO ANXIETY AND FEAR: ICD-10-CM

## 2022-04-29 DIAGNOSIS — F40.9 INSOMNIA DUE TO ANXIETY AND FEAR: ICD-10-CM

## 2022-04-29 DIAGNOSIS — M47.812 CERVICAL SPONDYLOSIS: ICD-10-CM

## 2022-04-29 RX ORDER — ZOLPIDEM TARTRATE 10 MG/1
TABLET ORAL
Qty: 30 TABLET | Refills: 2 | Status: SHIPPED | OUTPATIENT
Start: 2022-04-29 | End: 2022-08-02

## 2022-04-29 RX ORDER — GABAPENTIN 600 MG/1
600 TABLET ORAL NIGHTLY
Qty: 90 TABLET | Refills: 1 | Status: SHIPPED | OUTPATIENT
Start: 2022-04-29 | End: 2023-05-03 | Stop reason: SDUPTHER

## 2022-04-29 NOTE — TELEPHONE ENCOUNTER
----- Message from Tia Mendes sent at 4/29/2022 11:37 AM CDT -----  Regarding: refill  Contact: patient  Type:  RX Refill Request    Who Called: patient  Refill or New Rx:refill  RX Name and Strength:Ambien-10mg  How is the patient currently taking it? (ex. 1XDay):nightly  Is this a 30 day or 90 day RX:  Preferred Pharmacy with phone number:[facility]  Local or Mail Order:local  Ordering Provider:Dr Roberson  Would the patient rather a call back or a response via [facility]? call  Best Call Back Number:[phone]  Additional Information:     Type:  RX Refill Request    Who Called: patient  Refill or New Rx:refill  RX Name and Strength:Gabapentin-600mg  How is the patient currently taking it? (ex. 1XDay):one nightly  Is this a 30 day or 90 day RX:90  Preferred Pharmacy with phone number:[facility]  Local or Mail Order:local  Ordering Provider:Dr Roberson  Would the patient rather a call back or a response via [facility]? call  Best Call Back Number:[phone]  Additional Information:

## 2022-04-29 NOTE — TELEPHONE ENCOUNTER
No new care gaps identified.  Powered by [employer]. Reference number: [id number].   4/29/2022 3:48:08 PM CDT

## 2022-05-06 ENCOUNTER — OFFICE VISIT (OUTPATIENT)
Dept: CARDIOLOGY | Facility: CLINIC | Age: 67
End: 2022-05-06
Payer: MEDICARE

## 2022-05-06 VITALS
DIASTOLIC BLOOD PRESSURE: 90 MMHG | HEART RATE: 118 BPM | SYSTOLIC BLOOD PRESSURE: 146 MMHG | HEIGHT: 70 IN | WEIGHT: 171.94 LBS | BODY MASS INDEX: 24.61 KG/M2 | OXYGEN SATURATION: 96 %

## 2022-05-06 DIAGNOSIS — Z95.1 HX OF CABG: ICD-10-CM

## 2022-05-06 DIAGNOSIS — Z78.9 STATIN INTOLERANCE: ICD-10-CM

## 2022-05-06 DIAGNOSIS — I10 ESSENTIAL HYPERTENSION: Chronic | ICD-10-CM

## 2022-05-06 DIAGNOSIS — I25.118 CORONARY ARTERY DISEASE OF NATIVE ARTERY OF NATIVE HEART WITH STABLE ANGINA PECTORIS: Primary | ICD-10-CM

## 2022-05-06 DIAGNOSIS — E78.2 MIXED HYPERLIPIDEMIA: ICD-10-CM

## 2022-05-06 PROCEDURE — 3288F PR FALLS RISK ASSESSMENT DOCUMENTED: ICD-10-PCS | Mod: CPTII,S$GLB,, | Performed by: INTERNAL MEDICINE

## 2022-05-06 PROCEDURE — 1101F PR PT FALLS ASSESS DOC 0-1 FALLS W/OUT INJ PAST YR: ICD-10-PCS | Mod: CPTII,S$GLB,, | Performed by: INTERNAL MEDICINE

## 2022-05-06 PROCEDURE — 4010F ACE/ARB THERAPY RXD/TAKEN: CPT | Mod: CPTII,S$GLB,, | Performed by: INTERNAL MEDICINE

## 2022-05-06 PROCEDURE — 1159F MED LIST DOCD IN RCRD: CPT | Mod: CPTII,S$GLB,, | Performed by: INTERNAL MEDICINE

## 2022-05-06 PROCEDURE — 3080F PR MOST RECENT DIASTOLIC BLOOD PRESSURE >= 90 MM HG: ICD-10-PCS | Mod: CPTII,S$GLB,, | Performed by: INTERNAL MEDICINE

## 2022-05-06 PROCEDURE — 99499 RISK ADDL DX/OHS AUDIT: ICD-10-PCS | Mod: S$GLB,,, | Performed by: INTERNAL MEDICINE

## 2022-05-06 PROCEDURE — 99215 OFFICE O/P EST HI 40 MIN: CPT | Mod: S$GLB,,, | Performed by: INTERNAL MEDICINE

## 2022-05-06 PROCEDURE — 3077F PR MOST RECENT SYSTOLIC BLOOD PRESSURE >= 140 MM HG: ICD-10-PCS | Mod: CPTII,S$GLB,, | Performed by: INTERNAL MEDICINE

## 2022-05-06 PROCEDURE — 99215 PR OFFICE/OUTPT VISIT, EST, LEVL V, 40-54 MIN: ICD-10-PCS | Mod: S$GLB,,, | Performed by: INTERNAL MEDICINE

## 2022-05-06 PROCEDURE — 3008F PR BODY MASS INDEX (BMI) DOCUMENTED: ICD-10-PCS | Mod: CPTII,S$GLB,, | Performed by: INTERNAL MEDICINE

## 2022-05-06 PROCEDURE — 1160F RVW MEDS BY RX/DR IN RCRD: CPT | Mod: CPTII,S$GLB,, | Performed by: INTERNAL MEDICINE

## 2022-05-06 PROCEDURE — 1126F AMNT PAIN NOTED NONE PRSNT: CPT | Mod: CPTII,S$GLB,, | Performed by: INTERNAL MEDICINE

## 2022-05-06 PROCEDURE — 99999 PR PBB SHADOW E&M-EST. PATIENT-LVL V: ICD-10-PCS | Mod: PBBFAC,,, | Performed by: INTERNAL MEDICINE

## 2022-05-06 PROCEDURE — 3008F BODY MASS INDEX DOCD: CPT | Mod: CPTII,S$GLB,, | Performed by: INTERNAL MEDICINE

## 2022-05-06 PROCEDURE — 99999 PR PBB SHADOW E&M-EST. PATIENT-LVL V: CPT | Mod: PBBFAC,,, | Performed by: INTERNAL MEDICINE

## 2022-05-06 PROCEDURE — 3044F HG A1C LEVEL LT 7.0%: CPT | Mod: CPTII,S$GLB,, | Performed by: INTERNAL MEDICINE

## 2022-05-06 PROCEDURE — 3044F PR MOST RECENT HEMOGLOBIN A1C LEVEL <7.0%: ICD-10-PCS | Mod: CPTII,S$GLB,, | Performed by: INTERNAL MEDICINE

## 2022-05-06 PROCEDURE — 1126F PR PAIN SEVERITY QUANTIFIED, NO PAIN PRESENT: ICD-10-PCS | Mod: CPTII,S$GLB,, | Performed by: INTERNAL MEDICINE

## 2022-05-06 PROCEDURE — 3077F SYST BP >= 140 MM HG: CPT | Mod: CPTII,S$GLB,, | Performed by: INTERNAL MEDICINE

## 2022-05-06 PROCEDURE — 1160F PR REVIEW ALL MEDS BY PRESCRIBER/CLIN PHARMACIST DOCUMENTED: ICD-10-PCS | Mod: CPTII,S$GLB,, | Performed by: INTERNAL MEDICINE

## 2022-05-06 PROCEDURE — 3080F DIAST BP >= 90 MM HG: CPT | Mod: CPTII,S$GLB,, | Performed by: INTERNAL MEDICINE

## 2022-05-06 PROCEDURE — 3288F FALL RISK ASSESSMENT DOCD: CPT | Mod: CPTII,S$GLB,, | Performed by: INTERNAL MEDICINE

## 2022-05-06 PROCEDURE — 1159F PR MEDICATION LIST DOCUMENTED IN MEDICAL RECORD: ICD-10-PCS | Mod: CPTII,S$GLB,, | Performed by: INTERNAL MEDICINE

## 2022-05-06 PROCEDURE — 99499 UNLISTED E&M SERVICE: CPT | Mod: S$GLB,,, | Performed by: INTERNAL MEDICINE

## 2022-05-06 PROCEDURE — 1101F PT FALLS ASSESS-DOCD LE1/YR: CPT | Mod: CPTII,S$GLB,, | Performed by: INTERNAL MEDICINE

## 2022-05-06 PROCEDURE — 4010F PR ACE/ARB THEARPY RXD/TAKEN: ICD-10-PCS | Mod: CPTII,S$GLB,, | Performed by: INTERNAL MEDICINE

## 2022-05-06 RX ORDER — METOPROLOL TARTRATE 50 MG/1
50 TABLET ORAL 2 TIMES DAILY
Qty: 60 TABLET | Refills: 4 | Status: SHIPPED | OUTPATIENT
Start: 2022-05-06 | End: 2022-06-22

## 2022-05-06 RX ORDER — EVOLOCUMAB 140 MG/ML
140 INJECTION, SOLUTION SUBCUTANEOUS
Qty: 2 EACH | Refills: 5 | Status: SHIPPED | OUTPATIENT
Start: 2022-05-06 | End: 2022-06-23

## 2022-05-06 NOTE — PROGRESS NOTES
Subjective:   Patient ID:  Mil Sood is a 67 y.o. male who presents for follow up of Hospital Follow Up      67 yo male, came in for s/p CABG f/u  PMH CAD s/p CABG x4 in , HTN HLD. PreDM. Anxiety, no smoking/drinking. Statin intolerance for bone pain.  C/o chest pain for 4 months, central chest, twice a week, stabbing pain, radiating to the left arm. Occurred at rest. Lasted for 10 minutes and resolved spontaneously  Walking caused SOB but no pain.   Phar. MPI ordered by PCP for chest pain and showed apical and inferoseptal ischemia.  ekg NSR and no acute stt change  Father had PPM and mother ok. Sister  of lung cancer     Pt had LHC done on 2022 bu Dr. Godfrey, revealing:  lmca 50% eccentric calcified ivus csa 5.4  LAd HEAVILY CALCIFIED MID LAD LONG LESION 50-70% IN MAGNITUDE CSA 2 MM2.  LCX MID LONG 40% HEAVILY CALCIFIED.   OM1 90% PROX.  D1 CALCIFIED NON OBS DISEASE.   RCA CALCIFIED DIFFUSE DISEASE PDA OSTIUM 90% PLB 90% .  EF NORMAL.      visit states that still has exertional chest pain and Left arm pain. Has SOB,   Prefers to go to Dr. Mcmanus for CABG at [facility]. His wife worked before at Dr. Mcmanus's office    Interval history  S/p 4v CABG (LIMA to LAD, SVG to diagonal, SVG to OM and SVG to rPDA) on 3/11/2022 by Dr. Mcmanus. Continue aspirin, Plavix, and beta-blocker.  2022 admitted for Syncope ad OH. SBP dropped to 55 mmHG. Cr 1.3 and IVF Rx  EKG NSR  Echo 1. Normal left ventricular systolic function. LVEF 55 - 65%. Mild (Grade I) diastolic   dysfunction (impaired relaxation). Mild asymmetric septal hypertrophy. No LV outflow   tract obstruction.   2. Normal right ventricular size and systolic function.   3. Mild mitral valve regurgitation.   4. Mild aortic valve regurgitation.   5. Mild tricuspid valve regurgitation.     After d/c no recurrent syncope, no chest pain dizziness SOB leg swelling  Living with his son.         22 pt declined cardiac rehab  06/15 high copay for  repatha. declined    Past Medical History:   Diagnosis Date    Anxiety     Cancer     spot on kidney and lung    Diabetes mellitus     Borderline diet controlled    Diverticulitis     GERD (gastroesophageal reflux disease)     Hyperlipidemia     not on any meds currently    Hypertension     Lung mass     Mass of kidney        Past Surgical History:   Procedure Laterality Date    CHOLECYSTECTOMY      COLONOSCOPY      COLONOSCOPY N/A 2017    Procedure: COLONOSCOPY;  Surgeon: Amarilys Bravo MD;  Location: [facility] ENDO;  Service: Endoscopy;  Laterality: N/A;    DIAGNOSTIC LAPAROSCOPY N/A 2021    Procedure: LAPAROSCOPY, DIAGNOSTIC;  Surgeon: Shaji Jauregui MD;  Location: [facility] OR;  Service: General;  Laterality: N/A;    ESOPHAGOGASTRODUODENOSCOPY      ESOPHAGOGASTRODUODENOSCOPY N/A 2020    Procedure: ESOPHAGOGASTRODUODENOSCOPY (EGD);  Surgeon: Shaji Aquino III, MD;  Location: [facility] ENDO;  Service: Endoscopy;  Laterality: N/A;    HERNIA REPAIR Bilateral     groin    LEFT HEART CATHETERIZATION Left 2022    Procedure: CATHETERIZATION, HEART, LEFT;  Surgeon: Romeo Godfrey MD;  Location: [facility] CATH LAB;  Service: Cardiology;  Laterality: Left;    NECK SURGERY      PARTIAL NEPHRECTOMY      left    RENAL BIOPSY      ROBOT-ASSISTED REPAIR OF UMBILICAL HERNIA USING DA NEL XI N/A 2021    Procedure: XI ROBOTIC REPAIR, HERNIA, UMBILICAL;  Surgeon: Shaji Jauregui MD;  Location: [facility] OR;  Service: General;  Laterality: N/A;  converted to open    TONSILLECTOMY      TRIGGER FINGER RELEASE Right     long finger       Social History     Tobacco Use    Smoking status: Former Smoker     Packs/day: 1.00     Years: 20.00     Pack years: 20.00     Quit date: 1995     Years since quittin.2    Smokeless tobacco: Never Used   Substance Use Topics    Alcohol use: Never     Alcohol/week: 0.0 standard drinks    Drug use: No       Family History   Problem Relation Age of Onset     Hypertension Mother     Hypertension Father     Melanoma Father     Cancer Sister          Review of Systems   Constitutional: Negative for decreased appetite, diaphoresis, fever, malaise/fatigue and night sweats.   HENT: Negative for nosebleeds.    Eyes: Negative for blurred vision and double vision.   Cardiovascular: Positive for dyspnea on exertion. Negative for claudication, irregular heartbeat, leg swelling, near-syncope, orthopnea, palpitations, paroxysmal nocturnal dyspnea and syncope.   Respiratory: Negative for cough, shortness of breath, sleep disturbances due to breathing, snoring, sputum production and wheezing.    Endocrine: Negative for cold intolerance and polyuria.   Hematologic/Lymphatic: Does not bruise/bleed easily.   Skin: Negative for rash.   Musculoskeletal: Negative for back pain, falls, joint pain, joint swelling and neck pain.   Gastrointestinal: Negative for abdominal pain, heartburn, nausea and vomiting.   Genitourinary: Negative for dysuria, frequency and hematuria.   Neurological: Negative for difficulty with concentration, dizziness, focal weakness, headaches, light-headedness, numbness, seizures and weakness.   Psychiatric/Behavioral: Negative for depression, memory loss and substance abuse. The patient does not have insomnia.    Allergic/Immunologic: Negative for HIV exposure and hives.       Objective:   Physical Exam  HENT:      Head: Normocephalic.   Eyes:      Pupils: Pupils are equal, round, and reactive to light.   Neck:      Thyroid: No thyromegaly.      Vascular: Normal carotid pulses. No carotid bruit or JVD.   Cardiovascular:      Rate and Rhythm: Normal rate and regular rhythm.  No extrasystoles are present.     Chest Wall: PMI is not displaced.      Pulses: Normal pulses.           Carotid pulses are 2+ on the right side and 2+ on the left side.     Heart sounds: Normal heart sounds. No murmur heard.    No gallop. No S3 sounds.      Comments: S/p  sternotomy    Pulmonary:      Effort: No respiratory distress.      Breath sounds: Normal breath sounds. No stridor.   Abdominal:      General: Bowel sounds are normal.      Palpations: Abdomen is soft.      Tenderness: There is no abdominal tenderness. There is no rebound.   Musculoskeletal:         General: Normal range of motion.   Skin:     Findings: No rash.   Neurological:      Mental Status: He is alert and oriented to person, place, and time.   Psychiatric:         Behavior: Behavior normal.         Lab Results   Component Value Date    CHOL 212 (H) 02/11/2022    CHOL 235 (H) 08/17/2021    CHOL 236 (H) 01/27/2021     Lab Results   Component Value Date    HDL 49 02/11/2022    HDL 45 08/17/2021    HDL 54 01/27/2021     Lab Results   Component Value Date    LDLCALC 127.4 02/11/2022    LDLCALC 145.8 08/17/2021    LDLCALC 146.8 01/27/2021     Lab Results   Component Value Date    TRIG 178 (H) 02/11/2022    TRIG 221 (H) 08/17/2021    TRIG 176 (H) 01/27/2021     Lab Results   Component Value Date    CHOLHDL 23.1 02/11/2022    CHOLHDL 19.1 (L) 08/17/2021    CHOLHDL 22.9 01/27/2021       Chemistry        Component Value Date/Time     02/11/2022 0858    K 4.9 02/11/2022 0858     02/11/2022 0858    CO2 27 02/11/2022 0858    BUN 17 02/11/2022 0858    CREATININE 1.2 02/11/2022 0858    GLU 95 02/11/2022 0858        Component Value Date/Time    CALCIUM 9.4 02/11/2022 0858    ALKPHOS 64 02/11/2022 0858    AST 12 02/11/2022 0858    ALT 12 02/11/2022 0858    BILITOT 0.4 02/11/2022 0858    ESTGFRAFRICA >60.0 02/11/2022 0858    EGFRNONAA >60.0 02/11/2022 0858          Lab Results   Component Value Date    HGBA1C 5.2 03/09/2022     Lab Results   Component Value Date    TSH 1.415 11/01/2018     Lab Results   Component Value Date    INR 1.0 01/26/2022    INR 1.0 02/03/2020    INR 1.0 09/04/2019     Lab Results   Component Value Date    WBC 6.97 02/11/2022    HGB 13.2 (L) 03/11/2022    HCT 39.8 (L) 03/11/2022    MCV 93  02/11/2022     02/11/2022     BMP  Sodium   Date Value Ref Range Status   02/11/2022 140 136 - 145 mmol/L Final     Potassium   Date Value Ref Range Status   02/11/2022 4.9 3.5 - 5.1 mmol/L Final     Chloride   Date Value Ref Range Status   02/11/2022 102 95 - 110 mmol/L Final     CO2   Date Value Ref Range Status   02/11/2022 27 23 - 29 mmol/L Final     BUN   Date Value Ref Range Status   02/11/2022 17 8 - 23 mg/dL Final     Creatinine   Date Value Ref Range Status   02/11/2022 1.2 0.5 - 1.4 mg/dL Final     Calcium   Date Value Ref Range Status   02/11/2022 9.4 8.7 - 10.5 mg/dL Final     Anion Gap   Date Value Ref Range Status   02/11/2022 11 8 - 16 mmol/L Final     eGFR if    Date Value Ref Range Status   02/11/2022 >60.0 >60 mL/min/1.73 m^2 Final     eGFR if non    Date Value Ref Range Status   02/11/2022 >60.0 >60 mL/min/1.73 m^2 Final     Comment:     Calculation used to obtain the estimated glomerular filtration  rate (eGFR) is the CKD-EPI equation.        BNP  @LABRCNTIP(BNP,BNPTRIAGEBLO)@  @LABRCNTIP(troponini)@  CrCl cannot be calculated (Patient's most recent lab result is older than the maximum 7 days allowed.).  No results found in the last 24 hours.  No results found in the last 24 hours.  No results found in the last 24 hours.    Assessment:      1. Coronary artery disease of native artery of native heart with stable angina pectoris    2. Statin intolerance    3. Essential hypertension    4. Mixed hyperlipidemia    5. Hx of CABG x4 in         Plan:   Increase lorpessor to 50 mg bid for HTN and post CABG  Add repatha due to s/p cabg, HLD and statin intolerance  Refer to cardial rehab stage II and ETT  Continue ASA Plavix amlodipine benazepril metoprolol HCTZ hydralazine imdur    Counseled DASH  Check Lipid profile in 6 months  Recommend heart-healthy diet, weight control  Shruti. Risk modification.   I have reviewed all pertinent labs and cardiac studies  independently. Plans and recommendations have been formulated under my direct supervision. All questions answered and patient voiced understanding.   If symptoms persist go to the ED  RTC in 3 months

## 2022-05-09 ENCOUNTER — TELEPHONE (OUTPATIENT)
Dept: CARDIAC REHAB | Facility: HOSPITAL | Age: 67
End: 2022-05-09
Payer: MEDICARE

## 2022-05-09 RX ORDER — BENAZEPRIL HYDROCHLORIDE 20 MG/1
TABLET ORAL
Qty: 180 TABLET | Refills: 0 | Status: SHIPPED | OUTPATIENT
Start: 2022-05-09 | End: 2022-08-04

## 2022-05-09 NOTE — TELEPHONE ENCOUNTER
Refill Routing Note   Medication(s) are not appropriate for processing by [facility] for the following reason(s):      - Required vitals are abnormal  - Patient has been seen in the ED/Hospital since the last PCP visit    [facility] action(s):  Defer          Medication reconciliation completed: No     Appointments  past 12m or future 3m with PCP    Date Provider   Last Visit   2/18/2022 Yumi Roberson MD   Next Visit   5/13/2022 Yumi Roberson MD   ED visits in past 90 days: 0        Note composed:4:28 PM 05/09/2022

## 2022-05-09 NOTE — TELEPHONE ENCOUNTER
No new care gaps identified.  [facility] Embedded Care Gaps. Reference number: [id number]. 5/09/2022   2:24:49 PM CDT

## 2022-05-12 ENCOUNTER — PATIENT OUTREACH (OUTPATIENT)
Dept: ADMINISTRATIVE | Facility: HOSPITAL | Age: 67
End: 2022-05-12
Payer: MEDICARE

## 2022-05-13 ENCOUNTER — OFFICE VISIT (OUTPATIENT)
Dept: FAMILY MEDICINE | Facility: CLINIC | Age: 67
End: 2022-05-13
Payer: MEDICARE

## 2022-05-13 VITALS
OXYGEN SATURATION: 98 % | WEIGHT: 174.81 LBS | HEIGHT: 70 IN | DIASTOLIC BLOOD PRESSURE: 92 MMHG | BODY MASS INDEX: 25.03 KG/M2 | HEART RATE: 116 BPM | SYSTOLIC BLOOD PRESSURE: 132 MMHG | TEMPERATURE: 98 F

## 2022-05-13 DIAGNOSIS — I95.1 ORTHOSTATIC HYPOTENSION: Primary | ICD-10-CM

## 2022-05-13 DIAGNOSIS — I10 ESSENTIAL HYPERTENSION: Chronic | ICD-10-CM

## 2022-05-13 PROCEDURE — 99214 PR OFFICE/OUTPT VISIT, EST, LEVL IV, 30-39 MIN: ICD-10-PCS | Mod: S$GLB,,, | Performed by: FAMILY MEDICINE

## 2022-05-13 PROCEDURE — 3008F BODY MASS INDEX DOCD: CPT | Mod: CPTII,S$GLB,, | Performed by: FAMILY MEDICINE

## 2022-05-13 PROCEDURE — 1159F PR MEDICATION LIST DOCUMENTED IN MEDICAL RECORD: ICD-10-PCS | Mod: CPTII,S$GLB,, | Performed by: FAMILY MEDICINE

## 2022-05-13 PROCEDURE — 1101F PR PT FALLS ASSESS DOC 0-1 FALLS W/OUT INJ PAST YR: ICD-10-PCS | Mod: CPTII,S$GLB,, | Performed by: FAMILY MEDICINE

## 2022-05-13 PROCEDURE — 3080F DIAST BP >= 90 MM HG: CPT | Mod: CPTII,S$GLB,, | Performed by: FAMILY MEDICINE

## 2022-05-13 PROCEDURE — 99214 OFFICE O/P EST MOD 30 MIN: CPT | Mod: S$GLB,,, | Performed by: FAMILY MEDICINE

## 2022-05-13 PROCEDURE — 3044F PR MOST RECENT HEMOGLOBIN A1C LEVEL <7.0%: ICD-10-PCS | Mod: CPTII,S$GLB,, | Performed by: FAMILY MEDICINE

## 2022-05-13 PROCEDURE — 1101F PT FALLS ASSESS-DOCD LE1/YR: CPT | Mod: CPTII,S$GLB,, | Performed by: FAMILY MEDICINE

## 2022-05-13 PROCEDURE — 4010F ACE/ARB THERAPY RXD/TAKEN: CPT | Mod: CPTII,S$GLB,, | Performed by: FAMILY MEDICINE

## 2022-05-13 PROCEDURE — 3008F PR BODY MASS INDEX (BMI) DOCUMENTED: ICD-10-PCS | Mod: CPTII,S$GLB,, | Performed by: FAMILY MEDICINE

## 2022-05-13 PROCEDURE — 1159F MED LIST DOCD IN RCRD: CPT | Mod: CPTII,S$GLB,, | Performed by: FAMILY MEDICINE

## 2022-05-13 PROCEDURE — 3080F PR MOST RECENT DIASTOLIC BLOOD PRESSURE >= 90 MM HG: ICD-10-PCS | Mod: CPTII,S$GLB,, | Performed by: FAMILY MEDICINE

## 2022-05-13 PROCEDURE — 99999 PR PBB SHADOW E&M-EST. PATIENT-LVL V: ICD-10-PCS | Mod: PBBFAC,,, | Performed by: FAMILY MEDICINE

## 2022-05-13 PROCEDURE — 3288F PR FALLS RISK ASSESSMENT DOCUMENTED: ICD-10-PCS | Mod: CPTII,S$GLB,, | Performed by: FAMILY MEDICINE

## 2022-05-13 PROCEDURE — 3075F PR MOST RECENT SYSTOLIC BLOOD PRESS GE 130-139MM HG: ICD-10-PCS | Mod: CPTII,S$GLB,, | Performed by: FAMILY MEDICINE

## 2022-05-13 PROCEDURE — 4010F PR ACE/ARB THEARPY RXD/TAKEN: ICD-10-PCS | Mod: CPTII,S$GLB,, | Performed by: FAMILY MEDICINE

## 2022-05-13 PROCEDURE — 3075F SYST BP GE 130 - 139MM HG: CPT | Mod: CPTII,S$GLB,, | Performed by: FAMILY MEDICINE

## 2022-05-13 PROCEDURE — 3288F FALL RISK ASSESSMENT DOCD: CPT | Mod: CPTII,S$GLB,, | Performed by: FAMILY MEDICINE

## 2022-05-13 PROCEDURE — 99999 PR PBB SHADOW E&M-EST. PATIENT-LVL V: CPT | Mod: PBBFAC,,, | Performed by: FAMILY MEDICINE

## 2022-05-13 PROCEDURE — 1126F AMNT PAIN NOTED NONE PRSNT: CPT | Mod: CPTII,S$GLB,, | Performed by: FAMILY MEDICINE

## 2022-05-13 PROCEDURE — 3044F HG A1C LEVEL LT 7.0%: CPT | Mod: CPTII,S$GLB,, | Performed by: FAMILY MEDICINE

## 2022-05-13 PROCEDURE — 1126F PR PAIN SEVERITY QUANTIFIED, NO PAIN PRESENT: ICD-10-PCS | Mod: CPTII,S$GLB,, | Performed by: FAMILY MEDICINE

## 2022-05-13 RX ORDER — CYCLOBENZAPRINE HCL 10 MG
10 TABLET ORAL 3 TIMES DAILY PRN
Qty: 90 TABLET | Refills: 0 | Status: SHIPPED | OUTPATIENT
Start: 2022-05-13 | End: 2022-05-26

## 2022-05-13 NOTE — PROGRESS NOTES
Chief Complaint:    Chief Complaint   Patient presents with    Hospital Follow Up       History of Present Illness:  Family and/or Caretaker present at visit?  No.  Diagnostic tests reviewed/disposition: I have reviewed all completed as well as pending diagnostic tests at the time of discharge.  Disease/illness education: y  Home health/community services discussion/referrals: Patient does not have home health established from hospital visit.  They do not need home health.  If needed, we will set up home health for the patient.   Establishment or re-establishment of referral orders for community resources: No other necessary community resources.   Discussion with other health care providers: No discussion with other health care providers necessary.       Patient with cervical spondylosis, prediabetes, GERD, HTN, CAD, HLD, presents today for a hospital follow-up    Syncopal episode, orthostatic HTN on 04/26. Lives with his son, but his son didn't witness the episode.   His cardiac workup was unremarkable.  He had a CABG recently.  He was found to be significantly orthostatic in the hospital and it was resume that is medications/dysfunction of the a tonic nervous system was the cause.  Recently had a visit with Cardiology and no changes have been made so far been feeling okay since discharge.  Checks his BP everyday, it runs normally. ~132-140/80-90. Has a normal diet.  Saw his cardiologist on 03/09.   Not currently taking Flomax. Denies difficulty urinating.        ROS:  Review of Systems   Constitutional: Negative for activity change, chills, fatigue, fever and unexpected weight change.   HENT: Negative for congestion, ear discharge, ear pain, hearing loss, postnasal drip and rhinorrhea.    Eyes: Negative for pain and visual disturbance.   Respiratory: Negative for cough, chest tightness and shortness of breath.    Cardiovascular: Negative for chest pain and palpitations.   Gastrointestinal: Negative for  "abdominal pain, diarrhea and vomiting.   Endocrine: Negative for heat intolerance.   Genitourinary: Negative for difficulty urinating, dysuria, flank pain, frequency and hematuria.   Musculoskeletal: Negative for back pain, gait problem and neck pain.   Skin: Negative for color change and rash.   Neurological: Negative for dizziness, tremors, seizures, numbness and headaches.   Psychiatric/Behavioral: Negative for agitation, hallucinations, self-injury, sleep disturbance and suicidal ideas. The patient is not nervous/anxious.    All other systems reviewed and are negative.      Past Medical History:   Diagnosis Date    Anxiety     Cancer     spot on kidney and lung    Diabetes mellitus     Borderline diet controlled    Diverticulitis     GERD (gastroesophageal reflux disease)     Hyperlipidemia     not on any meds currently    Hypertension     Lung mass     Mass of kidney        Social History:  Social History     Socioeconomic History    Marital status:    Tobacco Use    Smoking status: Former Smoker     Packs/day: 1.00     Years: 20.00     Pack years: 20.00     Quit date: 1995     Years since quittin.2    Smokeless tobacco: Never Used   Substance and Sexual Activity    Alcohol use: Never     Alcohol/week: 0.0 standard drinks    Drug use: No    Sexual activity: Yes     Partners: Female     Birth control/protection: None       Family History:   family history includes Cancer in his sister; Hypertension in his father and mother; Melanoma in his father.    Health Maintenance   Topic Date Due    Lipid Panel  2023    TETANUS VACCINE  2028    Hepatitis C Screening  Completed    Abdominal Aortic Aneurysm Screening  Completed       Physical Exam:    Vital Signs  Temp: 98.4 °F (36.9 °C)  Pulse: (!) 116  SpO2: 98 %  BP: (!) 132/92  Pain Score: 0-No pain  Height and Weight  Height: 5' 10" (177.8 cm)  Weight: 79.3 kg (174 lb 13.2 oz)  BSA (Calculated - sq m): 1.98 sq meters  BMI " (Calculated): 25.1  Weight in (lb) to have BMI = 25: 173.9]    Body mass index is 25.08 kg/m².    Physical Exam  Vitals and nursing note reviewed.   Constitutional:       Appearance: Normal appearance.   HENT:      Head: Normocephalic and atraumatic.      Right Ear: Tympanic membrane normal.      Left Ear: Tympanic membrane normal.   Eyes:      Extraocular Movements: Extraocular movements intact.      Pupils: Pupils are equal, round, and reactive to light.   Cardiovascular:      Rate and Rhythm: Normal rate and regular rhythm.      Pulses: Normal pulses.      Heart sounds: Normal heart sounds. No murmur heard.    No gallop.   Pulmonary:      Effort: Pulmonary effort is normal. No respiratory distress.      Breath sounds: Normal breath sounds. No wheezing, rhonchi or rales.   Abdominal:      General: There is no distension.      Palpations: Abdomen is soft.      Tenderness: There is no abdominal tenderness.   Musculoskeletal:         General: No swelling, deformity or signs of injury. Normal range of motion.      Cervical back: Normal range of motion.   Skin:     General: Skin is warm and dry.      Capillary Refill: Capillary refill takes less than 2 seconds.      Coloration: Skin is not jaundiced or pale.   Neurological:      General: No focal deficit present.      Mental Status: He is alert and oriented to person, place, and time.   Psychiatric:         Mood and Affect: Mood normal.         Behavior: Behavior normal.           Assessment:      ICD-10-CM ICD-9-CM   1. Orthostatic hypotension  I95.1 458.0         Plan:     Avoid standing up too fast.  Optimize fluid intake  Please discontinue using Flomax  Continue monitoring blood pressure. Keep < 140/90.  Wear compression stockings daily.   No orders of the defined types were placed in this encounter.      Current Outpatient Medications   Medication Sig Dispense Refill    amLODIPine (NORVASC) 10 MG tablet Take 1 tablet (10 mg total) by mouth every evening.  (Patient taking differently: Take 5 mg by mouth once daily.) 90 tablet 2    aspirin 81 mg Tab Take 1 tablet by mouth nightly.       benazepriL (LOTENSIN) 20 MG tablet TAKE 2 TABLETS BY MOUTH NIGHTLY. 180 tablet 0    blood sugar diagnostic Strp Use to check blood sugars  strip 3    blood sugar diagnostic Strp 1 each by Misc.(Non-Drug; Combo Route) route 3 (three) times daily. 100 each 12    blood-glucose meter kit Use as instructed 1 each 1    citalopram (CELEXA) 40 MG tablet TAKE ONE TABLET BY MOUTH AT BEDTIME. 90 tablet 1    clonazePAM (KLONOPIN) 1 MG tablet Take 1 tablet (1 mg total) by mouth nightly as needed for Anxiety. 30 tablet 1    clopidogreL (PLAVIX) 75 mg tablet Take 75 mg by mouth once daily.      evolocumab (REPATHA SURECLICK) 140 mg/mL PnIj Inject 1 mL (140 mg total) into the skin every 14 (fourteen) days. 2 each 5    finasteride (PROSCAR) 5 mg tablet Take 1 tablet (5 mg total) by mouth nightly. 90 tablet 1    fluticasone propion-salmeterol 115-21 mcg/dose (ADVAIR HFA) 115-21 mcg/actuation HFAA inhaler Inhale 2 puffs into the lungs 2 (two) times daily. 12 g 12    gabapentin (NEURONTIN) 600 MG tablet Take 1 tablet (600 mg total) by mouth nightly. 90 tablet 1    hydrALAZINE (APRESOLINE) 25 MG tablet TAKE 1 TABLET BY MOUTH AT NIGHT. 90 tablet 0    hydroCHLOROthiazide (MICROZIDE) 12.5 mg capsule Take 2 capsules (25 mg total) by mouth nightly. 30 capsule 1    HYDROcodone-acetaminophen (NORCO) 7.5-325 mg per tablet Take 1 tablet by mouth every 6 (six) hours as needed for Pain.      hydrOXYzine pamoate (VISTARIL) 25 MG Cap Take 1 capsule (25 mg total) by mouth every evening. 30 capsule 1    lancets (ACCU-CHEK SOFTCLIX LANCETS) Misc 1 each by Misc.(Non-Drug; Combo Route) route 3 (three) times daily. 100 each 12    meloxicam (MOBIC) 15 MG tablet TAKE 1 TABLET BY MOUTH ONCE DAILY FOR 5 DAYS.THEN ONCE DAILY AS NEEDED FOR BACK PAIN. 30 tablet 0    metoprolol tartrate (LOPRESSOR) 50 MG  tablet Take 1 tablet (50 mg total) by mouth 2 (two) times daily. 60 tablet 4    omeprazole (PRILOSEC) 40 MG capsule Take 1 capsule (40 mg total) by mouth every evening. 90 capsule 1    RESTASIS MULTIDOSE 0.05 % Drop       tiotropium (SPIRIVA WITH HANDIHALER) 18 mcg inhalation capsule INHALE THE CONTENTS OF ONE CAPSULE DAILY. 90 capsule 1    traZODone (DESYREL) 100 MG tablet Take 1 tablet (100 mg total) by mouth every evening. 90 tablet 1    venlafaxine (EFFEXOR) 75 MG tablet Take 1 tablet (75 mg total) by mouth 2 (two) times daily. 180 tablet 1    zolpidem (AMBIEN) 10 mg Tab TAKE 1 TABLET BY MOUTH AT BEDTIME AS NEEDED FOR INSOMNIA. 30 tablet 2    cyclobenzaprine (FLEXERIL) 10 MG tablet Take 1 tablet (10 mg total) by mouth 3 (three) times daily as needed. 90 tablet 0    isosorbide mononitrate (IMDUR) 60 MG 24 hr tablet TAKE 1 TABLET BY MOUTH ONCE DAILY. (Patient not taking: Reported on 5/13/2022) 30 tablet 9     No current facility-administered medications for this visit.       Medications Discontinued During This Encounter   Medication Reason    tamsulosin (FLOMAX) 0.4 mg Cap     cyclobenzaprine (FLEXERIL) 10 MG tablet Reorder       No follow-ups on file.      Yumi Roberson MD  Scribe Attestation:   I, Thelma Gonzalez, am scribing for, and in the presence of, Dr.Arif Roberson I performed the above scribed service and the documentation accurately describes the services I performed. I attest to the accuracy of the note.    I, Dr. Yumi Roberson, reviewed documentation as scribed above. I performed the services described in this documentation.  I agree that the record reflects my personal performance and is accurate and complete. Yumi Roberson MD.  05/13/2022

## 2022-05-17 ENCOUNTER — TELEPHONE (OUTPATIENT)
Dept: CARDIOLOGY | Facility: HOSPITAL | Age: 67
End: 2022-05-17
Payer: MEDICARE

## 2022-05-17 NOTE — TELEPHONE ENCOUNTER
"I called Mr. Jaeger to confirm ETT scheduled for 05/18/2022 at 0800, [place] location.    Patient asked "Why do I need another stress test, I just had one a couple months ago? I a'int coming " and disconnected  the call before I could expained  further.  "

## 2022-06-08 ENCOUNTER — SPECIALTY PHARMACY (OUTPATIENT)
Dept: PHARMACY | Facility: CLINIC | Age: 67
End: 2022-06-08
Payer: MEDICARE

## 2022-06-08 NOTE — TELEPHONE ENCOUNTER
PA sent to [facility]. [facility] Key: [[id number]]. Will follow up once approved.

## 2022-06-09 NOTE — TELEPHONE ENCOUNTER
BI: COMPLETE     MEDICARE PLAN: [employer] medicare   Estimated copay: $47    Benefits Stage: Initial benefit   IN NETWORK: Yes   PA approval on file: 6/8/22-12/5/22  LIS level: None    Forward to FA

## 2022-06-10 NOTE — TELEPHONE ENCOUNTER
Call attempt to patient regarding Repatha prescription we received. Calling to inform patient Repatha has been approved through insurance with a high copay. No grants are currently available at this time but am able to apply patient for  assistance. WILL NEED HH SIZE, ANNUAL INCOME, & HOW PT WANTS TO RECEIVE THEIR PORTION OF APPLICATION. Will continue to follow up.

## 2022-06-15 NOTE — TELEPHONE ENCOUNTER
Outgoing call to patient regarding Repatha copay. Patient stated copay is not affordable. Patient was informed about and declined [facility] services for PAP assistance. Patient did not want to receive the medication. Provider was informed. Will disenroll patient.

## 2022-06-22 ENCOUNTER — OFFICE VISIT (OUTPATIENT)
Dept: CARDIOLOGY | Facility: CLINIC | Age: 67
End: 2022-06-22
Payer: MEDICARE

## 2022-06-22 VITALS
HEART RATE: 76 BPM | DIASTOLIC BLOOD PRESSURE: 57 MMHG | BODY MASS INDEX: 26.43 KG/M2 | OXYGEN SATURATION: 95 % | HEIGHT: 70 IN | SYSTOLIC BLOOD PRESSURE: 108 MMHG | WEIGHT: 184.63 LBS

## 2022-06-22 DIAGNOSIS — Z78.9 STATIN INTOLERANCE: ICD-10-CM

## 2022-06-22 DIAGNOSIS — I25.118 CORONARY ARTERY DISEASE OF NATIVE ARTERY OF NATIVE HEART WITH STABLE ANGINA PECTORIS: Primary | ICD-10-CM

## 2022-06-22 DIAGNOSIS — I70.0 ATHEROSCLEROSIS OF AORTA: ICD-10-CM

## 2022-06-22 DIAGNOSIS — I10 ESSENTIAL HYPERTENSION: Chronic | ICD-10-CM

## 2022-06-22 DIAGNOSIS — E78.2 MIXED HYPERLIPIDEMIA: ICD-10-CM

## 2022-06-22 DIAGNOSIS — J43.2 CENTRILOBULAR EMPHYSEMA: ICD-10-CM

## 2022-06-22 DIAGNOSIS — Z95.1 HX OF CABG: ICD-10-CM

## 2022-06-22 PROCEDURE — 1126F AMNT PAIN NOTED NONE PRSNT: CPT | Mod: CPTII,S$GLB,, | Performed by: INTERNAL MEDICINE

## 2022-06-22 PROCEDURE — 3074F PR MOST RECENT SYSTOLIC BLOOD PRESSURE < 130 MM HG: ICD-10-PCS | Mod: CPTII,S$GLB,, | Performed by: INTERNAL MEDICINE

## 2022-06-22 PROCEDURE — 3008F PR BODY MASS INDEX (BMI) DOCUMENTED: ICD-10-PCS | Mod: CPTII,S$GLB,, | Performed by: INTERNAL MEDICINE

## 2022-06-22 PROCEDURE — 99999 PR PBB SHADOW E&M-EST. PATIENT-LVL IV: ICD-10-PCS | Mod: PBBFAC,,, | Performed by: INTERNAL MEDICINE

## 2022-06-22 PROCEDURE — 3008F BODY MASS INDEX DOCD: CPT | Mod: CPTII,S$GLB,, | Performed by: INTERNAL MEDICINE

## 2022-06-22 PROCEDURE — 4010F PR ACE/ARB THEARPY RXD/TAKEN: ICD-10-PCS | Mod: CPTII,S$GLB,, | Performed by: INTERNAL MEDICINE

## 2022-06-22 PROCEDURE — 1160F PR REVIEW ALL MEDS BY PRESCRIBER/CLIN PHARMACIST DOCUMENTED: ICD-10-PCS | Mod: CPTII,S$GLB,, | Performed by: INTERNAL MEDICINE

## 2022-06-22 PROCEDURE — 99214 OFFICE O/P EST MOD 30 MIN: CPT | Mod: S$GLB,,, | Performed by: INTERNAL MEDICINE

## 2022-06-22 PROCEDURE — 1160F RVW MEDS BY RX/DR IN RCRD: CPT | Mod: CPTII,S$GLB,, | Performed by: INTERNAL MEDICINE

## 2022-06-22 PROCEDURE — 3078F PR MOST RECENT DIASTOLIC BLOOD PRESSURE < 80 MM HG: ICD-10-PCS | Mod: CPTII,S$GLB,, | Performed by: INTERNAL MEDICINE

## 2022-06-22 PROCEDURE — 99214 PR OFFICE/OUTPT VISIT, EST, LEVL IV, 30-39 MIN: ICD-10-PCS | Mod: S$GLB,,, | Performed by: INTERNAL MEDICINE

## 2022-06-22 PROCEDURE — 3074F SYST BP LT 130 MM HG: CPT | Mod: CPTII,S$GLB,, | Performed by: INTERNAL MEDICINE

## 2022-06-22 PROCEDURE — 3078F DIAST BP <80 MM HG: CPT | Mod: CPTII,S$GLB,, | Performed by: INTERNAL MEDICINE

## 2022-06-22 PROCEDURE — 1159F MED LIST DOCD IN RCRD: CPT | Mod: CPTII,S$GLB,, | Performed by: INTERNAL MEDICINE

## 2022-06-22 PROCEDURE — 3044F HG A1C LEVEL LT 7.0%: CPT | Mod: CPTII,S$GLB,, | Performed by: INTERNAL MEDICINE

## 2022-06-22 PROCEDURE — 1126F PR PAIN SEVERITY QUANTIFIED, NO PAIN PRESENT: ICD-10-PCS | Mod: CPTII,S$GLB,, | Performed by: INTERNAL MEDICINE

## 2022-06-22 PROCEDURE — 4010F ACE/ARB THERAPY RXD/TAKEN: CPT | Mod: CPTII,S$GLB,, | Performed by: INTERNAL MEDICINE

## 2022-06-22 PROCEDURE — 1159F PR MEDICATION LIST DOCUMENTED IN MEDICAL RECORD: ICD-10-PCS | Mod: CPTII,S$GLB,, | Performed by: INTERNAL MEDICINE

## 2022-06-22 PROCEDURE — 3044F PR MOST RECENT HEMOGLOBIN A1C LEVEL <7.0%: ICD-10-PCS | Mod: CPTII,S$GLB,, | Performed by: INTERNAL MEDICINE

## 2022-06-22 PROCEDURE — 99999 PR PBB SHADOW E&M-EST. PATIENT-LVL IV: CPT | Mod: PBBFAC,,, | Performed by: INTERNAL MEDICINE

## 2022-06-22 RX ORDER — PRAVASTATIN SODIUM 20 MG/1
20 TABLET ORAL DAILY
Qty: 30 TABLET | Refills: 5 | Status: SHIPPED | OUTPATIENT
Start: 2022-06-22 | End: 2022-12-13

## 2022-06-22 RX ORDER — HYDROCHLOROTHIAZIDE 12.5 MG/1
12.5 CAPSULE ORAL DAILY
Qty: 30 CAPSULE | Refills: 1 | Status: SHIPPED | OUTPATIENT
Start: 2022-06-22 | End: 2022-10-26

## 2022-06-22 RX ORDER — METOPROLOL SUCCINATE 25 MG/1
25 TABLET, EXTENDED RELEASE ORAL DAILY
Qty: 30 TABLET | Refills: 5 | Status: SHIPPED | OUTPATIENT
Start: 2022-06-22 | End: 2022-12-13

## 2022-06-22 NOTE — PROGRESS NOTES
Subjective:   Patient ID:  Mil Sood is a 67 y.o. male who presents for follow up of No chief complaint on file.      67 yo male, came in for s/p CABG f/u  PMH CAD s/p CABG x4 in , HTN HLD. PreDM. Anxiety, no smoking/drinking. Statin intolerance for bone pain.  C/o chest pain for 4 months, central chest, twice a week, stabbing pain, radiating to the left arm. Occurred at rest. Lasted for 10 minutes and resolved spontaneously  Walking caused SOB but no pain.   Phar. MPI ordered by PCP for chest pain and showed apical and inferoseptal ischemia.  ekg NSR and no acute stt change  Father had PPM and mother ok. Sister  of lung cancer     Pt had LHC done on 2022 bu Dr. Godfrey, revealing:  lmca 50% eccentric calcified ivus csa 5.4  LAd HEAVILY CALCIFIED MID LAD LONG LESION 50-70% IN MAGNITUDE CSA 2 MM2.  LCX MID LONG 40% HEAVILY CALCIFIED.   OM1 90% PROX.  D1 CALCIFIED NON OBS DISEASE.   RCA CALCIFIED DIFFUSE DISEASE PDA OSTIUM 90% PLB 90% .  EF NORMAL.      visit states that still has exertional chest pain and Left arm pain. Has SOB,   Prefers to go to Dr. Mcmanus for CABG at [facility]. His wife worked before at Dr. Mcmanus's office     visit  S/p 4v CABG (LIMA to LAD, SVG to diagonal, SVG to OM and SVG to rPDA) on 3/11/2022 by Dr. Mcmanus. Continue aspirin, Plavix, and beta-blocker.  2022 admitted for Syncope ad OH. SBP dropped to 55 mmHG. Cr 1.3 and IVF Rx  EKG NSR  Echo 1. Normal left ventricular systolic function. LVEF 55 - 65%. Mild (Grade I) diastolic   dysfunction (impaired relaxation). Mild asymmetric septal hypertrophy. No LV outflow   tract obstruction.   2. Normal right ventricular size and systolic function.   3. Mild mitral valve regurgitation.   4. Mild aortic valve regurgitation.   5. Mild tricuspid valve regurgitation.   After d/c no recurrent syncope, no chest pain dizziness SOB leg swelling  Living with his son.       Interval history  No dizziness fatigue chest pain dizziness  and SOB  Declined cardiac rehab, and resumed motorcycle 3 weeks ago.  Copay of Repatha was high for pt.           Past Medical History:   Diagnosis Date    Anxiety     Cancer     spot on kidney and lung    Diabetes mellitus     Borderline diet controlled    Diverticulitis     GERD (gastroesophageal reflux disease)     Hyperlipidemia     not on any meds currently    Hypertension     Lung mass     Mass of kidney        Past Surgical History:   Procedure Laterality Date    CHOLECYSTECTOMY      COLONOSCOPY      COLONOSCOPY N/A 2017    Procedure: COLONOSCOPY;  Surgeon: Amarilys Bravo MD;  Location: [facility] ENDO;  Service: Endoscopy;  Laterality: N/A;    DIAGNOSTIC LAPAROSCOPY N/A 2021    Procedure: LAPAROSCOPY, DIAGNOSTIC;  Surgeon: Shaji Jauregui MD;  Location: [facility] OR;  Service: General;  Laterality: N/A;    ESOPHAGOGASTRODUODENOSCOPY      ESOPHAGOGASTRODUODENOSCOPY N/A 2020    Procedure: ESOPHAGOGASTRODUODENOSCOPY (EGD);  Surgeon: Shaji Aquino III, MD;  Location: [facility] ENDO;  Service: Endoscopy;  Laterality: N/A;    HERNIA REPAIR Bilateral     groin    LEFT HEART CATHETERIZATION Left 2022    Procedure: CATHETERIZATION, HEART, LEFT;  Surgeon: Romeo Godfrey MD;  Location: [facility] CATH LAB;  Service: Cardiology;  Laterality: Left;    NECK SURGERY      PARTIAL NEPHRECTOMY      left    RENAL BIOPSY      ROBOT-ASSISTED REPAIR OF UMBILICAL HERNIA USING DA NEL XI N/A 2021    Procedure: XI ROBOTIC REPAIR, HERNIA, UMBILICAL;  Surgeon: Shaji Jauregui MD;  Location: [facility] OR;  Service: General;  Laterality: N/A;  converted to open    TONSILLECTOMY      TRIGGER FINGER RELEASE Right     long finger       Social History     Tobacco Use    Smoking status: Former Smoker     Packs/day: 1.00     Years: 20.00     Pack years: 20.00     Quit date: 1995     Years since quittin.4    Smokeless tobacco: Never Used   Substance Use Topics    Alcohol use: Never     Alcohol/week:  0.0 standard drinks    Drug use: No       Family History   Problem Relation Age of Onset    Hypertension Mother     Hypertension Father     Melanoma Father     Cancer Sister          Review of Systems   Constitutional: Negative for decreased appetite, diaphoresis, fever, malaise/fatigue and night sweats.   HENT: Negative for nosebleeds.    Eyes: Negative for blurred vision and double vision.   Cardiovascular: Positive for dyspnea on exertion. Negative for claudication, irregular heartbeat, leg swelling, near-syncope, orthopnea, palpitations, paroxysmal nocturnal dyspnea and syncope.   Respiratory: Negative for cough, shortness of breath, sleep disturbances due to breathing, snoring, sputum production and wheezing.    Endocrine: Negative for cold intolerance and polyuria.   Hematologic/Lymphatic: Does not bruise/bleed easily.   Skin: Negative for rash.   Musculoskeletal: Negative for back pain, falls, joint pain, joint swelling and neck pain.   Gastrointestinal: Negative for abdominal pain, heartburn, nausea and vomiting.   Genitourinary: Negative for dysuria, frequency and hematuria.   Neurological: Negative for difficulty with concentration, dizziness, focal weakness, headaches, light-headedness, numbness, seizures and weakness.   Psychiatric/Behavioral: Negative for depression, memory loss and substance abuse. The patient does not have insomnia.    Allergic/Immunologic: Negative for HIV exposure and hives.       Objective:   Physical Exam  HENT:      Head: Normocephalic.   Eyes:      Pupils: Pupils are equal, round, and reactive to light.   Neck:      Thyroid: No thyromegaly.      Vascular: Normal carotid pulses. No carotid bruit or JVD.   Cardiovascular:      Rate and Rhythm: Normal rate and regular rhythm.  No extrasystoles are present.     Chest Wall: PMI is not displaced.      Pulses: Normal pulses.           Carotid pulses are 2+ on the right side and 2+ on the left side.     Heart sounds: Normal heart  sounds. No murmur heard.    No gallop. No S3 sounds.      Comments: S/p sternotomy    Pulmonary:      Effort: No respiratory distress.      Breath sounds: Normal breath sounds. No stridor.   Abdominal:      General: Bowel sounds are normal.      Palpations: Abdomen is soft.      Tenderness: There is no abdominal tenderness. There is no rebound.   Musculoskeletal:         General: Normal range of motion.   Skin:     Findings: No rash.   Neurological:      Mental Status: He is alert and oriented to person, place, and time.   Psychiatric:         Behavior: Behavior normal.         Lab Results   Component Value Date    CHOL 212 (H) 02/11/2022    CHOL 235 (H) 08/17/2021    CHOL 236 (H) 01/27/2021     Lab Results   Component Value Date    HDL 49 02/11/2022    HDL 45 08/17/2021    HDL 54 01/27/2021     Lab Results   Component Value Date    LDLCALC 127.4 02/11/2022    LDLCALC 145.8 08/17/2021    LDLCALC 146.8 01/27/2021     Lab Results   Component Value Date    TRIG 178 (H) 02/11/2022    TRIG 221 (H) 08/17/2021    TRIG 176 (H) 01/27/2021     Lab Results   Component Value Date    CHOLHDL 23.1 02/11/2022    CHOLHDL 19.1 (L) 08/17/2021    CHOLHDL 22.9 01/27/2021       Chemistry        Component Value Date/Time     02/11/2022 0858    K 4.9 02/11/2022 0858     02/11/2022 0858    CO2 27 02/11/2022 0858    BUN 17 02/11/2022 0858    CREATININE 1.2 02/11/2022 0858    GLU 95 02/11/2022 0858        Component Value Date/Time    CALCIUM 9.4 02/11/2022 0858    ALKPHOS 64 02/11/2022 0858    AST 12 02/11/2022 0858    ALT 12 02/11/2022 0858    BILITOT 0.4 02/11/2022 0858    ESTGFRAFRICA >60.0 02/11/2022 0858    EGFRNONAA >60.0 02/11/2022 0858          Lab Results   Component Value Date    HGBA1C 5.2 03/09/2022     Lab Results   Component Value Date    TSH 1.415 11/01/2018     Lab Results   Component Value Date    INR 1.0 01/26/2022    INR 1.0 02/03/2020    INR 1.0 09/04/2019     Lab Results   Component Value Date    WBC 6.97  02/11/2022    HGB 13.2 (L) 03/11/2022    HCT 39.8 (L) 03/11/2022    MCV 93 02/11/2022     02/11/2022     BMP  Sodium   Date Value Ref Range Status   02/11/2022 140 136 - 145 mmol/L Final     Potassium   Date Value Ref Range Status   02/11/2022 4.9 3.5 - 5.1 mmol/L Final     Chloride   Date Value Ref Range Status   02/11/2022 102 95 - 110 mmol/L Final     CO2   Date Value Ref Range Status   02/11/2022 27 23 - 29 mmol/L Final     BUN   Date Value Ref Range Status   02/11/2022 17 8 - 23 mg/dL Final     Creatinine   Date Value Ref Range Status   02/11/2022 1.2 0.5 - 1.4 mg/dL Final     Calcium   Date Value Ref Range Status   02/11/2022 9.4 8.7 - 10.5 mg/dL Final     Anion Gap   Date Value Ref Range Status   02/11/2022 11 8 - 16 mmol/L Final     eGFR if    Date Value Ref Range Status   02/11/2022 >60.0 >60 mL/min/1.73 m^2 Final     eGFR if non    Date Value Ref Range Status   02/11/2022 >60.0 >60 mL/min/1.73 m^2 Final     Comment:     Calculation used to obtain the estimated glomerular filtration  rate (eGFR) is the CKD-EPI equation.        BNP  @LABRCNTIP(BNP,BNPTRIAGEBLO)@  @LABRCNTIP(troponini)@  CrCl cannot be calculated (Patient's most recent lab result is older than the maximum 7 days allowed.).  No results found in the last 24 hours.  No results found in the last 24 hours.  No results found in the last 24 hours.    Assessment:      1. Coronary artery disease of native artery of native heart with stable angina pectoris    2. Statin intolerance    3. Hx of CABG x4 in     4. Mixed hyperlipidemia    5. Atherosclerosis of aorta    6. Essential hypertension    7. Centrilobular emphysema        Plan:   Decrease Lopressor 50 mg bid to torpolXL 25 mg dialy due to soft BP  Add Pravastatin 20 mg daily lose dose to avoid the side effect    Continue ASA Plavix amlodipine benazepril HCTZ imdur  Counseled DASH  Check Lipid profile in 6 months  Recommend heart-healthy diet, weight  control and regular exercise.  Shruti. Risk modification.   I have reviewed all pertinent labs and cardiac studies independently. Plans and recommendations have been formulated under my direct supervision. All questions answered and patient voiced understanding.   If symptoms persist go to the ED  RTC in 4 months

## 2022-08-04 RX ORDER — BENAZEPRIL HYDROCHLORIDE 20 MG/1
TABLET ORAL
Qty: 180 TABLET | Refills: 1 | Status: SHIPPED | OUTPATIENT
Start: 2022-08-04 | End: 2023-02-25

## 2022-08-04 NOTE — TELEPHONE ENCOUNTER
No new care gaps identified.  [facility] Embedded Care Gaps. Reference number: [id number]. 8/04/2022   11:08:39 AM HECTORT

## 2022-08-04 NOTE — TELEPHONE ENCOUNTER
Refill Decision Note   Mil Sood  is requesting a refill authorization.  Brief Assessment and Rationale for Refill:  Approve     Medication Therapy Plan:       Medication Reconciliation Completed: No   Comments:     No Care Gaps recommended.     Note composed:4:25 PM 08/04/2022

## 2022-08-06 ENCOUNTER — HOSPITAL ENCOUNTER (EMERGENCY)
Facility: HOSPITAL | Age: 67
Discharge: HOME | End: 2022-08-06
Attending: EMERGENCY MEDICINE
Payer: MEDICARE

## 2022-08-06 VITALS
WEIGHT: 180 LBS | BODY MASS INDEX: 25.83 KG/M2 | DIASTOLIC BLOOD PRESSURE: 74 MMHG | HEART RATE: 68 BPM | OXYGEN SATURATION: 97 % | SYSTOLIC BLOOD PRESSURE: 126 MMHG | TEMPERATURE: 98 F | RESPIRATION RATE: 28 BRPM

## 2022-08-06 DIAGNOSIS — M25.572 LEFT ANKLE PAIN: ICD-10-CM

## 2022-08-06 DIAGNOSIS — R53.1 WEAKNESS: ICD-10-CM

## 2022-08-06 DIAGNOSIS — R33.8 BENIGN PROSTATIC HYPERPLASIA WITH URINARY RETENTION: ICD-10-CM

## 2022-08-06 DIAGNOSIS — S82.832A OTHER CLOSED FRACTURE OF DISTAL END OF LEFT FIBULA, INITIAL ENCOUNTER: Primary | ICD-10-CM

## 2022-08-06 DIAGNOSIS — N28.9 ACUTE RENAL INSUFFICIENCY: ICD-10-CM

## 2022-08-06 DIAGNOSIS — N40.1 BENIGN PROSTATIC HYPERPLASIA WITH URINARY RETENTION: ICD-10-CM

## 2022-08-06 LAB
ALBUMIN SERPL BCP-MCNC: 4.1 G/DL (ref 3.5–5.2)
ALP SERPL-CCNC: 67 U/L (ref 55–135)
ALT SERPL W/O P-5'-P-CCNC: 10 U/L (ref 10–44)
AMPHET+METHAMPHET UR QL: NEGATIVE
ANION GAP SERPL CALC-SCNC: 11 MMOL/L (ref 8–16)
AST SERPL-CCNC: 13 U/L (ref 10–40)
BARBITURATES UR QL SCN>200 NG/ML: NEGATIVE
BASOPHILS # BLD AUTO: 0.06 K/UL (ref 0–0.2)
BASOPHILS NFR BLD: 0.5 % (ref 0–1.9)
BENZODIAZ UR QL SCN>200 NG/ML: NEGATIVE
BILIRUB SERPL-MCNC: 0.6 MG/DL (ref 0.1–1)
BILIRUB UR QL STRIP: NEGATIVE
BUN SERPL-MCNC: 24 MG/DL (ref 8–23)
BZE UR QL SCN: NEGATIVE
CALCIUM SERPL-MCNC: 9.6 MG/DL (ref 8.7–10.5)
CANNABINOIDS UR QL SCN: NEGATIVE
CHLORIDE SERPL-SCNC: 99 MMOL/L (ref 95–110)
CK SERPL-CCNC: 125 U/L (ref 20–200)
CLARITY UR: CLEAR
CO2 SERPL-SCNC: 28 MMOL/L (ref 23–29)
COLOR UR: YELLOW
CREAT SERPL-MCNC: 1.7 MG/DL (ref 0.5–1.4)
CREAT UR-MCNC: 101.3 MG/DL (ref 23–375)
DIFFERENTIAL METHOD: ABNORMAL
EOSINOPHIL # BLD AUTO: 0.1 K/UL (ref 0–0.5)
EOSINOPHIL NFR BLD: 0.5 % (ref 0–8)
ERYTHROCYTE [DISTWIDTH] IN BLOOD BY AUTOMATED COUNT: 14.6 % (ref 11.5–14.5)
EST. GFR  (NO RACE VARIABLE): 44 ML/MIN/1.73 M^2
GLUCOSE SERPL-MCNC: 150 MG/DL (ref 70–110)
GLUCOSE UR QL STRIP: NEGATIVE
HCT VFR BLD AUTO: 44.8 % (ref 40–54)
HGB BLD-MCNC: 14.5 G/DL (ref 14–18)
HGB UR QL STRIP: NEGATIVE
IMM GRANULOCYTES # BLD AUTO: 0.06 K/UL (ref 0–0.04)
IMM GRANULOCYTES NFR BLD AUTO: 0.5 % (ref 0–0.5)
KETONES UR QL STRIP: NEGATIVE
LEUKOCYTE ESTERASE UR QL STRIP: NEGATIVE
LYMPHOCYTES # BLD AUTO: 0.8 K/UL (ref 1–4.8)
LYMPHOCYTES NFR BLD: 5.9 % (ref 18–48)
MCH RBC QN AUTO: 27.2 PG (ref 27–31)
MCHC RBC AUTO-ENTMCNC: 32.4 G/DL (ref 32–36)
MCV RBC AUTO: 84 FL (ref 82–98)
METHADONE UR QL SCN>300 NG/ML: NEGATIVE
MONOCYTES # BLD AUTO: 0.9 K/UL (ref 0.3–1)
MONOCYTES NFR BLD: 7.2 % (ref 4–15)
NEUTROPHILS # BLD AUTO: 11.2 K/UL (ref 1.8–7.7)
NEUTROPHILS NFR BLD: 85.4 % (ref 38–73)
NITRITE UR QL STRIP: NEGATIVE
NRBC BLD-RTO: 0 /100 WBC
OPIATES UR QL SCN: NEGATIVE
PCP UR QL SCN>25 NG/ML: NEGATIVE
PH UR STRIP: 7 [PH] (ref 5–8)
PLATELET # BLD AUTO: 264 K/UL (ref 150–450)
PMV BLD AUTO: 9.3 FL (ref 9.2–12.9)
POTASSIUM SERPL-SCNC: 5.2 MMOL/L (ref 3.5–5.1)
PROT SERPL-MCNC: 6.8 G/DL (ref 6–8.4)
PROT UR QL STRIP: NEGATIVE
RBC # BLD AUTO: 5.34 M/UL (ref 4.6–6.2)
SODIUM SERPL-SCNC: 138 MMOL/L (ref 136–145)
SP GR UR STRIP: 1.01 (ref 1–1.03)
TOXICOLOGY INFORMATION: NORMAL
URN SPEC COLLECT METH UR: NORMAL
UROBILINOGEN UR STRIP-ACNC: NEGATIVE EU/DL
WBC # BLD AUTO: 13.13 K/UL (ref 3.9–12.7)

## 2022-08-06 PROCEDURE — 93010 EKG 12-LEAD: ICD-10-PCS | Mod: ,,, | Performed by: INTERNAL MEDICINE

## 2022-08-06 PROCEDURE — 93005 ELECTROCARDIOGRAM TRACING: CPT

## 2022-08-06 PROCEDURE — 51702 INSERT TEMP BLADDER CATH: CPT | Mod: 59

## 2022-08-06 PROCEDURE — 29515 APPLICATION SHORT LEG SPLINT: CPT | Mod: LT

## 2022-08-06 PROCEDURE — 85025 COMPLETE CBC W/AUTO DIFF WBC: CPT | Performed by: EMERGENCY MEDICINE

## 2022-08-06 PROCEDURE — 93010 ELECTROCARDIOGRAM REPORT: CPT | Mod: ,,, | Performed by: INTERNAL MEDICINE

## 2022-08-06 PROCEDURE — 81003 URINALYSIS AUTO W/O SCOPE: CPT | Mod: 59 | Performed by: EMERGENCY MEDICINE

## 2022-08-06 PROCEDURE — 25000003 PHARM REV CODE 250: Performed by: EMERGENCY MEDICINE

## 2022-08-06 PROCEDURE — 51798 US URINE CAPACITY MEASURE: CPT

## 2022-08-06 PROCEDURE — 99285 EMERGENCY DEPT VISIT HI MDM: CPT | Mod: 25

## 2022-08-06 PROCEDURE — 82550 ASSAY OF CK (CPK): CPT | Performed by: EMERGENCY MEDICINE

## 2022-08-06 PROCEDURE — 80307 DRUG TEST PRSMV CHEM ANLYZR: CPT | Performed by: EMERGENCY MEDICINE

## 2022-08-06 PROCEDURE — 96360 HYDRATION IV INFUSION INIT: CPT | Mod: 59

## 2022-08-06 PROCEDURE — 80053 COMPREHEN METABOLIC PANEL: CPT | Performed by: EMERGENCY MEDICINE

## 2022-08-06 RX ORDER — HYDROCODONE BITARTRATE AND ACETAMINOPHEN 7.5; 325 MG/1; MG/1
1 TABLET ORAL EVERY 6 HOURS PRN
Qty: 18 TABLET | Refills: 0 | Status: SHIPPED | OUTPATIENT
Start: 2022-08-06 | End: 2022-09-21

## 2022-08-06 RX ADMIN — SODIUM CHLORIDE 1000 ML: 0.9 INJECTION, SOLUTION INTRAVENOUS at 09:08

## 2022-08-10 ENCOUNTER — ANESTHESIA (OUTPATIENT)
Dept: SURGERY | Facility: HOSPITAL | Age: 67
DRG: 493 | End: 2022-08-10
Payer: MEDICARE

## 2022-08-10 ENCOUNTER — OFFICE VISIT (OUTPATIENT)
Dept: ORTHOPEDICS | Facility: CLINIC | Age: 67
End: 2022-08-10
Payer: MEDICARE

## 2022-08-10 ENCOUNTER — HOSPITAL ENCOUNTER (INPATIENT)
Facility: HOSPITAL | Age: 67
LOS: 1 days | Discharge: HOME | DRG: 493 | End: 2022-08-12
Attending: ORTHOPAEDIC SURGERY | Admitting: ORTHOPAEDIC SURGERY
Payer: MEDICARE

## 2022-08-10 ENCOUNTER — PES CALL (OUTPATIENT)
Dept: ADMINISTRATIVE | Facility: CLINIC | Age: 67
End: 2022-08-10
Payer: MEDICARE

## 2022-08-10 ENCOUNTER — ANESTHESIA EVENT (OUTPATIENT)
Dept: SURGERY | Facility: HOSPITAL | Age: 67
DRG: 493 | End: 2022-08-10
Payer: MEDICARE

## 2022-08-10 VITALS — HEIGHT: 70 IN | WEIGHT: 180 LBS | BODY MASS INDEX: 25.77 KG/M2

## 2022-08-10 DIAGNOSIS — N13.8 BPH WITH OBSTRUCTION/LOWER URINARY TRACT SYMPTOMS: ICD-10-CM

## 2022-08-10 DIAGNOSIS — S93.432A ANKLE SYNDESMOSIS DISRUPTION, LEFT, INITIAL ENCOUNTER: ICD-10-CM

## 2022-08-10 DIAGNOSIS — N40.1 BPH WITH OBSTRUCTION/LOWER URINARY TRACT SYMPTOMS: ICD-10-CM

## 2022-08-10 DIAGNOSIS — S82.62XA CLOSED DISPLACED FRACTURE OF LATERAL MALLEOLUS OF LEFT FIBULA, INITIAL ENCOUNTER: Primary | ICD-10-CM

## 2022-08-10 DIAGNOSIS — S82.62XD CLOSED DISPLACED FRACTURE OF LATERAL MALLEOLUS OF LEFT FIBULA WITH ROUTINE HEALING, SUBSEQUENT ENCOUNTER: ICD-10-CM

## 2022-08-10 DIAGNOSIS — S82.62XA: ICD-10-CM

## 2022-08-10 PROBLEM — D30.00 RENAL ONCOCYTOMA: Status: RESOLVED | Noted: 2018-11-01 | Resolved: 2022-08-10

## 2022-08-10 LAB
ALBUMIN SERPL BCP-MCNC: 3.6 G/DL (ref 3.5–5.2)
ALP SERPL-CCNC: 58 U/L (ref 55–135)
ALT SERPL W/O P-5'-P-CCNC: 12 U/L (ref 10–44)
ANION GAP SERPL CALC-SCNC: 11 MMOL/L (ref 8–16)
AST SERPL-CCNC: 16 U/L (ref 10–40)
BASOPHILS # BLD AUTO: 0.05 K/UL (ref 0–0.2)
BASOPHILS NFR BLD: 0.6 % (ref 0–1.9)
BILIRUB SERPL-MCNC: 0.9 MG/DL (ref 0.1–1)
BUN SERPL-MCNC: 31 MG/DL (ref 8–23)
CALCIUM SERPL-MCNC: 10 MG/DL (ref 8.7–10.5)
CHLORIDE SERPL-SCNC: 99 MMOL/L (ref 95–110)
CO2 SERPL-SCNC: 24 MMOL/L (ref 23–29)
CREAT SERPL-MCNC: 1.1 MG/DL (ref 0.5–1.4)
DIFFERENTIAL METHOD: ABNORMAL
EOSINOPHIL # BLD AUTO: 0.2 K/UL (ref 0–0.5)
EOSINOPHIL NFR BLD: 2.6 % (ref 0–8)
ERYTHROCYTE [DISTWIDTH] IN BLOOD BY AUTOMATED COUNT: 14.2 % (ref 11.5–14.5)
EST. GFR  (NO RACE VARIABLE): >60 ML/MIN/1.73 M^2
GLUCOSE SERPL-MCNC: 98 MG/DL (ref 70–110)
HCT VFR BLD AUTO: 41.8 % (ref 40–54)
HGB BLD-MCNC: 13.6 G/DL (ref 14–18)
IMM GRANULOCYTES # BLD AUTO: 0.02 K/UL (ref 0–0.04)
IMM GRANULOCYTES NFR BLD AUTO: 0.2 % (ref 0–0.5)
LYMPHOCYTES # BLD AUTO: 1.1 K/UL (ref 1–4.8)
LYMPHOCYTES NFR BLD: 12.7 % (ref 18–48)
MCH RBC QN AUTO: 27.3 PG (ref 27–31)
MCHC RBC AUTO-ENTMCNC: 32.5 G/DL (ref 32–36)
MCV RBC AUTO: 84 FL (ref 82–98)
MONOCYTES # BLD AUTO: 1 K/UL (ref 0.3–1)
MONOCYTES NFR BLD: 11.6 % (ref 4–15)
NEUTROPHILS # BLD AUTO: 6.2 K/UL (ref 1.8–7.7)
NEUTROPHILS NFR BLD: 72.3 % (ref 38–73)
NRBC BLD-RTO: 0 /100 WBC
PLATELET # BLD AUTO: 298 K/UL (ref 150–450)
PMV BLD AUTO: 9.1 FL (ref 9.2–12.9)
POCT GLUCOSE: 102 MG/DL (ref 70–110)
POCT GLUCOSE: 89 MG/DL (ref 70–110)
POCT GLUCOSE: 98 MG/DL (ref 70–110)
POTASSIUM SERPL-SCNC: 4.2 MMOL/L (ref 3.5–5.1)
PROT SERPL-MCNC: 7.5 G/DL (ref 6–8.4)
RBC # BLD AUTO: 4.98 M/UL (ref 4.6–6.2)
SODIUM SERPL-SCNC: 134 MMOL/L (ref 136–145)
WBC # BLD AUTO: 8.53 K/UL (ref 3.9–12.7)

## 2022-08-10 PROCEDURE — 1100F PR PT FALLS ASSESS DOC 2+ FALLS/FALL W/INJURY/YR: ICD-10-PCS | Mod: CPTII,S$GLB,, | Performed by: ORTHOPAEDIC SURGERY

## 2022-08-10 PROCEDURE — G0378 HOSPITAL OBSERVATION PER HR: HCPCS

## 2022-08-10 PROCEDURE — 1160F PR REVIEW ALL MEDS BY PRESCRIBER/CLIN PHARMACIST DOCUMENTED: ICD-10-PCS | Mod: CPTII,S$GLB,, | Performed by: ORTHOPAEDIC SURGERY

## 2022-08-10 PROCEDURE — 3044F HG A1C LEVEL LT 7.0%: CPT | Mod: CPTII,S$GLB,, | Performed by: ORTHOPAEDIC SURGERY

## 2022-08-10 PROCEDURE — 27792 PR OPEN TX DISTAL FIBULAR FRACTURE LAT MALLEOLUS: ICD-10-PCS | Mod: LT,,, | Performed by: ORTHOPAEDIC SURGERY

## 2022-08-10 PROCEDURE — 93010 ELECTROCARDIOGRAM REPORT: CPT | Mod: ,,, | Performed by: INTERNAL MEDICINE

## 2022-08-10 PROCEDURE — 25000003 PHARM REV CODE 250: Performed by: NURSE ANESTHETIST, CERTIFIED REGISTERED

## 2022-08-10 PROCEDURE — 99999 PR PBB SHADOW E&M-EST. PATIENT-LVL V: CPT | Mod: PBBFAC,,, | Performed by: ORTHOPAEDIC SURGERY

## 2022-08-10 PROCEDURE — 71000033 HC RECOVERY, INTIAL HOUR: Performed by: ORTHOPAEDIC SURGERY

## 2022-08-10 PROCEDURE — 85025 COMPLETE CBC W/AUTO DIFF WBC: CPT | Performed by: ORTHOPAEDIC SURGERY

## 2022-08-10 PROCEDURE — 3008F PR BODY MASS INDEX (BMI) DOCUMENTED: ICD-10-PCS | Mod: CPTII,S$GLB,, | Performed by: ORTHOPAEDIC SURGERY

## 2022-08-10 PROCEDURE — 25000003 PHARM REV CODE 250: Performed by: ORTHOPAEDIC SURGERY

## 2022-08-10 PROCEDURE — 1125F AMNT PAIN NOTED PAIN PRSNT: CPT | Mod: CPTII,S$GLB,, | Performed by: ORTHOPAEDIC SURGERY

## 2022-08-10 PROCEDURE — 4010F ACE/ARB THERAPY RXD/TAKEN: CPT | Mod: CPTII,S$GLB,, | Performed by: ORTHOPAEDIC SURGERY

## 2022-08-10 PROCEDURE — 37000008 HC ANESTHESIA 1ST 15 MINUTES: Performed by: ORTHOPAEDIC SURGERY

## 2022-08-10 PROCEDURE — 96366 THER/PROPH/DIAG IV INF ADDON: CPT

## 2022-08-10 PROCEDURE — 93010 EKG 12-LEAD: ICD-10-PCS | Mod: ,,, | Performed by: INTERNAL MEDICINE

## 2022-08-10 PROCEDURE — 93005 ELECTROCARDIOGRAM TRACING: CPT

## 2022-08-10 PROCEDURE — 71000039 HC RECOVERY, EACH ADD'L HOUR: Performed by: ORTHOPAEDIC SURGERY

## 2022-08-10 PROCEDURE — 63600175 PHARM REV CODE 636 W HCPCS: Performed by: NURSE ANESTHETIST, CERTIFIED REGISTERED

## 2022-08-10 PROCEDURE — 1125F PR PAIN SEVERITY QUANTIFIED, PAIN PRESENT: ICD-10-PCS | Mod: CPTII,S$GLB,, | Performed by: ORTHOPAEDIC SURGERY

## 2022-08-10 PROCEDURE — 1100F PTFALLS ASSESS-DOCD GE2>/YR: CPT | Mod: CPTII,S$GLB,, | Performed by: ORTHOPAEDIC SURGERY

## 2022-08-10 PROCEDURE — 99999 PR PBB SHADOW E&M-EST. PATIENT-LVL V: ICD-10-PCS | Mod: PBBFAC,,, | Performed by: ORTHOPAEDIC SURGERY

## 2022-08-10 PROCEDURE — 1159F PR MEDICATION LIST DOCUMENTED IN MEDICAL RECORD: ICD-10-PCS | Mod: CPTII,S$GLB,, | Performed by: ORTHOPAEDIC SURGERY

## 2022-08-10 PROCEDURE — 96365 THER/PROPH/DIAG IV INF INIT: CPT

## 2022-08-10 PROCEDURE — 37000009 HC ANESTHESIA EA ADD 15 MINS: Performed by: ORTHOPAEDIC SURGERY

## 2022-08-10 PROCEDURE — 63600175 PHARM REV CODE 636 W HCPCS: Performed by: ORTHOPAEDIC SURGERY

## 2022-08-10 PROCEDURE — 25000003 PHARM REV CODE 250: Performed by: STUDENT IN AN ORGANIZED HEALTH CARE EDUCATION/TRAINING PROGRAM

## 2022-08-10 PROCEDURE — 4010F PR ACE/ARB THEARPY RXD/TAKEN: ICD-10-PCS | Mod: CPTII,S$GLB,, | Performed by: ORTHOPAEDIC SURGERY

## 2022-08-10 PROCEDURE — 36000709 HC OR TIME LEV III EA ADD 15 MIN: Performed by: ORTHOPAEDIC SURGERY

## 2022-08-10 PROCEDURE — 99204 PR OFFICE/OUTPT VISIT, NEW, LEVL IV, 45-59 MIN: ICD-10-PCS | Mod: 57,S$GLB,, | Performed by: ORTHOPAEDIC SURGERY

## 2022-08-10 PROCEDURE — 63600175 PHARM REV CODE 636 W HCPCS: Performed by: ANESTHESIOLOGY

## 2022-08-10 PROCEDURE — 36000708 HC OR TIME LEV III 1ST 15 MIN: Performed by: ORTHOPAEDIC SURGERY

## 2022-08-10 PROCEDURE — 3044F PR MOST RECENT HEMOGLOBIN A1C LEVEL <7.0%: ICD-10-PCS | Mod: CPTII,S$GLB,, | Performed by: ORTHOPAEDIC SURGERY

## 2022-08-10 PROCEDURE — 1160F RVW MEDS BY RX/DR IN RCRD: CPT | Mod: CPTII,S$GLB,, | Performed by: ORTHOPAEDIC SURGERY

## 2022-08-10 PROCEDURE — 27201423 OPTIME MED/SURG SUP & DEVICES STERILE SUPPLY: Performed by: ORTHOPAEDIC SURGERY

## 2022-08-10 PROCEDURE — 99204 OFFICE O/P NEW MOD 45 MIN: CPT | Mod: 57,S$GLB,, | Performed by: ORTHOPAEDIC SURGERY

## 2022-08-10 PROCEDURE — 3288F PR FALLS RISK ASSESSMENT DOCUMENTED: ICD-10-PCS | Mod: CPTII,S$GLB,, | Performed by: ORTHOPAEDIC SURGERY

## 2022-08-10 PROCEDURE — 1159F MED LIST DOCD IN RCRD: CPT | Mod: CPTII,S$GLB,, | Performed by: ORTHOPAEDIC SURGERY

## 2022-08-10 PROCEDURE — 80053 COMPREHEN METABOLIC PANEL: CPT | Performed by: ORTHOPAEDIC SURGERY

## 2022-08-10 PROCEDURE — 3288F FALL RISK ASSESSMENT DOCD: CPT | Mod: CPTII,S$GLB,, | Performed by: ORTHOPAEDIC SURGERY

## 2022-08-10 PROCEDURE — C1713 ANCHOR/SCREW BN/BN,TIS/BN: HCPCS | Performed by: ORTHOPAEDIC SURGERY

## 2022-08-10 PROCEDURE — 27792 TREATMENT OF ANKLE FRACTURE: CPT | Mod: LT,,, | Performed by: ORTHOPAEDIC SURGERY

## 2022-08-10 PROCEDURE — 3008F BODY MASS INDEX DOCD: CPT | Mod: CPTII,S$GLB,, | Performed by: ORTHOPAEDIC SURGERY

## 2022-08-10 DEVICE — SCREW BONE LOCK T10 3.5X18MM: Type: IMPLANTABLE DEVICE | Site: ANKLE | Status: FUNCTIONAL

## 2022-08-10 DEVICE — SCREW BONE NON LOCK 3.5X14MM: Type: IMPLANTABLE DEVICE | Site: ANKLE | Status: FUNCTIONAL

## 2022-08-10 DEVICE — SCREW BONE NON LOCK 3.5X18MM: Type: IMPLANTABLE DEVICE | Site: ANKLE | Status: FUNCTIONAL

## 2022-08-10 DEVICE — SCREW BONE LOCK T10 3.5X14MM: Type: IMPLANTABLE DEVICE | Site: ANKLE | Status: FUNCTIONAL

## 2022-08-10 DEVICE — PLATE BONE FIB VARIAX LAT DIST: Type: IMPLANTABLE DEVICE | Site: ANKLE | Status: FUNCTIONAL

## 2022-08-10 DEVICE — SCREW BONE LOCK T10 3.5X12MM: Type: IMPLANTABLE DEVICE | Site: ANKLE | Status: FUNCTIONAL

## 2022-08-10 RX ORDER — HYDRALAZINE HYDROCHLORIDE 25 MG/1
25 TABLET, FILM COATED ORAL NIGHTLY
Status: DISCONTINUED | OUTPATIENT
Start: 2022-08-10 | End: 2022-08-12 | Stop reason: HOSPADM

## 2022-08-10 RX ORDER — SODIUM CHLORIDE 0.9 % (FLUSH) 0.9 %
10 SYRINGE (ML) INJECTION
Status: DISCONTINUED | OUTPATIENT
Start: 2022-08-10 | End: 2022-08-12 | Stop reason: HOSPADM

## 2022-08-10 RX ORDER — HYDROMORPHONE HYDROCHLORIDE 2 MG/ML
0.2 INJECTION, SOLUTION INTRAMUSCULAR; INTRAVENOUS; SUBCUTANEOUS EVERY 5 MIN PRN
Status: DISCONTINUED | OUTPATIENT
Start: 2022-08-10 | End: 2022-08-10 | Stop reason: HOSPADM

## 2022-08-10 RX ORDER — CLOPIDOGREL BISULFATE 75 MG/1
75 TABLET ORAL DAILY
Status: DISCONTINUED | OUTPATIENT
Start: 2022-08-11 | End: 2022-08-12 | Stop reason: HOSPADM

## 2022-08-10 RX ORDER — OXYCODONE HYDROCHLORIDE 5 MG/1
5 TABLET ORAL
Status: DISCONTINUED | OUTPATIENT
Start: 2022-08-10 | End: 2022-08-10 | Stop reason: HOSPADM

## 2022-08-10 RX ORDER — FINASTERIDE 5 MG/1
5 TABLET, FILM COATED ORAL NIGHTLY
Status: DISCONTINUED | OUTPATIENT
Start: 2022-08-10 | End: 2022-08-12 | Stop reason: HOSPADM

## 2022-08-10 RX ORDER — METOPROLOL SUCCINATE 25 MG/1
25 TABLET, EXTENDED RELEASE ORAL DAILY
Status: DISCONTINUED | OUTPATIENT
Start: 2022-08-11 | End: 2022-08-12 | Stop reason: HOSPADM

## 2022-08-10 RX ORDER — LIDOCAINE HYDROCHLORIDE 10 MG/ML
INJECTION, SOLUTION EPIDURAL; INFILTRATION; INTRACAUDAL; PERINEURAL
Status: DISCONTINUED | OUTPATIENT
Start: 2022-08-10 | End: 2022-08-10

## 2022-08-10 RX ORDER — CLONAZEPAM 1 MG/1
1 TABLET ORAL NIGHTLY
Status: DISCONTINUED | OUTPATIENT
Start: 2022-08-10 | End: 2022-08-10 | Stop reason: ALTCHOICE

## 2022-08-10 RX ORDER — TRAZODONE HYDROCHLORIDE 100 MG/1
100 TABLET ORAL NIGHTLY
Status: DISCONTINUED | OUTPATIENT
Start: 2022-08-10 | End: 2022-08-12 | Stop reason: HOSPADM

## 2022-08-10 RX ORDER — GABAPENTIN 300 MG/1
600 CAPSULE ORAL NIGHTLY
Status: DISCONTINUED | OUTPATIENT
Start: 2022-08-10 | End: 2022-08-10

## 2022-08-10 RX ORDER — CLONAZEPAM 1 MG/1
1 TABLET ORAL NIGHTLY PRN
Status: DISCONTINUED | OUTPATIENT
Start: 2022-08-10 | End: 2022-08-12 | Stop reason: HOSPADM

## 2022-08-10 RX ORDER — FENTANYL CITRATE 50 UG/ML
INJECTION, SOLUTION INTRAMUSCULAR; INTRAVENOUS
Status: DISCONTINUED | OUTPATIENT
Start: 2022-08-10 | End: 2022-08-10

## 2022-08-10 RX ORDER — ENOXAPARIN SODIUM 100 MG/ML
40 INJECTION SUBCUTANEOUS EVERY 24 HOURS
Status: DISCONTINUED | OUTPATIENT
Start: 2022-08-11 | End: 2022-08-10

## 2022-08-10 RX ORDER — AMLODIPINE BESYLATE 5 MG/1
5 TABLET ORAL 2 TIMES DAILY
Status: DISCONTINUED | OUTPATIENT
Start: 2022-08-10 | End: 2022-08-12 | Stop reason: HOSPADM

## 2022-08-10 RX ORDER — ZOLPIDEM TARTRATE 5 MG/1
10 TABLET ORAL NIGHTLY PRN
Status: DISCONTINUED | OUTPATIENT
Start: 2022-08-10 | End: 2022-08-12 | Stop reason: HOSPADM

## 2022-08-10 RX ORDER — ONDANSETRON 2 MG/ML
INJECTION INTRAMUSCULAR; INTRAVENOUS
Status: DISCONTINUED | OUTPATIENT
Start: 2022-08-10 | End: 2022-08-10

## 2022-08-10 RX ORDER — ONDANSETRON 2 MG/ML
4 INJECTION INTRAMUSCULAR; INTRAVENOUS DAILY PRN
Status: DISCONTINUED | OUTPATIENT
Start: 2022-08-10 | End: 2022-08-10 | Stop reason: HOSPADM

## 2022-08-10 RX ORDER — CEFAZOLIN SODIUM 1 G/3ML
INJECTION, POWDER, FOR SOLUTION INTRAMUSCULAR; INTRAVENOUS
Status: DISCONTINUED | OUTPATIENT
Start: 2022-08-10 | End: 2022-08-10

## 2022-08-10 RX ORDER — HYDROCODONE BITARTRATE AND ACETAMINOPHEN 5; 325 MG/1; MG/1
1 TABLET ORAL EVERY 4 HOURS PRN
Status: DISCONTINUED | OUTPATIENT
Start: 2022-08-10 | End: 2022-08-12 | Stop reason: HOSPADM

## 2022-08-10 RX ORDER — AMLODIPINE BESYLATE 5 MG/1
5 TABLET ORAL DAILY
Status: DISCONTINUED | OUTPATIENT
Start: 2022-08-11 | End: 2022-08-10

## 2022-08-10 RX ORDER — PROPOFOL 10 MG/ML
VIAL (ML) INTRAVENOUS
Status: DISCONTINUED | OUTPATIENT
Start: 2022-08-10 | End: 2022-08-10

## 2022-08-10 RX ORDER — ONDANSETRON 8 MG/1
8 TABLET, ORALLY DISINTEGRATING ORAL EVERY 8 HOURS PRN
Status: DISCONTINUED | OUTPATIENT
Start: 2022-08-10 | End: 2022-08-12 | Stop reason: HOSPADM

## 2022-08-10 RX ORDER — ROPIVACAINE/EPI/CLONIDINE/KET 2.46-0.005
SYRINGE (ML) INJECTION
Status: DISCONTINUED | OUTPATIENT
Start: 2022-08-10 | End: 2022-08-10 | Stop reason: HOSPADM

## 2022-08-10 RX ORDER — CYCLOBENZAPRINE HCL 10 MG
10 TABLET ORAL 3 TIMES DAILY PRN
Status: DISCONTINUED | OUTPATIENT
Start: 2022-08-10 | End: 2022-08-12 | Stop reason: HOSPADM

## 2022-08-10 RX ORDER — CHLORHEXIDINE GLUCONATE ORAL RINSE 1.2 MG/ML
10 SOLUTION DENTAL 2 TIMES DAILY
Status: DISCONTINUED | OUTPATIENT
Start: 2022-08-10 | End: 2022-08-12 | Stop reason: HOSPADM

## 2022-08-10 RX ORDER — ASPIRIN 81 MG/1
81 TABLET ORAL DAILY
Status: DISCONTINUED | OUTPATIENT
Start: 2022-08-11 | End: 2022-08-12 | Stop reason: HOSPADM

## 2022-08-10 RX ORDER — ISOSORBIDE MONONITRATE 60 MG/1
60 TABLET, EXTENDED RELEASE ORAL DAILY
Status: DISCONTINUED | OUTPATIENT
Start: 2022-08-11 | End: 2022-08-12 | Stop reason: HOSPADM

## 2022-08-10 RX ORDER — GABAPENTIN 300 MG/1
600 CAPSULE ORAL NIGHTLY
Status: DISCONTINUED | OUTPATIENT
Start: 2022-08-10 | End: 2022-08-12 | Stop reason: HOSPADM

## 2022-08-10 RX ORDER — MORPHINE SULFATE 4 MG/ML
3 INJECTION, SOLUTION INTRAMUSCULAR; INTRAVENOUS
Status: DISCONTINUED | OUTPATIENT
Start: 2022-08-10 | End: 2022-08-11

## 2022-08-10 RX ORDER — MIDAZOLAM HYDROCHLORIDE 1 MG/ML
INJECTION, SOLUTION INTRAMUSCULAR; INTRAVENOUS
Status: DISCONTINUED | OUTPATIENT
Start: 2022-08-10 | End: 2022-08-10

## 2022-08-10 RX ORDER — CEFAZOLIN SODIUM 1 G/50ML
1 SOLUTION INTRAVENOUS
Status: COMPLETED | OUTPATIENT
Start: 2022-08-10 | End: 2022-08-11

## 2022-08-10 RX ORDER — ACETAMINOPHEN 325 MG/1
650 TABLET ORAL EVERY 4 HOURS PRN
Status: DISCONTINUED | OUTPATIENT
Start: 2022-08-10 | End: 2022-08-12 | Stop reason: HOSPADM

## 2022-08-10 RX ORDER — VENLAFAXINE 25 MG/1
75 TABLET ORAL 2 TIMES DAILY
Status: DISCONTINUED | OUTPATIENT
Start: 2022-08-10 | End: 2022-08-12 | Stop reason: HOSPADM

## 2022-08-10 RX ADMIN — CEFAZOLIN SODIUM 1 G: 1 SOLUTION INTRAVENOUS at 09:08

## 2022-08-10 RX ADMIN — TRAZODONE HYDROCHLORIDE 100 MG: 100 TABLET ORAL at 09:08

## 2022-08-10 RX ADMIN — PROPOFOL 150 MG: 10 INJECTION, EMULSION INTRAVENOUS at 12:08

## 2022-08-10 RX ADMIN — HYDROMORPHONE HYDROCHLORIDE 0.2 MG: 2 INJECTION INTRAMUSCULAR; INTRAVENOUS; SUBCUTANEOUS at 03:08

## 2022-08-10 RX ADMIN — HYDROMORPHONE HYDROCHLORIDE 0.2 MG: 2 INJECTION INTRAMUSCULAR; INTRAVENOUS; SUBCUTANEOUS at 02:08

## 2022-08-10 RX ADMIN — AMLODIPINE BESYLATE 5 MG: 5 TABLET ORAL at 09:08

## 2022-08-10 RX ADMIN — HYDROCODONE BITARTRATE AND ACETAMINOPHEN 1 TABLET: 5; 325 TABLET ORAL at 09:08

## 2022-08-10 RX ADMIN — CLONAZEPAM 1 MG: 1 TABLET ORAL at 09:08

## 2022-08-10 RX ADMIN — CEFAZOLIN 2 G: 1 INJECTION, POWDER, FOR SOLUTION INTRAMUSCULAR; INTRAVENOUS at 12:08

## 2022-08-10 RX ADMIN — MIDAZOLAM 2 MG: 1 INJECTION INTRAMUSCULAR; INTRAVENOUS at 12:08

## 2022-08-10 RX ADMIN — CHLORHEXIDINE GLUCONATE 0.12% ORAL RINSE 10 ML: 1.2 LIQUID ORAL at 09:08

## 2022-08-10 RX ADMIN — HYDRALAZINE HYDROCHLORIDE 25 MG: 25 TABLET, FILM COATED ORAL at 09:08

## 2022-08-10 RX ADMIN — SODIUM CHLORIDE, SODIUM LACTATE, POTASSIUM CHLORIDE, AND CALCIUM CHLORIDE: .6; .31; .03; .02 INJECTION, SOLUTION INTRAVENOUS at 12:08

## 2022-08-10 RX ADMIN — LIDOCAINE HYDROCHLORIDE 50 MG: 10 INJECTION, SOLUTION EPIDURAL; INFILTRATION; INTRACAUDAL; PERINEURAL at 12:08

## 2022-08-10 RX ADMIN — VENLAFAXINE 75 MG: 25 TABLET ORAL at 09:08

## 2022-08-10 RX ADMIN — ZOLPIDEM TARTRATE 10 MG: 5 TABLET ORAL at 09:08

## 2022-08-10 RX ADMIN — FENTANYL CITRATE 100 MCG: 50 INJECTION, SOLUTION INTRAMUSCULAR; INTRAVENOUS at 12:08

## 2022-08-10 RX ADMIN — GABAPENTIN 600 MG: 300 CAPSULE ORAL at 09:08

## 2022-08-10 RX ADMIN — FINASTERIDE 5 MG: 5 TABLET, FILM COATED ORAL at 09:08

## 2022-08-10 RX ADMIN — CEFAZOLIN SODIUM 1 G: 1 SOLUTION INTRAVENOUS at 06:08

## 2022-08-10 RX ADMIN — ONDANSETRON 4 MG: 2 INJECTION, SOLUTION INTRAMUSCULAR; INTRAVENOUS at 02:08

## 2022-08-10 NOTE — TRANSFER OF CARE
"Anesthesia Transfer of Care Note    Patient: Mil Sood    Procedure(s) Performed: Procedure(s) (LRB):  ORIF, ANKLE (Left)    Patient location: PACU    Anesthesia Type: general    Transport from OR: Transported from OR on room air with adequate spontaneous ventilation    Post pain: adequate analgesia    Post assessment: no apparent anesthetic complications    Post vital signs: stable    Level of consciousness: sedated    Nausea/Vomiting: no nausea/vomiting    Complications: none    Transfer of care protocol was followed      Last vitals:   Visit Vitals  BP (!) 140/92 (BP Location: Right arm, Patient Position: Sitting)   Pulse 100   Temp 36.3 °C (97.3 °F) (Tympanic)   Resp 20   Ht 5' 10" (1.778 m)   Wt 78.1 kg (172 lb 2.9 oz)   SpO2 99%   BMI 24.71 kg/m²     "

## 2022-08-10 NOTE — ANESTHESIA POSTPROCEDURE EVALUATION
Anesthesia Post Evaluation    Patient: Mil Sood    Procedure(s) Performed: Procedure(s) (LRB):  ORIF, ANKLE (Left)    Final Anesthesia Type: general      Patient location during evaluation: PACU  Patient participation: Yes- Able to Participate  Level of consciousness: awake and alert  Post-procedure vital signs: reviewed and stable  Pain management: adequate  Airway patency: patent  COLLEEN mitigation strategies: Extubation while patient is awake  PONV status at discharge: No PONV  Anesthetic complications: no      Cardiovascular status: blood pressure returned to baseline  Respiratory status: unassisted  Hydration status: euvolemic  Follow-up not needed.          Vitals Value Taken Time   /103 08/10/22 1446   Temp 36.4 °C (97.5 °F) 08/10/22 1422   Pulse 73 08/10/22 1448   Resp 20 08/10/22 1448   SpO2 98 % 08/10/22 1448   Vitals shown include unvalidated device data.      No case tracking events are documented in the log.      Pain/Doug Score: Doug Score: 4 (8/10/2022  2:30 PM)

## 2022-08-10 NOTE — CONSULTS
[facility] Medicine  Consult Note    Patient Name: Mil Sood  MRN: [id number]  Admission Date: 8/10/2022  Hospital Length of Stay: 0 days  Attending Physician: Allison Navarrete MD  Primary Care Provider: Yumi Roberson MD           Patient information was obtained from patient, past medical records and ER records.     Consults  Subjective:     Principal Problem: Closed displaced fracture of lateral malleolus of left fibula    Chief Complaint: No chief complaint on file.       HPI: 66 y/o M PMH CABG 3/22 POD0 Open treatment of displaced left lateral malleolar fracture with internal fixation s/p traumatic fall and ankle fracture. HM consulted for med management.    PMH: CAD, HTN  PSH: CABG  SH: not pertinent  FH: not pertinent      Review of Systems   Constitutional:  Negative for chills, fatigue and fever.   HENT: Negative.     Eyes: Negative.    Respiratory:  Negative for chest tightness, shortness of breath and wheezing.    Cardiovascular:  Negative for chest pain and leg swelling.   Gastrointestinal:  Negative for abdominal pain, diarrhea, nausea and vomiting.   Endocrine: Negative.    Genitourinary:  Negative for flank pain.   Musculoskeletal:  Negative for back pain, joint swelling and myalgias.   Neurological:  Negative for syncope, weakness and light-headedness.   Psychiatric/Behavioral:  Negative for confusion.    Objective:     Vital Signs (Most Recent):  Temp: 97.2 °F (36.2 °C) (08/10/22 1547)  Pulse: 81 (08/10/22 1547)  Resp: 18 (08/10/22 1547)  BP: (!) 164/89 (08/10/22 1547)  SpO2: 98 % (08/10/22 1547)   Vital Signs (24h Range):  Temp:  [97.2 °F (36.2 °C)-97.5 °F (36.4 °C)] 97.2 °F (36.2 °C)  Pulse:  [] 81  Resp:  [16-25] 18  SpO2:  [97 %-100 %] 98 %  BP: (115-172)/() 164/89     Weight: 78.1 kg (172 lb 2.9 oz)  Body mass index is 24.71 kg/m².  No intake or output data in the 24 hours ending 08/10/22 1739   Physical Exam  Constitutional:       General: He is not in acute  distress.     Appearance: Normal appearance. He is not ill-appearing.   HENT:      Head: Normocephalic and atraumatic.      Ears:      Comments: Hard of hearing  Cardiovascular:      Rate and Rhythm: Normal rate and regular rhythm.   Pulmonary:      Effort: Pulmonary effort is normal.      Breath sounds: Normal breath sounds.   Abdominal:      General: Abdomen is flat. Bowel sounds are normal. There is no distension.      Palpations: Abdomen is soft.      Tenderness: There is no abdominal tenderness. There is no guarding.   Musculoskeletal:         General: Signs of injury present. Normal range of motion.      Right lower leg: No edema.      Left lower leg: No edema.      Comments: LLE boot POD0   Skin:     General: Skin is warm and dry.   Neurological:      General: No focal deficit present.      Mental Status: He is alert and oriented to person, place, and time.       Significant Labs: All pertinent labs within the past 24 hours have been reviewed.    Significant Imaging: I have reviewed all pertinent imaging results/findings within the past 24 hours.    Assessment/Plan:     * Closed displaced fracture of lateral malleolus of left fibula  Pain control  PT/OT to eval and treat      Hx of CABG x4 in   Restart DAPT  Continue statin  BB, Imdur    Depression  Continue home meds  PRN insomnia meds    Essential hypertension  Home meds        VTE Risk Mitigation (From admission, onward)         Ordered     enoxaparin injection 40 mg  Daily         08/10/22 1743     Place sequential compression device  Until discontinued         08/10/22 1439     IP VTE HIGH RISK PATIENT  Once         08/10/22 1439                    Thank you for your consult. I will follow-up with patient. Please contact us if you have any additional questions.    lAlison Navarrete MD  Department of Hospital Medicine   [facility] Med S[facility]

## 2022-08-10 NOTE — SUBJECTIVE & OBJECTIVE
Review of Systems   Constitutional:  Negative for chills, fatigue and fever.   HENT: Negative.     Eyes: Negative.    Respiratory:  Negative for chest tightness, shortness of breath and wheezing.    Cardiovascular:  Negative for chest pain and leg swelling.   Gastrointestinal:  Negative for abdominal pain, diarrhea, nausea and vomiting.   Endocrine: Negative.    Genitourinary:  Negative for flank pain.   Musculoskeletal:  Negative for back pain, joint swelling and myalgias.   Neurological:  Negative for syncope, weakness and light-headedness.   Psychiatric/Behavioral:  Negative for confusion.    Objective:     Vital Signs (Most Recent):  Temp: 97.2 °F (36.2 °C) (08/10/22 1547)  Pulse: 81 (08/10/22 1547)  Resp: 18 (08/10/22 1547)  BP: (!) 164/89 (08/10/22 1547)  SpO2: 98 % (08/10/22 1547)   Vital Signs (24h Range):  Temp:  [97.2 °F (36.2 °C)-97.5 °F (36.4 °C)] 97.2 °F (36.2 °C)  Pulse:  [] 81  Resp:  [16-25] 18  SpO2:  [97 %-100 %] 98 %  BP: (115-172)/() 164/89     Weight: 78.1 kg (172 lb 2.9 oz)  Body mass index is 24.71 kg/m².  No intake or output data in the 24 hours ending 08/10/22 1739   Physical Exam  Constitutional:       General: He is not in acute distress.     Appearance: Normal appearance. He is not ill-appearing.   HENT:      Head: Normocephalic and atraumatic.      Ears:      Comments: Hard of hearing  Cardiovascular:      Rate and Rhythm: Normal rate and regular rhythm.   Pulmonary:      Effort: Pulmonary effort is normal.      Breath sounds: Normal breath sounds.   Abdominal:      General: Abdomen is flat. Bowel sounds are normal. There is no distension.      Palpations: Abdomen is soft.      Tenderness: There is no abdominal tenderness. There is no guarding.   Musculoskeletal:         General: Signs of injury present. Normal range of motion.      Right lower leg: No edema.      Left lower leg: No edema.      Comments: LLE boot POD0   Skin:     General: Skin is warm and dry.   Neurological:       General: No focal deficit present.      Mental Status: He is alert and oriented to person, place, and time.       Significant Labs: All pertinent labs within the past 24 hours have been reviewed.    Significant Imaging: I have reviewed all pertinent imaging results/findings within the past 24 hours.

## 2022-08-10 NOTE — PROGRESS NOTES
Subjective:     Patient ID: Mil Sood is a 67 y.o. male.    Chief Complaint: Pain and Injury of the Left Lower Leg      HPI:  This is a 67-year-old male brought in by his son.  He sustained an injury to the left ankle on August 6. Got was at home rolled the ankle with the onset of pain and swelling.  Went to the emergency room where he was found to have a left ankle fracture.  Treated with a splint.  Now here for orthopedic care.  Complains of left ankle pain and swelling.  Has been trying to walk on it and keeps falling down.  Has crutches but does not seem to want to use them according to his son.    The patient had CABG x5 March of 2022. Says he is not taking aspirin Plavix anymore.  Has bladder dysfunction of unknown cause.  Has an indwelling Ding.  He is to see urology today because it has been leaking.    Past Medical History:   Diagnosis Date    Anxiety     Cancer     spot on kidney and lung    Diabetes mellitus     Borderline diet controlled    Diverticulitis     GERD (gastroesophageal reflux disease)     Hyperlipidemia     not on any meds currently    Hypertension     Lung mass     Mass of kidney      Past Surgical History:   Procedure Laterality Date    CHOLECYSTECTOMY      COLONOSCOPY      COLONOSCOPY N/A 4/7/2017    Procedure: COLONOSCOPY;  Surgeon: Amarilys Bravo MD;  Location: [facility];  Service: Endoscopy;  Laterality: N/A;    DIAGNOSTIC LAPAROSCOPY N/A 5/4/2021    Procedure: LAPAROSCOPY, DIAGNOSTIC;  Surgeon: Shaji Jauregui MD;  Location: [facility] OR;  Service: General;  Laterality: N/A;    ESOPHAGOGASTRODUODENOSCOPY      ESOPHAGOGASTRODUODENOSCOPY N/A 2/13/2020    Procedure: ESOPHAGOGASTRODUODENOSCOPY (EGD);  Surgeon: Shaji Aquino III, MD;  Location: [facility];  Service: Endoscopy;  Laterality: N/A;    HERNIA REPAIR Bilateral     groin    LEFT HEART CATHETERIZATION Left 1/31/2022    Procedure: CATHETERIZATION, HEART, LEFT;  Surgeon: Romeo Godfrey MD;  Location: [facility]  CATH LAB;  Service: Cardiology;  Laterality: Left;    NECK SURGERY      PARTIAL NEPHRECTOMY      left    RENAL BIOPSY      ROBOT-ASSISTED REPAIR OF UMBILICAL HERNIA USING DA NEL XI N/A 2021    Procedure: XI ROBOTIC REPAIR, HERNIA, UMBILICAL;  Surgeon: Shaji Jauregui MD;  Location: [facility];  Service: General;  Laterality: N/A;  converted to open    TONSILLECTOMY      TRIGGER FINGER RELEASE Right     long finger     Family History   Problem Relation Age of Onset    Hypertension Mother     Hypertension Father     Melanoma Father     Cancer Sister      Social History     Socioeconomic History    Marital status:    Tobacco Use    Smoking status: Former Smoker     Packs/day: 1.00     Years: 20.00     Pack years: 20.00     Quit date: 1995     Years since quittin.5    Smokeless tobacco: Never Used   Substance and Sexual Activity    Alcohol use: Never     Alcohol/week: 0.0 standard drinks    Drug use: No    Sexual activity: Yes     Partners: Female     Birth control/protection: None     Medication List with Changes/Refills   Current Medications    AMLODIPINE (NORVASC) 10 MG TABLET    Take 1 tablet (10 mg total) by mouth every evening.    AMLODIPINE (NORVASC) 5 MG TABLET    Take 1 tablet (5 mg total) by mouth once daily.    BENAZEPRIL (LOTENSIN) 20 MG TABLET    TAKE 2 TABLETS BY MOUTH NIGHTLY.    BLOOD SUGAR DIAGNOSTIC STRP    Use to check blood sugars BID    BLOOD SUGAR DIAGNOSTIC STRP    1 each by Misc.(Non-Drug; Combo Route) route 3 (three) times daily.    BLOOD-GLUCOSE METER KIT    Use as instructed    CITALOPRAM (CELEXA) 40 MG TABLET    Take 1 tablet (40 mg total) by mouth every evening.    CLONAZEPAM (KLONOPIN) 1 MG TABLET    Take 1 tablet (1 mg total) by mouth nightly as needed for Anxiety.    CYCLOBENZAPRINE (FLEXERIL) 10 MG TABLET    TAKE 1 TABLET BY MOUTH THREE TIMES DAILY AS NEEDED.    FINASTERIDE (PROSCAR) 5 MG TABLET    Take 1 tablet (5 mg total) by mouth nightly.     FLUTICASONE PROPION-SALMETEROL 115-21 MCG/DOSE (ADVAIR HFA) 115-21 MCG/ACTUATION HFAA INHALER    Inhale 2 puffs into the lungs 2 (two) times daily.    GABAPENTIN (NEURONTIN) 600 MG TABLET    Take 1 tablet (600 mg total) by mouth nightly.    HYDRALAZINE (APRESOLINE) 25 MG TABLET    TAKE ONE TABLET BY MOUTH AT BEDTIME.    HYDROCHLOROTHIAZIDE (MICROZIDE) 12.5 MG CAPSULE    Take 1 capsule (12.5 mg total) by mouth once daily.    HYDROCODONE-ACETAMINOPHEN (NORCO) 7.5-325 MG PER TABLET    Take 1 tablet by mouth every 6 (six) hours as needed for Pain.    HYDROXYZINE PAMOATE (VISTARIL) 25 MG CAP    Take 1 capsule (25 mg total) by mouth every evening.    ISOSORBIDE MONONITRATE (IMDUR) 60 MG 24 HR TABLET    TAKE 1 TABLET BY MOUTH ONCE DAILY.    LANCETS (ACCU-CHEK SOFTCLIX LANCETS) MISC    1 each by Misc.(Non-Drug; Combo Route) route 3 (three) times daily.    MELOXICAM (MOBIC) 15 MG TABLET    TAKE 1 TABLET BY MOUTH ONCE DAILY FOR 5 DAYS.THEN ONCE DAILY AS NEEDED FOR BACK PAIN.    METOPROLOL SUCCINATE (TOPROL-XL) 25 MG 24 HR TABLET    Take 1 tablet (25 mg total) by mouth once daily.    OMEPRAZOLE (PRILOSEC) 40 MG CAPSULE    Take 1 capsule (40 mg total) by mouth every evening.    PRAVASTATIN (PRAVACHOL) 20 MG TABLET    Take 1 tablet (20 mg total) by mouth once daily.    RESTASIS MULTIDOSE 0.05 % DROP        TIOTROPIUM (SPIRIVA WITH HANDIHALER) 18 MCG INHALATION CAPSULE    INHALE THE CONTENTS OF ONE CAPSULE DAILY.    TRAZODONE (DESYREL) 100 MG TABLET    Take 1 tablet (100 mg total) by mouth every evening.    VENLAFAXINE (EFFEXOR) 75 MG TABLET    Take 1 tablet (75 mg total) by mouth 2 (two) times daily.    ZOLPIDEM (AMBIEN) 10 MG TAB    TAKE 1 TABLET BY MOUTH AT BEDTIME AS NEEDED FOR INSOMNIA.   Discontinued Medications    ASPIRIN 81 MG TAB    Take 1 tablet by mouth nightly.     CLOPIDOGREL (PLAVIX) 75 MG TABLET    Take 75 mg by mouth once daily.     Review of patient's allergies indicates:   Allergen Reactions    Rosuvastatin  "Other (See Comments)     Joint pain   Joint and bone pain     ROS     Objective:   Body mass index is 25.83 kg/m².  Vitals:    08/10/22 0759   Weight: 81.6 kg (180 lb)   Height: 5' 10" (1.778 m)   PainSc:   5   PainLoc: Leg       PHYSICAL EXAM:  Well-developed well-nourished male in no acute distress.  Awake, alert, oriented, cooperative with evaluation.    Examination of left ankle of the splint reveals swelling and bruising consistent with acute injury.  The foot is neurovascularly intact.  There is some increased pressure over the medial malleolus.    X-rays of the left ankle from August 6, 2022 reviewed.  There is a displaced fracture of the lateral malleolus with widening of the medial joint space consistent with syndesmotic injury.    Closed displaced fracture of lateral malleolus of left fibula, initial encounter  -     Case Request Operating Room: ORIF, ANKLE    Ankle syndesmosis disruption, left, initial encounter  -     Case Request Operating Room: ORIF, ANKLE        Plan:  The patient has a displaced fracture of the left lateral malleolus with unstable ankle mortise.  Recommend surgical repair.  There is some soft tissue pressure medially I think the surgery should be done urgently.  We going to try to get him to surgery today.  We will get him admitted to the hospital as outpatient.  Plan for surgery later today.               Howard Mata MD, FAAOS [facility], Orthopedic Trauma Service  [place]    "

## 2022-08-10 NOTE — ANESTHESIA PREPROCEDURE EVALUATION
08/10/2022  Mil Sood is a 67 y.o., male.    Patient Active Problem List   Diagnosis    Essential hypertension    GERD (gastroesophageal reflux disease)    Prediabetes    Depression    Coronary artery disease of native artery of native heart with stable angina pectoris    Cervical spondylosis    Culp's esophagus with high grade dysplasia    Culp's esophagus determined by biopsy    Screen for colon cancer    Renal oncocytoma    Myalgia due to HMG CoA reductase inhibitor    Statin intolerance    Culp's esophagus    Insomnia due to anxiety and fear    Incisional hernia, without obstruction or gangrene    Centrilobular emphysema    Primary osteoarthritis of left hand    Abnormal nuclear stress test    Other chest pain    Mixed hyperlipidemia    Atherosclerosis of aorta    Hx of CABG x4 in     Closed displaced fracture of lateral malleolus of left fibula    Ankle syndesmosis disruption, left, initial encounter     Past Surgical History:   Procedure Laterality Date    CHOLECYSTECTOMY      COLONOSCOPY      COLONOSCOPY N/A 4/7/2017    Procedure: COLONOSCOPY;  Surgeon: Amarilys Bravo MD;  Location: [facility];  Service: Endoscopy;  Laterality: N/A;    DIAGNOSTIC LAPAROSCOPY N/A 5/4/2021    Procedure: LAPAROSCOPY, DIAGNOSTIC;  Surgeon: Shaji Jauregui MD;  Location: [facility] OR;  Service: General;  Laterality: N/A;    ESOPHAGOGASTRODUODENOSCOPY      ESOPHAGOGASTRODUODENOSCOPY N/A 2/13/2020    Procedure: ESOPHAGOGASTRODUODENOSCOPY (EGD);  Surgeon: Shaji Aquino III, MD;  Location: [facility];  Service: Endoscopy;  Laterality: N/A;    HERNIA REPAIR Bilateral     groin    LEFT HEART CATHETERIZATION Left 1/31/2022    Procedure: CATHETERIZATION, HEART, LEFT;  Surgeon: Romeo Godfrey MD;  Location: [facility] CATH LAB;  Service: Cardiology;  Laterality: Left;    NECK  SURGERY      PARTIAL NEPHRECTOMY      left    RENAL BIOPSY      ROBOT-ASSISTED REPAIR OF UMBILICAL HERNIA USING DA NEL XI N/A 5/4/2021    Procedure: XI ROBOTIC REPAIR, HERNIA, UMBILICAL;  Surgeon: Shaji Jauregui MD;  Location: [facility];  Service: General;  Laterality: N/A;  converted to open    TONSILLECTOMY      TRIGGER FINGER RELEASE Right     long finger          Pre-op Assessment    I have reviewed the Patient Summary Reports.     I have reviewed the Nursing Notes. I have reviewed the NPO Status.   I have reviewed the Medications.     Review of Systems  Anesthesia Hx:  No problems with previous Anesthesia    Social:  Non-Smoker    Cardiovascular:   Hypertension CAD  CABG/stent  hyperlipidemia CABG 3/2022   Pulmonary:   COPD    Renal/:   BPH Renal oncocytoma;  Current indwelling catheter for urinary retention   Hepatic/GI:   GERD    Musculoskeletal:  Spine Disorders: cervical    Neurological:  Neurology Normal    Endocrine:  Endocrine Normal Pre DM       Physical Exam  General: Well nourished, Cooperative, Alert and Oriented    Airway:  Mallampati: II   Mouth Opening: Normal  TM Distance: Normal  Neck ROM: Normal ROM    Dental:  Edentulous      Lab Results   Component Value Date    WBC 13.13 (H) 08/06/2022    HGB 14.5 08/06/2022    HCT 44.8 08/06/2022    MCV 84 08/06/2022     08/06/2022         Chemistry        Component Value Date/Time     08/06/2022 0931    K 5.2 (H) 08/06/2022 0931    CL 99 08/06/2022 0931    CO2 28 08/06/2022 0931    BUN 24 (H) 08/06/2022 0931    CREATININE 1.7 (H) 08/06/2022 0931     (H) 08/06/2022 0931        Component Value Date/Time    CALCIUM 9.6 08/06/2022 0931    ALKPHOS 67 08/06/2022 0931    AST 13 08/06/2022 0931    ALT 10 08/06/2022 0931    BILITOT 0.6 08/06/2022 0931    ESTGFRAFRICA >60.0 02/11/2022 0858    EGFRNONAA >60.0 02/11/2022 0858        EKG  Normal sinus rhythm   Left posterior fascicular block   Abnormal ECG   When compared with ECG of  16-FEB-2022 14:20,   Left posterior fascicular block is now Present   Confirmed by LASHELL DUMONT MD ([id number]) on 8/7/2022 10:56:49 AM     Stress Test 1/20/22  Conclusion         Abnormal myocardial perfusion scan.    There is a severe intensity, small to moderate sized, reversible defect that is consistent with ischemia in the lateral apical wall(s).    The gated perfusion images showed an ejection fraction of 66% at rest. The gated perfusion images showed an ejection fraction of 71% post stress. Normal ejection fraction is greater than 59%.    The EKG portion of this study is negative for ischemia.    There were no arrhythmias during stress.      Anesthesia Plan  Type of Anesthesia, risks & benefits discussed:    Anesthesia Type: Gen ETT  Intra-op Monitoring Plan: Standard ASA Monitors  Post Op Pain Control Plan: IV/PO Opioids PRN  Induction:  IV  Airway Plan: Direct  Informed Consent: Informed consent signed with the Patient and all parties understand the risks and agree with anesthesia plan.  All questions answered.   ASA Score: 3    Ready For Surgery From Anesthesia Perspective.     .

## 2022-08-10 NOTE — HPI
68 y/o M PMH CABG 3/22 POD0 Open treatment of displaced left lateral malleolar fracture with internal fixation s/p traumatic fall and ankle fracture. HM consulted for med management.

## 2022-08-10 NOTE — ANESTHESIA PROCEDURE NOTES
Intubation    Date/Time: 8/10/2022 12:52 PM  Performed by: Doreen Woodruff CRNA  Authorized by: Fausto Paul MD     Intubation:     Induction:  Intravenous    Intubated:  Postinduction    Mask Ventilation:  Easy mask    Attempts:  1    Attempted By:  CRNA    Difficult Airway Encountered?: No      Complications:  None    Airway Device:  Supraglottic airway/LMA    Airway Device Size:  4.0    Style/Cuff Inflation:  Cuffed (inflated to minimal occlusive pressure)    Placement Verified By:  Capnometry    Complicating Factors:  None    Findings Post-Intubation:  BS equal bilateral and atraumatic/condition of teeth unchanged

## 2022-08-10 NOTE — OP NOTE
[facility] - Surgery ([facility])  Surgery Department  Operative Note    SUMMARY     Date of Procedure: 8/10/2022     Procedure:  Open treatment of displaced left lateral malleolar fracture with internal fixation    Surgeon(s) and Role:     * Howard Mata MD - Primary    Assisting Surgeon: None    Pre-Operative Diagnosis:   Closed displaced fracture of lateral malleolus of left fibula  Ankle syndesmosis disruption    Post-Operative Diagnosis:   Closed displaced fracture of lateral malleolus of left fibula    Anesthesia: General    Operative Findings (including complications, if any):  Displaced fracture of the lateral malleolus..  Post fixation of the fracture the ankle mortise was stable.  No complications    Description of Technical Procedures:   The patient was brought to the operating room and after administration of adequate general anesthetic the left lower extremity was prepped and draped in usual fashion for ankle exposure.  The limb was exsanguinated with an Ace wrap and tourniquet inflated to 300 mmHg about the upper left thigh.  Attention was turned to the lateral aspect of the ankle where an incision was made beginning at the tip of the lateral malleolus and carried proximally along the fibula for a total of 10 cm. Brought down through skin subcutaneous tissue.  Hematoma was encountered.  There was a short oblique fracture of the fibula at the upper end of the malleolus going from posterior superior to inferior distal.  The fracture was cleaned of interposed soft tissue and hematoma.  There is a small comminution fragment at the anterior margin of the distal fragment.  The fracture was reduced with a clamp.  Fluoroscopic images confirmed anatomic reduction of the fibula.  A interfragmentary compression screw was then placed from anterior superior to posterior inferior utilizing a 3.5 screw with overdrilling of the proximal cortex.  A Fabrice distal fibular plate of appropriate length was then selected.   This was held to the fibula with the whirly bird device proximally.  3.5 cortical screws were used to secure the plate at the distal most hole of the lateral malleolus and also thinned proximally to the shaft of the fibula.  X-rays confirmed anatomic reduction of the fibula and the ankle mortise and good hardware placement.  Further fixation was accomplished with locking screws.  The bone clamp was removed.  The fracture was stable to stress testing as was the ankle mortise with anatomic reduction and no instability.  The wound was irrigated with saline solution.  The subcutaneous tissues were closed with interrupted 2 Vicryl and skin with staples.  An Aquacel dressing and Ace wrap were applied and tourniquet released.  The patient was placed into a fracture boot.  Brought to the recovery room in stable condition.    Significant Surgical Tasks Conducted by the Assistant(s), if Applicable: na    Estimated Blood Loss (EBL): nil           Implants:   Implant Name Type Inv. Item Serial No.  Lot No. LRB No. Used Action   PLATE BONE FIB VARIAX LAT DIST - SN/A  PLATE BONE FIB VARIAX LAT DIST N/A [employer].  Left 1 Implanted   SCREW BONE NON LOCK 3.5X14MM - SN/A  SCREW BONE NON LOCK 3.5X14MM N/A [employer].  Left 2 Implanted   SCREW BONE NON LOCK 3.5X18MM - SN/A  SCREW BONE NON LOCK 3.5X18MM N/A [employer].  Left 2 Implanted   SCREW BONE LOCK T10 3.5X12MM - SN/A  SCREW BONE LOCK T10 3.5X12MM N/A [employer].  Left 1 Implanted   SCREW BONE LOCK T10 3.5X14MM - SN/A  SCREW BONE LOCK T10 3.5X14MM N/A [employer].  Left 1 Implanted   SCREW BONE LOCK T10 3.5X18MM - SN/A  SCREW BONE LOCK T10 3.5X18MM N/A [employer].  Left 2 Implanted       Specimens: none           Condition: Good    Disposition: PACU - hemodynamically stable.    Attestation: I performed the procedure.

## 2022-08-10 NOTE — H&P (VIEW-ONLY)
Subjective:     Patient ID: Mil Sood is a 67 y.o. male.    Chief Complaint: Pain and Injury of the Left Lower Leg      HPI:  This is a 67-year-old male brought in by his son.  He sustained an injury to the left ankle on August 6. Got was at home rolled the ankle with the onset of pain and swelling.  Went to the emergency room where he was found to have a left ankle fracture.  Treated with a splint.  Now here for orthopedic care.  Complains of left ankle pain and swelling.  Has been trying to walk on it and keeps falling down.  Has crutches but does not seem to want to use them according to his son.    The patient had CABG x5 March of 2022. Says he is not taking aspirin Plavix anymore.  Has bladder dysfunction of unknown cause.  Has an indwelling Ding.  He is to see urology today because it has been leaking.    Past Medical History:   Diagnosis Date    Anxiety     Cancer     spot on kidney and lung    Diabetes mellitus     Borderline diet controlled    Diverticulitis     GERD (gastroesophageal reflux disease)     Hyperlipidemia     not on any meds currently    Hypertension     Lung mass     Mass of kidney      Past Surgical History:   Procedure Laterality Date    CHOLECYSTECTOMY      COLONOSCOPY      COLONOSCOPY N/A 4/7/2017    Procedure: COLONOSCOPY;  Surgeon: Amarilys Bravo MD;  Location: [facility];  Service: Endoscopy;  Laterality: N/A;    DIAGNOSTIC LAPAROSCOPY N/A 5/4/2021    Procedure: LAPAROSCOPY, DIAGNOSTIC;  Surgeon: Shaji Jauregui MD;  Location: [facility] OR;  Service: General;  Laterality: N/A;    ESOPHAGOGASTRODUODENOSCOPY      ESOPHAGOGASTRODUODENOSCOPY N/A 2/13/2020    Procedure: ESOPHAGOGASTRODUODENOSCOPY (EGD);  Surgeon: Shaji Aquino III, MD;  Location: [facility];  Service: Endoscopy;  Laterality: N/A;    HERNIA REPAIR Bilateral     groin    LEFT HEART CATHETERIZATION Left 1/31/2022    Procedure: CATHETERIZATION, HEART, LEFT;  Surgeon: Romeo Godfrey MD;  Location: [facility]  CATH LAB;  Service: Cardiology;  Laterality: Left;    NECK SURGERY      PARTIAL NEPHRECTOMY      left    RENAL BIOPSY      ROBOT-ASSISTED REPAIR OF UMBILICAL HERNIA USING DA NEL XI N/A 2021    Procedure: XI ROBOTIC REPAIR, HERNIA, UMBILICAL;  Surgeon: Shaji Jauregui MD;  Location: [facility];  Service: General;  Laterality: N/A;  converted to open    TONSILLECTOMY      TRIGGER FINGER RELEASE Right     long finger     Family History   Problem Relation Age of Onset    Hypertension Mother     Hypertension Father     Melanoma Father     Cancer Sister      Social History     Socioeconomic History    Marital status:    Tobacco Use    Smoking status: Former Smoker     Packs/day: 1.00     Years: 20.00     Pack years: 20.00     Quit date: 1995     Years since quittin.5    Smokeless tobacco: Never Used   Substance and Sexual Activity    Alcohol use: Never     Alcohol/week: 0.0 standard drinks    Drug use: No    Sexual activity: Yes     Partners: Female     Birth control/protection: None     Medication List with Changes/Refills   Current Medications    AMLODIPINE (NORVASC) 10 MG TABLET    Take 1 tablet (10 mg total) by mouth every evening.    AMLODIPINE (NORVASC) 5 MG TABLET    Take 1 tablet (5 mg total) by mouth once daily.    BENAZEPRIL (LOTENSIN) 20 MG TABLET    TAKE 2 TABLETS BY MOUTH NIGHTLY.    BLOOD SUGAR DIAGNOSTIC STRP    Use to check blood sugars BID    BLOOD SUGAR DIAGNOSTIC STRP    1 each by Misc.(Non-Drug; Combo Route) route 3 (three) times daily.    BLOOD-GLUCOSE METER KIT    Use as instructed    CITALOPRAM (CELEXA) 40 MG TABLET    Take 1 tablet (40 mg total) by mouth every evening.    CLONAZEPAM (KLONOPIN) 1 MG TABLET    Take 1 tablet (1 mg total) by mouth nightly as needed for Anxiety.    CYCLOBENZAPRINE (FLEXERIL) 10 MG TABLET    TAKE 1 TABLET BY MOUTH THREE TIMES DAILY AS NEEDED.    FINASTERIDE (PROSCAR) 5 MG TABLET    Take 1 tablet (5 mg total) by mouth nightly.     FLUTICASONE PROPION-SALMETEROL 115-21 MCG/DOSE (ADVAIR HFA) 115-21 MCG/ACTUATION HFAA INHALER    Inhale 2 puffs into the lungs 2 (two) times daily.    GABAPENTIN (NEURONTIN) 600 MG TABLET    Take 1 tablet (600 mg total) by mouth nightly.    HYDRALAZINE (APRESOLINE) 25 MG TABLET    TAKE ONE TABLET BY MOUTH AT BEDTIME.    HYDROCHLOROTHIAZIDE (MICROZIDE) 12.5 MG CAPSULE    Take 1 capsule (12.5 mg total) by mouth once daily.    HYDROCODONE-ACETAMINOPHEN (NORCO) 7.5-325 MG PER TABLET    Take 1 tablet by mouth every 6 (six) hours as needed for Pain.    HYDROXYZINE PAMOATE (VISTARIL) 25 MG CAP    Take 1 capsule (25 mg total) by mouth every evening.    ISOSORBIDE MONONITRATE (IMDUR) 60 MG 24 HR TABLET    TAKE 1 TABLET BY MOUTH ONCE DAILY.    LANCETS (ACCU-CHEK SOFTCLIX LANCETS) MISC    1 each by Misc.(Non-Drug; Combo Route) route 3 (three) times daily.    MELOXICAM (MOBIC) 15 MG TABLET    TAKE 1 TABLET BY MOUTH ONCE DAILY FOR 5 DAYS.THEN ONCE DAILY AS NEEDED FOR BACK PAIN.    METOPROLOL SUCCINATE (TOPROL-XL) 25 MG 24 HR TABLET    Take 1 tablet (25 mg total) by mouth once daily.    OMEPRAZOLE (PRILOSEC) 40 MG CAPSULE    Take 1 capsule (40 mg total) by mouth every evening.    PRAVASTATIN (PRAVACHOL) 20 MG TABLET    Take 1 tablet (20 mg total) by mouth once daily.    RESTASIS MULTIDOSE 0.05 % DROP        TIOTROPIUM (SPIRIVA WITH HANDIHALER) 18 MCG INHALATION CAPSULE    INHALE THE CONTENTS OF ONE CAPSULE DAILY.    TRAZODONE (DESYREL) 100 MG TABLET    Take 1 tablet (100 mg total) by mouth every evening.    VENLAFAXINE (EFFEXOR) 75 MG TABLET    Take 1 tablet (75 mg total) by mouth 2 (two) times daily.    ZOLPIDEM (AMBIEN) 10 MG TAB    TAKE 1 TABLET BY MOUTH AT BEDTIME AS NEEDED FOR INSOMNIA.   Discontinued Medications    ASPIRIN 81 MG TAB    Take 1 tablet by mouth nightly.     CLOPIDOGREL (PLAVIX) 75 MG TABLET    Take 75 mg by mouth once daily.     Review of patient's allergies indicates:   Allergen Reactions    Rosuvastatin  "Other (See Comments)     Joint pain   Joint and bone pain     ROS     Objective:   Body mass index is 25.83 kg/m².  Vitals:    08/10/22 0759   Weight: 81.6 kg (180 lb)   Height: 5' 10" (1.778 m)   PainSc:   5   PainLoc: Leg       PHYSICAL EXAM:  Well-developed well-nourished male in no acute distress.  Awake, alert, oriented, cooperative with evaluation.    Examination of left ankle of the splint reveals swelling and bruising consistent with acute injury.  The foot is neurovascularly intact.  There is some increased pressure over the medial malleolus.    X-rays of the left ankle from August 6, 2022 reviewed.  There is a displaced fracture of the lateral malleolus with widening of the medial joint space consistent with syndesmotic injury.    Closed displaced fracture of lateral malleolus of left fibula, initial encounter  -     Case Request Operating Room: ORIF, ANKLE    Ankle syndesmosis disruption, left, initial encounter  -     Case Request Operating Room: ORIF, ANKLE        Plan:  The patient has a displaced fracture of the left lateral malleolus with unstable ankle mortise.  Recommend surgical repair.  There is some soft tissue pressure medially I think the surgery should be done urgently.  We going to try to get him to surgery today.  We will get him admitted to the hospital as outpatient.  Plan for surgery later today.               Howard Mata MD, FAAOS [facility], Orthopedic Trauma Service  [place]    "

## 2022-08-11 LAB
ALBUMIN SERPL BCP-MCNC: 3.1 G/DL (ref 3.5–5.2)
ALP SERPL-CCNC: 56 U/L (ref 55–135)
ALT SERPL W/O P-5'-P-CCNC: 11 U/L (ref 10–44)
ANION GAP SERPL CALC-SCNC: 11 MMOL/L (ref 8–16)
AST SERPL-CCNC: 16 U/L (ref 10–40)
BASOPHILS # BLD AUTO: 0.05 K/UL (ref 0–0.2)
BASOPHILS NFR BLD: 0.8 % (ref 0–1.9)
BILIRUB SERPL-MCNC: 0.7 MG/DL (ref 0.1–1)
BUN SERPL-MCNC: 21 MG/DL (ref 8–23)
CALCIUM SERPL-MCNC: 8.3 MG/DL (ref 8.7–10.5)
CHLORIDE SERPL-SCNC: 95 MMOL/L (ref 95–110)
CO2 SERPL-SCNC: 28 MMOL/L (ref 23–29)
CREAT SERPL-MCNC: 0.9 MG/DL (ref 0.5–1.4)
DIFFERENTIAL METHOD: ABNORMAL
EOSINOPHIL # BLD AUTO: 0.3 K/UL (ref 0–0.5)
EOSINOPHIL NFR BLD: 3.9 % (ref 0–8)
ERYTHROCYTE [DISTWIDTH] IN BLOOD BY AUTOMATED COUNT: 14 % (ref 11.5–14.5)
EST. GFR  (NO RACE VARIABLE): >60 ML/MIN/1.73 M^2
GLUCOSE SERPL-MCNC: 87 MG/DL (ref 70–110)
HCT VFR BLD AUTO: 36 % (ref 40–54)
HGB BLD-MCNC: 11.9 G/DL (ref 14–18)
IMM GRANULOCYTES # BLD AUTO: 0.02 K/UL (ref 0–0.04)
IMM GRANULOCYTES NFR BLD AUTO: 0.3 % (ref 0–0.5)
LYMPHOCYTES # BLD AUTO: 0.7 K/UL (ref 1–4.8)
LYMPHOCYTES NFR BLD: 10.8 % (ref 18–48)
MCH RBC QN AUTO: 27.5 PG (ref 27–31)
MCHC RBC AUTO-ENTMCNC: 33.1 G/DL (ref 32–36)
MCV RBC AUTO: 83 FL (ref 82–98)
MONOCYTES # BLD AUTO: 1.1 K/UL (ref 0.3–1)
MONOCYTES NFR BLD: 17.7 % (ref 4–15)
NEUTROPHILS # BLD AUTO: 4.3 K/UL (ref 1.8–7.7)
NEUTROPHILS NFR BLD: 66.5 % (ref 38–73)
NRBC BLD-RTO: 0 /100 WBC
PLATELET # BLD AUTO: 220 K/UL (ref 150–450)
PMV BLD AUTO: 8.7 FL (ref 9.2–12.9)
POTASSIUM SERPL-SCNC: 3.7 MMOL/L (ref 3.5–5.1)
PROT SERPL-MCNC: 5.7 G/DL (ref 6–8.4)
RBC # BLD AUTO: 4.33 M/UL (ref 4.6–6.2)
SODIUM SERPL-SCNC: 134 MMOL/L (ref 136–145)
WBC # BLD AUTO: 6.39 K/UL (ref 3.9–12.7)

## 2022-08-11 PROCEDURE — 63600175 PHARM REV CODE 636 W HCPCS: Performed by: ORTHOPAEDIC SURGERY

## 2022-08-11 PROCEDURE — 97161 PT EVAL LOW COMPLEX 20 MIN: CPT

## 2022-08-11 PROCEDURE — 25000003 PHARM REV CODE 250: Performed by: ORTHOPAEDIC SURGERY

## 2022-08-11 PROCEDURE — 36415 COLL VENOUS BLD VENIPUNCTURE: CPT | Performed by: NURSE PRACTITIONER

## 2022-08-11 PROCEDURE — 97110 THERAPEUTIC EXERCISES: CPT

## 2022-08-11 PROCEDURE — 11000001 HC ACUTE MED/SURG PRIVATE ROOM

## 2022-08-11 PROCEDURE — 94761 N-INVAS EAR/PLS OXIMETRY MLT: CPT

## 2022-08-11 PROCEDURE — 97530 THERAPEUTIC ACTIVITIES: CPT

## 2022-08-11 PROCEDURE — 96374 THER/PROPH/DIAG INJ IV PUSH: CPT | Mod: 59

## 2022-08-11 PROCEDURE — 99024 PR POST-OP FOLLOW-UP VISIT: ICD-10-PCS | Mod: ,,, | Performed by: ORTHOPAEDIC SURGERY

## 2022-08-11 PROCEDURE — 85025 COMPLETE CBC W/AUTO DIFF WBC: CPT | Performed by: NURSE PRACTITIONER

## 2022-08-11 PROCEDURE — 99232 SBSQ HOSP IP/OBS MODERATE 35: CPT | Mod: ,,, | Performed by: UROLOGY

## 2022-08-11 PROCEDURE — 99232 PR SUBSEQUENT HOSPITAL CARE,LEVL II: ICD-10-PCS | Mod: ,,, | Performed by: UROLOGY

## 2022-08-11 PROCEDURE — 25000003 PHARM REV CODE 250: Performed by: UROLOGY

## 2022-08-11 PROCEDURE — 99024 POSTOP FOLLOW-UP VISIT: CPT | Mod: ,,, | Performed by: ORTHOPAEDIC SURGERY

## 2022-08-11 PROCEDURE — 25000003 PHARM REV CODE 250: Performed by: STUDENT IN AN ORGANIZED HEALTH CARE EDUCATION/TRAINING PROGRAM

## 2022-08-11 PROCEDURE — 97166 OT EVAL MOD COMPLEX 45 MIN: CPT

## 2022-08-11 PROCEDURE — 96366 THER/PROPH/DIAG IV INF ADDON: CPT

## 2022-08-11 PROCEDURE — 80053 COMPREHEN METABOLIC PANEL: CPT | Performed by: NURSE PRACTITIONER

## 2022-08-11 RX ORDER — MUPIROCIN 20 MG/G
OINTMENT TOPICAL 2 TIMES DAILY
Status: DISCONTINUED | OUTPATIENT
Start: 2022-08-11 | End: 2022-08-12 | Stop reason: HOSPADM

## 2022-08-11 RX ORDER — TAMSULOSIN HYDROCHLORIDE 0.4 MG/1
0.4 CAPSULE ORAL DAILY
Status: DISCONTINUED | OUTPATIENT
Start: 2022-08-11 | End: 2022-08-12 | Stop reason: HOSPADM

## 2022-08-11 RX ORDER — MORPHINE SULFATE 4 MG/ML
3 INJECTION, SOLUTION INTRAMUSCULAR; INTRAVENOUS
Status: DISCONTINUED | OUTPATIENT
Start: 2022-08-11 | End: 2022-08-12 | Stop reason: HOSPADM

## 2022-08-11 RX ADMIN — CEFAZOLIN SODIUM 1 G: 1 SOLUTION INTRAVENOUS at 05:08

## 2022-08-11 RX ADMIN — CHLORHEXIDINE GLUCONATE 0.12% ORAL RINSE 10 ML: 1.2 LIQUID ORAL at 09:08

## 2022-08-11 RX ADMIN — VENLAFAXINE 75 MG: 25 TABLET ORAL at 09:08

## 2022-08-11 RX ADMIN — TRAZODONE HYDROCHLORIDE 100 MG: 100 TABLET ORAL at 09:08

## 2022-08-11 RX ADMIN — MORPHINE SULFATE 3 MG: 4 INJECTION INTRAVENOUS at 06:08

## 2022-08-11 RX ADMIN — ZOLPIDEM TARTRATE 10 MG: 5 TABLET ORAL at 09:08

## 2022-08-11 RX ADMIN — FINASTERIDE 5 MG: 5 TABLET, FILM COATED ORAL at 09:08

## 2022-08-11 RX ADMIN — AMLODIPINE BESYLATE 5 MG: 5 TABLET ORAL at 09:08

## 2022-08-11 RX ADMIN — TAMSULOSIN HYDROCHLORIDE 0.4 MG: 0.4 CAPSULE ORAL at 06:08

## 2022-08-11 RX ADMIN — HYDROCODONE BITARTRATE AND ACETAMINOPHEN 1 TABLET: 5; 325 TABLET ORAL at 09:08

## 2022-08-11 RX ADMIN — GABAPENTIN 600 MG: 300 CAPSULE ORAL at 09:08

## 2022-08-11 RX ADMIN — HYDROCODONE BITARTRATE AND ACETAMINOPHEN 1 TABLET: 5; 325 TABLET ORAL at 02:08

## 2022-08-11 RX ADMIN — ASPIRIN 81 MG: 81 TABLET, COATED ORAL at 09:08

## 2022-08-11 RX ADMIN — HYDROCODONE BITARTRATE AND ACETAMINOPHEN 1 TABLET: 5; 325 TABLET ORAL at 04:08

## 2022-08-11 RX ADMIN — METOPROLOL SUCCINATE 25 MG: 25 TABLET, EXTENDED RELEASE ORAL at 09:08

## 2022-08-11 RX ADMIN — ISOSORBIDE MONONITRATE 60 MG: 60 TABLET, EXTENDED RELEASE ORAL at 09:08

## 2022-08-11 RX ADMIN — MUPIROCIN: 20 OINTMENT TOPICAL at 09:08

## 2022-08-11 RX ADMIN — HYDRALAZINE HYDROCHLORIDE 25 MG: 25 TABLET, FILM COATED ORAL at 09:08

## 2022-08-11 RX ADMIN — CLONAZEPAM 1 MG: 1 TABLET ORAL at 09:08

## 2022-08-11 RX ADMIN — CLOPIDOGREL 75 MG: 75 TABLET, FILM COATED ORAL at 09:08

## 2022-08-11 RX ADMIN — MORPHINE SULFATE 3 MG: 4 INJECTION INTRAVENOUS at 11:08

## 2022-08-11 NOTE — NURSING
Dr. Mata present at bedside to assess pt. Pt complains of severe pain. MD states ok to give morphine dose now due to severe pain. MD states anyone who has a problem with order to contact him. Order complete no further action at this time .

## 2022-08-11 NOTE — SUBJECTIVE & OBJECTIVE
Interval History: See above    Review of Systems   Constitutional:  Negative for chills, fatigue and fever.   HENT: Negative.     Eyes: Negative.    Respiratory:  Negative for chest tightness, shortness of breath and wheezing.    Cardiovascular:  Negative for chest pain and leg swelling.   Gastrointestinal:  Negative for abdominal pain, diarrhea, nausea and vomiting.   Endocrine: Negative.    Genitourinary:  Negative for flank pain.   Musculoskeletal:  Positive for arthralgias and joint swelling. Negative for back pain and myalgias.   Neurological:  Negative for syncope, weakness and light-headedness.   Psychiatric/Behavioral:  Negative for confusion.    All other systems reviewed and are negative.  Objective:     Vital Signs (Most Recent):  Temp: 98 °F (36.7 °C) (08/11/22 1148)  Pulse: 92 (08/11/22 1148)  Resp: 15 (08/11/22 1159)  BP: 126/64 (08/11/22 1148)  SpO2: 95 % (08/11/22 1148) Vital Signs (24h Range):  Temp:  [97.2 °F (36.2 °C)-98.2 °F (36.8 °C)] 98 °F (36.7 °C)  Pulse:  [64-98] 92  Resp:  [15-25] 15  SpO2:  [92 %-100 %] 95 %  BP: (115-172)/() 126/64     Weight: 78.1 kg (172 lb 2.9 oz)  Body mass index is 24.71 kg/m².    Intake/Output Summary (Last 24 hours) at 8/11/2022 1204  Last data filed at 8/11/2022 0400  Gross per 24 hour   Intake --   Output 500 ml   Net -500 ml      Physical Exam  Vitals and nursing note reviewed.   Constitutional:       General: He is not in acute distress.     Appearance: Normal appearance. He is not ill-appearing.   HENT:      Head: Normocephalic and atraumatic.      Ears:      Comments: Hard of hearing  Cardiovascular:      Rate and Rhythm: Normal rate and regular rhythm.   Pulmonary:      Effort: Pulmonary effort is normal.      Breath sounds: Normal breath sounds.   Abdominal:      General: Abdomen is flat. Bowel sounds are normal. There is no distension.      Palpations: Abdomen is soft.      Tenderness: There is no abdominal tenderness. There is no guarding.    Musculoskeletal:         General: Signs of injury present. Normal range of motion.      Right lower leg: No edema.      Left lower leg: No edema.      Comments: LLE boot POD1   Skin:     General: Skin is warm and dry.   Neurological:      General: No focal deficit present.      Mental Status: He is alert and oriented to person, place, and time.   Psychiatric:         Mood and Affect: Mood normal.         Behavior: Behavior normal.       Significant Labs: All pertinent labs within the past 24 hours have been reviewed.  CBC:   Recent Labs   Lab 08/10/22  1142   WBC 8.53   HGB 13.6*   HCT 41.8        CMP:   Recent Labs   Lab 08/10/22  1142   *   K 4.2   CL 99   CO2 24   GLU 98   BUN 31*   CREATININE 1.1   CALCIUM 10.0   PROT 7.5   ALBUMIN 3.6   BILITOT 0.9   ALKPHOS 58   AST 16   ALT 12   ANIONGAP 11       Significant Imaging: I have reviewed all pertinent imaging results/findings within the past 24 hours.

## 2022-08-11 NOTE — CONSULTS
"Reason for consultation: Urinary retention    Provider requesting consultation: Dr. Howard Mata    History of Present Illness:   Mil Sood is a 67 y.o. Male currently admitted following internal fixation of L lateral malleolar fracture following traumatic fall. Pt reports that when he initially presented to the ED 5 days ago, he was unable to produce a urine specimen and klein was placed. Per the patient's report, only a small amount of urine drained, but the klein was left "in case surgery was needed". Per the consult request, 4000mL was drained, but I am unable to locate this information in the record otherwise. The patient denies any bothersome LUTS. No dysuria, gross hematuria, weak stream, stranguria, urgency or incontinence. Pt does report seeing Dr. Cleveland 2.5 years ago and was diagnosed with BPH and treated with flomax and finasteride at that time.         Review of Systems   Constitutional: Negative for chills.   Genitourinary: Negative for difficulty urinating, dysuria, frequency, hematuria and urgency.   Musculoskeletal: Positive for gait problem.   All other systems reviewed and are negative.        Past Medical History:   Diagnosis Date    Anxiety     Cancer     spot on kidney and lung    Diabetes mellitus     Borderline diet controlled    Diverticulitis     GERD (gastroesophageal reflux disease)     Hyperlipidemia     not on any meds currently    Hypertension     Lung mass     Mass of kidney        Past Surgical History:   Procedure Laterality Date    CHOLECYSTECTOMY      COLONOSCOPY      COLONOSCOPY N/A 4/7/2017    Procedure: COLONOSCOPY;  Surgeon: Amarilys Bravo MD;  Location: [facility] ENDO;  Service: Endoscopy;  Laterality: N/A;    DIAGNOSTIC LAPAROSCOPY N/A 5/4/2021    Procedure: LAPAROSCOPY, DIAGNOSTIC;  Surgeon: Shaji Jauregui MD;  Location: [facility] OR;  Service: General;  Laterality: N/A;    ESOPHAGOGASTRODUODENOSCOPY      ESOPHAGOGASTRODUODENOSCOPY N/A 2/13/2020    " Procedure: ESOPHAGOGASTRODUODENOSCOPY (EGD);  Surgeon: Sahji Aquino III, MD;  Location: [facility] ENDO;  Service: Endoscopy;  Laterality: N/A;    HERNIA REPAIR Bilateral     groin    LEFT HEART CATHETERIZATION Left 2022    Procedure: CATHETERIZATION, HEART, LEFT;  Surgeon: Romeo Godfrey MD;  Location: [facility] CATH LAB;  Service: Cardiology;  Laterality: Left;    NECK SURGERY      OPEN REDUCTION AND INTERNAL FIXATION (ORIF) OF INJURY OF ANKLE Left 8/10/2022    Procedure: ORIF, ANKLE;  Surgeon: Howard Mata MD;  Location: [facility] OR;  Service: Orthopedics;  Laterality: Left;    PARTIAL NEPHRECTOMY      left    RENAL BIOPSY      ROBOT-ASSISTED REPAIR OF UMBILICAL HERNIA USING DA NEL XI N/A 2021    Procedure: XI ROBOTIC REPAIR, HERNIA, UMBILICAL;  Surgeon: Shaji Jauregui MD;  Location: [facility] OR;  Service: General;  Laterality: N/A;  converted to open    TONSILLECTOMY      TRIGGER FINGER RELEASE Right     long finger       Family History   Problem Relation Age of Onset    Hypertension Mother     Hypertension Father     Melanoma Father     Cancer Sister        Social History     Tobacco Use    Smoking status: Former Smoker     Packs/day: 1.00     Years: 20.00     Pack years: 20.00     Quit date: 1995     Years since quittin.5    Smokeless tobacco: Never Used   Substance Use Topics    Alcohol use: Never     Alcohol/week: 0.0 standard drinks    Drug use: No       Current Facility-Administered Medications   Medication Dose Route Frequency Provider Last Rate Last Admin    acetaminophen tablet 650 mg  650 mg Oral Q4H PRN Howard Mata MD        amLODIPine tablet 5 mg  5 mg Oral BID Howard Mata MD   5 mg at 22 0915    aspirin EC tablet 81 mg  81 mg Oral Daily Allison Navarrete MD   81 mg at 22 0915    chlorhexidine 0.12 % solution 10 mL  10 mL Mouth/Throat BID Howard Mata MD   10 mL at 22 0915    clonazePAM tablet 1 mg  1 mg Oral Nightly PRN Allison MENDENHALL  MD Landon   1 mg at 08/10/22 2148    clopidogreL tablet 75 mg  75 mg Oral Daily Allison Navarrete MD   75 mg at 08/11/22 0915    cyclobenzaprine tablet 10 mg  10 mg Oral TID PRN Howard Mata MD        finasteride tablet 5 mg  5 mg Oral Nightly Allison Navarrete MD   5 mg at 08/10/22 2149    gabapentin capsule 600 mg  600 mg Oral Nightly Allison Navarrete MD   600 mg at 08/10/22 2149    hydrALAZINE tablet 25 mg  25 mg Oral Nightly Allison Navarrete MD   25 mg at 08/10/22 2149    HYDROcodone-acetaminophen 5-325 mg per tablet 1 tablet  1 tablet Oral Q4H PRN Howard Mata MD   1 tablet at 08/11/22 1442    isosorbide mononitrate 24 hr tablet 60 mg  60 mg Oral Daily Allison Navarrete MD   60 mg at 08/11/22 0915    metoprolol succinate (TOPROL-XL) 24 hr tablet 25 mg  25 mg Oral Daily Allison Navarrete MD   25 mg at 08/11/22 0915    morphine injection 3 mg  3 mg Intravenous Q1H PRN Howard Mata MD   3 mg at 08/11/22 1159    ondansetron disintegrating tablet 8 mg  8 mg Oral Q8H PRN Howard Mata MD        sodium chloride 0.9% flush 10 mL  10 mL Intravenous PRN Fausto Paul MD        sodium chloride 0.9% flush 10 mL  10 mL Intravenous PRN Fausto Paul MD        sodium chloride 0.9% flush 10 mL  10 mL Intravenous PRN Howard Mata MD        traZODone tablet 100 mg  100 mg Oral QHS Allison Navarrete MD   100 mg at 08/10/22 2150    venlafaxine tablet 75 mg  75 mg Oral BID Allison Navarrete MD   75 mg at 08/11/22 0915    zolpidem tablet 10 mg  10 mg Oral Nightly PRN Allison Navarrete MD   10 mg at 08/10/22 2148       Review of patient's allergies indicates:   Allergen Reactions    Rosuvastatin Other (See Comments)     Joint pain   Joint and bone pain       Physical Exam  Vitals:    08/11/22 1636   BP: 126/63   Pulse: 94   Resp: 18   Temp: 99.7 °F (37.6 °C)       General: Well-developed, well-nourished in no acute distress  HEENT: Normocephalic, atraumatic, Extraocular movements intact  Neck: supple,  trachea midline, no cervical or supraclavicular lymphadenopathy  Respirations: even and unlabored  Back: midline spine, no CVA tenderness  Abdomen: soft, Non-tender, non-distended, no organomegaly or palpable masses, no rebound or guarding  : circumcised male phallus without lesions, orthotopic urethral meatus, no inguinal hernia, no inguinal lymphadenopathy, no scrotal lesions, testicles descended bilaterally without mass or tenderness, clear yellow urine in klein  Rectal: 40g prostate, no nodules or tenderness. No gross blood  Extremities: atraumatic, moves all equally, no clubbing, cyanosis or edema  Psych: normal affect  Skin: warm and dry, no lesions  Neuro: Alert and oriented, Cranial nerves II-XII grossly intact    Urinalysis  pH, UA   Date Value Ref Range Status   08/06/2022 7.0 5.0 - 8.0 Final     Glucose, UA   Date Value Ref Range Status   08/06/2022 Negative Negative Final     Occult Blood UA   Date Value Ref Range Status   08/06/2022 Negative Negative Final     Nitrite, UA   Date Value Ref Range Status   08/06/2022 Negative Negative Final     Leukocytes, UA   Date Value Ref Range Status   08/06/2022 Negative Negative Final         Lab Results   Component Value Date    PSA 0.88 08/17/2021    PSA 0.51 01/27/2020    PSA 1.0 01/09/2019         Assessment:   BPH with obstruction  Urinary retention    Plan:    Flomax restarted  Continue finasteride  Will given voiding trial for klein to be removed at midnight.   Check post-void residual and replace klein if PVR>300cc or if patient is unable to urinate.   Please call with any questions.

## 2022-08-11 NOTE — PT/OT/SLP EVAL
Physical Therapy Evaluation    Patient Name:  Mil Sood   MRN:  [id number]    Recommendations:     Discharge Recommendations:  nursing facility, skilled VS REHAB  Discharge Equipment Recommendations: walker, rolling, bedside commode, wheelchair (WC OF FOR LONG DISTANCES)   Barriers to discharge: Decreased caregiver support    Assessment:     Mil Sood is a 67 y.o. male admitted with a medical diagnosis of Closed displaced fracture of lateral malleolus of left fibula.  He presents with the following impairments/functional limitations:  weakness, impaired endurance, impaired self care skills, impaired functional mobility, gait instability, impaired balance, decreased ROM, decreased lower extremity function, decreased safety awareness, pain, decreased coordination, orthopedic precautions .    Rehab Prognosis: Good; patient would benefit from acute skilled PT services to address these deficits and reach maximum level of function.    Recent Surgery: Procedure(s) (LRB):  ORIF, ANKLE (Left) 1 Day Post-Op    Plan:     During this hospitalization, patient to be seen 3 x/week to address the identified rehab impairments via gait training, therapeutic activities, therapeutic exercises and progress toward the following goals:    · Plan of Care Expires:  08/25/22    Subjective     Chief Complaint: PAIN  Patient/Family Comments/goals: DEC PAIN   Pain/Comfort:  · Pain Rating 1: 8/10  · Location - Side 1: Left  · Location 1: leg  · Pain Rating Post-Intervention 1: 8/10    Patients cultural, spiritual, Judaism conflicts given the current situation:      Living Environment:  PT LIVES AT HOME WITH SON WHO WORKS DURING THE DAY AND HAS 4 STEPS AND 1 RAILING TO ENTER HOME   Prior to admission, patients level of function was IND AND DRIVES .  Equipment used at home: none.  DME owned (not currently used): single point cane.  Upon discharge, patient will have assistance from UNKNOWN .    Objective:     Communicated with NURSE KELLY  "AND [facility] CHART REVIEW  prior to session.  Patient found supine with peripheral IV  upon PT entry to room.    General Precautions: Standard, fall   Orthopedic Precautions:LLE non weight bearing   Braces:  (WALKING BOOT)  Respiratory Status: Room air    Exams:  · RLE ROM: WFL  · RLE Strength: 4/5  · LLE ROM: LIMITED @ ANKLE, HIP AND KNEE WFL  · LLE Strength: NA D/T PAIN     Functional Mobility:  · Bed Mobility:     · Rolling Left:  stand by assistance  · Supine to Sit: stand by assistance  · Transfers:     · Sit to Stand:  minimum assistance with rolling walker  · Bed to Chair: minimum assistance with  rolling walker  using  Stand Pivot  · Gait: PT GT TRAINED X 40' WITH RW AND ONE STAND REST BREAK WITH INC VERBAL CUES FOR SAFETY.     Therapeutic Activities and Exercises:   PT RETURNED TO  AFTER GT ASSESSMENT. PT T/F TO CHAIR WITH MIN A AND DEC SAFETY WITH RW USE. PT IMPULSIVE AND MOVES FAST. PT REPORTED THAT IS HIS ONLY SPEED. PT SEATED FOR EDUCATED ON RW USE AND ON TE OF R LE AP AND B MIP AND TKE. PT LEFT SEATED AND PT EDUCATED ON RISK FOR FALLS DUE TO NWB L LE AND WEAKNESS, EDUCATED ON "CALL DON'T FALL", ENCOURAGED TO CALL FOR ASSISTANCE WITH ALL NEEDS SUCH AS BED<>CHAIR TRANSFERS OR TRIPS TO BATHROOM      AM-PAC 6 CLICK MOBILITY  Total Score:18     Patient left up in chair with all lines intact, call button in reach and SON present.    GOALS:   Multidisciplinary Problems     Physical Therapy Goals        Problem: Physical Therapy    Goal Priority Disciplines Outcome Goal Variances Interventions   Physical Therapy Goal     PT, PT/OT      Description: LT22  1. PT WILL COMPLETE BED MOBILITY IND  2. PT WILL GT TRAIN X 50 ' WITH RW NWB L LE AND SBA  3. PT WILL T/F TO CHAIR WITH RW L LE NWB AND SBA.                      History:     Past Medical History:   Diagnosis Date    Anxiety     Cancer     spot on kidney and lung    Diabetes mellitus     Borderline diet controlled    Diverticulitis     GERD " (gastroesophageal reflux disease)     Hyperlipidemia     not on any meds currently    Hypertension     Lung mass     Mass of kidney        Past Surgical History:   Procedure Laterality Date    CHOLECYSTECTOMY      COLONOSCOPY      COLONOSCOPY N/A 4/7/2017    Procedure: COLONOSCOPY;  Surgeon: Amarilys Bravo MD;  Location: [facility] ENDO;  Service: Endoscopy;  Laterality: N/A;    DIAGNOSTIC LAPAROSCOPY N/A 5/4/2021    Procedure: LAPAROSCOPY, DIAGNOSTIC;  Surgeon: Shaji Jauregui MD;  Location: [facility] OR;  Service: General;  Laterality: N/A;    ESOPHAGOGASTRODUODENOSCOPY      ESOPHAGOGASTRODUODENOSCOPY N/A 2/13/2020    Procedure: ESOPHAGOGASTRODUODENOSCOPY (EGD);  Surgeon: Shaji Aquino III, MD;  Location: [facility];  Service: Endoscopy;  Laterality: N/A;    HERNIA REPAIR Bilateral     groin    LEFT HEART CATHETERIZATION Left 1/31/2022    Procedure: CATHETERIZATION, HEART, LEFT;  Surgeon: Romeo Godfrey MD;  Location: [facility] CATH LAB;  Service: Cardiology;  Laterality: Left;    NECK SURGERY      PARTIAL NEPHRECTOMY      left    RENAL BIOPSY      ROBOT-ASSISTED REPAIR OF UMBILICAL HERNIA USING DA NEL XI N/A 5/4/2021    Procedure: XI ROBOTIC REPAIR, HERNIA, UMBILICAL;  Surgeon: Shaji Jauregui MD;  Location: [facility] OR;  Service: General;  Laterality: N/A;  converted to open    TONSILLECTOMY      TRIGGER FINGER RELEASE Right     long finger       Time Tracking:     PT Received On: 08/25/22  PT Start Time: 0914     PT Stop Time: 0938  PT Total Time (min): 24 min     Billable Minutes: Evaluation 14 and Therapeutic Exercise 10      08/11/2022

## 2022-08-11 NOTE — PROGRESS NOTES
Mil Sood is a 67 y.o. male patient.     Subjective   The patient is 1st postop day from ORIF of left ankle fracture.  Complaining of pain primarily along the medial aspect of the foot.  Was given Lovenox 3 hours postop yesterday, I did not see that order until 8:00 p.m. when I discontinued it.  He is to resume aspirin and Plavix this morning.  Says his pain is not adequately controlled with the hydrocodone but was also not given the ordered morphine.    1. Closed displaced fracture of lateral malleolus of left fibula, initial encounter    2. Ankle syndesmosis disruption, left, initial encounter    3. Fracture of left ankle, lateral malleolus    4. Closed displaced fracture of lateral malleolus of left fibula with routine healing, subsequent encounter      Past Medical History:   Diagnosis Date    Anxiety     Cancer     spot on kidney and lung    Diabetes mellitus     Borderline diet controlled    Diverticulitis     GERD (gastroesophageal reflux disease)     Hyperlipidemia     not on any meds currently    Hypertension     Lung mass     Mass of kidney      No past surgical history pertinent negatives on file.  Scheduled Meds:   amLODIPine  5 mg Oral BID    aspirin  81 mg Oral Daily    chlorhexidine  10 mL Mouth/Throat BID    clopidogreL  75 mg Oral Daily    finasteride  5 mg Oral Nightly    gabapentin  600 mg Oral Nightly    hydrALAZINE  25 mg Oral Nightly    isosorbide mononitrate  60 mg Oral Daily    metoprolol succinate  25 mg Oral Daily    traZODone  100 mg Oral QHS    venlafaxine  75 mg Oral BID     Continuous Infusions:  PRN Meds:acetaminophen, clonazePAM, cyclobenzaprine, HYDROcodone-acetaminophen, morphine, ondansetron, sodium chloride 0.9%, sodium chloride 0.9%, sodium chloride 0.9%, zolpidem    Review of patient's allergies indicates:   Allergen Reactions    Rosuvastatin Other (See Comments)     Joint pain   Joint and bone pain     Active Hospital Problems    Diagnosis  POA     "*Closed displaced fracture of lateral malleolus of left fibula [S82.62XA]  Yes    Hx of CABG x4 in  [Z95.1]  Not Applicable    Essential hypertension [I10]  Yes     Chronic    Depression [F32.A]  Yes      Resolved Hospital Problems   No resolved problems to display.     Blood pressure (!) 140/86, pulse 92, temperature 98.2 °F (36.8 °C), temperature source Oral, resp. rate 18, height 5' 10" (1.778 m), weight 78.1 kg (172 lb 2.9 oz), SpO2 95 %.    Objective   Well-developed well-nourished male in no acute distress.  Awake, alert, oriented, cooperative with evaluation.    Examination of the left ankle and foot reveals the Ace wrap to be dry and intact.  I adjusted the straps of the fracture boot.  The toes have normal color, temperature, sensation, capillary refill.  Moves the toes very well.     Assessment & Plan   Will reorder morphine so is clear that it is not just for severe pain but also for pain is not relieved with oral medication, which to me is the same thing.  Also no indication for Lovenox in ankle fracture patient's.  Aspirin is usually adequate which is going to be resumed today along with Plavix.  Will start occupational physical therapy to mobilize the patient, nonweightbearing on the left lower extremity.  Await Urology consultation.  Await social work input regarding discharge planning.       8/11/2022        Howard Mata MD, FAAOS [facility], Orthopedic Trauma Service  [place]    "

## 2022-08-11 NOTE — PT/OT/SLP EVAL
Occupational Therapy   Evaluation    Name: Mil Sood  MRN: [id number]  Admitting Diagnosis:  Closed displaced fracture of lateral malleolus of left fibula  Recent Surgery: Procedure(s) (LRB):  ORIF, ANKLE (Left) 1 Day Post-Op    Recommendations:     Discharge Recommendations: nursing facility, skilled, rehabilitation facility (REHAB VS SNF)  Discharge Equipment Recommendations:  bedside commode, wheelchair (w/c is for long distances)  Barriers to discharge:       Assessment:     Mil Sood is a 67 y.o. male with a medical diagnosis of Closed displaced fracture of lateral malleolus of left fibula.  He presents with DEBILITY AND GENERALIZED WEAKNESS. Performance deficits affecting function: weakness, impaired functional mobility, decreased safety awareness, impaired endurance, gait instability, impaired balance, impaired self care skills, decreased lower extremity function, decreased upper extremity function.      Rehab Prognosis: Fair; patient would benefit from acute skilled OT services to address these deficits and reach maximum level of function.       Plan:     Patient to be seen   to address the above listed problems via self-care/home management, therapeutic activities, therapeutic exercises  · Plan of Care Expires: 08/11/22  · Plan of Care Reviewed with: patient, son    Subjective     Chief Complaint: DEBILITY AND GENERALIZED WEAKNESS  Patient/Family Comments/goals:     Occupational Profile:  Living Environment: LIVES  ALONE IN MOBILE HOME WITH 4 STEP TO ENTER AND RAILING TO ENTER  Previous level of function: (I) WITH ADL'S AND PT REPORTS STILL DRIVES  Roles and Routines: OCCUPATIONAL THERAPY  Equipment Used at Home:  none  Assistance upon Discharge:     Pain/Comfort:  · Pain Rating 1: 8/10  · Location - Side 1: Left    Patients cultural, spiritual, Anabaptism conflicts given the current situation:      Objective:     Communicated with: NURSE NAD Epic CHART REVIEW prior to session.  Patient found HOB  elevated with peripheral IV upon OT entry to room.    General Precautions: Standard, fall   Orthopedic Precautions:LLE non weight bearing   Braces: N/A (l le walker boot)  Respiratory Status: Room air    Occupational Performance:    Bed Mobility:    · Patient completed Rolling/Turning to Left with  stand by assistance  · Patient completed Scooting/Bridging with stand by assistance  · Patient completed Supine to Sit with stand by assistance    Functional Mobility/Transfers:  · Patient completed Sit <> Stand Transfer with contact guard assistance  with  rolling walker   · Patient completed Bed <> Chair Transfer using Step Transfer technique with minimum assistance with rolling walker  · Functional Mobility: PT AMBULATED  30 FEET WITH ROLLING WALKER WITH MIN A     Activities of Daily Living:  · Upper Body Dressing: minimum assistance .  · Lower Body Dressing: minimum assistance .    Cognitive/Visual Perceptual:  Cognitive/Psychosocial Skills:     -       Oriented to: Person, Place, Time and Situation   -       Follows Commands/attention:Follows multistep  commands  -       Communication: clear/fluent  -       Memory: No Deficits noted  -       Safety awareness/insight to disability: impaired     Physical Exam:  Upper Extremity Range of Motion:     -       Right Upper Extremity: WFL  -       Left Upper Extremity: WFL  Upper Extremity Strength:    -       Right Upper Extremity: MMT: 4/5 GROSSLY  -       Left Upper Extremity: MMT: 4/5 GROSSLY   Strength:    -       Right Upper Extremity: WFL  -       Left Upper Extremity: WFL    AMPAC 6 Click ADL:  AMPAC Total Score: 20    Treatment & Education:  Education:  Patient educated on role of OT in acute setting and benefits of participation. Educated on techniques to use to increase independence and decrease fall risk with functional transfers. Educated on importance of OOB activity and calling for A to transfer back to bed. Encouraged completion of B UE AROM therex  throughout the day to tolerance to increase functional strength and activity tolerance. Pt demonstrated impulsiveness  and display poor safety during ambulation.  Patient stated understanding and in agreement with POC.    Patient left HOB elevated with all lines intact, call button in reach, nurse notified and  son present    GOALS:   Multidisciplinary Problems     Occupational Therapy Goals        Problem: Occupational Therapy    Goal Priority Disciplines Outcome Interventions   Occupational Therapy Goal     OT, PT/OT Ongoing, Progressing    Description: O.T. GOALS MET BY 8-25-22  MOD (I) WITH BSC TRANSFERS  MOD (I) WITH LE DRESSING  PT WILL TOLERATE 1 SET X 20 REPS WITH MIN RESISTANCE  MOD (I) WITH UE DRESSING                   History:     Past Medical History:   Diagnosis Date    Anxiety     Cancer     spot on kidney and lung    Diabetes mellitus     Borderline diet controlled    Diverticulitis     GERD (gastroesophageal reflux disease)     Hyperlipidemia     not on any meds currently    Hypertension     Lung mass     Mass of kidney        Past Surgical History:   Procedure Laterality Date    CHOLECYSTECTOMY      COLONOSCOPY      COLONOSCOPY N/A 4/7/2017    Procedure: COLONOSCOPY;  Surgeon: Amarilys Bravo MD;  Location: [facility] ENDO;  Service: Endoscopy;  Laterality: N/A;    DIAGNOSTIC LAPAROSCOPY N/A 5/4/2021    Procedure: LAPAROSCOPY, DIAGNOSTIC;  Surgeon: Shaji Jauregui MD;  Location: [facility] OR;  Service: General;  Laterality: N/A;    ESOPHAGOGASTRODUODENOSCOPY      ESOPHAGOGASTRODUODENOSCOPY N/A 2/13/2020    Procedure: ESOPHAGOGASTRODUODENOSCOPY (EGD);  Surgeon: Shaji Aquino III, MD;  Location: [facility] ENDO;  Service: Endoscopy;  Laterality: N/A;    HERNIA REPAIR Bilateral     groin    LEFT HEART CATHETERIZATION Left 1/31/2022    Procedure: CATHETERIZATION, HEART, LEFT;  Surgeon: Romeo Godfrey MD;  Location: [facility] CATH LAB;  Service: Cardiology;  Laterality: Left;    NECK SURGERY       OPEN REDUCTION AND INTERNAL FIXATION (ORIF) OF INJURY OF ANKLE Left 8/10/2022    Procedure: ORIF, ANKLE;  Surgeon: Howard Mata MD;  [facility];  Service: Orthopedics;  Laterality: Left;    PARTIAL NEPHRECTOMY      left    RENAL BIOPSY      ROBOT-ASSISTED REPAIR OF UMBILICAL HERNIA USING DA NEL XI N/A 5/4/2021    Procedure: XI ROBOTIC REPAIR, HERNIA, UMBILICAL;  Surgeon: Shaji Jauregui MD;  Location[facility];  Service: General;  Laterality: N/A;  converted to open    TONSILLECTOMY      TRIGGER FINGER RELEASE Right     long finger       Time Tracking:     OT Date of Treatment: 08/11/22  OT Start Time: 0905  OT Stop Time: 0933  OT Total Time (min): 28 min    Billable Minutes:Evaluation 18 minutes  Therapeutic Activity 10 minutes    8/11/2022

## 2022-08-11 NOTE — HOSPITAL COURSE
Mil Sood is POD1 from an ORIF of the left ankle. Patient states he was able to ambulate the arroyo today with a walker with therapy. He is having some issues with pain control. He is eager to discharge home. Reports he wants to go tomorrow.

## 2022-08-11 NOTE — PROGRESS NOTES
[facility] Medicine  Progress Note    Patient Name: Mil Sood  MRN: [id number]  Patient Class: IP- Inpatient   Admission Date: 8/10/2022  Length of Stay: 0 days  Attending Physician: Howard Mata MD  Primary Care Provider: Yumi Roberson MD        Subjective:     Principal Problem:Closed displaced fracture of lateral malleolus of left fibula        HPI:  66 y/o M PMH CABG 3/22 POD0 Open treatment of displaced left lateral malleolar fracture with internal fixation s/p traumatic fall and ankle fracture.  consulted for med management.      Overview/Hospital Course:  Mil Sood is POD1 from an ORIF of the left ankle. Patient states he was able to ambulate the arroyo today with a walker with therapy. He is having some issues with pain control. He is eager to discharge home. Reports he wants to go tomorrow.       Interval History: See above    Review of Systems   Constitutional:  Negative for chills, fatigue and fever.   HENT: Negative.     Eyes: Negative.    Respiratory:  Negative for chest tightness, shortness of breath and wheezing.    Cardiovascular:  Negative for chest pain and leg swelling.   Gastrointestinal:  Negative for abdominal pain, diarrhea, nausea and vomiting.   Endocrine: Negative.    Genitourinary:  Negative for flank pain.   Musculoskeletal:  Positive for arthralgias and joint swelling. Negative for back pain and myalgias.   Neurological:  Negative for syncope, weakness and light-headedness.   Psychiatric/Behavioral:  Negative for confusion.    All other systems reviewed and are negative.  Objective:     Vital Signs (Most Recent):  Temp: 98 °F (36.7 °C) (08/11/22 1148)  Pulse: 92 (08/11/22 1148)  Resp: 15 (08/11/22 1159)  BP: 126/64 (08/11/22 1148)  SpO2: 95 % (08/11/22 1148) Vital Signs (24h Range):  Temp:  [97.2 °F (36.2 °C)-98.2 °F (36.8 °C)] 98 °F (36.7 °C)  Pulse:  [64-98] 92  Resp:  [15-25] 15  SpO2:  [92 %-100 %] 95 %  BP: (115-172)/() 126/64     Weight: 78.1 kg (172  lb 2.9 oz)  Body mass index is 24.71 kg/m².    Intake/Output Summary (Last 24 hours) at 8/11/2022 1204  Last data filed at 8/11/2022 0400  Gross per 24 hour   Intake --   Output 500 ml   Net -500 ml      Physical Exam  Vitals and nursing note reviewed.   Constitutional:       General: He is not in acute distress.     Appearance: Normal appearance. He is not ill-appearing.   HENT:      Head: Normocephalic and atraumatic.      Ears:      Comments: Hard of hearing  Cardiovascular:      Rate and Rhythm: Normal rate and regular rhythm.   Pulmonary:      Effort: Pulmonary effort is normal.      Breath sounds: Normal breath sounds.   Abdominal:      General: Abdomen is flat. Bowel sounds are normal. There is no distension.      Palpations: Abdomen is soft.      Tenderness: There is no abdominal tenderness. There is no guarding.   Musculoskeletal:         General: Signs of injury present. Normal range of motion.      Right lower leg: No edema.      Left lower leg: No edema.      Comments: LLE boot POD1   Skin:     General: Skin is warm and dry.   Neurological:      General: No focal deficit present.      Mental Status: He is alert and oriented to person, place, and time.   Psychiatric:         Mood and Affect: Mood normal.         Behavior: Behavior normal.       Significant Labs: All pertinent labs within the past 24 hours have been reviewed.  CBC:   Recent Labs   Lab 08/10/22  1142   WBC 8.53   HGB 13.6*   HCT 41.8        CMP:   Recent Labs   Lab 08/10/22  1142   *   K 4.2   CL 99   CO2 24   GLU 98   BUN 31*   CREATININE 1.1   CALCIUM 10.0   PROT 7.5   ALBUMIN 3.6   BILITOT 0.9   ALKPHOS 58   AST 16   ALT 12   ANIONGAP 11       Significant Imaging: I have reviewed all pertinent imaging results/findings within the past 24 hours.      Assessment/Plan:      * Closed displaced fracture of lateral malleolus of left fibula  Pain control  PT/OT to eval and treat      Hx of CABG x4 in   Restart DAPT  Continue  statin  BB, Imdur    Depression  Continue home meds  PRN insomnia meds    Essential hypertension  Home meds        VTE Risk Mitigation (From admission, onward)         Ordered     Place sequential compression device  Until discontinued         08/10/22 1439     IP VTE HIGH RISK PATIENT  Once         08/10/22 1439                Discharge Planning   DIXON:      Code Status: Prior   Is the patient medically ready for discharge?:     Reason for patient still in hospital (select all that apply): Patient trending condition                     Vickie Oquendo NP  Department of Hospital Medicine   [facility]

## 2022-08-12 ENCOUNTER — TELEPHONE (OUTPATIENT)
Dept: UROLOGY | Facility: CLINIC | Age: 67
End: 2022-08-12
Payer: MEDICARE

## 2022-08-12 VITALS
HEIGHT: 70 IN | DIASTOLIC BLOOD PRESSURE: 76 MMHG | TEMPERATURE: 98 F | OXYGEN SATURATION: 96 % | WEIGHT: 172.19 LBS | BODY MASS INDEX: 24.65 KG/M2 | HEART RATE: 95 BPM | RESPIRATION RATE: 15 BRPM | SYSTOLIC BLOOD PRESSURE: 130 MMHG

## 2022-08-12 PROCEDURE — 99024 POSTOP FOLLOW-UP VISIT: CPT | Mod: ,,, | Performed by: PHYSICIAN ASSISTANT

## 2022-08-12 PROCEDURE — 99231 SBSQ HOSP IP/OBS SF/LOW 25: CPT | Mod: ,,, | Performed by: UROLOGY

## 2022-08-12 PROCEDURE — 99024 PR POST-OP FOLLOW-UP VISIT: ICD-10-PCS | Mod: ,,, | Performed by: PHYSICIAN ASSISTANT

## 2022-08-12 PROCEDURE — 25000003 PHARM REV CODE 250: Performed by: ORTHOPAEDIC SURGERY

## 2022-08-12 PROCEDURE — 97116 GAIT TRAINING THERAPY: CPT

## 2022-08-12 PROCEDURE — 25000003 PHARM REV CODE 250: Performed by: STUDENT IN AN ORGANIZED HEALTH CARE EDUCATION/TRAINING PROGRAM

## 2022-08-12 PROCEDURE — 25000003 PHARM REV CODE 250: Performed by: UROLOGY

## 2022-08-12 PROCEDURE — 97110 THERAPEUTIC EXERCISES: CPT

## 2022-08-12 PROCEDURE — 99231 PR SUBSEQUENT HOSPITAL CARE,LEVL I: ICD-10-PCS | Mod: ,,, | Performed by: UROLOGY

## 2022-08-12 RX ORDER — HYDROCODONE BITARTRATE AND ACETAMINOPHEN 5; 325 MG/1; MG/1
1 TABLET ORAL EVERY 8 HOURS PRN
Qty: 21 TABLET | Refills: 0 | Status: SHIPPED | OUTPATIENT
Start: 2022-08-12 | End: 2022-08-19

## 2022-08-12 RX ADMIN — TAMSULOSIN HYDROCHLORIDE 0.4 MG: 0.4 CAPSULE ORAL at 09:08

## 2022-08-12 RX ADMIN — METOPROLOL SUCCINATE 25 MG: 25 TABLET, EXTENDED RELEASE ORAL at 09:08

## 2022-08-12 RX ADMIN — ASPIRIN 81 MG: 81 TABLET, COATED ORAL at 09:08

## 2022-08-12 RX ADMIN — ISOSORBIDE MONONITRATE 60 MG: 60 TABLET, EXTENDED RELEASE ORAL at 09:08

## 2022-08-12 RX ADMIN — CLOPIDOGREL 75 MG: 75 TABLET, FILM COATED ORAL at 09:08

## 2022-08-12 RX ADMIN — CHLORHEXIDINE GLUCONATE 0.12% ORAL RINSE 10 ML: 1.2 LIQUID ORAL at 09:08

## 2022-08-12 RX ADMIN — AMLODIPINE BESYLATE 5 MG: 5 TABLET ORAL at 09:08

## 2022-08-12 RX ADMIN — MUPIROCIN: 20 OINTMENT TOPICAL at 09:08

## 2022-08-12 RX ADMIN — VENLAFAXINE 75 MG: 25 TABLET ORAL at 09:08

## 2022-08-12 RX ADMIN — HYDROCODONE BITARTRATE AND ACETAMINOPHEN 1 TABLET: 5; 325 TABLET ORAL at 09:08

## 2022-08-12 NOTE — PT/OT/SLP PROGRESS
Occupational Therapy   Treatment    Name: Mil Sood  MRN: [id number]  Admitting Diagnosis:  Closed displaced fracture of lateral malleolus of left fibula  2 Days Post-Op    Recommendations:     Discharge Recommendations: rehabilitation facility, nursing facility, skilled (Pt reporting he will be receiving home health)  Discharge Equipment Recommendations:  bedside commode, wheelchair (w/c for long distances.)  Barriers to discharge:  Decreased caregiver support    Assessment:     Mil Sood is a 67 y.o. male with a medical diagnosis of Closed displaced fracture of lateral malleolus of left fibula.  He presents with the following performance deficits affecting function are weakness, impaired endurance, impaired self care skills, impaired functional mobility, gait instability, impaired balance, decreased upper extremity function, decreased lower extremity function, decreased safety awareness, orthopedic precautions.     Rehab Prognosis:  Fair; patient would benefit from acute skilled OT services to address these deficits and reach maximum level of function.       Plan:     Patient to be seen 2 x/week to address the above listed problems via self-care/home management, therapeutic activities, therapeutic exercises  · Plan of Care Expires: 08/12/22  · Plan of Care Reviewed with: patient    Subjective     Pain/Comfort:  · Pain Rating 1: 0/10    Objective:     Communicated with: nurse Mullins and epic chart review  prior to session.  Patient found supine with peripheral IV and LLE walker boot upon OT entry to room.    General Precautions: Standard, fall   Orthopedic Precautions:LLE non weight bearing   Braces:  (LLE walker boot)  Respiratory Status: Room air     Occupational Performance:     Bed Mobility:    · Patient completed Supine to Sit with modified independence  · Patient completed Sit to Supine with modified independence     [facility] 6 Click ADL: 21    Treatment & Education:  Pt declining OOB activities and grooming  ADL's at this time, stating he worked with PT earlier this morning and is ready to go home. Pt agreeable to BUE AROM therex. Educated on and completed x10 reps BUE AROM shoulder flexion, elbow flexion/ext, and scapular retractions while sitting EOB. Pt able to recall LLE nonweightbearing precautions and adhering to them while sitting EOB to perform therex.   Educated on importance of OOB activity and calling for A to meet needs. Encouraged completion of B UE AROM therex throughout the day to tolerance to increase functional strength and activity tolerance. Patient stated understanding and in agreement with POC.    Patient left supine with all lines intact, call button in reach and nurse notifiedEducation:      GOALS:   Multidisciplinary Problems     Occupational Therapy Goals        Problem: Occupational Therapy    Goal Priority Disciplines Outcome Interventions   Occupational Therapy Goal     OT, PT/OT Ongoing, Progressing    Description: O.T. GOALS MET BY 8-25-22  MOD (I) WITH BSC TRANSFERS  MOD (I) WITH LE DRESSING  PT WILL TOLERATE 1 SET X 20 REPS WITH MIN RESISTANCE  MOD (I) WITH UE DRESSING                   Time Tracking:     OT Date of Treatment: 08/12/22  OT Start Time: 0955  OT Stop Time: 1010  OT Total Time (min): 15 min    Billable Minutes:Therapeutic Exercise 15    OT/BA: OT       Talia Haley OT    8/12/2022

## 2022-08-12 NOTE — DISCHARGE SUMMARY
[facility] S[facility]  Orthopedics  Discharge Summary      Patient Name: Mil Sood  MRN: [id number]  Admission Date: 8/10/2022  Hospital Length of Stay: 1 days  Discharge Date and Time:  08/12/2022 8:42 AM  Attending Physician: Lavelel Mata MD   Discharging Provider: Juan Ochoa PA-C  Primary Care Provider: Yumi Roberson MD    HPI: Mil Sood is a 67-year-old male postop day 2 status post ORIF left ankle fracture. Patient got up and worked with therapy on ambulation with nonweightbearing of the left lower extremity.  Patient has been restarted on his home aspirin and Plavix.  Patient states he is ready to go home    Procedure(s) (LRB):  ORIF, ANKLE (Left)      Hospital Course:  Patient underwent operative fixation of a left ankle fracture on the date of admission.    Consults (From admission, onward)        Status Ordering Provider     Inpatient consult to Hospitalist  Once        Provider:  Allison Navarrete MD    Acknowledged LAVELLE MATA     Inpatient consult to Social Work  Once        Provider:  (Not yet assigned)    Acknowledged LAVELLE MATA     Inpatient consult to Urology  Once        Provider:  Muna Manriquez MD    Completed LAVELLE MATA          Significant Diagnostic Studies: No pertinent studies.    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  Medications:  Reconciled Home Medications:       Medication List      ASK your doctor about these medications    amLODIPine 5 MG tablet  Commonly known as: NORVASC  Take 1 tablet (5 mg total) by mouth once daily.  Ask about: Which instructions should I use?     benazepriL 20 MG tablet  Commonly known as: LOTENSIN  TAKE 2 TABLETS BY MOUTH NIGHTLY.     * blood sugar diagnostic Strp  Use to check blood sugars BID     * blood sugar diagnostic Strp  1 each by Misc.(Non-Drug; Combo Route) route 3 (three) times daily.     blood-glucose meter kit  Use as instructed     citalopram 40 MG tablet  Commonly known as: CeleXA  Take 1 tablet (40 mg total) by mouth every evening.     clonazePAM 1 MG tablet  Commonly known as: KlonoPIN  Take 1 tablet (1 mg total) by mouth nightly as needed for Anxiety.     cyclobenzaprine 10 MG tablet  Commonly known as: FLEXERIL  TAKE 1 TABLET BY MOUTH THREE TIMES DAILY AS NEEDED.     finasteride 5 mg tablet  Commonly known as: PROSCAR  Take 1 tablet (5 mg total) by mouth nightly.     fluticasone propion-salmeterol 115-21 mcg/dose 115-21 mcg/actuation Hfaa inhaler  Commonly known as: ADVAIR HFA  Inhale 2 puffs into the lungs 2 (two) times daily.     gabapentin 600 MG tablet  Commonly known as: NEURONTIN  Take 1 tablet (600 mg total) by mouth nightly.     hydrALAZINE 25 MG tablet  Commonly known as: APRESOLINE  TAKE ONE TABLET BY MOUTH AT BEDTIME.     hydroCHLOROthiazide 12.5 mg capsule  Commonly known as: MICROZIDE  Take 1 capsule (12.5 mg total) by mouth once daily.     HYDROcodone-acetaminophen 7.5-325 mg per tablet  Commonly known as: NORCO  Take 1 tablet by mouth every 6 (six) hours as needed for Pain.     hydrOXYzine pamoate 25 MG Cap  Commonly known as: VISTARIL  Take 1 capsule (25 mg total) by mouth every evening.     isosorbide mononitrate 60 MG 24 hr tablet  Commonly known as: IMDUR  TAKE 1 TABLET BY MOUTH ONCE DAILY.     lancets Misc  Commonly known as: ACCU-CHEK SOFTCLIX LANCETS  1 each by Misc.(Non-Drug; Combo Route) route 3  (three) times daily.     meloxicam 15 MG tablet  Commonly known as: MOBIC  TAKE 1 TABLET BY MOUTH ONCE DAILY FOR 5 DAYS.THEN ONCE DAILY AS NEEDED FOR BACK PAIN.     metoprolol succinate 25 MG 24 hr tablet  Commonly known as: TOPROL-XL  Take 1 tablet (25 mg total) by mouth once daily.     omeprazole 40 MG capsule  Commonly known as: PRILOSEC  Take 1 capsule (40 mg total) by mouth every evening.     pravastatin 20 MG tablet  Commonly known as: PRAVACHOL  Take 1 tablet (20 mg total) by mouth once daily.     RESTASIS MULTIDOSE 0.05 % Drop  Generic drug: cycloSPORINE     SPIRIVA WITH HANDIHALER 18 mcg inhalation capsule  Generic drug: tiotropium  INHALE THE CONTENTS OF ONE CAPSULE DAILY.     traZODone 100 MG tablet  Commonly known as: DESYREL  Take 1 tablet (100 mg total) by mouth every evening.     venlafaxine 75 MG tablet  Commonly known as: EFFEXOR  Take 1 tablet (75 mg total) by mouth 2 (two) times daily.     zolpidem 10 mg Tab  Commonly known as: AMBIEN  TAKE 1 TABLET BY MOUTH AT BEDTIME AS NEEDED FOR INSOMNIA.         * This list has 2 medication(s) that are the same as other medications prescribed for you. Read the directions carefully, and ask your doctor or other care provider to review them with you.                Juan Ochoa PA-C  Orthopedics  [facility]

## 2022-08-12 NOTE — PT/OT/SLP PROGRESS
"Physical Therapy  Treatment    Mil Sood   MRN: [id number]   Admitting Diagnosis: Closed displaced fracture of lateral malleolus of left fibula    PT Received On: 08/12/22  PT Start Time: 0820     PT Stop Time: 0845    PT Total Time (min): 25 min       Billable Minutes:  Gait Training 15 and Therapeutic Exercise 10    Treatment Type: Treatment  PT/PTA: PT     PTA Visit Number: 0       General Precautions: Standard, fall  Orthopedic Precautions: LLE non weight bearing   Braces:  (L LE WALKING BOOT)  Respiratory Status: Room air         Subjective:  Communicated with NURSE KELLY AND Epic CHART REVIEW  prior to session.   PT AGREED TO TX     Pain/Comfort  Pain Rating 1: 4/10  Location - Side 1: Left  Location 1: ankle  Pain Rating Post-Intervention 1: 4/10    Objective:   Patient found with: peripheral IV    Functional Mobility:  PT MET IN RM SUP>SIT EOB WITH S. PT STOOD WITH NO AD AND COMPLETED SQUAT PIVOT T/F TO CHAIR NWB L LE. PT COMPLETED R LE AP AND B LE TKE AND MIP X 15 REPS. PT STOOD WITH NWB L LE AND GT TRAINED 2X40' WITH CGA ON LEVEL INDOOR SURFACES. PT RETURNED TO RM T/F TO CHAIR WITH RW AND CGA. PT LEFT SEATED WITH ALL NEEDS MET AND PT EDUCATED ON RISK FOR FALLS DUE TO GENERALIZED WEAKNESS, EDUCATED ON "CALL DON'T FALL", ENCOURAGED TO CALL FOR ASSISTANCE WITH ALL NEEDS SUCH AS BED<>CHAIR TRANSFERS OR TRIPS TO BATHROOM      AM-PAC 6 CLICK MOBILITY  How much help from another person does this patient currently need?   1 = Unable, Total/Dependent Assistance  2 = A lot, Maximum/Moderate Assistance  3 = A little, Minimum/Contact Guard/Supervision  4 = None, Modified [place]/Independent    Turning over in bed (including adjusting bedclothes, sheets and blankets)?: 4  Sitting down on and standing up from a chair with arms (e.g., wheelchair, bedside commode, etc.): 4  Moving from lying on back to sitting on the side of the bed?: 4  Moving to and from a bed to a chair (including a wheelchair)?: 3  Need to walk " in hospital room?: 3  Climbing 3-5 steps with a railing?: 1  Basic Mobility Total Score: 19    AM-PAC Raw Score CMS G-Code Modifier Level of Impairment Assistance   6 % Total / Unable   7 - 9 CM 80 - 100% Maximal Assist   10 - 14 CL 60 - 80% Moderate Assist   15 - 19 CK 40 - 60% Moderate Assist   20 - 22 CJ 20 - 40% Minimal Assist   23 CI 1-20% SBA / CGA   24 CH 0% Independent/ Mod I     Patient left up in chair with call button in reach.    Assessment:  PT PROGRESSING WITH GT.     Rehab identified problem list/impairments: Rehab identified problem list/impairments: weakness, impaired endurance, gait instability, impaired balance, impaired functional mobility, impaired self care skills, pain    Rehab potential is good.    Activity tolerance: Good    Discharge recommendations: Discharge Facility/Level of Care Needs: nursing facility, skilled     Barriers to discharge:      Equipment recommendations: Equipment Needed After Discharge: walker, rolling, wheelchair     GOALS:   Multidisciplinary Problems     Physical Therapy Goals        Problem: Physical Therapy    Goal Priority Disciplines Outcome Goal Variances Interventions   Physical Therapy Goal     PT, PT/OT Ongoing, Progressing     Description: LT22  1. PT WILL COMPLETE BED MOBILITY IND  2. PT WILL GT TRAIN X 50 ' WITH RW NWB L LE AND SBA  3. PT WILL T/F TO CHAIR WITH RW L LE NWB AND SBA.                      PLAN:    Patient to be seen 3 x/week  to address the above listed problems via gait training, therapeutic activities, therapeutic exercises  Plan of Care expires: 22  Plan of Care reviewed with: patient         2022

## 2022-08-12 NOTE — PLAN OF CARE
Pt declining OOB activity and grooming ADL's at this time, stating he worked with PT earlier this morning and wants to go home. Pt agreeable to BUE therex in bed. Performed sup>sit with mod (I). Pt adhering to LLE NWB precautions.   Continue OT POC.

## 2022-08-12 NOTE — TELEPHONE ENCOUNTER
Attempt to call pt but was unsuccessful. LVM informing the pt that we were trying to schedule an appt with a urology provider w/ him and top call us back.

## 2022-08-12 NOTE — PROGRESS NOTES
[facility] Follow up 08/12/2022    Subjective:     The patient is currently admitted with L lateral malleolar fracture and question of urinary retention. Ding was removed last night.   Today, the patient reports patient reports that he is urinating without difficulty. Has urinated twice.       Current Facility-Administered Medications   Medication Dose Route Frequency Provider Last Rate Last Admin    acetaminophen tablet 650 mg  650 mg Oral Q4H PRN Howard Mata MD        amLODIPine tablet 5 mg  5 mg Oral BID Howard Mata MD   5 mg at 08/11/22 2115    aspirin EC tablet 81 mg  81 mg Oral Daily Allison Navarrete MD   81 mg at 08/11/22 0915    chlorhexidine 0.12 % solution 10 mL  10 mL Mouth/Throat BID Howard Mata MD   10 mL at 08/11/22 2120    clonazePAM tablet 1 mg  1 mg Oral Nightly PRN Allison Navarrete MD   1 mg at 08/11/22 2114    clopidogreL tablet 75 mg  75 mg Oral Daily Allison Navarrete MD   75 mg at 08/11/22 0915    cyclobenzaprine tablet 10 mg  10 mg Oral TID PRN Howard Mata MD        finasteride tablet 5 mg  5 mg Oral Nightly Allison Navarrete MD   5 mg at 08/11/22 2116    gabapentin capsule 600 mg  600 mg Oral Nightly Allison Navarrete MD   600 mg at 08/11/22 2115    hydrALAZINE tablet 25 mg  25 mg Oral Nightly Allison Navarrete MD   25 mg at 08/11/22 2116    HYDROcodone-acetaminophen 5-325 mg per tablet 1 tablet  1 tablet Oral Q4H PRN Howard Mata MD   1 tablet at 08/11/22 2115    isosorbide mononitrate 24 hr tablet 60 mg  60 mg Oral Daily Allison Navarrete MD   60 mg at 08/11/22 0915    metoprolol succinate (TOPROL-XL) 24 hr tablet 25 mg  25 mg Oral Daily Allison Navarrete MD   25 mg at 08/11/22 0915    morphine injection 3 mg  3 mg Intravenous Q1H PRN Howard Mata MD   3 mg at 08/11/22 1803    mupirocin 2 % ointment   Nasal BID Howard Mata MD   Given at 08/11/22 2121    ondansetron disintegrating tablet 8 mg  8 mg Oral Q8H PRN Howard Mata MD        sodium  chloride 0.9% flush 10 mL  10 mL Intravenous PRN Fausto Paul MD        sodium chloride 0.9% flush 10 mL  10 mL Intravenous PRN Fausto Paul MD        sodium chloride 0.9% flush 10 mL  10 mL Intravenous PRN Howard Mata MD        tamsulosin 24 hr capsule 0.4 mg  0.4 mg Oral Daily Muna Manriquez MD   0.4 mg at 22 180    traZODone tablet 100 mg  100 mg Oral QHS Allison Navarrete MD   100 mg at 22    venlafaxine tablet 75 mg  75 mg Oral BID Allison Navarrete MD   75 mg at 22    zolpidem tablet 10 mg  10 mg Oral Nightly PRN Allison Navarrete MD   10 mg at 22       Objective:     Vitals:    22 0738   BP: 130/76   Pulse: 95   Resp: 15   Temp: 97.6 °F (36.4 °C)     I/O last 3 completed shifts:  In: 120 [P.O.:120]  Out: 600 [Urine:600]  Temp (24hrs), Av.5 °F (36.9 °C), Min:97.6 °F (36.4 °C), Max:99.7 °F (37.6 °C)      General: Well-developed, well-nourished in No acute distress  HEENT: Normocephalic, atraumatic  Respirations: Even and unlabored  Abdomen: soft, Non-tender, non-distended, no palpable masses, no rebound or guarding  Extremities: boot on LLE, no clubbing, cyanosis or edema  Skin: warm and dry, no lesions or rash  Neuro: cooperative, Cranial nerves II-XII grossly intact  Psych: normal affect    Labs    No results found for this or any previous visit (from the past 336 hour(s)).  Recent Results (from the past 336 hour(s))   CBC auto differential    Collection Time: 22 12:13 PM   Result Value Ref Range    WBC 6.39 3.90 - 12.70 K/uL    Hemoglobin 11.9 (L) 14.0 - 18.0 g/dL    Hematocrit 36.0 (L) 40.0 - 54.0 %    Platelets 220 150 - 450 K/uL   CBC auto differential    Collection Time: 08/10/22 11:42 AM   Result Value Ref Range    WBC 8.53 3.90 - 12.70 K/uL    Hemoglobin 13.6 (L) 14.0 - 18.0 g/dL    Hematocrit 41.8 40.0 - 54.0 %    Platelets 298 150 - 450 K/uL   CBC auto differential    Collection Time: 22  9:31 AM   Result Value Ref Range     WBC 13.13 (H) 3.90 - 12.70 K/uL    Hemoglobin 14.5 14.0 - 18.0 g/dL    Hematocrit 44.8 40.0 - 54.0 %    Platelets 264 150 - 450 K/uL     Microbiology Results (last 7 days)     ** No results found for the last 168 hours. **              Assessment    L lateral malleolar fracture  BPH with obstruction    Plan  Will have nursing check PVR   Continue flomax and finasteride  Patient may follow up outpatient for BPH follow up.   Please call with any questions.

## 2022-08-15 ENCOUNTER — PATIENT OUTREACH (OUTPATIENT)
Dept: ADMINISTRATIVE | Facility: CLINIC | Age: 67
End: 2022-08-15
Payer: MEDICARE

## 2022-08-15 DIAGNOSIS — S82.62XA CLOSED DISPLACED FRACTURE OF LATERAL MALLEOLUS OF LEFT FIBULA, INITIAL ENCOUNTER: Primary | ICD-10-CM

## 2022-08-15 NOTE — PROGRESS NOTES
C3 nurse spoke with Mil Sood  for a TCC post hospital discharge follow up call. The patient does not have a scheduled HOSFU appointment with Yumi Roberson MD  within 5-7 days post hospital discharge date 8/12/22. C3 nurse was unable to schedule HOSFU appointment in [facility]. Message sent to PCP staff requesting they contact patient and schedule follow up appointment.

## 2022-08-16 DIAGNOSIS — S82.62XA CLOSED DISPLACED FRACTURE OF LATERAL MALLEOLUS OF LEFT FIBULA, INITIAL ENCOUNTER: Primary | ICD-10-CM

## 2022-08-16 PROCEDURE — G0180 MD CERTIFICATION HHA PATIENT: HCPCS | Mod: ,,, | Performed by: PHYSICIAN ASSISTANT

## 2022-08-16 PROCEDURE — G0180 PR HOME HEALTH MD CERTIFICATION: ICD-10-PCS | Mod: ,,, | Performed by: PHYSICIAN ASSISTANT

## 2022-08-18 ENCOUNTER — OFFICE VISIT (OUTPATIENT)
Dept: FAMILY MEDICINE | Facility: CLINIC | Age: 67
End: 2022-08-18
Payer: MEDICARE

## 2022-08-18 ENCOUNTER — HOSPITAL ENCOUNTER (EMERGENCY)
Facility: HOSPITAL | Age: 67
Discharge: LEFT BEFORE DISCHARGE | End: 2022-08-18
Attending: EMERGENCY MEDICINE
Payer: MEDICARE

## 2022-08-18 VITALS
OXYGEN SATURATION: 100 % | TEMPERATURE: 97 F | HEIGHT: 70 IN | BODY MASS INDEX: 24.62 KG/M2 | DIASTOLIC BLOOD PRESSURE: 70 MMHG | HEART RATE: 142 BPM | WEIGHT: 172 LBS | SYSTOLIC BLOOD PRESSURE: 80 MMHG

## 2022-08-18 VITALS
DIASTOLIC BLOOD PRESSURE: 83 MMHG | OXYGEN SATURATION: 99 % | WEIGHT: 163.81 LBS | HEIGHT: 70 IN | SYSTOLIC BLOOD PRESSURE: 124 MMHG | RESPIRATION RATE: 20 BRPM | BODY MASS INDEX: 23.45 KG/M2 | TEMPERATURE: 98 F | HEART RATE: 105 BPM

## 2022-08-18 DIAGNOSIS — R00.0 TACHYCARDIA: Primary | ICD-10-CM

## 2022-08-18 DIAGNOSIS — Z87.81 STATUS POST ORIF OF FRACTURE OF ANKLE: ICD-10-CM

## 2022-08-18 DIAGNOSIS — I95.9 HYPOTENSION, UNSPECIFIED HYPOTENSION TYPE: ICD-10-CM

## 2022-08-18 DIAGNOSIS — I95.9 HYPOTENSION: Primary | ICD-10-CM

## 2022-08-18 DIAGNOSIS — Z98.890 STATUS POST ORIF OF FRACTURE OF ANKLE: ICD-10-CM

## 2022-08-18 PROCEDURE — 1159F PR MEDICATION LIST DOCUMENTED IN MEDICAL RECORD: ICD-10-PCS | Mod: CPTII,S$GLB,, | Performed by: FAMILY MEDICINE

## 2022-08-18 PROCEDURE — 3288F FALL RISK ASSESSMENT DOCD: CPT | Mod: CPTII,S$GLB,, | Performed by: FAMILY MEDICINE

## 2022-08-18 PROCEDURE — 93005 ELECTROCARDIOGRAM TRACING: CPT | Mod: S$GLB,,, | Performed by: FAMILY MEDICINE

## 2022-08-18 PROCEDURE — 3008F BODY MASS INDEX DOCD: CPT | Mod: CPTII,S$GLB,, | Performed by: FAMILY MEDICINE

## 2022-08-18 PROCEDURE — 3288F PR FALLS RISK ASSESSMENT DOCUMENTED: ICD-10-PCS | Mod: CPTII,S$GLB,, | Performed by: FAMILY MEDICINE

## 2022-08-18 PROCEDURE — 93010 ELECTROCARDIOGRAM REPORT: CPT | Mod: S$GLB,,, | Performed by: INTERNAL MEDICINE

## 2022-08-18 PROCEDURE — 99999 PR PBB SHADOW E&M-EST. PATIENT-LVL IV: CPT | Mod: PBBFAC,,, | Performed by: FAMILY MEDICINE

## 2022-08-18 PROCEDURE — 1101F PR PT FALLS ASSESS DOC 0-1 FALLS W/OUT INJ PAST YR: ICD-10-PCS | Mod: CPTII,S$GLB,, | Performed by: FAMILY MEDICINE

## 2022-08-18 PROCEDURE — 3044F HG A1C LEVEL LT 7.0%: CPT | Mod: CPTII,S$GLB,, | Performed by: FAMILY MEDICINE

## 2022-08-18 PROCEDURE — 93010 EKG 12-LEAD: ICD-10-PCS | Mod: S$GLB,,, | Performed by: INTERNAL MEDICINE

## 2022-08-18 PROCEDURE — 93010 EKG 12-LEAD: ICD-10-PCS | Mod: ,,, | Performed by: STUDENT IN AN ORGANIZED HEALTH CARE EDUCATION/TRAINING PROGRAM

## 2022-08-18 PROCEDURE — 99214 OFFICE O/P EST MOD 30 MIN: CPT | Mod: S$GLB,,, | Performed by: FAMILY MEDICINE

## 2022-08-18 PROCEDURE — 3074F SYST BP LT 130 MM HG: CPT | Mod: CPTII,S$GLB,, | Performed by: FAMILY MEDICINE

## 2022-08-18 PROCEDURE — 93005 ELECTROCARDIOGRAM TRACING: CPT

## 2022-08-18 PROCEDURE — 1111F PR DISCHARGE MEDS RECONCILED W/ CURRENT OUTPATIENT MED LIST: ICD-10-PCS | Mod: CPTII,S$GLB,, | Performed by: FAMILY MEDICINE

## 2022-08-18 PROCEDURE — 3078F PR MOST RECENT DIASTOLIC BLOOD PRESSURE < 80 MM HG: ICD-10-PCS | Mod: CPTII,S$GLB,, | Performed by: FAMILY MEDICINE

## 2022-08-18 PROCEDURE — 4010F ACE/ARB THERAPY RXD/TAKEN: CPT | Mod: CPTII,S$GLB,, | Performed by: FAMILY MEDICINE

## 2022-08-18 PROCEDURE — 99999 PR PBB SHADOW E&M-EST. PATIENT-LVL IV: ICD-10-PCS | Mod: PBBFAC,,, | Performed by: FAMILY MEDICINE

## 2022-08-18 PROCEDURE — 93010 ELECTROCARDIOGRAM REPORT: CPT | Mod: ,,, | Performed by: STUDENT IN AN ORGANIZED HEALTH CARE EDUCATION/TRAINING PROGRAM

## 2022-08-18 PROCEDURE — 99291 CRITICAL CARE FIRST HOUR: CPT | Mod: 25

## 2022-08-18 PROCEDURE — 93005 EKG 12-LEAD: ICD-10-PCS | Mod: S$GLB,,, | Performed by: FAMILY MEDICINE

## 2022-08-18 PROCEDURE — 1159F MED LIST DOCD IN RCRD: CPT | Mod: CPTII,S$GLB,, | Performed by: FAMILY MEDICINE

## 2022-08-18 PROCEDURE — 3078F DIAST BP <80 MM HG: CPT | Mod: CPTII,S$GLB,, | Performed by: FAMILY MEDICINE

## 2022-08-18 PROCEDURE — 3008F PR BODY MASS INDEX (BMI) DOCUMENTED: ICD-10-PCS | Mod: CPTII,S$GLB,, | Performed by: FAMILY MEDICINE

## 2022-08-18 PROCEDURE — 4010F PR ACE/ARB THEARPY RXD/TAKEN: ICD-10-PCS | Mod: CPTII,S$GLB,, | Performed by: FAMILY MEDICINE

## 2022-08-18 PROCEDURE — 3074F PR MOST RECENT SYSTOLIC BLOOD PRESSURE < 130 MM HG: ICD-10-PCS | Mod: CPTII,S$GLB,, | Performed by: FAMILY MEDICINE

## 2022-08-18 PROCEDURE — 99214 PR OFFICE/OUTPT VISIT, EST, LEVL IV, 30-39 MIN: ICD-10-PCS | Mod: S$GLB,,, | Performed by: FAMILY MEDICINE

## 2022-08-18 PROCEDURE — 1111F DSCHRG MED/CURRENT MED MERGE: CPT | Mod: CPTII,S$GLB,, | Performed by: FAMILY MEDICINE

## 2022-08-18 PROCEDURE — 3044F PR MOST RECENT HEMOGLOBIN A1C LEVEL <7.0%: ICD-10-PCS | Mod: CPTII,S$GLB,, | Performed by: FAMILY MEDICINE

## 2022-08-18 PROCEDURE — 1101F PT FALLS ASSESS-DOCD LE1/YR: CPT | Mod: CPTII,S$GLB,, | Performed by: FAMILY MEDICINE

## 2022-08-18 NOTE — PROGRESS NOTES
Chief Complaint:    Chief Complaint   Patient presents with    Follow-up       History of Present Illness:  Family and/or Caretaker present at visit?  No.  Diagnostic tests reviewed/disposition: I have reviewed all completed as well as pending diagnostic tests at the time of discharge.  Disease/illness education: y  Home health/community services discussion/referrals: Patient does not have home health established from hospital visit.  They do not need home health.  If needed, we will set up home health for the patient.   Establishment or re-establishment of referral orders for community resources: No other necessary community resources.   Discussion with other health care providers: No discussion with other health care providers necessary.     Patient with HTN presents today for a hospital follow-up on 08/10 for closed displaced fracture of lateral malleolus of left fibula    Screws and plate were placed. He follows up with Dr. Harrington on 09/26 and he was given pain medication. Denies chest pain or SOB. He doesn't like his crutches so he has a walker coming in.   Blood pressure is low today. He says it usuallys runs 120/90; he last checked it last night. He doesn't eat or drink much because he's by himself most of the day.   ROS:  Review of Systems   Constitutional: Negative for activity change, chills, fatigue, fever and unexpected weight change.   HENT: Negative for congestion, ear discharge, ear pain, hearing loss, postnasal drip and rhinorrhea.    Eyes: Negative for pain and visual disturbance.   Respiratory: Negative for cough, chest tightness and shortness of breath.    Cardiovascular: Negative for chest pain and palpitations.   Gastrointestinal: Negative for abdominal pain, diarrhea and vomiting.   Endocrine: Negative for heat intolerance.   Genitourinary: Negative for difficulty urinating, dysuria, flank pain, frequency and hematuria.   Musculoskeletal: Negative for back pain, gait problem and neck pain.  "  Skin: Negative for color change and rash.   Neurological: Negative for dizziness, tremors, seizures, numbness and headaches.   Psychiatric/Behavioral: Negative for agitation, hallucinations, self-injury, sleep disturbance and suicidal ideas. The patient is not nervous/anxious.    All other systems reviewed and are negative.      Past Medical History:   Diagnosis Date    Anxiety     Cancer     spot on kidney and lung    Diabetes mellitus     Borderline diet controlled    Diverticulitis     GERD (gastroesophageal reflux disease)     Hyperlipidemia     not on any meds currently    Hypertension     Lung mass     Mass of kidney        Social History:  Social History     Socioeconomic History    Marital status:    Tobacco Use    Smoking status: Former Smoker     Packs/day: 1.00     Years: 20.00     Pack years: 20.00     Quit date: 1995     Years since quittin.5    Smokeless tobacco: Never Used   Substance and Sexual Activity    Alcohol use: Never     Alcohol/week: 0.0 standard drinks    Drug use: No    Sexual activity: Yes     Partners: Female     Birth control/protection: None       Family History:   family history includes Cancer in his sister; Hypertension in his father and mother; Melanoma in his father.    Health Maintenance   Topic Date Due    Lipid Panel  2023    TETANUS VACCINE  2028    Hepatitis C Screening  Completed    Abdominal Aortic Aneurysm Screening  Completed       Physical Exam:    Vital Signs  Temp: 96.6 °F (35.9 °C)  Temp src: Temporal  Pulse: (!) 142  SpO2: 100 %  BP: (!) 80/70  Height and Weight  Height: 5' 10" (177.8 cm)  Weight: 78 kg (172 lb)  BSA (Calculated - sq m): 1.96 sq meters  BMI (Calculated): 24.7  Weight in (lb) to have BMI = 25: 173.9]    Body mass index is 24.68 kg/m².    Physical Exam  Vitals and nursing note reviewed.   Constitutional:       Appearance: Normal appearance.   HENT:      Head: Normocephalic and atraumatic.      Right Ear: " Tympanic membrane normal.      Left Ear: Tympanic membrane normal.   Eyes:      Extraocular Movements: Extraocular movements intact.      Pupils: Pupils are equal, round, and reactive to light.   Cardiovascular:      Rate and Rhythm: Regular rhythm. Tachycardia present.      Pulses: Normal pulses.      Heart sounds: Normal heart sounds. No murmur heard.    No gallop.      Comments: EKG shows sinus tachycardia  Pulmonary:      Effort: Pulmonary effort is normal. No respiratory distress.      Breath sounds: Normal breath sounds. No wheezing, rhonchi or rales.   Abdominal:      General: There is no distension.      Palpations: Abdomen is soft.      Tenderness: There is no abdominal tenderness.   Musculoskeletal:         General: No swelling, deformity or signs of injury. Normal range of motion.      Cervical back: Normal range of motion.   Skin:     General: Skin is warm and dry.      Capillary Refill: Capillary refill takes less than 2 seconds.      Coloration: Skin is pale. Skin is not jaundiced.   Neurological:      General: No focal deficit present.      Mental Status: He is alert and oriented to person, place, and time.      Comments: Ambulates with crutches. Wearing boot on L leg.    Psychiatric:         Mood and Affect: Mood normal.         Behavior: Behavior is hyperactive.           Assessment:      ICD-10-CM ICD-9-CM   1. Tachycardia  R00.0 785.0   2. Status post ORIF of fracture of ankle  Z98.890 V45.89    Z87.81 V15.51   3. Hypotension, unspecified hypotension type  I95.9 458.9     Plan:       Patient hypotensive and tachycardia could be dehydrated.  Offered to call an ambulance he refused.    He chooses to Drive, he appears to be competent although a bit anxious  ED notified      Orders Placed This Encounter   Procedures    IN OFFICE EKG 12-LEAD (to [place])     Current Outpatient Medications   Medication Sig Dispense Refill    amLODIPine (NORVASC) 5 MG tablet Take 1 tablet (5 mg total) by mouth once daily.  90 tablet 3    benazepriL (LOTENSIN) 20 MG tablet TAKE 2 TABLETS BY MOUTH NIGHTLY. 180 tablet 1    blood sugar diagnostic Strp Use to check blood sugars  strip 3    blood sugar diagnostic Strp 1 each by Misc.(Non-Drug; Combo Route) route 3 (three) times daily. 100 each 12    blood-glucose meter kit Use as instructed 1 each 1    citalopram (CELEXA) 40 MG tablet Take 1 tablet (40 mg total) by mouth every evening. 90 tablet 3    clonazePAM (KLONOPIN) 1 MG tablet Take 1 tablet (1 mg total) by mouth nightly as needed for Anxiety. 30 tablet 1    cyclobenzaprine (FLEXERIL) 10 MG tablet TAKE 1 TABLET BY MOUTH THREE TIMES DAILY AS NEEDED. 90 tablet 0    finasteride (PROSCAR) 5 mg tablet Take 1 tablet (5 mg total) by mouth nightly. 90 tablet 1    fluticasone propion-salmeterol 115-21 mcg/dose (ADVAIR HFA) 115-21 mcg/actuation HFAA inhaler Inhale 2 puffs into the lungs 2 (two) times daily. 12 g 12    gabapentin (NEURONTIN) 600 MG tablet Take 1 tablet (600 mg total) by mouth nightly. 90 tablet 1    hydrALAZINE (APRESOLINE) 25 MG tablet TAKE ONE TABLET BY MOUTH AT BEDTIME. 90 tablet 0    hydroCHLOROthiazide (MICROZIDE) 12.5 mg capsule Take 1 capsule (12.5 mg total) by mouth once daily. 30 capsule 1    HYDROcodone-acetaminophen (NORCO) 5-325 mg per tablet Take 1 tablet by mouth every 8 (eight) hours as needed for Pain. 21 tablet 0    HYDROcodone-acetaminophen (NORCO) 7.5-325 mg per tablet Take 1 tablet by mouth every 6 (six) hours as needed for Pain. 18 tablet 0    hydrOXYzine pamoate (VISTARIL) 25 MG Cap Take 1 capsule (25 mg total) by mouth every evening. 30 capsule 1    isosorbide mononitrate (IMDUR) 60 MG 24 hr tablet TAKE 1 TABLET BY MOUTH ONCE DAILY. 30 tablet 9    lancets (ACCU-CHEK SOFTCLIX LANCETS) Misc 1 each by Misc.(Non-Drug; Combo Route) route 3 (three) times daily. 100 each 12    meloxicam (MOBIC) 15 MG tablet TAKE 1 TABLET BY MOUTH ONCE DAILY FOR 5 DAYS.THEN ONCE DAILY AS NEEDED FOR BACK  PAIN. 30 tablet 0    metoprolol succinate (TOPROL-XL) 25 MG 24 hr tablet Take 1 tablet (25 mg total) by mouth once daily. 30 tablet 5    omeprazole (PRILOSEC) 40 MG capsule Take 1 capsule (40 mg total) by mouth every evening. 90 capsule 3    pravastatin (PRAVACHOL) 20 MG tablet Take 1 tablet (20 mg total) by mouth once daily. 30 tablet 5    RESTASIS MULTIDOSE 0.05 % Drop       tiotropium (SPIRIVA WITH HANDIHALER) 18 mcg inhalation capsule INHALE THE CONTENTS OF ONE CAPSULE DAILY. 90 capsule 1    traZODone (DESYREL) 100 MG tablet Take 1 tablet (100 mg total) by mouth every evening. 90 tablet 3    venlafaxine (EFFEXOR) 75 MG tablet Take 1 tablet (75 mg total) by mouth 2 (two) times daily. 180 tablet 1    zolpidem (AMBIEN) 10 mg Tab TAKE 1 TABLET BY MOUTH AT BEDTIME AS NEEDED FOR INSOMNIA. 30 tablet 3     No current facility-administered medications for this visit.       There are no discontinued medications.    No follow-ups on file.      Yumi Roberson MD  Scribe Attestation:   I, Thelma Gonzalez, am scribing for, and in the presence of, Dr.Arif Roberson I performed the above scribed service and the documentation accurately describes the services I performed. I attest to the accuracy of the note.    I, Dr. Yumi Roberson, reviewed documentation as scribed above. I performed the services described in this documentation.  I agree that the record reflects my personal performance and is accurate and complete. Yumi Roberson MD.  08/18/2022

## 2022-08-18 NOTE — ED PROVIDER NOTES
SCRIBE #1 NOTE: I, Reyna Hall, am scribing for, and in the presence of, Sourav Vu Jr., MD. I have scribed the entire note.      History      Chief Complaint   Patient presents with    Abnormal Lab     Dr Wong sent for low H/H, low BP       Review of patient's allergies indicates:   Allergen Reactions    Rosuvastatin Other (See Comments)     Joint pain   Joint and bone pain        HPI   HPI    8/18/2022, 10:49 AM   History obtained from the patient      History of Present Illness: Mil Sood is a 67 y.o. male patient with a PMHx of cancer, DM, HLD, and HTN who presents to the Emergency Department after being referred to the ED by Dr. Anand, his PCP, because he was hypotensive and tachycardic in clinic. The pt has no complaints at this time. He also denies being out in the heat recently. The pt had an ORIF of his L ankle performed by Dr. Mata (Orthopedic Surgery) on 8/10/2022. The pt denies any complications after the surgery. Patient denies any fever, chills, blood in stool, melena, sore throat, cough, congestion, palpitations, CP, SOB, arthralgias, leg swelling, and all other sxs at this time. No prior Tx reported. No further complaints or concerns at this time.          Arrival mode: Personal vehicle     PCP: Yumi Roberson MD       Past Medical History:  Past Medical History:   Diagnosis Date    Anxiety     Cancer     spot on kidney and lung    Diabetes mellitus     Borderline diet controlled    Diverticulitis     GERD (gastroesophageal reflux disease)     Hyperlipidemia     not on any meds currently    Hypertension     Lung mass     Mass of kidney        Past Surgical History:  Past Surgical History:   Procedure Laterality Date    CHOLECYSTECTOMY      COLONOSCOPY      COLONOSCOPY N/A 4/7/2017    Procedure: COLONOSCOPY;  Surgeon: Amarilys Bravo MD;  Location: [facility];  Service: Endoscopy;  Laterality: N/A;    DIAGNOSTIC LAPAROSCOPY N/A 5/4/2021    Procedure: LAPAROSCOPY,  DIAGNOSTIC;  Surgeon: Shaji Jauregui MD;  Location: [facility] OR;  Service: General;  Laterality: N/A;    ESOPHAGOGASTRODUODENOSCOPY      ESOPHAGOGASTRODUODENOSCOPY N/A 2020    Procedure: ESOPHAGOGASTRODUODENOSCOPY (EGD);  Surgeon: Shaji Aquino III, MD;  Location: [facility] ENDO;  Service: Endoscopy;  Laterality: N/A;    HERNIA REPAIR Bilateral     groin    LEFT HEART CATHETERIZATION Left 2022    Procedure: CATHETERIZATION, HEART, LEFT;  Surgeon: Romeo Godfrey MD;  Location: [facility] CATH LAB;  Service: Cardiology;  Laterality: Left;    NECK SURGERY      OPEN REDUCTION AND INTERNAL FIXATION (ORIF) OF INJURY OF ANKLE Left 8/10/2022    Procedure: ORIF, ANKLE;  Surgeon: Howard Mata MD;  Location: [facility] OR;  Service: Orthopedics;  Laterality: Left;    PARTIAL NEPHRECTOMY      left    RENAL BIOPSY      ROBOT-ASSISTED REPAIR OF UMBILICAL HERNIA USING DA NEL XI N/A 2021    Procedure: XI ROBOTIC REPAIR, HERNIA, UMBILICAL;  Surgeon: Shaji Jauregui MD;  Location: [facility] OR;  Service: General;  Laterality: N/A;  converted to open    TONSILLECTOMY      TRIGGER FINGER RELEASE Right     long finger         Family History:  Family History   Problem Relation Age of Onset    Hypertension Mother     Hypertension Father     Melanoma Father     Cancer Sister        Social History:  Social History     Tobacco Use    Smoking status: Former Smoker     Packs/day: 1.00     Years: 20.00     Pack years: 20.00     Quit date: 1995     Years since quittin.5    Smokeless tobacco: Never Used   Substance and Sexual Activity    Alcohol use: Never     Alcohol/week: 0.0 standard drinks    Drug use: No    Sexual activity: Yes     Partners: Female     Birth control/protection: None       ROS   Review of Systems   Constitutional: Negative for chills and fever.   HENT: Negative for congestion and sore throat.    Respiratory: Negative for cough and shortness of breath.    Cardiovascular: Negative for chest pain,  palpitations and leg swelling.   Gastrointestinal: Negative for blood in stool and nausea.        (-) melena   Genitourinary: Negative for dysuria.   Musculoskeletal: Negative for arthralgias and back pain.   Skin: Negative for rash.   Neurological: Negative for weakness.   Hematological: Does not bruise/bleed easily.   All other systems reviewed and are negative.    Physical Exam      Initial Vitals [08/18/22 1015]   BP Pulse Resp Temp SpO2   (!) 79/60 (!) 133 20 98.2 °F (36.8 °C) 99 %      MAP       --          Physical Exam  Nursing Notes and Vital Signs Reviewed.  Constitutional: Patient is in no acute distress. Well-developed and well-nourished.  Head: Atraumatic. Normocephalic.  Eyes:  EOM intact.  No scleral icterus.  ENT: Mucous membranes are moist.  Nares clear   Neck:  Full ROM. No JVD.  Cardiovascular: Regular rate. Regular rhythm No murmurs, rubs, or gallops. Distal pulses are 2+ and symmetric  Pulmonary/Chest: No respiratory distress. Clear to auscultation bilaterally. No wheezing or rales.  Equal chest wall rise bilaterally  Abdominal: Soft and non-distended.  There is no tenderness.  No rebound, guarding, or rigidity. Good bowel sounds.  Genitourinary: No CVA tenderness.  No suprapubic tenderness  Musculoskeletal: Moves all extremities. No obvious deformities.  5 x 5 strength in all extremities   Skin: Warm and dry.  Neurological:  Alert, awake, and appropriate.  Normal speech.  No acute focal neurological deficits are appreciated.  Two through 12 intact bilaterally.  Psychiatric: Normal affect. Good eye contact. Appropriate in content.      ED Course    Critical Care    Date/Time: 8/18/2022 10:52 AM  Performed by: Sourav Vu Jr., MD  Authorized by: Sourav Vu Jr., MD   Direct patient critical care time: 21 minutes  Additional history critical care time: 5 minutes  Ordering / reviewing critical care time: 6 minutes  Documentation critical care time: 5 minutes  Total critical care time  "(exclusive of procedural time) : 37 minutes  Critical care time was exclusive of separately billable procedures and treating other patients and teaching time.  Critical care was necessary to treat or prevent imminent or life-threatening deterioration of the following conditions: hypotension.  Critical care was time spent personally by me on the following activities: blood draw for specimens, development of treatment plan with patient or surrogate, interpretation of cardiac output measurements, evaluation of patient's response to treatment, examination of patient, obtaining history from patient or surrogate, ordering and performing treatments and interventions, ordering and review of laboratory studies, ordering and review of radiographic studies, pulse oximetry, re-evaluation of patient's condition and review of old charts.        ED Vital Signs:  Vitals:    08/18/22 1015 08/18/22 1047   BP: (!) 79/60 124/83   Pulse: (!) 133 105   Resp: 20    Temp: 98.2 °F (36.8 °C)    TempSrc: Oral    SpO2: 99% 99%   Weight:  74.3 kg (163 lb 12.8 oz)   Height: 5' 10" (1.778 m)        Abnormal Lab Results:  Labs Reviewed   CULTURE, BLOOD   CULTURE, BLOOD   CBC W/ AUTO DIFFERENTIAL   COMPREHENSIVE METABOLIC PANEL   LACTIC ACID, PLASMA   LACTIC ACID, PLASMA   URINALYSIS, REFLEX TO URINE CULTURE   TROPONIN I   SARS-COV-2 RDRP GENE        Imaging Results:  Imaging Results    None        The EKG was ordered, reviewed, and independently interpreted by the ED provider.  Interpretation time: 10:38  Rate: 106 BPM  Rhythm: sinus tachycardia  Interpretation: Possible Anterior infarct, age undetermined. No STEMI.           The Emergency Provider reviewed the vital signs and test results, which are outlined above.    ED Discussion     10:50 AM: Pt is A&O x3, appropriate, and of capacity at this time. Pt voices desire to leave hospital. D/w pt in length need for further evaluation and treatment due to HPI and PEx. Pt declines any further " evaluation or tx at this time. All risks, including worsening sx, permanent bodily harm and death, were discussed in length. Pt acknowledges all risk at this time and agrees to sign AMA form. Pt given RTER instructions. All questions and concerns addressed at this time. Pt leaving AMA.        .11:06 AM  Although the patient's blood pressure has improved, he remains tachycardic.  We have no cause for his decreased blood pressure though the patient demands to go home.  I have discussed this with him at length.  He was very pleasant discussed with him that we do not know why his blood pressure was low and that we cannot project how he will do.  He is aware that he is assuming the risks that include but are not limited to loss of life loss of limb loss of eyesight loss of sense, loss of function, loss of pleasure, need for CPR, need for intubation, need for mechanical ventilation, permanent intractable pain, permanent disability, need for surgery, heart attack, dysrhythmia, need for electrical cardioversion, brain death.  Patient has signed out against medical advice understanding these risks.    ED Medication(s):  Medications   lactated ringers bolus 2,190 mL (has no administration in time range)           New Prescriptions    No medications on file         Medical Decision Making    Medical Decision Making:   Clinical Tests:   Lab Tests: Ordered  Radiological Study: Ordered  Medical Tests: Ordered and Reviewed           Scribe Attestation:   Scribe #1: I performed the above scribed service and the documentation accurately describes the services I performed. I attest to the accuracy of the note.    Attending:   Physician Attestation Statement for Scribe #1: I, Sourav Vu Jr., MD, personally performed the services described in this documentation, as scribed by Reyna Hall, in my presence, and it is both accurate and complete.          Clinical Impression       ICD-10-CM ICD-9-CM   1. Hypotension  I95.9 458.9        Disposition:   Disposition: AMA  AMA: Patient left AMA after: MD assigned, HPI and exam. Patient status: competent, oriented x 3 and not intoxicated. AMA Form: signed by patient          Sourav Vu Jr., MD  08/18/22 110

## 2022-08-18 NOTE — FIRST PROVIDER EVALUATION
"Medical screening exam completed.  I have conducted a focused provider triage encounter, findings are as follows:    Brief history of present illness:  Sent for hypotension and tachycardia post op    Vitals:    08/18/22 1015   BP: (!) 79/60   BP Location: Right arm   Patient Position: Sitting   Pulse: (!) 133   Resp: 20   Temp: 98.2 °F (36.8 °C)   TempSrc: Oral   SpO2: 99%   Height: 5' 10" (1.778 m)       Pertinent physical exam:  Nad, hypotensive, tachycardic     Brief workup plan:  Labs, fluids, further eval by md    Preliminary workup initiated; this workup will be continued and followed by the physician or advanced practice provider that is assigned to the patient when roomed.  "

## 2022-08-24 ENCOUNTER — OFFICE VISIT (OUTPATIENT)
Dept: ORTHOPEDICS | Facility: CLINIC | Age: 67
End: 2022-08-24
Payer: MEDICARE

## 2022-08-24 VITALS
DIASTOLIC BLOOD PRESSURE: 81 MMHG | WEIGHT: 163 LBS | HEART RATE: 94 BPM | HEIGHT: 70 IN | BODY MASS INDEX: 23.34 KG/M2 | SYSTOLIC BLOOD PRESSURE: 124 MMHG

## 2022-08-24 DIAGNOSIS — M25.572 LEFT ANKLE PAIN, UNSPECIFIED CHRONICITY: Primary | ICD-10-CM

## 2022-08-24 DIAGNOSIS — S82.62XD CLOSED DISPLACED FRACTURE OF LATERAL MALLEOLUS OF LEFT FIBULA WITH ROUTINE HEALING, SUBSEQUENT ENCOUNTER: Primary | ICD-10-CM

## 2022-08-24 PROCEDURE — 3008F PR BODY MASS INDEX (BMI) DOCUMENTED: ICD-10-PCS | Mod: CPTII,S$GLB,, | Performed by: PHYSICIAN ASSISTANT

## 2022-08-24 PROCEDURE — 1159F PR MEDICATION LIST DOCUMENTED IN MEDICAL RECORD: ICD-10-PCS | Mod: CPTII,S$GLB,, | Performed by: PHYSICIAN ASSISTANT

## 2022-08-24 PROCEDURE — 99024 PR POST-OP FOLLOW-UP VISIT: ICD-10-PCS | Mod: S$GLB,,, | Performed by: PHYSICIAN ASSISTANT

## 2022-08-24 PROCEDURE — 3074F PR MOST RECENT SYSTOLIC BLOOD PRESSURE < 130 MM HG: ICD-10-PCS | Mod: CPTII,S$GLB,, | Performed by: PHYSICIAN ASSISTANT

## 2022-08-24 PROCEDURE — 1126F AMNT PAIN NOTED NONE PRSNT: CPT | Mod: CPTII,S$GLB,, | Performed by: PHYSICIAN ASSISTANT

## 2022-08-24 PROCEDURE — 3044F PR MOST RECENT HEMOGLOBIN A1C LEVEL <7.0%: ICD-10-PCS | Mod: CPTII,S$GLB,, | Performed by: PHYSICIAN ASSISTANT

## 2022-08-24 PROCEDURE — 1160F PR REVIEW ALL MEDS BY PRESCRIBER/CLIN PHARMACIST DOCUMENTED: ICD-10-PCS | Mod: CPTII,S$GLB,, | Performed by: PHYSICIAN ASSISTANT

## 2022-08-24 PROCEDURE — 3288F PR FALLS RISK ASSESSMENT DOCUMENTED: ICD-10-PCS | Mod: CPTII,S$GLB,, | Performed by: PHYSICIAN ASSISTANT

## 2022-08-24 PROCEDURE — 3079F PR MOST RECENT DIASTOLIC BLOOD PRESSURE 80-89 MM HG: ICD-10-PCS | Mod: CPTII,S$GLB,, | Performed by: PHYSICIAN ASSISTANT

## 2022-08-24 PROCEDURE — 3044F HG A1C LEVEL LT 7.0%: CPT | Mod: CPTII,S$GLB,, | Performed by: PHYSICIAN ASSISTANT

## 2022-08-24 PROCEDURE — 1160F RVW MEDS BY RX/DR IN RCRD: CPT | Mod: CPTII,S$GLB,, | Performed by: PHYSICIAN ASSISTANT

## 2022-08-24 PROCEDURE — 99024 POSTOP FOLLOW-UP VISIT: CPT | Mod: S$GLB,,, | Performed by: PHYSICIAN ASSISTANT

## 2022-08-24 PROCEDURE — 4010F ACE/ARB THERAPY RXD/TAKEN: CPT | Mod: CPTII,S$GLB,, | Performed by: PHYSICIAN ASSISTANT

## 2022-08-24 PROCEDURE — 3008F BODY MASS INDEX DOCD: CPT | Mod: CPTII,S$GLB,, | Performed by: PHYSICIAN ASSISTANT

## 2022-08-24 PROCEDURE — 4010F PR ACE/ARB THEARPY RXD/TAKEN: ICD-10-PCS | Mod: CPTII,S$GLB,, | Performed by: PHYSICIAN ASSISTANT

## 2022-08-24 PROCEDURE — 1126F PR PAIN SEVERITY QUANTIFIED, NO PAIN PRESENT: ICD-10-PCS | Mod: CPTII,S$GLB,, | Performed by: PHYSICIAN ASSISTANT

## 2022-08-24 PROCEDURE — 1100F PR PT FALLS ASSESS DOC 2+ FALLS/FALL W/INJURY/YR: ICD-10-PCS | Mod: CPTII,S$GLB,, | Performed by: PHYSICIAN ASSISTANT

## 2022-08-24 PROCEDURE — 3074F SYST BP LT 130 MM HG: CPT | Mod: CPTII,S$GLB,, | Performed by: PHYSICIAN ASSISTANT

## 2022-08-24 PROCEDURE — 3288F FALL RISK ASSESSMENT DOCD: CPT | Mod: CPTII,S$GLB,, | Performed by: PHYSICIAN ASSISTANT

## 2022-08-24 PROCEDURE — 99999 PR PBB SHADOW E&M-EST. PATIENT-LVL V: CPT | Mod: PBBFAC,,, | Performed by: PHYSICIAN ASSISTANT

## 2022-08-24 PROCEDURE — 3079F DIAST BP 80-89 MM HG: CPT | Mod: CPTII,S$GLB,, | Performed by: PHYSICIAN ASSISTANT

## 2022-08-24 PROCEDURE — 1159F MED LIST DOCD IN RCRD: CPT | Mod: CPTII,S$GLB,, | Performed by: PHYSICIAN ASSISTANT

## 2022-08-24 PROCEDURE — 99999 PR PBB SHADOW E&M-EST. PATIENT-LVL V: ICD-10-PCS | Mod: PBBFAC,,, | Performed by: PHYSICIAN ASSISTANT

## 2022-08-24 PROCEDURE — 1100F PTFALLS ASSESS-DOCD GE2>/YR: CPT | Mod: CPTII,S$GLB,, | Performed by: PHYSICIAN ASSISTANT

## 2022-08-24 NOTE — PROGRESS NOTES
Subjective:      Patient ID: Mil Sood is a 67 y.o. male.    Chief Complaint: Post-op Evaluation of the Left Ankle      HPI: Mil Sood is a 67-year-old male in clinic today for postoperative follow-up.  Patient is 14 days status postop ORIF left lateral malleolus fracture with plate and screws.  Patient is doing very well this time.  He has been compliant with fracture boot and nonweightbearing status.  Patient has no new complaints    Past Medical History:   Diagnosis Date    Anxiety     Cancer     spot on kidney and lung    Diabetes mellitus     Borderline diet controlled    Diverticulitis     GERD (gastroesophageal reflux disease)     Hyperlipidemia     not on any meds currently    Hypertension     Lung mass     Mass of kidney        Current Outpatient Medications:     amLODIPine (NORVASC) 5 MG tablet, Take 1 tablet (5 mg total) by mouth once daily., Disp: 90 tablet, Rfl: 3    benazepriL (LOTENSIN) 20 MG tablet, TAKE 2 TABLETS BY MOUTH NIGHTLY., Disp: 180 tablet, Rfl: 1    blood sugar diagnostic Strp, Use to check blood sugars BID, Disp: 180 strip, Rfl: 3    blood sugar diagnostic Strp, 1 each by Misc.(Non-Drug; Combo Route) route 3 (three) times daily., Disp: 100 each, Rfl: 12    blood-glucose meter kit, Use as instructed, Disp: 1 each, Rfl: 1    citalopram (CELEXA) 40 MG tablet, Take 1 tablet (40 mg total) by mouth every evening., Disp: 90 tablet, Rfl: 3    clonazePAM (KLONOPIN) 1 MG tablet, Take 1 tablet (1 mg total) by mouth nightly as needed for Anxiety., Disp: 30 tablet, Rfl: 1    cyclobenzaprine (FLEXERIL) 10 MG tablet, TAKE 1 TABLET BY MOUTH THREE TIMES DAILY AS NEEDED., Disp: 90 tablet, Rfl: 0    finasteride (PROSCAR) 5 mg tablet, Take 1 tablet (5 mg total) by mouth nightly., Disp: 90 tablet, Rfl: 1    gabapentin (NEURONTIN) 600 MG tablet, Take 1 tablet (600 mg total) by mouth nightly., Disp: 90 tablet, Rfl: 1    hydrALAZINE (APRESOLINE) 25 MG tablet, TAKE ONE TABLET BY MOUTH AT  BEDTIME., Disp: 90 tablet, Rfl: 0    hydroCHLOROthiazide (MICROZIDE) 12.5 mg capsule, Take 1 capsule (12.5 mg total) by mouth once daily., Disp: 30 capsule, Rfl: 1    hydrOXYzine pamoate (VISTARIL) 25 MG Cap, Take 1 capsule (25 mg total) by mouth every evening., Disp: 30 capsule, Rfl: 1    isosorbide mononitrate (IMDUR) 60 MG 24 hr tablet, TAKE 1 TABLET BY MOUTH ONCE DAILY., Disp: 30 tablet, Rfl: 9    lancets (ACCU-CHEK SOFTCLIX LANCETS) Misc, 1 each by Misc.(Non-Drug; Combo Route) route 3 (three) times daily., Disp: 100 each, Rfl: 12    meloxicam (MOBIC) 15 MG tablet, TAKE 1 TABLET BY MOUTH ONCE DAILY FOR 5 DAYS.THEN ONCE DAILY AS NEEDED FOR BACK PAIN., Disp: 30 tablet, Rfl: 0    metoprolol succinate (TOPROL-XL) 25 MG 24 hr tablet, Take 1 tablet (25 mg total) by mouth once daily., Disp: 30 tablet, Rfl: 5    omeprazole (PRILOSEC) 40 MG capsule, Take 1 capsule (40 mg total) by mouth every evening., Disp: 90 capsule, Rfl: 3    pravastatin (PRAVACHOL) 20 MG tablet, Take 1 tablet (20 mg total) by mouth once daily., Disp: 30 tablet, Rfl: 5    RESTASIS MULTIDOSE 0.05 % Drop, , Disp: , Rfl:     tiotropium (SPIRIVA WITH HANDIHALER) 18 mcg inhalation capsule, INHALE THE CONTENTS OF ONE CAPSULE DAILY., Disp: 90 capsule, Rfl: 1    traZODone (DESYREL) 100 MG tablet, Take 1 tablet (100 mg total) by mouth every evening., Disp: 90 tablet, Rfl: 3    venlafaxine (EFFEXOR) 75 MG tablet, Take 1 tablet (75 mg total) by mouth 2 (two) times daily., Disp: 180 tablet, Rfl: 1    zolpidem (AMBIEN) 10 mg Tab, TAKE 1 TABLET BY MOUTH AT BEDTIME AS NEEDED FOR INSOMNIA., Disp: 30 tablet, Rfl: 3    fluticasone propion-salmeterol 115-21 mcg/dose (ADVAIR HFA) 115-21 mcg/actuation HFAA inhaler, Inhale 2 puffs into the lungs 2 (two) times daily., Disp: 12 g, Rfl: 12    HYDROcodone-acetaminophen (NORCO) 7.5-325 mg per tablet, Take 1 tablet by mouth every 6 (six) hours as needed for Pain. (Patient not taking: Reported on 8/24/2022), Disp:  "18 tablet, Rfl: 0  Review of patient's allergies indicates:   Allergen Reactions    Rosuvastatin Other (See Comments)     Joint pain   Joint and bone pain       /81   Pulse 94   Ht 5' 10" (1.778 m)   Wt 73.9 kg (163 lb)   BMI 23.39 kg/m²     ROS      Objective:    Ortho Exam   Left ankle:  Aquacel dressing that was placed in the operating room is still intact day, this was removed for physical exam today  Filiberto are intact, wound margins are well healed, no signs of infection   Mild to moderate edema  Minimal TTP  ROM not tested   Calf and compartments are soft and compressible  Motor exam normal   Sensation and pulses intact    GEN: Well developed, well nourished male. AAOX3. No acute distress.   Head: Normocephalic, atraumatic.   Eyes: ALONDRA  Neck: Trachea is midline, no adenopathy  Resp: Breathing unlabored.  Neuro: Motor function normal, Cranial nerves intact  Psych: Mood and affect appropriate.       Assessment:     Imaging:  No new imaging ordered today        1. Closed displaced fracture of lateral malleolus of left fibula with routine healing, subsequent encounter          Plan:       Staples removed, patient instructed on normal wound care with soap water.  Patient should remain nonweightbearing in the fracture boot.  Patient instructed to keep the extremity elevated and apply ice as needed.  Patient return to clinic in about 1 month for repeat radiographs and further evaluation     Follow up in about 1 month (around 9/24/2022).          Patient note was created using MModal Dictation.  Any errors in syntax or even information may not have been identified and edited on initial review prior to signing this note.  "

## 2022-09-21 ENCOUNTER — HOSPITAL ENCOUNTER (OUTPATIENT)
Dept: RADIOLOGY | Facility: HOSPITAL | Age: 67
Discharge: HOME | End: 2022-09-21
Attending: PHYSICIAN ASSISTANT
Payer: MEDICARE

## 2022-09-21 ENCOUNTER — OFFICE VISIT (OUTPATIENT)
Dept: ORTHOPEDICS | Facility: CLINIC | Age: 67
End: 2022-09-21
Payer: MEDICARE

## 2022-09-21 VITALS
HEART RATE: 147 BPM | SYSTOLIC BLOOD PRESSURE: 144 MMHG | DIASTOLIC BLOOD PRESSURE: 96 MMHG | BODY MASS INDEX: 23.34 KG/M2 | HEIGHT: 70 IN | WEIGHT: 163 LBS

## 2022-09-21 DIAGNOSIS — S82.62XD CLOSED DISPLACED FRACTURE OF LATERAL MALLEOLUS OF LEFT FIBULA WITH ROUTINE HEALING, SUBSEQUENT ENCOUNTER: Primary | ICD-10-CM

## 2022-09-21 DIAGNOSIS — M25.572 LEFT ANKLE PAIN, UNSPECIFIED CHRONICITY: ICD-10-CM

## 2022-09-21 DIAGNOSIS — M25.572 LEFT ANKLE PAIN, UNSPECIFIED CHRONICITY: Primary | ICD-10-CM

## 2022-09-21 PROCEDURE — 73610 XR ANKLE COMPLETE 3 VIEW LEFT: ICD-10-PCS | Mod: 26,LT,, | Performed by: RADIOLOGY

## 2022-09-21 PROCEDURE — 4010F ACE/ARB THERAPY RXD/TAKEN: CPT | Mod: CPTII,S$GLB,, | Performed by: ORTHOPAEDIC SURGERY

## 2022-09-21 PROCEDURE — 3080F PR MOST RECENT DIASTOLIC BLOOD PRESSURE >= 90 MM HG: ICD-10-PCS | Mod: CPTII,S$GLB,, | Performed by: ORTHOPAEDIC SURGERY

## 2022-09-21 PROCEDURE — 3077F PR MOST RECENT SYSTOLIC BLOOD PRESSURE >= 140 MM HG: ICD-10-PCS | Mod: CPTII,S$GLB,, | Performed by: ORTHOPAEDIC SURGERY

## 2022-09-21 PROCEDURE — 73610 X-RAY EXAM OF ANKLE: CPT | Mod: 26,LT,, | Performed by: RADIOLOGY

## 2022-09-21 PROCEDURE — 1126F PR PAIN SEVERITY QUANTIFIED, NO PAIN PRESENT: ICD-10-PCS | Mod: CPTII,S$GLB,, | Performed by: ORTHOPAEDIC SURGERY

## 2022-09-21 PROCEDURE — 3077F SYST BP >= 140 MM HG: CPT | Mod: CPTII,S$GLB,, | Performed by: ORTHOPAEDIC SURGERY

## 2022-09-21 PROCEDURE — 1100F PTFALLS ASSESS-DOCD GE2>/YR: CPT | Mod: CPTII,S$GLB,, | Performed by: ORTHOPAEDIC SURGERY

## 2022-09-21 PROCEDURE — 99024 POSTOP FOLLOW-UP VISIT: CPT | Mod: S$GLB,,, | Performed by: ORTHOPAEDIC SURGERY

## 2022-09-21 PROCEDURE — 3080F DIAST BP >= 90 MM HG: CPT | Mod: CPTII,S$GLB,, | Performed by: ORTHOPAEDIC SURGERY

## 2022-09-21 PROCEDURE — 99999 PR PBB SHADOW E&M-EST. PATIENT-LVL IV: ICD-10-PCS | Mod: PBBFAC,,, | Performed by: ORTHOPAEDIC SURGERY

## 2022-09-21 PROCEDURE — 3288F FALL RISK ASSESSMENT DOCD: CPT | Mod: CPTII,S$GLB,, | Performed by: ORTHOPAEDIC SURGERY

## 2022-09-21 PROCEDURE — 1159F MED LIST DOCD IN RCRD: CPT | Mod: CPTII,S$GLB,, | Performed by: ORTHOPAEDIC SURGERY

## 2022-09-21 PROCEDURE — 3288F PR FALLS RISK ASSESSMENT DOCUMENTED: ICD-10-PCS | Mod: CPTII,S$GLB,, | Performed by: ORTHOPAEDIC SURGERY

## 2022-09-21 PROCEDURE — 1100F PR PT FALLS ASSESS DOC 2+ FALLS/FALL W/INJURY/YR: ICD-10-PCS | Mod: CPTII,S$GLB,, | Performed by: ORTHOPAEDIC SURGERY

## 2022-09-21 PROCEDURE — 3008F BODY MASS INDEX DOCD: CPT | Mod: CPTII,S$GLB,, | Performed by: ORTHOPAEDIC SURGERY

## 2022-09-21 PROCEDURE — 3044F PR MOST RECENT HEMOGLOBIN A1C LEVEL <7.0%: ICD-10-PCS | Mod: CPTII,S$GLB,, | Performed by: ORTHOPAEDIC SURGERY

## 2022-09-21 PROCEDURE — 99024 PR POST-OP FOLLOW-UP VISIT: ICD-10-PCS | Mod: S$GLB,,, | Performed by: ORTHOPAEDIC SURGERY

## 2022-09-21 PROCEDURE — 1126F AMNT PAIN NOTED NONE PRSNT: CPT | Mod: CPTII,S$GLB,, | Performed by: ORTHOPAEDIC SURGERY

## 2022-09-21 PROCEDURE — 3044F HG A1C LEVEL LT 7.0%: CPT | Mod: CPTII,S$GLB,, | Performed by: ORTHOPAEDIC SURGERY

## 2022-09-21 PROCEDURE — 1159F PR MEDICATION LIST DOCUMENTED IN MEDICAL RECORD: ICD-10-PCS | Mod: CPTII,S$GLB,, | Performed by: ORTHOPAEDIC SURGERY

## 2022-09-21 PROCEDURE — 99999 PR PBB SHADOW E&M-EST. PATIENT-LVL IV: CPT | Mod: PBBFAC,,, | Performed by: ORTHOPAEDIC SURGERY

## 2022-09-21 PROCEDURE — 4010F PR ACE/ARB THEARPY RXD/TAKEN: ICD-10-PCS | Mod: CPTII,S$GLB,, | Performed by: ORTHOPAEDIC SURGERY

## 2022-09-21 PROCEDURE — 73610 X-RAY EXAM OF ANKLE: CPT | Mod: TC,LT

## 2022-09-21 PROCEDURE — 3008F PR BODY MASS INDEX (BMI) DOCUMENTED: ICD-10-PCS | Mod: CPTII,S$GLB,, | Performed by: ORTHOPAEDIC SURGERY

## 2022-09-21 NOTE — PROGRESS NOTES
Patient ID: Mil Sood is a 67 y.o. male.    Chief Complaint: Post-op Evaluation of the Left Ankle      HPI: Mil Sood is a very pleasant 67 y.o.  male who is here for follow-up of left ankle.  He is 6 weeks status post ORIF of his left lateral malleolus fracture performed by Dr. Mata on August 10, 2022. The patient was last seen by nydia Toussaint on August 24, 2022.  At that time, patient was in a Cam boot and nonweightbearing.  He has remained nonweightbearing since then.  Today, the patient's pain is a 0 / 10 .  He has been doing extremely well and has not needed any pain medication.    Past Medical History:   Diagnosis Date    Anxiety     Cancer     spot on kidney and lung    Diabetes mellitus     Borderline diet controlled    Diverticulitis     GERD (gastroesophageal reflux disease)     Hyperlipidemia     not on any meds currently    Hypertension     Lung mass     Mass of kidney      Past Surgical History:   Procedure Laterality Date    CHOLECYSTECTOMY      COLONOSCOPY      COLONOSCOPY N/A 4/7/2017    Procedure: COLONOSCOPY;  Surgeon: Amarilys Bravo MD;  Location: [facility] ENDO;  Service: Endoscopy;  Laterality: N/A;    DIAGNOSTIC LAPAROSCOPY N/A 5/4/2021    Procedure: LAPAROSCOPY, DIAGNOSTIC;  Surgeon: Shaji Jauregui MD;  Location: [facility] OR;  Service: General;  Laterality: N/A;    ESOPHAGOGASTRODUODENOSCOPY      ESOPHAGOGASTRODUODENOSCOPY N/A 2/13/2020    Procedure: ESOPHAGOGASTRODUODENOSCOPY (EGD);  Surgeon: Shaji Aquino III, MD;  Location: [facility];  Service: Endoscopy;  Laterality: N/A;    HERNIA REPAIR Bilateral     groin    LEFT HEART CATHETERIZATION Left 1/31/2022    Procedure: CATHETERIZATION, HEART, LEFT;  Surgeon: Romeo Godfrey MD;  Location: [facility] CATH LAB;  Service: Cardiology;  Laterality: Left;    NECK SURGERY      OPEN REDUCTION AND INTERNAL FIXATION (ORIF) OF INJURY OF ANKLE Left 8/10/2022    Procedure: ORIF, ANKLE;  Surgeon: Howard Mata MD;  Location: [facility]  OR;  Service: Orthopedics;  Laterality: Left;    PARTIAL NEPHRECTOMY      left    RENAL BIOPSY      ROBOT-ASSISTED REPAIR OF UMBILICAL HERNIA USING DA NEL XI N/A 2021    Procedure: XI ROBOTIC REPAIR, HERNIA, UMBILICAL;  Surgeon: Shaji Jauregui MD;  Location: [facility] OR;  Service: General;  Laterality: N/A;  converted to open    TONSILLECTOMY      TRIGGER FINGER RELEASE Right     long finger     Family History   Problem Relation Age of Onset    Hypertension Mother     Hypertension Father     Melanoma Father     Cancer Sister      Social History     Socioeconomic History    Marital status:    Tobacco Use    Smoking status: Former     Packs/day: 1.00     Years: 20.00     Pack years: 20.00     Types: Cigarettes     Quit date: 1995     Years since quittin.6    Smokeless tobacco: Never   Substance and Sexual Activity    Alcohol use: Never     Alcohol/week: 0.0 standard drinks    Drug use: No    Sexual activity: Yes     Partners: Female     Birth control/protection: None     Medication List with Changes/Refills   Current Medications    AMLODIPINE (NORVASC) 5 MG TABLET    Take 1 tablet (5 mg total) by mouth once daily.    BENAZEPRIL (LOTENSIN) 20 MG TABLET    TAKE 2 TABLETS BY MOUTH NIGHTLY.    BLOOD SUGAR DIAGNOSTIC STRP    Use to check blood sugars BID    BLOOD SUGAR DIAGNOSTIC STRP    1 each by Misc.(Non-Drug; Combo Route) route 3 (three) times daily.    BLOOD-GLUCOSE METER KIT    Use as instructed    CITALOPRAM (CELEXA) 40 MG TABLET    Take 1 tablet (40 mg total) by mouth every evening.    CLONAZEPAM (KLONOPIN) 1 MG TABLET    Take 1 tablet (1 mg total) by mouth nightly as needed for Anxiety.    CYCLOBENZAPRINE (FLEXERIL) 10 MG TABLET    TAKE 1 TABLET BY MOUTH THREE TIMES DAILY AS NEEDED.    FINASTERIDE (PROSCAR) 5 MG TABLET    Take 1 tablet (5 mg total) by mouth nightly.    FLUTICASONE PROPION-SALMETEROL 115-21 MCG/DOSE (ADVAIR HFA) 115-21 MCG/ACTUATION HFAA INHALER    Inhale 2 puffs into the lungs  2 (two) times daily.    GABAPENTIN (NEURONTIN) 600 MG TABLET    Take 1 tablet (600 mg total) by mouth nightly.    HYDRALAZINE (APRESOLINE) 25 MG TABLET    TAKE ONE TABLET BY MOUTH AT BEDTIME.    HYDROCHLOROTHIAZIDE (MICROZIDE) 12.5 MG CAPSULE    Take 1 capsule (12.5 mg total) by mouth once daily.    HYDROXYZINE PAMOATE (VISTARIL) 25 MG CAP    Take 1 capsule (25 mg total) by mouth every evening.    ISOSORBIDE MONONITRATE (IMDUR) 60 MG 24 HR TABLET    TAKE 1 TABLET BY MOUTH ONCE DAILY.    LANCETS (ACCU-CHEK SOFTCLIX LANCETS) MISC    1 each by Misc.(Non-Drug; Combo Route) route 3 (three) times daily.    MELOXICAM (MOBIC) 15 MG TABLET    TAKE 1 TABLET BY MOUTH ONCE DAILY FOR 5 DAYS.THEN ONCE DAILY AS NEEDED FOR BACK PAIN.    METOPROLOL SUCCINATE (TOPROL-XL) 25 MG 24 HR TABLET    Take 1 tablet (25 mg total) by mouth once daily.    OMEPRAZOLE (PRILOSEC) 40 MG CAPSULE    Take 1 capsule (40 mg total) by mouth every evening.    PRAVASTATIN (PRAVACHOL) 20 MG TABLET    Take 1 tablet (20 mg total) by mouth once daily.    RESTASIS MULTIDOSE 0.05 % DROP        TIOTROPIUM (SPIRIVA WITH HANDIHALER) 18 MCG INHALATION CAPSULE    INHALE THE CONTENTS OF ONE CAPSULE DAILY.    TRAZODONE (DESYREL) 100 MG TABLET    Take 1 tablet (100 mg total) by mouth every evening.    VENLAFAXINE (EFFEXOR) 75 MG TABLET    Take 1 tablet (75 mg total) by mouth 2 (two) times daily.    ZOLPIDEM (AMBIEN) 10 MG TAB    TAKE 1 TABLET BY MOUTH AT BEDTIME AS NEEDED FOR INSOMNIA.   Discontinued Medications    HYDROCODONE-ACETAMINOPHEN (NORCO) 7.5-325 MG PER TABLET    Take 1 tablet by mouth every 6 (six) hours as needed for Pain.     Review of patient's allergies indicates:   Allergen Reactions    Rosuvastatin Other (See Comments)     Joint pain   Joint and bone pain       Physical Exam:     Wt Readings from Last 3 Encounters:   09/21/22 73.9 kg (163 lb)   08/24/22 73.9 kg (163 lb)   08/18/22 74.3 kg (163 lb 12.8 oz)     Temp Readings from Last 3 Encounters:    08/18/22 98.2 °F (36.8 °C) (Oral)   08/12/22 97.6 °F (36.4 °C) (Oral)   08/06/22 98 °F (36.7 °C) (Oral)     BP Readings from Last 3 Encounters:   09/21/22 (!) 144/96   08/24/22 124/81   08/18/22 124/83     Pulse Readings from Last 3 Encounters:   09/21/22 (!) 147   08/24/22 94   08/18/22 105       Body mass index is 23.39 kg/m².      General Appearance:   cooperative, no acute distress, appropriate for age                    Neurologic:  Alert and oriented x3                            Pysch:  Appropriate mood and affect                              Head:  Normocephalic, atraumatic                             EENT:  EOM grossly intact, no icterus, moist mucous membranes, fair dentition                   Respiratory:  non-labored; no audible wheezing                      Abdomen:  non-distended           Musculoskeletal:  Ambulating with crutches; left lower extremity:  Incision is clean dry and intact.  He has a well-healed lateral based surgical incision.  He has minimal swelling of the ankle.  There is no erythema induration or drainage.  Sensation is grossly intact to light touch and a 2+ dorsalis pedis pulse.  He has brisk cap refill to all toes.               Skin/Hair/Nails:  Skin warm, dry, and intact, no rashes or abnormal dyspigmentation       IMAGING:  Imaging studies performed today of the left ankle show no changes in hardware alignment in the left ankle.  Ankle mortise is intact.  No new fractures are noted.  Prior fracture line is still visible on lateral images.    Assessment:       Encounter Diagnosis   Name Primary?    Closed displaced fracture of lateral malleolus of left fibula with routine healing, subsequent encounter Yes          Plan:       Mil was seen today for post-op evaluation.    Diagnoses and all orders for this visit:    Closed displaced fracture of lateral malleolus of left fibula with routine healing, subsequent encounter    Overall patient is progressing extremely well.  He  may become weight-bearing as tolerated in his Cam boot.  We will see him back in 4 weeks for a recheck with x-rays.    Follow up in about 4 weeks (around 10/19/2022) for 3 views left ankle.    The patient understands, chooses and consents to this plan and accepts all   the risks which include but are not limited to the risks mentioned above.     Disclaimer: This note was prepared using a voice recognition system and is likely to have sound alike errors within the text.         Marianne Castro MD, FAAOS  Orthopaedic Surgeon

## 2022-09-22 ENCOUNTER — DOCUMENT SCAN (OUTPATIENT)
Dept: HOME HEALTH SERVICES | Facility: HOSPITAL | Age: 67
End: 2022-09-22
Payer: MEDICARE

## 2022-10-12 ENCOUNTER — OFFICE VISIT (OUTPATIENT)
Dept: FAMILY MEDICINE | Facility: CLINIC | Age: 67
End: 2022-10-12
Payer: MEDICARE

## 2022-10-12 VITALS
HEIGHT: 70 IN | WEIGHT: 179.25 LBS | DIASTOLIC BLOOD PRESSURE: 78 MMHG | SYSTOLIC BLOOD PRESSURE: 100 MMHG | HEART RATE: 107 BPM | BODY MASS INDEX: 25.66 KG/M2 | TEMPERATURE: 99 F | OXYGEN SATURATION: 99 %

## 2022-10-12 DIAGNOSIS — M18.12 ARTHRITIS OF CARPOMETACARPAL (CMC) JOINT OF LEFT THUMB: Primary | ICD-10-CM

## 2022-10-12 PROCEDURE — 1126F PR PAIN SEVERITY QUANTIFIED, NO PAIN PRESENT: ICD-10-PCS | Mod: CPTII,S$GLB,, | Performed by: FAMILY MEDICINE

## 2022-10-12 PROCEDURE — 3078F PR MOST RECENT DIASTOLIC BLOOD PRESSURE < 80 MM HG: ICD-10-PCS | Mod: CPTII,S$GLB,, | Performed by: FAMILY MEDICINE

## 2022-10-12 PROCEDURE — 1159F MED LIST DOCD IN RCRD: CPT | Mod: CPTII,S$GLB,, | Performed by: FAMILY MEDICINE

## 2022-10-12 PROCEDURE — 3288F FALL RISK ASSESSMENT DOCD: CPT | Mod: CPTII,S$GLB,, | Performed by: FAMILY MEDICINE

## 2022-10-12 PROCEDURE — 4010F ACE/ARB THERAPY RXD/TAKEN: CPT | Mod: CPTII,S$GLB,, | Performed by: FAMILY MEDICINE

## 2022-10-12 PROCEDURE — 1101F PT FALLS ASSESS-DOCD LE1/YR: CPT | Mod: CPTII,S$GLB,, | Performed by: FAMILY MEDICINE

## 2022-10-12 PROCEDURE — 1126F AMNT PAIN NOTED NONE PRSNT: CPT | Mod: CPTII,S$GLB,, | Performed by: FAMILY MEDICINE

## 2022-10-12 PROCEDURE — 3288F PR FALLS RISK ASSESSMENT DOCUMENTED: ICD-10-PCS | Mod: CPTII,S$GLB,, | Performed by: FAMILY MEDICINE

## 2022-10-12 PROCEDURE — 3044F PR MOST RECENT HEMOGLOBIN A1C LEVEL <7.0%: ICD-10-PCS | Mod: CPTII,S$GLB,, | Performed by: FAMILY MEDICINE

## 2022-10-12 PROCEDURE — 1101F PR PT FALLS ASSESS DOC 0-1 FALLS W/OUT INJ PAST YR: ICD-10-PCS | Mod: CPTII,S$GLB,, | Performed by: FAMILY MEDICINE

## 2022-10-12 PROCEDURE — 4010F PR ACE/ARB THEARPY RXD/TAKEN: ICD-10-PCS | Mod: CPTII,S$GLB,, | Performed by: FAMILY MEDICINE

## 2022-10-12 PROCEDURE — 99999 PR PBB SHADOW E&M-EST. PATIENT-LVL V: CPT | Mod: PBBFAC,,, | Performed by: FAMILY MEDICINE

## 2022-10-12 PROCEDURE — 3008F BODY MASS INDEX DOCD: CPT | Mod: CPTII,S$GLB,, | Performed by: FAMILY MEDICINE

## 2022-10-12 PROCEDURE — 3074F PR MOST RECENT SYSTOLIC BLOOD PRESSURE < 130 MM HG: ICD-10-PCS | Mod: CPTII,S$GLB,, | Performed by: FAMILY MEDICINE

## 2022-10-12 PROCEDURE — 99213 PR OFFICE/OUTPT VISIT, EST, LEVL III, 20-29 MIN: ICD-10-PCS | Mod: S$GLB,,, | Performed by: FAMILY MEDICINE

## 2022-10-12 PROCEDURE — 99213 OFFICE O/P EST LOW 20 MIN: CPT | Mod: S$GLB,,, | Performed by: FAMILY MEDICINE

## 2022-10-12 PROCEDURE — 99999 PR PBB SHADOW E&M-EST. PATIENT-LVL V: ICD-10-PCS | Mod: PBBFAC,,, | Performed by: FAMILY MEDICINE

## 2022-10-12 PROCEDURE — 1159F PR MEDICATION LIST DOCUMENTED IN MEDICAL RECORD: ICD-10-PCS | Mod: CPTII,S$GLB,, | Performed by: FAMILY MEDICINE

## 2022-10-12 PROCEDURE — 3044F HG A1C LEVEL LT 7.0%: CPT | Mod: CPTII,S$GLB,, | Performed by: FAMILY MEDICINE

## 2022-10-12 PROCEDURE — 3074F SYST BP LT 130 MM HG: CPT | Mod: CPTII,S$GLB,, | Performed by: FAMILY MEDICINE

## 2022-10-12 PROCEDURE — 3008F PR BODY MASS INDEX (BMI) DOCUMENTED: ICD-10-PCS | Mod: CPTII,S$GLB,, | Performed by: FAMILY MEDICINE

## 2022-10-12 PROCEDURE — 3078F DIAST BP <80 MM HG: CPT | Mod: CPTII,S$GLB,, | Performed by: FAMILY MEDICINE

## 2022-10-12 NOTE — PROGRESS NOTES
Chief Complaint:    Chief Complaint   Patient presents with    Cyst     Pt c/o knot on left hand       History of Present Illness:  Patient with HTN presents today for a pain in his L thumb    R hand dominant. He has done x-rays and cortisone injections. The injections didn't help with his pain. Musician.     ROS:  Review of Systems   Constitutional:  Negative for appetite change, chills and fever.   HENT:  Negative for congestion, ear pain, postnasal drip, rhinorrhea, sinus pressure and sinus pain.    Eyes:  Negative for pain.   Respiratory:  Negative for cough, chest tightness and shortness of breath.    Cardiovascular:  Negative for chest pain and palpitations.   Gastrointestinal:  Negative for abdominal pain, blood in stool, constipation, diarrhea and nausea.   Genitourinary:  Negative for difficulty urinating, dysuria, flank pain and hematuria.   Musculoskeletal:  Positive for arthralgias and myalgias. Negative for neck pain.   Skin:  Negative for pallor and wound.   Neurological:  Negative for dizziness, tremors, speech difficulty, light-headedness and headaches.   Psychiatric/Behavioral:  Negative for behavioral problems, dysphoric mood and sleep disturbance. The patient is not nervous/anxious.    All other systems reviewed and are negative.    Past Medical History:   Diagnosis Date    Anxiety     Cancer     spot on kidney and lung    Diabetes mellitus     Borderline diet controlled    Diverticulitis     GERD (gastroesophageal reflux disease)     Hyperlipidemia     not on any meds currently    Hypertension     Lung mass     Mass of kidney        Social History:  Social History     Socioeconomic History    Marital status:    Tobacco Use    Smoking status: Former     Packs/day: 1.00     Years: 20.00     Pack years: 20.00     Types: Cigarettes     Quit date: 1995     Years since quittin.7    Smokeless tobacco: Never   Substance and Sexual Activity    Alcohol use: Never     Alcohol/week: 0.0  "standard drinks    Drug use: No    Sexual activity: Yes     Partners: Female     Birth control/protection: None       Family History:   family history includes Cancer in his sister; Hypertension in his father and mother; Melanoma in his father.    Health Maintenance   Topic Date Due    Lipid Panel  08/18/2023    TETANUS VACCINE  04/26/2028    Hepatitis C Screening  Completed    Abdominal Aortic Aneurysm Screening  Completed       Physical Exam:    Vital Signs  Temp: 98.8 °F (37.1 °C)  Pulse: 107  SpO2: 99 %  BP: 100/78  Pain Score: 0-No pain  Height and Weight  Height: 5' 10" (177.8 cm)  Weight: 81.3 kg (179 lb 3.7 oz)  BSA (Calculated - sq m): 2 sq meters  BMI (Calculated): 25.7  Weight in (lb) to have BMI = 25: 173.9]    Body mass index is 25.72 kg/m².    Physical Exam  Vitals and nursing note reviewed.   Constitutional:       Appearance: Normal appearance.      Comments: Fidgety   HENT:      Head: Normocephalic and atraumatic.      Right Ear: Tympanic membrane normal.      Left Ear: Tympanic membrane normal.   Eyes:      Extraocular Movements: Extraocular movements intact.      Pupils: Pupils are equal, round, and reactive to light.   Cardiovascular:      Rate and Rhythm: Normal rate and regular rhythm.      Pulses: Normal pulses.      Heart sounds: Normal heart sounds. No murmur heard.    No gallop.   Pulmonary:      Effort: Pulmonary effort is normal. No respiratory distress.      Breath sounds: Normal breath sounds. No wheezing, rhonchi or rales.   Abdominal:      General: There is no distension.      Palpations: Abdomen is soft.      Tenderness: There is no abdominal tenderness.   Musculoskeletal:         General: No swelling, deformity or signs of injury. Normal range of motion.      Left hand: Tenderness present.        Hands:       Cervical back: Normal range of motion.   Skin:     General: Skin is warm and dry.      Capillary Refill: Capillary refill takes less than 2 seconds.      Coloration: Skin is not " jaundiced or pale.   Neurological:      General: No focal deficit present.      Mental Status: He is alert and oriented to person, place, and time.   Psychiatric:         Mood and Affect: Mood normal.         Behavior: Behavior normal.         Assessment:      ICD-10-CM ICD-9-CM   1. Arthritis of carpometacarpal (CMC) joint of left thumb  M18.12 716.94       Plan:     Refer to orthopedics for arthritis of CMC joint of left thumb.  Orders Placed This Encounter   Procedures    Ambulatory referral/consult to Orthopedics     Current Outpatient Medications   Medication Sig Dispense Refill    amLODIPine (NORVASC) 5 MG tablet Take 1 tablet (5 mg total) by mouth once daily. 90 tablet 3    benazepriL (LOTENSIN) 20 MG tablet TAKE 2 TABLETS BY MOUTH NIGHTLY. 180 tablet 1    blood sugar diagnostic Strp Use to check blood sugars  strip 3    blood sugar diagnostic Strp 1 each by Misc.(Non-Drug; Combo Route) route 3 (three) times daily. 100 each 12    blood-glucose meter kit Use as instructed 1 each 1    citalopram (CELEXA) 40 MG tablet Take 1 tablet (40 mg total) by mouth every evening. 90 tablet 3    clonazePAM (KLONOPIN) 1 MG tablet Take 1 tablet (1 mg total) by mouth nightly as needed for Anxiety. 30 tablet 1    cyclobenzaprine (FLEXERIL) 10 MG tablet TAKE 1 TABLET BY MOUTH THREE TIMES DAILY AS NEEDED. 90 tablet 0    finasteride (PROSCAR) 5 mg tablet Take 1 tablet (5 mg total) by mouth nightly. 90 tablet 1    gabapentin (NEURONTIN) 600 MG tablet Take 1 tablet (600 mg total) by mouth nightly. 90 tablet 1    hydrALAZINE (APRESOLINE) 25 MG tablet TAKE ONE TABLET BY MOUTH AT BEDTIME. 90 tablet 0    hydroCHLOROthiazide (MICROZIDE) 12.5 mg capsule Take 1 capsule (12.5 mg total) by mouth once daily. 30 capsule 1    hydrOXYzine pamoate (VISTARIL) 25 MG Cap Take 1 capsule (25 mg total) by mouth every evening. 30 capsule 1    isosorbide mononitrate (IMDUR) 60 MG 24 hr tablet TAKE 1 TABLET BY MOUTH ONCE DAILY. 30 tablet 9     lancets (ACCU-CHEK SOFTCLIX LANCETS) Misc 1 each by Misc.(Non-Drug; Combo Route) route 3 (three) times daily. 100 each 12    meloxicam (MOBIC) 15 MG tablet TAKE 1 TABLET BY MOUTH ONCE DAILY FOR 5 DAYS.THEN ONCE DAILY AS NEEDED FOR BACK PAIN. 30 tablet 0    metoprolol succinate (TOPROL-XL) 25 MG 24 hr tablet Take 1 tablet (25 mg total) by mouth once daily. 30 tablet 5    omeprazole (PRILOSEC) 40 MG capsule Take 1 capsule (40 mg total) by mouth every evening. 90 capsule 3    pravastatin (PRAVACHOL) 20 MG tablet Take 1 tablet (20 mg total) by mouth once daily. 30 tablet 5    RESTASIS MULTIDOSE 0.05 % Drop       tiotropium (SPIRIVA WITH HANDIHALER) 18 mcg inhalation capsule INHALE THE CONTENTS OF ONE CAPSULE DAILY. 90 capsule 1    traZODone (DESYREL) 100 MG tablet Take 1 tablet (100 mg total) by mouth every evening. 90 tablet 3    venlafaxine (EFFEXOR) 75 MG tablet Take 1 tablet (75 mg total) by mouth 2 (two) times daily. 180 tablet 1    zolpidem (AMBIEN) 10 mg Tab TAKE 1 TABLET BY MOUTH AT BEDTIME AS NEEDED FOR INSOMNIA. 30 tablet 3    fluticasone propion-salmeterol 115-21 mcg/dose (ADVAIR HFA) 115-21 mcg/actuation HFAA inhaler Inhale 2 puffs into the lungs 2 (two) times daily. 12 g 12     No current facility-administered medications for this visit.       There are no discontinued medications.    No follow-ups on file.      Yumi Roberson MD  Scribe Attestation:   I, Thelma Gonzalez, am scribing for, and in the presence of, Dr.Arif Roberson I performed the above scribed service and the documentation accurately describes the services I performed. I attest to the accuracy of the note.    I, Dr. Yumi Roberson, reviewed documentation as scribed above. I performed the services described in this documentation.  I agree that the record reflects my personal performance and is accurate and complete. Yumi Roberson MD.  10/12/2022

## 2022-10-19 ENCOUNTER — OFFICE VISIT (OUTPATIENT)
Dept: ORTHOPEDICS | Facility: CLINIC | Age: 67
End: 2022-10-19
Payer: MEDICARE

## 2022-10-19 ENCOUNTER — HOSPITAL ENCOUNTER (OUTPATIENT)
Dept: RADIOLOGY | Facility: HOSPITAL | Age: 67
Discharge: HOME | End: 2022-10-19
Attending: ORTHOPAEDIC SURGERY
Payer: MEDICARE

## 2022-10-19 VITALS
HEART RATE: 113 BPM | SYSTOLIC BLOOD PRESSURE: 134 MMHG | HEIGHT: 70 IN | BODY MASS INDEX: 25.62 KG/M2 | DIASTOLIC BLOOD PRESSURE: 91 MMHG | WEIGHT: 179 LBS

## 2022-10-19 DIAGNOSIS — M25.572 LEFT ANKLE PAIN, UNSPECIFIED CHRONICITY: Primary | ICD-10-CM

## 2022-10-19 DIAGNOSIS — M25.572 LEFT ANKLE PAIN, UNSPECIFIED CHRONICITY: ICD-10-CM

## 2022-10-19 DIAGNOSIS — S82.62XD CLOSED DISPLACED FRACTURE OF LATERAL MALLEOLUS OF LEFT FIBULA WITH ROUTINE HEALING, SUBSEQUENT ENCOUNTER: Primary | ICD-10-CM

## 2022-10-19 PROCEDURE — 1159F PR MEDICATION LIST DOCUMENTED IN MEDICAL RECORD: ICD-10-PCS | Mod: CPTII,S$GLB,, | Performed by: PHYSICIAN ASSISTANT

## 2022-10-19 PROCEDURE — 99024 POSTOP FOLLOW-UP VISIT: CPT | Mod: S$GLB,,, | Performed by: PHYSICIAN ASSISTANT

## 2022-10-19 PROCEDURE — 73610 XR ANKLE COMPLETE 3 VIEW LEFT: ICD-10-PCS | Mod: 26,LT,, | Performed by: RADIOLOGY

## 2022-10-19 PROCEDURE — 3044F HG A1C LEVEL LT 7.0%: CPT | Mod: CPTII,S$GLB,, | Performed by: PHYSICIAN ASSISTANT

## 2022-10-19 PROCEDURE — 1160F PR REVIEW ALL MEDS BY PRESCRIBER/CLIN PHARMACIST DOCUMENTED: ICD-10-PCS | Mod: CPTII,S$GLB,, | Performed by: PHYSICIAN ASSISTANT

## 2022-10-19 PROCEDURE — 3044F PR MOST RECENT HEMOGLOBIN A1C LEVEL <7.0%: ICD-10-PCS | Mod: CPTII,S$GLB,, | Performed by: PHYSICIAN ASSISTANT

## 2022-10-19 PROCEDURE — 3075F SYST BP GE 130 - 139MM HG: CPT | Mod: CPTII,S$GLB,, | Performed by: PHYSICIAN ASSISTANT

## 2022-10-19 PROCEDURE — 99999 PR PBB SHADOW E&M-EST. PATIENT-LVL IV: ICD-10-PCS | Mod: PBBFAC,,, | Performed by: PHYSICIAN ASSISTANT

## 2022-10-19 PROCEDURE — 1160F RVW MEDS BY RX/DR IN RCRD: CPT | Mod: CPTII,S$GLB,, | Performed by: PHYSICIAN ASSISTANT

## 2022-10-19 PROCEDURE — 1126F AMNT PAIN NOTED NONE PRSNT: CPT | Mod: CPTII,S$GLB,, | Performed by: PHYSICIAN ASSISTANT

## 2022-10-19 PROCEDURE — 73610 X-RAY EXAM OF ANKLE: CPT | Mod: TC,LT

## 2022-10-19 PROCEDURE — 4010F ACE/ARB THERAPY RXD/TAKEN: CPT | Mod: CPTII,S$GLB,, | Performed by: PHYSICIAN ASSISTANT

## 2022-10-19 PROCEDURE — 1100F PTFALLS ASSESS-DOCD GE2>/YR: CPT | Mod: CPTII,S$GLB,, | Performed by: PHYSICIAN ASSISTANT

## 2022-10-19 PROCEDURE — 3288F FALL RISK ASSESSMENT DOCD: CPT | Mod: CPTII,S$GLB,, | Performed by: PHYSICIAN ASSISTANT

## 2022-10-19 PROCEDURE — 99999 PR PBB SHADOW E&M-EST. PATIENT-LVL IV: CPT | Mod: PBBFAC,,, | Performed by: PHYSICIAN ASSISTANT

## 2022-10-19 PROCEDURE — 1100F PR PT FALLS ASSESS DOC 2+ FALLS/FALL W/INJURY/YR: ICD-10-PCS | Mod: CPTII,S$GLB,, | Performed by: PHYSICIAN ASSISTANT

## 2022-10-19 PROCEDURE — 3075F PR MOST RECENT SYSTOLIC BLOOD PRESS GE 130-139MM HG: ICD-10-PCS | Mod: CPTII,S$GLB,, | Performed by: PHYSICIAN ASSISTANT

## 2022-10-19 PROCEDURE — 1159F MED LIST DOCD IN RCRD: CPT | Mod: CPTII,S$GLB,, | Performed by: PHYSICIAN ASSISTANT

## 2022-10-19 PROCEDURE — 3080F DIAST BP >= 90 MM HG: CPT | Mod: CPTII,S$GLB,, | Performed by: PHYSICIAN ASSISTANT

## 2022-10-19 PROCEDURE — 1126F PR PAIN SEVERITY QUANTIFIED, NO PAIN PRESENT: ICD-10-PCS | Mod: CPTII,S$GLB,, | Performed by: PHYSICIAN ASSISTANT

## 2022-10-19 PROCEDURE — 73610 X-RAY EXAM OF ANKLE: CPT | Mod: 26,LT,, | Performed by: RADIOLOGY

## 2022-10-19 PROCEDURE — 3080F PR MOST RECENT DIASTOLIC BLOOD PRESSURE >= 90 MM HG: ICD-10-PCS | Mod: CPTII,S$GLB,, | Performed by: PHYSICIAN ASSISTANT

## 2022-10-19 PROCEDURE — 99024 PR POST-OP FOLLOW-UP VISIT: ICD-10-PCS | Mod: S$GLB,,, | Performed by: PHYSICIAN ASSISTANT

## 2022-10-19 PROCEDURE — 3288F PR FALLS RISK ASSESSMENT DOCUMENTED: ICD-10-PCS | Mod: CPTII,S$GLB,, | Performed by: PHYSICIAN ASSISTANT

## 2022-10-19 PROCEDURE — 4010F PR ACE/ARB THEARPY RXD/TAKEN: ICD-10-PCS | Mod: CPTII,S$GLB,, | Performed by: PHYSICIAN ASSISTANT

## 2022-10-19 NOTE — PROGRESS NOTES
Subjective:      Patient ID: Mil Sood is a 67 y.o. male.    Chief Complaint: Injury of the Left Ankle      HPI: Mil Sood is a 67-year-old male in clinic today for postoperative follow-up.  Patient is 10 weeks status post ORIF left lateral malleolus fracture with plate and screws.  Patient is doing very well this time.  He has weaned himself out of the boot completely and has been fully weight-bearing without any pain.  The patient reports that every now and then he will get pain over the incision the feels like an electrical shock, but this is rare and does not last long.  Patient also complains of some pain at the dorsum of the foot that he associates with wearing his boots    Past Medical History:   Diagnosis Date    Anxiety     Cancer     spot on kidney and lung    Diabetes mellitus     Borderline diet controlled    Diverticulitis     GERD (gastroesophageal reflux disease)     Hyperlipidemia     not on any meds currently    Hypertension     Lung mass     Mass of kidney        Current Outpatient Medications:     amLODIPine (NORVASC) 5 MG tablet, Take 1 tablet (5 mg total) by mouth once daily., Disp: 90 tablet, Rfl: 3    benazepriL (LOTENSIN) 20 MG tablet, TAKE 2 TABLETS BY MOUTH NIGHTLY., Disp: 180 tablet, Rfl: 1    blood sugar diagnostic Strp, Use to check blood sugars BID, Disp: 180 strip, Rfl: 3    blood sugar diagnostic Strp, 1 each by Misc.(Non-Drug; Combo Route) route 3 (three) times daily., Disp: 100 each, Rfl: 12    blood-glucose meter kit, Use as instructed, Disp: 1 each, Rfl: 1    citalopram (CELEXA) 40 MG tablet, Take 1 tablet (40 mg total) by mouth every evening., Disp: 90 tablet, Rfl: 3    clonazePAM (KLONOPIN) 1 MG tablet, Take 1 tablet (1 mg total) by mouth nightly as needed for Anxiety., Disp: 30 tablet, Rfl: 1    cyclobenzaprine (FLEXERIL) 10 MG tablet, TAKE 1 TABLET BY MOUTH THREE TIMES DAILY AS NEEDED., Disp: 90 tablet, Rfl: 0    finasteride (PROSCAR) 5 mg tablet, Take 1 tablet (5 mg  total) by mouth nightly., Disp: 90 tablet, Rfl: 1    gabapentin (NEURONTIN) 600 MG tablet, Take 1 tablet (600 mg total) by mouth nightly., Disp: 90 tablet, Rfl: 1    hydrALAZINE (APRESOLINE) 25 MG tablet, TAKE ONE TABLET BY MOUTH AT BEDTIME., Disp: 90 tablet, Rfl: 0    hydroCHLOROthiazide (MICROZIDE) 12.5 mg capsule, Take 1 capsule (12.5 mg total) by mouth once daily., Disp: 30 capsule, Rfl: 1    hydrOXYzine pamoate (VISTARIL) 25 MG Cap, Take 1 capsule (25 mg total) by mouth every evening., Disp: 30 capsule, Rfl: 1    isosorbide mononitrate (IMDUR) 60 MG 24 hr tablet, TAKE 1 TABLET BY MOUTH ONCE DAILY., Disp: 30 tablet, Rfl: 9    lancets (ACCU-CHEK SOFTCLIX LANCETS) Misc, 1 each by Misc.(Non-Drug; Combo Route) route 3 (three) times daily., Disp: 100 each, Rfl: 12    meloxicam (MOBIC) 15 MG tablet, TAKE 1 TABLET BY MOUTH ONCE DAILY FOR 5 DAYS.THEN ONCE DAILY AS NEEDED FOR BACK PAIN., Disp: 30 tablet, Rfl: 0    metoprolol succinate (TOPROL-XL) 25 MG 24 hr tablet, Take 1 tablet (25 mg total) by mouth once daily., Disp: 30 tablet, Rfl: 5    omeprazole (PRILOSEC) 40 MG capsule, Take 1 capsule (40 mg total) by mouth every evening., Disp: 90 capsule, Rfl: 3    pravastatin (PRAVACHOL) 20 MG tablet, Take 1 tablet (20 mg total) by mouth once daily., Disp: 30 tablet, Rfl: 5    RESTASIS MULTIDOSE 0.05 % Drop, , Disp: , Rfl:     tiotropium (SPIRIVA WITH HANDIHALER) 18 mcg inhalation capsule, INHALE THE CONTENTS OF ONE CAPSULE DAILY., Disp: 90 capsule, Rfl: 1    traZODone (DESYREL) 100 MG tablet, Take 1 tablet (100 mg total) by mouth every evening., Disp: 90 tablet, Rfl: 3    venlafaxine (EFFEXOR) 75 MG tablet, Take 1 tablet (75 mg total) by mouth 2 (two) times daily., Disp: 180 tablet, Rfl: 1    zolpidem (AMBIEN) 10 mg Tab, TAKE 1 TABLET BY MOUTH AT BEDTIME AS NEEDED FOR INSOMNIA., Disp: 30 tablet, Rfl: 3    fluticasone propion-salmeterol 115-21 mcg/dose (ADVAIR HFA) 115-21 mcg/actuation HFAA inhaler, Inhale 2 puffs into the  "lungs 2 (two) times daily., Disp: 12 g, Rfl: 12  Review of patient's allergies indicates:   Allergen Reactions    Rosuvastatin Other (See Comments)     Joint pain   Joint and bone pain       BP (!) 134/91   Pulse (!) 113   Ht 5' 10" (1.778 m)   Wt 81.2 kg (179 lb)   BMI 25.68 kg/m²     ROS      Objective:    Ortho Exam   Left ankle:  Incision is well healed, no signs of infection   No edema   No TTP  Ankle ROM full  Calf and compartments are soft and compressible   Motor exam normal   Sensation and pulses intact    GEN: Well developed, well nourished male. AAOX3. No acute distress.   Head: Normocephalic, atraumatic.   Eyes: ALONDRA  Neck: Trachea is midline, no adenopathy  Resp: Breathing unlabored.  Neuro: Motor function normal, Cranial nerves intact  Psych: Mood and affect appropriate.       Assessment:     Imaging:  X-ray left ankle obtained today shows hardware in satisfactory alignment no signs of failure.  No detrimental changes        1. Closed displaced fracture of lateral malleolus of left fibula with routine healing, subsequent encounter          Plan:       Reviewed the radiographs with the patient.  Encouraged him on the progress he has made.  Patient should continue to increase activities as tolerated and has no restrictions at this time.  Patient will return to clinic in 6 weeks for repeat radiographs and further evaluation       Follow up in about 6 weeks (around 11/30/2022).          Patient note was created using MModal Dictation.  Any errors in syntax or even information may not have been identified and edited on initial review prior to signing this note.      "

## 2022-10-21 ENCOUNTER — TELEPHONE (OUTPATIENT)
Dept: ORTHOPEDICS | Facility: CLINIC | Age: 67
End: 2022-10-21
Payer: MEDICARE

## 2022-10-21 NOTE — TELEPHONE ENCOUNTER
Spoke with pt to schedule appt for ortho referral. Pt is established with Dr. Negrete for L thumb pain. XR 10/21/21. Pt agreed to 11/2 appt at 7:15. Pt verbalized understanding of appt date, time, and location.

## 2022-10-25 DIAGNOSIS — M79.642 LEFT HAND PAIN: Primary | ICD-10-CM

## 2022-10-26 ENCOUNTER — OFFICE VISIT (OUTPATIENT)
Dept: CARDIOLOGY | Facility: CLINIC | Age: 67
End: 2022-10-26
Payer: MEDICARE

## 2022-10-26 ENCOUNTER — TELEPHONE (OUTPATIENT)
Dept: CARDIOLOGY | Facility: CLINIC | Age: 67
End: 2022-10-26
Payer: MEDICARE

## 2022-10-26 VITALS
DIASTOLIC BLOOD PRESSURE: 80 MMHG | SYSTOLIC BLOOD PRESSURE: 120 MMHG | HEIGHT: 70 IN | HEART RATE: 110 BPM | OXYGEN SATURATION: 98 % | BODY MASS INDEX: 25.99 KG/M2 | WEIGHT: 181.56 LBS

## 2022-10-26 DIAGNOSIS — Z95.1 HX OF CABG: ICD-10-CM

## 2022-10-26 DIAGNOSIS — Z78.9 STATIN INTOLERANCE: ICD-10-CM

## 2022-10-26 DIAGNOSIS — I10 ESSENTIAL HYPERTENSION: Chronic | ICD-10-CM

## 2022-10-26 DIAGNOSIS — I95.1 ORTHOSTATIC HYPOTENSION: ICD-10-CM

## 2022-10-26 DIAGNOSIS — I25.118 CORONARY ARTERY DISEASE OF NATIVE ARTERY OF NATIVE HEART WITH STABLE ANGINA PECTORIS: Primary | ICD-10-CM

## 2022-10-26 DIAGNOSIS — E78.2 MIXED HYPERLIPIDEMIA: ICD-10-CM

## 2022-10-26 PROCEDURE — 1159F PR MEDICATION LIST DOCUMENTED IN MEDICAL RECORD: ICD-10-PCS | Mod: CPTII,S$GLB,, | Performed by: INTERNAL MEDICINE

## 2022-10-26 PROCEDURE — 99214 PR OFFICE/OUTPT VISIT, EST, LEVL IV, 30-39 MIN: ICD-10-PCS | Mod: S$GLB,,, | Performed by: INTERNAL MEDICINE

## 2022-10-26 PROCEDURE — 4010F PR ACE/ARB THEARPY RXD/TAKEN: ICD-10-PCS | Mod: CPTII,S$GLB,, | Performed by: INTERNAL MEDICINE

## 2022-10-26 PROCEDURE — 1160F RVW MEDS BY RX/DR IN RCRD: CPT | Mod: CPTII,S$GLB,, | Performed by: INTERNAL MEDICINE

## 2022-10-26 PROCEDURE — 3074F PR MOST RECENT SYSTOLIC BLOOD PRESSURE < 130 MM HG: ICD-10-PCS | Mod: CPTII,S$GLB,, | Performed by: INTERNAL MEDICINE

## 2022-10-26 PROCEDURE — 99214 OFFICE O/P EST MOD 30 MIN: CPT | Mod: S$GLB,,, | Performed by: INTERNAL MEDICINE

## 2022-10-26 PROCEDURE — 4010F ACE/ARB THERAPY RXD/TAKEN: CPT | Mod: CPTII,S$GLB,, | Performed by: INTERNAL MEDICINE

## 2022-10-26 PROCEDURE — 99999 PR PBB SHADOW E&M-EST. PATIENT-LVL IV: CPT | Mod: PBBFAC,,, | Performed by: INTERNAL MEDICINE

## 2022-10-26 PROCEDURE — 3044F PR MOST RECENT HEMOGLOBIN A1C LEVEL <7.0%: ICD-10-PCS | Mod: CPTII,S$GLB,, | Performed by: INTERNAL MEDICINE

## 2022-10-26 PROCEDURE — 3044F HG A1C LEVEL LT 7.0%: CPT | Mod: CPTII,S$GLB,, | Performed by: INTERNAL MEDICINE

## 2022-10-26 PROCEDURE — 99999 PR PBB SHADOW E&M-EST. PATIENT-LVL IV: ICD-10-PCS | Mod: PBBFAC,,, | Performed by: INTERNAL MEDICINE

## 2022-10-26 PROCEDURE — 3079F PR MOST RECENT DIASTOLIC BLOOD PRESSURE 80-89 MM HG: ICD-10-PCS | Mod: CPTII,S$GLB,, | Performed by: INTERNAL MEDICINE

## 2022-10-26 PROCEDURE — 3079F DIAST BP 80-89 MM HG: CPT | Mod: CPTII,S$GLB,, | Performed by: INTERNAL MEDICINE

## 2022-10-26 PROCEDURE — 3074F SYST BP LT 130 MM HG: CPT | Mod: CPTII,S$GLB,, | Performed by: INTERNAL MEDICINE

## 2022-10-26 PROCEDURE — 1159F MED LIST DOCD IN RCRD: CPT | Mod: CPTII,S$GLB,, | Performed by: INTERNAL MEDICINE

## 2022-10-26 PROCEDURE — 1160F PR REVIEW ALL MEDS BY PRESCRIBER/CLIN PHARMACIST DOCUMENTED: ICD-10-PCS | Mod: CPTII,S$GLB,, | Performed by: INTERNAL MEDICINE

## 2022-10-26 RX ORDER — ASPIRIN 81 MG/1
81 TABLET ORAL DAILY
Start: 2022-10-26 | End: 2023-12-20

## 2022-10-26 NOTE — TELEPHONE ENCOUNTER
Pt contacted [facility] for pt         ----- Message from Yasmani Chairez MD sent at 10/26/2022 10:37 AM CDT -----  Advise to take ASA 81 mg

## 2022-10-26 NOTE — PROGRESS NOTES
Subjective:   Patient ID:  Mil Sood is a 67 y.o. male who presents for follow up of Shortness of Breath      67 yo male, came in for s/p CABG f/u  PMH CAD s/p CABG x4 in , HTN HLD. PreDM. Anxiety, no smoking/drinking. Statin intolerance for bone pain.  C/o chest pain for 4 months, central chest, twice a week, stabbing pain, radiating to the left arm. Occurred at rest. Lasted for 10 minutes and resolved spontaneously  Walking caused SOB but no pain.   Phar. MPI ordered by PCP for chest pain and showed apical and inferoseptal ischemia.  ekg NSR and no acute stt change  Father had PPM and mother ok. Sister  of lung cancer     Pt had LHC done on 2022 bu Dr. Godfrey, revealing:  lmca 50% eccentric calcified ivus csa 5.4  LAd HEAVILY CALCIFIED MID LAD LONG LESION 50-70% IN MAGNITUDE CSA 2 MM2.  LCX MID LONG 40% HEAVILY CALCIFIED.   OM1 90% PROX.  D1 CALCIFIED NON OBS DISEASE.   RCA CALCIFIED DIFFUSE DISEASE PDA OSTIUM 90% PLB 90% .  EF NORMAL.      visit states that still has exertional chest pain and Left arm pain. Has SOB,   Prefers to go to Dr. Mcmanus for CABG at [facility]. His wife worked before at Dr. Mcmanus's office     visit  S/p 4v CABG (LIMA to LAD, SVG to diagonal, SVG to OM and SVG to rPDA) on 3/11/2022 by Dr. Mcmanus. Continue aspirin, Plavix, and beta-blocker.  2022 admitted for Syncope ad OH. SBP dropped to 55 mmHG. Cr 1.3 and IVF Rx  EKG NSR  Echo 1. Normal left ventricular systolic function. LVEF 55 - 65%. Mild (Grade I) diastolic   dysfunction (impaired relaxation). Mild asymmetric septal hypertrophy. No LV outflow   tract obstruction.   2. Normal right ventricular size and systolic function.   3. Mild mitral valve regurgitation.   4. Mild aortic valve regurgitation.   5. Mild tricuspid valve regurgitation.   After d/c no recurrent syncope, no chest pain dizziness SOB leg swelling  Living with his son.      visit  No dizziness fatigue chest pain dizziness and  SOB  Declined cardiac rehab, and resumed motorcycle 3 weeks ago.  Copay of Repatha was high for pt.     Interval history  Two weeks ago, episode of black put at [place]. Waking up and went to the kitchen. Felt dizziness and then passed out. Waking up and BP 60/40 mmHg.  Otherwise, no chest pain dyspnea and leg swelling          Past Medical History:   Diagnosis Date    Anxiety     Cancer     spot on kidney and lung    Diabetes mellitus     Borderline diet controlled    Diverticulitis     GERD (gastroesophageal reflux disease)     Hyperlipidemia     not on any meds currently    Hypertension     Lung mass     Mass of kidney        Past Surgical History:   Procedure Laterality Date    CHOLECYSTECTOMY      COLONOSCOPY      COLONOSCOPY N/A 4/7/2017    Procedure: COLONOSCOPY;  Surgeon: Amarilys Bravo MD;  Location: [facility] ENDO;  Service: Endoscopy;  Laterality: N/A;    DIAGNOSTIC LAPAROSCOPY N/A 5/4/2021    Procedure: LAPAROSCOPY, DIAGNOSTIC;  Surgeon: Shaji Jauregui MD;  Location: [facility];  Service: General;  Laterality: N/A;    ESOPHAGOGASTRODUODENOSCOPY      ESOPHAGOGASTRODUODENOSCOPY N/A 2/13/2020    Procedure: ESOPHAGOGASTRODUODENOSCOPY (EGD);  Surgeon: Shaji Aquino III, MD;  Location: [facility];  Service: Endoscopy;  Laterality: N/A;    HERNIA REPAIR Bilateral     groin    LEFT HEART CATHETERIZATION Left 1/31/2022    Procedure: CATHETERIZATION, HEART, LEFT;  Surgeon: Romeo Godfrey MD;  Location: [facility] CATH LAB;  Service: Cardiology;  Laterality: Left;    NECK SURGERY      OPEN REDUCTION AND INTERNAL FIXATION (ORIF) OF INJURY OF ANKLE Left 8/10/2022    Procedure: ORIF, ANKLE;  Surgeon: Howard Mata MD;  Location: [facility] OR;  Service: Orthopedics;  Laterality: Left;    PARTIAL NEPHRECTOMY      left    RENAL BIOPSY      ROBOT-ASSISTED REPAIR OF UMBILICAL HERNIA USING DA NEL XI N/A 5/4/2021    Procedure: XI ROBOTIC REPAIR, HERNIA, UMBILICAL;  Surgeon: Shaji Jauregui MD;  Location: [facility];  Service:  General;  Laterality: N/A;  converted to open    TONSILLECTOMY      TRIGGER FINGER RELEASE Right     long finger       Social History     Tobacco Use    Smoking status: Former     Packs/day: 1.00     Years: 20.00     Pack years: 20.00     Types: Cigarettes     Quit date: 1995     Years since quittin.7    Smokeless tobacco: Never   Substance Use Topics    Alcohol use: Never     Alcohol/week: 0.0 standard drinks    Drug use: No       Family History   Problem Relation Age of Onset    Hypertension Mother     Hypertension Father     Melanoma Father     Cancer Sister          Review of Systems   Constitutional: Negative for decreased appetite, diaphoresis, fever, malaise/fatigue and night sweats.   HENT:  Negative for nosebleeds.    Eyes:  Negative for blurred vision and double vision.   Cardiovascular:  Positive for dyspnea on exertion and syncope. Negative for claudication, irregular heartbeat, leg swelling, near-syncope, orthopnea, palpitations and paroxysmal nocturnal dyspnea.   Respiratory:  Negative for cough, shortness of breath, sleep disturbances due to breathing, snoring, sputum production and wheezing.    Endocrine: Negative for cold intolerance and polyuria.   Hematologic/Lymphatic: Does not bruise/bleed easily.   Skin:  Negative for rash.   Musculoskeletal:  Negative for back pain, falls, joint pain, joint swelling and neck pain.   Gastrointestinal:  Negative for abdominal pain, heartburn, nausea and vomiting.   Genitourinary:  Negative for dysuria, frequency and hematuria.   Neurological:  Negative for difficulty with concentration, dizziness, focal weakness, headaches, light-headedness, numbness, seizures and weakness.   Psychiatric/Behavioral:  Negative for depression, memory loss and substance abuse. The patient does not have insomnia.    Allergic/Immunologic: Negative for HIV exposure and hives.     Objective:   Physical Exam  HENT:      Head: Normocephalic.   Eyes:      Pupils: Pupils are  equal, round, and reactive to light.   Neck:      Thyroid: No thyromegaly.      Vascular: Normal carotid pulses. No carotid bruit or JVD.   Cardiovascular:      Rate and Rhythm: Normal rate and regular rhythm. No extrasystoles are present.     Chest Wall: PMI is not displaced.      Pulses: Normal pulses.           Carotid pulses are 2+ on the right side and 2+ on the left side.     Heart sounds: Normal heart sounds. No murmur heard.    No gallop. No S3 sounds.      Comments: S/p sternotomy    Pulmonary:      Effort: No respiratory distress.      Breath sounds: Normal breath sounds. No stridor.   Abdominal:      General: Bowel sounds are normal.      Palpations: Abdomen is soft.      Tenderness: There is no abdominal tenderness. There is no rebound.   Musculoskeletal:         General: Normal range of motion.   Skin:     Findings: No rash.   Neurological:      Mental Status: He is alert and oriented to person, place, and time.   Psychiatric:         Behavior: Behavior normal.       Lab Results   Component Value Date    CHOL 194 08/18/2022    CHOL 212 (H) 02/11/2022    CHOL 235 (H) 08/17/2021     Lab Results   Component Value Date    HDL 38 (L) 08/18/2022    HDL 49 02/11/2022    HDL 45 08/17/2021     Lab Results   Component Value Date    LDLCALC 118.4 08/18/2022    LDLCALC 127.4 02/11/2022    LDLCALC 145.8 08/17/2021     Lab Results   Component Value Date    TRIG 188 (H) 08/18/2022    TRIG 178 (H) 02/11/2022    TRIG 221 (H) 08/17/2021     Lab Results   Component Value Date    CHOLHDL 19.6 (L) 08/18/2022    CHOLHDL 23.1 02/11/2022    CHOLHDL 19.1 (L) 08/17/2021       Chemistry        Component Value Date/Time     08/18/2022 0838    K 5.3 (H) 08/18/2022 0838     08/18/2022 0838    CO2 24 08/18/2022 0838    BUN 25 (H) 08/18/2022 0838    CREATININE 1.3 08/18/2022 0838     (H) 08/18/2022 0838        Component Value Date/Time    CALCIUM 9.7 08/18/2022 0838    ALKPHOS 68 08/18/2022 0838    AST 16 08/18/2022  0838    ALT 16 08/18/2022 0838    BILITOT 0.4 08/18/2022 0838    ESTGFRAFRICA >60.0 02/11/2022 0858    EGFRNONAA >60.0 02/11/2022 0858          Lab Results   Component Value Date    HGBA1C 5.5 08/18/2022     Lab Results   Component Value Date    TSH 1.415 11/01/2018     Lab Results   Component Value Date    INR 1.0 01/26/2022    INR 1.0 02/03/2020    INR 1.0 09/04/2019     Lab Results   Component Value Date    WBC 7.29 08/18/2022    HGB 14.3 08/18/2022    HCT 44.9 08/18/2022    MCV 87 08/18/2022     (H) 08/18/2022     BMP  Sodium   Date Value Ref Range Status   08/18/2022 142 136 - 145 mmol/L Final     Potassium   Date Value Ref Range Status   08/18/2022 5.3 (H) 3.5 - 5.1 mmol/L Final     Comment:     *No Visible Hemolysis     Chloride   Date Value Ref Range Status   08/18/2022 102 95 - 110 mmol/L Final     CO2   Date Value Ref Range Status   08/18/2022 24 23 - 29 mmol/L Final     BUN   Date Value Ref Range Status   08/18/2022 25 (H) 8 - 23 mg/dL Final     Creatinine   Date Value Ref Range Status   08/18/2022 1.3 0.5 - 1.4 mg/dL Final     Calcium   Date Value Ref Range Status   08/18/2022 9.7 8.7 - 10.5 mg/dL Final     Anion Gap   Date Value Ref Range Status   08/18/2022 16 8 - 16 mmol/L Final     eGFR if    Date Value Ref Range Status   02/11/2022 >60.0 >60 mL/min/1.73 m^2 Final     eGFR if non    Date Value Ref Range Status   02/11/2022 >60.0 >60 mL/min/1.73 m^2 Final     Comment:     Calculation used to obtain the estimated glomerular filtration  rate (eGFR) is the CKD-EPI equation.        BNP  @LABRCNTIP(BNP,BNPTRIAGEBLO)@  @LABRCNTIP(troponini)@  CrCl cannot be calculated (Patient's most recent lab result is older than the maximum 7 days allowed.).  No results found in the last 24 hours.  No results found in the last 24 hours.  No results found in the last 24 hours.    Assessment:      1. Coronary artery disease of native artery of native heart with stable angina pectoris     2. Essential hypertension    3. Mixed hyperlipidemia    4. Hx of CABG x4 in     5. Orthostatic hypotension    6. Statin intolerance      Few episodes of syncope due to OH    Plan:   D/c hydralazine and HCTZ   Check BP at home  Fall precaution  Continue Lopressor Pravastatin 20 mg daily amlodipine benazepril imdur  Continue ASA 81mg daily    Counseled DASH  Check Lipid profile in 6 months  Recommend heart-healthy diet, weight control and regular exercise.  Shruti. Risk modification.   I have reviewed all pertinent labs and cardiac studies independently. Plans and recommendations have been formulated under my direct supervision. All questions answered and patient voiced understanding.   If symptoms persist go to the ED  RTC in 3 months

## 2022-11-02 ENCOUNTER — TELEPHONE (OUTPATIENT)
Dept: ORTHOPEDICS | Facility: CLINIC | Age: 67
End: 2022-11-02

## 2022-11-02 ENCOUNTER — OFFICE VISIT (OUTPATIENT)
Dept: ORTHOPEDICS | Facility: CLINIC | Age: 67
End: 2022-11-02
Payer: MEDICARE

## 2022-11-02 ENCOUNTER — HOSPITAL ENCOUNTER (OUTPATIENT)
Dept: RADIOLOGY | Facility: HOSPITAL | Age: 67
Discharge: HOME | End: 2022-11-02
Attending: ORTHOPAEDIC SURGERY
Payer: MEDICARE

## 2022-11-02 VITALS — HEIGHT: 70 IN | WEIGHT: 181 LBS | BODY MASS INDEX: 25.91 KG/M2

## 2022-11-02 DIAGNOSIS — M79.642 LEFT HAND PAIN: ICD-10-CM

## 2022-11-02 DIAGNOSIS — M18.12 ARTHRITIS OF CARPOMETACARPAL (CMC) JOINT OF LEFT THUMB: Primary | ICD-10-CM

## 2022-11-02 DIAGNOSIS — M18.12 ARTHRITIS OF CARPOMETACARPAL (CMC) JOINT OF LEFT THUMB: ICD-10-CM

## 2022-11-02 DIAGNOSIS — Z01.818 PREOP TESTING: Primary | ICD-10-CM

## 2022-11-02 PROCEDURE — 4010F ACE/ARB THERAPY RXD/TAKEN: CPT | Mod: CPTII,S$GLB,, | Performed by: ORTHOPAEDIC SURGERY

## 2022-11-02 PROCEDURE — 99213 OFFICE O/P EST LOW 20 MIN: CPT | Mod: S$GLB,,, | Performed by: ORTHOPAEDIC SURGERY

## 2022-11-02 PROCEDURE — 3008F BODY MASS INDEX DOCD: CPT | Mod: CPTII,S$GLB,, | Performed by: ORTHOPAEDIC SURGERY

## 2022-11-02 PROCEDURE — 3008F PR BODY MASS INDEX (BMI) DOCUMENTED: ICD-10-PCS | Mod: CPTII,S$GLB,, | Performed by: ORTHOPAEDIC SURGERY

## 2022-11-02 PROCEDURE — 99999 PR PBB SHADOW E&M-EST. PATIENT-LVL IV: CPT | Mod: PBBFAC,,, | Performed by: ORTHOPAEDIC SURGERY

## 2022-11-02 PROCEDURE — 73130 X-RAY EXAM OF HAND: CPT | Mod: TC,LT

## 2022-11-02 PROCEDURE — 1125F AMNT PAIN NOTED PAIN PRSNT: CPT | Mod: CPTII,S$GLB,, | Performed by: ORTHOPAEDIC SURGERY

## 2022-11-02 PROCEDURE — 99999 PR PBB SHADOW E&M-EST. PATIENT-LVL IV: ICD-10-PCS | Mod: PBBFAC,,, | Performed by: ORTHOPAEDIC SURGERY

## 2022-11-02 PROCEDURE — 3044F HG A1C LEVEL LT 7.0%: CPT | Mod: CPTII,S$GLB,, | Performed by: ORTHOPAEDIC SURGERY

## 2022-11-02 PROCEDURE — 1125F PR PAIN SEVERITY QUANTIFIED, PAIN PRESENT: ICD-10-PCS | Mod: CPTII,S$GLB,, | Performed by: ORTHOPAEDIC SURGERY

## 2022-11-02 PROCEDURE — 3044F PR MOST RECENT HEMOGLOBIN A1C LEVEL <7.0%: ICD-10-PCS | Mod: CPTII,S$GLB,, | Performed by: ORTHOPAEDIC SURGERY

## 2022-11-02 PROCEDURE — 1100F PR PT FALLS ASSESS DOC 2+ FALLS/FALL W/INJURY/YR: ICD-10-PCS | Mod: CPTII,S$GLB,, | Performed by: ORTHOPAEDIC SURGERY

## 2022-11-02 PROCEDURE — 73130 XR HAND COMPLETE 3 VIEW LEFT: ICD-10-PCS | Mod: 26,LT,, | Performed by: RADIOLOGY

## 2022-11-02 PROCEDURE — 3288F FALL RISK ASSESSMENT DOCD: CPT | Mod: CPTII,S$GLB,, | Performed by: ORTHOPAEDIC SURGERY

## 2022-11-02 PROCEDURE — 4010F PR ACE/ARB THEARPY RXD/TAKEN: ICD-10-PCS | Mod: CPTII,S$GLB,, | Performed by: ORTHOPAEDIC SURGERY

## 2022-11-02 PROCEDURE — 1159F MED LIST DOCD IN RCRD: CPT | Mod: CPTII,S$GLB,, | Performed by: ORTHOPAEDIC SURGERY

## 2022-11-02 PROCEDURE — 1160F PR REVIEW ALL MEDS BY PRESCRIBER/CLIN PHARMACIST DOCUMENTED: ICD-10-PCS | Mod: CPTII,S$GLB,, | Performed by: ORTHOPAEDIC SURGERY

## 2022-11-02 PROCEDURE — 99213 PR OFFICE/OUTPT VISIT, EST, LEVL III, 20-29 MIN: ICD-10-PCS | Mod: S$GLB,,, | Performed by: ORTHOPAEDIC SURGERY

## 2022-11-02 PROCEDURE — 3288F PR FALLS RISK ASSESSMENT DOCUMENTED: ICD-10-PCS | Mod: CPTII,S$GLB,, | Performed by: ORTHOPAEDIC SURGERY

## 2022-11-02 PROCEDURE — 1160F RVW MEDS BY RX/DR IN RCRD: CPT | Mod: CPTII,S$GLB,, | Performed by: ORTHOPAEDIC SURGERY

## 2022-11-02 PROCEDURE — 1159F PR MEDICATION LIST DOCUMENTED IN MEDICAL RECORD: ICD-10-PCS | Mod: CPTII,S$GLB,, | Performed by: ORTHOPAEDIC SURGERY

## 2022-11-02 PROCEDURE — 73130 X-RAY EXAM OF HAND: CPT | Mod: 26,LT,, | Performed by: RADIOLOGY

## 2022-11-02 PROCEDURE — 1100F PTFALLS ASSESS-DOCD GE2>/YR: CPT | Mod: CPTII,S$GLB,, | Performed by: ORTHOPAEDIC SURGERY

## 2022-11-02 NOTE — TELEPHONE ENCOUNTER
----- Message from Yasmani Chairez MD sent at 11/2/2022  8:47 AM CDT -----  Regarding: RE: surgical cleareance  Ok with me  ----- Message -----  From: Davida Calix LPN  Sent: 11/2/2022   7:53 AM CDT  To: Yasmani Chairez MD, [facility] Staff  Subject: surgical cleareance                              Good morning,   Patient was recently seen in your office - Dr. Negrete will be doing a left thumb basal joint arthroplasty on 11/18/22 - please advise if patient is cleared for surgery     Thank you  Davida

## 2022-11-02 NOTE — PROGRESS NOTES
Subjective:     Patient ID: Mil Sood is a 67 y.o. male.    Chief Complaint: Pain of the Left Hand      HPI:  The patient is a 67-year-old male with left thumb basal joint arthritis.  He was last injected 01/18/2022 without improvement.  He has tried splinting without improvement.  He wishes to have a left thumb basal joint arthroplasty.    Past Medical History:   Diagnosis Date    Anxiety     Cancer     spot on kidney and lung    Diabetes mellitus     Borderline diet controlled    Diverticulitis     GERD (gastroesophageal reflux disease)     Hyperlipidemia     not on any meds currently    Hypertension     Lung mass     Mass of kidney      Past Surgical History:   Procedure Laterality Date    CHOLECYSTECTOMY      COLONOSCOPY      COLONOSCOPY N/A 4/7/2017    Procedure: COLONOSCOPY;  Surgeon: Amarilys Bravo MD;  Location: [facility] ENDO;  Service: Endoscopy;  Laterality: N/A;    DIAGNOSTIC LAPAROSCOPY N/A 5/4/2021    Procedure: LAPAROSCOPY, DIAGNOSTIC;  Surgeon: Shaji Jauregui MD;  Location: [facility] OR;  Service: General;  Laterality: N/A;    ESOPHAGOGASTRODUODENOSCOPY      ESOPHAGOGASTRODUODENOSCOPY N/A 2/13/2020    Procedure: ESOPHAGOGASTRODUODENOSCOPY (EGD);  Surgeon: Shaji Aquino III, MD;  Location: [facility] ENDO;  Service: Endoscopy;  Laterality: N/A;    HERNIA REPAIR Bilateral     groin    LEFT HEART CATHETERIZATION Left 1/31/2022    Procedure: CATHETERIZATION, HEART, LEFT;  Surgeon: Romeo Godfrey MD;  Location: [facility] CATH LAB;  Service: Cardiology;  Laterality: Left;    NECK SURGERY      OPEN REDUCTION AND INTERNAL FIXATION (ORIF) OF INJURY OF ANKLE Left 8/10/2022    Procedure: ORIF, ANKLE;  Surgeon: Howard Mata MD;  Location: [facility] OR;  Service: Orthopedics;  Laterality: Left;    PARTIAL NEPHRECTOMY      left    RENAL BIOPSY      ROBOT-ASSISTED REPAIR OF UMBILICAL HERNIA USING DA NEL XI N/A 5/4/2021    Procedure: XI ROBOTIC REPAIR, HERNIA, UMBILICAL;  Surgeon: Shaji Jauregui MD;  Location: [facility]  OR;  Service: General;  Laterality: N/A;  converted to open    TONSILLECTOMY      TRIGGER FINGER RELEASE Right     long finger     Family History   Problem Relation Age of Onset    Hypertension Mother     Hypertension Father     Melanoma Father     Cancer Sister      Social History     Socioeconomic History    Marital status:    Tobacco Use    Smoking status: Former     Packs/day: 1.00     Years: 20.00     Pack years: 20.00     Types: Cigarettes     Quit date: 1995     Years since quittin.7    Smokeless tobacco: Never   Substance and Sexual Activity    Alcohol use: Never     Alcohol/week: 0.0 standard drinks    Drug use: No    Sexual activity: Yes     Partners: Female     Birth control/protection: None     Medication List with Changes/Refills   Current Medications    AMLODIPINE (NORVASC) 5 MG TABLET    Take 1 tablet (5 mg total) by mouth once daily.    ASPIRIN (ECOTRIN) 81 MG EC TABLET    Take 1 tablet (81 mg total) by mouth once daily.    BENAZEPRIL (LOTENSIN) 20 MG TABLET    TAKE 2 TABLETS BY MOUTH NIGHTLY.    BLOOD SUGAR DIAGNOSTIC STRP    Use to check blood sugars BID    BLOOD SUGAR DIAGNOSTIC STRP    1 each by Misc.(Non-Drug; Combo Route) route 3 (three) times daily.    BLOOD-GLUCOSE METER KIT    Use as instructed    CITALOPRAM (CELEXA) 40 MG TABLET    Take 1 tablet (40 mg total) by mouth every evening.    CLONAZEPAM (KLONOPIN) 1 MG TABLET    Take 1 tablet (1 mg total) by mouth nightly as needed for Anxiety.    CYCLOBENZAPRINE (FLEXERIL) 10 MG TABLET    TAKE 1 TABLET BY MOUTH THREE TIMES DAILY AS NEEDED.    FINASTERIDE (PROSCAR) 5 MG TABLET    Take 1 tablet (5 mg total) by mouth nightly.    FLUTICASONE PROPION-SALMETEROL 115-21 MCG/DOSE (ADVAIR HFA) 115-21 MCG/ACTUATION HFAA INHALER    Inhale 2 puffs into the lungs 2 (two) times daily.    GABAPENTIN (NEURONTIN) 600 MG TABLET    Take 1 tablet (600 mg total) by mouth nightly.    HYDROXYZINE PAMOATE (VISTARIL) 25 MG CAP    Take 1 capsule (25 mg  total) by mouth every evening.    ISOSORBIDE MONONITRATE (IMDUR) 60 MG 24 HR TABLET    TAKE 1 TABLET BY MOUTH ONCE DAILY.    LANCETS (ACCU-CHEK SOFTCLIX LANCETS) MISC    1 each by Misc.(Non-Drug; Combo Route) route 3 (three) times daily.    MELOXICAM (MOBIC) 15 MG TABLET    TAKE 1 TABLET BY MOUTH ONCE DAILY FOR 5 DAYS.THEN ONCE DAILY AS NEEDED FOR BACK PAIN.    METOPROLOL SUCCINATE (TOPROL-XL) 25 MG 24 HR TABLET    Take 1 tablet (25 mg total) by mouth once daily.    OMEPRAZOLE (PRILOSEC) 40 MG CAPSULE    Take 1 capsule (40 mg total) by mouth every evening.    PRAVASTATIN (PRAVACHOL) 20 MG TABLET    Take 1 tablet (20 mg total) by mouth once daily.    RESTASIS MULTIDOSE 0.05 % DROP        TIOTROPIUM (SPIRIVA WITH HANDIHALER) 18 MCG INHALATION CAPSULE    INHALE THE CONTENTS OF ONE CAPSULE DAILY.    TRAZODONE (DESYREL) 100 MG TABLET    Take 1 tablet (100 mg total) by mouth every evening.    VENLAFAXINE (EFFEXOR) 75 MG TABLET    Take 1 tablet (75 mg total) by mouth 2 (two) times daily.    ZOLPIDEM (AMBIEN) 10 MG TAB    TAKE 1 TABLET BY MOUTH AT BEDTIME AS NEEDED FOR INSOMNIA.     Review of patient's allergies indicates:   Allergen Reactions    Rosuvastatin Other (See Comments)     Joint pain   Joint and bone pain     Review of Systems   Constitutional: Negative for malaise/fatigue.   HENT:  Negative for hearing loss.    Eyes:  Negative for double vision and visual disturbance.   Cardiovascular:  Positive for chest pain.   Respiratory:  Positive for shortness of breath.    Endocrine: Negative for cold intolerance.   Hematologic/Lymphatic: Does not bruise/bleed easily.   Skin:  Negative for poor wound healing and suspicious lesions.   Musculoskeletal:  Positive for arthritis, joint pain and joint swelling. Negative for gout.   Gastrointestinal:  Negative for nausea and vomiting.   Genitourinary:  Positive for frequency, hesitancy, incomplete emptying and nocturia. Negative for dysuria.   Neurological:  Negative for  numbness, paresthesias and sensory change.   Psychiatric/Behavioral:  Positive for depression. Negative for memory loss and substance abuse. The patient has insomnia and is nervous/anxious.    Allergic/Immunologic: Negative for persistent infections.     Objective:   Body mass index is 25.97 kg/m².  There were no vitals filed for this visit.             General    Constitutional: He is oriented to person, place, and time. He appears well-developed and well-nourished. No distress.   HENT:   Head: Normocephalic.   Mouth/Throat: Oropharynx is clear and moist.   Eyes: EOM are normal.   Cardiovascular:  Normal rate.            Pulmonary/Chest: Effort normal.   Abdominal: Soft.   Neurological: He is alert and oriented to person, place, and time. No cranial nerve deficit.   Psychiatric: He has a normal mood and affect.         Left Hand/Wrist Exam     Inspection   Scars: Wrist - absent Hand -  absent  Effusion: Wrist - absent Hand -  absent    Pain   Wrist - The patient exhibits pain of the CMC.    Other     Sensory Exam  Median Distribution: normal  Ulnar Distribution: normal  Radial Distribution: normal    Comments:  The patient has tenderness left thumb basal joint with a positive axial circumduction grind test.  There are no motor or sensory deficits.          Vascular Exam       Capillary Refill  Left Hand: normal capillary refill        Relevant imaging results reviewed and interpreted by me, discussed with the patient and / or family today radiographs left hand showed severe arthritis basal joint left thumb with erosion of the trapezium laterally  Assessment:     Encounter Diagnosis   Name Primary?    Arthritis of carpometacarpal (CMC) joint of left thumb         Plan:     The patient has not responded to conservative measures left thumb.  He has severe basal joint arthritis.  He was counseled regarding a left thumb basal joint arthroplasty.  Risk complications and alternatives were discussed including the risk of  infection, anesthetic risk, injury to nerves and vessels, loss of motion, and possible need for additional surgeries were discussed.  He seems to understand and agree to that surgery.  All questions were answered.                Disclaimer: This note was prepared using a voice recognition system and is likely to have sound alike errors within the text.

## 2022-11-02 NOTE — H&P (VIEW-ONLY)
Subjective:     Patient ID: Mil Sood is a 67 y.o. male.    Chief Complaint: Pain of the Left Hand      HPI:  The patient is a 67-year-old male with left thumb basal joint arthritis.  He was last injected 01/18/2022 without improvement.  He has tried splinting without improvement.  He wishes to have a left thumb basal joint arthroplasty.    Past Medical History:   Diagnosis Date    Anxiety     Cancer     spot on kidney and lung    Diabetes mellitus     Borderline diet controlled    Diverticulitis     GERD (gastroesophageal reflux disease)     Hyperlipidemia     not on any meds currently    Hypertension     Lung mass     Mass of kidney      Past Surgical History:   Procedure Laterality Date    CHOLECYSTECTOMY      COLONOSCOPY      COLONOSCOPY N/A 4/7/2017    Procedure: COLONOSCOPY;  Surgeon: Amarilys Bravo MD;  Location: [facility] ENDO;  Service: Endoscopy;  Laterality: N/A;    DIAGNOSTIC LAPAROSCOPY N/A 5/4/2021    Procedure: LAPAROSCOPY, DIAGNOSTIC;  Surgeon: Shaji Jauregui MD;  Location: [facility] OR;  Service: General;  Laterality: N/A;    ESOPHAGOGASTRODUODENOSCOPY      ESOPHAGOGASTRODUODENOSCOPY N/A 2/13/2020    Procedure: ESOPHAGOGASTRODUODENOSCOPY (EGD);  Surgeon: Shaji Aquino III, MD;  Location: [facility] ENDO;  Service: Endoscopy;  Laterality: N/A;    HERNIA REPAIR Bilateral     groin    LEFT HEART CATHETERIZATION Left 1/31/2022    Procedure: CATHETERIZATION, HEART, LEFT;  Surgeon: Romeo Godfrey MD;  Location: [facility] CATH LAB;  Service: Cardiology;  Laterality: Left;    NECK SURGERY      OPEN REDUCTION AND INTERNAL FIXATION (ORIF) OF INJURY OF ANKLE Left 8/10/2022    Procedure: ORIF, ANKLE;  Surgeon: Howard Mata MD;  Location: [facility] OR;  Service: Orthopedics;  Laterality: Left;    PARTIAL NEPHRECTOMY      left    RENAL BIOPSY      ROBOT-ASSISTED REPAIR OF UMBILICAL HERNIA USING DA NEL XI N/A 5/4/2021    Procedure: XI ROBOTIC REPAIR, HERNIA, UMBILICAL;  Surgeon: Shaji Jauregui MD;  Location: [facility]  OR;  Service: General;  Laterality: N/A;  converted to open    TONSILLECTOMY      TRIGGER FINGER RELEASE Right     long finger     Family History   Problem Relation Age of Onset    Hypertension Mother     Hypertension Father     Melanoma Father     Cancer Sister      Social History     Socioeconomic History    Marital status:    Tobacco Use    Smoking status: Former     Packs/day: 1.00     Years: 20.00     Pack years: 20.00     Types: Cigarettes     Quit date: 1995     Years since quittin.7    Smokeless tobacco: Never   Substance and Sexual Activity    Alcohol use: Never     Alcohol/week: 0.0 standard drinks    Drug use: No    Sexual activity: Yes     Partners: Female     Birth control/protection: None     Medication List with Changes/Refills   Current Medications    AMLODIPINE (NORVASC) 5 MG TABLET    Take 1 tablet (5 mg total) by mouth once daily.    ASPIRIN (ECOTRIN) 81 MG EC TABLET    Take 1 tablet (81 mg total) by mouth once daily.    BENAZEPRIL (LOTENSIN) 20 MG TABLET    TAKE 2 TABLETS BY MOUTH NIGHTLY.    BLOOD SUGAR DIAGNOSTIC STRP    Use to check blood sugars BID    BLOOD SUGAR DIAGNOSTIC STRP    1 each by Misc.(Non-Drug; Combo Route) route 3 (three) times daily.    BLOOD-GLUCOSE METER KIT    Use as instructed    CITALOPRAM (CELEXA) 40 MG TABLET    Take 1 tablet (40 mg total) by mouth every evening.    CLONAZEPAM (KLONOPIN) 1 MG TABLET    Take 1 tablet (1 mg total) by mouth nightly as needed for Anxiety.    CYCLOBENZAPRINE (FLEXERIL) 10 MG TABLET    TAKE 1 TABLET BY MOUTH THREE TIMES DAILY AS NEEDED.    FINASTERIDE (PROSCAR) 5 MG TABLET    Take 1 tablet (5 mg total) by mouth nightly.    FLUTICASONE PROPION-SALMETEROL 115-21 MCG/DOSE (ADVAIR HFA) 115-21 MCG/ACTUATION HFAA INHALER    Inhale 2 puffs into the lungs 2 (two) times daily.    GABAPENTIN (NEURONTIN) 600 MG TABLET    Take 1 tablet (600 mg total) by mouth nightly.    HYDROXYZINE PAMOATE (VISTARIL) 25 MG CAP    Take 1 capsule (25 mg  total) by mouth every evening.    ISOSORBIDE MONONITRATE (IMDUR) 60 MG 24 HR TABLET    TAKE 1 TABLET BY MOUTH ONCE DAILY.    LANCETS (ACCU-CHEK SOFTCLIX LANCETS) MISC    1 each by Misc.(Non-Drug; Combo Route) route 3 (three) times daily.    MELOXICAM (MOBIC) 15 MG TABLET    TAKE 1 TABLET BY MOUTH ONCE DAILY FOR 5 DAYS.THEN ONCE DAILY AS NEEDED FOR BACK PAIN.    METOPROLOL SUCCINATE (TOPROL-XL) 25 MG 24 HR TABLET    Take 1 tablet (25 mg total) by mouth once daily.    OMEPRAZOLE (PRILOSEC) 40 MG CAPSULE    Take 1 capsule (40 mg total) by mouth every evening.    PRAVASTATIN (PRAVACHOL) 20 MG TABLET    Take 1 tablet (20 mg total) by mouth once daily.    RESTASIS MULTIDOSE 0.05 % DROP        TIOTROPIUM (SPIRIVA WITH HANDIHALER) 18 MCG INHALATION CAPSULE    INHALE THE CONTENTS OF ONE CAPSULE DAILY.    TRAZODONE (DESYREL) 100 MG TABLET    Take 1 tablet (100 mg total) by mouth every evening.    VENLAFAXINE (EFFEXOR) 75 MG TABLET    Take 1 tablet (75 mg total) by mouth 2 (two) times daily.    ZOLPIDEM (AMBIEN) 10 MG TAB    TAKE 1 TABLET BY MOUTH AT BEDTIME AS NEEDED FOR INSOMNIA.     Review of patient's allergies indicates:   Allergen Reactions    Rosuvastatin Other (See Comments)     Joint pain   Joint and bone pain     Review of Systems   Constitutional: Negative for malaise/fatigue.   HENT:  Negative for hearing loss.    Eyes:  Negative for double vision and visual disturbance.   Cardiovascular:  Positive for chest pain.   Respiratory:  Positive for shortness of breath.    Endocrine: Negative for cold intolerance.   Hematologic/Lymphatic: Does not bruise/bleed easily.   Skin:  Negative for poor wound healing and suspicious lesions.   Musculoskeletal:  Positive for arthritis, joint pain and joint swelling. Negative for gout.   Gastrointestinal:  Negative for nausea and vomiting.   Genitourinary:  Positive for frequency, hesitancy, incomplete emptying and nocturia. Negative for dysuria.   Neurological:  Negative for  numbness, paresthesias and sensory change.   Psychiatric/Behavioral:  Positive for depression. Negative for memory loss and substance abuse. The patient has insomnia and is nervous/anxious.    Allergic/Immunologic: Negative for persistent infections.     Objective:   Body mass index is 25.97 kg/m².  There were no vitals filed for this visit.             General    Constitutional: He is oriented to person, place, and time. He appears well-developed and well-nourished. No distress.   HENT:   Head: Normocephalic.   Mouth/Throat: Oropharynx is clear and moist.   Eyes: EOM are normal.   Cardiovascular:  Normal rate.            Pulmonary/Chest: Effort normal.   Abdominal: Soft.   Neurological: He is alert and oriented to person, place, and time. No cranial nerve deficit.   Psychiatric: He has a normal mood and affect.         Left Hand/Wrist Exam     Inspection   Scars: Wrist - absent Hand -  absent  Effusion: Wrist - absent Hand -  absent    Pain   Wrist - The patient exhibits pain of the CMC.    Other     Sensory Exam  Median Distribution: normal  Ulnar Distribution: normal  Radial Distribution: normal    Comments:  The patient has tenderness left thumb basal joint with a positive axial circumduction grind test.  There are no motor or sensory deficits.          Vascular Exam       Capillary Refill  Left Hand: normal capillary refill        Relevant imaging results reviewed and interpreted by me, discussed with the patient and / or family today radiographs left hand showed severe arthritis basal joint left thumb with erosion of the trapezium laterally  Assessment:     Encounter Diagnosis   Name Primary?    Arthritis of carpometacarpal (CMC) joint of left thumb         Plan:     The patient has not responded to conservative measures left thumb.  He has severe basal joint arthritis.  He was counseled regarding a left thumb basal joint arthroplasty.  Risk complications and alternatives were discussed including the risk of  infection, anesthetic risk, injury to nerves and vessels, loss of motion, and possible need for additional surgeries were discussed.  He seems to understand and agree to that surgery.  All questions were answered.                Disclaimer: This note was prepared using a voice recognition system and is likely to have sound alike errors within the text.

## 2022-11-03 ENCOUNTER — TELEPHONE (OUTPATIENT)
Dept: ADMINISTRATIVE | Facility: HOSPITAL | Age: 67
End: 2022-11-03
Payer: MEDICARE

## 2022-11-07 ENCOUNTER — TELEPHONE (OUTPATIENT)
Dept: PREADMISSION TESTING | Facility: HOSPITAL | Age: 67
End: 2022-11-07
Payer: MEDICARE

## 2022-11-07 NOTE — TELEPHONE ENCOUNTER
Pre op instructions reviewed with Pt per phone: Spoke about pre op process and surgery instructions, verbalized understanding.    To confirm, Surgery is scheduled on 11/18/22. We will call you the business day (after 2:30 pm) prior to surgery to confirm arrival time as it is subject to change due to cancellations/ emergencies.    Please report to the [facility] (1st Floor) at [facility] located off of [facility] (2nd building on the left, in front of the [facility] pole).  Address: [address] [place] [address] [place]. [address]        INSTRUCTIONS IMPORTANT!!!  Do Not Eat, Drink, or Smoke after 12 midnight unless instructed otherwise by your Surgeon. OK to brush teeth, no gum, candy or mints!      *Take Only these medications with a small sip of water Morning of Surgery:  None-Pt takes all med at night      ____  HOLD all vitamins, herbal supplements, aspirin products and NSAIDS 7 days prior to surgery, as these can thin the blood too much.  ____  NO Acrylic/fake nails or nail polish worn day of surgery (specifically hand/arm & foot surgeries).  ____  NO powder, lotions, deodorants, oils or cream on body.  ____  Remove all jewelry & piercings prior to surgery.  ____  Dentures, Hearing Aids & Contact Lens will need to be removed prior to the       start of surgery.  ____  Bring photo ID and insurance information to hospital (Leave Valuables at Home).  ____  If going home the same day, arrange for a ride home. You will not be able to drive 24 hrs if Anesthesia was used.   ____  Females (ages 11-60) may need to give a urine sample the morning of surgery; please see Pre op Nurse prior to using the restroom.  ____  Wear clean, loose fitting clothing to allow for dressings/ bandages.            Diabetic Patients: If you take diabetic medication, do NOT take morning of surgery unless instructed by Doctor. Metformin to be stopped 24 hrs prior to surgery time. DO NOT take long-acting insulin the evening before surgery. Blood  sugars will be checked in pre-op by Nurse.    Bathing Instructions:    -Shower with anti-bacterial Soap (Hibiclens or Dial) the night before surgery and the morning of.   -Do not use Hibiclens on your face or genitals.   -Apply clean clothes after shower.  -Do not shave your face or body 2 days prior to surgery unless instructed otherwise by your Surgeon.  -Do not shave pubic hair 7 days prior to surgery (gyn pt's).    [facility] Visitor/Ride Policy:  Only 2 adults allowed (over the age of 18) to accompany you to the Hospital. You Must have a ride home from a responsible adult that you know and trust. Medical Transport, Uber or Lyft can only be used if patient has a responsible adult to accompany them during ride home.    Discharge Instructions: You will receive Post-op/Discharge instructions by your Discharge Nurse prior to going home. Please call your Surgeon's office with any post-surgery questions/concerns @ [phone].    *If you are running late or have questions the morning of surgery, please call the Surgery Dept @ [phone]  *Insurance/ Financial Questions, please call [phone]    Thank you,  -[facility] Pre Admit Testing Nurse  [phone] or [phone]  M-F 7:30 am-4 pm

## 2022-11-07 NOTE — TELEPHONE ENCOUNTER
----- Message from Libra Arizmendi RN sent at 11/7/2022  1:18 PM CST -----  Regarding: FW: surgery 11/18/22    ----- Message -----  From: Yasmani Chairez MD  Sent: 11/5/2022   5:09 PM CST  To: Libra Arizmendi RN  Subject: RE: surgery 11/18/22                             OK for the procedure from cardiology standpoint.    ----- Message -----  From: Libra Arizmendi RN  Sent: 11/4/2022  10:07 AM CDT  To: Yasmani Chairez MD  Subject: FW: surgery 11/18/22                             Please advise  ----- Message -----  From: Ana Brooks RN  Sent: 11/4/2022  10:02 AM CDT  To: [facility] Staff  Subject: surgery 11/18/22                                 Good Morning.    Mr. Sood was recently seen by Dr. Chairez on 10/26/22.  Pt is scheduled for surgery on 11/18 for Arthroplasty of Left Finger.  Would Dr. Chairez be able to clear Pt  without seeing him?    Thank you,    PAT dept

## 2022-11-17 ENCOUNTER — TELEPHONE (OUTPATIENT)
Dept: PREADMISSION TESTING | Facility: HOSPITAL | Age: 67
End: 2022-11-17
Payer: MEDICARE

## 2022-11-17 NOTE — TELEPHONE ENCOUNTER
Called and spoke with pt about the following:     Please arrive to [facility] main lobby at 7 am on 11/18/22 for your scheduled procedure. NOTHING to eat or drink after midnight unless instructed otherwise by your Surgeon. Take only the medications instructed by your Pre Admit Nurse with small sip of water.    Thank you,  -[facility] Pre Admit Testing Dept.  Mon-Fri 8 am - 4 pm [phone]

## 2022-11-18 ENCOUNTER — ANESTHESIA EVENT (OUTPATIENT)
Dept: SURGERY | Facility: HOSPITAL | Age: 67
End: 2022-11-18
Payer: MEDICARE

## 2022-11-18 ENCOUNTER — ANESTHESIA (OUTPATIENT)
Dept: SURGERY | Facility: HOSPITAL | Age: 67
End: 2022-11-18
Payer: MEDICARE

## 2022-11-18 ENCOUNTER — HOSPITAL ENCOUNTER (OUTPATIENT)
Facility: HOSPITAL | Age: 67
Discharge: HOME | End: 2022-11-18
Attending: ORTHOPAEDIC SURGERY | Admitting: ORTHOPAEDIC SURGERY
Payer: MEDICARE

## 2022-11-18 DIAGNOSIS — M18.12 ARTHRITIS OF CARPOMETACARPAL (CMC) JOINT OF LEFT THUMB: Primary | ICD-10-CM

## 2022-11-18 PROCEDURE — 63600175 PHARM REV CODE 636 W HCPCS

## 2022-11-18 PROCEDURE — 71000033 HC RECOVERY, INTIAL HOUR: Performed by: ORTHOPAEDIC SURGERY

## 2022-11-18 PROCEDURE — 63600175 PHARM REV CODE 636 W HCPCS: Performed by: NURSE ANESTHETIST, CERTIFIED REGISTERED

## 2022-11-18 PROCEDURE — 37000009 HC ANESTHESIA EA ADD 15 MINS: Performed by: ORTHOPAEDIC SURGERY

## 2022-11-18 PROCEDURE — 25000003 PHARM REV CODE 250

## 2022-11-18 PROCEDURE — 36000708 HC OR TIME LEV III 1ST 15 MIN: Performed by: ORTHOPAEDIC SURGERY

## 2022-11-18 PROCEDURE — 25447 ARTHRP NTRCRP/CRP/MTCR NTRPS: CPT | Mod: FA,,, | Performed by: ORTHOPAEDIC SURGERY

## 2022-11-18 PROCEDURE — 37000008 HC ANESTHESIA 1ST 15 MINUTES: Performed by: ORTHOPAEDIC SURGERY

## 2022-11-18 PROCEDURE — 25447 PR REPAIR INTERCARP/CARP-METACARP JT: ICD-10-PCS | Mod: FA,,, | Performed by: ORTHOPAEDIC SURGERY

## 2022-11-18 PROCEDURE — C1713 ANCHOR/SCREW BN/BN,TIS/BN: HCPCS | Performed by: ORTHOPAEDIC SURGERY

## 2022-11-18 PROCEDURE — 71000015 HC POSTOP RECOV 1ST HR: Performed by: ORTHOPAEDIC SURGERY

## 2022-11-18 PROCEDURE — 25000003 PHARM REV CODE 250: Performed by: ORTHOPAEDIC SURGERY

## 2022-11-18 PROCEDURE — 25000003 PHARM REV CODE 250: Performed by: NURSE ANESTHETIST, CERTIFIED REGISTERED

## 2022-11-18 PROCEDURE — 36000709 HC OR TIME LEV III EA ADD 15 MIN: Performed by: ORTHOPAEDIC SURGERY

## 2022-11-18 PROCEDURE — 25000003 PHARM REV CODE 250: Performed by: ANESTHESIOLOGY

## 2022-11-18 DEVICE — ANCHOR DX SWIVELOCK SL 3.5X8: Type: IMPLANTABLE DEVICE | Site: THUMB | Status: FUNCTIONAL

## 2022-11-18 RX ORDER — HYDROMORPHONE HYDROCHLORIDE 2 MG/ML
0.2 INJECTION, SOLUTION INTRAMUSCULAR; INTRAVENOUS; SUBCUTANEOUS EVERY 5 MIN PRN
Status: DISCONTINUED | OUTPATIENT
Start: 2022-11-18 | End: 2022-11-18 | Stop reason: HOSPADM

## 2022-11-18 RX ORDER — ALBUTEROL SULFATE 0.83 MG/ML
2.5 SOLUTION RESPIRATORY (INHALATION) EVERY 4 HOURS PRN
Status: DISCONTINUED | OUTPATIENT
Start: 2022-11-18 | End: 2022-11-18 | Stop reason: HOSPADM

## 2022-11-18 RX ORDER — CEFAZOLIN SODIUM 2 G/50ML
2 SOLUTION INTRAVENOUS
Status: COMPLETED | OUTPATIENT
Start: 2022-11-18 | End: 2022-11-18

## 2022-11-18 RX ORDER — MEPERIDINE HYDROCHLORIDE 25 MG/ML
12.5 INJECTION INTRAMUSCULAR; INTRAVENOUS; SUBCUTANEOUS ONCE
Status: DISCONTINUED | OUTPATIENT
Start: 2022-11-18 | End: 2022-11-18 | Stop reason: HOSPADM

## 2022-11-18 RX ORDER — PROPOFOL 10 MG/ML
VIAL (ML) INTRAVENOUS
Status: DISCONTINUED | OUTPATIENT
Start: 2022-11-18 | End: 2022-11-18

## 2022-11-18 RX ORDER — HYDROCODONE BITARTRATE AND ACETAMINOPHEN 5; 325 MG/1; MG/1
1 TABLET ORAL EVERY 4 HOURS PRN
Status: CANCELLED | OUTPATIENT
Start: 2022-11-18

## 2022-11-18 RX ORDER — DIPHENHYDRAMINE HYDROCHLORIDE 50 MG/ML
25 INJECTION INTRAMUSCULAR; INTRAVENOUS EVERY 6 HOURS PRN
Status: DISCONTINUED | OUTPATIENT
Start: 2022-11-18 | End: 2022-11-18 | Stop reason: HOSPADM

## 2022-11-18 RX ORDER — MIDAZOLAM HYDROCHLORIDE 1 MG/ML
INJECTION INTRAMUSCULAR; INTRAVENOUS
Status: DISCONTINUED | OUTPATIENT
Start: 2022-11-18 | End: 2022-11-18

## 2022-11-18 RX ORDER — BUPIVACAINE HYDROCHLORIDE 2.5 MG/ML
INJECTION, SOLUTION EPIDURAL; INFILTRATION; INTRACAUDAL
Status: DISCONTINUED | OUTPATIENT
Start: 2022-11-18 | End: 2022-11-18 | Stop reason: HOSPADM

## 2022-11-18 RX ORDER — SODIUM CHLORIDE 0.9 % (FLUSH) 0.9 %
3 SYRINGE (ML) INJECTION
Status: DISCONTINUED | OUTPATIENT
Start: 2022-11-18 | End: 2022-11-18 | Stop reason: HOSPADM

## 2022-11-18 RX ORDER — ROCURONIUM BROMIDE 10 MG/ML
INJECTION, SOLUTION INTRAVENOUS
Status: DISCONTINUED | OUTPATIENT
Start: 2022-11-18 | End: 2022-11-18

## 2022-11-18 RX ORDER — ONDANSETRON HYDROCHLORIDE 2 MG/ML
INJECTION, SOLUTION INTRAMUSCULAR; INTRAVENOUS
Status: DISCONTINUED | OUTPATIENT
Start: 2022-11-18 | End: 2022-11-18

## 2022-11-18 RX ORDER — OXYCODONE HYDROCHLORIDE 5 MG/1
5 TABLET ORAL
Status: DISCONTINUED | OUTPATIENT
Start: 2022-11-18 | End: 2022-11-18 | Stop reason: HOSPADM

## 2022-11-18 RX ORDER — FENTANYL CITRATE 50 UG/ML
INJECTION, SOLUTION INTRAMUSCULAR; INTRAVENOUS
Status: DISCONTINUED | OUTPATIENT
Start: 2022-11-18 | End: 2022-11-18

## 2022-11-18 RX ORDER — KETOROLAC TROMETHAMINE 30 MG/ML
15 INJECTION, SOLUTION INTRAMUSCULAR; INTRAVENOUS EVERY 8 HOURS PRN
Status: DISCONTINUED | OUTPATIENT
Start: 2022-11-18 | End: 2022-11-18 | Stop reason: HOSPADM

## 2022-11-18 RX ORDER — PHENYLEPHRINE HYDROCHLORIDE 10 MG/ML
INJECTION INTRAVENOUS
Status: DISCONTINUED | OUTPATIENT
Start: 2022-11-18 | End: 2022-11-18

## 2022-11-18 RX ORDER — SUCCINYLCHOLINE CHLORIDE 20 MG/ML
INJECTION INTRAMUSCULAR; INTRAVENOUS
Status: DISCONTINUED | OUTPATIENT
Start: 2022-11-18 | End: 2022-11-18

## 2022-11-18 RX ORDER — ONDANSETRON 2 MG/ML
4 INJECTION INTRAMUSCULAR; INTRAVENOUS DAILY PRN
Status: DISCONTINUED | OUTPATIENT
Start: 2022-11-18 | End: 2022-11-18 | Stop reason: HOSPADM

## 2022-11-18 RX ORDER — CHLORHEXIDINE GLUCONATE ORAL RINSE 1.2 MG/ML
10 SOLUTION DENTAL 2 TIMES DAILY
Status: CANCELLED | OUTPATIENT
Start: 2022-11-18 | End: 2022-11-23

## 2022-11-18 RX ORDER — LIDOCAINE HYDROCHLORIDE 20 MG/ML
INJECTION, SOLUTION EPIDURAL; INFILTRATION; INTRACAUDAL; PERINEURAL
Status: DISCONTINUED | OUTPATIENT
Start: 2022-11-18 | End: 2022-11-18

## 2022-11-18 RX ORDER — SODIUM CHLORIDE 0.9 % (FLUSH) 0.9 %
10 SYRINGE (ML) INJECTION
Status: DISCONTINUED | OUTPATIENT
Start: 2022-11-18 | End: 2022-11-18 | Stop reason: HOSPADM

## 2022-11-18 RX ORDER — CHLORHEXIDINE GLUCONATE ORAL RINSE 1.2 MG/ML
10 SOLUTION DENTAL
Status: DISCONTINUED | OUTPATIENT
Start: 2022-11-18 | End: 2023-11-16

## 2022-11-18 RX ORDER — PROCHLORPERAZINE EDISYLATE 5 MG/ML
5 INJECTION INTRAMUSCULAR; INTRAVENOUS EVERY 30 MIN PRN
Status: DISCONTINUED | OUTPATIENT
Start: 2022-11-18 | End: 2022-11-18 | Stop reason: HOSPADM

## 2022-11-18 RX ORDER — OXYCODONE AND ACETAMINOPHEN 10; 325 MG/1; MG/1
1 TABLET ORAL EVERY 6 HOURS PRN
Qty: 28 TABLET | Refills: 0 | Status: SHIPPED | OUTPATIENT
Start: 2022-11-18 | End: 2023-01-11

## 2022-11-18 RX ADMIN — SUCCINYLCHOLINE CHLORIDE 140 MG: 20 INJECTION, SOLUTION INTRAMUSCULAR; INTRAVENOUS at 08:11

## 2022-11-18 RX ADMIN — LIDOCAINE HYDROCHLORIDE 50 MG: 20 INJECTION, SOLUTION EPIDURAL; INFILTRATION; INTRACAUDAL; PERINEURAL at 08:11

## 2022-11-18 RX ADMIN — ONDANSETRON 4 MG: 2 INJECTION INTRAMUSCULAR; INTRAVENOUS at 09:11

## 2022-11-18 RX ADMIN — PROPOFOL 140 MG: 10 INJECTION, EMULSION INTRAVENOUS at 08:11

## 2022-11-18 RX ADMIN — FENTANYL CITRATE 25 MCG: 0.05 INJECTION, SOLUTION INTRAMUSCULAR; INTRAVENOUS at 08:11

## 2022-11-18 RX ADMIN — PHENYLEPHRINE HYDROCHLORIDE 200 MCG: 10 INJECTION INTRAVENOUS at 09:11

## 2022-11-18 RX ADMIN — PHENYLEPHRINE HYDROCHLORIDE 100 MCG: 10 INJECTION INTRAVENOUS at 09:11

## 2022-11-18 RX ADMIN — MIDAZOLAM HYDROCHLORIDE 2 MG: 1 INJECTION, SOLUTION INTRAMUSCULAR; INTRAVENOUS at 08:11

## 2022-11-18 RX ADMIN — OXYCODONE HYDROCHLORIDE 5 MG: 5 TABLET ORAL at 10:11

## 2022-11-18 RX ADMIN — CEFAZOLIN SODIUM 2 G: 2 SOLUTION INTRAVENOUS at 08:11

## 2022-11-18 RX ADMIN — ROCURONIUM BROMIDE 10 MG: 10 INJECTION, SOLUTION INTRAVENOUS at 08:11

## 2022-11-18 RX ADMIN — CHLORHEXIDINE GLUCONATE 0.12% ORAL RINSE 10 ML: 1.2 LIQUID ORAL at 08:11

## 2022-11-18 RX ADMIN — FENTANYL CITRATE 25 MCG: 0.05 INJECTION, SOLUTION INTRAMUSCULAR; INTRAVENOUS at 09:11

## 2022-11-18 RX ADMIN — FENTANYL CITRATE 50 MCG: 0.05 INJECTION, SOLUTION INTRAMUSCULAR; INTRAVENOUS at 08:11

## 2022-11-18 NOTE — OP NOTE
[facility] - Surgery ([facility])  Orthopedic Surgery  Operative Note    SUMMARY     Date of Procedure: 11/18/2022   Assistant: None    Procedure: Procedure(s) (LRB):  ARTHROPLASTY, FINGER (Left)   thumb basal joint using Arthrex internal brace system    Surgeon(s) and Role:     * Kane Negrete MD - Primary    Assisting Surgeon: None    Pre-Operative Diagnosis: Arthritis of carpometacarpal (CMC) joint of left thumb [M18.12]    Post-Operative Diagnosis: Post-Op Diagnosis Codes:     * Arthritis of carpometacarpal (CMC) joint of left thumb [M18.12]    Anesthesia:  Jump    Technical Procedures Used:  left thumb basal joint arthroplasty    Description of the Findings of the Procedure:  The patient was taken to the operating room where general anesthesia was administered.  Satisfactory anesthesia had been achieved the left  arm was pre prepped and draped in usual sterile fashion. After exsanguination of the extremity a proximal tourniquet was inflated to 250 mm of mercury after the patient received 2 g of Ancef intravenously preoperatively.  At this time an oblique incision was made over the trapezium.  Cutaneous neurovascular structures were identified and carefully retracted the capsule was opened.  The trapezium was removed with the McGlamry elevator.  Intraoperative images showed satisfactory resection of the trapezium.  At this time using the Arthrex SwiveLock system a guidewire was placed in the base of the 2nd proximal metacarpal and verified to be in satisfactory position.  This was drilled with a 1.8 mm drill bit and stop guide.  At this time the 1st SwiveLock was placed in the drill hole with good pullout strength.  At this time a guidewire was placed in the base of the 1st metacarpal drilled and the 2nd SwiveLock was deployed with the fork in the FiberTape.  Good stability of the basal joint was achieved.  The extra FiberTape was removed sharply with a knife.  Intraoperative images showed satisfactory  resection of the trapezium  Closure of the capsule was performed using 3 0 Vicryl suture.. Subcutaneous tissue was closed using 3 0 Vicryl suture. Skin was closed using horizontal mattress 4 0 nylon suture.  10 cc of 0.25% plain Marcaine were injected about the wound.  Xeroform gauze 4 by 4s and 2 ABD pads were overwrapped with a 3 in gauze dressing. The patient tolerated the procedure well and was transferred to the recovery room in satisfactory condition.      Complications: No    Estimated Blood Loss (EBL): 5cc             Condition: Good    Disposition: PACU - hemodynamically stable.    Attestation: I was present and scrubbed for the entire procedure.

## 2022-11-18 NOTE — ANESTHESIA POSTPROCEDURE EVALUATION
Anesthesia Post Evaluation    Patient: Mil Sood    Procedure(s) Performed: Procedure(s) (LRB):  ARTHROPLASTY, FINGER (Left)    Final Anesthesia Type: general      Patient location during evaluation: PACU  Patient participation: Yes- Able to Participate  Level of consciousness: awake and alert  Post-procedure vital signs: reviewed and stable  Pain management: adequate  Airway patency: patent  COLLEEN mitigation strategies: Verification of full reversal of neuromuscular block  PONV status at discharge: No PONV  Anesthetic complications: no      Cardiovascular status: hemodynamically stable  Respiratory status: spontaneous ventilation  Hydration status: euvolemic  Follow-up not needed.          Vitals Value Taken Time   /91 11/18/22 1030   Temp 36.2 °C (97.2 °F) 11/18/22 1000   Pulse 91 11/18/22 1031   Resp 41 11/18/22 1030   SpO2 98 % 11/18/22 1031   Vitals shown include unvalidated device data.      Event Time   Out of Recovery 10:31:31         Pain/Doug Score: Pain Rating Prior to Med Admin: 6 (11/18/2022 10:26 AM)  Doug Score: 10 (11/18/2022 10:15 AM)

## 2022-11-18 NOTE — ANESTHESIA PREPROCEDURE EVALUATION
11/18/2022  Mil Sood is a 67 y.o., male.    Patient Active Problem List   Diagnosis    Essential hypertension    GERD (gastroesophageal reflux disease)    Prediabetes    Depression    Coronary artery disease of native artery of native heart with stable angina pectoris    Cervical spondylosis    Culp's esophagus with high grade dysplasia    Culp's esophagus determined by biopsy    Screen for colon cancer    Renal oncocytoma    Myalgia due to HMG CoA reductase inhibitor    Statin intolerance    Culp's esophagus    Insomnia due to anxiety and fear    Incisional hernia, without obstruction or gangrene    Centrilobular emphysema    Primary osteoarthritis of left hand    Abnormal nuclear stress test    Other chest pain    Mixed hyperlipidemia    Atherosclerosis of aorta    Hx of CABG x4 in     Closed displaced fracture of lateral malleolus of left fibula    Ankle syndesmosis disruption, left, initial encounter    BPH with obstruction/lower urinary tract symptoms    Orthostatic hypotension       Pre-op Assessment    I have reviewed the Patient Summary Reports.    I have reviewed the NPO Status.   I have reviewed the Medications.     Review of Systems  Anesthesia Hx:  No problems with previous Anesthesia  Denies Family Hx of Anesthesia complications.   Denies Personal Hx of Anesthesia complications.   Social:  Non-Smoker    Hematology/Oncology:  Hematology Normal        Cardiovascular:   Hypertension CAD  CABG/stent  Angina hyperlipidemia ECG has been reviewed. Hx of CABG x4 in   He has done well since except for orthostatic syncopal episodes which resolved after hydralazine was discontinued  Normal LV function on cath   Pulmonary:   COPD    Renal/:   Chronic Renal Disease BPH    Hepatic/GI:   GERD Diverticulitis   Musculoskeletal:   Arthritis      Neurological:  Neurology Normal Cervical spondylosis   Endocrine:   Diabetes    Psych:   anxiety          Physical Exam  General: Oriented and Alert    Airway:  Mallampati: II   Mouth Opening: Normal  TM Distance: Normal  Tongue: Normal  Neck ROM: Normal ROM        Anesthesia Plan  Type of Anesthesia, risks & benefits discussed:    Anesthesia Type: Gen Supraglottic Airway, MAC, Gen ETT  Intra-op Monitoring Plan: Standard ASA Monitors  Informed Consent: Informed consent signed with the Patient and all parties understand the risks and agree with anesthesia plan.  All questions answered.   ASA Score: 3  Day of Surgery Review of History & Physical: I have interviewed and examined the patient. I have reviewed the patient's H&P dated:     Ready For Surgery From Anesthesia Perspective.     .      Chemistry        Component Value Date/Time     11/02/2022 0754    K 4.5 11/02/2022 0754     11/02/2022 0754    CO2 26 11/02/2022 0754    BUN 15 11/02/2022 0754    CREATININE 1.0 11/02/2022 0754    GLU 92 11/02/2022 0754        Component Value Date/Time    CALCIUM 9.9 11/02/2022 0754    ALKPHOS 72 11/02/2022 0754    AST 14 11/02/2022 0754    ALT 12 11/02/2022 0754    BILITOT 0.4 11/02/2022 0754    ESTGFRAFRICA >60.0 02/11/2022 0858    EGFRNONAA >60.0 02/11/2022 0858        Lab Results   Component Value Date    WBC 5.85 11/02/2022    HGB 15.2 11/02/2022    HCT 48.0 11/02/2022    MCV 89 11/02/2022     11/02/2022         Sinus tachycardia   Possible Anterior infarct ,age undetermined   Abnormal ECG   When compared with ECG of 18-AUG-2022 09:05,   No significant change was found   Confirmed by Gallo BATRES, Tiffanie' B. ([id number]) on 8/19/2022 5:23:26 AM

## 2022-11-18 NOTE — ANESTHESIA PROCEDURE NOTES
Intubation    Date/Time: 11/18/2022 8:43 AM  Performed by: Sánchez Garcia CRNA  Authorized by: Sukhwinder Naatrajan II, MD     Intubation:     Induction:  Intravenous    Intubated:  Postinduction    Mask Ventilation:  Easy mask    Attempts:  1    Attempted By:  Student    Method of Intubation:  Direct    Blade:  Linda 3    Laryngeal View Grade: Grade I - full view of cords      Difficult Airway Encountered?: No      Complications:  None    Airway Device:  Oral endotracheal tube    Airway Device Size:  7.5    Style/Cuff Inflation:  Cuffed (inflated to minimal occlusive pressure)    Tube secured:  22    Secured at:  The lips    Placement Verified By:  Capnometry    Complicating Factors:  None    Findings Post-Intubation:  BS equal bilateral

## 2022-11-18 NOTE — DISCHARGE SUMMARY
[facility] - Surgery (Hospital)  Discharge Note  Short Stay    Procedure(s) (LRB):  ARTHROPLASTY, FINGER (Left) thumb basal joint using Arthrex internal brace system      OUTCOME: Patient tolerated treatment/procedure well without complication and is now ready for discharge.    DISPOSITION: Home or Self Care    FINAL DIAGNOSIS:  Left thumb basal joint arthritis    FOLLOWUP: In clinic    DISCHARGE INSTRUCTIONS:    Discharge Procedure Orders   Diet general     Call MD for:  temperature >100.4     Call MD for:  persistent nausea and vomiting     Call MD for:  severe uncontrolled pain     Call MD for:  difficulty breathing, headache or visual disturbances     Call MD for:  redness, tenderness, or signs of infection (pain, swelling, redness, odor or green/yellow discharge around incision site)     Call MD for:  hives     Call MD for:  persistent dizziness or light-headedness     Call MD for:  extreme fatigue        TIME SPENT ON DISCHARGE:  20 minutes

## 2022-11-18 NOTE — TRANSFER OF CARE
"Anesthesia Transfer of Care Note    Patient: Mil Sood    Procedure(s) Performed: Procedure(s) (LRB):  ARTHROPLASTY, FINGER (Left)    Patient location: PACU    Anesthesia Type: general    Transport from OR: Transported from OR on room air with adequate spontaneous ventilation    Post pain: adequate analgesia    Post assessment: no apparent anesthetic complications and tolerated procedure well    Post vital signs: stable    Level of consciousness: awake    Nausea/Vomiting: no nausea/vomiting    Complications: none    Transfer of care protocol was followed      Last vitals:   Visit Vitals  BP (!) 151/84   Pulse 89   Temp 36.2 °C (97.2 °F) (Skin)   Resp 16   Ht 5' 10" (1.778 m)   Wt 82.9 kg (182 lb 10.4 oz)   SpO2 100%   BMI 26.21 kg/m²     "

## 2022-11-21 NOTE — PROGRESS NOTES
SUBJECTIVE:      Patient ID: Mil Sood is a 67 y.o. male.    HPI: Mr. Sood is here today for post-operative visit #1.  He is 4 days status post ARTHROPLASTY, FINGER (Left) thumb basal joint using Arthrex internal brace system by Dr. Negrete on 11/18/22. He reports that he is doing well.  Pain is 3/10.  He is not taking any medication for pain.  He has been compliant with postop instructions and keeping the extremity dry. He denies fever, chills, and sweats since the time of the surgery.     Past Medical History:   Diagnosis Date    Anxiety     Cancer     spot on kidney and lung    Diabetes mellitus     Borderline diet controlled    Diverticulitis     GERD (gastroesophageal reflux disease)     Hyperlipidemia     not on any meds currently    Hypertension     Lung mass     Mass of kidney      Past Surgical History:   Procedure Laterality Date    CHOLECYSTECTOMY      COLONOSCOPY      COLONOSCOPY N/A 4/7/2017    Procedure: COLONOSCOPY;  Surgeon: Amarilys Bravo MD;  Location: [facility];  Service: Endoscopy;  Laterality: N/A;    DIAGNOSTIC LAPAROSCOPY N/A 5/4/2021    Procedure: LAPAROSCOPY, DIAGNOSTIC;  Surgeon: Shaji Jauregui MD;  Location: [facility];  Service: General;  Laterality: N/A;    ESOPHAGOGASTRODUODENOSCOPY      ESOPHAGOGASTRODUODENOSCOPY N/A 2/13/2020    Procedure: ESOPHAGOGASTRODUODENOSCOPY (EGD);  Surgeon: Shaji Aquino III, MD;  Location: [facility];  Service: Endoscopy;  Laterality: N/A;    HERNIA REPAIR Bilateral     groin    LEFT HEART CATHETERIZATION Left 1/31/2022    Procedure: CATHETERIZATION, HEART, LEFT;  Surgeon: Romeo Godfrey MD;  Location: [facility] CATH LAB;  Service: Cardiology;  Laterality: Left;    NECK SURGERY      OPEN REDUCTION AND INTERNAL FIXATION (ORIF) OF INJURY OF ANKLE Left 8/10/2022    Procedure: ORIF, ANKLE;  Surgeon: Howard Mata MD;  Location: [facility] OR;  Service: Orthopedics;  Laterality: Left;    PARTIAL NEPHRECTOMY      left    RENAL BIOPSY      ROBOT-ASSISTED REPAIR  OF UMBILICAL HERNIA USING DA NEL XI N/A 2021    Procedure: XI ROBOTIC REPAIR, HERNIA, UMBILICAL;  Surgeon: Shaji Jauregui MD;  Location: [facility];  Service: General;  Laterality: N/A;  converted to open    TONSILLECTOMY      TRIGGER FINGER RELEASE Right     long finger     Family History   Problem Relation Age of Onset    Hypertension Mother     Hypertension Father     Melanoma Father     Cancer Sister      Social History     Socioeconomic History    Marital status:    Tobacco Use    Smoking status: Former     Packs/day: 1.00     Years: 20.00     Pack years: 20.00     Types: Cigarettes     Quit date: 1995     Years since quittin.8    Smokeless tobacco: Never   Substance and Sexual Activity    Alcohol use: Never     Alcohol/week: 0.0 standard drinks    Drug use: No    Sexual activity: Yes     Partners: Female     Birth control/protection: None     Medication List with Changes/Refills   New Medications    SULFAMETHOXAZOLE-TRIMETHOPRIM 800-160MG (BACTRIM DS) 800-160 MG TAB    Take 1 tablet by mouth 2 (two) times daily.   Current Medications    AMLODIPINE (NORVASC) 5 MG TABLET    Take 1 tablet (5 mg total) by mouth once daily.    ASPIRIN (ECOTRIN) 81 MG EC TABLET    Take 1 tablet (81 mg total) by mouth once daily.    BENAZEPRIL (LOTENSIN) 20 MG TABLET    TAKE 2 TABLETS BY MOUTH NIGHTLY.    BLOOD SUGAR DIAGNOSTIC STRP    Use to check blood sugars BID    BLOOD SUGAR DIAGNOSTIC STRP    1 each by Misc.(Non-Drug; Combo Route) route 3 (three) times daily.    BLOOD-GLUCOSE METER KIT    Use as instructed    CITALOPRAM (CELEXA) 40 MG TABLET    Take 1 tablet (40 mg total) by mouth every evening.    CLONAZEPAM (KLONOPIN) 1 MG TABLET    Take 1 tablet (1 mg total) by mouth nightly as needed for Anxiety.    CYCLOBENZAPRINE (FLEXERIL) 10 MG TABLET    TAKE 1 TABLET BY MOUTH THREE TIMES DAILY AS NEEDED.    FINASTERIDE (PROSCAR) 5 MG TABLET    Take 1 tablet (5 mg total) by mouth nightly.    FLUTICASONE  "PROPION-SALMETEROL 115-21 MCG/DOSE (ADVAIR HFA) 115-21 MCG/ACTUATION HFAA INHALER    Inhale 2 puffs into the lungs 2 (two) times daily.    GABAPENTIN (NEURONTIN) 600 MG TABLET    Take 1 tablet (600 mg total) by mouth nightly.    HYDROXYZINE PAMOATE (VISTARIL) 25 MG CAP    Take 1 capsule (25 mg total) by mouth every evening.    ISOSORBIDE MONONITRATE (IMDUR) 60 MG 24 HR TABLET    TAKE 1 TABLET BY MOUTH ONCE DAILY.    LANCETS (ACCU-CHEK SOFTCLIX LANCETS) MISC    1 each by Misc.(Non-Drug; Combo Route) route 3 (three) times daily.    MELOXICAM (MOBIC) 15 MG TABLET    TAKE 1 TABLET BY MOUTH ONCE DAILY FOR 5 DAYS.THEN ONCE DAILY AS NEEDED FOR BACK PAIN.    METOPROLOL SUCCINATE (TOPROL-XL) 25 MG 24 HR TABLET    Take 1 tablet (25 mg total) by mouth once daily.    OMEPRAZOLE (PRILOSEC) 40 MG CAPSULE    Take 1 capsule (40 mg total) by mouth every evening.    OXYCODONE-ACETAMINOPHEN (PERCOCET)  MG PER TABLET    Take 1 tablet by mouth every 6 (six) hours as needed for Pain.    PRAVASTATIN (PRAVACHOL) 20 MG TABLET    Take 1 tablet (20 mg total) by mouth once daily.    RESTASIS MULTIDOSE 0.05 % DROP        TIOTROPIUM (SPIRIVA WITH HANDIHALER) 18 MCG INHALATION CAPSULE    INHALE THE CONTENTS OF ONE CAPSULE DAILY.    TRAZODONE (DESYREL) 100 MG TABLET    Take 1 tablet (100 mg total) by mouth every evening.    VENLAFAXINE (EFFEXOR) 75 MG TABLET    Take 1 tablet (75 mg total) by mouth 2 (two) times daily.    ZOLPIDEM (AMBIEN) 10 MG TAB    TAKE 1 TABLET BY MOUTH AT BEDTIME AS NEEDED FOR INSOMNIA.     Review of patient's allergies indicates:   Allergen Reactions    Rosuvastatin Other (See Comments)     Joint pain   Joint and bone pain       OBJECTIVE:     Physical exam:    Vitals:    11/22/22 0716   Weight: 82.9 kg (182 lb 12.2 oz)   Height: 5' 10" (1.778 m)   PainSc:   3   PainLoc: Hand     Vital signs are stable, patient is afebrile.  Patient is well dressed and well groomed, no acute distress.  Alert and oriented to person, " place, and time.    Left UE:  Post op dressing taken down.   Incision is dry and intact.   erythema and edema to incision, warm to touch.  No active drainage.  See picture attached  He is NVI.   Sutures in place.   2+ pulses noted.  Cap refill <2 seconds  Motor intact to hand          ASSESSMENT         Encounter Diagnoses   Name Primary?    Post-operative state Yes    Arthritis of carpometacarpal (CMC) joint of left thumb             4 days status post ARTHROPLASTY, FINGER (Left) thumb basal joint using Arthrex internal brace system    PLAN:           Mil was seen today for post-op evaluation.    Diagnoses and all orders for this visit:    Post-operative state  -     sulfamethoxazole-trimethoprim 800-160mg (BACTRIM DS) 800-160 mg Tab; Take 1 tablet by mouth 2 (two) times daily.    Arthritis of carpometacarpal (CMC) joint of left thumb  -     sulfamethoxazole-trimethoprim 800-160mg (BACTRIM DS) 800-160 mg Tab; Take 1 tablet by mouth 2 (two) times daily.      - PO instruction reviewed and provided to patient  - The incision was cleaned with hydrogen peroxide and normal saline. A sterile Band-Aid was applied.   - Patient may clean the incision daily as above.   - thumb spica brace given today.  Wear full-time, may take off to shower  - Bactrim DS b.i.d. for 10 days  - Patient should notify the office of any signs or symptoms of infection including fevers, erythema, purulent drainage, increasing pain.    - Follow up for suture removal and wound check    POST OPERATIVE INSTRUCTIONS - Visit #1    BANDAGES/DRESSING/BATHING:  We have removed the dressing, cleaned your incision, and put on a band-aid at your first post op visit today. You may clean the incision daily as we did today. Keep the incision clean and dry. When showering, you may cover the incision with a plastic bag/umbrella bag.   Do not use Neosporin or other topical ointments or creams on the incision. If you are concerned about any redness or drainage of  the incisions, please call the office.    SWELLING  Some swelling of your arm, hand and fingers is normal. The swelling can be decreased by  elevating your arm on a few pillows when lying down. Swelling can be further controlled by cold therapy over the surgical site. Flexion/extension of the fingers (opening and closing your hands) will also help to relieve swelling and prevent stiffness.    ACTIVITY  You may use the hand and wrist as tolerated for light activity. You are encouraged to bend the wrist, elbow and fingers as soon as the initial surgical dressing is removed. Avoid lifting, pushing, or pulling any object greater than 5-10 pounds for the first 10-14 days.     BRACE  Wear your thumb spica brace full-time, may take off to shower    MEDICATIONS  Take pain medicines exactly as directed.  If the doctor gave you a prescription medicine for pain, take it as prescribed.  If you are not taking a prescription pain medicine, take an over-the-counter medicine such as acetaminophen (Tylenol), ibuprofen (Advil, Motrin), or naproxen (Aleve) as long as you do not have an allergy or medical condition that prevents you from taking them.  Do not take two or more pain medicines at the same time unless the doctor told you to. Many pain medicines have acetaminophen, which is Tylenol. Too much acetaminophen (Tylenol) can be harmful.    FOLLOW -UP  You should be scheduled for a post-op appointment within the 10-14 days following surgery, at which time we will review your surgery, remove sutures and evaluate incisions, review your post-operative program and answer any of your questions.         Rupinder Narayan PA-C   [facility]

## 2022-11-22 ENCOUNTER — OFFICE VISIT (OUTPATIENT)
Dept: ORTHOPEDICS | Facility: CLINIC | Age: 67
End: 2022-11-22
Payer: MEDICARE

## 2022-11-22 VITALS — WEIGHT: 182.75 LBS | BODY MASS INDEX: 26.16 KG/M2 | HEIGHT: 70 IN

## 2022-11-22 DIAGNOSIS — M18.12 ARTHRITIS OF CARPOMETACARPAL (CMC) JOINT OF LEFT THUMB: ICD-10-CM

## 2022-11-22 DIAGNOSIS — Z98.890 POST-OPERATIVE STATE: Primary | ICD-10-CM

## 2022-11-22 PROCEDURE — 3008F PR BODY MASS INDEX (BMI) DOCUMENTED: ICD-10-PCS | Mod: CPTII,S$GLB,,

## 2022-11-22 PROCEDURE — 3044F PR MOST RECENT HEMOGLOBIN A1C LEVEL <7.0%: ICD-10-PCS | Mod: CPTII,S$GLB,,

## 2022-11-22 PROCEDURE — 3288F PR FALLS RISK ASSESSMENT DOCUMENTED: ICD-10-PCS | Mod: CPTII,S$GLB,,

## 2022-11-22 PROCEDURE — 3008F BODY MASS INDEX DOCD: CPT | Mod: CPTII,S$GLB,,

## 2022-11-22 PROCEDURE — 99999 PR PBB SHADOW E&M-EST. PATIENT-LVL IV: ICD-10-PCS | Mod: PBBFAC,,,

## 2022-11-22 PROCEDURE — 4010F ACE/ARB THERAPY RXD/TAKEN: CPT | Mod: CPTII,S$GLB,,

## 2022-11-22 PROCEDURE — 1101F PT FALLS ASSESS-DOCD LE1/YR: CPT | Mod: CPTII,S$GLB,,

## 2022-11-22 PROCEDURE — 99024 POSTOP FOLLOW-UP VISIT: CPT | Mod: S$GLB,,,

## 2022-11-22 PROCEDURE — 3044F HG A1C LEVEL LT 7.0%: CPT | Mod: CPTII,S$GLB,,

## 2022-11-22 PROCEDURE — 1159F PR MEDICATION LIST DOCUMENTED IN MEDICAL RECORD: ICD-10-PCS | Mod: CPTII,S$GLB,,

## 2022-11-22 PROCEDURE — 1125F AMNT PAIN NOTED PAIN PRSNT: CPT | Mod: CPTII,S$GLB,,

## 2022-11-22 PROCEDURE — 99999 PR PBB SHADOW E&M-EST. PATIENT-LVL IV: CPT | Mod: PBBFAC,,,

## 2022-11-22 PROCEDURE — 99024 PR POST-OP FOLLOW-UP VISIT: ICD-10-PCS | Mod: S$GLB,,,

## 2022-11-22 PROCEDURE — 3288F FALL RISK ASSESSMENT DOCD: CPT | Mod: CPTII,S$GLB,,

## 2022-11-22 PROCEDURE — 1125F PR PAIN SEVERITY QUANTIFIED, PAIN PRESENT: ICD-10-PCS | Mod: CPTII,S$GLB,,

## 2022-11-22 PROCEDURE — 1159F MED LIST DOCD IN RCRD: CPT | Mod: CPTII,S$GLB,,

## 2022-11-22 PROCEDURE — 4010F PR ACE/ARB THEARPY RXD/TAKEN: ICD-10-PCS | Mod: CPTII,S$GLB,,

## 2022-11-22 PROCEDURE — 1101F PR PT FALLS ASSESS DOC 0-1 FALLS W/OUT INJ PAST YR: ICD-10-PCS | Mod: CPTII,S$GLB,,

## 2022-11-22 RX ORDER — SULFAMETHOXAZOLE AND TRIMETHOPRIM 800; 160 MG/1; MG/1
1 TABLET ORAL 2 TIMES DAILY
Qty: 20 TABLET | Refills: 0 | Status: SHIPPED | OUTPATIENT
Start: 2022-11-22 | End: 2023-05-16

## 2022-11-23 VITALS
OXYGEN SATURATION: 97 % | TEMPERATURE: 97 F | DIASTOLIC BLOOD PRESSURE: 91 MMHG | HEART RATE: 89 BPM | RESPIRATION RATE: 41 BRPM | BODY MASS INDEX: 26.15 KG/M2 | HEIGHT: 70 IN | SYSTOLIC BLOOD PRESSURE: 152 MMHG | WEIGHT: 182.63 LBS

## 2022-11-28 DIAGNOSIS — F40.9 INSOMNIA DUE TO ANXIETY AND FEAR: ICD-10-CM

## 2022-11-28 DIAGNOSIS — F51.05 INSOMNIA DUE TO ANXIETY AND FEAR: ICD-10-CM

## 2022-11-28 RX ORDER — ZOLPIDEM TARTRATE 10 MG/1
10 TABLET ORAL NIGHTLY
Qty: 30 TABLET | Refills: 3 | Status: SHIPPED | OUTPATIENT
Start: 2022-11-28 | End: 2023-03-22

## 2022-11-28 NOTE — TELEPHONE ENCOUNTER
----- Message from Rosaline Wilkes sent at 11/28/2022  7:58 AM CST -----  He is needing a refill of his ambien.

## 2022-11-28 NOTE — TELEPHONE ENCOUNTER
No new care gaps identified.  [facility] Embedded Care Gaps. Reference number: [id number]. 11/28/2022   1:30:16 PM CST

## 2022-11-30 ENCOUNTER — HOSPITAL ENCOUNTER (OUTPATIENT)
Dept: RADIOLOGY | Facility: HOSPITAL | Age: 67
Discharge: HOME | End: 2022-11-30
Attending: PHYSICIAN ASSISTANT
Payer: MEDICARE

## 2022-11-30 ENCOUNTER — OFFICE VISIT (OUTPATIENT)
Dept: ORTHOPEDICS | Facility: CLINIC | Age: 67
End: 2022-11-30
Payer: MEDICARE

## 2022-11-30 VITALS
SYSTOLIC BLOOD PRESSURE: 141 MMHG | BODY MASS INDEX: 26.16 KG/M2 | DIASTOLIC BLOOD PRESSURE: 91 MMHG | WEIGHT: 182.75 LBS | HEIGHT: 70 IN | HEART RATE: 112 BPM

## 2022-11-30 DIAGNOSIS — M25.572 LEFT ANKLE PAIN, UNSPECIFIED CHRONICITY: ICD-10-CM

## 2022-11-30 DIAGNOSIS — S82.62XD CLOSED DISPLACED FRACTURE OF LATERAL MALLEOLUS OF LEFT FIBULA WITH ROUTINE HEALING, SUBSEQUENT ENCOUNTER: Primary | ICD-10-CM

## 2022-11-30 DIAGNOSIS — M25.572 ACUTE LEFT ANKLE PAIN: Primary | ICD-10-CM

## 2022-11-30 PROCEDURE — 3288F FALL RISK ASSESSMENT DOCD: CPT | Mod: CPTII,S$GLB,, | Performed by: PHYSICIAN ASSISTANT

## 2022-11-30 PROCEDURE — 73610 X-RAY EXAM OF ANKLE: CPT | Mod: TC,LT

## 2022-11-30 PROCEDURE — 3080F PR MOST RECENT DIASTOLIC BLOOD PRESSURE >= 90 MM HG: ICD-10-PCS | Mod: CPTII,S$GLB,, | Performed by: PHYSICIAN ASSISTANT

## 2022-11-30 PROCEDURE — 3077F SYST BP >= 140 MM HG: CPT | Mod: CPTII,S$GLB,, | Performed by: PHYSICIAN ASSISTANT

## 2022-11-30 PROCEDURE — 1159F PR MEDICATION LIST DOCUMENTED IN MEDICAL RECORD: ICD-10-PCS | Mod: CPTII,S$GLB,, | Performed by: PHYSICIAN ASSISTANT

## 2022-11-30 PROCEDURE — 3044F PR MOST RECENT HEMOGLOBIN A1C LEVEL <7.0%: ICD-10-PCS | Mod: CPTII,S$GLB,, | Performed by: PHYSICIAN ASSISTANT

## 2022-11-30 PROCEDURE — 99214 PR OFFICE/OUTPT VISIT, EST, LEVL IV, 30-39 MIN: ICD-10-PCS | Mod: S$GLB,,, | Performed by: PHYSICIAN ASSISTANT

## 2022-11-30 PROCEDURE — 1125F AMNT PAIN NOTED PAIN PRSNT: CPT | Mod: CPTII,S$GLB,, | Performed by: PHYSICIAN ASSISTANT

## 2022-11-30 PROCEDURE — 1159F MED LIST DOCD IN RCRD: CPT | Mod: CPTII,S$GLB,, | Performed by: PHYSICIAN ASSISTANT

## 2022-11-30 PROCEDURE — 1100F PR PT FALLS ASSESS DOC 2+ FALLS/FALL W/INJURY/YR: ICD-10-PCS | Mod: CPTII,S$GLB,, | Performed by: PHYSICIAN ASSISTANT

## 2022-11-30 PROCEDURE — 99214 OFFICE O/P EST MOD 30 MIN: CPT | Mod: S$GLB,,, | Performed by: PHYSICIAN ASSISTANT

## 2022-11-30 PROCEDURE — 3008F BODY MASS INDEX DOCD: CPT | Mod: CPTII,S$GLB,, | Performed by: PHYSICIAN ASSISTANT

## 2022-11-30 PROCEDURE — 73610 X-RAY EXAM OF ANKLE: CPT | Mod: 26,LT,, | Performed by: RADIOLOGY

## 2022-11-30 PROCEDURE — 3288F PR FALLS RISK ASSESSMENT DOCUMENTED: ICD-10-PCS | Mod: CPTII,S$GLB,, | Performed by: PHYSICIAN ASSISTANT

## 2022-11-30 PROCEDURE — 3077F PR MOST RECENT SYSTOLIC BLOOD PRESSURE >= 140 MM HG: ICD-10-PCS | Mod: CPTII,S$GLB,, | Performed by: PHYSICIAN ASSISTANT

## 2022-11-30 PROCEDURE — 1125F PR PAIN SEVERITY QUANTIFIED, PAIN PRESENT: ICD-10-PCS | Mod: CPTII,S$GLB,, | Performed by: PHYSICIAN ASSISTANT

## 2022-11-30 PROCEDURE — 3008F PR BODY MASS INDEX (BMI) DOCUMENTED: ICD-10-PCS | Mod: CPTII,S$GLB,, | Performed by: PHYSICIAN ASSISTANT

## 2022-11-30 PROCEDURE — 99999 PR PBB SHADOW E&M-EST. PATIENT-LVL V: CPT | Mod: PBBFAC,,, | Performed by: PHYSICIAN ASSISTANT

## 2022-11-30 PROCEDURE — 1160F PR REVIEW ALL MEDS BY PRESCRIBER/CLIN PHARMACIST DOCUMENTED: ICD-10-PCS | Mod: CPTII,S$GLB,, | Performed by: PHYSICIAN ASSISTANT

## 2022-11-30 PROCEDURE — 1160F RVW MEDS BY RX/DR IN RCRD: CPT | Mod: CPTII,S$GLB,, | Performed by: PHYSICIAN ASSISTANT

## 2022-11-30 PROCEDURE — 4010F ACE/ARB THERAPY RXD/TAKEN: CPT | Mod: CPTII,S$GLB,, | Performed by: PHYSICIAN ASSISTANT

## 2022-11-30 PROCEDURE — 99999 PR PBB SHADOW E&M-EST. PATIENT-LVL V: ICD-10-PCS | Mod: PBBFAC,,, | Performed by: PHYSICIAN ASSISTANT

## 2022-11-30 PROCEDURE — 1100F PTFALLS ASSESS-DOCD GE2>/YR: CPT | Mod: CPTII,S$GLB,, | Performed by: PHYSICIAN ASSISTANT

## 2022-11-30 PROCEDURE — 3080F DIAST BP >= 90 MM HG: CPT | Mod: CPTII,S$GLB,, | Performed by: PHYSICIAN ASSISTANT

## 2022-11-30 PROCEDURE — 3044F HG A1C LEVEL LT 7.0%: CPT | Mod: CPTII,S$GLB,, | Performed by: PHYSICIAN ASSISTANT

## 2022-11-30 PROCEDURE — 73610 XR ANKLE COMPLETE 3 VIEW LEFT: ICD-10-PCS | Mod: 26,LT,, | Performed by: RADIOLOGY

## 2022-11-30 PROCEDURE — 4010F PR ACE/ARB THEARPY RXD/TAKEN: ICD-10-PCS | Mod: CPTII,S$GLB,, | Performed by: PHYSICIAN ASSISTANT

## 2022-11-30 NOTE — PROGRESS NOTES
Subjective:      Patient ID: Mil Sood is a 67 y.o. male.    Chief Complaint: Pain of the Left Ankle      HPI: Mil Sood is a 67-year-old male in clinic today for postoperative follow-up.  Patient is 3-1/2 months status post ORIF of his left lateral malleolus fracture with a plate and screws.  Patient is doing very well this time.  He has been completely out of the boot for over 6 weeks now.  He is fully weight-bearing without any pain.  He does report that he will get pain at night the medial side of his ankle, but it is not worse with weight-bearing or range of motion    Past Medical History:   Diagnosis Date    Anxiety     Cancer     spot on kidney and lung    Diabetes mellitus     Borderline diet controlled    Diverticulitis     GERD (gastroesophageal reflux disease)     Hyperlipidemia     not on any meds currently    Hypertension     Lung mass     Mass of kidney        Current Outpatient Medications:     amLODIPine (NORVASC) 5 MG tablet, Take 1 tablet (5 mg total) by mouth once daily. (Patient taking differently: Take 5 mg by mouth every evening.), Disp: 90 tablet, Rfl: 3    aspirin (ECOTRIN) 81 MG EC tablet, Take 1 tablet (81 mg total) by mouth once daily., Disp: , Rfl:     benazepriL (LOTENSIN) 20 MG tablet, TAKE 2 TABLETS BY MOUTH NIGHTLY., Disp: 180 tablet, Rfl: 1    blood sugar diagnostic Strp, Use to check blood sugars BID, Disp: 180 strip, Rfl: 3    blood sugar diagnostic Strp, 1 each by Misc.(Non-Drug; Combo Route) route 3 (three) times daily., Disp: 100 each, Rfl: 12    blood-glucose meter kit, Use as instructed, Disp: 1 each, Rfl: 1    citalopram (CELEXA) 40 MG tablet, Take 1 tablet (40 mg total) by mouth every evening., Disp: 90 tablet, Rfl: 3    clonazePAM (KLONOPIN) 1 MG tablet, Take 1 tablet (1 mg total) by mouth nightly as needed for Anxiety., Disp: 30 tablet, Rfl: 1    cyclobenzaprine (FLEXERIL) 10 MG tablet, TAKE 1 TABLET BY MOUTH THREE TIMES DAILY AS NEEDED., Disp: 90 tablet, Rfl: 0     finasteride (PROSCAR) 5 mg tablet, Take 1 tablet (5 mg total) by mouth nightly., Disp: 90 tablet, Rfl: 1    gabapentin (NEURONTIN) 600 MG tablet, Take 1 tablet (600 mg total) by mouth nightly., Disp: 90 tablet, Rfl: 1    hydrOXYzine pamoate (VISTARIL) 25 MG Cap, Take 1 capsule (25 mg total) by mouth every evening., Disp: 30 capsule, Rfl: 1    isosorbide mononitrate (IMDUR) 60 MG 24 hr tablet, TAKE 1 TABLET BY MOUTH ONCE DAILY., Disp: 90 tablet, Rfl: 2    lancets (ACCU-CHEK SOFTCLIX LANCETS) Misc, 1 each by Misc.(Non-Drug; Combo Route) route 3 (three) times daily., Disp: 100 each, Rfl: 12    meloxicam (MOBIC) 15 MG tablet, TAKE 1 TABLET BY MOUTH ONCE DAILY FOR 5 DAYS.THEN ONCE DAILY AS NEEDED FOR BACK PAIN., Disp: 30 tablet, Rfl: 0    metoprolol succinate (TOPROL-XL) 25 MG 24 hr tablet, Take 1 tablet (25 mg total) by mouth once daily. (Patient taking differently: Take 25 mg by mouth every evening.), Disp: 30 tablet, Rfl: 5    omeprazole (PRILOSEC) 40 MG capsule, Take 1 capsule (40 mg total) by mouth every evening., Disp: 90 capsule, Rfl: 3    pravastatin (PRAVACHOL) 20 MG tablet, Take 1 tablet (20 mg total) by mouth once daily. (Patient taking differently: Take 20 mg by mouth every evening.), Disp: 30 tablet, Rfl: 5    RESTASIS MULTIDOSE 0.05 % Drop, , Disp: , Rfl:     sulfamethoxazole-trimethoprim 800-160mg (BACTRIM DS) 800-160 mg Tab, Take 1 tablet by mouth 2 (two) times daily., Disp: 20 tablet, Rfl: 0    tiotropium (SPIRIVA WITH HANDIHALER) 18 mcg inhalation capsule, INHALE THE CONTENTS OF ONE CAPSULE DAILY., Disp: 90 capsule, Rfl: 1    traZODone (DESYREL) 100 MG tablet, Take 1 tablet (100 mg total) by mouth every evening., Disp: 90 tablet, Rfl: 3    venlafaxine (EFFEXOR) 75 MG tablet, Take 1 tablet (75 mg total) by mouth 2 (two) times daily., Disp: 180 tablet, Rfl: 1    zolpidem (AMBIEN) 10 mg Tab, Take 1 tablet (10 mg total) by mouth every evening., Disp: 30 tablet, Rfl: 3    fluticasone propion-salmeterol  "115-21 mcg/dose (ADVAIR HFA) 115-21 mcg/actuation HFAA inhaler, Inhale 2 puffs into the lungs 2 (two) times daily., Disp: 12 g, Rfl: 12    oxyCODONE-acetaminophen (PERCOCET)  mg per tablet, Take 1 tablet by mouth every 6 (six) hours as needed for Pain. (Patient not taking: Reported on 11/22/2022), Disp: 28 tablet, Rfl: 0  No current facility-administered medications for this visit.    Facility-Administered Medications Ordered in Other Visits:     chlorhexidine 0.12 % solution 10 mL, 10 mL, Mouth/Throat, On Call Procedure, Rupinder Narayan PA-C, 10 mL at 11/18/22 0828  Review of patient's allergies indicates:   Allergen Reactions    Rosuvastatin Other (See Comments)     Joint pain   Joint and bone pain       BP (!) 141/91 (BP Location: Left arm, Patient Position: Sitting, BP Method: Medium (Automatic))   Pulse (!) 112   Ht 5' 10" (1.778 m)   Wt 82.9 kg (182 lb 12.2 oz)   BMI 26.22 kg/m²     ROS      Objective:    Ortho Exam   Left ankle:  Incision is well healed, no signs of infection   No edema  No TTP  Ankle ROM is full   Calf and compartments are soft and compressible  Motor exam normal   Sensation and pulses intact    GEN: Well developed, well nourished male. AAOX3. No acute distress.   Head: Normocephalic, atraumatic.   Eyes: ALONDRA  Neck: Trachea is midline, no adenopathy  Resp: Breathing unlabored.  Neuro: Motor function normal, Cranial nerves intact  Psych: Mood and affect appropriate.       Assessment:     Imaging:  X-ray left ankle obtained today shows hardware in satisfactory alignment no signs of failure.  There is interval healing noted.  No detrimental changes        1. Closed displaced fracture of lateral malleolus of left fibula with routine healing, subsequent encounter          Plan:       Reviewed the radiographs with the patient.  Encouraged him to continue to increase activities as tolerated.  Patient should continue to ice and elevate the extremity as needed to help with edema and evening " pain.  Patient will return to clinic in about 6 weeks for repeat radiographs and further evaluation       Follow up in about 6 weeks (around 1/11/2023).          Patient note was created using MModal Dictation.  Any errors in syntax or even information may not have been identified and edited on initial review prior to signing this note.       No

## 2022-12-01 NOTE — PROGRESS NOTES
SUBJECTIVE:      Patient ID: Mil Sood is a 67 y.o. male.    HPI: Mr. Sood is here today for post-operative visit #2.  He is 14 days status post ARTHROPLASTY, FINGER (Left) thumb basal joint using Arthrex internal brace system by Dr. Negrete on 11/18/22. He reports that he is doing well.  Pain is 3/10.  He is not taking pain medication.  He is compliant with taking the Bactrim as well.  States that he has only been wearing his thumb spica brace whenever the thumb has been painful.  He has been compliant with keeping the extremity dry. He denies fever, chills, and sweats since the time of the surgery.     Interval hx 11/22/22:  Mr. Sood is here today for post-operative visit #1.  He is 4 days status post ARTHROPLASTY, FINGER (Left) thumb basal joint using Arthrex internal brace system by Dr. Negrete on 11/18/22. He reports that he is doing well.  Pain is 3/10.  He is not taking any medication for pain.  He has been compliant with postop instructions and keeping the extremity dry. He denies fever, chills, and sweats since the time of the surgery.     Past Medical History:   Diagnosis Date    Anxiety     Cancer     spot on kidney and lung    Diabetes mellitus     Borderline diet controlled    Diverticulitis     GERD (gastroesophageal reflux disease)     Hyperlipidemia     not on any meds currently    Hypertension     Lung mass     Mass of kidney      Past Surgical History:   Procedure Laterality Date    ARTHROPLASTY OF JOINT OF FINGER Left 11/18/2022    Procedure: ARTHROPLASTY, FINGER;  Surgeon: Kane Negrete MD;  Location: [facility] OR;  Service: Orthopedics;  Laterality: Left;  left thumb basal joint arthroplasty  Arthrex  Podiatry extras    CHOLECYSTECTOMY      COLONOSCOPY      COLONOSCOPY N/A 4/7/2017    Procedure: COLONOSCOPY;  Surgeon: Amarilys Bravo MD;  Location: [facility] ENDO;  Service: Endoscopy;  Laterality: N/A;    DIAGNOSTIC LAPAROSCOPY N/A 5/4/2021    Procedure: LAPAROSCOPY, DIAGNOSTIC;  Surgeon:  Shaji Jauregui MD;  Location: [facility] OR;  Service: General;  Laterality: N/A;    ESOPHAGOGASTRODUODENOSCOPY      ESOPHAGOGASTRODUODENOSCOPY N/A 2020    Procedure: ESOPHAGOGASTRODUODENOSCOPY (EGD);  Surgeon: Shaji Aquino III, MD;  Location: [facility] ENDO;  Service: Endoscopy;  Laterality: N/A;    HERNIA REPAIR Bilateral     groin    LEFT HEART CATHETERIZATION Left 2022    Procedure: CATHETERIZATION, HEART, LEFT;  Surgeon: Romeo Godfrey MD;  Location: [facility] CATH LAB;  Service: Cardiology;  Laterality: Left;    NECK SURGERY      OPEN REDUCTION AND INTERNAL FIXATION (ORIF) OF INJURY OF ANKLE Left 8/10/2022    Procedure: ORIF, ANKLE;  Surgeon: Howard Mata MD;  Location: [facility] OR;  Service: Orthopedics;  Laterality: Left;    PARTIAL NEPHRECTOMY      left    RENAL BIOPSY      ROBOT-ASSISTED REPAIR OF UMBILICAL HERNIA USING DA NEL XI N/A 2021    Procedure: XI ROBOTIC REPAIR, HERNIA, UMBILICAL;  Surgeon: Shaji Jauregui MD;  Location: [facility] OR;  Service: General;  Laterality: N/A;  converted to open    TONSILLECTOMY      TRIGGER FINGER RELEASE Right     long finger     Family History   Problem Relation Age of Onset    Hypertension Mother     Hypertension Father     Melanoma Father     Cancer Sister      Social History     Socioeconomic History    Marital status:    Tobacco Use    Smoking status: Former     Packs/day: 1.00     Years: 20.00     Pack years: 20.00     Types: Cigarettes     Quit date: 1995     Years since quittin.8    Smokeless tobacco: Never   Substance and Sexual Activity    Alcohol use: Never     Alcohol/week: 0.0 standard drinks    Drug use: No    Sexual activity: Yes     Partners: Female     Birth control/protection: None     Medication List with Changes/Refills   Current Medications    AMLODIPINE (NORVASC) 5 MG TABLET    Take 1 tablet (5 mg total) by mouth once daily.    ASPIRIN (ECOTRIN) 81 MG EC TABLET    Take 1 tablet (81 mg total) by mouth once daily.    BENAZEPRIL  (LOTENSIN) 20 MG TABLET    TAKE 2 TABLETS BY MOUTH NIGHTLY.    BLOOD SUGAR DIAGNOSTIC STRP    Use to check blood sugars BID    BLOOD SUGAR DIAGNOSTIC STRP    1 each by Misc.(Non-Drug; Combo Route) route 3 (three) times daily.    BLOOD-GLUCOSE METER KIT    Use as instructed    CITALOPRAM (CELEXA) 40 MG TABLET    Take 1 tablet (40 mg total) by mouth every evening.    CLONAZEPAM (KLONOPIN) 1 MG TABLET    Take 1 tablet (1 mg total) by mouth nightly as needed for Anxiety.    CYCLOBENZAPRINE (FLEXERIL) 10 MG TABLET    TAKE 1 TABLET BY MOUTH THREE TIMES DAILY AS NEEDED.    FINASTERIDE (PROSCAR) 5 MG TABLET    Take 1 tablet (5 mg total) by mouth nightly.    FLUTICASONE PROPION-SALMETEROL 115-21 MCG/DOSE (ADVAIR HFA) 115-21 MCG/ACTUATION HFAA INHALER    Inhale 2 puffs into the lungs 2 (two) times daily.    GABAPENTIN (NEURONTIN) 600 MG TABLET    Take 1 tablet (600 mg total) by mouth nightly.    HYDROXYZINE PAMOATE (VISTARIL) 25 MG CAP    Take 1 capsule (25 mg total) by mouth every evening.    ISOSORBIDE MONONITRATE (IMDUR) 60 MG 24 HR TABLET    TAKE 1 TABLET BY MOUTH ONCE DAILY.    LANCETS (ACCU-CHEK SOFTCLIX LANCETS) MISC    1 each by Misc.(Non-Drug; Combo Route) route 3 (three) times daily.    MELOXICAM (MOBIC) 15 MG TABLET    TAKE 1 TABLET BY MOUTH ONCE DAILY FOR 5 DAYS.THEN ONCE DAILY AS NEEDED FOR BACK PAIN.    METOPROLOL SUCCINATE (TOPROL-XL) 25 MG 24 HR TABLET    Take 1 tablet (25 mg total) by mouth once daily.    OMEPRAZOLE (PRILOSEC) 40 MG CAPSULE    Take 1 capsule (40 mg total) by mouth every evening.    OXYCODONE-ACETAMINOPHEN (PERCOCET)  MG PER TABLET    Take 1 tablet by mouth every 6 (six) hours as needed for Pain.    PRAVASTATIN (PRAVACHOL) 20 MG TABLET    Take 1 tablet (20 mg total) by mouth once daily.    RESTASIS MULTIDOSE 0.05 % DROP        SULFAMETHOXAZOLE-TRIMETHOPRIM 800-160MG (BACTRIM DS) 800-160 MG TAB    Take 1 tablet by mouth 2 (two) times daily.    TIOTROPIUM (SPIRIVA WITH HANDIHALER) 18 MCG  "INHALATION CAPSULE    INHALE THE CONTENTS OF ONE CAPSULE DAILY.    TRAZODONE (DESYREL) 100 MG TABLET    Take 1 tablet (100 mg total) by mouth every evening.    VENLAFAXINE (EFFEXOR) 75 MG TABLET    Take 1 tablet (75 mg total) by mouth 2 (two) times daily.    ZOLPIDEM (AMBIEN) 10 MG TAB    Take 1 tablet (10 mg total) by mouth every evening.     Review of patient's allergies indicates:   Allergen Reactions    Rosuvastatin Other (See Comments)     Joint pain   Joint and bone pain       OBJECTIVE:     Physical exam:    Vitals:    12/02/22 0713   Weight: 82.9 kg (182 lb 12.2 oz)   Height: 5' 10" (1.778 m)   PainSc:   3   PainLoc: Hand     Vital signs are stable, patient is afebrile.  Patient is well dressed and well groomed, no acute distress.  Alert and oriented to person, place, and time.    Left UE:  Incision is clean, dry, and intact.   There is no exudate. There is no sign of any infection.   Mild erythema, improved from last visit  Mild edema to hand  He is NVI.   Sutures in place.   2+ pulses noted.  Cap refill <2 seconds  Motor intact to hand    ASSESSMENT         Encounter Diagnoses   Name Primary?    Arthritis of carpometacarpal (CMC) joint of left thumb Yes    Post-operative state             14 days status post ARTHROPLASTY, FINGER (Left) thumb basal joint using Arthrex internal brace system    PLAN:           Mil was seen today for post-op evaluation.    Diagnoses and all orders for this visit:    Arthritis of carpometacarpal (CMC) joint of left thumb    Post-operative state      - PO instruction reviewed and provided to patient  - The incision was cleaned with hydrogen peroxide. Sutures removed with no difficulty. Steri-Strips applied.   - thumb spica brace full-time for 4 more weeks, then wean.  Discussed importance of brace compliance with patient to protect the internal brace from the surgery  - Patient should notify the office of any signs or symptoms of infection including fevers, erythema, " purulent drainage, increasing pain.    - Follow up PRN     POST OPERATIVE VISIT INSTRUCTIONS - Visit #2    1. No soaking the incision for at least 7-10 days. You may get it wet in the shower and use regular soap.    2. To avoid a hard, painful scar, we recommend you use Mederma and/or Silicone Scar patches. You may also use Cocoa Butter, Vitamin E oil, Coconut oil, etc.    You may start this 5-7 days after stitches are removed and when wound is completely closed.    - Mederma scar cream: scar massage over incision 1-2 times per day. You may use topical lotion or Vitamin E oil. Massage an additional few times a day to help the scar become soft and less sensitive.    - Silicone Scar Patches: cut and place over the incision, then massage over the patch to help with scarring and sensitivity. Can be reused up to 10 days if you rinse it clean, let it dry out, then reapply.    Brands: Mepiform Silicone Scar Sheets (recommended), Scar Away, [employer] brand or generic pharmacy brand ([employer], [employer], [employer])    3. Continue range of motion exercises as instructed at todays visit.    4. You may take Tylenol 500mg and/or Ibuprofen 400mg every 4-6 hours with food for pain as tolerated as long as you do not have an allergy or medical condition that prevents you from taking them.    5. Therapy recommended:  None currently    6. Immobilization:  Thumb spica brace for 4 more weeks    7. Follow up:  As needed         Rupinder Narayan PA-C   [facility]

## 2022-12-02 ENCOUNTER — OFFICE VISIT (OUTPATIENT)
Dept: ORTHOPEDICS | Facility: CLINIC | Age: 67
End: 2022-12-02
Payer: MEDICARE

## 2022-12-02 VITALS — BODY MASS INDEX: 26.16 KG/M2 | WEIGHT: 182.75 LBS | HEIGHT: 70 IN

## 2022-12-02 DIAGNOSIS — Z98.890 POST-OPERATIVE STATE: ICD-10-CM

## 2022-12-02 DIAGNOSIS — M18.12 ARTHRITIS OF CARPOMETACARPAL (CMC) JOINT OF LEFT THUMB: Primary | ICD-10-CM

## 2022-12-02 PROCEDURE — 3288F FALL RISK ASSESSMENT DOCD: CPT | Mod: CPTII,S$GLB,,

## 2022-12-02 PROCEDURE — 3044F PR MOST RECENT HEMOGLOBIN A1C LEVEL <7.0%: ICD-10-PCS | Mod: CPTII,S$GLB,,

## 2022-12-02 PROCEDURE — 99024 POSTOP FOLLOW-UP VISIT: CPT | Mod: S$GLB,,,

## 2022-12-02 PROCEDURE — 1125F PR PAIN SEVERITY QUANTIFIED, PAIN PRESENT: ICD-10-PCS | Mod: CPTII,S$GLB,,

## 2022-12-02 PROCEDURE — 3008F PR BODY MASS INDEX (BMI) DOCUMENTED: ICD-10-PCS | Mod: CPTII,S$GLB,,

## 2022-12-02 PROCEDURE — 99999 PR PBB SHADOW E&M-EST. PATIENT-LVL III: CPT | Mod: PBBFAC,,,

## 2022-12-02 PROCEDURE — 99024 PR POST-OP FOLLOW-UP VISIT: ICD-10-PCS | Mod: S$GLB,,,

## 2022-12-02 PROCEDURE — 4010F ACE/ARB THERAPY RXD/TAKEN: CPT | Mod: CPTII,S$GLB,,

## 2022-12-02 PROCEDURE — 1159F MED LIST DOCD IN RCRD: CPT | Mod: CPTII,S$GLB,,

## 2022-12-02 PROCEDURE — 1159F PR MEDICATION LIST DOCUMENTED IN MEDICAL RECORD: ICD-10-PCS | Mod: CPTII,S$GLB,,

## 2022-12-02 PROCEDURE — 1101F PR PT FALLS ASSESS DOC 0-1 FALLS W/OUT INJ PAST YR: ICD-10-PCS | Mod: CPTII,S$GLB,,

## 2022-12-02 PROCEDURE — 3288F PR FALLS RISK ASSESSMENT DOCUMENTED: ICD-10-PCS | Mod: CPTII,S$GLB,,

## 2022-12-02 PROCEDURE — 1101F PT FALLS ASSESS-DOCD LE1/YR: CPT | Mod: CPTII,S$GLB,,

## 2022-12-02 PROCEDURE — 3044F HG A1C LEVEL LT 7.0%: CPT | Mod: CPTII,S$GLB,,

## 2022-12-02 PROCEDURE — 3008F BODY MASS INDEX DOCD: CPT | Mod: CPTII,S$GLB,,

## 2022-12-02 PROCEDURE — 4010F PR ACE/ARB THEARPY RXD/TAKEN: ICD-10-PCS | Mod: CPTII,S$GLB,,

## 2022-12-02 PROCEDURE — 99999 PR PBB SHADOW E&M-EST. PATIENT-LVL III: ICD-10-PCS | Mod: PBBFAC,,,

## 2022-12-02 PROCEDURE — 1125F AMNT PAIN NOTED PAIN PRSNT: CPT | Mod: CPTII,S$GLB,,

## 2022-12-13 RX ORDER — PRAVASTATIN SODIUM 20 MG/1
20 TABLET ORAL DAILY
Qty: 90 TABLET | Refills: 3 | Status: ON HOLD | OUTPATIENT
Start: 2022-12-13 | End: 2023-05-18 | Stop reason: HOSPADM

## 2022-12-13 RX ORDER — METOPROLOL TARTRATE 25 MG/1
25 TABLET, FILM COATED ORAL 2 TIMES DAILY
Qty: 60 TABLET | Refills: 11 | Status: SHIPPED | OUTPATIENT
Start: 2022-12-13 | End: 2023-12-08

## 2022-12-13 NOTE — TELEPHONE ENCOUNTER
Confirmed Metoprolol tartrate 25 mg BID        ----- Message from Rita Alexis sent at 12/13/2022  4:16 PM CST -----  Contact: sunny/ [facility] pharmacy  Sunny with [facility] pharmacy is calling to speak with the nurse regarding medication. Reports needing to know if the metoprolol tartrate (LOPRESSOR) 25 MG tablet is the right medication. Please give sunny a call back at [phone]  Thanks rosette

## 2023-01-11 ENCOUNTER — OFFICE VISIT (OUTPATIENT)
Dept: ORTHOPEDICS | Facility: CLINIC | Age: 68
End: 2023-01-11
Payer: MEDICARE

## 2023-01-11 ENCOUNTER — HOSPITAL ENCOUNTER (OUTPATIENT)
Dept: RADIOLOGY | Facility: HOSPITAL | Age: 68
Discharge: HOME | End: 2023-01-11
Attending: PHYSICIAN ASSISTANT
Payer: MEDICARE

## 2023-01-11 VITALS
WEIGHT: 182 LBS | HEART RATE: 121 BPM | SYSTOLIC BLOOD PRESSURE: 100 MMHG | DIASTOLIC BLOOD PRESSURE: 67 MMHG | HEIGHT: 70 IN | BODY MASS INDEX: 26.05 KG/M2

## 2023-01-11 DIAGNOSIS — S82.62XD CLOSED DISPLACED FRACTURE OF LATERAL MALLEOLUS OF LEFT FIBULA WITH ROUTINE HEALING, SUBSEQUENT ENCOUNTER: Primary | ICD-10-CM

## 2023-01-11 DIAGNOSIS — M25.572 ACUTE LEFT ANKLE PAIN: ICD-10-CM

## 2023-01-11 DIAGNOSIS — S93.432A ANKLE SYNDESMOSIS DISRUPTION, LEFT, INITIAL ENCOUNTER: ICD-10-CM

## 2023-01-11 PROCEDURE — 1160F PR REVIEW ALL MEDS BY PRESCRIBER/CLIN PHARMACIST DOCUMENTED: ICD-10-PCS | Mod: HCNC,CPTII,S$GLB, | Performed by: ORTHOPAEDIC SURGERY

## 2023-01-11 PROCEDURE — 99212 PR OFFICE/OUTPT VISIT, EST, LEVL II, 10-19 MIN: ICD-10-PCS | Mod: HCNC,S$GLB,, | Performed by: ORTHOPAEDIC SURGERY

## 2023-01-11 PROCEDURE — 73610 X-RAY EXAM OF ANKLE: CPT | Mod: 26,HCNC,LT, | Performed by: RADIOLOGY

## 2023-01-11 PROCEDURE — 3288F FALL RISK ASSESSMENT DOCD: CPT | Mod: HCNC,CPTII,S$GLB, | Performed by: ORTHOPAEDIC SURGERY

## 2023-01-11 PROCEDURE — 73610 XR ANKLE COMPLETE 3 VIEW LEFT: ICD-10-PCS | Mod: 26,HCNC,LT, | Performed by: RADIOLOGY

## 2023-01-11 PROCEDURE — 3074F PR MOST RECENT SYSTOLIC BLOOD PRESSURE < 130 MM HG: ICD-10-PCS | Mod: HCNC,CPTII,S$GLB, | Performed by: ORTHOPAEDIC SURGERY

## 2023-01-11 PROCEDURE — 3078F DIAST BP <80 MM HG: CPT | Mod: HCNC,CPTII,S$GLB, | Performed by: ORTHOPAEDIC SURGERY

## 2023-01-11 PROCEDURE — 3008F BODY MASS INDEX DOCD: CPT | Mod: HCNC,CPTII,S$GLB, | Performed by: ORTHOPAEDIC SURGERY

## 2023-01-11 PROCEDURE — 99999 PR PBB SHADOW E&M-EST. PATIENT-LVL IV: ICD-10-PCS | Mod: PBBFAC,HCNC,, | Performed by: ORTHOPAEDIC SURGERY

## 2023-01-11 PROCEDURE — 1126F PR PAIN SEVERITY QUANTIFIED, NO PAIN PRESENT: ICD-10-PCS | Mod: HCNC,CPTII,S$GLB, | Performed by: ORTHOPAEDIC SURGERY

## 2023-01-11 PROCEDURE — 99212 OFFICE O/P EST SF 10 MIN: CPT | Mod: HCNC,S$GLB,, | Performed by: ORTHOPAEDIC SURGERY

## 2023-01-11 PROCEDURE — 1160F RVW MEDS BY RX/DR IN RCRD: CPT | Mod: HCNC,CPTII,S$GLB, | Performed by: ORTHOPAEDIC SURGERY

## 2023-01-11 PROCEDURE — 1159F MED LIST DOCD IN RCRD: CPT | Mod: HCNC,CPTII,S$GLB, | Performed by: ORTHOPAEDIC SURGERY

## 2023-01-11 PROCEDURE — 3008F PR BODY MASS INDEX (BMI) DOCUMENTED: ICD-10-PCS | Mod: HCNC,CPTII,S$GLB, | Performed by: ORTHOPAEDIC SURGERY

## 2023-01-11 PROCEDURE — 3078F PR MOST RECENT DIASTOLIC BLOOD PRESSURE < 80 MM HG: ICD-10-PCS | Mod: HCNC,CPTII,S$GLB, | Performed by: ORTHOPAEDIC SURGERY

## 2023-01-11 PROCEDURE — 1101F PT FALLS ASSESS-DOCD LE1/YR: CPT | Mod: HCNC,CPTII,S$GLB, | Performed by: ORTHOPAEDIC SURGERY

## 2023-01-11 PROCEDURE — 1101F PR PT FALLS ASSESS DOC 0-1 FALLS W/OUT INJ PAST YR: ICD-10-PCS | Mod: HCNC,CPTII,S$GLB, | Performed by: ORTHOPAEDIC SURGERY

## 2023-01-11 PROCEDURE — 73610 X-RAY EXAM OF ANKLE: CPT | Mod: TC,HCNC,LT

## 2023-01-11 PROCEDURE — 1126F AMNT PAIN NOTED NONE PRSNT: CPT | Mod: HCNC,CPTII,S$GLB, | Performed by: ORTHOPAEDIC SURGERY

## 2023-01-11 PROCEDURE — 3288F PR FALLS RISK ASSESSMENT DOCUMENTED: ICD-10-PCS | Mod: HCNC,CPTII,S$GLB, | Performed by: ORTHOPAEDIC SURGERY

## 2023-01-11 PROCEDURE — 99999 PR PBB SHADOW E&M-EST. PATIENT-LVL IV: CPT | Mod: PBBFAC,HCNC,, | Performed by: ORTHOPAEDIC SURGERY

## 2023-01-11 PROCEDURE — 1159F PR MEDICATION LIST DOCUMENTED IN MEDICAL RECORD: ICD-10-PCS | Mod: HCNC,CPTII,S$GLB, | Performed by: ORTHOPAEDIC SURGERY

## 2023-01-11 PROCEDURE — 3074F SYST BP LT 130 MM HG: CPT | Mod: HCNC,CPTII,S$GLB, | Performed by: ORTHOPAEDIC SURGERY

## 2023-01-11 RX ORDER — TAMSULOSIN HYDROCHLORIDE 0.4 MG/1
CAPSULE ORAL
COMMUNITY
Start: 2022-12-26 | End: 2023-03-22

## 2023-01-11 NOTE — PROGRESS NOTES
Patient ID: Mil Sood is a 67 y.o. male.    Chief Complaint: No chief complaint on file.      HPI: Mil Sood is a very pleasant 67 y.o.  male who is here for follow-up of his left ankle.  Patient is 5 months status post ORIF of his left ankle fracture performed by Dr. Mata on August 10, 2022.  Patient is doing extremely well and is back to riding his motorcycle.  His pain is 0/10.    Past Medical History:   Diagnosis Date    Anxiety     Cancer     spot on kidney and lung    Diabetes mellitus     Borderline diet controlled    Diverticulitis     GERD (gastroesophageal reflux disease)     Hyperlipidemia     not on any meds currently    Hypertension     Lung mass     Mass of kidney      Past Surgical History:   Procedure Laterality Date    ARTHROPLASTY OF JOINT OF FINGER Left 11/18/2022    Procedure: ARTHROPLASTY, FINGER;  Surgeon: Kane Negrete MD;  Location: [facility] OR;  Service: Orthopedics;  Laterality: Left;  left thumb basal joint arthroplasty  Arthrex  Podiatry extras    CHOLECYSTECTOMY      COLONOSCOPY      COLONOSCOPY N/A 4/7/2017    Procedure: COLONOSCOPY;  Surgeon: Amarilys Bravo MD;  Location: [facility];  Service: Endoscopy;  Laterality: N/A;    DIAGNOSTIC LAPAROSCOPY N/A 5/4/2021    Procedure: LAPAROSCOPY, DIAGNOSTIC;  Surgeon: Shaji Jauregui MD;  Location: [facility] OR;  Service: General;  Laterality: N/A;    ESOPHAGOGASTRODUODENOSCOPY      ESOPHAGOGASTRODUODENOSCOPY N/A 2/13/2020    Procedure: ESOPHAGOGASTRODUODENOSCOPY (EGD);  Surgeon: Shaji Aquino III, MD;  Location: [facility] ENDO;  Service: Endoscopy;  Laterality: N/A;    HERNIA REPAIR Bilateral     groin    LEFT HEART CATHETERIZATION Left 1/31/2022    Procedure: CATHETERIZATION, HEART, LEFT;  Surgeon: Romeo Godfrey MD;  Location: [facility] CATH LAB;  Service: Cardiology;  Laterality: Left;    NECK SURGERY      OPEN REDUCTION AND INTERNAL FIXATION (ORIF) OF INJURY OF ANKLE Left 8/10/2022    Procedure: ORIF, ANKLE;  Surgeon:  Howard Mata MD;  Location: [facility] OR;  Service: Orthopedics;  Laterality: Left;    PARTIAL NEPHRECTOMY      left    RENAL BIOPSY      ROBOT-ASSISTED REPAIR OF UMBILICAL HERNIA USING DA NEL XI N/A 2021    Procedure: XI ROBOTIC REPAIR, HERNIA, UMBILICAL;  Surgeon: Shaji Jauregui MD;  Location: [facility] OR;  Service: General;  Laterality: N/A;  converted to open    TONSILLECTOMY      TRIGGER FINGER RELEASE Right     long finger     Family History   Problem Relation Age of Onset    Hypertension Mother     Hypertension Father     Melanoma Father     Cancer Sister      Social History     Socioeconomic History    Marital status:    Tobacco Use    Smoking status: Former     Packs/day: 1.00     Years: 20.00     Pack years: 20.00     Types: Cigarettes     Quit date: 1995     Years since quittin.9    Smokeless tobacco: Never   Substance and Sexual Activity    Alcohol use: Never     Alcohol/week: 0.0 standard drinks    Drug use: No    Sexual activity: Yes     Partners: Female     Birth control/protection: None     Medication List with Changes/Refills   Current Medications    AMLODIPINE (NORVASC) 5 MG TABLET    Take 1 tablet (5 mg total) by mouth once daily.    ASPIRIN (ECOTRIN) 81 MG EC TABLET    Take 1 tablet (81 mg total) by mouth once daily.    BENAZEPRIL (LOTENSIN) 20 MG TABLET    TAKE 2 TABLETS BY MOUTH NIGHTLY.    BLOOD SUGAR DIAGNOSTIC STRP    Use to check blood sugars BID    BLOOD SUGAR DIAGNOSTIC STRP    1 each by Misc.(Non-Drug; Combo Route) route 3 (three) times daily.    BLOOD-GLUCOSE METER KIT    Use as instructed    CITALOPRAM (CELEXA) 40 MG TABLET    Take 1 tablet (40 mg total) by mouth every evening.    CLONAZEPAM (KLONOPIN) 1 MG TABLET    Take 1 tablet (1 mg total) by mouth nightly as needed for Anxiety.    CYCLOBENZAPRINE (FLEXERIL) 10 MG TABLET    TAKE 1 TABLET BY MOUTH THREE TIMES DAILY AS NEEDED.    FINASTERIDE (PROSCAR) 5 MG TABLET    Take 1 tablet (5 mg total) by mouth nightly.     FLUTICASONE PROPION-SALMETEROL 115-21 MCG/DOSE (ADVAIR HFA) 115-21 MCG/ACTUATION HFAA INHALER    Inhale 2 puffs into the lungs 2 (two) times daily.    GABAPENTIN (NEURONTIN) 600 MG TABLET    Take 1 tablet (600 mg total) by mouth nightly.    HYDROXYZINE PAMOATE (VISTARIL) 25 MG CAP    Take 1 capsule (25 mg total) by mouth every evening.    ISOSORBIDE MONONITRATE (IMDUR) 60 MG 24 HR TABLET    TAKE 1 TABLET BY MOUTH ONCE DAILY.    LANCETS (ACCU-CHEK SOFTCLIX LANCETS) MISC    1 each by Misc.(Non-Drug; Combo Route) route 3 (three) times daily.    MELOXICAM (MOBIC) 15 MG TABLET    TAKE 1 TABLET BY MOUTH ONCE DAILY FOR 5 DAYS.THEN ONCE DAILY AS NEEDED FOR BACK PAIN.    METOPROLOL TARTRATE (LOPRESSOR) 25 MG TABLET    Take 1 tablet (25 mg total) by mouth 2 (two) times daily.    OMEPRAZOLE (PRILOSEC) 40 MG CAPSULE    Take 1 capsule (40 mg total) by mouth every evening.    OXYCODONE-ACETAMINOPHEN (PERCOCET)  MG PER TABLET    Take 1 tablet by mouth every 6 (six) hours as needed for Pain.    PRAVASTATIN (PRAVACHOL) 20 MG TABLET    Take 1 tablet (20 mg total) by mouth once daily.    RESTASIS MULTIDOSE 0.05 % DROP        SULFAMETHOXAZOLE-TRIMETHOPRIM 800-160MG (BACTRIM DS) 800-160 MG TAB    Take 1 tablet by mouth 2 (two) times daily.    TIOTROPIUM (SPIRIVA WITH HANDIHALER) 18 MCG INHALATION CAPSULE    INHALE THE CONTENTS OF ONE CAPSULE DAILY.    TRAZODONE (DESYREL) 100 MG TABLET    Take 1 tablet (100 mg total) by mouth every evening.    VENLAFAXINE (EFFEXOR) 75 MG TABLET    Take 1 tablet (75 mg total) by mouth 2 (two) times daily.    ZOLPIDEM (AMBIEN) 10 MG TAB    Take 1 tablet (10 mg total) by mouth every evening.     Review of patient's allergies indicates:   Allergen Reactions    Rosuvastatin Other (See Comments)     Joint pain   Joint and bone pain       Physical Exam:     Wt Readings from Last 3 Encounters:   12/02/22 82.9 kg (182 lb 12.2 oz)   11/30/22 82.9 kg (182 lb 12.2 oz)   11/22/22 82.9 kg (182 lb 12.2 oz)      Temp Readings from Last 3 Encounters:   11/18/22 97.2 °F (36.2 °C) (Skin)   10/12/22 98.8 °F (37.1 °C)   08/18/22 98.2 °F (36.8 °C) (Oral)     BP Readings from Last 3 Encounters:   11/30/22 (!) 141/91   11/18/22 (!) 152/91   10/26/22 120/80     Pulse Readings from Last 3 Encounters:   11/30/22 (!) 112   11/18/22 89   10/26/22 110       There is no height or weight on file to calculate BMI.      General Appearance:   cooperative, no acute distress, appropriate for age                    Neurologic:  Alert and oriented x3                            Pysch:  Appropriate mood and affect                              Head:  Normocephalic, atraumatic                             EENT:  EOM grossly intact, no icterus, moist mucous membranes, fair dentition                              Back:  Symmetrical, no curvature, ROM normal, no CVA tenderness                   Respiratory:  non-labored; no audible wheezing                      Abdomen:  non-distended           Musculoskeletal:  Smooth concentric gait without a gait aid; left lower extremity:  Patient has full range of motion of the ankle and no tenderness to palpation along the lateral border of the ankle.  No joint line tenderness to palpation.               Skin/Hair/Nails:  Skin warm, dry, and intact, no rashes or abnormal dyspigmentation     IMAGING:  Reviewed patient's plain films from today which demonstrate a well reduced ankle mortise.  No change in hardware positioning.  Patient's fracture line is still visible however there has been no displacement.    Assessment:       Encounter Diagnoses   Name Primary?    Closed displaced fracture of lateral malleolus of left fibula with routine healing, subsequent encounter Yes    Ankle syndesmosis disruption, left, initial encounter           Plan:       Diagnoses and all orders for this visit:    Closed displaced fracture of lateral malleolus of left fibula with routine healing, subsequent encounter    Ankle  syndesmosis disruption, left, initial encounter      I reviewed the patient's x-rays with him.  He is back to all of his full activities and is very pleased with the outcome.  He will follow-up with us on as-needed basis.    Follow up if symptoms worsen or fail to improve.    The patient understands, chooses and consents to this plan and accepts all   the risks which include but are not limited to the risks mentioned above.     Disclaimer: This note was prepared using a voice recognition system and is likely to have sound alike errors within the text.         Marianne Castro MD, ELIAS, FAAOS  Orthopaedic Surgeon

## 2023-03-10 ENCOUNTER — PATIENT OUTREACH (OUTPATIENT)
Dept: ADMINISTRATIVE | Facility: HOSPITAL | Age: 68
End: 2023-03-10
Payer: MEDICARE

## 2023-03-10 NOTE — PROGRESS NOTES
Working Statin Report:     Pt has a documented statin intolerance. Code is due to be dropped in 2023. Pt does not have an upcoming appointment. Called to discuss scheduling, left message.

## 2023-03-29 ENCOUNTER — HOSPITAL ENCOUNTER (OUTPATIENT)
Dept: RADIOLOGY | Facility: HOSPITAL | Age: 68
Discharge: HOME | End: 2023-03-29
Attending: FAMILY MEDICINE
Payer: MEDICARE

## 2023-03-29 ENCOUNTER — OFFICE VISIT (OUTPATIENT)
Dept: FAMILY MEDICINE | Facility: CLINIC | Age: 68
End: 2023-03-29
Payer: MEDICARE

## 2023-03-29 VITALS
HEIGHT: 70 IN | HEART RATE: 81 BPM | BODY MASS INDEX: 26.88 KG/M2 | DIASTOLIC BLOOD PRESSURE: 76 MMHG | TEMPERATURE: 99 F | RESPIRATION RATE: 20 BRPM | OXYGEN SATURATION: 98 % | SYSTOLIC BLOOD PRESSURE: 108 MMHG | WEIGHT: 187.75 LBS

## 2023-03-29 DIAGNOSIS — R49.0 HOARSENESS OF VOICE: ICD-10-CM

## 2023-03-29 DIAGNOSIS — I70.0 ATHEROSCLEROSIS OF AORTA: ICD-10-CM

## 2023-03-29 DIAGNOSIS — J43.2 CENTRILOBULAR EMPHYSEMA: ICD-10-CM

## 2023-03-29 DIAGNOSIS — R05.9 COUGH, UNSPECIFIED TYPE: ICD-10-CM

## 2023-03-29 DIAGNOSIS — K22.711 BARRETT'S ESOPHAGUS WITH HIGH GRADE DYSPLASIA: ICD-10-CM

## 2023-03-29 DIAGNOSIS — R05.9 COUGH, UNSPECIFIED TYPE: Primary | ICD-10-CM

## 2023-03-29 PROCEDURE — 99214 PR OFFICE/OUTPT VISIT, EST, LEVL IV, 30-39 MIN: ICD-10-PCS | Mod: HCNC,S$GLB,, | Performed by: FAMILY MEDICINE

## 2023-03-29 PROCEDURE — 1126F PR PAIN SEVERITY QUANTIFIED, NO PAIN PRESENT: ICD-10-PCS | Mod: HCNC,CPTII,S$GLB, | Performed by: FAMILY MEDICINE

## 2023-03-29 PROCEDURE — 3074F SYST BP LT 130 MM HG: CPT | Mod: HCNC,CPTII,S$GLB, | Performed by: FAMILY MEDICINE

## 2023-03-29 PROCEDURE — 99999 PR PBB SHADOW E&M-EST. PATIENT-LVL V: CPT | Mod: PBBFAC,HCNC,, | Performed by: FAMILY MEDICINE

## 2023-03-29 PROCEDURE — 3288F PR FALLS RISK ASSESSMENT DOCUMENTED: ICD-10-PCS | Mod: HCNC,CPTII,S$GLB, | Performed by: FAMILY MEDICINE

## 2023-03-29 PROCEDURE — 1126F AMNT PAIN NOTED NONE PRSNT: CPT | Mod: HCNC,CPTII,S$GLB, | Performed by: FAMILY MEDICINE

## 2023-03-29 PROCEDURE — 1159F MED LIST DOCD IN RCRD: CPT | Mod: HCNC,CPTII,S$GLB, | Performed by: FAMILY MEDICINE

## 2023-03-29 PROCEDURE — 99214 OFFICE O/P EST MOD 30 MIN: CPT | Mod: HCNC,S$GLB,, | Performed by: FAMILY MEDICINE

## 2023-03-29 PROCEDURE — 4010F ACE/ARB THERAPY RXD/TAKEN: CPT | Mod: HCNC,CPTII,S$GLB, | Performed by: FAMILY MEDICINE

## 2023-03-29 PROCEDURE — 1100F PTFALLS ASSESS-DOCD GE2>/YR: CPT | Mod: HCNC,CPTII,S$GLB, | Performed by: FAMILY MEDICINE

## 2023-03-29 PROCEDURE — 71046 XR CHEST PA AND LATERAL: ICD-10-PCS | Mod: 26,HCNC,, | Performed by: RADIOLOGY

## 2023-03-29 PROCEDURE — 99999 PR PBB SHADOW E&M-EST. PATIENT-LVL V: ICD-10-PCS | Mod: PBBFAC,HCNC,, | Performed by: FAMILY MEDICINE

## 2023-03-29 PROCEDURE — 3008F BODY MASS INDEX DOCD: CPT | Mod: HCNC,CPTII,S$GLB, | Performed by: FAMILY MEDICINE

## 2023-03-29 PROCEDURE — 1159F PR MEDICATION LIST DOCUMENTED IN MEDICAL RECORD: ICD-10-PCS | Mod: HCNC,CPTII,S$GLB, | Performed by: FAMILY MEDICINE

## 2023-03-29 PROCEDURE — 71046 X-RAY EXAM CHEST 2 VIEWS: CPT | Mod: TC,HCNC,FY,PO

## 2023-03-29 PROCEDURE — 3078F DIAST BP <80 MM HG: CPT | Mod: HCNC,CPTII,S$GLB, | Performed by: FAMILY MEDICINE

## 2023-03-29 PROCEDURE — 3288F FALL RISK ASSESSMENT DOCD: CPT | Mod: HCNC,CPTII,S$GLB, | Performed by: FAMILY MEDICINE

## 2023-03-29 PROCEDURE — 1100F PR PT FALLS ASSESS DOC 2+ FALLS/FALL W/INJURY/YR: ICD-10-PCS | Mod: HCNC,CPTII,S$GLB, | Performed by: FAMILY MEDICINE

## 2023-03-29 PROCEDURE — 4010F PR ACE/ARB THEARPY RXD/TAKEN: ICD-10-PCS | Mod: HCNC,CPTII,S$GLB, | Performed by: FAMILY MEDICINE

## 2023-03-29 PROCEDURE — 3008F PR BODY MASS INDEX (BMI) DOCUMENTED: ICD-10-PCS | Mod: HCNC,CPTII,S$GLB, | Performed by: FAMILY MEDICINE

## 2023-03-29 PROCEDURE — 3074F PR MOST RECENT SYSTOLIC BLOOD PRESSURE < 130 MM HG: ICD-10-PCS | Mod: HCNC,CPTII,S$GLB, | Performed by: FAMILY MEDICINE

## 2023-03-29 PROCEDURE — 71046 X-RAY EXAM CHEST 2 VIEWS: CPT | Mod: 26,HCNC,, | Performed by: RADIOLOGY

## 2023-03-29 PROCEDURE — 3078F PR MOST RECENT DIASTOLIC BLOOD PRESSURE < 80 MM HG: ICD-10-PCS | Mod: HCNC,CPTII,S$GLB, | Performed by: FAMILY MEDICINE

## 2023-03-29 NOTE — PROGRESS NOTES
Chief Complaint:    Chief Complaint   Patient presents with    Cough     Dry cough and hoarse        History of Present Illness:    Patient presents today complaining of a dry cough going on for about a month   He also has some hoarseness which is been going on for about 2 weeks it did not start out his any cold symptoms.  He denies any sputum production no fever no weight loss wheezing.    Does have a diagnosis of emphysema due to smoking history long time back     He also has Culp's esophagus had EGD in 2020 was supposed to go back in to years but has not followed up on that yet    ROS:  Review of Systems   Constitutional:  Negative for activity change, chills, fatigue, fever and unexpected weight change.   HENT:  Negative for congestion, ear discharge, ear pain, hearing loss, postnasal drip and rhinorrhea.    Eyes:  Negative for pain and visual disturbance.   Respiratory:  Positive for cough. Negative for chest tightness and shortness of breath.    Cardiovascular:  Negative for chest pain and palpitations.   Gastrointestinal:  Negative for abdominal pain, diarrhea and vomiting.   Endocrine: Negative for heat intolerance.   Genitourinary:  Negative for dysuria, flank pain, frequency and hematuria.   Musculoskeletal:  Negative for back pain, gait problem and neck pain.   Skin:  Negative for color change and rash.   Neurological:  Negative for dizziness, tremors, seizures, numbness and headaches.   Psychiatric/Behavioral:  Negative for agitation, hallucinations, self-injury, sleep disturbance and suicidal ideas. The patient is not nervous/anxious.      Past Medical History:   Diagnosis Date    Anxiety     Cancer     spot on kidney and lung    Diabetes mellitus     Borderline diet controlled    Diverticulitis     GERD (gastroesophageal reflux disease)     Hyperlipidemia     not on any meds currently    Hypertension     Lung mass     Mass of kidney        Social History:  Social History     Socioeconomic History  "   Marital status:    Tobacco Use    Smoking status: Former     Packs/day: 1.00     Years: 20.00     Pack years: 20.00     Types: Cigarettes     Quit date: 1995     Years since quittin.1    Smokeless tobacco: Never   Substance and Sexual Activity    Alcohol use: Never     Alcohol/week: 0.0 standard drinks    Drug use: No    Sexual activity: Yes     Partners: Female     Birth control/protection: None       Family History:   family history includes Cancer in his sister; Hypertension in his father and mother; Melanoma in his father.    Health Maintenance   Topic Date Due    Lipid Panel  2023    TETANUS VACCINE  2028    Hepatitis C Screening  Completed    Abdominal Aortic Aneurysm Screening  Completed       Exam:Physical     Vital Signs  Temp: 98.6 °F (37 °C)  Temp Source: Tympanic  Pulse: 81  Resp: 20  SpO2: 98 %  BP: 108/76  BP Location: Left arm  Patient Position: Sitting  Pain Score: 0-No pain  Height and Weight  Height: 5' 10" (177.8 cm)  Weight: 85.2 kg (187 lb 11.6 oz)  BSA (Calculated - sq m): 2.05 sq meters  BMI (Calculated): 26.9  Weight in (lb) to have BMI = 25: 173.9]    Body mass index is 26.94 kg/m².    Physical Exam  Constitutional:       Appearance: He is well-developed.   HENT:      Right Ear: Tympanic membrane and ear canal normal.      Left Ear: Tympanic membrane and ear canal normal.   Eyes:      Conjunctiva/sclera: Conjunctivae normal.      Pupils: Pupils are equal, round, and reactive to light.   Cardiovascular:      Rate and Rhythm: Normal rate and regular rhythm.      Heart sounds: Normal heart sounds. No murmur heard.  Pulmonary:      Effort: Pulmonary effort is normal. No respiratory distress.      Breath sounds: Normal breath sounds. No wheezing or rales.   Chest:      Chest wall: No tenderness.   Abdominal:      General: There is no distension.      Palpations: Abdomen is soft. There is no mass.      Tenderness: There is no abdominal tenderness. There is no " guarding.   Musculoskeletal:         General: No tenderness.      Cervical back: Normal range of motion and neck supple.   Lymphadenopathy:      Cervical: No cervical adenopathy.   Skin:     General: Skin is warm and dry.   Neurological:      Mental Status: He is alert and oriented to person, place, and time.      Deep Tendon Reflexes: Reflexes are normal and symmetric.   Psychiatric:         Behavior: Behavior normal.         Thought Content: Thought content normal.         Judgment: Judgment normal.         Assessment:      ICD-10-CM ICD-9-CM   1. Cough, unspecified type  R05.9 786.2   2. Hoarseness of voice  R49.0 784.42   3. Culp's esophagus with high grade dysplasia  K22.711 530.85   4. Atherosclerosis of aorta  I70.0 440.0   5. Centrilobular emphysema  J43.2 492.8         Plan:  X-ray chest today   Consult ENT for nasopharyngoscopy exam of the throat due to underlying hoarseness and cough suspect laryngeal reflux   Request made for EGD for Culp's esophagus follow-up EGD.  Emphysema stable          No follow-ups on file.      Yumi Roberson MD

## 2023-03-30 ENCOUNTER — OFFICE VISIT (OUTPATIENT)
Dept: OTOLARYNGOLOGY | Facility: CLINIC | Age: 68
End: 2023-03-30
Payer: MEDICARE

## 2023-03-30 DIAGNOSIS — R49.0 HOARSENESS OF VOICE: ICD-10-CM

## 2023-03-30 PROCEDURE — 1101F PR PT FALLS ASSESS DOC 0-1 FALLS W/OUT INJ PAST YR: ICD-10-PCS | Mod: HCNC,CPTII,S$GLB, | Performed by: OTOLARYNGOLOGY

## 2023-03-30 PROCEDURE — 1160F RVW MEDS BY RX/DR IN RCRD: CPT | Mod: HCNC,CPTII,S$GLB, | Performed by: OTOLARYNGOLOGY

## 2023-03-30 PROCEDURE — 4010F PR ACE/ARB THEARPY RXD/TAKEN: ICD-10-PCS | Mod: HCNC,CPTII,S$GLB, | Performed by: OTOLARYNGOLOGY

## 2023-03-30 PROCEDURE — 1159F PR MEDICATION LIST DOCUMENTED IN MEDICAL RECORD: ICD-10-PCS | Mod: HCNC,CPTII,S$GLB, | Performed by: OTOLARYNGOLOGY

## 2023-03-30 PROCEDURE — 1159F MED LIST DOCD IN RCRD: CPT | Mod: HCNC,CPTII,S$GLB, | Performed by: OTOLARYNGOLOGY

## 2023-03-30 PROCEDURE — 99999 PR PBB SHADOW E&M-EST. PATIENT-LVL IV: CPT | Mod: PBBFAC,HCNC,, | Performed by: OTOLARYNGOLOGY

## 2023-03-30 PROCEDURE — 99202 PR OFFICE/OUTPT VISIT, NEW, LEVL II, 15-29 MIN: ICD-10-PCS | Mod: 25,HCNC,S$GLB, | Performed by: OTOLARYNGOLOGY

## 2023-03-30 PROCEDURE — 4010F ACE/ARB THERAPY RXD/TAKEN: CPT | Mod: HCNC,CPTII,S$GLB, | Performed by: OTOLARYNGOLOGY

## 2023-03-30 PROCEDURE — 31575 PR LARYNGOSCOPY, FLEXIBLE; DIAGNOSTIC: ICD-10-PCS | Mod: HCNC,S$GLB,, | Performed by: OTOLARYNGOLOGY

## 2023-03-30 PROCEDURE — 99999 PR PBB SHADOW E&M-EST. PATIENT-LVL IV: ICD-10-PCS | Mod: PBBFAC,HCNC,, | Performed by: OTOLARYNGOLOGY

## 2023-03-30 PROCEDURE — 3288F FALL RISK ASSESSMENT DOCD: CPT | Mod: HCNC,CPTII,S$GLB, | Performed by: OTOLARYNGOLOGY

## 2023-03-30 PROCEDURE — 1101F PT FALLS ASSESS-DOCD LE1/YR: CPT | Mod: HCNC,CPTII,S$GLB, | Performed by: OTOLARYNGOLOGY

## 2023-03-30 PROCEDURE — 3288F PR FALLS RISK ASSESSMENT DOCUMENTED: ICD-10-PCS | Mod: HCNC,CPTII,S$GLB, | Performed by: OTOLARYNGOLOGY

## 2023-03-30 PROCEDURE — 31575 DIAGNOSTIC LARYNGOSCOPY: CPT | Mod: HCNC,S$GLB,, | Performed by: OTOLARYNGOLOGY

## 2023-03-30 PROCEDURE — 1160F PR REVIEW ALL MEDS BY PRESCRIBER/CLIN PHARMACIST DOCUMENTED: ICD-10-PCS | Mod: HCNC,CPTII,S$GLB, | Performed by: OTOLARYNGOLOGY

## 2023-03-30 PROCEDURE — 99202 OFFICE O/P NEW SF 15 MIN: CPT | Mod: 25,HCNC,S$GLB, | Performed by: OTOLARYNGOLOGY

## 2023-03-30 NOTE — PATIENT INSTRUCTIONS
VOCAL HYGIENE AND HYDRATION RECOMMENDATIONS     DO   Drink plenty of waterabout  oz a day! If your urine is pale, you should be well-hydrated.  Try some of these tips to increase hydration as well.  Eat wet snacks such as grapes, melon, cucumbers, apple slices   Reduce intake coffee and other caffeinated and carbonated beverages   Suck on pastille lozenges or sugar free candies (not mentholated lozenges)   Use a room or personal humidifier   Use sinus rinses/irrigations or nasal saline spray   Inhale steam for a few minutes before speaking or singing   Pay attention to how, and how much, you use your voice.   Give yourself vocal breaks throughout the day.   Breathe when talking, take frequent pauses for breath.   Use a microphone when speaking in front of a group of 15 or more to reduce voice strain.   Learn how to use your voice correctly to get loud with voice therapy techniques.  Stay healthy. Eat right and get plenty of rest. Follow a reflux precaution diet if indicated.   ____________________________________________________________________    REDUCE   Loud talking, yelling, cheering, or screaming. Voice injury can take place over a long time, or all at once.  If you need to talk loud or yell, be sure you are doing it properly.   Clearing your throat and forceful coughing. The vocal folds collect mucus when irritated or inflamed and this can cause the urge to clear your throat. The trauma of clearing the throat creates more inflammation and mucus. See tips above for keeping hydrated to assist with cutting back on throat clearing.  Instead of clearing your throat, try these behaviors until the sensation passes:   Take a sip of water   Silently clear with a hard exhale making an hhh sound   Swallow hard   Drying agents such as anti-histamines or decongestant medications. You should always take medications as prescribed, but keep in mind these will dry you out, so increase your hydration!    ____________________________________________________________________    AVOID   Inhalant irritants such as smoke, strong fumes, and allergens. Take advantage of 1-800-QUIT-NOW if you are ready to stop smoking.   Unnecessary non-speech noises, such as grunting during exercise, loud noises, or excessively high- or low-pitched sounds. Save your voice for talking and singing!   Whispering. Whispering places your vocal folds in a tenser position than usual.

## 2023-03-30 NOTE — PROGRESS NOTES
"      SUBJECTIVE:    HPI: Mil is seeing me today for evaluation of hoarseness that has been present for the past 2 weeks. No preceding illness. He does not smoke (former smoker-quit decades ago). He denies difficulty breathing. Occasionally, food "hangs" up. He does have a history of reflux and takes Prilosec.  The hoarseness does not affect his quality of life. No aggravating or alleviating factors.       OBJECTIVE:  Physical Exam  HENT:      Mouth/Throat:      Pharynx: Oropharynx is clear. Uvula midline.   Musculoskeletal:      Cervical back: Neck supple.   Lymphadenopathy:      Cervical: No cervical adenopathy.     Due to indication in patient's history, presentation or risk factors, a fiberoptic exam was performed.     SEPARATE PROCEDURE NOTE:    ANESTHESIA:  Topical lidocaine with kelly-synephrine    FINDINGS:  Normal exam    PROCEDURE:  After verbal consent was obtained, the flexible scope was passed through the patient's nasal cavity without difficulty.  The nasopharynx (adenoid pad) and eustachian tube orifices were first visualized and were found to be normal, without masses or irregularity.  The posterior pharyngeal wall and base of tongue were then examined and no mass or irregular tissue was seen.  The scope was then advanced to the larynx, and the epiglottis, valleculae, and piriform sinuses were normal, without masses or mucosal irregularity.  The false vocal folds and true vocal folds were then examined and were found to have normal mobility (full abduction and adduction) and no masses or mucosal irregularity was seen.  The arytenoids and the interarytenoid area were normal.      ASSESSMENT:    Encounter Diagnosis   Name Primary?    Hoarseness of voice          PLAN:       Jaeger and I discussed the findings on laryngoscopy today. No concerning lesions were visualized, and he was reassured. Vocal hygiene measures were given. A referral for speech/voice therapy was offered, but he declined. Follow " up as needed.

## 2023-04-05 ENCOUNTER — EXTERNAL HOME HEALTH (OUTPATIENT)
Dept: HOME HEALTH SERVICES | Facility: HOSPITAL | Age: 68
End: 2023-04-05
Payer: MEDICARE

## 2023-04-06 ENCOUNTER — HOSPITAL ENCOUNTER (OUTPATIENT)
Dept: PREADMISSION TESTING | Facility: HOSPITAL | Age: 68
Discharge: HOME | End: 2023-04-06
Attending: SURGERY
Payer: MEDICARE

## 2023-04-06 DIAGNOSIS — K22.711 BARRETT'S ESOPHAGUS WITH HIGH GRADE DYSPLASIA: Primary | ICD-10-CM

## 2023-05-03 ENCOUNTER — ANESTHESIA (OUTPATIENT)
Dept: ENDOSCOPY | Facility: HOSPITAL | Age: 68
End: 2023-05-03
Payer: MEDICARE

## 2023-05-03 ENCOUNTER — HOSPITAL ENCOUNTER (OUTPATIENT)
Facility: HOSPITAL | Age: 68
Discharge: HOME | End: 2023-05-03
Attending: INTERNAL MEDICINE | Admitting: INTERNAL MEDICINE
Payer: MEDICARE

## 2023-05-03 ENCOUNTER — ANESTHESIA EVENT (OUTPATIENT)
Dept: ENDOSCOPY | Facility: HOSPITAL | Age: 68
End: 2023-05-03
Payer: MEDICARE

## 2023-05-03 DIAGNOSIS — K22.70 BARRETT'S ESOPHAGUS DETERMINED BY BIOPSY: ICD-10-CM

## 2023-05-03 DIAGNOSIS — Z12.11 ENCOUNTER FOR SCREENING COLONOSCOPY: Primary | ICD-10-CM

## 2023-05-03 DIAGNOSIS — M47.812 CERVICAL SPONDYLOSIS: ICD-10-CM

## 2023-05-03 DIAGNOSIS — K21.9 GASTROESOPHAGEAL REFLUX DISEASE WITHOUT ESOPHAGITIS: ICD-10-CM

## 2023-05-03 DIAGNOSIS — K22.711 BARRETT'S ESOPHAGUS WITH HIGH GRADE DYSPLASIA: Primary | ICD-10-CM

## 2023-05-03 PROCEDURE — 88305 TISSUE EXAM BY PATHOLOGIST: CPT | Mod: 26,HCNC,, | Performed by: STUDENT IN AN ORGANIZED HEALTH CARE EDUCATION/TRAINING PROGRAM

## 2023-05-03 PROCEDURE — 25000003 PHARM REV CODE 250: Mod: HCNC | Performed by: NURSE ANESTHETIST, CERTIFIED REGISTERED

## 2023-05-03 PROCEDURE — 88305 TISSUE EXAM BY PATHOLOGIST: ICD-10-PCS | Mod: 26,HCNC,, | Performed by: STUDENT IN AN ORGANIZED HEALTH CARE EDUCATION/TRAINING PROGRAM

## 2023-05-03 PROCEDURE — 27201012 HC FORCEPS, HOT/COLD, DISP: Mod: HCNC | Performed by: INTERNAL MEDICINE

## 2023-05-03 PROCEDURE — 43239 EGD BIOPSY SINGLE/MULTIPLE: CPT | Mod: HCNC | Performed by: INTERNAL MEDICINE

## 2023-05-03 PROCEDURE — 37000008 HC ANESTHESIA 1ST 15 MINUTES: Mod: HCNC | Performed by: INTERNAL MEDICINE

## 2023-05-03 PROCEDURE — 63600175 PHARM REV CODE 636 W HCPCS: Mod: HCNC | Performed by: NURSE ANESTHETIST, CERTIFIED REGISTERED

## 2023-05-03 PROCEDURE — 88305 TISSUE EXAM BY PATHOLOGIST: CPT | Mod: HCNC | Performed by: STUDENT IN AN ORGANIZED HEALTH CARE EDUCATION/TRAINING PROGRAM

## 2023-05-03 PROCEDURE — 43239 EGD BIOPSY SINGLE/MULTIPLE: CPT | Mod: HCNC,,, | Performed by: INTERNAL MEDICINE

## 2023-05-03 PROCEDURE — 43239 PR EGD, FLEX, W/BIOPSY, SGL/MULTI: ICD-10-PCS | Mod: HCNC,,, | Performed by: INTERNAL MEDICINE

## 2023-05-03 PROCEDURE — 37000009 HC ANESTHESIA EA ADD 15 MINS: Mod: HCNC | Performed by: INTERNAL MEDICINE

## 2023-05-03 RX ORDER — GABAPENTIN 600 MG/1
600 TABLET ORAL NIGHTLY
Qty: 90 TABLET | Refills: 1 | Status: SHIPPED | OUTPATIENT
Start: 2023-05-03 | End: 2023-06-07

## 2023-05-03 RX ORDER — LIDOCAINE HYDROCHLORIDE 10 MG/ML
INJECTION, SOLUTION EPIDURAL; INFILTRATION; INTRACAUDAL; PERINEURAL
Status: DISCONTINUED | OUTPATIENT
Start: 2023-05-03 | End: 2023-05-03

## 2023-05-03 RX ORDER — SODIUM, POTASSIUM,MAG SULFATES 17.5-3.13G
1 SOLUTION, RECONSTITUTED, ORAL ORAL DAILY
Qty: 1 KIT | Refills: 0 | Status: SHIPPED | OUTPATIENT
Start: 2023-05-03 | End: 2023-05-05

## 2023-05-03 RX ORDER — OMEPRAZOLE 40 MG/1
40 CAPSULE, DELAYED RELEASE ORAL EVERY MORNING
Qty: 90 CAPSULE | Refills: 3 | Status: SHIPPED | OUTPATIENT
Start: 2023-05-03 | End: 2023-06-07 | Stop reason: SDUPTHER

## 2023-05-03 RX ORDER — PROPOFOL 10 MG/ML
VIAL (ML) INTRAVENOUS
Status: DISCONTINUED | OUTPATIENT
Start: 2023-05-03 | End: 2023-05-03

## 2023-05-03 RX ORDER — SODIUM CHLORIDE, SODIUM LACTATE, POTASSIUM CHLORIDE, CALCIUM CHLORIDE 600; 310; 30; 20 MG/100ML; MG/100ML; MG/100ML; MG/100ML
INJECTION, SOLUTION INTRAVENOUS CONTINUOUS
Status: DISCONTINUED | OUTPATIENT
Start: 2023-05-03 | End: 2023-05-03 | Stop reason: HOSPADM

## 2023-05-03 RX ADMIN — PROPOFOL 120 MG: 10 INJECTION, EMULSION INTRAVENOUS at 07:05

## 2023-05-03 RX ADMIN — PROPOFOL 30 MG: 10 INJECTION, EMULSION INTRAVENOUS at 07:05

## 2023-05-03 RX ADMIN — LIDOCAINE HYDROCHLORIDE 50 MG: 10 INJECTION, SOLUTION EPIDURAL; INFILTRATION; INTRACAUDAL; PERINEURAL at 07:05

## 2023-05-03 RX ADMIN — SODIUM CHLORIDE, SODIUM LACTATE, POTASSIUM CHLORIDE, AND CALCIUM CHLORIDE: .6; .31; .03; .02 INJECTION, SOLUTION INTRAVENOUS at 07:05

## 2023-05-03 NOTE — PLAN OF CARE
Dr Jensen came to bedside and discussed findings. No N/V, no abdominal pain, no GI bleeding, and vitals stable. Pt discharged from unit.

## 2023-05-03 NOTE — PROVATION PATIENT INSTRUCTIONS
Discharge Summary/Instructions after an Endoscopic Procedure  Patient Name: Mil Sood  Patient MRN: [id number]  Patient YOB: 1955  Wednesday, May 3, 2023 Jenelle Jensen MD  Dear patient,  As a result of recent federal legislation (The Federal Cures Act), you may   receive lab or pathology results from your procedure in your [facility]   account before your physician is able to contact you. Your physician or   their representative will relay the results to you with their   recommendations at their soonest availability.  Thank you,  RESTRICTIONS:  During your procedure today, you received medications for sedation.  These   medications may affect your judgment, balance and coordination.  Therefore,   for 24 hours, you have the following restrictions:   - DO NOT drive a car, operate machinery, make legal/financial decisions,   sign important papers or drink alcohol.    ACTIVITY:  Today: no heavy lifting, straining or running due to procedural   sedation/anesthesia.  The following day: return to full activity including work.  DIET:  Eat and drink normally unless instructed otherwise.     TREATMENT FOR COMMON SIDE EFFECTS:  - Mild abdominal pain, nausea, belching, bloating or excessive gas:  rest,   eat lightly and use a heating pad.  - Sore Throat: treat with throat lozenges and/or gargle with warm salt   water.  - Because air was used during the procedure, expelling large amounts of air   from your rectum or belching is normal.  - If a bowel prep was taken, you may not have a bowel movement for 1-3 days.    This is normal.  SYMPTOMS TO WATCH FOR AND REPORT TO YOUR PHYSICIAN:  1. Abdominal pain or bloating, other than gas cramps.  2. Chest pain.  3. Back pain.  4. Signs of infection such as: chills or fever occurring within 24 hours   after the procedure.  5. Rectal bleeding, which would show as bright red, maroon, or black stools.   (A tablespoon of blood from the rectum is not serious, especially if    hemorrhoids are present.)  6. Vomiting.  7. Weakness or dizziness.  GO DIRECTLY TO THE NEAREST EMERGENCY ROOM IF YOU HAVE ANY OF THE FOLLOWING:      Difficulty breathing              Chills and/or fever over 101 F   Persistent vomiting and/or vomiting blood   Severe abdominal pain   Severe chest pain   Black, tarry stools   Bleeding- more than one tablespoon   Any other symptom or condition that you feel may need urgent attention  Your doctor recommends these additional instructions:  If any biopsies were taken, your doctors clinic will contact you in 1 to 2   weeks with any results.  - Discharge patient to home (with escort).   - Resume previous diet.   - Continue present medications.   - Continue Omeprazole at current dose but take in the morning.   - Schedule your colonoscopy today.  - Await pathology results.   - Return to primary care physician.  For questions, problems or results please call your physician Jenelle Jensen MD at Work:  [phone]  If you have any questions about the above instructions, call the GI   department at [phone] or call the endoscopy unit at [phone]   from 7am until 3 pm.  [facility], EMERGENCY ROOM PHONE NUMBER:   [phone]  IF A COMPLICATION OR EMERGENCY SITUATION ARISES AND YOU ARE UNABLE TO REACH   YOUR PHYSICIAN - GO DIRECTLY TO THE EMERGENCY ROOM.  I have read or have had read to me these discharge instructions for my   procedure and have received a written copy.  I understand these   instructions and will follow-up with my physician if I have any questions.     __________________________________       _____________________________________  Nurse Signature                                          Patient/Designated   Responsible Party Signature  MD Jenelle Ghotra MD  5/3/2023 7:50:31 AM  This report has been verified and signed electronically.  Dear patient,  As a result of recent federal legislation (The Federal  Cures Act), you may   receive lab or pathology results from your procedure in your [facility]   account before your physician is able to contact you. Your physician or   their representative will relay the results to you with their   recommendations at their soonest availability.  Thank you,  PROVATION

## 2023-05-03 NOTE — ANESTHESIA POSTPROCEDURE EVALUATION
Anesthesia Post Evaluation    Patient: Mil Sood    Procedure(s) Performed: Procedure(s) (LRB):  EGD (ESOPHAGOGASTRODUODENOSCOPY) (N/A)    Final Anesthesia Type: MAC      Patient location during evaluation: PACU  Patient participation: Yes- Able to Participate  Level of consciousness: awake and alert  Post-procedure vital signs: reviewed and stable  Pain management: adequate  Airway patency: patent    PONV status at discharge: No PONV  Anesthetic complications: no      Cardiovascular status: blood pressure returned to baseline  Respiratory status: unassisted  Hydration status: euvolemic  Follow-up not needed.          Vitals Value Taken Time   /66 05/03/23 0748   Temp 98 05/03/23 0748   Pulse 66 05/03/23 0748   Resp 12 05/03/23 0748   SpO2 98 05/03/23 0748         No case tracking events are documented in the log.      Pain/Doug Score: No data recorded

## 2023-05-03 NOTE — LETTER
April 6, 2023    Mil LEPE May  [address] [facility] [address]                [address]  [facility] [place] [address]  Phone: [phone]  Fax: [phone]             Mil LEPE May  [address] [facility] [address]      [facility] ENDOSCOPY PATIENT INSTRUCTIONS    You are scheduled for a/an EGD (Procedure), on Wednesday (Day), 5-3-2023 (Date).   Please check in for your appointment at 6:00 am.                          Your procedure will be performed at [facility] The hospital is located at [address], off [address], [address] ([place]). Once on [address], [facility] is the second building on the left. Check in for your procedure on the 1st floor. ([phone])      ALL PATIENTS:   ? You will be at the hospital for approximately 3 - 4 hours.    ? Use of Anesthesia requires you to have a responsible person who can drive you to the hospital, stay while the procedure is being done, assume responsibility for your care at discharge, and drive you home. You will not be able to operate a vehicle or machinery or sign any legal documents until the following day. YOU MUST HAVE A RESPONSIBLE PERSON TO DRIVE YOU HOME.  ? Leave all valuables at home, including jewelry. (Piercings need to be removed) You will need to bring your 's license, medical insurance card, and a method of payment. You will be responsible for any co-payment at time of registration.  Please reach out to Financial Services if you have any questions or concerns.    ? If biopsies need to be performed or a polyp needs to be removed this may result in a change in the billing of your procedure and could impact your payment responsibility depending on your insurance provider.   ? Wear clothing appropriate for easy re-dressing after sedation.   ? Please bring a complete list of all medications you are taking.     MEDICATIONS:  ? BLOOD THINNING MEDICATION (Coumadin, Plavix,  Eliquis, etc.):  o If you are on a blood thinning medication, our scheduling team will obtain clearance to hold from your prescribing physician. Please do not stop these medications until it is approved by your provider. Our scheduling nurse will notify you an appropriate date and time to hold prior to your procedure.  o Coumadin, Plavix and Effient MUST be stopped 5 days prior to exam unless discussed with the doctor performing the test.    o Eliquis, Savaysa, Arixtra, and Xarelto MUST be stopped 2 days prior to exam unless discussed with the doctor performing the test.  ? WEIGHT LOSS MEDICATIONS - Stop 2 weeks prior to your appointment  ? BLOOD PRESSURE, HEART, SEIZURE, LUNG or PSYCHIATRIC MEDICATIONS you normally take in the morning, please take them the morning of your procedure.  This includes Inhalers.   o Please take these medications one hour prior to your arrival time with a small sip of water.     DIABETIC PATIENTS:   ? Check blood sugar levels while following clear liquid diet on the day before the exam. Check again on the morning of the exam and as needed if you feel hypoglycemic (low blood sugar).     ? Do not take any diabetic medications (including insulin) the morning of the exam.    ? If your blood sugar goes below 70, you may drink 2 ounces of clear juice, soda or eat a piece of hard candy. Wait 15 minutes, then recheck your blood sugar. If it isn't going up, you may drink another 2 ounces of clear juice and contact the On-Call Nurse line at [phone].   ?   UPPER ENDOSCOPY PROCEDURE:   ? You may not have anything to eat or drink after midnight. You make take your AM meds with a small sip of water.    Questions regarding scheduling: Endoscopy Scheduling [phone] (M-F, 7:30am-4:30pm)   Questions regarding prep or procedure: (Kamar) [facility] Pre-Op Nurse [phone] or [phone]   Questions regarding prep or procedure: [place] ([place]) Pre-Op Nurse: [phone]   Questions about  Insurance/ Financial obligations: Financial Services [phone]   [facility] [phone] (M-F, 6:30am-3:00pm)   [facility] [phone] (M-F, 6:30am-3:00pm)            Questions requiring immediate assistance: [facility] On-Call Nurse Line [phone] (After Hours)

## 2023-05-03 NOTE — ANESTHESIA PREPROCEDURE EVALUATION
05/03/2023  Mil Sood is a 68 y.o., male.      Pre-op Assessment    I have reviewed the Patient Summary Reports.     I have reviewed the Nursing Notes. I have reviewed the NPO Status.   I have reviewed the Medications.     Review of Systems  Anesthesia Hx:  No problems with previous Anesthesia    Social:  Former Smoker    Hematology/Oncology:  Hematology Normal   Oncology Normal     EENT/Dental:EENT/Dental Normal   Cardiovascular:   Hypertension, well controlled CAD asymptomatic CABG/stent  hyperlipidemia    Pulmonary:   COPD, moderate    Renal/:   Chronic Renal Disease, CKD renal calculi    Hepatic/GI:   GERD, poorly controlled    Musculoskeletal:   Arthritis     Neurological:  Neurology Normal    Endocrine:   Diabetes, well controlled, type 2    Dermatological:  Skin Normal    Psych:   Psychiatric History anxiety depression Sleep Disorder and Insomnia. Sleep Disorder and Insomnia.        Physical Exam  General: Well nourished    Airway:  Mallampati: II   Mouth Opening: Normal  TM Distance: Normal  Tongue: Normal  Neck ROM: Normal ROM    Dental:  Edentulous    Chest/Lungs:  Clear to auscultation    Heart:  Rate: Normal        Anesthesia Plan  Type of Anesthesia, risks & benefits discussed:    Anesthesia Type: MAC  Intra-op Monitoring Plan: Standard ASA Monitors  Induction:  IV  Informed Consent: Informed consent signed with the Patient and all parties understand the risks and agree with anesthesia plan.  All questions answered. Patient consented to blood products? Yes  ASA Score: 3    Ready For Surgery From Anesthesia Perspective.     .

## 2023-05-03 NOTE — TELEPHONE ENCOUNTER
----- Message from Marianne Dickinson sent at 5/3/2023 11:31 AM CDT -----  Contact: Mil  Type:  RX Refill Request    Who Called: Mil  Refill or New Rx:refill  RX Name and Strength:gabapentin (NEURONTIN) 600 MG tablet  How is the patient currently taking it? (ex. 1XDay):as prescribed  Is this a 30 day or 90 day RX:90 day  Preferred Pharmacy with phone number:  [facility] [address]  [address]  [place] [address]  Phone: [phone] Fax: [phone]  Local or Mail Order:local  Ordering Provider:Da  Would the patient rather a call back or a response via [facility]? Call back  Best Call Back Number:[phone].  Additional Information: Pt is out of medication    Thanks  TS

## 2023-05-03 NOTE — H&P
PRE PROCEDURE H&P    Patient Name: Mil Sood  MRN: [id number]  : 1955  Date of Procedure:  5/3/2023  Referring Physician: Yumi Roberson MD  Primary Physician: Yumi Roberson MD  Procedure Physician: Jenelle Jensen MD       Planned Procedure: EGD  Diagnosis:   Culp's Esophagus    Chief Complaint: Same as above    HPI: Patient is an 68 y.o. male is here for the above. Hx of Culp's esophagus with HGD in . Repeat EGDs showed no further histologic recurrence. Last EGD , no intestinal metaplasia seen. Remains on Omeprazole 40mg PO QHS. No dysphagia or weight loss. Takes ASA, no other blood thinners. Overdue for colonoscopy.    Last colonoscopy:   Family history: none  Anticoagulation: ASA    Past Medical History:   Past Medical History:   Diagnosis Date    Anxiety     Cancer     spot on kidney and lung    Diabetes mellitus     Borderline diet controlled    Diverticulitis     GERD (gastroesophageal reflux disease)     Hyperlipidemia     not on any meds currently    Hypertension     Lung mass     Mass of kidney         Past Surgical History:  Past Surgical History:   Procedure Laterality Date    ARTHROPLASTY OF JOINT OF FINGER Left 2022    Procedure: ARTHROPLASTY, FINGER;  Surgeon: Kane Negrete MD;  Location: [facility];  Service: Orthopedics;  Laterality: Left;  left thumb basal joint arthroplasty  Arthrex  Podiatry extras    CHOLECYSTECTOMY      COLONOSCOPY      COLONOSCOPY N/A 2017    Procedure: COLONOSCOPY;  Surgeon: Amarilys Bravo MD;  Location: [facility];  Service: Endoscopy;  Laterality: N/A;    DIAGNOSTIC LAPAROSCOPY N/A 2021    Procedure: LAPAROSCOPY, DIAGNOSTIC;  Surgeon: Shaji Jauregui MD;  Location: [facility];  Service: General;  Laterality: N/A;    ESOPHAGOGASTRODUODENOSCOPY      ESOPHAGOGASTRODUODENOSCOPY N/A 2020    Procedure: ESOPHAGOGASTRODUODENOSCOPY (EGD);  Surgeon: Shaji Aquino III, MD;  Location: [facility];  Service: Endoscopy;  Laterality:  N/A;    HERNIA REPAIR Bilateral     groin    LEFT HEART CATHETERIZATION Left 1/31/2022    Procedure: CATHETERIZATION, HEART, LEFT;  Surgeon: Romeo Godfrey MD;  Location: [facility] CATH LAB;  Service: Cardiology;  Laterality: Left;    NECK SURGERY      OPEN REDUCTION AND INTERNAL FIXATION (ORIF) OF INJURY OF ANKLE Left 8/10/2022    Procedure: ORIF, ANKLE;  Surgeon: Howard Mata MD;  Location: [facility] OR;  Service: Orthopedics;  Laterality: Left;    PARTIAL NEPHRECTOMY      left    RENAL BIOPSY      ROBOT-ASSISTED REPAIR OF UMBILICAL HERNIA USING DA NEL XI N/A 5/4/2021    Procedure: XI ROBOTIC REPAIR, HERNIA, UMBILICAL;  Surgeon: Shaji Jauregui MD;  Location: [facility] OR;  Service: General;  Laterality: N/A;  converted to open    TONSILLECTOMY      TRIGGER FINGER RELEASE Right     long finger        Home Medications:  Prior to Admission medications    Medication Sig Start Date End Date Taking? Authorizing Provider   amLODIPine (NORVASC) 5 MG tablet Take 1 tablet (5 mg total) by mouth once daily.  Patient taking differently: Take 5 mg by mouth every evening. 7/21/22  Yes Yumi Roberson MD   benazepriL (LOTENSIN) 20 MG tablet TAKE 2 TABLETS BY MOUTH NIGHTLY. 2/25/23  Yes Yumi Roberson MD   citalopram (CELEXA) 40 MG tablet Take 1 tablet (40 mg total) by mouth every evening. 7/21/22  Yes Yumi Roberson MD   cyclobenzaprine (FLEXERIL) 10 MG tablet TAKE 1 TABLET BY MOUTH THREE TIMES DAILY AS NEEDED. 2/25/23  Yes Yumi Roberson MD   finasteride (PROSCAR) 5 mg tablet TAKE 1 TABLET BY MOUTH AT NIGHT. 3/22/23  Yes Yumi Roberson MD   hydrALAZINE (APRESOLINE) 25 MG tablet TAKE ONE TABLET BY MOUTH AT BEDTIME. 4/13/23  Yes Yumi Roberson MD   hydrOXYzine pamoate (VISTARIL) 25 MG Cap Take 1 capsule (25 mg total) by mouth every evening. 1/26/22  Yes Yumi Roberson MD   metoprolol tartrate (LOPRESSOR) 25 MG tablet Take 1 tablet (25 mg total) by mouth 2 (two) times daily. 12/13/22 12/13/23 Yes Yasmani Chairez MD   omeprazole (PRILOSEC) 40 MG  capsule Take 1 capsule (40 mg total) by mouth every evening. 7/21/22  Yes Yumi Roberson MD   pravastatin (PRAVACHOL) 20 MG tablet Take 1 tablet (20 mg total) by mouth once daily. 12/13/22 12/13/23 Yes Yasmani Chairez MD   sulfamethoxazole-trimethoprim 800-160mg (BACTRIM DS) 800-160 mg Tab Take 1 tablet by mouth 2 (two) times daily. 11/22/22  Yes Rupinder Narayan PA-C   tamsulosin (FLOMAX) 0.4 mg Cap TAKE 1 CAPSULE BY MOUTH EVERY EVENING. 3/22/23  Yes Yumi Roberson MD   tiotropium (SPIRIVA WITH HANDIHALER) 18 mcg inhalation capsule INHALE THE CONTENTS OF ONE CAPSULE DAILY. 1/25/22  Yes Yumi Roberson MD   zolpidem (AMBIEN) 10 mg Tab TAKE 1 TABLET BY MOUTH IN THE EVENING. 4/20/23  Yes Yumi Roberson MD   aspirin (ECOTRIN) 81 MG EC tablet Take 1 tablet (81 mg total) by mouth once daily. 10/26/22   Yasmani Chairez MD   blood sugar diagnostic Strp Use to check blood sugars BID 5/13/20   Yumi Roberson MD   blood sugar diagnostic Strp 1 each by Misc.(Non-Drug; Combo Route) route 3 (three) times daily. 2/8/21   Yumi Roberson MD   blood-glucose meter kit Use as instructed 2/8/21   Yumi Roberson MD   clonazePAM (KLONOPIN) 1 MG tablet Take 1 tablet (1 mg total) by mouth nightly as needed for Anxiety. 1/26/22   Yumi Roberson MD   fluticasone propion-salmeterol 115-21 mcg/dose (ADVAIR HFA) 115-21 mcg/actuation HFAA inhaler Inhale 2 puffs into the lungs 2 (two) times daily. 7/29/21 11/7/22  Yumi Roberson MD   gabapentin (NEURONTIN) 600 MG tablet Take 1 tablet (600 mg total) by mouth nightly. 4/29/22   Yumi Roberson MD   isosorbide mononitrate (IMDUR) 60 MG 24 hr tablet TAKE 1 TABLET BY MOUTH ONCE DAILY. 11/29/22   Yasmani Chairez MD   lancets (ACCU-CHEK SOFTCLIX LANCETS) Misc 1 each by Misc.(Non-Drug; Combo Route) route 3 (three) times daily. 2/8/21   Yumi Roberson MD   RESTASIS MULTIDOSE 0.05 % Drop  11/2/21   Historical Provider   venlafaxine (EFFEXOR) 75 MG tablet Take 1 tablet (75 mg total) by mouth 2 (two) times daily. 1/14/23   Yumi Roberson  "MD        Allergies:  Review of patient's allergies indicates:   Allergen Reactions    Rosuvastatin Other (See Comments)     Joint pain   Joint and bone pain        Social History:   Social History     Socioeconomic History    Marital status:    Tobacco Use    Smoking status: Former     Packs/day: 1.00     Years: 20.00     Pack years: 20.00     Types: Cigarettes     Quit date: 1995     Years since quittin.2    Smokeless tobacco: Never   Substance and Sexual Activity    Alcohol use: Never     Alcohol/week: 0.0 standard drinks    Drug use: No    Sexual activity: Yes     Partners: Female     Birth control/protection: None       Family History:  Family History   Problem Relation Age of Onset    Hypertension Mother     Hypertension Father     Melanoma Father     Cancer Sister        ROS: No acute cardiac events, no acute respiratory complaints.     Physical Exam (all patients):    BP (!) 148/90 (BP Location: Left arm, Patient Position: Lying)   Pulse 98   Temp 99 °F (37.2 °C) (Temporal)   Resp 16   Ht 5' 11" (1.803 m)   Wt 81.6 kg (180 lb)   SpO2 97%   BMI 25.10 kg/m²   Lungs: Clear to auscultation bilaterally, respirations unlabored  Heart: Regular rate and rhythm, S1 and S2 normal, no obvious murmurs  Abdomen:         Soft, non-tender, bowel sounds normal, no masses, no organomegaly    Lab Results   Component Value Date    WBC 5.85 2022    MCV 89 2022    RDW 13.3 2022     2022    INR 1.0 2022    GLU 92 2022    HGBA1C 5.5 2022    BUN 15 2022     2022    K 4.5 2022     2022        SEDATION PLAN: per anesthesia      History reviewed, vital signs satisfactory, cardiopulmonary status satisfactory, sedation options, risks and plans have been discussed with the patient  All their questions were answered and the patient agrees to the sedation procedures as planned and the patient is deemed an appropriate candidate for " the sedation as planned.    The risks, benefits and alternatives of the procedure were discussed with the patient in detail. This discussion was had in the presence of  endoscopy staff. The risks include, risks of adverse reaction to sedation requiring the use of reversal agents, bleeding requiring blood transfusion, perforation requiring surgical intervention and technical failure. Other risks include aspiration leading to respiratory distress and respiratory failure resulting in endotracheal intubation and mechanical ventilation including death. If anesthesia is being utilized for this procedure, it is up to the anesthesiologist to determine airway safety including elective endotracheal intubation. Questions were answered, they agree to proceed. There was no language barriers.      Procedure explained to patient, informed consent obtained and placed in chart.    Jenelle Jensen  5/3/2023  7:27 AM

## 2023-05-03 NOTE — TELEPHONE ENCOUNTER
No care due was identified.  [facility] Embedded Care Due Messages. Reference number: [id number].   5/03/2023 11:38:09 AM CDT

## 2023-05-03 NOTE — TRANSFER OF CARE
"Anesthesia Transfer of Care Note    Patient: Mil Sood    Procedure(s) Performed: Procedure(s) (LRB):  EGD (ESOPHAGOGASTRODUODENOSCOPY) (N/A)    Patient location: PACU    Anesthesia Type: MAC    Transport from OR: Transported from OR on room air with adequate spontaneous ventilation    Post pain: adequate analgesia    Post assessment: no apparent anesthetic complications    Post vital signs: stable    Level of consciousness: awake    Nausea/Vomiting: no nausea/vomiting    Complications: none    Transfer of care protocol was followed      Last vitals:   Visit Vitals  BP (!) 148/90 (BP Location: Left arm, Patient Position: Lying)   Pulse 98   Temp 37.2 °C (99 °F) (Temporal)   Resp 16   Ht 5' 11" (1.803 m)   Wt 81.6 kg (180 lb)   SpO2 97%   BMI 25.10 kg/m²     "

## 2023-05-03 NOTE — ANESTHESIA RELEASE NOTE
"Anesthesia Release from PACU Note    Patient: Mil Sood    Procedure(s) Performed: Procedure(s) (LRB):  EGD (ESOPHAGOGASTRODUODENOSCOPY) (N/A)    Anesthesia type: MAC    Post pain: Adequate analgesia    Post assessment: no apparent anesthetic complications    Last Vitals:   Visit Vitals  BP (!) 148/90 (BP Location: Left arm, Patient Position: Lying)   Pulse 98   Temp 37.2 °C (99 °F) (Temporal)   Resp 16   Ht 5' 11" (1.803 m)   Wt 81.6 kg (180 lb)   SpO2 97%   BMI 25.10 kg/m²       Post vital signs: stable    Level of consciousness: awake    Nausea/Vomiting: no nausea/no vomiting    Complications: none    Airway Patency: patent    Respiratory: unassisted    Cardiovascular: stable and blood pressure at baseline    Hydration: euvolemic  "

## 2023-05-04 VITALS
RESPIRATION RATE: 18 BRPM | OXYGEN SATURATION: 100 % | HEIGHT: 71 IN | SYSTOLIC BLOOD PRESSURE: 139 MMHG | DIASTOLIC BLOOD PRESSURE: 93 MMHG | TEMPERATURE: 98 F | WEIGHT: 180 LBS | HEART RATE: 91 BPM | BODY MASS INDEX: 25.2 KG/M2

## 2023-05-10 LAB
FINAL PATHOLOGIC DIAGNOSIS: NORMAL
GROSS: NORMAL
Lab: NORMAL

## 2023-05-11 NOTE — PROGRESS NOTES
AN staff: please contact patient with results since he does not have patient portal. Inform the following: the biopsies do not show signs of Culp's esophagus like you had in 2016 but it does shoe active inflammation. Please take the Prilosec in the morning like we discussed. Guidelines recommend a repeat endoscopy in 5 years. We will send you a reminder as the time nears.     We will see you next month for your colonoscopy.

## 2023-05-16 ENCOUNTER — HOSPITAL ENCOUNTER (INPATIENT)
Facility: HOSPITAL | Age: 68
LOS: 1 days | Discharge: HOME HEALTH | DRG: 064 | End: 2023-05-18
Attending: EMERGENCY MEDICINE | Admitting: INTERNAL MEDICINE
Payer: MEDICARE

## 2023-05-16 DIAGNOSIS — R41.82 ALTERED MENTAL STATUS, UNSPECIFIED ALTERED MENTAL STATUS TYPE: ICD-10-CM

## 2023-05-16 DIAGNOSIS — R07.9 CHEST PAIN: ICD-10-CM

## 2023-05-16 DIAGNOSIS — R53.1 WEAKNESS: ICD-10-CM

## 2023-05-16 DIAGNOSIS — K52.9 COLITIS: Primary | ICD-10-CM

## 2023-05-16 DIAGNOSIS — N17.9 AKI (ACUTE KIDNEY INJURY): ICD-10-CM

## 2023-05-16 DIAGNOSIS — I63.40 CEREBROVASCULAR ACCIDENT (CVA) DUE TO EMBOLISM OF CEREBRAL ARTERY: ICD-10-CM

## 2023-05-16 DIAGNOSIS — R11.2 NAUSEA AND VOMITING, UNSPECIFIED VOMITING TYPE: ICD-10-CM

## 2023-05-16 DIAGNOSIS — S06.0XAA CONCUSSION WITH UNKNOWN LOSS OF CONSCIOUSNESS STATUS, INITIAL ENCOUNTER: ICD-10-CM

## 2023-05-16 DIAGNOSIS — I63.9 CARDIOEMBOLIC STROKE: ICD-10-CM

## 2023-05-16 PROBLEM — R47.1 DYSARTHRIA: Status: ACTIVE | Noted: 2023-05-16

## 2023-05-16 LAB
ALBUMIN SERPL BCP-MCNC: 4.2 G/DL (ref 3.5–5.2)
ALP SERPL-CCNC: 62 U/L (ref 55–135)
ALT SERPL W/O P-5'-P-CCNC: 20 U/L (ref 10–44)
ANION GAP SERPL CALC-SCNC: 14 MMOL/L (ref 8–16)
APTT PPP: 23.2 SEC (ref 21–32)
AST SERPL-CCNC: 19 U/L (ref 10–40)
BASOPHILS # BLD AUTO: 0.06 K/UL (ref 0–0.2)
BASOPHILS NFR BLD: 0.4 % (ref 0–1.9)
BILIRUB SERPL-MCNC: 0.4 MG/DL (ref 0.1–1)
BILIRUB UR QL STRIP: NEGATIVE
BNP SERPL-MCNC: 21 PG/ML (ref 0–99)
BUN SERPL-MCNC: 30 MG/DL (ref 8–23)
CALCIUM SERPL-MCNC: 9.6 MG/DL (ref 8.7–10.5)
CHLORIDE SERPL-SCNC: 103 MMOL/L (ref 95–110)
CLARITY UR: CLEAR
CO2 SERPL-SCNC: 21 MMOL/L (ref 23–29)
COLOR UR: YELLOW
CREAT SERPL-MCNC: 2.2 MG/DL (ref 0.5–1.4)
DIFFERENTIAL METHOD: ABNORMAL
EOSINOPHIL # BLD AUTO: 0 K/UL (ref 0–0.5)
EOSINOPHIL NFR BLD: 0.1 % (ref 0–8)
ERYTHROCYTE [DISTWIDTH] IN BLOOD BY AUTOMATED COUNT: 13.5 % (ref 11.5–14.5)
EST. GFR  (NO RACE VARIABLE): 32 ML/MIN/1.73 M^2
GLUCOSE SERPL-MCNC: 126 MG/DL (ref 70–110)
GLUCOSE UR QL STRIP: NEGATIVE
HCT VFR BLD AUTO: 50 % (ref 40–54)
HCV AB SERPL QL IA: NEGATIVE
HEP C VIRUS HOLD SPECIMEN: NORMAL
HGB BLD-MCNC: 16 G/DL (ref 14–18)
HGB UR QL STRIP: NEGATIVE
HIV 1+2 AB+HIV1 P24 AG SERPL QL IA: NEGATIVE
IMM GRANULOCYTES # BLD AUTO: 0.1 K/UL (ref 0–0.04)
IMM GRANULOCYTES NFR BLD AUTO: 0.7 % (ref 0–0.5)
INFLUENZA A, MOLECULAR: NEGATIVE
INFLUENZA B, MOLECULAR: NEGATIVE
INR PPP: 1 (ref 0.8–1.2)
KETONES UR QL STRIP: NEGATIVE
LACTATE SERPL-SCNC: 1.2 MMOL/L (ref 0.5–2.2)
LACTATE SERPL-SCNC: 2.1 MMOL/L (ref 0.5–2.2)
LEUKOCYTE ESTERASE UR QL STRIP: NEGATIVE
LYMPHOCYTES # BLD AUTO: 0.7 K/UL (ref 1–4.8)
LYMPHOCYTES NFR BLD: 4.9 % (ref 18–48)
MAGNESIUM SERPL-MCNC: 2.3 MG/DL (ref 1.6–2.6)
MCH RBC QN AUTO: 27.9 PG (ref 27–31)
MCHC RBC AUTO-ENTMCNC: 32 G/DL (ref 32–36)
MCV RBC AUTO: 87 FL (ref 82–98)
MONOCYTES # BLD AUTO: 1.1 K/UL (ref 0.3–1)
MONOCYTES NFR BLD: 8 % (ref 4–15)
NEUTROPHILS # BLD AUTO: 12.1 K/UL (ref 1.8–7.7)
NEUTROPHILS NFR BLD: 85.9 % (ref 38–73)
NITRITE UR QL STRIP: NEGATIVE
NRBC BLD-RTO: 0 /100 WBC
PH UR STRIP: 6 [PH] (ref 5–8)
PLATELET # BLD AUTO: 293 K/UL (ref 150–450)
PMV BLD AUTO: 8.5 FL (ref 9.2–12.9)
POTASSIUM SERPL-SCNC: 4.8 MMOL/L (ref 3.5–5.1)
PROCALCITONIN SERPL IA-MCNC: 0.08 NG/ML
PROT SERPL-MCNC: 7.6 G/DL (ref 6–8.4)
PROT UR QL STRIP: ABNORMAL
PROTHROMBIN TIME: 11 SEC (ref 9–12.5)
RBC # BLD AUTO: 5.74 M/UL (ref 4.6–6.2)
SARS-COV-2 RDRP RESP QL NAA+PROBE: NEGATIVE
SODIUM SERPL-SCNC: 138 MMOL/L (ref 136–145)
SP GR UR STRIP: 1.01 (ref 1–1.03)
SPECIMEN SOURCE: NORMAL
TROPONIN I SERPL DL<=0.01 NG/ML-MCNC: 0.09 NG/ML (ref 0–0.03)
TROPONIN I SERPL DL<=0.01 NG/ML-MCNC: 1.12 NG/ML (ref 0–0.03)
TROPONIN I SERPL DL<=0.01 NG/ML-MCNC: 2.71 NG/ML (ref 0–0.03)
URN SPEC COLLECT METH UR: ABNORMAL
UROBILINOGEN UR STRIP-ACNC: NEGATIVE EU/DL
WBC # BLD AUTO: 14.12 K/UL (ref 3.9–12.7)

## 2023-05-16 PROCEDURE — 87040 BLOOD CULTURE FOR BACTERIA: CPT | Performed by: EMERGENCY MEDICINE

## 2023-05-16 PROCEDURE — P9612 CATHETERIZE FOR URINE SPEC: HCPCS

## 2023-05-16 PROCEDURE — 96365 THER/PROPH/DIAG IV INF INIT: CPT | Mod: 59

## 2023-05-16 PROCEDURE — G0378 HOSPITAL OBSERVATION PER HR: HCPCS

## 2023-05-16 PROCEDURE — 84484 ASSAY OF TROPONIN QUANT: CPT | Mod: 91 | Performed by: INTERNAL MEDICINE

## 2023-05-16 PROCEDURE — 85610 PROTHROMBIN TIME: CPT | Performed by: EMERGENCY MEDICINE

## 2023-05-16 PROCEDURE — 80053 COMPREHEN METABOLIC PANEL: CPT | Performed by: EMERGENCY MEDICINE

## 2023-05-16 PROCEDURE — 99285 EMERGENCY DEPT VISIT HI MDM: CPT | Mod: 25

## 2023-05-16 PROCEDURE — 84484 ASSAY OF TROPONIN QUANT: CPT | Mod: 91 | Performed by: EMERGENCY MEDICINE

## 2023-05-16 PROCEDURE — 93005 ELECTROCARDIOGRAM TRACING: CPT

## 2023-05-16 PROCEDURE — 36415 COLL VENOUS BLD VENIPUNCTURE: CPT | Performed by: INTERNAL MEDICINE

## 2023-05-16 PROCEDURE — 93010 ELECTROCARDIOGRAM REPORT: CPT | Mod: ,,, | Performed by: STUDENT IN AN ORGANIZED HEALTH CARE EDUCATION/TRAINING PROGRAM

## 2023-05-16 PROCEDURE — U0002 COVID-19 LAB TEST NON-CDC: HCPCS | Performed by: EMERGENCY MEDICINE

## 2023-05-16 PROCEDURE — 83735 ASSAY OF MAGNESIUM: CPT | Performed by: EMERGENCY MEDICINE

## 2023-05-16 PROCEDURE — 25000003 PHARM REV CODE 250: Performed by: INTERNAL MEDICINE

## 2023-05-16 PROCEDURE — 83880 ASSAY OF NATRIURETIC PEPTIDE: CPT | Performed by: EMERGENCY MEDICINE

## 2023-05-16 PROCEDURE — 85730 THROMBOPLASTIN TIME PARTIAL: CPT | Performed by: EMERGENCY MEDICINE

## 2023-05-16 PROCEDURE — 93010 EKG 12-LEAD: ICD-10-PCS | Mod: ,,, | Performed by: STUDENT IN AN ORGANIZED HEALTH CARE EDUCATION/TRAINING PROGRAM

## 2023-05-16 PROCEDURE — 87389 HIV-1 AG W/HIV-1&-2 AB AG IA: CPT | Performed by: EMERGENCY MEDICINE

## 2023-05-16 PROCEDURE — 86803 HEPATITIS C AB TEST: CPT | Performed by: EMERGENCY MEDICINE

## 2023-05-16 PROCEDURE — 83605 ASSAY OF LACTIC ACID: CPT | Mod: 91 | Performed by: EMERGENCY MEDICINE

## 2023-05-16 PROCEDURE — 96361 HYDRATE IV INFUSION ADD-ON: CPT

## 2023-05-16 PROCEDURE — 81003 URINALYSIS AUTO W/O SCOPE: CPT | Performed by: EMERGENCY MEDICINE

## 2023-05-16 PROCEDURE — 84145 PROCALCITONIN (PCT): CPT | Performed by: EMERGENCY MEDICINE

## 2023-05-16 PROCEDURE — S0030 INJECTION, METRONIDAZOLE: HCPCS | Performed by: INTERNAL MEDICINE

## 2023-05-16 PROCEDURE — 63600175 PHARM REV CODE 636 W HCPCS: Performed by: INTERNAL MEDICINE

## 2023-05-16 PROCEDURE — 96375 TX/PRO/DX INJ NEW DRUG ADDON: CPT

## 2023-05-16 PROCEDURE — 63600175 PHARM REV CODE 636 W HCPCS: Performed by: EMERGENCY MEDICINE

## 2023-05-16 PROCEDURE — G0427 INPT/ED TELECONSULT70: HCPCS | Mod: 95,,, | Performed by: PSYCHIATRY & NEUROLOGY

## 2023-05-16 PROCEDURE — C9113 INJ PANTOPRAZOLE SODIUM, VIA: HCPCS | Performed by: INTERNAL MEDICINE

## 2023-05-16 PROCEDURE — 25000003 PHARM REV CODE 250: Performed by: NURSE PRACTITIONER

## 2023-05-16 PROCEDURE — 85025 COMPLETE CBC W/AUTO DIFF WBC: CPT | Performed by: EMERGENCY MEDICINE

## 2023-05-16 PROCEDURE — S0030 INJECTION, METRONIDAZOLE: HCPCS | Performed by: EMERGENCY MEDICINE

## 2023-05-16 PROCEDURE — 25000003 PHARM REV CODE 250: Performed by: EMERGENCY MEDICINE

## 2023-05-16 PROCEDURE — 87502 INFLUENZA DNA AMP PROBE: CPT | Performed by: EMERGENCY MEDICINE

## 2023-05-16 PROCEDURE — G0427 PR INPT TELEHEALTH CON 70/>M: ICD-10-PCS | Mod: 95,,, | Performed by: PSYCHIATRY & NEUROLOGY

## 2023-05-16 PROCEDURE — 96366 THER/PROPH/DIAG IV INF ADDON: CPT

## 2023-05-16 RX ORDER — PANTOPRAZOLE SODIUM 40 MG/10ML
40 INJECTION, POWDER, LYOPHILIZED, FOR SOLUTION INTRAVENOUS DAILY
Status: DISCONTINUED | OUTPATIENT
Start: 2023-05-16 | End: 2023-05-18 | Stop reason: HOSPADM

## 2023-05-16 RX ORDER — NALOXONE HCL 0.4 MG/ML
0.4 VIAL (ML) INJECTION
Status: DISCONTINUED | OUTPATIENT
Start: 2023-05-16 | End: 2023-05-18 | Stop reason: HOSPADM

## 2023-05-16 RX ORDER — ONDANSETRON 2 MG/ML
4 INJECTION INTRAMUSCULAR; INTRAVENOUS
Status: COMPLETED | OUTPATIENT
Start: 2023-05-16 | End: 2023-05-16

## 2023-05-16 RX ORDER — LORAZEPAM 2 MG/ML
1 INJECTION INTRAMUSCULAR
Status: DISPENSED | OUTPATIENT
Start: 2023-05-16 | End: 2023-05-16

## 2023-05-16 RX ORDER — CIPROFLOXACIN 2 MG/ML
400 INJECTION, SOLUTION INTRAVENOUS
Status: DISCONTINUED | OUTPATIENT
Start: 2023-05-16 | End: 2023-05-18 | Stop reason: HOSPADM

## 2023-05-16 RX ORDER — ONDANSETRON 2 MG/ML
4 INJECTION INTRAMUSCULAR; INTRAVENOUS EVERY 6 HOURS PRN
Status: DISCONTINUED | OUTPATIENT
Start: 2023-05-16 | End: 2023-05-18 | Stop reason: HOSPADM

## 2023-05-16 RX ORDER — SODIUM CHLORIDE 0.9 % (FLUSH) 0.9 %
10 SYRINGE (ML) INJECTION EVERY 12 HOURS PRN
Status: DISCONTINUED | OUTPATIENT
Start: 2023-05-16 | End: 2023-05-18 | Stop reason: HOSPADM

## 2023-05-16 RX ORDER — SODIUM CHLORIDE 9 MG/ML
INJECTION, SOLUTION INTRAVENOUS CONTINUOUS
Status: DISCONTINUED | OUTPATIENT
Start: 2023-05-16 | End: 2023-05-18 | Stop reason: HOSPADM

## 2023-05-16 RX ORDER — TALC
6 POWDER (GRAM) TOPICAL NIGHTLY PRN
Status: DISCONTINUED | OUTPATIENT
Start: 2023-05-16 | End: 2023-05-16

## 2023-05-16 RX ORDER — GLUCAGON 1 MG
1 KIT INJECTION
Status: DISCONTINUED | OUTPATIENT
Start: 2023-05-16 | End: 2023-05-18 | Stop reason: HOSPADM

## 2023-05-16 RX ORDER — ACETAMINOPHEN 325 MG/1
650 TABLET ORAL EVERY 6 HOURS PRN
Status: DISCONTINUED | OUTPATIENT
Start: 2023-05-16 | End: 2023-05-18

## 2023-05-16 RX ORDER — METRONIDAZOLE 500 MG/100ML
500 INJECTION, SOLUTION INTRAVENOUS
Status: DISCONTINUED | OUTPATIENT
Start: 2023-05-16 | End: 2023-05-18 | Stop reason: HOSPADM

## 2023-05-16 RX ORDER — METRONIDAZOLE 500 MG/100ML
500 INJECTION, SOLUTION INTRAVENOUS
Status: COMPLETED | OUTPATIENT
Start: 2023-05-16 | End: 2023-05-16

## 2023-05-16 RX ORDER — NAPROXEN SODIUM 220 MG/1
81 TABLET, FILM COATED ORAL DAILY
Status: DISCONTINUED | OUTPATIENT
Start: 2023-05-16 | End: 2023-05-18 | Stop reason: HOSPADM

## 2023-05-16 RX ORDER — TRAZODONE HYDROCHLORIDE 100 MG/1
100 TABLET ORAL NIGHTLY
COMMUNITY
Start: 2023-04-15 | End: 2023-06-27

## 2023-05-16 RX ADMIN — CIPROFLOXACIN 400 MG: 2 INJECTION, SOLUTION INTRAVENOUS at 11:05

## 2023-05-16 RX ADMIN — ASPIRIN 81 MG CHEWABLE TABLET 81 MG: 81 TABLET CHEWABLE at 09:05

## 2023-05-16 RX ADMIN — PANTOPRAZOLE SODIUM 40 MG: 40 INJECTION, POWDER, FOR SOLUTION INTRAVENOUS at 01:05

## 2023-05-16 RX ADMIN — SODIUM CHLORIDE 1000 ML: 9 INJECTION, SOLUTION INTRAVENOUS at 10:05

## 2023-05-16 RX ADMIN — METRONIDAZOLE 500 MG: 5 INJECTION, SOLUTION INTRAVENOUS at 11:05

## 2023-05-16 RX ADMIN — SODIUM CHLORIDE: 9 INJECTION, SOLUTION INTRAVENOUS at 03:05

## 2023-05-16 RX ADMIN — ONDANSETRON 4 MG: 2 INJECTION INTRAMUSCULAR; INTRAVENOUS at 10:05

## 2023-05-16 RX ADMIN — CIPROFLOXACIN 400 MG: 2 INJECTION, SOLUTION INTRAVENOUS at 12:05

## 2023-05-16 RX ADMIN — SODIUM CHLORIDE: 9 INJECTION, SOLUTION INTRAVENOUS at 01:05

## 2023-05-16 RX ADMIN — METRONIDAZOLE 500 MG: 5 INJECTION, SOLUTION INTRAVENOUS at 08:05

## 2023-05-16 NOTE — H&P
[facility]  [facility] Medicine  History & Physical    Patient Name: Mil Sood  MRN: [id number]  Patient Class: OP- Observation  Admission Date: 5/16/2023  Attending Physician: Brennen Hall MD   Primary Care Provider: Yumi Roberson MD         Patient information was obtained from patient, relative(s), past medical records and ER records.     Subjective:     Principal Problem:Colitis    Chief Complaint:   Chief Complaint   Patient presents with    Altered Mental Status     Pt found unresponsive and lethargic by son. Pt woke prior to [facility] arrival. Pt altered, GCS 14.         HPI: The patient is a 69 yo male with past medical history of Culp's esophagitis, diabetes, GERD, diverticulitis and anxiety who presented to the ED after being found unresponsive by his son this morning. Patient last known well was 2300 last night. He was initially incoherent. He tried to stand and fell hitting his head on the dresser per his son. The patient reports his stomach has been tight for the last week. He has nausea, vomiting and diarrhea. He reports his mouth is dry. Patient is independent at baseline. His speech is not at baseline. CT head negative. CT abdomen/pelvis Labs revealed YANNI. He was given fluid bolus and antibiotics in ED. Hospital medicine consulted. Patient placed in observation.      Past Medical History:   Diagnosis Date    Anxiety     Cancer     spot on kidney and lung    Diabetes mellitus     Borderline diet controlled    Diverticulitis     GERD (gastroesophageal reflux disease)     Hyperlipidemia     not on any meds currently    Hypertension     Lung mass     Mass of kidney        Past Surgical History:   Procedure Laterality Date    ARTHROPLASTY OF JOINT OF FINGER Left 11/18/2022    Procedure: ARTHROPLASTY, FINGER;  Surgeon: Kane Negrete MD;  Location: [facility];  Service: Orthopedics;  Laterality: Left;  left thumb basal joint arthroplasty  Arthrex  Podiatry extras     CHOLECYSTECTOMY      COLONOSCOPY      COLONOSCOPY N/A 4/7/2017    Procedure: COLONOSCOPY;  Surgeon: Amarilys Bravo MD;  Location: [facility] ENDO;  Service: Endoscopy;  Laterality: N/A;    DIAGNOSTIC LAPAROSCOPY N/A 5/4/2021    Procedure: LAPAROSCOPY, DIAGNOSTIC;  Surgeon: Shaji Jauregui MD;  Location: [facility] OR;  Service: General;  Laterality: N/A;    ESOPHAGOGASTRODUODENOSCOPY      ESOPHAGOGASTRODUODENOSCOPY N/A 2/13/2020    Procedure: ESOPHAGOGASTRODUODENOSCOPY (EGD);  Surgeon: Shaji Aquino III, MD;  Location: [facility] ENDO;  Service: Endoscopy;  Laterality: N/A;    ESOPHAGOGASTRODUODENOSCOPY N/A 5/3/2023    Procedure: EGD (ESOPHAGOGASTRODUODENOSCOPY);  Surgeon: Jenelle Jensen MD;  Location: [facility] ENDO;  Service: Endoscopy;  Laterality: N/A;    HERNIA REPAIR Bilateral     groin    LEFT HEART CATHETERIZATION Left 1/31/2022    Procedure: CATHETERIZATION, HEART, LEFT;  Surgeon: Romeo Godfrey MD;  Location: [facility] CATH LAB;  Service: Cardiology;  Laterality: Left;    NECK SURGERY      OPEN REDUCTION AND INTERNAL FIXATION (ORIF) OF INJURY OF ANKLE Left 8/10/2022    Procedure: ORIF, ANKLE;  Surgeon: Howard Mata MD;  Location: [facility] OR;  Service: Orthopedics;  Laterality: Left;    PARTIAL NEPHRECTOMY      left    RENAL BIOPSY      ROBOT-ASSISTED REPAIR OF UMBILICAL HERNIA USING DA NEL XI N/A 5/4/2021    Procedure: XI ROBOTIC REPAIR, HERNIA, UMBILICAL;  Surgeon: Shaji Jauregui MD;  Location: [facility] OR;  Service: General;  Laterality: N/A;  converted to open    TONSILLECTOMY      TRIGGER FINGER RELEASE Right     long finger       Review of patient's allergies indicates:   Allergen Reactions    Rosuvastatin Other (See Comments)     Joint pain   Joint and bone pain       Current Facility-Administered Medications on File Prior to Encounter   Medication    chlorhexidine 0.12 % solution 10 mL     Current Outpatient Medications on File Prior to Encounter   Medication Sig    amLODIPine (NORVASC) 5 MG  tablet Take 1 tablet (5 mg total) by mouth once daily. (Patient taking differently: Take 5 mg by mouth every evening.)    aspirin (ECOTRIN) 81 MG EC tablet Take 1 tablet (81 mg total) by mouth once daily.    benazepriL (LOTENSIN) 20 MG tablet TAKE 2 TABLETS BY MOUTH NIGHTLY.    citalopram (CELEXA) 40 MG tablet Take 1 tablet (40 mg total) by mouth every evening.    cyclobenzaprine (FLEXERIL) 10 MG tablet TAKE 1 TABLET BY MOUTH THREE TIMES DAILY AS NEEDED.    finasteride (PROSCAR) 5 mg tablet TAKE 1 TABLET BY MOUTH AT NIGHT.    gabapentin (NEURONTIN) 600 MG tablet Take 1 tablet (600 mg total) by mouth nightly.    hydrALAZINE (APRESOLINE) 25 MG tablet TAKE ONE TABLET BY MOUTH AT BEDTIME.    isosorbide mononitrate (IMDUR) 60 MG 24 hr tablet TAKE 1 TABLET BY MOUTH ONCE DAILY.    metoprolol tartrate (LOPRESSOR) 25 MG tablet Take 1 tablet (25 mg total) by mouth 2 (two) times daily.    omeprazole (PRILOSEC) 40 MG capsule Take 1 capsule (40 mg total) by mouth every morning.    pravastatin (PRAVACHOL) 20 MG tablet Take 1 tablet (20 mg total) by mouth once daily.    tamsulosin (FLOMAX) 0.4 mg Cap TAKE 1 CAPSULE BY MOUTH EVERY EVENING.    tiotropium (SPIRIVA WITH HANDIHALER) 18 mcg inhalation capsule INHALE THE CONTENTS OF ONE CAPSULE DAILY.    traZODone (DESYREL) 100 MG tablet Take 100 mg by mouth every evening.    venlafaxine (EFFEXOR) 75 MG tablet Take 1 tablet (75 mg total) by mouth 2 (two) times daily.    zolpidem (AMBIEN) 10 mg Tab TAKE 1 TABLET BY MOUTH IN THE EVENING.    clonazePAM (KLONOPIN) 1 MG tablet Take 1 tablet (1 mg total) by mouth nightly as needed for Anxiety.    fluticasone propion-salmeterol 115-21 mcg/dose (ADVAIR HFA) 115-21 mcg/actuation HFAA inhaler Inhale 2 puffs into the lungs 2 (two) times daily.    [DISCONTINUED] blood sugar diagnostic Strp Use to check blood sugars BID    [DISCONTINUED] blood sugar diagnostic Strp 1 each by Misc.(Non-Drug; Combo Route) route 3 (three) times  daily.    [DISCONTINUED] blood-glucose meter kit Use as instructed    [DISCONTINUED] hydrOXYzine pamoate (VISTARIL) 25 MG Cap Take 1 capsule (25 mg total) by mouth every evening.    [DISCONTINUED] lancets (ACCU-CHEK SOFTCLIX LANCETS) Misc 1 each by Misc.(Non-Drug; Combo Route) route 3 (three) times daily.    [DISCONTINUED] RESTASIS MULTIDOSE 0.05 % Drop     [DISCONTINUED] sulfamethoxazole-trimethoprim 800-160mg (BACTRIM DS) 800-160 mg Tab Take 1 tablet by mouth 2 (two) times daily.     Family History       Problem Relation (Age of Onset)    Cancer Sister    Hypertension Mother, Father    Melanoma Father          Tobacco Use    Smoking status: Former     Packs/day: 1.00     Years: 20.00     Pack years: 20.00     Types: Cigarettes     Quit date: 1995     Years since quittin.2    Smokeless tobacco: Never   Substance and Sexual Activity    Alcohol use: Never     Alcohol/week: 0.0 standard drinks    Drug use: No    Sexual activity: Yes     Partners: Female     Birth control/protection: None     Review of Systems   Gastrointestinal:  Positive for abdominal distention, diarrhea, nausea and vomiting. Negative for abdominal pain.   Neurological:  Positive for weakness.   All other systems reviewed and are negative.  Objective:     Vital Signs (Most Recent):  Temp: 98.4 °F (36.9 °C) (23 0919)  Pulse: 70 (23 0910)  Resp: (!) 22 (23 0910)  BP: 109/68 (23 0910)  SpO2: 95 % (23 0858) Vital Signs (24h Range):  Temp:  [98.4 °F (36.9 °C)] 98.4 °F (36.9 °C)  Pulse:  [70-85] 70  Resp:  [18-22] 22  SpO2:  [95 %] 95 %  BP: (109)/(61-68) 109/68     Weight: 77.5 kg (170 lb 13.7 oz)  Body mass index is 23.83 kg/m².     Physical Exam  HENT:      Head: Normocephalic and atraumatic.   Cardiovascular:      Rate and Rhythm: Normal rate and regular rhythm.      Heart sounds: No murmur heard.  Pulmonary:      Effort: Pulmonary effort is normal. No respiratory distress.      Breath sounds: Normal  breath sounds. No wheezing.   Abdominal:      General: Bowel sounds are normal. There is no distension.      Palpations: Abdomen is soft.      Tenderness: There is no abdominal tenderness.   Musculoskeletal:         General: No swelling.   Skin:     General: Skin is warm and dry.   Neurological:      Mental Status: He is alert and oriented to person, place, and time. Mental status is at baseline.      Comments: Dysarthria, moves all extremities, gait not assessed due to fall at home              Significant Labs: All pertinent labs within the past 24 hours have been reviewed.  CBC:   Recent Labs   Lab 05/16/23  1028   WBC 14.12*   HGB 16.0   HCT 50.0        CMP:   Recent Labs   Lab 05/16/23  1028      K 4.8      CO2 21*   *   BUN 30*   CREATININE 2.2*   CALCIUM 9.6   PROT 7.6   ALBUMIN 4.2   BILITOT 0.4   ALKPHOS 62   AST 19   ALT 20   ANIONGAP 14     Cardiac Markers:   Recent Labs   Lab 05/16/23  1028   BNP 21     Troponin:   Recent Labs   Lab 05/16/23  1028   TROPONINI 0.090*       Significant Imaging: I have reviewed all pertinent imaging results/findings within the past 24 hours.    Assessment/Plan:     * Colitis  Infectious/inflammatory process per CT  Empiric antibiotics  IVFs        YANNI (acute kidney injury)  Patient with acute kidney injury likely due to IVVD/dehydration YANNI is currently being monitored. Labs reviewed- Renal function/electrolytes with Estimated Creatinine Clearance: 34.2 mL/min (A) (based on SCr of 2.2 mg/dL (H)). according to latest data. Monitor urine output and serial BMP and adjust therapy as needed. Avoid nephrotoxins and renally dose meds for GFR listed above.   IVFs    Weakness  MRI brain to rule out stroke  PT/OT eval  Further plan based upon progression with therapy      Dysarthria  Continue PPI  Hydrate patient and reassess  Bedside swallow per nursing      GERD (gastroesophageal reflux disease)  Continue PPI      Essential hypertension  Hold home  medications for now  Allow permissive hypertension until MRI resulted  Monitor blood pressure        VTE Risk Mitigation (From admission, onward)         Ordered     IP VTE HIGH RISK PATIENT  Once         05/16/23 1307     Place sequential compression device  Until discontinued         05/16/23 1307                   On 05/16/2023, patient should be placed in hospital observation services under my care.        Brennen Hall MD  Department of Hospital Medicine  [facility] - Emergency Dept.

## 2023-05-16 NOTE — Clinical Note
Kristina May accompanied their grandfather to the emergency department on 5/16/2023. They may return to work on 05/18/2023.      If you have any questions or concerns, please don't hesitate to call.      Neelam Salinas RN

## 2023-05-16 NOTE — ASSESSMENT & PLAN NOTE
Hold home medications for now  Allow permissive hypertension until MRI resulted  Monitor blood pressure

## 2023-05-16 NOTE — ED PROVIDER NOTES
SCRIBE #1 NOTE: I, Michael Medina, am scribing for, and in the presence of, Paradise Williamson Do, MD. I have scribed the entire note.      History      Chief Complaint   Patient presents with    Altered Mental Status     Pt found unresponsive and lethargic by son. Pt woke prior to [facility] arrival. Pt altered, GCS 14.        Review of patient's allergies indicates:   Allergen Reactions    Rosuvastatin Other (See Comments)     Joint pain   Joint and bone pain        HPI   HPI    5/16/2023, 9:28 AM   History obtained from the patient's sons  HPI and ROS limited secondary to pt's AMS      History of Present Illness: Mil Sood is a 68 y.o. male patient who presents to the Emergency Department for AMS, onset this morning at 5:30 AM. Last known normal time was 11 PM last night. Son lives with the pt, and states that the pt was found unresponsive and incoherent this morning.  Kind of had gibberish speech but then the son left to go to work.  The other son went to check on the patient around 730.  The patient was sleeping at that time.  It then they checked on him again prior to arrival.  They found him on the floor full of vomit and altered.  Pt had awoken prior to [facility] arrival. Symptoms are constant and moderate in severity. No mitigating or exacerbating factors reported. Associated sxs include slurred speech, n/v/d, and generalized abdominal pain. No prior Tx reported. No further complaints or concerns at this time.     [facility]    PCP: Yumi Roberson MD       Past Medical History:  Past Medical History:   Diagnosis Date    Anxiety     Cancer     spot on kidney and lung    Diabetes mellitus     Borderline diet controlled    Diverticulitis     GERD (gastroesophageal reflux disease)     Hyperlipidemia     not on any meds currently    Hypertension     Lung mass     Mass of kidney        Past Surgical History:  Past Surgical History:   Procedure Laterality Date    ARTHROPLASTY OF JOINT OF FINGER Left 11/18/2022     Procedure: ARTHROPLASTY, FINGER;  Surgeon: Kane Negrete MD;  Location: [facility] OR;  Service: Orthopedics;  Laterality: Left;  left thumb basal joint arthroplasty  Arthrex  Podiatry extras    CHOLECYSTECTOMY      COLONOSCOPY      COLONOSCOPY N/A 4/7/2017    Procedure: COLONOSCOPY;  Surgeon: Amarilys Bravo MD;  Location: [facility] ENDO;  Service: Endoscopy;  Laterality: N/A;    DIAGNOSTIC LAPAROSCOPY N/A 5/4/2021    Procedure: LAPAROSCOPY, DIAGNOSTIC;  Surgeon: Shaji Jauregui MD;  Location: [facility] OR;  Service: General;  Laterality: N/A;    ESOPHAGOGASTRODUODENOSCOPY      ESOPHAGOGASTRODUODENOSCOPY N/A 2/13/2020    Procedure: ESOPHAGOGASTRODUODENOSCOPY (EGD);  Surgeon: Shaji Aquino III, MD;  Location: [facility];  Service: Endoscopy;  Laterality: N/A;    ESOPHAGOGASTRODUODENOSCOPY N/A 5/3/2023    Procedure: EGD (ESOPHAGOGASTRODUODENOSCOPY);  Surgeon: Jenelle Jensen MD;  Location: [facility];  Service: Endoscopy;  Laterality: N/A;    HERNIA REPAIR Bilateral     groin    LEFT HEART CATHETERIZATION Left 1/31/2022    Procedure: CATHETERIZATION, HEART, LEFT;  Surgeon: Romeo Godfrey MD;  Location: [facility] CATH LAB;  Service: Cardiology;  Laterality: Left;    NECK SURGERY      OPEN REDUCTION AND INTERNAL FIXATION (ORIF) OF INJURY OF ANKLE Left 8/10/2022    Procedure: ORIF, ANKLE;  Surgeon: Howard Mata MD;  Location: [facility] OR;  Service: Orthopedics;  Laterality: Left;    PARTIAL NEPHRECTOMY      left    RENAL BIOPSY      ROBOT-ASSISTED REPAIR OF UMBILICAL HERNIA USING DA NEL XI N/A 5/4/2021    Procedure: XI ROBOTIC REPAIR, HERNIA, UMBILICAL;  Surgeon: Shaji Jauregui MD;  Location: [facility] OR;  Service: General;  Laterality: N/A;  converted to open    TONSILLECTOMY      TRIGGER FINGER RELEASE Right     long finger         Family History:  Family History   Problem Relation Age of Onset    Hypertension Mother     Hypertension Father     Melanoma Father     Cancer Sister      Social History:  Social History      Tobacco Use    Smoking status: Former     Packs/day: 1.00     Years: 20.00     Pack years: 20.00     Types: Cigarettes     Quit date: 1995     Years since quittin.2    Smokeless tobacco: Never   Substance and Sexual Activity    Alcohol use: Never     Alcohol/week: 0.0 standard drinks    Drug use: No    Sexual activity: Yes     Partners: Female     Birth control/protection: None       ROS   Review of Systems   Unable to perform ROS: Mental status change   Gastrointestinal:  Positive for abdominal pain (generalized), diarrhea, nausea and vomiting.   Neurological:  Positive for speech difficulty (slurred).   Psychiatric/Behavioral:  Positive for confusion (AMS).      Physical Exam      Initial Vitals   BP Pulse Resp Temp SpO2   23 0858 23 0858 23 0858 23 0919 23 0858   109/61 85 18 98.4 °F (36.9 °C) 95 %      MAP       --                 Physical Exam  Nursing Notes and Vital Signs Reviewed.  Constitutional: Patient is in no acute distress. Well-developed and well-nourished.  Patient is confused  Head: Atraumatic. Normocephalic.  Eyes: PERRL. EOM intact. Conjunctivae are not pale. No scleral icterus.  ENT: Mucous membranes are dry. Oropharynx is clear and symmetric.    Neck: Supple. Full ROM.   Cardiovascular: Regular rate. Regular rhythm. No murmurs, rubs, or gallops. Distal pulses are 2+ and symmetric.  Pulmonary/Chest: No respiratory distress. Clear to auscultation bilaterally. No wheezing or rales.  Abdominal: Soft and non-distended.  There is no tenderness.  No rebound, guarding, or rigidity.   Musculoskeletal: Moves all extremities.  Was able to lift each arm in the air and each leg and they are individually, No obvious deformities. No edema.  Skin: Warm and dry.  Neurological:  Awake and alert. Appears confused. Mumbled, slurred speech but he was able to tell me his son's names. No facial droop. Able to follow simple commands. 4/5 strength in BUE and BLE.    ED Course     Critical Care    Date/Time: 5/16/2023 11:49 AM  Performed by: Paradise Williamson Do, MD  Authorized by: Paradise Williamson Do, MD   Direct patient critical care time: 15 minutes  Additional history critical care time: 5 minutes  Ordering / reviewing critical care time: 10 minutes  Documentation critical care time: 5 minutes  Consulting other physicians critical care time: 5 minutes  Total critical care time (exclusive of procedural time) : 40 minutes  Critical care time was exclusive of separately billable procedures and treating other patients and teaching time.  Critical care was necessary to treat or prevent imminent or life-threatening deterioration of the following conditions: renal failure (Acute renal failure, colitis).  Critical care was time spent personally by me on the following activities: blood draw for specimens, development of treatment plan with patient or surrogate, discussions with consultants, interpretation of cardiac output measurements, evaluation of patient's response to treatment, examination of patient, obtaining history from patient or surrogate, ordering and performing treatments and interventions, ordering and review of laboratory studies, ordering and review of radiographic studies, pulse oximetry, re-evaluation of patient's condition and review of old charts.      ED Vital Signs:  Vitals:    05/16/23 0858 05/16/23 0910 05/16/23 0919 05/16/23 1018   BP: 109/61 109/68     Pulse: 85 70     Resp: 18 (!) 22     Temp:   98.4 °F (36.9 °C)    TempSrc:   Rectal    SpO2: 95%      Weight:    77.5 kg (170 lb 13.7 oz)    05/16/23 1356   BP: 110/62   Pulse: 87   Resp: 20   Temp: 98.1 °F (36.7 °C)   TempSrc: Oral   SpO2: 95%   Weight:        Abnormal Lab Results:  Labs Reviewed   CBC W/ AUTO DIFFERENTIAL - Abnormal; Notable for the following components:       Result Value    WBC 14.12 (*)     MPV 8.5 (*)     Immature Granulocytes 0.7 (*)     Gran # (ANC) 12.1 (*)     Immature Grans (Abs) 0.10 (*)     Lymph  # 0.7 (*)     Mono # 1.1 (*)     Gran % 85.9 (*)     Lymph % 4.9 (*)     All other components within normal limits    Narrative:     Release to patient->Immediate   COMPREHENSIVE METABOLIC PANEL - Abnormal; Notable for the following components:    CO2 21 (*)     Glucose 126 (*)     BUN 30 (*)     Creatinine 2.2 (*)     eGFR 32 (*)     All other components within normal limits    Narrative:     Release to patient->Immediate   URINALYSIS, REFLEX TO URINE CULTURE - Abnormal; Notable for the following components:    Protein, UA Trace (*)     All other components within normal limits    Narrative:     Specimen Source->Urine   TROPONIN I - Abnormal; Notable for the following components:    Troponin I 0.090 (*)     All other components within normal limits    Narrative:     Release to patient->Immediate   INFLUENZA A & B BY MOLECULAR   CULTURE, BLOOD   CULTURE, BLOOD   LACTIC ACID, PLASMA    Narrative:     Release to patient->Immediate   PROCALCITONIN    Narrative:     Release to patient->Immediate   B-TYPE NATRIURETIC PEPTIDE    Narrative:     Release to patient->Immediate   PROTIME-INR    Narrative:     Release to patient->Immediate   APTT    Narrative:     Release to patient->Immediate   MAGNESIUM    Narrative:     Release to patient->Immediate   SARS-COV-2 RNA AMPLIFICATION, QUAL   HIV 1 / 2 ANTIBODY    Narrative:     Release to patient->Immediate   HEPATITIS C ANTIBODY    Narrative:     Release to patient->Immediate   HEP C VIRUS HOLD SPECIMEN    Narrative:     Release to patient->Immediate   LACTIC ACID, PLASMA   TROPONIN I        All Lab Results:  Results for orders placed or performed during the hospital encounter of 05/16/23   Influenza A & B by Molecular    Specimen: Nasopharyngeal Swab   Result Value Ref Range    Influenza A, Molecular Negative Negative    Influenza B, Molecular Negative Negative    Flu A & B Source Nasal swab    CBC auto differential   Result Value Ref Range    WBC 14.12 (H) 3.90 - 12.70 K/uL     RBC 5.74 4.60 - 6.20 M/uL    Hemoglobin 16.0 14.0 - 18.0 g/dL    Hematocrit 50.0 40.0 - 54.0 %    MCV 87 82 - 98 fL    MCH 27.9 27.0 - 31.0 pg    MCHC 32.0 32.0 - 36.0 g/dL    RDW 13.5 11.5 - 14.5 %    Platelets 293 150 - 450 K/uL    MPV 8.5 (L) 9.2 - 12.9 fL    Immature Granulocytes 0.7 (H) 0.0 - 0.5 %    Gran # (ANC) 12.1 (H) 1.8 - 7.7 K/uL    Immature Grans (Abs) 0.10 (H) 0.00 - 0.04 K/uL    Lymph # 0.7 (L) 1.0 - 4.8 K/uL    Mono # 1.1 (H) 0.3 - 1.0 K/uL    Eos # 0.0 0.0 - 0.5 K/uL    Baso # 0.06 0.00 - 0.20 K/uL    nRBC 0 0 /100 WBC    Gran % 85.9 (H) 38.0 - 73.0 %    Lymph % 4.9 (L) 18.0 - 48.0 %    Mono % 8.0 4.0 - 15.0 %    Eosinophil % 0.1 0.0 - 8.0 %    Basophil % 0.4 0.0 - 1.9 %    Differential Method Automated    Comprehensive metabolic panel   Result Value Ref Range    Sodium 138 136 - 145 mmol/L    Potassium 4.8 3.5 - 5.1 mmol/L    Chloride 103 95 - 110 mmol/L    CO2 21 (L) 23 - 29 mmol/L    Glucose 126 (H) 70 - 110 mg/dL    BUN 30 (H) 8 - 23 mg/dL    Creatinine 2.2 (H) 0.5 - 1.4 mg/dL    Calcium 9.6 8.7 - 10.5 mg/dL    Total Protein 7.6 6.0 - 8.4 g/dL    Albumin 4.2 3.5 - 5.2 g/dL    Total Bilirubin 0.4 0.1 - 1.0 mg/dL    Alkaline Phosphatase 62 55 - 135 U/L    AST 19 10 - 40 U/L    ALT 20 10 - 44 U/L    Anion Gap 14 8 - 16 mmol/L    eGFR 32 (A) >60 mL/min/1.73 m^2   Lactic acid, plasma #1   Result Value Ref Range    Lactate (Lactic Acid) 2.1 0.5 - 2.2 mmol/L   Urinalysis, Reflex to Urine Culture Urine, Catheterized    Specimen: Urine   Result Value Ref Range    Specimen UA Urine, Catheterized     Color, UA Yellow Yellow, Straw, Asiya    Appearance, UA Clear Clear    pH, UA 6.0 5.0 - 8.0    Specific Gravity, UA 1.010 1.005 - 1.030    Protein, UA Trace (A) Negative    Glucose, UA Negative Negative    Ketones, UA Negative Negative    Bilirubin (UA) Negative Negative    Occult Blood UA Negative Negative    Nitrite, UA Negative Negative    Urobilinogen, UA Negative <2.0 EU/dL    Leukocytes, UA Negative  Negative   Procalcitonin   Result Value Ref Range    Procalcitonin 0.08 <0.25 ng/mL   Troponin I   Result Value Ref Range    Troponin I 0.090 (H) 0.000 - 0.026 ng/mL   Brain natriuretic peptide   Result Value Ref Range    BNP 21 0 - 99 pg/mL   Protime-INR   Result Value Ref Range    Prothrombin Time 11.0 9.0 - 12.5 sec    INR 1.0 0.8 - 1.2   APTT   Result Value Ref Range    aPTT 23.2 21.0 - 32.0 sec   Magnesium   Result Value Ref Range    Magnesium 2.3 1.6 - 2.6 mg/dL   COVID-19 Rapid Screening   Result Value Ref Range    SARS-CoV-2 RNA, Amplification, Qual Negative Negative   HIV 1/2 Ag/Ab (4th Gen)   Result Value Ref Range    HIV 1/2 Ag/Ab Negative Negative   Hepatitis C Antibody   Result Value Ref Range    Hepatitis C Ab Negative Negative   HCV Virus Hold Specimen   Result Value Ref Range    HEP C Virus Hold Specimen Hold for HCV sendout    Lactic acid, plasma #2   Result Value Ref Range    Lactate (Lactic Acid) 1.2 0.5 - 2.2 mmol/L     Imaging Results:  Imaging Results              CT Head Without Contrast (Final result)  Result time 05/16/23 10:15:07      Final result by Mitch Jones MD (Timothy) (05/16/23 10:15:07)                   Impression:      No definite acute intracranial abnormality.    All CT scans at this facility use dose modulation, iterative reconstructions, and/or weight base dosing when appropriate to reduce radiation dose to as low as reasonably achievable.      Electronically signed by: Mitch Jones MD  Date:    05/16/2023  Time:    10:15               Narrative:    EXAMINATION:  CT HEAD WITHOUT CONTRAST    CLINICAL HISTORY:  Head trauma, minor (Age >= 65y);Mental status change, unknown cause;    TECHNIQUE:  Noncontrast images were obtained    COMPARISON:  CT brain 07/20/2019    FINDINGS:  Motion artifact limits evaluation.  Atrophy is present.  Patchy periventricular white matter hypodensity likely secondary to chronic small vessel ischemic changes.  No intracranial acute hemorrhage or  acute focal brain parenchymal abnormality is identified.  Calvarium is intact.                                       CT Abdomen Pelvis  Without Contrast (Final result)  Result time 05/16/23 10:23:06      Final result by Fritz Morris MD (05/16/23 10:23:06)                   Impression:      Fluid seen throughout the colon suggesting infectious or inflammatory colitis.      Electronically signed by: Fritz Morris  Date:    05/16/2023  Time:    10:23               Narrative:    EXAMINATION:  CT ABDOMEN PELVIS WITHOUT CONTRAST    CLINICAL HISTORY:  Nausea/vomiting;Abdominal pain, acute, nonlocalized;    COMPARISON:  None available.    TECHNIQUE:  Axial CT images were obtained of the abdomen and pelvis.  Iterative reconstruction technique was used. The CT exam was performed using one or more of the following dose reduction techniques- Automated exposure control, adjustment of the mA and/or kV according to patient size, and/or use of iterative reconstructed technique.    FINDINGS:  Heart: Normal in size. No pericardial effusion.  Coronary artery calcifications.    Lung Bases: Mild bibasilar atelectasis/scarring.    Liver: Unremarkable.    Gallbladder: Surgically absent.    Bile Ducts: No evidence of dilated ducts.    Pancreas: No mass or peripancreatic fat stranding.    Spleen: Unremarkable.    Adrenals: Unremarkable.    Kidneys/ Ureters: Prior tumor ablation changes to both kidneys.  No hydronephrosis.  Punctate nonobstructing left renal calculus.  A right renal cyst is present.  The ureters are unremarkable.    Bladder: No evidence of wall thickening.    Reproductive organs: Unremarkable.    GI Tract/Mesentery: Fluid seen throughout the colon.  No evidence of obstruction.    Peritoneal Space: Unremarkable.    Retroperitoneum: No adenopathy.    Abdominal wall: Unremarkable.    Vasculature: Mild atherosclerotic disease. No aortic aneurysm.    Bones: Multilevel degenerative changes of the spine.  No acute or aggressive  osseous lesion.  Median sternotomy wires.                                       X-Ray Chest AP Portable (Final result)  Result time 05/16/23 09:48:43      Final result by Mitch Jones MD (Timothy) (05/16/23 09:48:43)                   Impression:      Clear lungs.      Electronically signed by: Mitch Jones MD  Date:    05/16/2023  Time:    09:48               Narrative:    EXAMINATION:  XR CHEST AP PORTABLE    CLINICAL HISTORY:  <Diagnosis>, Sepsis;    COMPARISON:  Comparison 03/29/2023.    FINDINGS:  One view.    Prior median sternotomy.  Normal size heart.  Lungs appear clear.  Previous cervical spine surgery.                                     The EKG was ordered, reviewed, and independently interpreted by the ED provider.  Interpretation time: 09:34  Rate: 68 BPM  Rhythm: normal sinus rhythm  Interpretation: Anterior infarct, age undetermined. No STEMI.           The Emergency Provider reviewed the vital signs and test results, which are outlined above.    ED Discussion     1:14 PM: Discussed case with Dr. Hall ([facility]). Dr. Hall agrees with current care and management of pt and accepts admission.   Admitting Service: Hospital Medicine  Admitting Physician: Dr. Hall  Admit to: Obs Tele    1:15 PM: Re-evaluated pt. I have discussed test results, shared treatment plan, and the need for admission with patient and family at bedside. Pt and family express understanding at this time and agree with all information. All questions answered. Pt and family have no further questions or concerns at this time. Pt is ready for admit.         ED Medication(s):  Medications   LORazepam injection 1 mg (1 mg Intravenous Not Given 5/16/23 0945)   ciprofloxacin (CIPRO)400mg/200ml D5W IVPB 400 mg (0 mg Intravenous Stopped 5/16/23 1345)   0.9%  NaCl infusion ( Intravenous New Bag 5/16/23 1356)   sodium chloride 0.9% flush 10 mL (has no administration in time range)   naloxone 0.4 mg/mL injection 0.4 mg (has  no administration in time range)   glucagon (human recombinant) injection 1 mg (has no administration in time range)   ondansetron injection 4 mg (has no administration in time range)   pantoprazole injection 40 mg (40 mg Intravenous Given 5/16/23 1356)   metronidazole IVPB 500 mg (has no administration in time range)   sodium chloride 0.9% bolus 1,000 mL 1,000 mL (0 mLs Intravenous Stopped 5/16/23 1127)   ondansetron injection 4 mg (4 mg Intravenous Given 5/16/23 1017)   metronidazole IVPB 500 mg (0 mg Intravenous Stopped 5/16/23 1232)       New Prescriptions    No medications on file       Medical Decision Making    Medical Decision Making:   Initial Assessment:   Patient with nausea vomiting abdominal pain fall at home and confusion  Differential Diagnosis:   Gastroenteritis, bowel obstruction, colitis, diverticulitis, cholecystitis, appendicitis, perforated bowel, herniation, infectious etiology, UTI, pyelonephritis  CVA, TIA, intracranial hemorrhage, STEMI, non-STEMI, pneumonia, infectious etiology  Clinical Tests:   Lab Tests: Ordered and Reviewed  Radiological Study: Ordered and Reviewed  Medical Tests: Ordered and Reviewed  ED Management:  Patient with mildly elevated white count with YANNI his BUN creatinine is elevated, no UTI.  Lactate is normal.  Troponin was a little bit elevated 0.09.  EKG does not show any ST elevation, chest x-ray did not show pneumonia.  But he did get a CT scan which showed inflammatory changes consistent with colitis.  He continues to have some confusion although it is a lot better then when he 1st came in.  Sons are at the bedside.  Hospital medicine services was consulted and Dr. Hall will admit him to the hospital for IV antibiotics and fluid hydration         Scribe Attestation:   Scribe #1: I performed the above scribed service and the documentation accurately describes the services I performed. I attest to the accuracy of the note.    Attending:   Physician Attestation  Statement for Scribe #1: I, Paradise Williamson Do, MD, personally performed the services described in this documentation, as scribed by Michael Medina, in my presence, and it is both accurate and complete.          Clinical Impression       ICD-10-CM ICD-9-CM   1. Colitis  K52.9 558.9   2. Weakness  R53.1 780.79   3. Chest pain  R07.9 786.50   4. Altered mental status, unspecified altered mental status type  R41.82 780.97   5. Concussion with unknown loss of consciousness status, initial encounter  S06.0XAA 850.9   6. Nausea and vomiting, unspecified vomiting type  R11.2 787.01   7. YANNI (acute kidney injury)  N17.9 584.9       Disposition:   Disposition: Placed in Observation  Condition: Fair       Paradise Williamson Do, MD  05/16/23 1343       Paradise Williamson Do, MD  05/16/23 1402

## 2023-05-16 NOTE — HPI
The patient is a 67 yo male with past medical history of Culp's esophagitis, diabetes, GERD, diverticulitis and anxiety who presented to the ED after being found unresponsive by his son this morning. Patient last known well was 2300 last night. He was initially incoherent. He tried to stand and fell hitting his head on the dresser per his son. The patient reports his stomach has been tight for the last week. He has nausea, vomiting and diarrhea. He reports his mouth is dry. Patient is independent at baseline. His speech is not at baseline. CT head negative. CT abdomen/pelvis Labs revealed YANNI. He was given fluid bolus and antibiotics in ED. Hospital medicine consulted. Patient placed in observation.

## 2023-05-16 NOTE — SUBJECTIVE & OBJECTIVE
Past Medical History:   Diagnosis Date    Anxiety     Cancer     spot on kidney and lung    Diabetes mellitus     Borderline diet controlled    Diverticulitis     GERD (gastroesophageal reflux disease)     Hyperlipidemia     not on any meds currently    Hypertension     Lung mass     Mass of kidney        Past Surgical History:   Procedure Laterality Date    ARTHROPLASTY OF JOINT OF FINGER Left 11/18/2022    Procedure: ARTHROPLASTY, FINGER;  Surgeon: Kane Negrete MD;  Location: [facility];  Service: Orthopedics;  Laterality: Left;  left thumb basal joint arthroplasty  Arthrex  Podiatry extras    CHOLECYSTECTOMY      COLONOSCOPY      COLONOSCOPY N/A 4/7/2017    Procedure: COLONOSCOPY;  Surgeon: Amarilys Bravo MD;  Location: [facility];  Service: Endoscopy;  Laterality: N/A;    DIAGNOSTIC LAPAROSCOPY N/A 5/4/2021    Procedure: LAPAROSCOPY, DIAGNOSTIC;  Surgeon: Shaji Jauregui MD;  Location: [facility];  Service: General;  Laterality: N/A;    ESOPHAGOGASTRODUODENOSCOPY      ESOPHAGOGASTRODUODENOSCOPY N/A 2/13/2020    Procedure: ESOPHAGOGASTRODUODENOSCOPY (EGD);  Surgeon: Shaji Aquino III, MD;  Location: [facility];  Service: Endoscopy;  Laterality: N/A;    ESOPHAGOGASTRODUODENOSCOPY N/A 5/3/2023    Procedure: EGD (ESOPHAGOGASTRODUODENOSCOPY);  Surgeon: Jenelle Jensen MD;  Location: [facility];  Service: Endoscopy;  Laterality: N/A;    HERNIA REPAIR Bilateral     groin    LEFT HEART CATHETERIZATION Left 1/31/2022    Procedure: CATHETERIZATION, HEART, LEFT;  Surgeon: Romeo Godfrey MD;  Location: [facility] CATH [facility];  Service: Cardiology;  Laterality: Left;    NECK SURGERY      OPEN REDUCTION AND INTERNAL FIXATION (ORIF) OF INJURY OF ANKLE Left 8/10/2022    Procedure: ORIF, ANKLE;  Surgeon: Howard Mata MD;  Location: [facility] OR;  Service: Orthopedics;  Laterality: Left;    PARTIAL NEPHRECTOMY      left    RENAL BIOPSY      ROBOT-ASSISTED REPAIR OF UMBILICAL HERNIA USING DA NEL XI N/A 5/4/2021     Procedure: XI ROBOTIC REPAIR, HERNIA, UMBILICAL;  Surgeon: Shaji Jauregui MD;  Location: [facility];  Service: General;  Laterality: N/A;  converted to open    TONSILLECTOMY      TRIGGER FINGER RELEASE Right     long finger       Review of patient's allergies indicates:   Allergen Reactions    Rosuvastatin Other (See Comments)     Joint pain   Joint and bone pain       Current Facility-Administered Medications on File Prior to Encounter   Medication    chlorhexidine 0.12 % solution 10 mL     Current Outpatient Medications on File Prior to Encounter   Medication Sig    amLODIPine (NORVASC) 5 MG tablet Take 1 tablet (5 mg total) by mouth once daily. (Patient taking differently: Take 5 mg by mouth every evening.)    aspirin (ECOTRIN) 81 MG EC tablet Take 1 tablet (81 mg total) by mouth once daily.    benazepriL (LOTENSIN) 20 MG tablet TAKE 2 TABLETS BY MOUTH NIGHTLY.    citalopram (CELEXA) 40 MG tablet Take 1 tablet (40 mg total) by mouth every evening.    cyclobenzaprine (FLEXERIL) 10 MG tablet TAKE 1 TABLET BY MOUTH THREE TIMES DAILY AS NEEDED.    finasteride (PROSCAR) 5 mg tablet TAKE 1 TABLET BY MOUTH AT NIGHT.    gabapentin (NEURONTIN) 600 MG tablet Take 1 tablet (600 mg total) by mouth nightly.    hydrALAZINE (APRESOLINE) 25 MG tablet TAKE ONE TABLET BY MOUTH AT BEDTIME.    isosorbide mononitrate (IMDUR) 60 MG 24 hr tablet TAKE 1 TABLET BY MOUTH ONCE DAILY.    metoprolol tartrate (LOPRESSOR) 25 MG tablet Take 1 tablet (25 mg total) by mouth 2 (two) times daily.    omeprazole (PRILOSEC) 40 MG capsule Take 1 capsule (40 mg total) by mouth every morning.    pravastatin (PRAVACHOL) 20 MG tablet Take 1 tablet (20 mg total) by mouth once daily.    tamsulosin (FLOMAX) 0.4 mg Cap TAKE 1 CAPSULE BY MOUTH EVERY EVENING.    tiotropium (SPIRIVA WITH HANDIHALER) 18 mcg inhalation capsule INHALE THE CONTENTS OF ONE CAPSULE DAILY.    traZODone (DESYREL) 100 MG tablet Take 100 mg by mouth every evening.    venlafaxine (EFFEXOR)  75 MG tablet Take 1 tablet (75 mg total) by mouth 2 (two) times daily.    zolpidem (AMBIEN) 10 mg Tab TAKE 1 TABLET BY MOUTH IN THE EVENING.    clonazePAM (KLONOPIN) 1 MG tablet Take 1 tablet (1 mg total) by mouth nightly as needed for Anxiety.    fluticasone propion-salmeterol 115-21 mcg/dose (ADVAIR HFA) 115-21 mcg/actuation HFAA inhaler Inhale 2 puffs into the lungs 2 (two) times daily.    [DISCONTINUED] blood sugar diagnostic Strp Use to check blood sugars BID    [DISCONTINUED] blood sugar diagnostic Strp 1 each by Misc.(Non-Drug; Combo Route) route 3 (three) times daily.    [DISCONTINUED] blood-glucose meter kit Use as instructed    [DISCONTINUED] hydrOXYzine pamoate (VISTARIL) 25 MG Cap Take 1 capsule (25 mg total) by mouth every evening.    [DISCONTINUED] lancets (ACCU-CHEK SOFTCLIX LANCETS) Misc 1 each by Misc.(Non-Drug; Combo Route) route 3 (three) times daily.    [DISCONTINUED] RESTASIS MULTIDOSE 0.05 % Drop     [DISCONTINUED] sulfamethoxazole-trimethoprim 800-160mg (BACTRIM DS) 800-160 mg Tab Take 1 tablet by mouth 2 (two) times daily.     Family History       Problem Relation (Age of Onset)    Cancer Sister    Hypertension Mother, Father    Melanoma Father          Tobacco Use    Smoking status: Former     Packs/day: 1.00     Years: 20.00     Pack years: 20.00     Types: Cigarettes     Quit date: 1995     Years since quittin.2    Smokeless tobacco: Never   Substance and Sexual Activity    Alcohol use: Never     Alcohol/week: 0.0 standard drinks    Drug use: No    Sexual activity: Yes     Partners: Female     Birth control/protection: None     Review of Systems   Gastrointestinal:  Positive for abdominal distention, diarrhea, nausea and vomiting. Negative for abdominal pain.   Neurological:  Positive for weakness.   All other systems reviewed and are negative.  Objective:     Vital Signs (Most Recent):  Temp: 98.4 °F (36.9 °C) (23 0919)  Pulse: 70 (23 0910)  Resp: (!) 22 (23  0910)  BP: 109/68 (05/16/23 0910)  SpO2: 95 % (05/16/23 0858) Vital Signs (24h Range):  Temp:  [98.4 °F (36.9 °C)] 98.4 °F (36.9 °C)  Pulse:  [70-85] 70  Resp:  [18-22] 22  SpO2:  [95 %] 95 %  BP: (109)/(61-68) 109/68     Weight: 77.5 kg (170 lb 13.7 oz)  Body mass index is 23.83 kg/m².     Physical Exam  HENT:      Head: Normocephalic and atraumatic.   Cardiovascular:      Rate and Rhythm: Normal rate and regular rhythm.      Heart sounds: No murmur heard.  Pulmonary:      Effort: Pulmonary effort is normal. No respiratory distress.      Breath sounds: Normal breath sounds. No wheezing.   Abdominal:      General: Bowel sounds are normal. There is no distension.      Palpations: Abdomen is soft.      Tenderness: There is no abdominal tenderness.   Musculoskeletal:         General: No swelling.   Skin:     General: Skin is warm and dry.   Neurological:      Mental Status: He is alert and oriented to person, place, and time. Mental status is at baseline.      Comments: Dysarthria, moves all extremities, gait not assessed due to fall at home              Significant Labs: All pertinent labs within the past 24 hours have been reviewed.  CBC:   Recent Labs   Lab 05/16/23  1028   WBC 14.12*   HGB 16.0   HCT 50.0        CMP:   Recent Labs   Lab 05/16/23  1028      K 4.8      CO2 21*   *   BUN 30*   CREATININE 2.2*   CALCIUM 9.6   PROT 7.6   ALBUMIN 4.2   BILITOT 0.4   ALKPHOS 62   AST 19   ALT 20   ANIONGAP 14     Cardiac Markers:   Recent Labs   Lab 05/16/23  1028   BNP 21     Troponin:   Recent Labs   Lab 05/16/23  1028   TROPONINI 0.090*       Significant Imaging: I have reviewed all pertinent imaging results/findings within the past 24 hours.

## 2023-05-16 NOTE — ASSESSMENT & PLAN NOTE
Patient with acute kidney injury likely due to IVVD/dehydration YANNI is currently being monitored. Labs reviewed- Renal function/electrolytes with Estimated Creatinine Clearance: 34.2 mL/min (A) (based on SCr of 2.2 mg/dL (H)). according to latest data. Monitor urine output and serial BMP and adjust therapy as needed. Avoid nephrotoxins and renally dose meds for GFR listed above.   IVFs

## 2023-05-16 NOTE — PHARMACY MED REC
"Admission Medication History     The home medication history was taken by Trung Garcia.    You may go to "Admission" then "Reconcile Home Medications" tabs to review and/or act upon these items.     The home medication list has been updated by the Pharmacy department.   Please read ALL comments highlighted in yellow.   Please address this information as you see fit.    Feel free to contact us if you have any questions or require assistance.      The medications listed below were removed from the home medication list. Please reorder if appropriate:  Patient reports no longer taking the following medication(s):  VISTARIL 25MG  BACTRIM DS    Medications listed below were obtained from: Patient/family and Analytic software- [employer]  (Not in a hospital admission)      Trung Garcia  [id number]    Current Outpatient Medications on File Prior to Encounter   Medication Sig Dispense Refill Last Dose    amLODIPine (NORVASC) 5 MG tablet Take 1 tablet (5 mg total) by mouth once daily. (Patient taking differently: Take 5 mg by mouth every evening.) 90 tablet 3 5/15/2023    aspirin (ECOTRIN) 81 MG EC tablet Take 1 tablet (81 mg total) by mouth once daily.   5/15/2023    benazepriL (LOTENSIN) 20 MG tablet TAKE 2 TABLETS BY MOUTH NIGHTLY. 180 tablet 2 5/15/2023    citalopram (CELEXA) 40 MG tablet Take 1 tablet (40 mg total) by mouth every evening. 90 tablet 3 5/15/2023    cyclobenzaprine (FLEXERIL) 10 MG tablet TAKE 1 TABLET BY MOUTH THREE TIMES DAILY AS NEEDED. 90 tablet 0 5/15/2023    finasteride (PROSCAR) 5 mg tablet TAKE 1 TABLET BY MOUTH AT NIGHT. 90 tablet 1 5/15/2023    gabapentin (NEURONTIN) 600 MG tablet Take 1 tablet (600 mg total) by mouth nightly. 90 tablet 1 5/15/2023    hydrALAZINE (APRESOLINE) 25 MG tablet TAKE ONE TABLET BY MOUTH AT BEDTIME. 90 tablet 0 5/15/2023    isosorbide mononitrate (IMDUR) 60 MG 24 hr tablet TAKE 1 TABLET BY MOUTH ONCE DAILY. 90 tablet 2 5/15/2023    metoprolol tartrate (LOPRESSOR) " 25 MG tablet Take 1 tablet (25 mg total) by mouth 2 (two) times daily. 60 tablet 11 5/15/2023    omeprazole (PRILOSEC) 40 MG capsule Take 1 capsule (40 mg total) by mouth every morning. 90 capsule 3 5/15/2023    pravastatin (PRAVACHOL) 20 MG tablet Take 1 tablet (20 mg total) by mouth once daily. 90 tablet 3 5/15/2023    tamsulosin (FLOMAX) 0.4 mg Cap TAKE 1 CAPSULE BY MOUTH EVERY EVENING. 90 capsule 0 5/15/2023    tiotropium (SPIRIVA WITH HANDIHALER) 18 mcg inhalation capsule INHALE THE CONTENTS OF ONE CAPSULE DAILY. 90 capsule 1 Past Month    traZODone (DESYREL) 100 MG tablet Take 100 mg by mouth every evening.   5/15/2023    venlafaxine (EFFEXOR) 75 MG tablet Take 1 tablet (75 mg total) by mouth 2 (two) times daily. 180 tablet 3 5/15/2023    zolpidem (AMBIEN) 10 mg Tab TAKE 1 TABLET BY MOUTH IN THE EVENING. 30 tablet 5 Past Week    clonazePAM (KLONOPIN) 1 MG tablet Take 1 tablet (1 mg total) by mouth nightly as needed for Anxiety. 30 tablet 1 Unknown    fluticasone propion-salmeterol 115-21 mcg/dose (ADVAIR HFA) 115-21 mcg/actuation HFAA inhaler Inhale 2 puffs into the lungs 2 (two) times daily. 12 g 12                            .

## 2023-05-16 NOTE — PLAN OF CARE
Discussed poc with pt, pt verbalized understanding     Purposeful rounding every 2hours  Cardiac monitor in place. Pt running NSR.     Fall precautions in place, remains injury free  Pt denies c/o nausea or pain.     IVFs  Bed locked at lowest position  Call light within reach     Chart check complete  Reviewed active orders  Call light within reach  Will continue with POC

## 2023-05-16 NOTE — Clinical Note
Landon May accompanied their father to the emergency department on 5/16/2023. They may return to work on 05/18/2023.      If you have any questions or concerns, please don't hesitate to call.      Neelam Salinas RN

## 2023-05-17 ENCOUNTER — TELEPHONE (OUTPATIENT)
Dept: CARDIOLOGY | Facility: HOSPITAL | Age: 68
End: 2023-05-17
Payer: MEDICARE

## 2023-05-17 DIAGNOSIS — Z86.73 HISTORY OF CARDIOEMBOLIC CEREBROVASCULAR ACCIDENT (CVA): Primary | ICD-10-CM

## 2023-05-17 PROBLEM — I21.4 NSTEMI (NON-ST ELEVATED MYOCARDIAL INFARCTION): Status: ACTIVE | Noted: 2023-05-17

## 2023-05-17 LAB
ALBUMIN SERPL BCP-MCNC: 3.5 G/DL (ref 3.5–5.2)
ALP SERPL-CCNC: 54 U/L (ref 55–135)
ALT SERPL W/O P-5'-P-CCNC: 29 U/L (ref 10–44)
ANION GAP SERPL CALC-SCNC: 9 MMOL/L (ref 8–16)
AORTIC ROOT ANNULUS: 3.16 CM
APTT PPP: 25.8 SEC (ref 21–32)
APTT PPP: 31.4 SEC (ref 21–32)
APTT PPP: 48.7 SEC (ref 21–32)
ASCENDING AORTA: 3.3 CM
AST SERPL-CCNC: 46 U/L (ref 10–40)
AV INDEX (PROSTH): 0.74
AV MEAN GRADIENT: 4 MMHG
AV PEAK GRADIENT: 8 MMHG
AV REGURGITATION PRESSURE HALF TIME: 629 MS
AV VALVE AREA: 2.42 CM2
AV VELOCITY RATIO: 0.8
BASOPHILS # BLD AUTO: 0.03 K/UL (ref 0–0.2)
BASOPHILS # BLD AUTO: 0.06 K/UL (ref 0–0.2)
BASOPHILS # BLD AUTO: 0.06 K/UL (ref 0–0.2)
BASOPHILS NFR BLD: 0.3 % (ref 0–1.9)
BASOPHILS NFR BLD: 0.5 % (ref 0–1.9)
BASOPHILS NFR BLD: 0.5 % (ref 0–1.9)
BILIRUB SERPL-MCNC: 0.6 MG/DL (ref 0.1–1)
BSA FOR ECHO PROCEDURE: 2.06 M2
BUN SERPL-MCNC: 24 MG/DL (ref 8–23)
CALCIUM SERPL-MCNC: 8.7 MG/DL (ref 8.7–10.5)
CHLORIDE SERPL-SCNC: 104 MMOL/L (ref 95–110)
CHOLEST SERPL-MCNC: 170 MG/DL (ref 120–199)
CHOLEST/HDLC SERPL: 3.7 {RATIO} (ref 2–5)
CO2 SERPL-SCNC: 20 MMOL/L (ref 23–29)
CREAT SERPL-MCNC: 1.2 MG/DL (ref 0.5–1.4)
CV ECHO LV RWT: 0.77 CM
DIFFERENTIAL METHOD: ABNORMAL
DOP CALC AO PEAK VEL: 1.4 M/S
DOP CALC AO VTI: 29.4 CM
DOP CALC LVOT AREA: 3.3 CM2
DOP CALC LVOT DIAMETER: 2.04 CM
DOP CALC LVOT PEAK VEL: 1.12 M/S
DOP CALC LVOT STROKE VOLUME: 71.22 CM3
DOP CALC RVOT PEAK VEL: 0.71 M/S
DOP CALC RVOT VTI: 14.3 CM
DOP CALCLVOT PEAK VEL VTI: 21.8 CM
E WAVE DECELERATION TIME: 157.01 MSEC
E/A RATIO: 1
E/E' RATIO: 8.21 M/S
ECHO LV POSTERIOR WALL: 1.33 CM (ref 0.6–1.1)
EJECTION FRACTION: 60 %
EOSINOPHIL # BLD AUTO: 0.1 K/UL (ref 0–0.5)
EOSINOPHIL NFR BLD: 0.8 % (ref 0–8)
EOSINOPHIL NFR BLD: 1.2 % (ref 0–8)
EOSINOPHIL NFR BLD: 1.2 % (ref 0–8)
ERYTHROCYTE [DISTWIDTH] IN BLOOD BY AUTOMATED COUNT: 13.2 % (ref 11.5–14.5)
EST. GFR  (NO RACE VARIABLE): >60 ML/MIN/1.73 M^2
ESTIMATED AVG GLUCOSE: 108 MG/DL (ref 68–131)
FRACTIONAL SHORTENING: 28 % (ref 28–44)
GLUCOSE SERPL-MCNC: 106 MG/DL (ref 70–110)
HBA1C MFR BLD: 5.4 % (ref 4–5.6)
HCT VFR BLD AUTO: 42.5 % (ref 40–54)
HCT VFR BLD AUTO: 44.2 % (ref 40–54)
HCT VFR BLD AUTO: 44.2 % (ref 40–54)
HDLC SERPL-MCNC: 46 MG/DL (ref 40–75)
HDLC SERPL: 27.1 % (ref 20–50)
HGB BLD-MCNC: 14 G/DL (ref 14–18)
HGB BLD-MCNC: 14.2 G/DL (ref 14–18)
HGB BLD-MCNC: 14.2 G/DL (ref 14–18)
IMM GRANULOCYTES # BLD AUTO: 0.04 K/UL (ref 0–0.04)
IMM GRANULOCYTES NFR BLD AUTO: 0.4 % (ref 0–0.5)
INR PPP: 1 (ref 0.8–1.2)
INTERVENTRICULAR SEPTUM: 1.75 CM (ref 0.6–1.1)
IVC DIAMETER: 1.91 CM
IVRT: 68.51 MSEC
LA MAJOR: 5.28 CM
LA MINOR: 5.86 CM
LA WIDTH: 3.6 CM
LDLC SERPL CALC-MCNC: 105.8 MG/DL (ref 63–159)
LEFT ATRIUM SIZE: 3.21 CM
LEFT ATRIUM VOLUME INDEX MOD: 22.1 ML/M2
LEFT ATRIUM VOLUME INDEX: 26.6 ML/M2
LEFT ATRIUM VOLUME MOD: 45.29 CM3
LEFT ATRIUM VOLUME: 54.56 CM3
LEFT INTERNAL DIMENSION IN SYSTOLE: 2.48 CM (ref 2.1–4)
LEFT VENTRICLE DIASTOLIC VOLUME INDEX: 24.01 ML/M2
LEFT VENTRICLE DIASTOLIC VOLUME: 49.23 ML
LEFT VENTRICLE MASS INDEX: 97 G/M2
LEFT VENTRICLE SYSTOLIC VOLUME INDEX: 10.7 ML/M2
LEFT VENTRICLE SYSTOLIC VOLUME: 21.97 ML
LEFT VENTRICULAR INTERNAL DIMENSION IN DIASTOLE: 3.45 CM (ref 3.5–6)
LEFT VENTRICULAR MASS: 198.1 G
LV LATERAL E/E' RATIO: 7.09 M/S
LV SEPTAL E/E' RATIO: 9.75 M/S
LVOT MG: 2.65 MMHG
LVOT MV: 0.79 CM/S
LYMPHOCYTES # BLD AUTO: 1 K/UL (ref 1–4.8)
LYMPHOCYTES NFR BLD: 8.8 % (ref 18–48)
MCH RBC QN AUTO: 27.9 PG (ref 27–31)
MCH RBC QN AUTO: 27.9 PG (ref 27–31)
MCH RBC QN AUTO: 28.3 PG (ref 27–31)
MCHC RBC AUTO-ENTMCNC: 32.1 G/DL (ref 32–36)
MCHC RBC AUTO-ENTMCNC: 32.1 G/DL (ref 32–36)
MCHC RBC AUTO-ENTMCNC: 32.9 G/DL (ref 32–36)
MCV RBC AUTO: 86 FL (ref 82–98)
MCV RBC AUTO: 87 FL (ref 82–98)
MCV RBC AUTO: 87 FL (ref 82–98)
MONOCYTES # BLD AUTO: 1.1 K/UL (ref 0.3–1)
MONOCYTES # BLD AUTO: 1.2 K/UL (ref 0.3–1)
MONOCYTES # BLD AUTO: 1.2 K/UL (ref 0.3–1)
MONOCYTES NFR BLD: 10.4 % (ref 4–15)
MONOCYTES NFR BLD: 10.8 % (ref 4–15)
MONOCYTES NFR BLD: 10.8 % (ref 4–15)
MV PEAK A VEL: 0.78 M/S
MV PEAK E VEL: 0.78 M/S
NEUTROPHILS # BLD AUTO: 8.7 K/UL (ref 1.8–7.7)
NEUTROPHILS NFR BLD: 78.3 % (ref 38–73)
NEUTROPHILS NFR BLD: 78.3 % (ref 38–73)
NEUTROPHILS NFR BLD: 79.3 % (ref 38–73)
NONHDLC SERPL-MCNC: 124 MG/DL
NRBC BLD-RTO: 0 /100 WBC
PISA AR MAX VEL: 5.23 M/S
PISA MRMAX VEL: 6.44 M/S
PISA TR MAX VEL: 3.03 M/S
PLATELET # BLD AUTO: 236 K/UL (ref 150–450)
PLATELET # BLD AUTO: 236 K/UL (ref 150–450)
PLATELET # BLD AUTO: 238 K/UL (ref 150–450)
PMV BLD AUTO: 8.7 FL (ref 9.2–12.9)
PMV BLD AUTO: 8.8 FL (ref 9.2–12.9)
PMV BLD AUTO: 8.8 FL (ref 9.2–12.9)
POTASSIUM SERPL-SCNC: 4.3 MMOL/L (ref 3.5–5.1)
PROT SERPL-MCNC: 6.4 G/DL (ref 6–8.4)
PROTHROMBIN TIME: 10.9 SEC (ref 9–12.5)
PULM VEIN S/D RATIO: 1.07
PV MEAN GRADIENT: 1 MMHG
PV MV: 0.53 M/S
PV PEAK D VEL: 0.54 M/S
PV PEAK S VEL: 0.58 M/S
PV PEAK VELOCITY: 0.9 CM/S
RA MAJOR: 5.36 CM
RA PRESSURE: 3 MMHG
RBC # BLD AUTO: 4.94 M/UL (ref 4.6–6.2)
RBC # BLD AUTO: 5.09 M/UL (ref 4.6–6.2)
RBC # BLD AUTO: 5.09 M/UL (ref 4.6–6.2)
RIGHT VENTRICULAR END-DIASTOLIC DIMENSION: 3.6 CM
RV TISSUE DOPPLER FREE WALL SYSTOLIC VELOCITY 1 (APICAL 4 CHAMBER VIEW): 0.01 CM/S
SODIUM SERPL-SCNC: 133 MMOL/L (ref 136–145)
STJ: 2.63 CM
TDI LATERAL: 0.11 M/S
TDI SEPTAL: 0.08 M/S
TDI: 0.1 M/S
TR MAX PG: 37 MMHG
TRICUSPID ANNULAR PLANE SYSTOLIC EXCURSION: 1.5 CM
TRIGL SERPL-MCNC: 91 MG/DL (ref 30–150)
TROPONIN I SERPL DL<=0.01 NG/ML-MCNC: 2.71 NG/ML (ref 0–0.03)
TROPONIN I SERPL DL<=0.01 NG/ML-MCNC: 3.21 NG/ML (ref 0–0.03)
TROPONIN I SERPL DL<=0.01 NG/ML-MCNC: 3.27 NG/ML (ref 0–0.03)
TSH SERPL DL<=0.005 MIU/L-ACNC: 1.33 UIU/ML (ref 0.4–4)
TV REST PULMONARY ARTERY PRESSURE: 40 MMHG
WBC # BLD AUTO: 10.92 K/UL (ref 3.9–12.7)
WBC # BLD AUTO: 11.07 K/UL (ref 3.9–12.7)
WBC # BLD AUTO: 11.07 K/UL (ref 3.9–12.7)

## 2023-05-17 PROCEDURE — C9113 INJ PANTOPRAZOLE SODIUM, VIA: HCPCS | Performed by: INTERNAL MEDICINE

## 2023-05-17 PROCEDURE — 99223 1ST HOSP IP/OBS HIGH 75: CPT | Mod: 25,,, | Performed by: STUDENT IN AN ORGANIZED HEALTH CARE EDUCATION/TRAINING PROGRAM

## 2023-05-17 PROCEDURE — 63600175 PHARM REV CODE 636 W HCPCS: Performed by: INTERNAL MEDICINE

## 2023-05-17 PROCEDURE — 36415 COLL VENOUS BLD VENIPUNCTURE: CPT | Performed by: FAMILY MEDICINE

## 2023-05-17 PROCEDURE — 25000003 PHARM REV CODE 250: Performed by: NURSE PRACTITIONER

## 2023-05-17 PROCEDURE — 85025 COMPLETE CBC W/AUTO DIFF WBC: CPT | Performed by: NURSE PRACTITIONER

## 2023-05-17 PROCEDURE — 97116 GAIT TRAINING THERAPY: CPT

## 2023-05-17 PROCEDURE — 83036 HEMOGLOBIN GLYCOSYLATED A1C: CPT | Performed by: NURSE PRACTITIONER

## 2023-05-17 PROCEDURE — 97165 OT EVAL LOW COMPLEX 30 MIN: CPT

## 2023-05-17 PROCEDURE — 85730 THROMBOPLASTIN TIME PARTIAL: CPT | Performed by: NURSE PRACTITIONER

## 2023-05-17 PROCEDURE — 96376 TX/PRO/DX INJ SAME DRUG ADON: CPT

## 2023-05-17 PROCEDURE — 27000207 HC ISOLATION

## 2023-05-17 PROCEDURE — 92523 SPEECH SOUND LANG COMPREHEN: CPT | Performed by: SPEECH-LANGUAGE PATHOLOGIST

## 2023-05-17 PROCEDURE — 25000003 PHARM REV CODE 250: Performed by: INTERNAL MEDICINE

## 2023-05-17 PROCEDURE — 96361 HYDRATE IV INFUSION ADD-ON: CPT

## 2023-05-17 PROCEDURE — 80061 LIPID PANEL: CPT | Performed by: NURSE PRACTITIONER

## 2023-05-17 PROCEDURE — 85025 COMPLETE CBC W/AUTO DIFF WBC: CPT | Mod: 91 | Performed by: INTERNAL MEDICINE

## 2023-05-17 PROCEDURE — 80053 COMPREHEN METABOLIC PANEL: CPT | Performed by: INTERNAL MEDICINE

## 2023-05-17 PROCEDURE — 99223 PR INITIAL HOSPITAL CARE,LEVL III: ICD-10-PCS | Mod: 25,,, | Performed by: STUDENT IN AN ORGANIZED HEALTH CARE EDUCATION/TRAINING PROGRAM

## 2023-05-17 PROCEDURE — 36415 COLL VENOUS BLD VENIPUNCTURE: CPT | Performed by: NURSE PRACTITIONER

## 2023-05-17 PROCEDURE — 85610 PROTHROMBIN TIME: CPT | Performed by: NURSE PRACTITIONER

## 2023-05-17 PROCEDURE — S0030 INJECTION, METRONIDAZOLE: HCPCS | Performed by: INTERNAL MEDICINE

## 2023-05-17 PROCEDURE — 63600175 PHARM REV CODE 636 W HCPCS: Performed by: NURSE PRACTITIONER

## 2023-05-17 PROCEDURE — 25000003 PHARM REV CODE 250: Performed by: FAMILY MEDICINE

## 2023-05-17 PROCEDURE — 97162 PT EVAL MOD COMPLEX 30 MIN: CPT

## 2023-05-17 PROCEDURE — 84443 ASSAY THYROID STIM HORMONE: CPT | Performed by: NURSE PRACTITIONER

## 2023-05-17 PROCEDURE — 96366 THER/PROPH/DIAG IV INF ADDON: CPT

## 2023-05-17 PROCEDURE — 11000001 HC ACUTE MED/SURG PRIVATE ROOM

## 2023-05-17 PROCEDURE — 84484 ASSAY OF TROPONIN QUANT: CPT | Performed by: NURSE PRACTITIONER

## 2023-05-17 PROCEDURE — 21400001 HC TELEMETRY ROOM

## 2023-05-17 PROCEDURE — 97530 THERAPEUTIC ACTIVITIES: CPT

## 2023-05-17 PROCEDURE — 85730 THROMBOPLASTIN TIME PARTIAL: CPT | Mod: 91 | Performed by: FAMILY MEDICINE

## 2023-05-17 PROCEDURE — 96365 THER/PROPH/DIAG IV INF INIT: CPT | Mod: 59

## 2023-05-17 PROCEDURE — 63600175 PHARM REV CODE 636 W HCPCS: Performed by: EMERGENCY MEDICINE

## 2023-05-17 RX ORDER — CLOPIDOGREL BISULFATE 75 MG/1
75 TABLET ORAL DAILY
Status: DISCONTINUED | OUTPATIENT
Start: 2023-05-17 | End: 2023-05-18 | Stop reason: HOSPADM

## 2023-05-17 RX ORDER — HYDRALAZINE HYDROCHLORIDE 20 MG/ML
10 INJECTION INTRAMUSCULAR; INTRAVENOUS EVERY 4 HOURS PRN
Status: DISCONTINUED | OUTPATIENT
Start: 2023-05-17 | End: 2023-05-17

## 2023-05-17 RX ORDER — ATORVASTATIN CALCIUM 40 MG/1
40 TABLET, FILM COATED ORAL DAILY
Status: DISCONTINUED | OUTPATIENT
Start: 2023-05-17 | End: 2023-05-18 | Stop reason: HOSPADM

## 2023-05-17 RX ORDER — GABAPENTIN 300 MG/1
600 CAPSULE ORAL NIGHTLY
Status: DISCONTINUED | OUTPATIENT
Start: 2023-05-17 | End: 2023-05-18 | Stop reason: HOSPADM

## 2023-05-17 RX ORDER — VENLAFAXINE 25 MG/1
75 TABLET ORAL 2 TIMES DAILY
Status: DISCONTINUED | OUTPATIENT
Start: 2023-05-17 | End: 2023-05-18 | Stop reason: HOSPADM

## 2023-05-17 RX ORDER — HEPARIN SODIUM,PORCINE/D5W 25000/250
0-40 INTRAVENOUS SOLUTION INTRAVENOUS CONTINUOUS
Status: DISCONTINUED | OUTPATIENT
Start: 2023-05-17 | End: 2023-05-17

## 2023-05-17 RX ORDER — ISOSORBIDE MONONITRATE 60 MG/1
60 TABLET, EXTENDED RELEASE ORAL DAILY
Status: DISCONTINUED | OUTPATIENT
Start: 2023-05-18 | End: 2023-05-18 | Stop reason: HOSPADM

## 2023-05-17 RX ORDER — HYDRALAZINE HYDROCHLORIDE 20 MG/ML
10 INJECTION INTRAMUSCULAR; INTRAVENOUS EVERY 4 HOURS PRN
Status: DISCONTINUED | OUTPATIENT
Start: 2023-05-17 | End: 2023-05-18 | Stop reason: HOSPADM

## 2023-05-17 RX ORDER — LABETALOL HYDROCHLORIDE 5 MG/ML
10 INJECTION, SOLUTION INTRAVENOUS EVERY 4 HOURS PRN
Status: DISCONTINUED | OUTPATIENT
Start: 2023-05-17 | End: 2023-05-18 | Stop reason: HOSPADM

## 2023-05-17 RX ORDER — HYDROCODONE BITARTRATE AND ACETAMINOPHEN 7.5; 325 MG/1; MG/1
1 TABLET ORAL EVERY 6 HOURS PRN
Status: DISCONTINUED | OUTPATIENT
Start: 2023-05-17 | End: 2023-05-18 | Stop reason: HOSPADM

## 2023-05-17 RX ORDER — FINASTERIDE 5 MG/1
5 TABLET, FILM COATED ORAL NIGHTLY
Status: DISCONTINUED | OUTPATIENT
Start: 2023-05-17 | End: 2023-05-18 | Stop reason: HOSPADM

## 2023-05-17 RX ORDER — TAMSULOSIN HYDROCHLORIDE 0.4 MG/1
1 CAPSULE ORAL NIGHTLY
Status: DISCONTINUED | OUTPATIENT
Start: 2023-05-17 | End: 2023-05-18 | Stop reason: HOSPADM

## 2023-05-17 RX ORDER — AMLODIPINE BESYLATE 5 MG/1
5 TABLET ORAL NIGHTLY
Status: DISCONTINUED | OUTPATIENT
Start: 2023-05-17 | End: 2023-05-18 | Stop reason: HOSPADM

## 2023-05-17 RX ORDER — METOPROLOL TARTRATE 25 MG/1
25 TABLET, FILM COATED ORAL 2 TIMES DAILY
Status: DISCONTINUED | OUTPATIENT
Start: 2023-05-17 | End: 2023-05-18 | Stop reason: HOSPADM

## 2023-05-17 RX ORDER — CLONAZEPAM 1 MG/1
1 TABLET ORAL NIGHTLY PRN
Status: DISCONTINUED | OUTPATIENT
Start: 2023-05-17 | End: 2023-05-18 | Stop reason: HOSPADM

## 2023-05-17 RX ORDER — TRAZODONE HYDROCHLORIDE 100 MG/1
100 TABLET ORAL NIGHTLY
Status: DISCONTINUED | OUTPATIENT
Start: 2023-05-17 | End: 2023-05-18 | Stop reason: HOSPADM

## 2023-05-17 RX ADMIN — METRONIDAZOLE 500 MG: 5 INJECTION, SOLUTION INTRAVENOUS at 03:05

## 2023-05-17 RX ADMIN — FINASTERIDE 5 MG: 5 TABLET, FILM COATED ORAL at 08:05

## 2023-05-17 RX ADMIN — CIPROFLOXACIN 400 MG: 2 INJECTION, SOLUTION INTRAVENOUS at 11:05

## 2023-05-17 RX ADMIN — VENLAFAXINE 75 MG: 25 TABLET ORAL at 08:05

## 2023-05-17 RX ADMIN — ONDANSETRON 4 MG: 2 INJECTION INTRAMUSCULAR; INTRAVENOUS at 06:05

## 2023-05-17 RX ADMIN — ACETAMINOPHEN 650 MG: 325 TABLET ORAL at 08:05

## 2023-05-17 RX ADMIN — AMLODIPINE BESYLATE 5 MG: 5 TABLET ORAL at 08:05

## 2023-05-17 RX ADMIN — METRONIDAZOLE 500 MG: 5 INJECTION, SOLUTION INTRAVENOUS at 12:05

## 2023-05-17 RX ADMIN — GABAPENTIN 600 MG: 300 CAPSULE ORAL at 08:05

## 2023-05-17 RX ADMIN — PANTOPRAZOLE SODIUM 40 MG: 40 INJECTION, POWDER, FOR SOLUTION INTRAVENOUS at 08:05

## 2023-05-17 RX ADMIN — ASPIRIN 81 MG CHEWABLE TABLET 81 MG: 81 TABLET CHEWABLE at 08:05

## 2023-05-17 RX ADMIN — APIXABAN 5 MG: 2.5 TABLET, FILM COATED ORAL at 08:05

## 2023-05-17 RX ADMIN — METRONIDAZOLE 500 MG: 5 INJECTION, SOLUTION INTRAVENOUS at 08:05

## 2023-05-17 RX ADMIN — TRAZODONE HYDROCHLORIDE 100 MG: 100 TABLET ORAL at 08:05

## 2023-05-17 RX ADMIN — CLOPIDOGREL BISULFATE 75 MG: 75 TABLET ORAL at 05:05

## 2023-05-17 RX ADMIN — HYDRALAZINE HYDROCHLORIDE 10 MG: 20 INJECTION, SOLUTION INTRAMUSCULAR; INTRAVENOUS at 01:05

## 2023-05-17 RX ADMIN — TAMSULOSIN HYDROCHLORIDE 0.4 MG: 0.4 CAPSULE ORAL at 08:05

## 2023-05-17 RX ADMIN — HYDROCODONE BITARTRATE AND ACETAMINOPHEN 1 TABLET: 7.5; 325 TABLET ORAL at 06:05

## 2023-05-17 RX ADMIN — CIPROFLOXACIN 400 MG: 2 INJECTION, SOLUTION INTRAVENOUS at 10:05

## 2023-05-17 RX ADMIN — METOPROLOL TARTRATE 25 MG: 25 TABLET, FILM COATED ORAL at 08:05

## 2023-05-17 RX ADMIN — HEPARIN SODIUM AND DEXTROSE 12 UNITS/KG/HR: 10000; 5 INJECTION INTRAVENOUS at 03:05

## 2023-05-17 RX ADMIN — ATORVASTATIN CALCIUM 40 MG: 40 TABLET, FILM COATED ORAL at 01:05

## 2023-05-17 NOTE — CARE UPDATE
Notified by nurse that radiologist called regarding MRI Brain report. Called radiologist who reported MRI brain showed multiple acute strokes consistent with a cardio embolic source. He recommended vascular neurology consult. Ordered ASA. Pt allergic to STATIN. Cr is 2.2- unable to perform CTA head/neck. Will order Echo and Carotid U/S. Pt seen and examined. Pt AAOx3. Pt reports mild dysphagia- otherwise, no focal deficits. Tele vascular neurology consulted

## 2023-05-17 NOTE — ASSESSMENT & PLAN NOTE
Needs 30 day cardiac monitor  Switch to eliquis for possible cadioembolic cause of CVA  Continue ASA

## 2023-05-17 NOTE — CARE UPDATE
Nurse reported 10 beat run of nonsustained Vtach and pt returned to NSR. Asymptomatic. Pt denied CP or SOB. Electrolytes stable. BB was held for permissive HTN. Troponin 0.090>1.118>2.714. Discussed with Dr. Ruiz- will add IV Heparin for possible ACS.

## 2023-05-17 NOTE — ASSESSMENT & PLAN NOTE
Acute confusional state,dysarthria and abnormal MRI with multifocal cerebral ischemia and prominent wedge shaped right frontal ischemic stroke.  Antithrombotics for secondary stroke prevention: Antiplatelets: Aspirin: 81 mg daily    Statins for secondary stroke prevention and hyperlipidemia, if present:   Statins: Atorvastatin- 80 mg daily    Aggressive risk factor modification: HTN     Rehab efforts: The patient has been evaluated by a stroke team provider and the therapy needs have been fully considered based off the presenting complaints and exam findings. The following therapy evaluations are needed: PT evaluate and treat, OT evaluate and treat, SLP evaluate and treat    Diagnostics ordered/pending: HgbA1C to assess blood glucose levels, MRA head to assess vasculature, TTE to assess cardiac function/status , TSH to assess thyroid function    VTE prophylaxis: Enoxaparin 40 mg SQ every 24 hours    BP parameters: Infarct: No intervention, SBP <220   Further cardioembolic workup to guide decision on anticoagulation. Noted leucocytosis, if concern for sepsis may need to consider septic embolism. Other causes of cardioembolic stroke should be included in the workup as described earlier. No emergent role for heparin gtt as requested for the consultation in the absence of atrial fibrillation or known cardiac thrombus due to high risk for hemorrhagic transformation associated with cardio-embolic stroke particularly for the wedge shaped right frontal ischemic lesion.

## 2023-05-17 NOTE — SUBJECTIVE & OBJECTIVE
Past Medical History:   Diagnosis Date    Anxiety     Cancer     spot on kidney and lung    Diabetes mellitus     Borderline diet controlled    Diverticulitis     GERD (gastroesophageal reflux disease)     Hyperlipidemia     not on any meds currently    Hypertension     Lung mass     Mass of kidney        Past Surgical History:   Procedure Laterality Date    ARTHROPLASTY OF JOINT OF FINGER Left 11/18/2022    Procedure: ARTHROPLASTY, FINGER;  Surgeon: Kane Negrete MD;  Location: [facility];  Service: Orthopedics;  Laterality: Left;  left thumb basal joint arthroplasty  Arthrex  Podiatry extras    CHOLECYSTECTOMY      COLONOSCOPY      COLONOSCOPY N/A 4/7/2017    Procedure: COLONOSCOPY;  Surgeon: Amarilys Bravo MD;  Location: [facility];  Service: Endoscopy;  Laterality: N/A;    DIAGNOSTIC LAPAROSCOPY N/A 5/4/2021    Procedure: LAPAROSCOPY, DIAGNOSTIC;  Surgeon: Shaji Jauregui MD;  Location: [facility];  Service: General;  Laterality: N/A;    ESOPHAGOGASTRODUODENOSCOPY      ESOPHAGOGASTRODUODENOSCOPY N/A 2/13/2020    Procedure: ESOPHAGOGASTRODUODENOSCOPY (EGD);  Surgeon: Shaji Aquino III, MD;  Location: [facility];  Service: Endoscopy;  Laterality: N/A;    ESOPHAGOGASTRODUODENOSCOPY N/A 5/3/2023    Procedure: EGD (ESOPHAGOGASTRODUODENOSCOPY);  Surgeon: Jenelle Jensen MD;  Location: [facility];  Service: Endoscopy;  Laterality: N/A;    HERNIA REPAIR Bilateral     groin    LEFT HEART CATHETERIZATION Left 1/31/2022    Procedure: CATHETERIZATION, HEART, LEFT;  Surgeon: Romeo Godfrey MD;  Location: [facility] CATH LAB;  Service: Cardiology;  Laterality: Left;    NECK SURGERY      OPEN REDUCTION AND INTERNAL FIXATION (ORIF) OF INJURY OF ANKLE Left 8/10/2022    Procedure: ORIF, ANKLE;  Surgeon: Howard Mata MD;  Location: [facility] OR;  Service: Orthopedics;  Laterality: Left;    PARTIAL NEPHRECTOMY      left    RENAL BIOPSY      ROBOT-ASSISTED REPAIR OF UMBILICAL HERNIA USING DA NEL XI N/A 5/4/2021     Procedure: XI ROBOTIC REPAIR, HERNIA, UMBILICAL;  Surgeon: Shaji Jauregui MD;  Location: [facility];  Service: General;  Laterality: N/A;  converted to open    TONSILLECTOMY      TRIGGER FINGER RELEASE Right     long finger       Review of patient's allergies indicates:   Allergen Reactions    Rosuvastatin Other (See Comments)     Joint pain   Joint and bone pain       Current Facility-Administered Medications on File Prior to Encounter   Medication    chlorhexidine 0.12 % solution 10 mL     Current Outpatient Medications on File Prior to Encounter   Medication Sig    amLODIPine (NORVASC) 5 MG tablet Take 1 tablet (5 mg total) by mouth once daily. (Patient taking differently: Take 5 mg by mouth every evening.)    aspirin (ECOTRIN) 81 MG EC tablet Take 1 tablet (81 mg total) by mouth once daily.    benazepriL (LOTENSIN) 20 MG tablet TAKE 2 TABLETS BY MOUTH NIGHTLY.    citalopram (CELEXA) 40 MG tablet Take 1 tablet (40 mg total) by mouth every evening.    cyclobenzaprine (FLEXERIL) 10 MG tablet TAKE 1 TABLET BY MOUTH THREE TIMES DAILY AS NEEDED.    finasteride (PROSCAR) 5 mg tablet TAKE 1 TABLET BY MOUTH AT NIGHT.    gabapentin (NEURONTIN) 600 MG tablet Take 1 tablet (600 mg total) by mouth nightly.    hydrALAZINE (APRESOLINE) 25 MG tablet TAKE ONE TABLET BY MOUTH AT BEDTIME.    isosorbide mononitrate (IMDUR) 60 MG 24 hr tablet TAKE 1 TABLET BY MOUTH ONCE DAILY.    metoprolol tartrate (LOPRESSOR) 25 MG tablet Take 1 tablet (25 mg total) by mouth 2 (two) times daily.    omeprazole (PRILOSEC) 40 MG capsule Take 1 capsule (40 mg total) by mouth every morning.    pravastatin (PRAVACHOL) 20 MG tablet Take 1 tablet (20 mg total) by mouth once daily.    tamsulosin (FLOMAX) 0.4 mg Cap TAKE 1 CAPSULE BY MOUTH EVERY EVENING.    tiotropium (SPIRIVA WITH HANDIHALER) 18 mcg inhalation capsule INHALE THE CONTENTS OF ONE CAPSULE DAILY.    traZODone (DESYREL) 100 MG tablet Take 100 mg by mouth every evening.    venlafaxine (EFFEXOR)  75 MG tablet Take 1 tablet (75 mg total) by mouth 2 (two) times daily.    zolpidem (AMBIEN) 10 mg Tab TAKE 1 TABLET BY MOUTH IN THE EVENING.    clonazePAM (KLONOPIN) 1 MG tablet Take 1 tablet (1 mg total) by mouth nightly as needed for Anxiety.    fluticasone propion-salmeterol 115-21 mcg/dose (ADVAIR HFA) 115-21 mcg/actuation HFAA inhaler Inhale 2 puffs into the lungs 2 (two) times daily.     Family History       Problem Relation (Age of Onset)    Cancer Sister    Hypertension Mother, Father    Melanoma Father          Tobacco Use    Smoking status: Former     Packs/day: 1.00     Years: 20.00     Pack years: 20.00     Types: Cigarettes     Quit date: 1995     Years since quittin.2    Smokeless tobacco: Never   Substance and Sexual Activity    Alcohol use: Never     Alcohol/week: 0.0 standard drinks    Drug use: No    Sexual activity: Yes     Partners: Female     Birth control/protection: None     Review of Systems   Constitutional: Negative for diaphoresis, weight gain and weight loss.   HENT:  Negative for congestion and nosebleeds.    Cardiovascular:  Negative for chest pain, claudication, cyanosis, dyspnea on exertion, irregular heartbeat, leg swelling, near-syncope, orthopnea, palpitations, paroxysmal nocturnal dyspnea and syncope.   Respiratory:  Negative for cough, hemoptysis, shortness of breath, sleep disturbances due to breathing, snoring, sputum production and wheezing.    Hematologic/Lymphatic: Negative for bleeding problem. Does not bruise/bleed easily.   Skin:  Negative for rash.   Musculoskeletal:  Negative for arthritis, back pain, falls, joint pain, muscle cramps and muscle weakness.   Gastrointestinal:  Negative for abdominal pain, constipation, diarrhea, heartburn, hematemesis, hematochezia, melena, nausea and vomiting.   Genitourinary:  Negative for dysuria, hematuria and nocturia.   Neurological:  Positive for weakness. Negative for excessive daytime sleepiness, dizziness, headaches,  light-headedness, loss of balance, numbness and vertigo.   Objective:     Vital Signs (Most Recent):  Temp: 97.7 °F (36.5 °C) (05/17/23 1301)  Pulse: 93 (05/17/23 1301)  Resp: 18 (05/17/23 1301)  BP: (!) 200/109 (05/17/23 1301)  SpO2: 96 % (05/17/23 1301) Vital Signs (24h Range):  Temp:  [97.7 °F (36.5 °C)-98.8 °F (37.1 °C)] 97.7 °F (36.5 °C)  Pulse:  [67-98] 93  Resp:  [17-18] 18  SpO2:  [95 %-97 %] 96 %  BP: (126-200)/() 200/109     Weight: 84.8 kg (187 lb)  Body mass index is 26.08 kg/m².    SpO2: 96 %         Intake/Output Summary (Last 24 hours) at 5/17/2023 1450  Last data filed at 5/17/2023 0716  Gross per 24 hour   Intake 1658.94 ml   Output 700 ml   Net 958.94 ml       Lines/Drains/Airways       Peripheral Intravenous Line  Duration                  Peripheral IV - Single Lumen 05/16/23 1028 18 G Anterior;Distal;Left Upper Arm 1 day         Peripheral IV - Single Lumen 05/17/23 0345 20 G Anterior;Right Forearm <1 day                     Physical Exam  Vitals and nursing note reviewed.   Constitutional:       Appearance: Normal appearance. He is well-developed.   HENT:      Head: Normocephalic.      Mouth/Throat:      Mouth: Mucous membranes are moist.   Neck:      Vascular: No carotid bruit or JVD.   Cardiovascular:      Rate and Rhythm: Normal rate and regular rhythm.      Pulses: Normal pulses.      Heart sounds: Normal heart sounds. No murmur heard.    No friction rub.   Pulmonary:      Effort: Pulmonary effort is normal. No respiratory distress.      Breath sounds: Normal breath sounds. No wheezing or rales.   Abdominal:      General: Bowel sounds are normal. There is no distension.      Palpations: Abdomen is soft.      Tenderness: There is no abdominal tenderness. There is no guarding.   Musculoskeletal:         General: No swelling or tenderness.      Cervical back: Neck supple. No tenderness.      Right lower leg: No edema.      Left lower leg: No edema.   Skin:     General: Skin is warm and  dry.      Capillary Refill: Capillary refill takes less than 2 seconds.      Findings: No rash.   Neurological:      General: No focal deficit present.      Mental Status: He is alert and oriented to person, place, and time.   Psychiatric:         Mood and Affect: Mood normal.         Behavior: Behavior normal.         Thought Content: Thought content normal.        Significant Labs: BMP:   Recent Labs   Lab 05/16/23  1028 05/17/23  0706   * 106    133*   K 4.8 4.3    104   CO2 21* 20*   BUN 30* 24*   CREATININE 2.2* 1.2   CALCIUM 9.6 8.7   MG 2.3  --    , CMP   Recent Labs   Lab 05/16/23  1028 05/17/23  0706    133*   K 4.8 4.3    104   CO2 21* 20*   * 106   BUN 30* 24*   CREATININE 2.2* 1.2   CALCIUM 9.6 8.7   PROT 7.6 6.4   ALBUMIN 4.2 3.5   BILITOT 0.4 0.6   ALKPHOS 62 54*   AST 19 46*   ALT 20 29   ANIONGAP 14 9   , CBC   Recent Labs   Lab 05/16/23  1028 05/17/23  0312 05/17/23  0706   WBC 14.12* 10.92 11.07  11.07   HGB 16.0 14.0 14.2  14.2   HCT 50.0 42.5 44.2  44.2    238 236  236   , INR   Recent Labs   Lab 05/16/23  1028 05/17/23  0312   INR 1.0 1.0   , Lipid Panel No results for input(s): CHOL, HDL, LDLCALC, TRIG, CHOLHDL in the last 48 hours., and Troponin   Recent Labs   Lab 05/16/23  1855 05/17/23  0706 05/17/23  1006   TROPONINI 2.714* 3.208* 3.271*       Significant Imaging: Echocardiogram: 2D echo with color flow doppler:   Results for orders placed or performed during the hospital encounter of 11/06/14   2D echo with color flow doppler   Result Value Ref Range    EF + QEF 60 55 - 65    Mitral Valve Regurgitation TRIVIAL TO MILD     Est. PA Systolic Pressure 25     Tricuspid Valve Regurgitation MILD     Narrative    TEST DESCRIPTION   Technical Quality: This is a technically adequate study.     Aorta: The aortic root is normal in size, measuring 2.6 cm at sinotubular junction and 3.0 cm at Sinuses of Valsalva. The proximal ascending aorta is normal in  size, measuring 2.8 cm across.     Left Atrium: The left atrial volume index is severely enlarged, measuring 41.57 cc/m2.     Left Ventricle: The left ventricle is normal in size, with an end-diastolic diameter of 4.3 cm, and an end-systolic diameter of 2.6 cm. LV wall thickness is normal, with the septum and the posterior wall each measuring 1.2 cm across. Relative wall   thickness was increased at 0.56, and the LV mass index was 100.2 g/m2 consistent with concentric remodeling. Global left ventricular systolic function appears normal. Visually estimated ejection fraction is 60-65%. The LV Doppler derived stroke volume   equals 91.0 ccs.       Right Atrium: The right atrium is normal in size, measuring 5.3 cm in length and 3.8 cm in width in the apical view.     Right Ventricle: The right ventricle is normal in size. Global right ventricular systolic function appears normal. The estimated PA systolic pressure is greater than 25 mmHg.     Aortic Valve:  Aortic valve is normal in structure with normal leaflet mobility.     Mitral Valve:  Mitral valve is normal in structure with normal leaflet mobility. There is trivial to mild mitral regurgitation.     Tricuspid Valve:  Tricuspid valve is normal in structure with normal leaflet mobility. There is mild tricuspid regurgitation.     Pulmonary Valve:  Pulmonary valve is normal in structure with normal leaflet mobility.     IVC: The IVC is not well-visualized.     Intracavitary: There is no evidence of pericardial effusion, intracavity mass, thrombi, or vegetation.         CONCLUSIONS     1 - Normal left ventricular systolic function (EF 60-65%).     2 - Normal right ventricular systolic function .         This document has been electronically    SIGNED BY: Timothy Gonzalez MD On: 11/07/2014 13:38    and Transthoracic echo (TTE) complete (Cupid Only):   Results for orders placed or performed during the hospital encounter of 05/16/23   Echo   Result Value Ref Range    BSA 2.06  m2    TDI SEPTAL 0.08 m/s    LV LATERAL E/E' RATIO 7.09 m/s    LV SEPTAL E/E' RATIO 9.75 m/s    LA WIDTH 3.60 cm    IVC diameter 1.91 cm    Left Ventricular Outflow Tract Mean Velocity 0.79 cm/s    Left Ventricular Outflow Tract Mean Gradient 2.65 mmHg    Pulmonary Valve Mean Velocity 0.53 m/s    TDI LATERAL 0.11 m/s    PV PEAK VELOCITY 0.90 cm/s    LVIDd 3.45 (A) 3.5 - 6.0 cm    IVS 1.75 (A) 0.6 - 1.1 cm    Posterior Wall 1.33 (A) 0.6 - 1.1 cm    Ao root annulus 3.16 cm    LVIDs 2.48 2.1 - 4.0 cm    FS 28 28 - 44 %    LA volume 54.56 cm3    STJ 2.63 cm    Ascending aorta 3.30 cm    LV mass 198.10 g    LA size 3.21 cm    RVDD 3.60 cm    TAPSE 1.50 cm    RV S' 0.01 cm/s    Left Ventricle Relative Wall Thickness 0.77 cm    AV regurgitation pressure 1/2 time [id number] ms    AV mean gradient 4 mmHg    AV valve area 2.42 cm2    AV Velocity Ratio 0.80     AV index (prosthetic) 0.74     E/A ratio 1.00     Mean e' 0.10 m/s    E wave deceleration time 157.01 msec    IVRT 68.51 msec    Pulm vein S/D ratio 1.07     LVOT diameter 2.04 cm    LVOT area 3.3 cm2    LVOT peak trung 1.12 m/s    LVOT peak VTI 21.80 cm    Ao peak trung 1.40 m/s    Ao VTI 29.4 cm    RVOT peak trung 0.71 m/s    RVOT peak VTI 14.3 cm    Mr max trung 6.44 m/s    LVOT stroke volume 71.22 cm3    AV peak gradient 8 mmHg    PV mean gradient 1.00 mmHg    E/E' ratio 8.21 m/s    MV Peak E Trung 0.78 m/s    AR Max Trung 5.23 m/s    TR Max Trung 3.03 m/s    MV Peak A Trung 0.78 m/s    PV Peak S Trung 0.58 m/s    PV Peak D Trung 0.54 m/s    LV Systolic Volume 21.97 mL    LV Systolic Volume Index 10.7 mL/m2    LV Diastolic Volume 49.23 mL    LV Diastolic Volume Index 24.01 mL/m2    LA Volume Index 26.6 mL/m2    LV Mass Index 97 g/m2    RA Major Axis 5.36 cm    Left Atrium Minor Axis 5.86 cm    Left Atrium Major Axis 5.28 cm    Triscuspid Valve Regurgitation Peak Gradient 37 mmHg    LA Volume Index (Mod) 22.1 mL/m2    LA volume (mod) 45.29 cm3    Right Atrial Pressure (from  IVC) 3 mmHg    EF 60 %    TV rest pulmonary artery pressure 40 mmHg    Narrative    · The left ventricle is normal in size with concentric remodeling and   normal systolic function.  · Normal left ventricular diastolic function.  · The estimated PA systolic pressure is 40 mmHg.  · Normal right ventricular size with normal right ventricular systolic   function.  · Normal central venous pressure (3 mmHg).  · The estimated ejection fraction is 60%.  · Moderate tricuspid regurgitation.  · Mild-to-moderate mitral regurgitation.  · Mild aortic regurgitation.

## 2023-05-17 NOTE — HPI
Patient presented with confusion and found to have multifocal strokes on MRI brain. Consult requested for evaluation of cardioembolic stroke and role of anticoagulation.       Past Medical History:   Diagnosis Date    Anxiety     Cancer     spot on kidney and lung    Diabetes mellitus     Borderline diet controlled    Diverticulitis     GERD (gastroesophageal reflux disease)     Hyperlipidemia     not on any meds currently    Hypertension     Lung mass     Mass of kidney

## 2023-05-17 NOTE — PLAN OF CARE
[facility] - Med Surg  Initial Discharge Assessment       Primary Care Provider: Yumi Roberson MD    Admission Diagnosis: Colitis [K52.9]  Weakness [R53.1]  YANNI (acute kidney injury) [N17.9]  Chest pain [R07.9]  Altered mental status, unspecified altered mental status type [R41.82]  Nausea and vomiting, unspecified vomiting type [R11.2]  Concussion with unknown loss of consciousness status, initial encounter [S06.0XAA]    Admission Date: 5/16/2023  Expected Discharge Date: per attending         Payor: [employer] MANAGED MEDICARE / Plan: [employer] MEDICARE HMO / Product Type: Capitation /     Extended Emergency Contact Information  Primary Emergency Contact: heath snow  Mobile Phone: [phone]  Relation: Son  Preferred language: English   needed? No  Secondary Emergency Contact: maygeorge  [place] Phone: [phone]  Mobile Phone: [phone]  Relation: Son  Preferred language: English   needed? No    Discharge Plan A: Home with [facility] [id number]  [id number]  [place] [address]  Phone: [phone] Fax: [phone]      Initial Assessment (most recent)       Adult Discharge Assessment - 05/17/23 1000          Discharge Assessment    Assessment Type Discharge Planning Assessment     Confirmed/corrected address, phone number and insurance Yes     Confirmed Demographics Correct on Facesheet     Source of Information patient     When was your last doctors appointment? --   n/a    Communicated DIXON with patient/caregiver Date not available/Unable to determine     Reason For Admission colitis     People in Home child(missy), adult     Do you expect to return to your current living situation? Yes     Do you have help at home or someone to help you manage your care at home? Yes     Who are your caregiver(s) and their phone number(s)? son     Prior to hospitilization cognitive status: Alert/Oriented     Current cognitive status: Alert/Oriented     Equipment  Currently Used at Home walker, rolling;cane, quad     Readmission within 30 days? No     Patient currently being followed by outpatient case management? No     Do you currently have service(s) that help you manage your care at home? No     Do you take prescription medications? Yes     Do you have prescription coverage? Yes     Coverage [employer]     Do you have any problems affording any of your prescribed medications? No     Is the patient taking medications as prescribed? yes     Who is going to help you get home at discharge? son     How do you get to doctors appointments? car, drives self     Are you on dialysis? No     Do you take coumadin? No     Discharge Plan A Home with family                                 19.3

## 2023-05-17 NOTE — ASSESSMENT & PLAN NOTE
Antithrombotics for secondary stroke prevention: Antiplatelets: Aspirin: 81 mg daily  Clopidogrel: 75 mg daily  Anticoagulants: Apixaban 5 mg BID     Statins for secondary stroke prevention and hyperlipidemia, if present:   Statins: Atorvastatin- 40 mg daily    Aggressive risk factor modification: HTN, HLD, CAD     Rehab efforts: The patient has been evaluated by a stroke team provider and the therapy needs have been fully considered based off the presenting complaints and exam findings. The following therapy evaluations are needed: PT evaluate and treat, OT evaluate and treat, SLP evaluate and treat    Imaging reviewed  Permissive HTN allowed initially  Has been > 24hrs   Will start to lower bp   DAPT x 30 days  eliquis 5mg bid x 30 days recommended  Due to concern for cardioembolic event

## 2023-05-17 NOTE — HPI
Mr. Sood is a 68-year-old male with CAD s/p three-vessel CABG 3/22, diabetes, hypertension who was admitted after being found unresponsive at home by his son.  Also reports weakness. Workup showed MRI brain with multifocal infarcts in right frontal lobe, bilateral parietal lobes and bilateral cerebellum possibly embolic type infarcts. Vascular surgery was consulted, no intervention recommended.  Was placed on heparin infusion.   Troponin  2.7>3.21>3.27  EKG with nonspecific ST T-wave abnormality, sinus rhythm.    Echo with EF 60%, mild to mod MR, mild AI

## 2023-05-17 NOTE — NURSING
Notified on call team, about MRI results. Fabi Chapman NP spoke with patient at bedside. BP elevated and c/o 7 out of 10 headache, with blurred vision. Patient orientated X4. Patient denies chest pain, numbness or tingling, or abnormal speech. Tele neuro vascular was consulted and spoke with patient. No new orders at this time.

## 2023-05-17 NOTE — PROGRESS NOTES
ST evaluation orders received and chart reviewed.  Pt NPO awaiting Stress Test.  ST to return post-procedure for assessment as clinically indicated.

## 2023-05-17 NOTE — ASSESSMENT & PLAN NOTE
No cardiac symptoms   Troponin leak due to demand ischemia in setting of acute stroke, elevated BP  Obtain repeat troponin to confirm downtrend   F/u in cardiology clinic  Consider OP stress test

## 2023-05-17 NOTE — NURSING
Patient had 10 beats V-tach. Physical showed tele strip to Fabi Chapman NP. Patient now NPO and going to be placed on heparin drip . Patient has been mistaking me as his grand daughter after 1 AM.  Grand daughter at bedside. This is a new onset confusion. RN will  take over patient care due to heparin drip.

## 2023-05-17 NOTE — PROGRESS NOTES
[facility] Medicine  Progress Note    Patient Name: Mil Sood  MRN: [id number]  Patient Class: IP- Inpatient   Admission Date: 5/16/2023  Length of Stay: 0 days  Attending Physician: Zion Mckeon MD  Primary Care Provider: Yumi Roberson MD        Subjective:     Principal Problem:Colitis        HPI:  The patient is a 69 yo male with past medical history of Culp's esophagitis, diabetes, GERD, diverticulitis and anxiety who presented to the ED after being found unresponsive by his son this morning. Patient last known well was 2300 last night. He was initially incoherent. He tried to stand and fell hitting his head on the dresser per his son. The patient reports his stomach has been tight for the last week. He has nausea, vomiting and diarrhea. He reports his mouth is dry. Patient is independent at baseline. His speech is not at baseline. CT head negative. CT abdomen/pelvis Labs revealed YANNI. He was given fluid bolus and antibiotics in ED. Hospital medicine consulted. Patient placed in observation.      Overview/Hospital Course:  5/17: troponin trending down. Cards on case. Ok to stop heparin. Outpatient stress recommended. Asa and plavix started for bilateral strokes. Echo negative. Due to concern for cardioembolic event both cardiology and neurology recommended starting eliquis. Will plan for eliquis 5mg bid x 30 days. Pt/OT on case. Recommended SNF. Speech consulted and regular diet initiated. Will start controlling BP after initial permissive HTN.       Interval History: No acute issue overnight. Requesting to go home if possible.     Review of Systems   Constitutional:  Negative for fatigue and fever.   HENT:  Negative for sinus pressure.    Eyes:  Negative for visual disturbance.   Respiratory:  Negative for shortness of breath.    Cardiovascular:  Negative for chest pain.   Gastrointestinal:  Positive for diarrhea. Negative for nausea and vomiting.   Genitourinary:  Negative for difficulty  urinating.   Musculoskeletal:  Negative for back pain.   Skin:  Negative for rash.   Neurological:  Negative for headaches.   Psychiatric/Behavioral:  Negative for confusion.    Objective:     Vital Signs (Most Recent):  Temp: 98.2 °F (36.8 °C) (05/17/23 1618)  Pulse: 106 (05/17/23 1621)  Resp: 18 (05/17/23 1618)  BP: (!) 193/95 (05/17/23 1621)  SpO2: 98 % (05/17/23 1618) Vital Signs (24h Range):  Temp:  [97.7 °F (36.5 °C)-98.8 °F (37.1 °C)] 98.2 °F (36.8 °C)  Pulse:  [] 106  Resp:  [17-18] 18  SpO2:  [95 %-98 %] 98 %  BP: (126-200)/() 193/95     Weight: 84.8 kg (187 lb)  Body mass index is 26.08 kg/m².    Intake/Output Summary (Last 24 hours) at 5/17/2023 1716  Last data filed at 5/17/2023 0716  Gross per 24 hour   Intake 1658.94 ml   Output 700 ml   Net 958.94 ml         Physical Exam  Constitutional:       General: He is not in acute distress.     Appearance: He is well-developed. He is not diaphoretic.   HENT:      Head: Normocephalic and atraumatic.   Eyes:      Pupils: Pupils are equal, round, and reactive to light.   Cardiovascular:      Rate and Rhythm: Normal rate and regular rhythm.      Heart sounds: Normal heart sounds. No murmur heard.    No friction rub. No gallop.   Pulmonary:      Effort: Pulmonary effort is normal. No respiratory distress.      Breath sounds: Normal breath sounds. No stridor. No wheezing or rales.   Abdominal:      General: There is no distension.      Palpations: Abdomen is soft. There is no mass.      Tenderness: There is no abdominal tenderness. There is no guarding.      Comments: Hyperactive bowel sounds   Skin:     General: Skin is warm.      Findings: No erythema.   Neurological:      General: No focal deficit present.      Mental Status: He is alert and oriented to person, place, and time.      Cranial Nerves: No cranial nerve deficit.      Motor: No weakness.           Significant Labs: All pertinent labs within the past 24 hours have been  reviewed.    Significant Imaging: I have reviewed all pertinent imaging results/findings within the past 24 hours.        Assessment/Plan:      * Colitis  Infectious/inflammatory process per CT  Empiric antibiotics  IVFs  Patient still reporting significant diarrhea  Will check stool studies     NSTEMI (non-ST elevated myocardial infarction)  Troponin trending down  Cards on case  Recommended outpatient stress test  Heparin dced      Cerebrovascular accident (CVA) due to embolism of cerebral artery    Antithrombotics for secondary stroke prevention: Antiplatelets: Aspirin: 81 mg daily  Clopidogrel: 75 mg daily  Anticoagulants: Apixaban 5 mg BID     Statins for secondary stroke prevention and hyperlipidemia, if present:   Statins: Atorvastatin- 40 mg daily    Aggressive risk factor modification: HTN, HLD, CAD     Rehab efforts: The patient has been evaluated by a stroke team provider and the therapy needs have been fully considered based off the presenting complaints and exam findings. The following therapy evaluations are needed: PT evaluate and treat, OT evaluate and treat, SLP evaluate and treat    Imaging reviewed  Permissive HTN allowed initially  Has been > 24hrs   Will start to lower bp   DAPT x 30 days  eliquis 5mg bid x 30 days recommended  Due to concern for cardioembolic event    YANNI (acute kidney injury)  Resolved with VIF    Weakness  MRI brain to rule out stroke  PT/OT eval  Further plan based upon progression with therapy      Dysarthria  Continue PPI  Hydrate patient and reassess  Bedside swallow per nursing      GERD (gastroesophageal reflux disease)  Continue PPI      Essential hypertension  Resume bp meds         VTE Risk Mitigation (From admission, onward)         Ordered     apixaban tablet 5 mg  2 times daily         05/17/23 1644     IP VTE HIGH RISK PATIENT  Once         05/16/23 1307     Place sequential compression device  Until discontinued         05/16/23 1307                Discharge  Planning   DIXON:      Code Status: Full Code   Is the patient medically ready for discharge?:     Reason for patient still in hospital (select all that apply): Patient trending condition  Discharge Plan A: Home with family                  Zion Mckeon MD  Department of Hospital Medicine   '[facility] Surg

## 2023-05-17 NOTE — PT/OT/SLP EVAL
Speech Language Pathology Evaluation  Cognitive/Bedside Swallow    Patient Name:  Mil Sood   MRN:  [id number]  Admitting Diagnosis: Colitis    Recommendations:                  General Recommendations:   outpatient ST as needed   Diet recommendations:  Regular Diet - IDDSI Level 7, Thin liquids - IDDSI Level 0   Aspiration Precautions: Standard aspiration precautions   General Precautions: Standard,    Communication strategies:  none    History:     Past Medical History:   Diagnosis Date    Anxiety     Cancer     spot on kidney and lung    Diabetes mellitus     Borderline diet controlled    Diverticulitis     GERD (gastroesophageal reflux disease)     Hyperlipidemia     not on any meds currently    Hypertension     Lung mass     Mass of kidney        Past Surgical History:   Procedure Laterality Date    ARTHROPLASTY OF JOINT OF FINGER Left 11/18/2022    Procedure: ARTHROPLASTY, FINGER;  Surgeon: Kane Negrete MD;  Location: [facility];  Service: Orthopedics;  Laterality: Left;  left thumb basal joint arthroplasty  Arthrex  Podiatry extras    CHOLECYSTECTOMY      COLONOSCOPY      COLONOSCOPY N/A 4/7/2017    Procedure: COLONOSCOPY;  Surgeon: Amarilys Bravo MD;  Location: [facility];  Service: Endoscopy;  Laterality: N/A;    DIAGNOSTIC LAPAROSCOPY N/A 5/4/2021    Procedure: LAPAROSCOPY, DIAGNOSTIC;  Surgeon: Shaji Jauregui MD;  Location: [facility];  Service: General;  Laterality: N/A;    ESOPHAGOGASTRODUODENOSCOPY      ESOPHAGOGASTRODUODENOSCOPY N/A 2/13/2020    Procedure: ESOPHAGOGASTRODUODENOSCOPY (EGD);  Surgeon: Shaji Aquino III, MD;  Location: [facility];  Service: Endoscopy;  Laterality: N/A;    ESOPHAGOGASTRODUODENOSCOPY N/A 5/3/2023    Procedure: EGD (ESOPHAGOGASTRODUODENOSCOPY);  Surgeon: Jenelle Jensen MD;  Location: [facility];  Service: Endoscopy;  Laterality: N/A;    HERNIA REPAIR Bilateral     groin    LEFT HEART CATHETERIZATION Left 1/31/2022    Procedure: CATHETERIZATION, HEART,  "LEFT;  Surgeon: Romeo Godfrey MD;  Location: [facility] CATH LAB;  Service: Cardiology;  Laterality: Left;    NECK SURGERY      OPEN REDUCTION AND INTERNAL FIXATION (ORIF) OF INJURY OF ANKLE Left 8/10/2022    Procedure: ORIF, ANKLE;  Surgeon: Howard Mata MD;  Location: [facility] OR;  Service: Orthopedics;  Laterality: Left;    PARTIAL NEPHRECTOMY      left    RENAL BIOPSY      ROBOT-ASSISTED REPAIR OF UMBILICAL HERNIA USING DA NEL XI N/A 5/4/2021    Procedure: XI ROBOTIC REPAIR, HERNIA, UMBILICAL;  Surgeon: Shaji Jauregui MD;  Location: [facility] OR;  Service: General;  Laterality: N/A;  converted to open    TONSILLECTOMY      TRIGGER FINGER RELEASE Right     long finger       Social History: Patient lives alone; he has been retired from construction for ~20 years.    Subjective     Patient sitting on side of bed upon ST entering room. His son and granddaughter at bedside. He reports he has returned to his baseline status and would like to go home. His son reports patient has returned to 99% baseline with very mild deficits in speech and memory.     Respiratory Status: Room air    Objective:   Clinical Swallow Exam/ Maciel Mechanism Exam:  Oral Motor Exam:  Mandibular Strength and Mobility - Trigeminal Nerve (CNV) Rest: WFL   Lateralization: WFL  Protrusion: WFL  Retraction:  WFL  Involuntary Movement: absent   Oral Labial Strength and Mobility -Facial Nerve (CN VII)  Rest: WFL   Lateralization: WFL  Protrusion: WFL  Retraction:  WFL  Involuntary Movement: absent    Lingual Strength and Mobility- Hypoglossal Nerve (CN XII)  Rest: WFL   Lateralization: WFL   Protrusion: L deviation  Retraction:  WFL  Involuntary Movement: absent    Velar Elevation - Glossopharyngeal Nerve (CN IX) and Vagus (CN X) Rest: WFL   Symmetry with Short Production of "ah": WFL  Involuntary Movement: absent    Facial Sensation and Movement - Facial Nerve (CN VII) Symmetrical at rest: Yes  Wrinkle forehead: Yes  Able to close eyes tightly: Yes  Taste " to Anterior 2/3 of Tongue: Yes       Clinical Swallow Evaluation:   Predisposing dysphagia risk factors: acute CVA   Clinical signs of possible chronic dysphagia: NA  Precipitating dysphagia risk factors: NA    [place] Swallow Protocol:  [place] Swallow Protocol dictates patient remain NPO if fail screener; (Pascualr et al. 2014) however, as objective swallow assessment is not available for greater than a week, patient will remain on current diet until objective assessment is completed unless otherwise indicated.     The [place] Swallow Protocol was administered. The patient was alert and provided the instructions prior to the beginning of the protocol. The patient consumed 3 oz before putting the cup down. Patient drank with consecutive swallows. No overt s/s of pharyngeal dysphagia or aspiration were observed during today's assessment.      PO Trials:   Patient presented with:     Consistency Amount/Presentation Description   Thin liquid (IDDSI 0) cup sip/self regulated No overt signs of aspiration or pharyngeal dysphagia observed.    Puree (IDDSI 4) tablespoon/ self regulated No overt signs of aspiration or pharyngeal dysphagia observed.    Solid (IDDSI 7) Piece of meat/self-fed No overt signs of aspiration or pharyngeal dysphagia observed.      *Thickened liquids were not used in this assessment. OEdouard (2018) reported that thickened liquids have no sound evidence as reducing risk of pneumonia in patients with dysphagia and can cause harm by increasing risk of dehydration. It also presents an increased risk of UTI, electrolyte imbalance, constipation, fecal impaction, cognitive impairment, functional decline, and even death (Langmore, 2002; Jessi, 2016).  This supports the assertion that we should confirm a patient requires thickened liquids with an instrumental swallow study prior to recommending them.    INTERPRETATION AND RISK ASSESSMENT:  Clinical swallow evaluation (CSE) revealed oral phase characterized by  lingual, labial, and buccal strength and range of motion adequate for lip closure, bolus preparation and propulsion. The patient had no anterior loss of the bolus with complete closure of the lips around the cup rim/spoon. No residue remained in the oral cavity following the swallow. Patient without overt clinical signs/symptoms of aspiration on any PO trials given. Patient presents with a possibly inefficient swallow as indicated by NA. Contributing risk factors for dysphagia include  NA .     No clinical signs of dysphagia, despite acute CVA. Instrumental swallow study is not indicated to assess the swallowing physiology and rule out aspiration of various consistencies. Patient encouraged to follow up with outpatient ST given any changes to swallow functioning.          Cognitive Status:    Attention No obvious deficits observed   Memory Delayed recall of 3 objects at 5 minutes WFL  [employer] WFL     Receptive Language:   Comprehension:   Commands  multistep basic commands WFL    Pragmatics:    WFL    Expressive Language:  Verbal:    Conversational speech WFL      Motor Speech:  Dysarthria : mildly appreciated. Recommend further evaluation with outpatient ST if dysarthria persists    Oral Musculature Evaluation    Goals:   Multidisciplinary Problems       SLP Goals       Not on file                  Assessment:     Mil Sood is a 68 y.o. male with a medical diagnosis of acute CVA. He presents with no clinical signs of dysphagia, despite acute CVA. Instrumental swallow study is not indicated to assess the swallowing physiology and rule out aspiration of various consistencies. Patient encouraged to follow up with outpatient ST given any changes to swallow functioning. Mildly appreciated dysarthria characterized by imprecise consonant production; further evaluation with outpatient ST recommended to evaluate and differentially diagnose/treat speech deficits and cognitive-linguistic functioning  given any changes to functioning. Patient and his son expressed understanding of information provided. No further acute needs, ST to sign off. Please re consult as needed.     Plan:     Patient to be seen:      Plan of Care expires:     Plan of Care reviewed with:  patient, son   SLP Follow-Up:  No       Discharge recommendations:      Barriers to Discharge:  None    Time Tracking:     SLP Treatment Date:   05/17/23  Speech Start Time:  1258  Speech Stop Time:  1318     Speech Total Time (min):  20 min    Billable Minutes: Alexandra 20     Madeline Olivo MA, CCC-SLP  Speech Language Pathologist  5/17/2023

## 2023-05-17 NOTE — CONSULTS
[facility]  Vascular Neurology  Comprehensive Stroke Center  TeleVascular Neurology Acute Consultation Note      Consults    Consulting Provider: ADALBERTO BAKER  Current Providers  No providers found    Patient Location:  [facility] MEDICAL SURGICAL UNIT IP Unit  Spoke hospital nurse at bedside with patient assisting consultant.     Patient information was obtained from primary team.         Assessment/Plan:       Diagnoses:   Neuro  Cerebrovascular accident (CVA) due to embolism of cerebral artery  Acute confusional state,dysarthria and abnormal MRI with multifocal cerebral ischemia and prominent wedge shaped right frontal ischemic stroke.  Antithrombotics for secondary stroke prevention: Antiplatelets: Aspirin: 81 mg daily    Statins for secondary stroke prevention and hyperlipidemia, if present:   Statins: Atorvastatin- 80 mg daily    Aggressive risk factor modification: HTN     Rehab efforts: The patient has been evaluated by a stroke team provider and the therapy needs have been fully considered based off the presenting complaints and exam findings. The following therapy evaluations are needed: PT evaluate and treat, OT evaluate and treat, SLP evaluate and treat    Diagnostics ordered/pending: HgbA1C to assess blood glucose levels, MRA head to assess vasculature, TTE to assess cardiac function/status , TSH to assess thyroid function    VTE prophylaxis: Enoxaparin 40 mg SQ every 24 hours    BP parameters: Infarct: No intervention, SBP <220   Further cardioembolic workup to guide decision on anticoagulation. Noted leucocytosis, if concern for sepsis may need to consider septic embolism. Other causes of cardioembolic stroke should be included in the workup as described earlier. No emergent role for heparin gtt as requested for the consultation in the absence of atrial fibrillation or known cardiac thrombus due to high risk for hemorrhagic transformation associated with  "cardio-embolic stroke particularly for the wedge shaped right frontal ischemic lesion.             STROKE DOCUMENTATION     Acute Stroke Times:   Acute Stroke Times   Stroke Team Arrival Date: 05/16/23  Stroke Team Arrival Time: 2136    NIH Scale:        Modified [place]    [place] Coma Scale:    ABCD2 Score:    [id number] Score:   HAS -BLED Score:   ICH Score:   Hunt & Castro Classification:       Blood pressure (!) 159/92, pulse 98, temperature 98.8 °F (37.1 °C), temperature source Oral, resp. rate 18, height 5' 11" (1.803 m), weight 85 kg (187 lb 6.3 oz), SpO2 95 %.  Eligible for thrombolytic therapy?: No  Thrombolytic therapy recomended: Thrombolytic therapy not recommended due to Outside of treatment window   Possible Interventional Revascularization Candidate? No; at this time symptoms not suggestive of large vessel occlusion    Disposition Recommendation: remains as inpatient at [facility]     Subjective:     History of Present Illness:  Patient presented with confusion and found to have multifocal strokes on MRI brain. Consult requested for evaluation of cardioembolic stroke and role of anticoagulation.       Past Medical History:   Diagnosis Date    Anxiety     Cancer     spot on kidney and lung    Diabetes mellitus     Borderline diet controlled    Diverticulitis     GERD (gastroesophageal reflux disease)     Hyperlipidemia     not on any meds currently    Hypertension     Lung mass     Mass of kidney          No new subjective & objective note has been filed under this hospital service since the last note was generated.      Recommended the emergency room physician to have a brief discussion with the patient and/or family if available regarding the  risks and benefits of treatment, and to briefly document the occurrence of that discussion in his clinical encounter note.     The attending portion of this evaluation, treatment, and documentation was performed per Kaushal Vallejo MD via audio only.    Billing " code:  ([facility] INPT CONSULT 15MIN)    In your opinion, this was a: Tier 2 Van Negative    Consult End Time: 10:04 PM     Kaushal Vallejo MD  Comprehensive Stroke Center  Vascular Neurology   [facility]

## 2023-05-17 NOTE — ASSESSMENT & PLAN NOTE
Infectious/inflammatory process per CT  Empiric antibiotics  IVFs  Patient still reporting significant diarrhea  Will check stool studies

## 2023-05-17 NOTE — PT/OT/SLP EVAL
Physical Therapy Evaluation and Discharge Note    Patient Name:  Mil Sood   MRN:  [id number]    Recommendations:     Discharge Recommendations: home  Discharge Equipment Recommendations:     Barriers to discharge: None    Assessment:     Mil Sood is a 68 y.o. male admitted with a medical diagnosis of Colitis. .  At this time, patient is functioning at their prior level of function and does not require further acute PT services.     Recent Surgery: * No surgery found *      Plan:     During this hospitalization, patient does not require further acute PT services.  Please re-consult if situation changes.      Subjective     Chief Complaint: BACK PAIN   Patient/Family Comments/goals: GO HOME   Pain/Comfort:  Pain Rating 1: 2/10  Location 1: back  Pain Rating Post-Intervention 1: 2/10    Patients cultural, spiritual, Bahai conflicts given the current situation:      Living Environment:   PT LIVES AT HOME WITH SON IN A ONE STORY HOME WITH 6 STEPS AND B RAILING   Prior to admission, patients level of function was IND AND DRIVES .  Equipment used at home:  .  DME owned (not currently used): rolling walker, single point cane, bedside commode, and shower chair.  Upon discharge, patient will have assistance from SON AND FAMILY .    Objective:     Communicated with NURSE CHIANG AND [facility] CHART REVIEW  prior to session.  Patient found supine with peripheral IV, telemetry upon PT entry to room.    General Precautions: Standard,      Orthopedic Precautions:N/A   Braces: N/A  Respiratory Status: Room air    Exams:  Postural Exam:  Patient presented with the following abnormalities:    -       No postural abnormalities identified  RLE ROM: WNL  RLE Strength: WNL  LLE ROM: WNL  LLE Strength: WNL    Functional Mobility:  Bed Mobility:     Rolling Right: independence  Supine to Sit: independence  Transfers:     Sit to Stand:  independence with no AD  Bed to Chair: independence with  no AD  using  Stand Pivot  Gait: PT GT  TRAINED X 500' IND WITH NO LOB.     AM-PAC 6 CLICK MOBILITY  Total Score:21       Treatment and Education:  PT CONT WITH GT X 200' AND RETURNED TO RM T/F TO CHAIR IND. PT LEFT SEATED WITH ALL NEEDS MET AND EDUCATED ON NO NEED FOR SKILLED P.T. AT THIS TIME.     AM-PAC 6 CLICK MOBILITY  Total Score:21     Patient left up in chair with call button in reach.    GOALS:   Multidisciplinary Problems       Physical Therapy Goals       Not on file                    History:     Past Medical History:   Diagnosis Date    Anxiety     Cancer     spot on kidney and lung    Diabetes mellitus     Borderline diet controlled    Diverticulitis     GERD (gastroesophageal reflux disease)     Hyperlipidemia     not on any meds currently    Hypertension     Lung mass     Mass of kidney        Past Surgical History:   Procedure Laterality Date    ARTHROPLASTY OF JOINT OF FINGER Left 11/18/2022    Procedure: ARTHROPLASTY, FINGER;  Surgeon: Kane Negrete MD;  Location: [facility];  Service: Orthopedics;  Laterality: Left;  left thumb basal joint arthroplasty  Arthrex  Podiatry extras    CHOLECYSTECTOMY      COLONOSCOPY      COLONOSCOPY N/A 4/7/2017    Procedure: COLONOSCOPY;  Surgeon: Amairlys Bravo MD;  Location: [facility];  Service: Endoscopy;  Laterality: N/A;    DIAGNOSTIC LAPAROSCOPY N/A 5/4/2021    Procedure: LAPAROSCOPY, DIAGNOSTIC;  Surgeon: Shaji Jauregui MD;  Location: [facility];  Service: General;  Laterality: N/A;    ESOPHAGOGASTRODUODENOSCOPY      ESOPHAGOGASTRODUODENOSCOPY N/A 2/13/2020    Procedure: ESOPHAGOGASTRODUODENOSCOPY (EGD);  Surgeon: Shaji Aquino III, MD;  Location: [facility];  Service: Endoscopy;  Laterality: N/A;    ESOPHAGOGASTRODUODENOSCOPY N/A 5/3/2023    Procedure: EGD (ESOPHAGOGASTRODUODENOSCOPY);  Surgeon: Jenelle Jensen MD;  Location: [facility];  Service: Endoscopy;  Laterality: N/A;    HERNIA REPAIR Bilateral     groin    LEFT HEART CATHETERIZATION Left 1/31/2022    Procedure:  CATHETERIZATION, HEART, LEFT;  Surgeon: Romeo Godfrey MD;  Location: [facility] CATH LAB;  Service: Cardiology;  Laterality: Left;    NECK SURGERY      OPEN REDUCTION AND INTERNAL FIXATION (ORIF) OF INJURY OF ANKLE Left 8/10/2022    Procedure: ORIF, ANKLE;  Surgeon: Howard Mata MD;  Location: [facility] OR;  Service: Orthopedics;  Laterality: Left;    PARTIAL NEPHRECTOMY      left    RENAL BIOPSY      ROBOT-ASSISTED REPAIR OF UMBILICAL HERNIA USING DA NEL XI N/A 5/4/2021    Procedure: XI ROBOTIC REPAIR, HERNIA, UMBILICAL;  Surgeon: Shaji Jauregui MD;  Location: [facility] OR;  Service: General;  Laterality: N/A;  converted to open    TONSILLECTOMY      TRIGGER FINGER RELEASE Right     long finger       Time Tracking:     PT Received On: 05/17/23  PT Start Time: 0815     PT Stop Time: 0840  PT Total Time (min): 25 min     Billable Minutes: Evaluation 15 and Gait Training 10      05/17/2023

## 2023-05-17 NOTE — SUBJECTIVE & OBJECTIVE
Interval History: No acute issue overnight. Requesting to go home if possible.     Review of Systems   Constitutional:  Negative for fatigue and fever.   HENT:  Negative for sinus pressure.    Eyes:  Negative for visual disturbance.   Respiratory:  Negative for shortness of breath.    Cardiovascular:  Negative for chest pain.   Gastrointestinal:  Positive for diarrhea. Negative for nausea and vomiting.   Genitourinary:  Negative for difficulty urinating.   Musculoskeletal:  Negative for back pain.   Skin:  Negative for rash.   Neurological:  Negative for headaches.   Psychiatric/Behavioral:  Negative for confusion.    Objective:     Vital Signs (Most Recent):  Temp: 98.2 °F (36.8 °C) (05/17/23 1618)  Pulse: 106 (05/17/23 1621)  Resp: 18 (05/17/23 1618)  BP: (!) 193/95 (05/17/23 1621)  SpO2: 98 % (05/17/23 1618) Vital Signs (24h Range):  Temp:  [97.7 °F (36.5 °C)-98.8 °F (37.1 °C)] 98.2 °F (36.8 °C)  Pulse:  [] 106  Resp:  [17-18] 18  SpO2:  [95 %-98 %] 98 %  BP: (126-200)/() 193/95     Weight: 84.8 kg (187 lb)  Body mass index is 26.08 kg/m².    Intake/Output Summary (Last 24 hours) at 5/17/2023 1716  Last data filed at 5/17/2023 0716  Gross per 24 hour   Intake 1658.94 ml   Output 700 ml   Net 958.94 ml         Physical Exam  Constitutional:       General: He is not in acute distress.     Appearance: He is well-developed. He is not diaphoretic.   HENT:      Head: Normocephalic and atraumatic.   Eyes:      Pupils: Pupils are equal, round, and reactive to light.   Cardiovascular:      Rate and Rhythm: Normal rate and regular rhythm.      Heart sounds: Normal heart sounds. No murmur heard.    No friction rub. No gallop.   Pulmonary:      Effort: Pulmonary effort is normal. No respiratory distress.      Breath sounds: Normal breath sounds. No stridor. No wheezing or rales.   Abdominal:      General: There is no distension.      Palpations: Abdomen is soft. There is no mass.      Tenderness: There is no  abdominal tenderness. There is no guarding.      Comments: Hyperactive bowel sounds   Skin:     General: Skin is warm.      Findings: No erythema.   Neurological:      General: No focal deficit present.      Mental Status: He is alert and oriented to person, place, and time.      Cranial Nerves: No cranial nerve deficit.      Motor: No weakness.           Significant Labs: All pertinent labs within the past 24 hours have been reviewed.    Significant Imaging: I have reviewed all pertinent imaging results/findings within the past 24 hours.

## 2023-05-17 NOTE — HOSPITAL COURSE
5/17: troponin trending down. Cards on case. Ok to stop heparin. Outpatient stress recommended.  Will start controlling BP after initial permissive HTN.     Patient admitted for stroke like symptoms. Asa and plavix started for bilateral strokes. Echo negative. Due to concern for cardioembolic event both cardiology and neurology recommended starting eliquis. Will plan for eliquis 5mg bid x 30 days. Pt/OT on case recommended home. Speech consulted and regular diet initiated. Patient's diarrhea improved. He was on cipro and flagyl during stay. This was changed to po augmentin. Initially planned to r/o cdiff however diarrhea resolved before sample was able to be collected therefore very unlikely to be cdiff. Patient was discharged home with home health.

## 2023-05-17 NOTE — CONSULTS
[facility] Surg  Cardiology  Consult Note    Patient Name: Mil Sood  MRN: [id number]  Admission Date: 5/16/2023  Hospital Length of Stay: 0 days  Code Status: Full Code   Attending Provider: Zion Mckeon MD   Consulting Provider: Al Holder MD  Primary Care Physician: Yumi Roberson MD  Principal Problem:Colitis      Inpatient consult to Cardiology  Consult performed by: Al Holder MD  Consult ordered by: Fabi Chapman NP  Reason for consult: nstemi        Subjective:     Chief Complaint:  Weakness     HPI:   Mr. Sood is a 68-year-old male with CAD s/p three-vessel CABG 3/22, diabetes, hypertension who was admitted after being found unresponsive at home by his son.  Also reports weakness. Workup showed MRI brain with multifocal infarcts in right frontal lobe, bilateral parietal lobes and bilateral cerebellum possibly embolic type infarcts. Vascular surgery was consulted, no intervention recommended.  Was placed on heparin infusion.   Troponin  2.7>3.21>3.27  EKG with nonspecific ST T-wave abnormality, sinus rhythm.    Echo with EF 60%, mild to mod MR, mild AI             Past Medical History:   Diagnosis Date    Anxiety     Cancer     spot on kidney and lung    Diabetes mellitus     Borderline diet controlled    Diverticulitis     GERD (gastroesophageal reflux disease)     Hyperlipidemia     not on any meds currently    Hypertension     Lung mass     Mass of kidney        Past Surgical History:   Procedure Laterality Date    ARTHROPLASTY OF JOINT OF FINGER Left 11/18/2022    Procedure: ARTHROPLASTY, FINGER;  Surgeon: Kane Negrete MD;  Location: [facility] OR;  Service: Orthopedics;  Laterality: Left;  left thumb basal joint arthroplasty  Arthrex  Podiatry extras    CHOLECYSTECTOMY      COLONOSCOPY      COLONOSCOPY N/A 4/7/2017    Procedure: COLONOSCOPY;  Surgeon: Amarilys Bravo MD;  Location: [facility] ENDO;  Service: Endoscopy;  Laterality: N/A;    DIAGNOSTIC LAPAROSCOPY N/A  5/4/2021    Procedure: LAPAROSCOPY, DIAGNOSTIC;  Surgeon: Shaji Jauregui MD;  Location: [facility] OR;  Service: General;  Laterality: N/A;    ESOPHAGOGASTRODUODENOSCOPY      ESOPHAGOGASTRODUODENOSCOPY N/A 2/13/2020    Procedure: ESOPHAGOGASTRODUODENOSCOPY (EGD);  Surgeon: Shaji Aquino III, MD;  Location: [facility] ENDO;  Service: Endoscopy;  Laterality: N/A;    ESOPHAGOGASTRODUODENOSCOPY N/A 5/3/2023    Procedure: EGD (ESOPHAGOGASTRODUODENOSCOPY);  Surgeon: Jenelle Jensen MD;  Location: [facility] ENDO;  Service: Endoscopy;  Laterality: N/A;    HERNIA REPAIR Bilateral     groin    LEFT HEART CATHETERIZATION Left 1/31/2022    Procedure: CATHETERIZATION, HEART, LEFT;  Surgeon: Romeo Godfrey MD;  Location: [facility] CATH LAB;  Service: Cardiology;  Laterality: Left;    NECK SURGERY      OPEN REDUCTION AND INTERNAL FIXATION (ORIF) OF INJURY OF ANKLE Left 8/10/2022    Procedure: ORIF, ANKLE;  Surgeon: Howard Mata MD;  Location: [facility] OR;  Service: Orthopedics;  Laterality: Left;    PARTIAL NEPHRECTOMY      left    RENAL BIOPSY      ROBOT-ASSISTED REPAIR OF UMBILICAL HERNIA USING DA NEL XI N/A 5/4/2021    Procedure: XI ROBOTIC REPAIR, HERNIA, UMBILICAL;  Surgeon: Shaji Jauregui MD;  Location: [facility] OR;  Service: General;  Laterality: N/A;  converted to open    TONSILLECTOMY      TRIGGER FINGER RELEASE Right     long finger       Review of patient's allergies indicates:   Allergen Reactions    Rosuvastatin Other (See Comments)     Joint pain   Joint and bone pain       Current Facility-Administered Medications on File Prior to Encounter   Medication    chlorhexidine 0.12 % solution 10 mL     Current Outpatient Medications on File Prior to Encounter   Medication Sig    amLODIPine (NORVASC) 5 MG tablet Take 1 tablet (5 mg total) by mouth once daily. (Patient taking differently: Take 5 mg by mouth every evening.)    aspirin (ECOTRIN) 81 MG EC tablet Take 1 tablet (81 mg total) by mouth once daily.     benazepriL (LOTENSIN) 20 MG tablet TAKE 2 TABLETS BY MOUTH NIGHTLY.    citalopram (CELEXA) 40 MG tablet Take 1 tablet (40 mg total) by mouth every evening.    cyclobenzaprine (FLEXERIL) 10 MG tablet TAKE 1 TABLET BY MOUTH THREE TIMES DAILY AS NEEDED.    finasteride (PROSCAR) 5 mg tablet TAKE 1 TABLET BY MOUTH AT NIGHT.    gabapentin (NEURONTIN) 600 MG tablet Take 1 tablet (600 mg total) by mouth nightly.    hydrALAZINE (APRESOLINE) 25 MG tablet TAKE ONE TABLET BY MOUTH AT BEDTIME.    isosorbide mononitrate (IMDUR) 60 MG 24 hr tablet TAKE 1 TABLET BY MOUTH ONCE DAILY.    metoprolol tartrate (LOPRESSOR) 25 MG tablet Take 1 tablet (25 mg total) by mouth 2 (two) times daily.    omeprazole (PRILOSEC) 40 MG capsule Take 1 capsule (40 mg total) by mouth every morning.    pravastatin (PRAVACHOL) 20 MG tablet Take 1 tablet (20 mg total) by mouth once daily.    tamsulosin (FLOMAX) 0.4 mg Cap TAKE 1 CAPSULE BY MOUTH EVERY EVENING.    tiotropium (SPIRIVA WITH HANDIHALER) 18 mcg inhalation capsule INHALE THE CONTENTS OF ONE CAPSULE DAILY.    traZODone (DESYREL) 100 MG tablet Take 100 mg by mouth every evening.    venlafaxine (EFFEXOR) 75 MG tablet Take 1 tablet (75 mg total) by mouth 2 (two) times daily.    zolpidem (AMBIEN) 10 mg Tab TAKE 1 TABLET BY MOUTH IN THE EVENING.    clonazePAM (KLONOPIN) 1 MG tablet Take 1 tablet (1 mg total) by mouth nightly as needed for Anxiety.    fluticasone propion-salmeterol 115-21 mcg/dose (ADVAIR HFA) 115-21 mcg/actuation HFAA inhaler Inhale 2 puffs into the lungs 2 (two) times daily.     Family History       Problem Relation (Age of Onset)    Cancer Sister    Hypertension Mother, Father    Melanoma Father          Tobacco Use    Smoking status: Former     Packs/day: 1.00     Years: 20.00     Pack years: 20.00     Types: Cigarettes     Quit date: 1995     Years since quittin.2    Smokeless tobacco: Never   Substance and Sexual Activity    Alcohol use: Never      Alcohol/week: 0.0 standard drinks    Drug use: No    Sexual activity: Yes     Partners: Female     Birth control/protection: None     Review of Systems   Constitutional: Negative for diaphoresis, weight gain and weight loss.   HENT:  Negative for congestion and nosebleeds.    Cardiovascular:  Negative for chest pain, claudication, cyanosis, dyspnea on exertion, irregular heartbeat, leg swelling, near-syncope, orthopnea, palpitations, paroxysmal nocturnal dyspnea and syncope.   Respiratory:  Negative for cough, hemoptysis, shortness of breath, sleep disturbances due to breathing, snoring, sputum production and wheezing.    Hematologic/Lymphatic: Negative for bleeding problem. Does not bruise/bleed easily.   Skin:  Negative for rash.   Musculoskeletal:  Negative for arthritis, back pain, falls, joint pain, muscle cramps and muscle weakness.   Gastrointestinal:  Negative for abdominal pain, constipation, diarrhea, heartburn, hematemesis, hematochezia, melena, nausea and vomiting.   Genitourinary:  Negative for dysuria, hematuria and nocturia.   Neurological:  Positive for weakness. Negative for excessive daytime sleepiness, dizziness, headaches, light-headedness, loss of balance, numbness and vertigo.   Objective:     Vital Signs (Most Recent):  Temp: 97.7 °F (36.5 °C) (05/17/23 1301)  Pulse: 93 (05/17/23 1301)  Resp: 18 (05/17/23 1301)  BP: (!) 200/109 (05/17/23 1301)  SpO2: 96 % (05/17/23 1301) Vital Signs (24h Range):  Temp:  [97.7 °F (36.5 °C)-98.8 °F (37.1 °C)] 97.7 °F (36.5 °C)  Pulse:  [67-98] 93  Resp:  [17-18] 18  SpO2:  [95 %-97 %] 96 %  BP: (126-200)/() 200/109     Weight: 84.8 kg (187 lb)  Body mass index is 26.08 kg/m².    SpO2: 96 %         Intake/Output Summary (Last 24 hours) at 5/17/2023 1450  Last data filed at 5/17/2023 0716  Gross per 24 hour   Intake 1658.94 ml   Output 700 ml   Net 958.94 ml       Lines/Drains/Airways       Peripheral Intravenous Line  Duration                  Peripheral  IV - Single Lumen 05/16/23 1028 18 G Anterior;Distal;Left Upper Arm 1 day         Peripheral IV - Single Lumen 05/17/23 0345 20 G Anterior;Right Forearm <1 day                     Physical Exam  Vitals and nursing note reviewed.   Constitutional:       Appearance: Normal appearance. He is well-developed.   HENT:      Head: Normocephalic.      Mouth/Throat:      Mouth: Mucous membranes are moist.   Neck:      Vascular: No carotid bruit or JVD.   Cardiovascular:      Rate and Rhythm: Normal rate and regular rhythm.      Pulses: Normal pulses.      Heart sounds: Normal heart sounds. No murmur heard.    No friction rub.   Pulmonary:      Effort: Pulmonary effort is normal. No respiratory distress.      Breath sounds: Normal breath sounds. No wheezing or rales.   Abdominal:      General: Bowel sounds are normal. There is no distension.      Palpations: Abdomen is soft.      Tenderness: There is no abdominal tenderness. There is no guarding.   Musculoskeletal:         General: No swelling or tenderness.      Cervical back: Neck supple. No tenderness.      Right lower leg: No edema.      Left lower leg: No edema.   Skin:     General: Skin is warm and dry.      Capillary Refill: Capillary refill takes less than 2 seconds.      Findings: No rash.   Neurological:      General: No focal deficit present.      Mental Status: He is alert and oriented to person, place, and time.   Psychiatric:         Mood and Affect: Mood normal.         Behavior: Behavior normal.         Thought Content: Thought content normal.        Significant Labs: BMP:   Recent Labs   Lab 05/16/23  1028 05/17/23  0706   * 106    133*   K 4.8 4.3    104   CO2 21* 20*   BUN 30* 24*   CREATININE 2.2* 1.2   CALCIUM 9.6 8.7   MG 2.3  --    , CMP   Recent Labs   Lab 05/16/23  1028 05/17/23  0706    133*   K 4.8 4.3    104   CO2 21* 20*   * 106   BUN 30* 24*   CREATININE 2.2* 1.2   CALCIUM 9.6 8.7   PROT 7.6 6.4   ALBUMIN 4.2  3.5   BILITOT 0.4 0.6   ALKPHOS 62 54*   AST 19 46*   ALT 20 29   ANIONGAP 14 9   , CBC   Recent Labs   Lab 05/16/23  1028 05/17/23  0312 05/17/23  0706   WBC 14.12* 10.92 11.07  11.07   HGB 16.0 14.0 14.2  14.2   HCT 50.0 42.5 44.2  44.2    238 236  236   , INR   Recent Labs   Lab 05/16/23  1028 05/17/23  0312   INR 1.0 1.0   , Lipid Panel No results for input(s): CHOL, HDL, LDLCALC, TRIG, CHOLHDL in the last 48 hours., and Troponin   Recent Labs   Lab 05/16/23  1855 05/17/23  0706 05/17/23  1006   TROPONINI 2.714* 3.208* 3.271*       Significant Imaging: Echocardiogram: 2D echo with color flow doppler:   Results for orders placed or performed during the hospital encounter of 11/06/14   2D echo with color flow doppler   Result Value Ref Range    EF + QEF 60 55 - 65    Mitral Valve Regurgitation TRIVIAL TO MILD     Est. PA Systolic Pressure 25     Tricuspid Valve Regurgitation MILD     Narrative    TEST DESCRIPTION   Technical Quality: This is a technically adequate study.     Aorta: The aortic root is normal in size, measuring 2.6 cm at sinotubular junction and 3.0 cm at Sinuses of Valsalva. The proximal ascending aorta is normal in size, measuring 2.8 cm across.     Left Atrium: The left atrial volume index is severely enlarged, measuring 41.57 cc/m2.     Left Ventricle: The left ventricle is normal in size, with an end-diastolic diameter of 4.3 cm, and an end-systolic diameter of 2.6 cm. LV wall thickness is normal, with the septum and the posterior wall each measuring 1.2 cm across. Relative wall   thickness was increased at 0.56, and the LV mass index was 100.2 g/m2 consistent with concentric remodeling. Global left ventricular systolic function appears normal. Visually estimated ejection fraction is 60-65%. The LV Doppler derived stroke volume   equals 91.0 ccs.       Right Atrium: The right atrium is normal in size, measuring 5.3 cm in length and 3.8 cm in width in the apical view.     Right  Ventricle: The right ventricle is normal in size. Global right ventricular systolic function appears normal. The estimated PA systolic pressure is greater than 25 mmHg.     Aortic Valve:  Aortic valve is normal in structure with normal leaflet mobility.     Mitral Valve:  Mitral valve is normal in structure with normal leaflet mobility. There is trivial to mild mitral regurgitation.     Tricuspid Valve:  Tricuspid valve is normal in structure with normal leaflet mobility. There is mild tricuspid regurgitation.     Pulmonary Valve:  Pulmonary valve is normal in structure with normal leaflet mobility.     IVC: The IVC is not well-visualized.     Intracavitary: There is no evidence of pericardial effusion, intracavity mass, thrombi, or vegetation.         CONCLUSIONS     1 - Normal left ventricular systolic function (EF 60-65%).     2 - Normal right ventricular systolic function .         This document has been electronically    SIGNED BY: Timothy Gonzalez MD On: 11/07/2014 13:38    and Transthoracic echo (TTE) complete (Cupid Only):   Results for orders placed or performed during the hospital encounter of 05/16/23   Echo   Result Value Ref Range    BSA 2.06 m2    TDI SEPTAL 0.08 m/s    LV LATERAL E/E' RATIO 7.09 m/s    LV SEPTAL E/E' RATIO 9.75 m/s    LA WIDTH 3.60 cm    IVC diameter 1.91 cm    Left Ventricular Outflow Tract Mean Velocity 0.79 cm/s    Left Ventricular Outflow Tract Mean Gradient 2.65 mmHg    Pulmonary Valve Mean Velocity 0.53 m/s    TDI LATERAL 0.11 m/s    PV PEAK VELOCITY 0.90 cm/s    LVIDd 3.45 (A) 3.5 - 6.0 cm    IVS 1.75 (A) 0.6 - 1.1 cm    Posterior Wall 1.33 (A) 0.6 - 1.1 cm    Ao root annulus 3.16 cm    LVIDs 2.48 2.1 - 4.0 cm    FS 28 28 - 44 %    LA volume 54.56 cm3    STJ 2.63 cm    Ascending aorta 3.30 cm    LV mass 198.10 g    LA size 3.21 cm    RVDD 3.60 cm    TAPSE 1.50 cm    RV S' 0.01 cm/s    Left Ventricle Relative Wall Thickness 0.77 cm    AV regurgitation pressure 1/2 time  [id number] ms    AV mean gradient 4 mmHg    AV valve area 2.42 cm2    AV Velocity Ratio 0.80     AV index (prosthetic) 0.74     E/A ratio 1.00     Mean e' 0.10 m/s    E wave deceleration time 157.01 msec    IVRT 68.51 msec    Pulm vein S/D ratio 1.07     LVOT diameter 2.04 cm    LVOT area 3.3 cm2    LVOT peak trung 1.12 m/s    LVOT peak VTI 21.80 cm    Ao peak trung 1.40 m/s    Ao VTI 29.4 cm    RVOT peak trung 0.71 m/s    RVOT peak VTI 14.3 cm    Mr max trung 6.44 m/s    LVOT stroke volume 71.22 cm3    AV peak gradient 8 mmHg    PV mean gradient 1.00 mmHg    E/E' ratio 8.21 m/s    MV Peak E Trung 0.78 m/s    AR Max Trung 5.23 m/s    TR Max Trung 3.03 m/s    MV Peak A Trung 0.78 m/s    PV Peak S Trung 0.58 m/s    PV Peak D Trung 0.54 m/s    LV Systolic Volume 21.97 mL    LV Systolic Volume Index 10.7 mL/m2    LV Diastolic Volume 49.23 mL    LV Diastolic Volume Index 24.01 mL/m2    LA Volume Index 26.6 mL/m2    LV Mass Index 97 g/m2    RA Major Axis 5.36 cm    Left Atrium Minor Axis 5.86 cm    Left Atrium Major Axis 5.28 cm    Triscuspid Valve Regurgitation Peak Gradient 37 mmHg    LA Volume Index (Mod) 22.1 mL/m2    LA volume (mod) 45.29 cm3    Right Atrial Pressure (from IVC) 3 mmHg    EF 60 %    TV rest pulmonary artery pressure 40 mmHg    Narrative    · The left ventricle is normal in size with concentric remodeling and   normal systolic function.  · Normal left ventricular diastolic function.  · The estimated PA systolic pressure is 40 mmHg.  · Normal right ventricular size with normal right ventricular systolic   function.  · Normal central venous pressure (3 mmHg).  · The estimated ejection fraction is 60%.  · Moderate tricuspid regurgitation.  · Mild-to-moderate mitral regurgitation.  · Mild aortic regurgitation.        Assessment and Plan:     NSTEMI (non-ST elevated myocardial infarction)  No cardiac symptoms   Troponin leak due to demand ischemia in setting of acute stroke, elevated BP  Obtain repeat troponin to  confirm downtrend   F/u in cardiology clinic  Consider OP stress test     Cerebrovascular accident (CVA) due to embolism of cerebral artery  Needs 30 day cardiac monitor  Switch to eliquis for possible cadioembolic cause of CVA  Continue ASA    Hx of CABG x4 in   Continue ASA, statin  F/u cardiology clinic    Essential hypertension  Permissible HTN   Needs blood pressure meds      VTE Risk Mitigation (From admission, onward)         Ordered     heparin 25,000 units in dextrose 5% (100 units/ml) IV bolus from bag - ADDITIONAL PRN BOLUS - 60 units/kg (max bolus 4000 units)  As needed (PRN)        Question:  Heparin Infusion Adjustment (DO NOT MODIFY ANSWER)  Answer:  [email or web address]\epic\Images\Pharmacy\HeparinInfusions\heparin LOW INTENSITY nomogram for [facility] [id number]    05/17/23 0245     heparin 25,000 units in dextrose 5% (100 units/ml) IV bolus from bag - ADDITIONAL PRN BOLUS - 30 units/kg (max bolus 4000 units)  As needed (PRN)        Question:  Heparin Infusion Adjustment (DO NOT MODIFY ANSWER)  Answer:  [email or web address]\epic\Images\Pharmacy\HeparinInfusions\heparin LOW INTENSITY nomogram for [facility] [id number]    05/17/23 0245     heparin 25,000 units in dextrose 5% 250 mL (100 units/mL) infusion LOW INTENSITY nomogram - [facility]  Continuous        Question Answer Comment   Heparin Infusion Adjustment (DO NOT MODIFY ANSWER) [email or web address]\epic\Images\Pharmacy\HeparinInfusions\heparin LOW INTENSITY nomogram for [facility] [id number]    Begin at (in units/kg/hr) 12        05/17/23 0245     IP VTE HIGH RISK PATIENT  Once         05/16/23 1307     Place sequential compression device  Until discontinued         05/16/23 1307                Thank you for your consult.  Al Holder MD  Cardiology   [facility]

## 2023-05-18 VITALS
RESPIRATION RATE: 18 BRPM | DIASTOLIC BLOOD PRESSURE: 73 MMHG | WEIGHT: 187 LBS | HEIGHT: 71 IN | SYSTOLIC BLOOD PRESSURE: 115 MMHG | OXYGEN SATURATION: 95 % | HEART RATE: 60 BPM | BODY MASS INDEX: 26.18 KG/M2 | TEMPERATURE: 98 F

## 2023-05-18 LAB
ALBUMIN SERPL BCP-MCNC: 3.4 G/DL (ref 3.5–5.2)
ALP SERPL-CCNC: 47 U/L (ref 55–135)
ALT SERPL W/O P-5'-P-CCNC: 23 U/L (ref 10–44)
ANION GAP SERPL CALC-SCNC: 11 MMOL/L (ref 8–16)
AST SERPL-CCNC: 28 U/L (ref 10–40)
BASOPHILS # BLD AUTO: 0.05 K/UL (ref 0–0.2)
BASOPHILS NFR BLD: 0.6 % (ref 0–1.9)
BILIRUB SERPL-MCNC: 0.6 MG/DL (ref 0.1–1)
BUN SERPL-MCNC: 15 MG/DL (ref 8–23)
CALCIUM SERPL-MCNC: 8.8 MG/DL (ref 8.7–10.5)
CHLORIDE SERPL-SCNC: 103 MMOL/L (ref 95–110)
CO2 SERPL-SCNC: 18 MMOL/L (ref 23–29)
CREAT SERPL-MCNC: 0.9 MG/DL (ref 0.5–1.4)
DIFFERENTIAL METHOD: ABNORMAL
EOSINOPHIL # BLD AUTO: 0.2 K/UL (ref 0–0.5)
EOSINOPHIL NFR BLD: 1.7 % (ref 0–8)
ERYTHROCYTE [DISTWIDTH] IN BLOOD BY AUTOMATED COUNT: 13 % (ref 11.5–14.5)
EST. GFR  (NO RACE VARIABLE): >60 ML/MIN/1.73 M^2
GLUCOSE SERPL-MCNC: 93 MG/DL (ref 70–110)
HCT VFR BLD AUTO: 41.9 % (ref 40–54)
HGB BLD-MCNC: 13.6 G/DL (ref 14–18)
IMM GRANULOCYTES # BLD AUTO: 0.02 K/UL (ref 0–0.04)
IMM GRANULOCYTES NFR BLD AUTO: 0.2 % (ref 0–0.5)
LYMPHOCYTES # BLD AUTO: 1 K/UL (ref 1–4.8)
LYMPHOCYTES NFR BLD: 12 % (ref 18–48)
MCH RBC QN AUTO: 28 PG (ref 27–31)
MCHC RBC AUTO-ENTMCNC: 32.5 G/DL (ref 32–36)
MCV RBC AUTO: 86 FL (ref 82–98)
MONOCYTES # BLD AUTO: 1.1 K/UL (ref 0.3–1)
MONOCYTES NFR BLD: 13.3 % (ref 4–15)
NEUTROPHILS # BLD AUTO: 6.2 K/UL (ref 1.8–7.7)
NEUTROPHILS NFR BLD: 72.2 % (ref 38–73)
NRBC BLD-RTO: 0 /100 WBC
PLATELET # BLD AUTO: 218 K/UL (ref 150–450)
PMV BLD AUTO: 8.8 FL (ref 9.2–12.9)
POTASSIUM SERPL-SCNC: 4.1 MMOL/L (ref 3.5–5.1)
PROT SERPL-MCNC: 6.2 G/DL (ref 6–8.4)
RBC # BLD AUTO: 4.85 M/UL (ref 4.6–6.2)
SODIUM SERPL-SCNC: 132 MMOL/L (ref 136–145)
WBC # BLD AUTO: 8.58 K/UL (ref 3.9–12.7)

## 2023-05-18 PROCEDURE — 63600175 PHARM REV CODE 636 W HCPCS: Performed by: INTERNAL MEDICINE

## 2023-05-18 PROCEDURE — S0030 INJECTION, METRONIDAZOLE: HCPCS | Performed by: INTERNAL MEDICINE

## 2023-05-18 PROCEDURE — 80053 COMPREHEN METABOLIC PANEL: CPT | Performed by: INTERNAL MEDICINE

## 2023-05-18 PROCEDURE — 25000003 PHARM REV CODE 250: Performed by: FAMILY MEDICINE

## 2023-05-18 PROCEDURE — 25000003 PHARM REV CODE 250: Performed by: INTERNAL MEDICINE

## 2023-05-18 PROCEDURE — 85025 COMPLETE CBC W/AUTO DIFF WBC: CPT | Performed by: INTERNAL MEDICINE

## 2023-05-18 PROCEDURE — C9113 INJ PANTOPRAZOLE SODIUM, VIA: HCPCS | Performed by: INTERNAL MEDICINE

## 2023-05-18 PROCEDURE — 63600175 PHARM REV CODE 636 W HCPCS: Performed by: EMERGENCY MEDICINE

## 2023-05-18 PROCEDURE — 36415 COLL VENOUS BLD VENIPUNCTURE: CPT | Performed by: INTERNAL MEDICINE

## 2023-05-18 PROCEDURE — 25000003 PHARM REV CODE 250: Performed by: NURSE PRACTITIONER

## 2023-05-18 RX ORDER — ACETAMINOPHEN 325 MG/1
650 TABLET ORAL EVERY 6 HOURS PRN
Status: DISCONTINUED | OUTPATIENT
Start: 2023-05-18 | End: 2023-05-18 | Stop reason: HOSPADM

## 2023-05-18 RX ORDER — AMOXICILLIN AND CLAVULANATE POTASSIUM 875; 125 MG/1; MG/1
1 TABLET, FILM COATED ORAL 2 TIMES DAILY
Qty: 10 TABLET | Refills: 0 | Status: SHIPPED | OUTPATIENT
Start: 2023-05-18 | End: 2023-05-23

## 2023-05-18 RX ORDER — CLOPIDOGREL BISULFATE 75 MG/1
75 TABLET ORAL DAILY
Qty: 30 TABLET | Refills: 0 | Status: SHIPPED | OUTPATIENT
Start: 2023-05-19 | End: 2023-06-07 | Stop reason: SDUPTHER

## 2023-05-18 RX ORDER — ATORVASTATIN CALCIUM 40 MG/1
40 TABLET, FILM COATED ORAL DAILY
Qty: 90 TABLET | Refills: 3 | Status: SHIPPED | OUTPATIENT
Start: 2023-05-19 | End: 2023-06-07 | Stop reason: SDUPTHER

## 2023-05-18 RX ADMIN — CIPROFLOXACIN 400 MG: 2 INJECTION, SOLUTION INTRAVENOUS at 11:05

## 2023-05-18 RX ADMIN — METRONIDAZOLE 500 MG: 5 INJECTION, SOLUTION INTRAVENOUS at 04:05

## 2023-05-18 RX ADMIN — CLOPIDOGREL BISULFATE 75 MG: 75 TABLET ORAL at 10:05

## 2023-05-18 RX ADMIN — METOPROLOL TARTRATE 25 MG: 25 TABLET, FILM COATED ORAL at 10:05

## 2023-05-18 RX ADMIN — ISOSORBIDE MONONITRATE 60 MG: 60 TABLET, EXTENDED RELEASE ORAL at 10:05

## 2023-05-18 RX ADMIN — PANTOPRAZOLE SODIUM 40 MG: 40 INJECTION, POWDER, FOR SOLUTION INTRAVENOUS at 10:05

## 2023-05-18 RX ADMIN — HYDROCODONE BITARTRATE AND ACETAMINOPHEN 1 TABLET: 7.5; 325 TABLET ORAL at 01:05

## 2023-05-18 RX ADMIN — METRONIDAZOLE 500 MG: 5 INJECTION, SOLUTION INTRAVENOUS at 01:05

## 2023-05-18 RX ADMIN — APIXABAN 5 MG: 2.5 TABLET, FILM COATED ORAL at 10:05

## 2023-05-18 RX ADMIN — ASPIRIN 81 MG CHEWABLE TABLET 81 MG: 81 TABLET CHEWABLE at 09:05

## 2023-05-18 RX ADMIN — SODIUM CHLORIDE: 9 INJECTION, SOLUTION INTRAVENOUS at 04:05

## 2023-05-18 NOTE — DISCHARGE SUMMARY
[facility] Medicine  Discharge Summary      Patient Name: Mil Sood  MRN: [id number]  BUBBA: [id number]  Patient Class: IP- Inpatient  Admission Date: 5/16/2023  Hospital Length of Stay: 1 days  Discharge Date and Time:  05/18/2023 4:38 PM  Attending Physician: Zion Mckeon MD   Discharging Provider: Zion Mckeon MD  Primary Care Provider: Yumi Roberson MD    Primary Care Team: Networked reference to record PCT     HPI:   The patient is a 67 yo male with past medical history of Culp's esophagitis, diabetes, GERD, diverticulitis and anxiety who presented to the ED after being found unresponsive by his son this morning. Patient last known well was 2300 last night. He was initially incoherent. He tried to stand and fell hitting his head on the dresser per his son. The patient reports his stomach has been tight for the last week. He has nausea, vomiting and diarrhea. He reports his mouth is dry. Patient is independent at baseline. His speech is not at baseline. CT head negative. CT abdomen/pelvis Labs revealed YANNI. He was given fluid bolus and antibiotics in ED. Hospital medicine consulted. Patient placed in observation.      * No surgery found *      Hospital Course:   5/17: troponin trending down. Cards on case. Ok to stop heparin. Outpatient stress recommended.  Will start controlling BP after initial permissive HTN.     Patient admitted for stroke like symptoms. Asa and plavix started for bilateral strokes. Echo negative. Due to concern for cardioembolic event both cardiology and neurology recommended starting eliquis. Will plan for eliquis 5mg bid x 30 days. Pt/OT on case recommended home. Speech consulted and regular diet initiated. Patient's diarrhea improved. He was on cipro and flagyl during stay. This was changed to po augmentin. Initially planned to r/o cdiff however diarrhea resolved before sample was able to be collected therefore very unlikely to be cdiff. Patient was discharged home with home  health.        Goals of Care Treatment Preferences:  Code Status: Full Code      Consults:   Consults (From admission, onward)        Status Ordering Provider     Inpatient consult to Cardiology  Once        Provider:  Al Holder MD    Completed ADALBERTO BAKER     Inpatient Consult Tele-Vascular Neurology  Once        Provider:  (Not yet assigned)    Acknowledged ADALBERTO BAKER          No new Assessment & Plan notes have been filed under this hospital service since the last note was generated.  Service: Hospital Medicine    Final Active Diagnoses:    Diagnosis Date Noted POA    PRINCIPAL PROBLEM:  Colitis [K52.9] 05/16/2023 Yes    NSTEMI (non-ST elevated myocardial infarction) [I21.4] 05/17/2023 Yes    Dysarthria [R47.1] 05/16/2023 Yes    Weakness [R53.1] 05/16/2023 Yes    YANNI (acute kidney injury) [N17.9] 05/16/2023 Yes    Cerebrovascular accident (CVA) due to embolism of cerebral artery [I63.40] 05/16/2023 Yes    Hx of CABG x4 in  [Z95.1] 05/06/2022 Not Applicable    GERD (gastroesophageal reflux disease) [K21.9] 01/02/2014 Yes    Essential hypertension [I10] 01/02/2014 Yes     Chronic      Problems Resolved During this Admission:       Discharged Condition: good    Disposition: Home or Self Care    Follow Up:   Follow-up Information     Yumi Roberson MD Follow up in 1 week(s).    Specialty: Family Medicine  Contact information:  [address] [place] [id number]  [phone]                       Patient Instructions:      Ambulatory referral/consult to Outpatient Case Management   Referral Priority: Routine Referral Type: Consultation   Referral Reason: Specialty Services Required   Number of Visits Requested: 1     Ambulatory referral/consult to Home Health   Standing Status: Future   Referral Priority: Routine Referral Type: Home Health   Referral Reason: Specialty Services Required   Requested Specialty: Home Health Services   Number of Visits Requested: 1      Ambulatory referral/consult to [facility]   Standing Status: Future   Referral Priority: Routine Referral Type: Consultation   Referral Reason: Specialty Services Required   Number of Visits Requested: 1       Significant Diagnostic Studies: Labs:   BMP:   Recent Labs   Lab 05/17/23  0706 05/18/23  0834    93   * 132*   K 4.3 4.1    103   CO2 20* 18*   BUN 24* 15   CREATININE 1.2 0.9   CALCIUM 8.7 8.8    and CBC   Recent Labs   Lab 05/17/23  0312 05/17/23  0706 05/18/23  0834   WBC 10.92 11.07  11.07 8.58   HGB 14.0 14.2  14.2 13.6*   HCT 42.5 44.2  44.2 41.9    236  236 218       Pending Diagnostic Studies:     None         Medications:  Reconciled Home Medications:      Medication List      START taking these medications    amoxicillin-clavulanate 875-125mg 875-125 mg per tablet  Commonly known as: AUGMENTIN  Take 1 tablet by mouth 2 (two) times daily. for 5 days     apixaban 5 mg Tab  Commonly known as: ELIQUIS  Take 1 tablet (5 mg total) by mouth 2 (two) times daily.     atorvastatin 40 MG tablet  Commonly known as: LIPITOR  Take 1 tablet (40 mg total) by mouth once daily.  Start taking on: May 19, 2023     clopidogreL 75 mg tablet  Commonly known as: PLAVIX  Take 1 tablet (75 mg total) by mouth once daily.  Start taking on: May 19, 2023        CHANGE how you take these medications    amLODIPine 5 MG tablet  Commonly known as: NORVASC  Take 1 tablet (5 mg total) by mouth once daily.  What changed: when to take this        CONTINUE taking these medications    aspirin 81 MG EC tablet  Commonly known as: ECOTRIN  Take 1 tablet (81 mg total) by mouth once daily.     benazepriL 20 MG tablet  Commonly known as: LOTENSIN  TAKE 2 TABLETS BY MOUTH NIGHTLY.     citalopram 40 MG tablet  Commonly known as: CeleXA  Take 1 tablet (40 mg total) by mouth every evening.     clonazePAM 1 MG tablet  Commonly known as: KlonoPIN  Take 1 tablet (1 mg total) by mouth nightly as needed  for Anxiety.     cyclobenzaprine 10 MG tablet  Commonly known as: FLEXERIL  TAKE 1 TABLET BY MOUTH THREE TIMES DAILY AS NEEDED.     finasteride 5 mg tablet  Commonly known as: PROSCAR  TAKE 1 TABLET BY MOUTH AT NIGHT.     fluticasone propion-salmeterol 115-21 mcg/dose 115-21 mcg/actuation Hfaa inhaler  Commonly known as: ADVAIR HFA  Inhale 2 puffs into the lungs 2 (two) times daily.     gabapentin 600 MG tablet  Commonly known as: NEURONTIN  Take 1 tablet (600 mg total) by mouth nightly.     hydrALAZINE 25 MG tablet  Commonly known as: APRESOLINE  TAKE ONE TABLET BY MOUTH AT BEDTIME.     isosorbide mononitrate 60 MG 24 hr tablet  Commonly known as: IMDUR  TAKE 1 TABLET BY MOUTH ONCE DAILY.     metoprolol tartrate 25 MG tablet  Commonly known as: LOPRESSOR  Take 1 tablet (25 mg total) by mouth 2 (two) times daily.     omeprazole 40 MG capsule  Commonly known as: PRILOSEC  Take 1 capsule (40 mg total) by mouth every morning.     SPIRIVA WITH HANDIHALER 18 mcg inhalation capsule  Generic drug: tiotropium  INHALE THE CONTENTS OF ONE CAPSULE DAILY.     tamsulosin 0.4 mg Cap  Commonly known as: FLOMAX  TAKE 1 CAPSULE BY MOUTH EVERY EVENING.     traZODone 100 MG tablet  Commonly known as: DESYREL  Take 100 mg by mouth every evening.     venlafaxine 75 MG tablet  Commonly known as: EFFEXOR  Take 1 tablet (75 mg total) by mouth 2 (two) times daily.     zolpidem 10 mg Tab  Commonly known as: AMBIEN  TAKE 1 TABLET BY MOUTH IN THE EVENING.        STOP taking these medications    pravastatin 20 MG tablet  Commonly known as: PRAVACHOL            Indwelling Lines/Drains at time of discharge:   Lines/Drains/Airways     None                 Time spent on the discharge of patient: 37 minutes         Zion Mckeon MD  Department of Hospital Medicine  [facility] Med [facility]

## 2023-05-18 NOTE — PLAN OF CARE
Problem: Adult Inpatient Plan of Care  Goal: Plan of Care Review  Outcome: Met  Goal: Patient-Specific Goal (Individualized)  Outcome: Met  Goal: Absence of Hospital-Acquired Illness or Injury  Outcome: Met  Goal: Optimal Comfort and Wellbeing  Outcome: Met  Goal: Readiness for Transition of Care  Outcome: Met     Problem: Fluid and Electrolyte Imbalance (Acute Kidney Injury/Impairment)  Goal: Fluid and Electrolyte Balance  Outcome: Met     Problem: Oral Intake Inadequate (Acute Kidney Injury/Impairment)  Goal: Optimal Nutrition Intake  Outcome: Met     Problem: Renal Function Impairment (Acute Kidney Injury/Impairment)  Goal: Effective Renal Function  Outcome: Met     Problem: Skin Injury Risk Increased  Goal: Skin Health and Integrity  Outcome: Met     Problem: Infection  Goal: Absence of Infection Signs and Symptoms  Outcome: Met

## 2023-05-18 NOTE — PROGRESS NOTES
DC instructions reviewed with patient and son at bedside, both verbalized understanding, encouraged pt to keep all appointments and to meds as directed, prescriptions delivered to bedside per outpatient pharmacy. Stroke education reviewed with patient. Belongings gathered at bedside, no acute distress noted.

## 2023-05-18 NOTE — PT/OT/SLP EVAL
"Occupational Therapy   Evaluation and Discharge Note    Name: Mil Sood  MRN: [id number]  Admitting Diagnosis: Colitis  Recent Surgery: * No surgery found *      Recommendations:     Discharge Recommendations: home  Discharge Equipment Recommendations: none  Barriers to discharge:       Assessment:     Mil Sood is a 68 y.o. male with a medical diagnosis of Colitis. At this time, patient is functioning at their prior level of function and does not require further acute OT services.     Plan:     During this hospitalization, patient does not require further acute OT services.  Please re-consult if situation changes.    Plan of Care Reviewed with: patient, son    Subjective     Chief Complaint:   Patient/Family Comments/goals:  "return home "    Occupational Profile:  Living Environment: lives in mobile home with 6 steps to enter  Previous level of function: (I) with adl's and fumctional ability  Roles and Routines: still drives.  Equipment Used at home: none  Assistance upon Discharge: none    Pain/Comfort:  Pain Rating 1: 2/10    Patients cultural, spiritual, Mosque conflicts given the current situation:      Objective:   Nurse and epic chart review  Communicated with: nurse and epic chart review prior to session.  Patient found HOB elevated with telemetry, peripheral IV upon OT entry to room.    General Precautions: Standard,    Orthopedic Precautions: N/A  Braces: N/A  Respiratory Status: Room air     Occupational Performance:    Bed Mobility:    Patient completed Rolling/Turning to Left with  independence  Patient completed Rolling/Turning to Right with independence  Patient completed Scooting/Bridging with independence  Patient completed Supine to Sit with independence  Patient completed Sit to Supine with independence    Functional Mobility/Transfers:  Patient completed Sit <> Stand Transfer with independence  with  no assistive device   Functional Mobility: (I) with ambulating 20 feet    Activities of " Daily Living:  Upper Body Dressing: independence .  Lower Body Dressing: independence .    Cognitive/Visual Perceptual:  Cognitive/Psychosocial Skills:     -       Oriented to: Person, Place, Time, and Situation   -       Follows Commands/attention:Follows multistep  commands  -       Communication: clear/fluent  -       Memory: No Deficits noted  -       Safety awareness/insight to disability: intact     Physical Exam:  Upper Extremity Range of Motion:     -       Right Upper Extremity: WFL  -       Left Upper Extremity: WFL  Upper Extremity Strength:    -       Right Upper Extremity: WFL  -       Left Upper Extremity: WFL   Strength:    -       Right Upper Extremity: WFL  -       Left Upper Extremity: WFL    AMPAC 6 Click ADL:  AMPAC Total Score: 24    Treatment & Education:  Patient educated on role of OT in acute setting and benefits of OOB activity. Encouraged OOB throughout the course of hospitalization to decrease risk of hospital related debility. Patient stated understanding and in agreement with POC.        Patient left HOB elevated with all lines intact, call button in reach, nurse isabela  notified, and nurse isabela  present    GOALS:   Multidisciplinary Problems       Occupational Therapy Goals       Not on file                    History:     Past Medical History:   Diagnosis Date    Anxiety     Cancer     spot on kidney and lung    Diabetes mellitus     Borderline diet controlled    Diverticulitis     GERD (gastroesophageal reflux disease)     Hyperlipidemia     not on any meds currently    Hypertension     Lung mass     Mass of kidney          Past Surgical History:   Procedure Laterality Date    ARTHROPLASTY OF JOINT OF FINGER Left 11/18/2022    Procedure: ARTHROPLASTY, FINGER;  Surgeon: Kane Negrete MD;  Location: [facility];  Service: Orthopedics;  Laterality: Left;  left thumb basal joint arthroplasty  Arthrex  Podiatry extras    CHOLECYSTECTOMY      COLONOSCOPY      COLONOSCOPY N/A  4/7/2017    Procedure: COLONOSCOPY;  Surgeon: Amarilys Bravo MD;  Location: [facility];  Service: Endoscopy;  Laterality: N/A;    DIAGNOSTIC LAPAROSCOPY N/A 5/4/2021    Procedure: LAPAROSCOPY, DIAGNOSTIC;  Surgeon: Shaji Jauregui MD;  Location: [facility] OR;  Service: General;  Laterality: N/A;    ESOPHAGOGASTRODUODENOSCOPY      ESOPHAGOGASTRODUODENOSCOPY N/A 2/13/2020    Procedure: ESOPHAGOGASTRODUODENOSCOPY (EGD);  Surgeon: Shaji Aquino III, MD;  Location: [facility];  Service: Endoscopy;  Laterality: N/A;    ESOPHAGOGASTRODUODENOSCOPY N/A 5/3/2023    Procedure: EGD (ESOPHAGOGASTRODUODENOSCOPY);  Surgeon: Jenelle Jensen MD;  Location: [facility];  Service: Endoscopy;  Laterality: N/A;    HERNIA REPAIR Bilateral     groin    LEFT HEART CATHETERIZATION Left 1/31/2022    Procedure: CATHETERIZATION, HEART, LEFT;  Surgeon: Romeo Godfrey MD;  Location: [facility] CATH LAB;  Service: Cardiology;  Laterality: Left;    NECK SURGERY      OPEN REDUCTION AND INTERNAL FIXATION (ORIF) OF INJURY OF ANKLE Left 8/10/2022    Procedure: ORIF, ANKLE;  Surgeon: Howard Mata MD;  Location: [facility];  Service: Orthopedics;  Laterality: Left;    PARTIAL NEPHRECTOMY      left    RENAL BIOPSY      ROBOT-ASSISTED REPAIR OF UMBILICAL HERNIA USING DA NEL XI N/A 5/4/2021    Procedure: XI ROBOTIC REPAIR, HERNIA, UMBILICAL;  Surgeon: Shaji Jauregui MD;  Location: [facility];  Service: General;  Laterality: N/A;  converted to open    TONSILLECTOMY      TRIGGER FINGER RELEASE Right     long finger       Time Tracking:     OT Date of Treatment: 05/17/23  OT Start Time: 1045  OT Stop Time: 1110  OT Total Time (min): 25 min    Billable Minutes:Evaluation 15 minutes  Therapeutic Activity 10 min    5/17/2023

## 2023-05-18 NOTE — PLAN OF CARE
Patient remains free of hospital injury. VSS, BP improved. IVF and IV antibiotics continued. Cardiac monitoring as ordered. Pain controlled with PRN meds. All questions answered, call light in reach, bed alarm in use. Will continue to monitor. Chart review complete.    Problem: Adult Inpatient Plan of Care  Goal: Patient-Specific Goal (Individualized)  Outcome: Ongoing, Progressing  Flowsheets (Taken 5/18/2023 0028)  Anxieties, Fears or Concerns: none  Individualized Care Needs: urinal close, rest     Problem: Fluid and Electrolyte Imbalance (Acute Kidney Injury/Impairment)  Goal: Fluid and Electrolyte Balance  Outcome: Ongoing, Progressing     Problem: Adult Inpatient Plan of Care  Goal: Absence of Hospital-Acquired Illness or Injury  Outcome: Ongoing, Progressing     Problem: Infection  Goal: Absence of Infection Signs and Symptoms  Outcome: Ongoing, Progressing

## 2023-05-19 ENCOUNTER — PES CALL (OUTPATIENT)
Dept: ADMINISTRATIVE | Facility: CLINIC | Age: 68
End: 2023-05-19
Payer: MEDICARE

## 2023-05-19 ENCOUNTER — PATIENT OUTREACH (OUTPATIENT)
Dept: ADMINISTRATIVE | Facility: CLINIC | Age: 68
End: 2023-05-19
Payer: MEDICARE

## 2023-05-19 NOTE — PROGRESS NOTES
C3 nurse spoke with Mil Sood for a TCC post hospital discharge follow up call. The patient does not have a scheduled HOSFU appointment with Yumi Roberson MD within 5-7 days post hospital discharge date 05. C3 nurse was unable to schedule HOSFU appointment in [facility].    Message sent to PCP staff requesting they contact patient and schedule follow up appointment.

## 2023-05-19 NOTE — TELEPHONE ENCOUNTER
Attempted to call patient, there was no answer, message left to call the clinic back at their convenience.       Appt made for Thursday at 9:40

## 2023-05-20 ENCOUNTER — TELEPHONE (OUTPATIENT)
Dept: ADMINISTRATIVE | Facility: CLINIC | Age: 68
End: 2023-05-20
Payer: MEDICARE

## 2023-05-21 LAB
BACTERIA BLD CULT: NORMAL
BACTERIA BLD CULT: NORMAL

## 2023-05-22 ENCOUNTER — PES CALL (OUTPATIENT)
Dept: ADMINISTRATIVE | Facility: CLINIC | Age: 68
End: 2023-05-22
Payer: MEDICARE

## 2023-05-25 ENCOUNTER — OFFICE VISIT (OUTPATIENT)
Dept: FAMILY MEDICINE | Facility: CLINIC | Age: 68
End: 2023-05-25
Payer: MEDICARE

## 2023-05-25 VITALS
OXYGEN SATURATION: 95 % | RESPIRATION RATE: 18 BRPM | HEIGHT: 71 IN | HEART RATE: 100 BPM | TEMPERATURE: 99 F | WEIGHT: 188.5 LBS | BODY MASS INDEX: 26.39 KG/M2 | DIASTOLIC BLOOD PRESSURE: 78 MMHG | SYSTOLIC BLOOD PRESSURE: 124 MMHG

## 2023-05-25 DIAGNOSIS — Z86.73 S/P STROKE DUE TO CEREBROVASCULAR DISEASE: ICD-10-CM

## 2023-05-25 DIAGNOSIS — Z09 HOSPITAL DISCHARGE FOLLOW-UP: Primary | ICD-10-CM

## 2023-05-25 DIAGNOSIS — R19.7 DIARRHEA, UNSPECIFIED TYPE: ICD-10-CM

## 2023-05-25 DIAGNOSIS — Z78.9: ICD-10-CM

## 2023-05-25 PROCEDURE — 99495 TCM SERVICES (MODERATE COMPLEXITY): ICD-10-PCS | Mod: S$GLB,,, | Performed by: FAMILY MEDICINE

## 2023-05-25 PROCEDURE — 1101F PT FALLS ASSESS-DOCD LE1/YR: CPT | Mod: CPTII,S$GLB,, | Performed by: FAMILY MEDICINE

## 2023-05-25 PROCEDURE — 1111F PR DISCHARGE MEDS RECONCILED W/ CURRENT OUTPATIENT MED LIST: ICD-10-PCS | Mod: CPTII,S$GLB,, | Performed by: FAMILY MEDICINE

## 2023-05-25 PROCEDURE — 3074F SYST BP LT 130 MM HG: CPT | Mod: CPTII,S$GLB,, | Performed by: FAMILY MEDICINE

## 2023-05-25 PROCEDURE — 1111F DSCHRG MED/CURRENT MED MERGE: CPT | Mod: CPTII,S$GLB,, | Performed by: FAMILY MEDICINE

## 2023-05-25 PROCEDURE — 3078F DIAST BP <80 MM HG: CPT | Mod: CPTII,S$GLB,, | Performed by: FAMILY MEDICINE

## 2023-05-25 PROCEDURE — 3288F PR FALLS RISK ASSESSMENT DOCUMENTED: ICD-10-PCS | Mod: CPTII,S$GLB,, | Performed by: FAMILY MEDICINE

## 2023-05-25 PROCEDURE — 3044F PR MOST RECENT HEMOGLOBIN A1C LEVEL <7.0%: ICD-10-PCS | Mod: CPTII,S$GLB,, | Performed by: FAMILY MEDICINE

## 2023-05-25 PROCEDURE — 1159F MED LIST DOCD IN RCRD: CPT | Mod: CPTII,S$GLB,, | Performed by: FAMILY MEDICINE

## 2023-05-25 PROCEDURE — 99495 TRANSJ CARE MGMT MOD F2F 14D: CPT | Mod: S$GLB,,, | Performed by: FAMILY MEDICINE

## 2023-05-25 PROCEDURE — 3008F PR BODY MASS INDEX (BMI) DOCUMENTED: ICD-10-PCS | Mod: CPTII,S$GLB,, | Performed by: FAMILY MEDICINE

## 2023-05-25 PROCEDURE — 1126F PR PAIN SEVERITY QUANTIFIED, NO PAIN PRESENT: ICD-10-PCS | Mod: CPTII,S$GLB,, | Performed by: FAMILY MEDICINE

## 2023-05-25 PROCEDURE — 99999 PR PBB SHADOW E&M-EST. PATIENT-LVL V: CPT | Mod: PBBFAC,,, | Performed by: FAMILY MEDICINE

## 2023-05-25 PROCEDURE — 1126F AMNT PAIN NOTED NONE PRSNT: CPT | Mod: CPTII,S$GLB,, | Performed by: FAMILY MEDICINE

## 2023-05-25 PROCEDURE — 3008F BODY MASS INDEX DOCD: CPT | Mod: CPTII,S$GLB,, | Performed by: FAMILY MEDICINE

## 2023-05-25 PROCEDURE — 1159F PR MEDICATION LIST DOCUMENTED IN MEDICAL RECORD: ICD-10-PCS | Mod: CPTII,S$GLB,, | Performed by: FAMILY MEDICINE

## 2023-05-25 PROCEDURE — 4010F ACE/ARB THERAPY RXD/TAKEN: CPT | Mod: CPTII,S$GLB,, | Performed by: FAMILY MEDICINE

## 2023-05-25 PROCEDURE — 3078F PR MOST RECENT DIASTOLIC BLOOD PRESSURE < 80 MM HG: ICD-10-PCS | Mod: CPTII,S$GLB,, | Performed by: FAMILY MEDICINE

## 2023-05-25 PROCEDURE — 3074F PR MOST RECENT SYSTOLIC BLOOD PRESSURE < 130 MM HG: ICD-10-PCS | Mod: CPTII,S$GLB,, | Performed by: FAMILY MEDICINE

## 2023-05-25 PROCEDURE — 3288F FALL RISK ASSESSMENT DOCD: CPT | Mod: CPTII,S$GLB,, | Performed by: FAMILY MEDICINE

## 2023-05-25 PROCEDURE — 1101F PR PT FALLS ASSESS DOC 0-1 FALLS W/OUT INJ PAST YR: ICD-10-PCS | Mod: CPTII,S$GLB,, | Performed by: FAMILY MEDICINE

## 2023-05-25 PROCEDURE — 3044F HG A1C LEVEL LT 7.0%: CPT | Mod: CPTII,S$GLB,, | Performed by: FAMILY MEDICINE

## 2023-05-25 PROCEDURE — 4010F PR ACE/ARB THEARPY RXD/TAKEN: ICD-10-PCS | Mod: CPTII,S$GLB,, | Performed by: FAMILY MEDICINE

## 2023-05-25 PROCEDURE — 99999 PR PBB SHADOW E&M-EST. PATIENT-LVL V: ICD-10-PCS | Mod: PBBFAC,,, | Performed by: FAMILY MEDICINE

## 2023-05-25 NOTE — PROGRESS NOTES
Transitional Care Note  Subjective:       Patient ID: Mil Sood is a 68 y.o. male.  Chief Complaint: Follow-up    Family and/or Caretaker present at visit?  No.  Diagnostic tests reviewed/disposition: I have reviewed all completed as well as pending diagnostic tests at the time of discharge.  Disease/illness education: y  Home health/community services discussion/referrals: Patient does not have home health established from hospital visit.  They do not need home health.  If needed, we will set up home health for the patient.   Establishment or re-establishment of referral orders for community resources: No other necessary community resources.   Discussion with other health care providers: No discussion with other health care providers necessary.   Presents today hospital discharge   He was hospitalized and he was found unresponsive because he was having diarrhea for some time.  He was found to have stroke multiple parts of the brain, the left frontal lobe, bilateral parietal and cerebellar hemisphere.  He had extensive workup no major blockage in the neck, echo was okay.  No source of cardiometabolic emboli was isolated he was put on anticoagulation.    He was treated with antibiotics for diarrhea C diff could not be done because his diarrhea had improved but he says it is back in for it.      He feels that he has no deficits from the stroke he is was evaluated by Physical therapy and speech therapy and was released.  He feels excellent.    He drinks about 90+ oz of tea a day.    Review of Systems   Constitutional:  Negative for activity change, chills, fatigue, fever and unexpected weight change.   HENT:  Negative for congestion, ear discharge, ear pain, hearing loss, postnasal drip and rhinorrhea.    Eyes:  Negative for pain and visual disturbance.   Respiratory:  Negative for cough, chest tightness and shortness of breath.    Cardiovascular:  Negative for chest pain and palpitations.   Gastrointestinal:   Negative for abdominal pain, diarrhea and vomiting.   Endocrine: Negative for heat intolerance.   Genitourinary:  Negative for dysuria, flank pain, frequency and hematuria.   Musculoskeletal:  Negative for back pain, gait problem and neck pain.   Skin:  Negative for color change and rash.   Neurological:  Negative for dizziness, tremors, seizures, numbness and headaches.   Psychiatric/Behavioral:  Negative for agitation, hallucinations, self-injury, sleep disturbance and suicidal ideas. The patient is not nervous/anxious.      Objective:      Physical Exam  Constitutional:       Appearance: He is well-developed.   Eyes:      Conjunctiva/sclera: Conjunctivae normal.      Pupils: Pupils are equal, round, and reactive to light.   Cardiovascular:      Rate and Rhythm: Normal rate and regular rhythm.      Heart sounds: Normal heart sounds. No murmur heard.  Pulmonary:      Effort: Pulmonary effort is normal. No respiratory distress.      Breath sounds: Normal breath sounds. No wheezing or rales.   Chest:      Chest wall: No tenderness.   Abdominal:      General: There is no distension.      Palpations: Abdomen is soft. There is no mass.      Tenderness: There is no abdominal tenderness. There is no guarding.   Musculoskeletal:         General: No tenderness.      Cervical back: Normal range of motion and neck supple.   Lymphadenopathy:      Cervical: No cervical adenopathy.   Skin:     General: Skin is warm and dry.   Neurological:      Mental Status: He is alert and oriented to person, place, and time.      Deep Tendon Reflexes: Reflexes are normal and symmetric.   Psychiatric:         Behavior: Behavior normal.         Thought Content: Thought content normal.         Judgment: Judgment normal.       Assessment:       1. Hospital discharge follow-up    2. S/P stroke due to cerebrovascular disease    3. Diarrhea, unspecified type    4. Consumes caffeine from tea        Plan:         Thromboembolic CVA with unclear focus  continue long-term anticoagulation   No baseline deficits at this time.    Check stool for C diff   Will set him up Neurology and Cardiology   Recommend that he cut back on his caffeine intake

## 2023-05-29 NOTE — PHYSICIAN QUERY
PT Name: Mil Sood  MR #: [id number]     DOCUMENTATION CLARIFICATION      CDS/:  Jorge Walters RN, CDS                   Contact Information:  [email or web address]    This form is a permanent document in the medical record.    Query Date: May 29, 2023    By submitting this query, we are merely seeking further clarification of documentation to reflect the severity of illness of your patient. Please utilize your independent clinical judgment when addressing the question(s) below.     The Medical Record contains the following:   Indicators   Supporting Clinical Findings     Location in Medical Record   X Chest Pain, Angina Patient denies chest pain, numbness or tingling, or abnormal speech    Clinical Impression: Chest pain     Cardiovascular: Negative for chest pain   NN 5/16      ED MD 5/16    Cardio consult 5/17   X Coronary Artery Disease Mr. Sood is a 68-year-old male with CAD s/p three-vessel CABG 3/22, diabetes, hypertension    Cardio Consult 5/17   X EKG The EKG was ordered, reviewed, and independently interpreted by the ED provider.  Interpretation time: 09:34  Rate: 68 BPM  Rhythm: normal sinus rhythm  Interpretation: Anterior infarct, age undetermined. No STEMI.    Normal sinus rhythm   Cannot exclude Anterior infarct (cited on or before 18-AUG-2022)   Nonspecific T wave abnormality   Abnormal ECG   When compared with ECG of 18-AUG-2022 10:38,   Vent. rate has decreased BY  38 BPM   Questionable change in The axis   T wave inversion now evident in Lateral leads    ED MD 5/16              EKG 5/16   X Troponin Troponin 0.090    Troponin 1.118    Troponin 2.714    Troponin 3.208    Troponin 3.271    Troponin 2.712 Labs 5/16 @ 1028    Labs 5/16 @ 1524    Labs 5/16 @ 1855    Labs 5/17 @ 706    Labs 5/17 @ 1006    Labs 5/17 @ 1451     X Echo Results The left ventricle is normal in size with concentric remodeling and normal systolic function.    Normal left ventricular diastolic function.    The  "estimated PA systolic pressure is 40 mmHg.    Normal right ventricular size with normal right ventricular systolic function.    Normal central venous pressure (3 mmHg).    The estimated ejection fraction is 60%.    Moderate tricuspid regurgitation.    Mild-to-moderate mitral regurgitation.    Mild aortic regurgitation.     There is normal wall motion.   Echo 5/17    Angiography     X Documentation of acute cardiac condition NSTEMI (non-ST elevated myocardial infarction)  No cardiac symptoms   Troponin leak due to demand ischemia in setting of acute stroke, elevated BP   Cardio Consult 5/17   X Medication/Treatment F/u in cardiology clinic  Consider OP stress test  Cardio Consult 5/17     X Other Patient admitted for stroke like symptoms. Asa and plavix started for bilateral strokes    Echo negative. Due to concern for cardioembolic event both cardiology and neurology recommended starting eliquis. Will plan for eliquis 5mg bid x 30 days.      DCS 5/18        Provider, after study please clarify the diagnosis "NSTEMI":    [  x ] NSTEMI/Myocardial Infarction Type 2     [   ] Acute Myocardial Injury, non-ischemic      [   ] Elevated troponin only (without corresponding diagnosis)     [   ] Other Cardiac Diagnosis (please specify): _______________           Please document in your progress notes daily for the duration of treatment until resolved, and include in your discharge summary.    Form No. [id number]  "

## 2023-05-30 ENCOUNTER — OUTPATIENT CASE MANAGEMENT (OUTPATIENT)
Dept: ADMINISTRATIVE | Facility: OTHER | Age: 68
End: 2023-05-30
Payer: MEDICARE

## 2023-05-31 ENCOUNTER — HOSPITAL ENCOUNTER (OUTPATIENT)
Dept: CARDIOLOGY | Facility: HOSPITAL | Age: 68
Discharge: HOME | End: 2023-05-31
Attending: STUDENT IN AN ORGANIZED HEALTH CARE EDUCATION/TRAINING PROGRAM
Payer: MEDICARE

## 2023-05-31 ENCOUNTER — TELEPHONE (OUTPATIENT)
Dept: FAMILY MEDICINE | Facility: CLINIC | Age: 68
End: 2023-05-31
Payer: MEDICARE

## 2023-05-31 DIAGNOSIS — Z86.73 HISTORY OF CARDIOEMBOLIC CEREBROVASCULAR ACCIDENT (CVA): ICD-10-CM

## 2023-05-31 PROCEDURE — 93248 CV CARDIAC MONITOR - 3-15 DAY ADULT (CUPID ONLY): ICD-10-PCS | Mod: ,,, | Performed by: STUDENT IN AN ORGANIZED HEALTH CARE EDUCATION/TRAINING PROGRAM

## 2023-05-31 PROCEDURE — 93248 EXT ECG>7D<15D REV&INTERPJ: CPT | Mod: ,,, | Performed by: STUDENT IN AN ORGANIZED HEALTH CARE EDUCATION/TRAINING PROGRAM

## 2023-05-31 NOTE — TELEPHONE ENCOUNTER
----- Message from Elyse Mc sent at 5/31/2023 12:42 PM CDT -----  Contact: [email or web address] [phone]  Pharmacy is calling to clarify an RX.  RX name:  zolpidem (AMBIEN) 10 mg Tab  What do they need to clarify:    Comments:  [facility] the medication and wants to see if they can replace it. She stated that pt had a stroke and he brought all of his medications and they didn't find the ambien so she wants to permission to refill it

## 2023-06-01 ENCOUNTER — TELEPHONE (OUTPATIENT)
Dept: PREADMISSION TESTING | Facility: HOSPITAL | Age: 68
End: 2023-06-01
Payer: MEDICARE

## 2023-06-01 NOTE — TELEPHONE ENCOUNTER
Spoke with pt, informed him that he would need to see cards first before having scope done. Verbalized understanding. Colonoscopy removed and placed back in the depot.

## 2023-06-01 NOTE — TELEPHONE ENCOUNTER
----- Message from Lynette Cooley sent at 5/31/2023  4:32 PM CDT -----  Regarding: blood thinners  Contact: patient  Patient called stating he received a letter saying if he  is on blood thinners to call Endo, He stated he is on 2 different kind of blood thinners and asking for someone to call him with instructions, procedure scheduled for 06/16/2023... call patient at [phone]        Thanks

## 2023-06-06 ENCOUNTER — OUTPATIENT CASE MANAGEMENT (OUTPATIENT)
Dept: ADMINISTRATIVE | Facility: OTHER | Age: 68
End: 2023-06-06
Payer: MEDICARE

## 2023-06-06 NOTE — PROGRESS NOTES
Outpatient Care Management  Patient Does Not Consent    Patient: Mil Sood  MRN:  [id number]  Date of Service:  6/6/2023  Completed by:  Nanette Dacosta RN    Chief Complaint   Patient presents with    OPCM Enrollment Call       Patient Summary           Consent Received:  Decline  Decline Reason:  Needs met     Encouraged patient to call in the future or let MD know if any OPCM needs arise. Voiced understanding.  ZA Dacosta RN

## 2023-06-07 DIAGNOSIS — K21.9 GASTROESOPHAGEAL REFLUX DISEASE WITHOUT ESOPHAGITIS: ICD-10-CM

## 2023-06-07 DIAGNOSIS — M47.812 CERVICAL SPONDYLOSIS: ICD-10-CM

## 2023-06-07 RX ORDER — GABAPENTIN 600 MG/1
1200 TABLET ORAL NIGHTLY
Qty: 180 TABLET | Refills: 3 | Status: SHIPPED | OUTPATIENT
Start: 2023-06-07

## 2023-06-07 RX ORDER — ISOSORBIDE MONONITRATE 60 MG/1
60 TABLET, EXTENDED RELEASE ORAL DAILY
Qty: 90 TABLET | Refills: 2 | Status: SHIPPED | OUTPATIENT
Start: 2023-06-07

## 2023-06-07 RX ORDER — CLOPIDOGREL BISULFATE 75 MG/1
75 TABLET ORAL DAILY
Qty: 90 TABLET | Refills: 3 | Status: SHIPPED | OUTPATIENT
Start: 2023-06-07 | End: 2023-06-28

## 2023-06-07 RX ORDER — OMEPRAZOLE 40 MG/1
40 CAPSULE, DELAYED RELEASE ORAL EVERY MORNING
Qty: 90 CAPSULE | Refills: 3 | Status: SHIPPED | OUTPATIENT
Start: 2023-06-07 | End: 2024-06-06

## 2023-06-07 RX ORDER — ATORVASTATIN CALCIUM 40 MG/1
40 TABLET, FILM COATED ORAL DAILY
Qty: 90 TABLET | Refills: 3 | Status: SHIPPED | OUTPATIENT
Start: 2023-06-07 | End: 2024-01-20

## 2023-06-07 NOTE — TELEPHONE ENCOUNTER
No care due was identified.  [facility] Embedded Care Due Messages. Reference number: [id number].   6/07/2023 10:53:55 AM CDT

## 2023-06-12 NOTE — TELEPHONE ENCOUNTER
No care due was identified.  [facility] Embedded Care Due Messages. Reference number: [id number].   6/12/2023 5:51:53 PM CDT

## 2023-06-13 RX ORDER — TAMSULOSIN HYDROCHLORIDE 0.4 MG/1
CAPSULE ORAL
Qty: 90 CAPSULE | Refills: 0 | Status: SHIPPED | OUTPATIENT
Start: 2023-06-13 | End: 2023-08-31

## 2023-06-13 NOTE — TELEPHONE ENCOUNTER
Refill Routing Note   Refill Routing Note   Medication(s) are not appropriate for processing by [facility] for the following reason(s):      New or recently adjusted medication    [facility] action(s):  Defer None identified        Medication reconciliation completed: No     Appointments  past 12m or future 3m with PCP    Date Provider   Last Visit   5/25/2023 Yumi Roberson MD   Next Visit   Visit date not found Yumi Roberson MD   ED visits in past 90 days: 0        Note composed:7:55 PM 06/12/2023

## 2023-06-27 DIAGNOSIS — F32.A DEPRESSION, UNSPECIFIED DEPRESSION TYPE: ICD-10-CM

## 2023-06-27 DIAGNOSIS — F41.8 DEPRESSION WITH ANXIETY: ICD-10-CM

## 2023-06-27 DIAGNOSIS — I10 ESSENTIAL HYPERTENSION: Chronic | ICD-10-CM

## 2023-06-27 RX ORDER — CITALOPRAM 40 MG/1
TABLET, FILM COATED ORAL
Qty: 90 TABLET | Refills: 3 | Status: SHIPPED | OUTPATIENT
Start: 2023-06-27 | End: 2023-08-31 | Stop reason: DRUGHIGH

## 2023-06-27 RX ORDER — AMLODIPINE BESYLATE 5 MG/1
TABLET ORAL
Qty: 90 TABLET | Refills: 3 | Status: SHIPPED | OUTPATIENT
Start: 2023-06-27 | End: 2023-08-30 | Stop reason: SDUPTHER

## 2023-06-27 RX ORDER — HYDRALAZINE HYDROCHLORIDE 25 MG/1
TABLET, FILM COATED ORAL
Qty: 90 TABLET | Refills: 3 | Status: SHIPPED | OUTPATIENT
Start: 2023-06-27 | End: 2023-06-28

## 2023-06-27 RX ORDER — TRAZODONE HYDROCHLORIDE 100 MG/1
TABLET ORAL
Qty: 90 TABLET | Refills: 3 | Status: SHIPPED | OUTPATIENT
Start: 2023-06-27 | End: 2024-01-20

## 2023-06-27 NOTE — TELEPHONE ENCOUNTER
No care due was identified.  [facility] Embedded Care Due Messages. Reference number: [id number].   6/27/2023 1:23:36 PM CDT

## 2023-06-27 NOTE — TELEPHONE ENCOUNTER
Refill Routing Note   Medication(s) are not appropriate for processing by [facility] for the following reason(s):      Medication outside of protocol  Drug-disease interaction  No active prescription written by PCP    [facility] action(s):  Defer  Route  Approve None identified     Medication Therapy Plan: Drug-Disease: citalopram and NSTEMI (non-ST elevated myocardial infarction)      Appointments  past 12m or future 3m with PCP    Date Provider   Last Visit   5/25/2023 Yumi Roberson MD   Next Visit   Visit date not found Yumi Roberson MD   ED visits in past 90 days: 0        Note composed:3:08 PM 06/27/2023

## 2023-06-28 ENCOUNTER — TELEPHONE (OUTPATIENT)
Dept: CARDIOLOGY | Facility: HOSPITAL | Age: 68
End: 2023-06-28
Payer: MEDICARE

## 2023-06-28 ENCOUNTER — OFFICE VISIT (OUTPATIENT)
Dept: CARDIOLOGY | Facility: CLINIC | Age: 68
End: 2023-06-28
Payer: MEDICARE

## 2023-06-28 ENCOUNTER — TELEPHONE (OUTPATIENT)
Dept: PHARMACY | Facility: CLINIC | Age: 68
End: 2023-06-28
Payer: MEDICARE

## 2023-06-28 VITALS
BODY MASS INDEX: 25.99 KG/M2 | DIASTOLIC BLOOD PRESSURE: 68 MMHG | OXYGEN SATURATION: 98 % | WEIGHT: 185.63 LBS | HEART RATE: 84 BPM | SYSTOLIC BLOOD PRESSURE: 116 MMHG | HEIGHT: 71 IN

## 2023-06-28 DIAGNOSIS — Z95.1 HX OF CABG: ICD-10-CM

## 2023-06-28 DIAGNOSIS — E78.2 MIXED HYPERLIPIDEMIA: ICD-10-CM

## 2023-06-28 DIAGNOSIS — Z86.73 S/P STROKE DUE TO CEREBROVASCULAR DISEASE: ICD-10-CM

## 2023-06-28 DIAGNOSIS — Z78.9 STATIN INTOLERANCE: ICD-10-CM

## 2023-06-28 DIAGNOSIS — J43.2 CENTRILOBULAR EMPHYSEMA: ICD-10-CM

## 2023-06-28 DIAGNOSIS — I21.4 NSTEMI (NON-ST ELEVATED MYOCARDIAL INFARCTION): ICD-10-CM

## 2023-06-28 DIAGNOSIS — I63.40 CEREBROVASCULAR ACCIDENT (CVA) DUE TO EMBOLISM OF CEREBRAL ARTERY: Primary | ICD-10-CM

## 2023-06-28 DIAGNOSIS — I10 ESSENTIAL HYPERTENSION: Chronic | ICD-10-CM

## 2023-06-28 DIAGNOSIS — I70.0 ATHEROSCLEROSIS OF AORTA: ICD-10-CM

## 2023-06-28 DIAGNOSIS — R79.89 ELEVATED TROPONIN: ICD-10-CM

## 2023-06-28 PROCEDURE — 99215 PR OFFICE/OUTPT VISIT, EST, LEVL V, 40-54 MIN: ICD-10-PCS | Mod: S$GLB,,, | Performed by: INTERNAL MEDICINE

## 2023-06-28 PROCEDURE — 1160F RVW MEDS BY RX/DR IN RCRD: CPT | Mod: CPTII,S$GLB,, | Performed by: INTERNAL MEDICINE

## 2023-06-28 PROCEDURE — 4010F ACE/ARB THERAPY RXD/TAKEN: CPT | Mod: CPTII,S$GLB,, | Performed by: INTERNAL MEDICINE

## 2023-06-28 PROCEDURE — 3008F PR BODY MASS INDEX (BMI) DOCUMENTED: ICD-10-PCS | Mod: CPTII,S$GLB,, | Performed by: INTERNAL MEDICINE

## 2023-06-28 PROCEDURE — 3044F HG A1C LEVEL LT 7.0%: CPT | Mod: CPTII,S$GLB,, | Performed by: INTERNAL MEDICINE

## 2023-06-28 PROCEDURE — 1159F PR MEDICATION LIST DOCUMENTED IN MEDICAL RECORD: ICD-10-PCS | Mod: CPTII,S$GLB,, | Performed by: INTERNAL MEDICINE

## 2023-06-28 PROCEDURE — 99215 OFFICE O/P EST HI 40 MIN: CPT | Mod: S$GLB,,, | Performed by: INTERNAL MEDICINE

## 2023-06-28 PROCEDURE — 3074F PR MOST RECENT SYSTOLIC BLOOD PRESSURE < 130 MM HG: ICD-10-PCS | Mod: CPTII,S$GLB,, | Performed by: INTERNAL MEDICINE

## 2023-06-28 PROCEDURE — 3288F FALL RISK ASSESSMENT DOCD: CPT | Mod: CPTII,S$GLB,, | Performed by: INTERNAL MEDICINE

## 2023-06-28 PROCEDURE — 4010F PR ACE/ARB THEARPY RXD/TAKEN: ICD-10-PCS | Mod: CPTII,S$GLB,, | Performed by: INTERNAL MEDICINE

## 2023-06-28 PROCEDURE — 99999 PR PBB SHADOW E&M-EST. PATIENT-LVL V: ICD-10-PCS | Mod: PBBFAC,,, | Performed by: INTERNAL MEDICINE

## 2023-06-28 PROCEDURE — 3078F PR MOST RECENT DIASTOLIC BLOOD PRESSURE < 80 MM HG: ICD-10-PCS | Mod: CPTII,S$GLB,, | Performed by: INTERNAL MEDICINE

## 2023-06-28 PROCEDURE — 1126F PR PAIN SEVERITY QUANTIFIED, NO PAIN PRESENT: ICD-10-PCS | Mod: CPTII,S$GLB,, | Performed by: INTERNAL MEDICINE

## 2023-06-28 PROCEDURE — 99999 PR PBB SHADOW E&M-EST. PATIENT-LVL V: CPT | Mod: PBBFAC,,, | Performed by: INTERNAL MEDICINE

## 2023-06-28 PROCEDURE — 3008F BODY MASS INDEX DOCD: CPT | Mod: CPTII,S$GLB,, | Performed by: INTERNAL MEDICINE

## 2023-06-28 PROCEDURE — 1101F PT FALLS ASSESS-DOCD LE1/YR: CPT | Mod: CPTII,S$GLB,, | Performed by: INTERNAL MEDICINE

## 2023-06-28 PROCEDURE — 1160F PR REVIEW ALL MEDS BY PRESCRIBER/CLIN PHARMACIST DOCUMENTED: ICD-10-PCS | Mod: CPTII,S$GLB,, | Performed by: INTERNAL MEDICINE

## 2023-06-28 PROCEDURE — 3074F SYST BP LT 130 MM HG: CPT | Mod: CPTII,S$GLB,, | Performed by: INTERNAL MEDICINE

## 2023-06-28 PROCEDURE — 3078F DIAST BP <80 MM HG: CPT | Mod: CPTII,S$GLB,, | Performed by: INTERNAL MEDICINE

## 2023-06-28 PROCEDURE — 3288F PR FALLS RISK ASSESSMENT DOCUMENTED: ICD-10-PCS | Mod: CPTII,S$GLB,, | Performed by: INTERNAL MEDICINE

## 2023-06-28 PROCEDURE — 1159F MED LIST DOCD IN RCRD: CPT | Mod: CPTII,S$GLB,, | Performed by: INTERNAL MEDICINE

## 2023-06-28 PROCEDURE — 1126F AMNT PAIN NOTED NONE PRSNT: CPT | Mod: CPTII,S$GLB,, | Performed by: INTERNAL MEDICINE

## 2023-06-28 PROCEDURE — 3044F PR MOST RECENT HEMOGLOBIN A1C LEVEL <7.0%: ICD-10-PCS | Mod: CPTII,S$GLB,, | Performed by: INTERNAL MEDICINE

## 2023-06-28 PROCEDURE — 1101F PR PT FALLS ASSESS DOC 0-1 FALLS W/OUT INJ PAST YR: ICD-10-PCS | Mod: CPTII,S$GLB,, | Performed by: INTERNAL MEDICINE

## 2023-06-28 RX ORDER — EVOLOCUMAB 140 MG/ML
140 INJECTION, SOLUTION SUBCUTANEOUS
Qty: 2 EACH | Refills: 0 | Status: ACTIVE | OUTPATIENT
Start: 2023-06-28 | End: 2023-07-26

## 2023-06-28 NOTE — PROGRESS NOTES
Subjective:   Patient ID:  Mil Sood is a 68 y.o. male who presents for follow up of Chest Pain (Stabbing chest pain a couple weeks ago)      67 yo male, came in for s/p CABG f/u  PMH CAD s/p CABG x4 in , h/o emboli CVA in , HTN HLD. PreDM. Anxiety, no smoking/drinking. Statin intolerance for bone pain.  C/o chest pain for 4 months, central chest, twice a week, stabbing pain, radiating to the left arm. Occurred at rest. Lasted for 10 minutes and resolved spontaneously  Walking caused SOB but no pain.   Phar. MPI ordered by PCP for chest pain and showed apical and inferoseptal ischemia.  ekg NSR and no acute stt change  Father had PPM and mother ok. Sister  of lung cancer     Pt had LHC done on 2022 bu Dr. Godfrey, revealing:  lmca 50% eccentric calcified ivus csa 5.4  LAd HEAVILY CALCIFIED MID LAD LONG LESION 50-70% IN MAGNITUDE CSA 2 MM2.  LCX MID LONG 40% HEAVILY CALCIFIED.   OM1 90% PROX.  D1 CALCIFIED NON OBS DISEASE.   RCA CALCIFIED DIFFUSE DISEASE PDA OSTIUM 90% PLB 90% .  EF NORMAL.      visit states that still has exertional chest pain and Left arm pain. Has SOB,   Prefers to go to Dr. Mcmanus for CABG at [facility]. His wife worked before at Dr. Mcmanus's office     visit  22 S/p 4v CABG (LIMA to LAD, SVG to diagonal, SVG to OM and SVG to rPDA) by Dr. Mcmanus. Continue aspirin, Plavix, and beta-blocker.  2022 admitted for Syncope ad [place]. SBP dropped to 55 mmHG. Cr 1.3 and IVF Rx  EKG NSR  Echo 1. Normal left ventricular systolic function. LVEF 55 - 65%. Mild (Grade I) diastolic   dysfunction (impaired relaxation). Mild asymmetric septal hypertrophy. No LV outflow   tract obstruction.   2. Normal right ventricular size and systolic function.   3. Mild mitral valve regurgitation.   4. Mild aortic valve regurgitation.   5. Mild tricuspid valve regurgitation.   After d/c no recurrent syncope, no chest pain dizziness SOB leg swelling  Living with his son.       visit  Copay of Repatha was high for pt.        Two weeks ago, [place] of [place] out [place]. Waking up and went to the kitchen. Felt dizziness and then passed out. Waking up and BP 60/40 mmHg.  Otherwise, no chest pain dyspnea and leg swelling    Interval history  05-23 admitted for AMS. MRI brain with multifocal infarcts in right frontal lobe, bilateral parietal lobes and bilateral cerebellum possibly embolic type infarcts. Vascular surgery was consulted, no intervention recommended.  Troponin  2.7>3.21>3.27  EKG with nonspecific ST T-wave abnormality, sinus rhythm.    Echo with EF 60%, mild to mod MR, mild AI, Ju[place]o 26 nl size    Now left arm and hand weakness and right arm numbness. Right foot 3 toes numbness. Walking is ok.  DJD some dizziness              Past Medical History:   Diagnosis Date    Anxiety     Cancer     spot on kidney and lung    Diabetes mellitus     Borderline diet controlled    Diverticulitis     GERD (gastroesophageal reflux disease)     Hyperlipidemia     not on any meds currently    Hypertension     Lung mass     Mass of kidney        Past Surgical History:   Procedure Laterality Date    ARTHROPLASTY OF JOINT OF FINGER Left 11/18/2022    Procedure: ARTHROPLASTY, FINGER;  Surgeon: Kane Negrete MD;  Location: [facility];  Service: Orthopedics;  Laterality: Left;  left thumb basal joint arthroplasty  Arthrex  Podiatry extras    CHOLECYSTECTOMY      COLONOSCOPY      COLONOSCOPY N/A 4/7/2017    Procedure: COLONOSCOPY;  Surgeon: Amarilys Bravo MD;  Location: [facility];  Service: Endoscopy;  Laterality: N/A;    DIAGNOSTIC LAPAROSCOPY N/A 5/4/2021    Procedure: LAPAROSCOPY, DIAGNOSTIC;  Surgeon: Shaji Jauregui MD;  Location: [facility] OR;  Service: General;  Laterality: N/A;    ESOPHAGOGASTRODUODENOSCOPY      ESOPHAGOGASTRODUODENOSCOPY N/A 2/13/2020    Procedure: ESOPHAGOGASTRODUODENOSCOPY (EGD);  Surgeon: Shaji Aquino III, MD;  Location: [facility];  Service: Endoscopy;   Laterality: N/A;    ESOPHAGOGASTRODUODENOSCOPY N/A 5/3/2023    Procedure: EGD (ESOPHAGOGASTRODUODENOSCOPY);  Surgeon: Jenelle Jensen MD;  Location: [facility] ENDO;  Service: Endoscopy;  Laterality: N/A;    HERNIA REPAIR Bilateral     groin    LEFT HEART CATHETERIZATION Left 2022    Procedure: CATHETERIZATION, HEART, LEFT;  Surgeon: Romeo Godfrey MD;  Location: [facility] CATH LAB;  Service: Cardiology;  Laterality: Left;    NECK SURGERY      OPEN REDUCTION AND INTERNAL FIXATION (ORIF) OF INJURY OF ANKLE Left 8/10/2022    Procedure: ORIF, ANKLE;  Surgeon: Howard Mata MD;  Location: [facility] OR;  Service: Orthopedics;  Laterality: Left;    PARTIAL NEPHRECTOMY      left    RENAL BIOPSY      ROBOT-ASSISTED REPAIR OF UMBILICAL HERNIA USING DA NEL XI N/A 2021    Procedure: XI ROBOTIC REPAIR, HERNIA, UMBILICAL;  Surgeon: Shaji Jauregui MD;  Location: [facility] OR;  Service: General;  Laterality: N/A;  converted to open    TONSILLECTOMY      TRIGGER FINGER RELEASE Right     long finger       Social History     Tobacco Use    Smoking status: Former     Packs/day: 1.00     Years: 20.00     Pack years: 20.00     Types: Cigarettes     Quit date: 1995     Years since quittin.4    Smokeless tobacco: Never   Substance Use Topics    Alcohol use: Never     Alcohol/week: 0.0 standard drinks    Drug use: No       Family History   Problem Relation Age of Onset    Hypertension Mother     Hypertension Father     Melanoma Father     Cancer Sister          ROS    Objective:   Physical Exam  HENT:      Head: Normocephalic.   Eyes:      Pupils: Pupils are equal, round, and reactive to light.   Neck:      Thyroid: No thyromegaly.      Vascular: Normal carotid pulses. No carotid bruit or JVD.   Cardiovascular:      Rate and Rhythm: Normal rate and regular rhythm. No extrasystoles are present.     Chest Wall: PMI is not displaced.      Pulses: Normal pulses.           Carotid pulses are 2+ on the right side and 2+ on the left  side.     Heart sounds: Normal heart sounds. No murmur heard.    No gallop. No S3 sounds.      Comments: S/p sternotomy    Pulmonary:      Effort: No respiratory distress.      Breath sounds: Normal breath sounds. No stridor.   Abdominal:      General: Bowel sounds are normal.      Palpations: Abdomen is soft.      Tenderness: There is no abdominal tenderness. There is no rebound.   Musculoskeletal:         General: Normal range of motion.   Skin:     Findings: No rash.   Neurological:      Mental Status: He is alert and oriented to person, place, and time.   Psychiatric:         Behavior: Behavior normal.       Lab Results   Component Value Date    CHOL 170 05/17/2023    CHOL 194 08/18/2022    CHOL 212 (H) 02/11/2022     Lab Results   Component Value Date    HDL 46 05/17/2023    HDL 38 (L) 08/18/2022    HDL 49 02/11/2022     Lab Results   Component Value Date    LDLCALC 105.8 05/17/2023    LDLCALC 118.4 08/18/2022    LDLCALC 127.4 02/11/2022     Lab Results   Component Value Date    TRIG 91 05/17/2023    TRIG 188 (H) 08/18/2022    TRIG 178 (H) 02/11/2022     Lab Results   Component Value Date    CHOLHDL 27.1 05/17/2023    CHOLHDL 19.6 (L) 08/18/2022    CHOLHDL 23.1 02/11/2022       Chemistry        Component Value Date/Time     (L) 05/18/2023 0834    K 4.1 05/18/2023 0834     05/18/2023 0834    CO2 18 (L) 05/18/2023 0834    BUN 15 05/18/2023 0834    CREATININE 0.9 05/18/2023 0834    GLU 93 05/18/2023 0834        Component Value Date/Time    CALCIUM 8.8 05/18/2023 0834    ALKPHOS 47 (L) 05/18/2023 0834    AST 28 05/18/2023 0834    ALT 23 05/18/2023 0834    BILITOT 0.6 05/18/2023 0834    ESTGFRAFRICA >60.0 02/11/2022 0858    EGFRNONAA >60.0 02/11/2022 0858          Lab Results   Component Value Date    HGBA1C 5.4 05/17/2023     Lab Results   Component Value Date    TSH 1.335 05/17/2023     Lab Results   Component Value Date    INR 1.0 05/17/2023    INR 1.0 05/16/2023    INR 1.4 03/11/2022     Lab Results    Component Value Date    WBC 8.58 05/18/2023    HGB 13.6 (L) 05/18/2023    HCT 41.9 05/18/2023    MCV 86 05/18/2023     05/18/2023     BMP  Sodium   Date Value Ref Range Status   05/18/2023 132 (L) 136 - 145 mmol/L Final     Potassium   Date Value Ref Range Status   05/18/2023 4.1 3.5 - 5.1 mmol/L Final     Chloride   Date Value Ref Range Status   05/18/2023 103 95 - 110 mmol/L Final     CO2   Date Value Ref Range Status   05/18/2023 18 (L) 23 - 29 mmol/L Final     BUN   Date Value Ref Range Status   05/18/2023 15 8 - 23 mg/dL Final     Creatinine   Date Value Ref Range Status   05/18/2023 0.9 0.5 - 1.4 mg/dL Final     Calcium   Date Value Ref Range Status   05/18/2023 8.8 8.7 - 10.5 mg/dL Final     Anion Gap   Date Value Ref Range Status   05/18/2023 11 8 - 16 mmol/L Final     eGFR if    Date Value Ref Range Status   02/11/2022 >60.0 >60 mL/min/1.73 m^2 Final     eGFR if non    Date Value Ref Range Status   02/11/2022 >60.0 >60 mL/min/1.73 m^2 Final     Comment:     Calculation used to obtain the estimated glomerular filtration  rate (eGFR) is the CKD-EPI equation.        BNP  @LABRCNTIP(BNP,BNPTRIAGEBLO)@  @LABRCNTIP(troponini)@  CrCl cannot be calculated (Patient's most recent lab result is older than the maximum 7 days allowed.).  No results found in the last 24 hours.  No results found in the last 24 hours.  No results found in the last 24 hours.    Assessment:      1. Cerebrovascular accident (CVA) due to embolism of cerebral artery    2. S/P stroke due to cerebrovascular disease    3. Essential hypertension    4. Mixed hyperlipidemia    5. Atherosclerosis of aorta    6. Hx of CABG x4 in     7. NSTEMI (non-ST elevated myocardial infarction)    8. Statin intolerance    9. Elevated troponin    10. Centrilobular emphysema        Plan:   D/c Plavix to avoid high bleeding risk, Continue ASA 81  mg and eliquis  D/c Hydralzien to avoid low BP and dizziness  BARYD  pending  Add repatha again   Phar mPI for elevated troponin  Continue amlodipine statin benazepril metoprolol and imdur  DASH  Rtc in 6m

## 2023-07-07 ENCOUNTER — SPECIALTY PHARMACY (OUTPATIENT)
Dept: PHARMACY | Facility: CLINIC | Age: 68
End: 2023-07-07
Payer: MEDICARE

## 2023-07-07 LAB
OHS CV HOLTER SINUS AVERAGE HR: 82
OHS CV HOLTER SINUS MAX HR: 143
OHS CV HOLTER SINUS MIN HR: 85

## 2023-07-07 NOTE — TELEPHONE ENCOUNTER
Repatha prescription routed to [facility] in [place], [place] per patient's request.

## 2023-07-11 ENCOUNTER — TELEPHONE (OUTPATIENT)
Dept: CARDIOLOGY | Facility: CLINIC | Age: 68
End: 2023-07-11
Payer: MEDICARE

## 2023-07-11 NOTE — TELEPHONE ENCOUNTER
LVM for pt to call back in regards to monitor results.           ----- Message from Yasmani Chairez MD sent at 7/11/2023  3:23 PM CDT -----  2 weeks monitor showed occasional arrhythmia. NO afib detected  Continue current Rx  F/U as scheduled

## 2023-07-26 DIAGNOSIS — E78.2 MIXED HYPERLIPIDEMIA: ICD-10-CM

## 2023-07-26 RX ORDER — EVOLOCUMAB 140 MG/ML
INJECTION, SOLUTION SUBCUTANEOUS
Qty: 2 ML | Refills: 9 | Status: SHIPPED | OUTPATIENT
Start: 2023-07-26 | End: 2023-11-14

## 2023-07-27 ENCOUNTER — PES CALL (OUTPATIENT)
Dept: ADMINISTRATIVE | Facility: CLINIC | Age: 68
End: 2023-07-27
Payer: MEDICARE

## 2023-08-02 ENCOUNTER — TELEPHONE (OUTPATIENT)
Dept: CARDIOLOGY | Facility: CLINIC | Age: 68
End: 2023-08-02
Payer: MEDICARE

## 2023-08-02 NOTE — TELEPHONE ENCOUNTER
Spoke with pharmacy in regards to clarifying medication.           ----- Message from Filomena Solorzano sent at 8/2/2023 10:34 AM CDT -----  Contact: [phone]  Calling requesting clarification for pt medication,rapatha injections treatment . Please call back at [phone] . Thanksdj

## 2023-08-08 ENCOUNTER — TELEPHONE (OUTPATIENT)
Dept: PREADMISSION TESTING | Facility: HOSPITAL | Age: 68
End: 2023-08-08
Payer: MEDICARE

## 2023-08-08 DIAGNOSIS — Z12.11 SCREEN FOR COLON CANCER: Primary | ICD-10-CM

## 2023-08-08 NOTE — TELEPHONE ENCOUNTER
Pt had referral placed for Endo Schedulers in March 2023. He will be needing new orders as the he never completed the procedure. Please advise if pt still needs EGD/Colonoscopy. Please placed new referrals when feasible. thanks

## 2023-08-21 PROBLEM — I63.40 CEREBROVASCULAR ACCIDENT (CVA) DUE TO EMBOLISM OF CEREBRAL ARTERY: Status: RESOLVED | Noted: 2023-05-16 | Resolved: 2023-08-21

## 2023-08-21 PROBLEM — I21.4 NSTEMI (NON-ST ELEVATED MYOCARDIAL INFARCTION): Status: RESOLVED | Noted: 2023-05-17 | Resolved: 2023-08-21

## 2023-08-21 PROBLEM — N17.9 AKI (ACUTE KIDNEY INJURY): Status: RESOLVED | Noted: 2023-05-16 | Resolved: 2023-08-21

## 2023-08-30 ENCOUNTER — TELEPHONE (OUTPATIENT)
Dept: FAMILY MEDICINE | Facility: CLINIC | Age: 68
End: 2023-08-30

## 2023-08-30 ENCOUNTER — LAB VISIT (OUTPATIENT)
Dept: LAB | Facility: HOSPITAL | Age: 68
End: 2023-08-30
Attending: FAMILY MEDICINE
Payer: MEDICARE

## 2023-08-30 ENCOUNTER — OFFICE VISIT (OUTPATIENT)
Dept: FAMILY MEDICINE | Facility: CLINIC | Age: 68
End: 2023-08-30
Payer: MEDICARE

## 2023-08-30 VITALS
DIASTOLIC BLOOD PRESSURE: 70 MMHG | BODY MASS INDEX: 26.96 KG/M2 | WEIGHT: 192.56 LBS | HEIGHT: 71 IN | OXYGEN SATURATION: 98 % | HEART RATE: 88 BPM | SYSTOLIC BLOOD PRESSURE: 110 MMHG

## 2023-08-30 DIAGNOSIS — I10 ESSENTIAL HYPERTENSION: Primary | ICD-10-CM

## 2023-08-30 DIAGNOSIS — F40.9 INSOMNIA DUE TO ANXIETY AND FEAR: ICD-10-CM

## 2023-08-30 DIAGNOSIS — Z12.5 PROSTATE CANCER SCREENING ENCOUNTER, OPTIONS AND RISKS DISCUSSED: ICD-10-CM

## 2023-08-30 DIAGNOSIS — F51.05 INSOMNIA DUE TO ANXIETY AND FEAR: ICD-10-CM

## 2023-08-30 DIAGNOSIS — N40.0 BENIGN PROSTATIC HYPERPLASIA, UNSPECIFIED WHETHER LOWER URINARY TRACT SYMPTOMS PRESENT: ICD-10-CM

## 2023-08-30 DIAGNOSIS — R07.9 CHEST PAIN, UNSPECIFIED TYPE: ICD-10-CM

## 2023-08-30 DIAGNOSIS — M65.342 TRIGGER RING FINGER OF LEFT HAND: ICD-10-CM

## 2023-08-30 DIAGNOSIS — M79.644 PAIN OF FINGER OF RIGHT HAND: ICD-10-CM

## 2023-08-30 LAB — COMPLEXED PSA SERPL-MCNC: 0.58 NG/ML (ref 0–4)

## 2023-08-30 PROCEDURE — 3078F DIAST BP <80 MM HG: CPT | Mod: HCNC,CPTII,S$GLB, | Performed by: FAMILY MEDICINE

## 2023-08-30 PROCEDURE — 3288F PR FALLS RISK ASSESSMENT DOCUMENTED: ICD-10-PCS | Mod: HCNC,CPTII,S$GLB, | Performed by: FAMILY MEDICINE

## 2023-08-30 PROCEDURE — 99214 PR OFFICE/OUTPT VISIT, EST, LEVL IV, 30-39 MIN: ICD-10-PCS | Mod: HCNC,S$GLB,, | Performed by: FAMILY MEDICINE

## 2023-08-30 PROCEDURE — 1159F PR MEDICATION LIST DOCUMENTED IN MEDICAL RECORD: ICD-10-PCS | Mod: HCNC,CPTII,S$GLB, | Performed by: FAMILY MEDICINE

## 2023-08-30 PROCEDURE — 3074F PR MOST RECENT SYSTOLIC BLOOD PRESSURE < 130 MM HG: ICD-10-PCS | Mod: HCNC,CPTII,S$GLB, | Performed by: FAMILY MEDICINE

## 2023-08-30 PROCEDURE — 3288F FALL RISK ASSESSMENT DOCD: CPT | Mod: HCNC,CPTII,S$GLB, | Performed by: FAMILY MEDICINE

## 2023-08-30 PROCEDURE — 3044F PR MOST RECENT HEMOGLOBIN A1C LEVEL <7.0%: ICD-10-PCS | Mod: HCNC,CPTII,S$GLB, | Performed by: FAMILY MEDICINE

## 2023-08-30 PROCEDURE — 3008F PR BODY MASS INDEX (BMI) DOCUMENTED: ICD-10-PCS | Mod: HCNC,CPTII,S$GLB, | Performed by: FAMILY MEDICINE

## 2023-08-30 PROCEDURE — 3074F SYST BP LT 130 MM HG: CPT | Mod: HCNC,CPTII,S$GLB, | Performed by: FAMILY MEDICINE

## 2023-08-30 PROCEDURE — 3044F HG A1C LEVEL LT 7.0%: CPT | Mod: HCNC,CPTII,S$GLB, | Performed by: FAMILY MEDICINE

## 2023-08-30 PROCEDURE — 36415 COLL VENOUS BLD VENIPUNCTURE: CPT | Mod: HCNC,PO | Performed by: FAMILY MEDICINE

## 2023-08-30 PROCEDURE — 3078F PR MOST RECENT DIASTOLIC BLOOD PRESSURE < 80 MM HG: ICD-10-PCS | Mod: HCNC,CPTII,S$GLB, | Performed by: FAMILY MEDICINE

## 2023-08-30 PROCEDURE — 1101F PT FALLS ASSESS-DOCD LE1/YR: CPT | Mod: HCNC,CPTII,S$GLB, | Performed by: FAMILY MEDICINE

## 2023-08-30 PROCEDURE — 4010F ACE/ARB THERAPY RXD/TAKEN: CPT | Mod: HCNC,CPTII,S$GLB, | Performed by: FAMILY MEDICINE

## 2023-08-30 PROCEDURE — 1126F PR PAIN SEVERITY QUANTIFIED, NO PAIN PRESENT: ICD-10-PCS | Mod: HCNC,CPTII,S$GLB, | Performed by: FAMILY MEDICINE

## 2023-08-30 PROCEDURE — 99214 OFFICE O/P EST MOD 30 MIN: CPT | Mod: HCNC,S$GLB,, | Performed by: FAMILY MEDICINE

## 2023-08-30 PROCEDURE — 1101F PR PT FALLS ASSESS DOC 0-1 FALLS W/OUT INJ PAST YR: ICD-10-PCS | Mod: HCNC,CPTII,S$GLB, | Performed by: FAMILY MEDICINE

## 2023-08-30 PROCEDURE — 99999 PR PBB SHADOW E&M-EST. PATIENT-LVL IV: ICD-10-PCS | Mod: PBBFAC,HCNC,, | Performed by: FAMILY MEDICINE

## 2023-08-30 PROCEDURE — 4010F PR ACE/ARB THEARPY RXD/TAKEN: ICD-10-PCS | Mod: HCNC,CPTII,S$GLB, | Performed by: FAMILY MEDICINE

## 2023-08-30 PROCEDURE — 1126F AMNT PAIN NOTED NONE PRSNT: CPT | Mod: HCNC,CPTII,S$GLB, | Performed by: FAMILY MEDICINE

## 2023-08-30 PROCEDURE — 84153 ASSAY OF PSA TOTAL: CPT | Mod: HCNC | Performed by: FAMILY MEDICINE

## 2023-08-30 PROCEDURE — 99999 PR PBB SHADOW E&M-EST. PATIENT-LVL IV: CPT | Mod: PBBFAC,HCNC,, | Performed by: FAMILY MEDICINE

## 2023-08-30 PROCEDURE — 3008F BODY MASS INDEX DOCD: CPT | Mod: HCNC,CPTII,S$GLB, | Performed by: FAMILY MEDICINE

## 2023-08-30 PROCEDURE — 1159F MED LIST DOCD IN RCRD: CPT | Mod: HCNC,CPTII,S$GLB, | Performed by: FAMILY MEDICINE

## 2023-08-30 RX ORDER — ZOLPIDEM TARTRATE 10 MG/1
10 TABLET ORAL NIGHTLY
Qty: 30 TABLET | Refills: 5 | Status: SHIPPED | OUTPATIENT
Start: 2023-09-25 | End: 2023-10-16 | Stop reason: SDUPTHER

## 2023-08-30 RX ORDER — HYDRALAZINE HYDROCHLORIDE 25 MG/1
25 TABLET, FILM COATED ORAL 3 TIMES DAILY
COMMUNITY
End: 2023-08-30 | Stop reason: SDUPTHER

## 2023-08-30 RX ORDER — FINASTERIDE 5 MG/1
5 TABLET, FILM COATED ORAL NIGHTLY
Qty: 90 TABLET | Refills: 3 | Status: SHIPPED | OUTPATIENT
Start: 2023-08-30 | End: 2024-01-20

## 2023-08-30 RX ORDER — HYDRALAZINE HYDROCHLORIDE 25 MG/1
25 TABLET, FILM COATED ORAL 3 TIMES DAILY
Qty: 270 TABLET | Refills: 3 | Status: SHIPPED | OUTPATIENT
Start: 2023-08-30

## 2023-08-30 RX ORDER — AMLODIPINE BESYLATE 5 MG/1
5 TABLET ORAL DAILY
Qty: 90 TABLET | Refills: 3 | Status: SHIPPED | OUTPATIENT
Start: 2023-08-30 | End: 2024-08-29

## 2023-08-30 NOTE — PROGRESS NOTES
Chief Complaint:    Chief Complaint   Patient presents with    trigger finger    Hand Pain     C/o left and right trigger finger. Med refill         History of Present Illness:  Patient with HTN presents today for hand pain,    Right pointer fingertip pain. Says if touched it feels like pins and needles, denies any recent trauma.  He is also having trigger finger to his Left 2nd finger.  Chronic mild to moderate chest pain, says it is manageable. Has stress test scheduled for Nov. 2023. Says most recent chest pain started lasted night while sleeping and woke him up. Pain is exerted with walking and makes him feel nauseous. Is currently on Imdur for this pain.    Asking about starting a depression medication. Says he lost his wife 4 years ago unexpectedly and is still struggling with grief from it. He has 2 depression medications in his chart, he is unsure if he is taking either one.  Insomnia stable on Ambien.  ROS:  Review of Systems   Constitutional:  Negative for appetite change, chills and fever.   HENT:  Negative for congestion, ear pain, postnasal drip, rhinorrhea, sinus pressure and sinus pain.    Eyes:  Negative for pain.   Respiratory:  Negative for cough, chest tightness and shortness of breath.    Cardiovascular:  Positive for chest pain. Negative for palpitations.   Gastrointestinal:  Negative for abdominal pain, blood in stool, constipation, diarrhea and nausea.   Genitourinary:  Negative for difficulty urinating, dysuria, flank pain and hematuria.   Musculoskeletal:  Positive for arthralgias and myalgias. Negative for neck pain.   Skin:  Negative for pallor and wound.   Neurological:  Negative for dizziness, tremors, speech difficulty, light-headedness and headaches.   Psychiatric/Behavioral:  Negative for behavioral problems, dysphoric mood and sleep disturbance. The patient is not nervous/anxious.    All other systems reviewed and are negative.      Past Medical History:   Diagnosis Date    Anxiety  "    Cancer     spot on kidney and lung    Diabetes mellitus     Borderline diet controlled    Diverticulitis     GERD (gastroesophageal reflux disease)     Hyperlipidemia     not on any meds currently    Hypertension     Lung mass     Mass of kidney        Social History:  Social History     Socioeconomic History    Marital status:    Tobacco Use    Smoking status: Former     Current packs/day: 0.00     Average packs/day: 1 pack/day for 20.0 years (20.0 ttl pk-yrs)     Types: Cigarettes     Start date: 1975     Quit date: 1995     Years since quittin.5    Smokeless tobacco: Never   Substance and Sexual Activity    Alcohol use: Never     Alcohol/week: 0.0 standard drinks of alcohol    Drug use: No    Sexual activity: Yes     Partners: Female     Birth control/protection: None     Social Determinants of Health     Stress: No Stress Concern Present (2019)    [employer] - Occupational Stress Questionnaire     Feeling of Stress : Not at all       Family History:   family history includes Cancer in his sister; Hypertension in his father and mother; Melanoma in his father.    Health Maintenance   Topic Date Due    Shingles Vaccine (1 of 2) Never done    Lipid Panel  2024    Colorectal Cancer Screening  2027    TETANUS VACCINE  2028    Hepatitis C Screening  Completed    Abdominal Aortic Aneurysm Screening  Completed       Physical Exam:    Vital Signs  Pulse: 88  SpO2: 98 %  BP: 110/70  BP Location: Left arm  Patient Position: Sitting  Pain Score: 0-No pain  Height and Weight  Height: 5' 11" (180.3 cm)  Weight: 87.4 kg (192 lb 9.2 oz)  BSA (Calculated - sq m): 2.09 sq meters  BMI (Calculated): 26.9  Weight in (lb) to have BMI = 25: 178.9]    Body mass index is 26.86 kg/m².    Physical Exam  Vitals and nursing note reviewed.   Constitutional:       Appearance: Normal appearance.      Comments: Fidgety   HENT:      Head: Normocephalic and atraumatic.    "   Right Ear: Tympanic membrane normal.      Left Ear: Tympanic membrane normal.   Eyes:      Extraocular Movements: Extraocular movements intact.      Pupils: Pupils are equal, round, and reactive to light.   Cardiovascular:      Rate and Rhythm: Normal rate and regular rhythm.      Pulses: Normal pulses.      Heart sounds: Normal heart sounds. No murmur heard.     No gallop.   Pulmonary:      Effort: Pulmonary effort is normal. No respiratory distress.      Breath sounds: Normal breath sounds. No wheezing, rhonchi or rales.   Abdominal:      General: There is no distension.      Palpations: Abdomen is soft.      Tenderness: There is no abdominal tenderness.   Musculoskeletal:         General: No swelling, deformity or signs of injury. Normal range of motion.      Right hand: Tenderness present.        Hands:       Cervical back: Normal range of motion.      Comments: R hand has some skin missing    L hand 2nd finger MCP joint locks   Skin:     General: Skin is warm and dry.      Capillary Refill: Capillary refill takes less than 2 seconds.      Coloration: Skin is not jaundiced or pale.   Neurological:      General: No focal deficit present.      Mental Status: He is alert and oriented to person, place, and time.   Psychiatric:         Mood and Affect: Mood normal.         Behavior: Behavior normal.           Assessment:      ICD-10-CM ICD-9-CM   1. Essential hypertension  I10 401.9   2. Benign prostatic hyperplasia, unspecified whether lower urinary tract symptoms present  N40.0 600.00   3. Insomnia due to anxiety and fear  F51.05 300.20    F40.9 327.02   4. Chest pain, unspecified type  R07.9 786.50   5. Pain of finger of right hand  M79.644 729.5   6. Trigger ring finger of left hand  M65.342 727.03   7. Prostate cancer screening encounter, options and risks discussed  Z12.5 V76.44       Plan:     Refer to orthopedics for possible injections due to trigger finger of L hand.  Contacted Cardiology to see if  anymore needs to be done about more recent Chest pains.  According to Cardiology they will try to pre point stress test but he has been told to go to the ER should he get this chest pain again.  Call back with current medications taking to determine depression medication.  Insomnia stable.  See labs below. Check PSA.  Follow-up 6 months.     Orders Placed This Encounter   Procedures    PSA, Screening    Ambulatory referral/consult to Orthopedics     Current Outpatient Medications   Medication Sig Dispense Refill    apixaban (ELIQUIS) 5 mg Tab Take 1 tablet (5 mg total) by mouth 2 (two) times daily. 180 tablet 3    aspirin (ECOTRIN) 81 MG EC tablet Take 1 tablet (81 mg total) by mouth once daily.      atorvastatin (LIPITOR) 40 MG tablet Take 1 tablet (40 mg total) by mouth once daily. 90 tablet 3    benazepriL (LOTENSIN) 20 MG tablet TAKE 2 TABLETS BY MOUTH NIGHTLY. 180 tablet 2    citalopram (CELEXA) 40 MG tablet TAKE 1 TABLET BY MOUTH IN THE EVENING. 90 tablet 3    clonazePAM (KLONOPIN) 1 MG tablet Take 1 tablet (1 mg total) by mouth nightly as needed for Anxiety. 30 tablet 1    cyclobenzaprine (FLEXERIL) 10 MG tablet TAKE 1 TABLET BY MOUTH THREE TIMES DAILY AS NEEDED. 90 tablet 0    evolocumab (REPATHA SURECLICK) 140 mg/mL PnIj INJECT 1 ML SUB-Q EVERY 14 DAYS. 2 mL 9    gabapentin (NEURONTIN) 600 MG tablet Take 2 tablets (1,200 mg total) by mouth nightly. 180 tablet 3    isosorbide mononitrate (IMDUR) 60 MG 24 hr tablet Take 1 tablet (60 mg total) by mouth once daily. 90 tablet 2    metoprolol tartrate (LOPRESSOR) 25 MG tablet Take 1 tablet (25 mg total) by mouth 2 (two) times daily. 60 tablet 11    omeprazole (PRILOSEC) 40 MG capsule Take 1 capsule (40 mg total) by mouth every morning. 90 capsule 3    tamsulosin (FLOMAX) 0.4 mg Cap TAKE 1 CAPSULE BY MOUTH EVERY EVENING. 90 capsule 0    tiotropium (SPIRIVA WITH HANDIHALER) 18 mcg inhalation capsule INHALE THE CONTENTS OF ONE CAPSULE DAILY. 90 capsule 1     traZODone (DESYREL) 100 MG tablet TAKE 1 TABLET BY MOUTH IN THE EVENING. 90 tablet 3    venlafaxine (EFFEXOR) 75 MG tablet Take 1 tablet (75 mg total) by mouth 2 (two) times daily. 180 tablet 3    amLODIPine (NORVASC) 5 MG tablet Take 1 tablet (5 mg total) by mouth once daily. 90 tablet 3    finasteride (PROSCAR) 5 mg tablet Take 1 tablet (5 mg total) by mouth every evening. 90 tablet 3    fluticasone propion-salmeterol 115-21 mcg/dose (ADVAIR HFA) 115-21 mcg/actuation HFAA inhaler Inhale 2 puffs into the lungs 2 (two) times daily. 12 g 12    hydrALAZINE (APRESOLINE) 25 MG tablet Take 1 tablet (25 mg total) by mouth 3 (three) times daily. 270 tablet 3    [START ON 9/25/2023] zolpidem (AMBIEN) 10 mg Tab Take 1 tablet (10 mg total) by mouth every evening. 30 tablet 5     No current facility-administered medications for this visit.     Facility-Administered Medications Ordered in Other Visits   Medication Dose Route Frequency Provider Last Rate Last Admin    chlorhexidine 0.12 % solution 10 mL  10 mL Mouth/Throat On Call Procedure Rupinder Narayan PA-C   10 mL at 11/18/22 0828       Medications Discontinued During This Encounter   Medication Reason    finasteride (PROSCAR) 5 mg tablet Reorder    zolpidem (AMBIEN) 10 mg Tab Reorder    amLODIPine (NORVASC) 5 MG tablet Reorder    hydrALAZINE (APRESOLINE) 25 MG tablet Reorder       Follow up in about 6 months (around 2/29/2024).      Yumi Roberson MD  Scribe Attestation:   I, Chun Yuan, am scribing for, and in the presence of, Dr.Arif Roberson I performed the above scribed service and the documentation accurately describes the services I performed. I attest to the accuracy of the note.    I, Dr. Yumi Roberson, reviewed documentation as scribed above. I performed the services described in this documentation.  I agree that the record reflects my personal performance and is accurate and complete. Yumi Roberson MD.  08/30/2023

## 2023-08-30 NOTE — TELEPHONE ENCOUNTER
No care due was identified.  [facility] Embedded Care Due Messages. Reference number: [id number].   8/30/2023 5:01:30 PM CDT

## 2023-08-30 NOTE — TELEPHONE ENCOUNTER
Refill Routing Note   Medication(s) are not appropriate for processing by [facility] for the following reason(s):      Drug-disease interaction: tamsulosin and Orthostatic hypotension    [facility] action(s):  Defer Care Due:  None identified            Pharmacist review requested: Yes     Appointments  past 12m or future 3m with PCP    Date Provider   Last Visit   8/30/2023 Yumi Roberson MD   Next Visit   Visit date not found Yumi Roberson MD   ED visits in past 90 days: 0        Note composed:6:55 PM 08/30/2023

## 2023-08-31 DIAGNOSIS — F41.8 DEPRESSION WITH ANXIETY: ICD-10-CM

## 2023-08-31 DIAGNOSIS — F32.A DEPRESSION, UNSPECIFIED DEPRESSION TYPE: ICD-10-CM

## 2023-08-31 RX ORDER — CITALOPRAM 20 MG/1
20 TABLET, FILM COATED ORAL DAILY
Qty: 30 TABLET | Refills: 11 | Status: SHIPPED | OUTPATIENT
Start: 2023-08-31 | End: 2024-01-16 | Stop reason: SDUPTHER

## 2023-08-31 RX ORDER — TAMSULOSIN HYDROCHLORIDE 0.4 MG/1
CAPSULE ORAL
Qty: 90 CAPSULE | Refills: 3 | Status: SHIPPED | OUTPATIENT
Start: 2023-08-31

## 2023-08-31 RX ORDER — CITALOPRAM 40 MG/1
20 TABLET, FILM COATED ORAL NIGHTLY
Qty: 90 TABLET | Refills: 3 | Status: CANCELLED | OUTPATIENT
Start: 2023-08-31

## 2023-08-31 NOTE — TELEPHONE ENCOUNTER
No care due was identified.  [facility] Embedded Care Due Messages. Reference number: [id number].   8/31/2023 11:00:55 AM CDT

## 2023-08-31 NOTE — TELEPHONE ENCOUNTER
He called back yesterday and said that he has not been on any anti-depressant medications since Sept 2022

## 2023-09-05 ENCOUNTER — OFFICE VISIT (OUTPATIENT)
Dept: INTERNAL MEDICINE | Facility: CLINIC | Age: 68
End: 2023-09-05
Payer: MEDICARE

## 2023-09-05 VITALS
HEART RATE: 71 BPM | SYSTOLIC BLOOD PRESSURE: 134 MMHG | WEIGHT: 193.56 LBS | DIASTOLIC BLOOD PRESSURE: 82 MMHG | HEIGHT: 69 IN | BODY MASS INDEX: 28.67 KG/M2 | OXYGEN SATURATION: 96 %

## 2023-09-05 DIAGNOSIS — K22.70 BARRETT'S ESOPHAGUS WITHOUT DYSPLASIA: ICD-10-CM

## 2023-09-05 DIAGNOSIS — Z59.9 FINANCIAL DIFFICULTIES: ICD-10-CM

## 2023-09-05 DIAGNOSIS — I10 HYPERTENSION, UNSPECIFIED TYPE: ICD-10-CM

## 2023-09-05 DIAGNOSIS — F32.1 CURRENT MODERATE EPISODE OF MAJOR DEPRESSIVE DISORDER, UNSPECIFIED WHETHER RECURRENT: ICD-10-CM

## 2023-09-05 DIAGNOSIS — Z00.00 ENCOUNTER FOR PREVENTIVE HEALTH EXAMINATION: Primary | ICD-10-CM

## 2023-09-05 DIAGNOSIS — E78.5 HYPERLIPIDEMIA, UNSPECIFIED HYPERLIPIDEMIA TYPE: ICD-10-CM

## 2023-09-05 DIAGNOSIS — J43.9 PULMONARY EMPHYSEMA, UNSPECIFIED EMPHYSEMA TYPE: ICD-10-CM

## 2023-09-05 DIAGNOSIS — I70.0 ATHEROSCLEROSIS OF AORTA: ICD-10-CM

## 2023-09-05 PROCEDURE — 1160F RVW MEDS BY RX/DR IN RCRD: CPT | Mod: CPTII,S$GLB,, | Performed by: NURSE PRACTITIONER

## 2023-09-05 PROCEDURE — 99999 PR PBB SHADOW E&M-EST. PATIENT-LVL V: ICD-10-PCS | Mod: PBBFAC,,, | Performed by: NURSE PRACTITIONER

## 2023-09-05 PROCEDURE — 3288F FALL RISK ASSESSMENT DOCD: CPT | Mod: CPTII,S$GLB,, | Performed by: NURSE PRACTITIONER

## 2023-09-05 PROCEDURE — 3079F PR MOST RECENT DIASTOLIC BLOOD PRESSURE 80-89 MM HG: ICD-10-PCS | Mod: CPTII,S$GLB,, | Performed by: NURSE PRACTITIONER

## 2023-09-05 PROCEDURE — 1160F PR REVIEW ALL MEDS BY PRESCRIBER/CLIN PHARMACIST DOCUMENTED: ICD-10-PCS | Mod: CPTII,S$GLB,, | Performed by: NURSE PRACTITIONER

## 2023-09-05 PROCEDURE — 1159F MED LIST DOCD IN RCRD: CPT | Mod: CPTII,S$GLB,, | Performed by: NURSE PRACTITIONER

## 2023-09-05 PROCEDURE — 3044F PR MOST RECENT HEMOGLOBIN A1C LEVEL <7.0%: ICD-10-PCS | Mod: CPTII,S$GLB,, | Performed by: NURSE PRACTITIONER

## 2023-09-05 PROCEDURE — 3008F PR BODY MASS INDEX (BMI) DOCUMENTED: ICD-10-PCS | Mod: CPTII,S$GLB,, | Performed by: NURSE PRACTITIONER

## 2023-09-05 PROCEDURE — 3075F PR MOST RECENT SYSTOLIC BLOOD PRESS GE 130-139MM HG: ICD-10-PCS | Mod: CPTII,S$GLB,, | Performed by: NURSE PRACTITIONER

## 2023-09-05 PROCEDURE — 4010F ACE/ARB THERAPY RXD/TAKEN: CPT | Mod: CPTII,S$GLB,, | Performed by: NURSE PRACTITIONER

## 2023-09-05 PROCEDURE — 99999 PR PBB SHADOW E&M-EST. PATIENT-LVL V: CPT | Mod: PBBFAC,,, | Performed by: NURSE PRACTITIONER

## 2023-09-05 PROCEDURE — 1170F PR FUNCTIONAL STATUS ASSESSED: ICD-10-PCS | Mod: CPTII,S$GLB,, | Performed by: NURSE PRACTITIONER

## 2023-09-05 PROCEDURE — G0439 PR MEDICARE ANNUAL WELLNESS SUBSEQUENT VISIT: ICD-10-PCS | Mod: S$GLB,,, | Performed by: NURSE PRACTITIONER

## 2023-09-05 PROCEDURE — 3075F SYST BP GE 130 - 139MM HG: CPT | Mod: CPTII,S$GLB,, | Performed by: NURSE PRACTITIONER

## 2023-09-05 PROCEDURE — 1126F AMNT PAIN NOTED NONE PRSNT: CPT | Mod: CPTII,S$GLB,, | Performed by: NURSE PRACTITIONER

## 2023-09-05 PROCEDURE — 3044F HG A1C LEVEL LT 7.0%: CPT | Mod: CPTII,S$GLB,, | Performed by: NURSE PRACTITIONER

## 2023-09-05 PROCEDURE — 1100F PTFALLS ASSESS-DOCD GE2>/YR: CPT | Mod: CPTII,S$GLB,, | Performed by: NURSE PRACTITIONER

## 2023-09-05 PROCEDURE — 1100F PR PT FALLS ASSESS DOC 2+ FALLS/FALL W/INJURY/YR: ICD-10-PCS | Mod: CPTII,S$GLB,, | Performed by: NURSE PRACTITIONER

## 2023-09-05 PROCEDURE — 4010F PR ACE/ARB THEARPY RXD/TAKEN: ICD-10-PCS | Mod: CPTII,S$GLB,, | Performed by: NURSE PRACTITIONER

## 2023-09-05 PROCEDURE — 3079F DIAST BP 80-89 MM HG: CPT | Mod: CPTII,S$GLB,, | Performed by: NURSE PRACTITIONER

## 2023-09-05 PROCEDURE — 3008F BODY MASS INDEX DOCD: CPT | Mod: CPTII,S$GLB,, | Performed by: NURSE PRACTITIONER

## 2023-09-05 PROCEDURE — G0439 PPPS, SUBSEQ VISIT: HCPCS | Mod: S$GLB,,, | Performed by: NURSE PRACTITIONER

## 2023-09-05 PROCEDURE — 1170F FXNL STATUS ASSESSED: CPT | Mod: CPTII,S$GLB,, | Performed by: NURSE PRACTITIONER

## 2023-09-05 PROCEDURE — 1159F PR MEDICATION LIST DOCUMENTED IN MEDICAL RECORD: ICD-10-PCS | Mod: CPTII,S$GLB,, | Performed by: NURSE PRACTITIONER

## 2023-09-05 PROCEDURE — 3288F PR FALLS RISK ASSESSMENT DOCUMENTED: ICD-10-PCS | Mod: CPTII,S$GLB,, | Performed by: NURSE PRACTITIONER

## 2023-09-05 PROCEDURE — 1126F PR PAIN SEVERITY QUANTIFIED, NO PAIN PRESENT: ICD-10-PCS | Mod: CPTII,S$GLB,, | Performed by: NURSE PRACTITIONER

## 2023-09-05 SDOH — SOCIAL DETERMINANTS OF HEALTH (SDOH): PROBLEM RELATED TO HOUSING AND ECONOMIC CIRCUMSTANCES, UNSPECIFIED: Z59.9

## 2023-09-05 NOTE — PATIENT INSTRUCTIONS
Counseling and Referral of Other Preventative  (Italic type indicates deductible and co-insurance are waived)    Patient Name: Mil Sood  Today's Date: 9/5/2023    Health Maintenance       Date Due Completion Date    Shingles Vaccine (1 of 2) Never done ---    Influenza Vaccine (1) 09/01/2023 12/8/2021    COVID-19 Vaccine (3 - Pfizer series) 09/05/2024 (Originally 10/22/2021) 8/27/2021    Hemoglobin A1c (Prediabetes) 05/17/2024 5/17/2023    Lipid Panel 05/17/2024 5/17/2023    Colorectal Cancer Screening 04/07/2027 2/24/2022    TETANUS VACCINE 04/26/2028 4/26/2018        Orders Placed This Encounter   Procedures    Ambulatory referral/consult to Outpatient Case Management       The following information is provided to all patients.  This information is to help you find resources for any of the problems found today that may be affecting your health:                Living healthy guide: www.[facility].[place].[facility]      Understanding Diabetes: www.diabetes.org      Eating healthy: www.cdc.gov/healthyweight      [facility] home safety checklist: www.cdc.gov/steadi/patient.html      Agency on Aging: www.goea.[place].[facility]      Alcoholics anonymous (AA): www.aa.org      Physical Activity: www.bhumika.nih.gov/va2sbpq      Tobacco use: www.quitwithusla.org

## 2023-09-05 NOTE — PROGRESS NOTES
"Mil Sood presented for a  Medicare AWV and comprehensive Health Risk Assessment today. The following components were reviewed and updated:    Medical history  Family History  Social history  Allergies and Current Medications  Health Risk Assessment  Health Maintenance  Care Team         ** See Completed Assessments for Annual Wellness Visit within the encounter summary.**         The following assessments were completed:  Living Situation  CAGE  Depression Screening  Timed Get Up and Go  Whisper Test-na  Cognitive Function Screening  Nutrition Screening  ADL Screening  PAQ Screening        Vitals:    09/05/23 0845   BP: 134/82   Pulse: 71   SpO2: 96%   Weight: 87.8 kg (193 lb 9 oz)   Height: 5' 9.29" (1.76 m)     Body mass index is 28.34 kg/m².  Physical Exam  Vitals and nursing note reviewed.   Constitutional:       Appearance: He is well-developed.   HENT:      Head: Normocephalic.   Cardiovascular:      Rate and Rhythm: Normal rate and regular rhythm.      Heart sounds: Normal heart sounds.   Pulmonary:      Effort: Pulmonary effort is normal. No respiratory distress.      Breath sounds: Normal breath sounds.   Abdominal:      Palpations: Abdomen is soft. There is no mass.      Tenderness: There is no abdominal tenderness.   Musculoskeletal:         General: Normal range of motion.   Skin:     General: Skin is warm and dry.   Neurological:      Mental Status: He is alert and oriented to person, place, and time.      Motor: No abnormal muscle tone.   Psychiatric:         Speech: Speech normal.         Behavior: Behavior normal.               Diagnoses and health risks identified today and associated recommendations/orders:    1. Encounter for preventive health examination     Review for Opioid Screening: Pt does not have Rx for Opioids      Review for Substance Use Disorders: Patient does not use substance  per chart     Reports he is at his respiratory baseline  Declines COVID booster  Discussed receiving " flu vaccine at pharmacy.     Discussed receiving Shingrix at pharmacy.    Will discuss PSA with PCP  Reports chronic n/t hands. Advised to follow up with PCP for further evaluation and recommendations. Patient expressed understanding.    Encouraged healthy diet and exercise as tolerated   Normal timed get up and go test. Reports 1 falls in the last 12 months.    Fall precautions reviewed with patient. Advised to follow up with PCP for further recommendations. Patient expressed understanding.       2. Pulmonary emphysema, unspecified emphysema type  Ct 12/21  Stable. Continue current treatment plan as previously prescribed with your  pcp    3. Current moderate episode of major depressive disorder, unspecified whether recurrent  PHQ 9-11  Reports stress  Denies SI  Discussed counseling. Psychiatry department contact information given to patient.   Reports medication was started per PCP last week  Continue current treatment plan as previously prescribed with your  pcp  4. Atherosclerosis of aorta  Ct 12/21  Stable. Continue current treatment plan as previously prescribed with your  cardiologist     5. Hypertension, unspecified type  Continue current treatment plan as previously prescribed with your  pcp and cardiologist.     6. Hyperlipidemia, unspecified hyperlipidemia type  Continue current treatment plan as previously prescribed with your  pcp and cardiologist.     7. Culp's esophagus without dysplasia  Continue current treatment plan as previously prescribed with your  GI provider.     8. Financial difficulties  [facility] financial resources information page given to patient.    - Ambulatory referral/consult to Outpatient Case Management      Provided Mil with a 5-10 year written screening schedule and personal prevention plan. Recommendations were developed using the USPSTF age appropriate recommendations. Education, counseling, and referrals were provided as needed. After Visit Summary printed and given to  patient which includes a list of additional screenings\tests needed.    Follow up in about 1 year (around 9/5/2024) for awv.    Hannah Perry NP  I offered to discuss advanced care planning, including how to pick a person who would make decisions for you if you were unable to make them for yourself, called a health care power of , and what kind of decisions you might make such as use of life sustaining treatments such as ventilators and tube feeding when faced with a life limiting illness recorded on a living will that they will need to know. (How you want to be cared for as you near the end of your natural life)     X Patient is interested in learning more about how to make advanced directives.  I provided them paperwork and offered to discuss this with them.

## 2023-09-13 NOTE — MR AVS SNAPSHOT
[facility]  [address] [facility] [address]  Phone: [phone]  Fax: [phone]                  Mil LEPE May   2017 10:00 AM   Office Visit    Description:  Male : 1955   Provider:  Ari Pelletier MD   Department:  [facility]           Reason for Visit     Consult     Hernia           Diagnoses this Visit        Comments    Bilateral inguinal hernia without obstruction or gangrene, recurrence not specified    -  Primary     Bilateral inguinal hernia without obstruction or gangrene                To Do List           Future Appointments        Provider Department Dept Phone    3/3/2017 9:00 AM [facility] CT1 LIMIT 500 LBS [facility] - [phone]    3/3/2017 9:30 AM [facility] CT1 LIMIT 500 LBS [facility] -  [phone]    3/3/2017 11:30 AM [facility] XRFL1 [facility] - [phone]    3/3/2017 1:00 PM EKG, [facility] CARDIO [facility] Special Cardiology [phone]    3/3/2017 1:30 PM LABORATORY, [facility] [phone]      Your Future Surgeries/Procedures     2017   Surgery with Amarilys Bravo MD   [facility] -  ([facility])    [address] [address] [phone]              Goals (5 Years of Data)     None      [facility]     [facility] Nurse Care Line -  Assistance  Registered nurses in the [facility] provide clinical advisement, health education, appointment booking, and other advisory services.  Call for this free service at [phone].             Medications           Message regarding Medications     Verify the changes and/or additions to your medication regime listed below are the same as discussed with your clinician today.  If any of these changes or additions are incorrect, please notify your healthcare provider.             Verify that the below list of medications is an  accurate representation of the medications you are currently taking.  If none reported, the list may be blank. If incorrect, please contact your healthcare provider. Carry this list with you in case of emergency.           Current Medications     amlodipine (NORVASC) 10 MG tablet Take 1 tablet (10 mg total) by mouth once daily.    aspirin 81 mg Tab Take 1 tablet by mouth Daily.    benazepril (LOTENSIN) 20 MG tablet Take 2 tablets (40 mg total) by mouth once daily.    clonazepam (KLONOPIN) 2 MG Tab 2 mg. Take 2 mg by mouth nightly as needed.    gabapentin (NEURONTIN) 300 MG capsule Take 2 capsules (600 mg total) by mouth 3 (three) times daily.    hydrALAZINE (APRESOLINE) 25 MG tablet Take 1 tablet (25 mg total) by mouth 3 (three) times daily.    hydrochlorothiazide (MICROZIDE) 12.5 mg capsule Take 1 capsule (12.5 mg total) by mouth once daily.    hydrOXYzine (VISTARIL) 100 MG capsule Take 100 mg by mouth once daily.    lidocaine-prilocaine (EMLA) cream as needed.    methocarbamol (ROBAXIN) 750 MG Tab Take 1 tablet (750 mg total) by mouth 3 (three) times daily.    metoprolol tartrate (LOPRESSOR) 50 MG tablet Take 1 tablet (50 mg total) by mouth 2 (two) times daily.    pantoprazole (PROTONIX) 40 MG tablet Take 1 tablet (40 mg total) by mouth 2 (two) times daily before meals.    tizanidine (ZANAFLEX) 2 MG tablet Take 1 tablet (2 mg total) by mouth daily as needed.    zolpidem (AMBIEN) 10 mg Tab TAKE 1 TABLET BY MOUTH NIGHTLY AS NEEDED.    sodium,potassium,mag sulfates (SUPREP BOWEL PREP KIT) 17.5-3.13-1.6 gram SolR Take 1 kit by mouth once. For colonoscopy preparation           Clinical Reference Information           Your Vitals Were     BP                   147/90           Blood Pressure          Most Recent Value    BP  (!)  147/90      Allergies as of 2/24/2017     No Known Allergies      Immunizations Administered on Date of Encounter - 2/24/2017     None      Orders Placed During Today's Visit      Normal Orders  This Visit    Case Request Operating Room: REPAIR-HERNIA-INGUINAL-BILATERAL     Future Labs/Procedures Expected by Expires    CBC auto differential  2/24/2017 4/25/2018    Comprehensive metabolic panel  2/24/2017 4/25/2018    EKG 12-lead  As directed 2/24/2018      Language Assistance Services     ATTENTION: Language assistance services are available, free of charge. Please call [phone].      ATENCIÓN: Si habla español, tiene a meza disposición servicios gratuitos de asistencia lingüística. Llame al [phone].     CHÚ Ý: N?u b?n nói Ti?ng Vi?t, có các d?ch v? h? tr? ngôn ng? mi?n phí dành cho b?n. G?i s? [phone].         [facility] complies with applicable Federal civil rights laws and does not discriminate on the basis of race, color, national origin, age, disability, or sex.         Admission

## 2023-09-14 ENCOUNTER — HOSPITAL ENCOUNTER (OUTPATIENT)
Dept: RADIOLOGY | Facility: HOSPITAL | Age: 68
Discharge: HOME | End: 2023-09-14
Attending: INTERNAL MEDICINE
Payer: MEDICARE

## 2023-09-14 DIAGNOSIS — I21.4 NSTEMI (NON-ST ELEVATED MYOCARDIAL INFARCTION): ICD-10-CM

## 2023-09-14 DIAGNOSIS — Z95.1 HX OF CABG: ICD-10-CM

## 2023-09-15 ENCOUNTER — HOSPITAL ENCOUNTER (OUTPATIENT)
Dept: CARDIOLOGY | Facility: HOSPITAL | Age: 68
Discharge: HOME | End: 2023-09-15
Attending: INTERNAL MEDICINE
Payer: MEDICARE

## 2023-09-15 ENCOUNTER — HOSPITAL ENCOUNTER (OUTPATIENT)
Dept: RADIOLOGY | Facility: HOSPITAL | Age: 68
Discharge: HOME | End: 2023-09-15
Attending: INTERNAL MEDICINE
Payer: MEDICARE

## 2023-09-15 LAB
CV STRESS BASE HR: 76 BPM
DIASTOLIC BLOOD PRESSURE: 81 MMHG
NUC REST EJECTION FRACTION: 68
NUC STRESS EJECTION FRACTION: 78 %
OHS CV CPX 85 PERCENT MAX PREDICTED HEART RATE MALE: 129
OHS CV CPX MAX PREDICTED HEART RATE: 152
OHS CV CPX PATIENT IS FEMALE: 0
OHS CV CPX PATIENT IS MALE: 1
OHS CV CPX PEAK DIASTOLIC BLOOD PRESSURE: 91 MMHG
OHS CV CPX PEAK HEAR RATE: 103 BPM
OHS CV CPX PEAK RATE PRESSURE PRODUCT: NORMAL
OHS CV CPX PEAK SYSTOLIC BLOOD PRESSURE: 139 MMHG
OHS CV CPX PERCENT MAX PREDICTED HEART RATE ACHIEVED: 68
OHS CV CPX RATE PRESSURE PRODUCT PRESENTING: 9804
SYSTOLIC BLOOD PRESSURE: 129 MMHG

## 2023-09-15 PROCEDURE — 93016 CV STRESS TEST SUPVJ ONLY: CPT | Mod: ,,, | Performed by: INTERNAL MEDICINE

## 2023-09-15 PROCEDURE — 63600175 PHARM REV CODE 636 W HCPCS: Performed by: INTERNAL MEDICINE

## 2023-09-15 PROCEDURE — A9502 TC99M TETROFOSMIN: HCPCS

## 2023-09-15 PROCEDURE — 93018 CV STRESS TEST I&R ONLY: CPT | Mod: ,,, | Performed by: INTERNAL MEDICINE

## 2023-09-15 PROCEDURE — 93016 NUCLEAR STRESS - CARDIOLOGY INTERPRETED (CUPID ONLY): ICD-10-PCS | Mod: ,,, | Performed by: INTERNAL MEDICINE

## 2023-09-15 PROCEDURE — 78452 HT MUSCLE IMAGE SPECT MULT: CPT

## 2023-09-15 PROCEDURE — 78452 HT MUSCLE IMAGE SPECT MULT: CPT | Mod: 26,,, | Performed by: INTERNAL MEDICINE

## 2023-09-15 PROCEDURE — 78452 NUCLEAR STRESS - CARDIOLOGY INTERPRETED (CUPID ONLY): ICD-10-PCS | Mod: 26,,, | Performed by: INTERNAL MEDICINE

## 2023-09-15 PROCEDURE — 93018 NUCLEAR STRESS - CARDIOLOGY INTERPRETED (CUPID ONLY): ICD-10-PCS | Mod: ,,, | Performed by: INTERNAL MEDICINE

## 2023-09-15 PROCEDURE — 93017 CV STRESS TEST TRACING ONLY: CPT

## 2023-09-15 RX ORDER — REGADENOSON 0.08 MG/ML
0.4 INJECTION, SOLUTION INTRAVENOUS ONCE
Status: COMPLETED | OUTPATIENT
Start: 2023-09-15 | End: 2023-09-15

## 2023-09-15 RX ADMIN — REGADENOSON 0.4 MG: 0.08 INJECTION, SOLUTION INTRAVENOUS at 09:09

## 2023-09-20 ENCOUNTER — PATIENT MESSAGE (OUTPATIENT)
Dept: CARDIOLOGY | Facility: CLINIC | Age: 68
End: 2023-09-20
Payer: MEDICARE

## 2023-09-25 DIAGNOSIS — M79.641 BILATERAL HAND PAIN: Primary | ICD-10-CM

## 2023-09-25 DIAGNOSIS — M79.642 BILATERAL HAND PAIN: Primary | ICD-10-CM

## 2023-09-29 ENCOUNTER — HOSPITAL ENCOUNTER (OUTPATIENT)
Dept: RADIOLOGY | Facility: HOSPITAL | Age: 68
Discharge: HOME | End: 2023-09-29
Attending: ORTHOPAEDIC SURGERY
Payer: MEDICARE

## 2023-09-29 ENCOUNTER — OFFICE VISIT (OUTPATIENT)
Dept: ORTHOPEDICS | Facility: CLINIC | Age: 68
End: 2023-09-29
Payer: MEDICARE

## 2023-09-29 VITALS — WEIGHT: 193 LBS | BODY MASS INDEX: 28.58 KG/M2 | HEIGHT: 69 IN

## 2023-09-29 DIAGNOSIS — I73.00 RAYNAUD'S PHENOMENON WITHOUT GANGRENE: ICD-10-CM

## 2023-09-29 DIAGNOSIS — G56.03 BILATERAL CARPAL TUNNEL SYNDROME: Primary | ICD-10-CM

## 2023-09-29 DIAGNOSIS — M79.642 BILATERAL HAND PAIN: ICD-10-CM

## 2023-09-29 DIAGNOSIS — D36.12 NEUROMA OF HAND: ICD-10-CM

## 2023-09-29 DIAGNOSIS — M54.12 CERVICAL RADICULOPATHY: ICD-10-CM

## 2023-09-29 DIAGNOSIS — M79.641 BILATERAL HAND PAIN: ICD-10-CM

## 2023-09-29 DIAGNOSIS — R20.2 NUMBNESS AND TINGLING: Primary | ICD-10-CM

## 2023-09-29 DIAGNOSIS — R20.0 NUMBNESS AND TINGLING: Primary | ICD-10-CM

## 2023-09-29 DIAGNOSIS — M65.342 TRIGGER RING FINGER OF LEFT HAND: ICD-10-CM

## 2023-09-29 PROCEDURE — 1101F PT FALLS ASSESS-DOCD LE1/YR: CPT | Mod: CPTII,S$GLB,, | Performed by: ORTHOPAEDIC SURGERY

## 2023-09-29 PROCEDURE — 99999 PR PBB SHADOW E&M-EST. PATIENT-LVL IV: ICD-10-PCS | Mod: PBBFAC,,, | Performed by: ORTHOPAEDIC SURGERY

## 2023-09-29 PROCEDURE — 1126F PR PAIN SEVERITY QUANTIFIED, NO PAIN PRESENT: ICD-10-PCS | Mod: CPTII,S$GLB,, | Performed by: ORTHOPAEDIC SURGERY

## 2023-09-29 PROCEDURE — 73130 X-RAY EXAM OF HAND: CPT | Mod: 26,50,, | Performed by: RADIOLOGY

## 2023-09-29 PROCEDURE — 1101F PR PT FALLS ASSESS DOC 0-1 FALLS W/OUT INJ PAST YR: ICD-10-PCS | Mod: CPTII,S$GLB,, | Performed by: ORTHOPAEDIC SURGERY

## 2023-09-29 PROCEDURE — 1126F AMNT PAIN NOTED NONE PRSNT: CPT | Mod: CPTII,S$GLB,, | Performed by: ORTHOPAEDIC SURGERY

## 2023-09-29 PROCEDURE — 99213 OFFICE O/P EST LOW 20 MIN: CPT | Mod: 25,S$GLB,, | Performed by: ORTHOPAEDIC SURGERY

## 2023-09-29 PROCEDURE — 4010F PR ACE/ARB THEARPY RXD/TAKEN: ICD-10-PCS | Mod: CPTII,S$GLB,, | Performed by: ORTHOPAEDIC SURGERY

## 2023-09-29 PROCEDURE — 1159F MED LIST DOCD IN RCRD: CPT | Mod: CPTII,S$GLB,, | Performed by: ORTHOPAEDIC SURGERY

## 2023-09-29 PROCEDURE — 20550 TENDON SHEATH: ICD-10-PCS | Mod: 51,XS,LT,S$GLB | Performed by: ORTHOPAEDIC SURGERY

## 2023-09-29 PROCEDURE — 4010F ACE/ARB THERAPY RXD/TAKEN: CPT | Mod: CPTII,S$GLB,, | Performed by: ORTHOPAEDIC SURGERY

## 2023-09-29 PROCEDURE — 1160F RVW MEDS BY RX/DR IN RCRD: CPT | Mod: CPTII,S$GLB,, | Performed by: ORTHOPAEDIC SURGERY

## 2023-09-29 PROCEDURE — 99999 PR PBB SHADOW E&M-EST. PATIENT-LVL IV: CPT | Mod: PBBFAC,,, | Performed by: ORTHOPAEDIC SURGERY

## 2023-09-29 PROCEDURE — 3044F PR MOST RECENT HEMOGLOBIN A1C LEVEL <7.0%: ICD-10-PCS | Mod: CPTII,S$GLB,, | Performed by: ORTHOPAEDIC SURGERY

## 2023-09-29 PROCEDURE — 3044F HG A1C LEVEL LT 7.0%: CPT | Mod: CPTII,S$GLB,, | Performed by: ORTHOPAEDIC SURGERY

## 2023-09-29 PROCEDURE — 20550 NJX 1 TENDON SHEATH/LIGAMENT: CPT | Mod: 51,XS,LT,S$GLB | Performed by: ORTHOPAEDIC SURGERY

## 2023-09-29 PROCEDURE — 20526 CARPAL TUNNEL: ICD-10-PCS | Mod: RT,S$GLB,, | Performed by: ORTHOPAEDIC SURGERY

## 2023-09-29 PROCEDURE — 20526 THER INJECTION CARP TUNNEL: CPT | Mod: RT,S$GLB,, | Performed by: ORTHOPAEDIC SURGERY

## 2023-09-29 PROCEDURE — 3288F FALL RISK ASSESSMENT DOCD: CPT | Mod: CPTII,S$GLB,, | Performed by: ORTHOPAEDIC SURGERY

## 2023-09-29 PROCEDURE — 1160F PR REVIEW ALL MEDS BY PRESCRIBER/CLIN PHARMACIST DOCUMENTED: ICD-10-PCS | Mod: CPTII,S$GLB,, | Performed by: ORTHOPAEDIC SURGERY

## 2023-09-29 PROCEDURE — 3008F PR BODY MASS INDEX (BMI) DOCUMENTED: ICD-10-PCS | Mod: CPTII,S$GLB,, | Performed by: ORTHOPAEDIC SURGERY

## 2023-09-29 PROCEDURE — 3288F PR FALLS RISK ASSESSMENT DOCUMENTED: ICD-10-PCS | Mod: CPTII,S$GLB,, | Performed by: ORTHOPAEDIC SURGERY

## 2023-09-29 PROCEDURE — 3008F BODY MASS INDEX DOCD: CPT | Mod: CPTII,S$GLB,, | Performed by: ORTHOPAEDIC SURGERY

## 2023-09-29 PROCEDURE — 73130 X-RAY EXAM OF HAND: CPT | Mod: TC,50

## 2023-09-29 PROCEDURE — 73130 XR HAND COMPLETE 3 VIEWS BILATERAL: ICD-10-PCS | Mod: 26,50,, | Performed by: RADIOLOGY

## 2023-09-29 PROCEDURE — 99213 PR OFFICE/OUTPT VISIT, EST, LEVL III, 20-29 MIN: ICD-10-PCS | Mod: 25,S$GLB,, | Performed by: ORTHOPAEDIC SURGERY

## 2023-09-29 PROCEDURE — 1159F PR MEDICATION LIST DOCUMENTED IN MEDICAL RECORD: ICD-10-PCS | Mod: CPTII,S$GLB,, | Performed by: ORTHOPAEDIC SURGERY

## 2023-09-29 RX ORDER — TRIAMCINOLONE ACETONIDE 40 MG/ML
40 INJECTION, SUSPENSION INTRA-ARTICULAR; INTRAMUSCULAR
Status: DISCONTINUED | OUTPATIENT
Start: 2023-09-29 | End: 2023-09-29 | Stop reason: HOSPADM

## 2023-09-29 RX ADMIN — TRIAMCINOLONE ACETONIDE 40 MG: 40 INJECTION, SUSPENSION INTRA-ARTICULAR; INTRAMUSCULAR at 08:09

## 2023-09-29 NOTE — PROCEDURES
Carpal Tunnel    Date/Time: 9/29/2023 8:00 AM    Performed by: Kane Negrete MD  Authorized by: Kane Negrete MD    Consent Done?:  Yes (Verbal)  Indications:  Pain  Timeout: prior to procedure the correct patient, procedure, and site was verified    Prep: patient was prepped and draped in usual sterile fashion      Local anesthesia used?: Yes    Local anesthetic:  Lidocaine 2% without epinephrine  Anesthetic total (ml):  0.5    Location:  Wrist  Site:  R carpal tunnel  Ultrasonic Guidance for Needle Placement?: No    Needle size:  25 G  Approach:  Volar  Medications:  40 mg triamcinolone acetonide 40 mg/mL

## 2023-09-29 NOTE — PROCEDURES
Tendon Sheath    Date/Time: 9/29/2023 8:00 AM    Performed by: Kane Negrete MD  Authorized by: Kane Negrete MD    Consent Done?:  Yes (Verbal)  Indications:  Pain  Timeout: prior to procedure the correct patient, procedure, and site was verified    Prep: patient was prepped and draped in usual sterile fashion      Local anesthesia used?: Yes    Local anesthetic:  Lidocaine 2% without epinephrine  Anesthetic total (ml):  0.5    Location:  Long finger  Site:  L long flexor tendon sheath  Ultrasonic guidance for needle placement?: No    Needle size:  25 G  Approach:  Volar  Medications:  40 mg triamcinolone acetonide 40 mg/mL

## 2023-09-29 NOTE — PROGRESS NOTES
Subjective:     Patient ID: Mil Sood is a 68 y.o. male.    Chief Complaint: Pain of the Right Hand and Pain of the Left Hand      HPI:  The patient is a 68-year-old male with left long trigger finger.  The right hand has several issues.  He probably has bilateral carpal tunnel syndrome and cervical radiculopathy.  He has neck pain.  He has had a right long trigger finger release.  That is doing well.  Additionally he fell in November 2022 tripping over a grandchild's toy in the dark.  He had an ankle fracture.  He injured his right index finger at that time.  His issues with the right index finger are he seems to have a color change red and blue and white in the right index finger tip but not in any the other fingers.  He does not smoke at this time and quit 35 years ago.  He seems to have neuroma pain of the right index finger radial digital nerve just involving the distal phalanx.    Past Medical History:   Diagnosis Date    Anxiety     Cancer     spot on kidney and lung    Diabetes mellitus     Borderline diet controlled    Diverticulitis     GERD (gastroesophageal reflux disease)     Hyperlipidemia     not on any meds currently    Hypertension     Lung mass     Mass of kidney      Past Surgical History:   Procedure Laterality Date    ARTHROPLASTY OF JOINT OF FINGER Left 11/18/2022    Procedure: ARTHROPLASTY, FINGER;  Surgeon: Kane Negrete MD;  Location: [facility] OR;  Service: Orthopedics;  Laterality: Left;  left thumb basal joint arthroplasty  Arthrex  Podiatry extras    CHOLECYSTECTOMY      COLONOSCOPY      COLONOSCOPY N/A 4/7/2017    Procedure: COLONOSCOPY;  Surgeon: Amarilys Bravo MD;  Location: [facility];  Service: Endoscopy;  Laterality: N/A;    DIAGNOSTIC LAPAROSCOPY N/A 5/4/2021    Procedure: LAPAROSCOPY, DIAGNOSTIC;  Surgeon: Shaji Jauregui MD;  Location: [facility] OR;  Service: General;  Laterality: N/A;    ESOPHAGOGASTRODUODENOSCOPY      ESOPHAGOGASTRODUODENOSCOPY N/A 2/13/2020     Procedure: ESOPHAGOGASTRODUODENOSCOPY (EGD);  Surgeon: Shaji Aquino III, MD;  Location: [facility] ENDO;  Service: Endoscopy;  Laterality: N/A;    ESOPHAGOGASTRODUODENOSCOPY N/A 5/3/2023    Procedure: EGD (ESOPHAGOGASTRODUODENOSCOPY);  Surgeon: Jenelle Jensen MD;  Location: [facility] ENDO;  Service: Endoscopy;  Laterality: N/A;    HERNIA REPAIR Bilateral     groin    LEFT HEART CATHETERIZATION Left 2022    Procedure: CATHETERIZATION, HEART, LEFT;  Surgeon: Romeo Godfrey MD;  Location: [facility] CATH LAB;  Service: Cardiology;  Laterality: Left;    NECK SURGERY      OPEN REDUCTION AND INTERNAL FIXATION (ORIF) OF INJURY OF ANKLE Left 8/10/2022    Procedure: ORIF, ANKLE;  Surgeon: Howard Mata MD;  Location: [facility] OR;  Service: Orthopedics;  Laterality: Left;    PARTIAL NEPHRECTOMY      left    RENAL BIOPSY      ROBOT-ASSISTED REPAIR OF UMBILICAL HERNIA USING DA NEL XI N/A 2021    Procedure: XI ROBOTIC REPAIR, HERNIA, UMBILICAL;  Surgeon: Shaji Jauregui MD;  Location: [facility] OR;  Service: General;  Laterality: N/A;  converted to open    TONSILLECTOMY      TRIGGER FINGER RELEASE Right     long finger     Family History   Problem Relation Age of Onset    Hypertension Mother     Hypertension Father     Melanoma Father     Cancer Sister      Social History     Socioeconomic History    Marital status:    Tobacco Use    Smoking status: Former     Current packs/day: 0.00     Average packs/day: 1 pack/day for 20.0 years (20.0 ttl pk-yrs)     Types: Cigarettes     Start date: 1975     Quit date: 1995     Years since quittin.6    Smokeless tobacco: Never   Substance and Sexual Activity    Alcohol use: Never     Alcohol/week: 0.0 standard drinks of alcohol    Drug use: No    Sexual activity: Not Currently     Partners: Female     Birth control/protection: None     Social Determinants of Health     Financial Resource Strain: Medium Risk (2023)    Overall Financial Resource Strain (CARDIA)      Difficulty of Paying Living Expenses: Somewhat hard   Food Insecurity: No Food Insecurity (9/5/2023)    Hunger Vital Sign     Worried About Running Out of Food in the Last Year: Never true     Ran Out of Food in the Last Year: Never true   Transportation Needs: No Transportation Needs (9/5/2023)    PRAPARE - Transportation     Lack of Transportation (Medical): No     Lack of Transportation (Non-Medical): No   Physical Activity: Insufficiently Active (9/5/2023)    Exercise Vital Sign     Days of Exercise per Week: 3 days     Minutes of Exercise per Session: 40 min   Stress: Stress Concern Present (9/5/2023)    [employer] [place] [employer] - Occupational Stress Questionnaire     Feeling of Stress : Very much   Social Connections: Moderately Isolated (9/5/2023)    Social Connection and Isolation Panel [NHANES]     Frequency of Communication with Friends and Family: More than three times a week     Frequency of Social Gatherings with Friends and Family: Once a week     Attends Bahai Services: More than 4 times per year     Active Member of Clubs or Organizations: No     Marital Status:    Housing Stability: Low Risk  (9/5/2023)    Housing Stability Vital Sign     Unable to Pay for Housing in the Last Year: No     Number of Places Lived in the Last Year: 1     Unstable Housing in the Last Year: No     Medication List with Changes/Refills   Current Medications    AMLODIPINE (NORVASC) 5 MG TABLET    Take 1 tablet (5 mg total) by mouth once daily.    APIXABAN (ELIQUIS) 5 MG TAB    Take 1 tablet (5 mg total) by mouth 2 (two) times daily.    ASPIRIN (ECOTRIN) 81 MG EC TABLET    Take 1 tablet (81 mg total) by mouth once daily.    ATORVASTATIN (LIPITOR) 40 MG TABLET    Take 1 tablet (40 mg total) by mouth once daily.    BENAZEPRIL (LOTENSIN) 20 MG TABLET    TAKE 2 TABLETS BY MOUTH NIGHTLY.    CITALOPRAM (CELEXA) 20 MG TABLET    Take 1 tablet (20 mg total) by mouth once daily.    CLONAZEPAM (KLONOPIN) 1 MG  TABLET    Take 1 tablet (1 mg total) by mouth nightly as needed for Anxiety.    CYCLOBENZAPRINE (FLEXERIL) 10 MG TABLET    TAKE 1 TABLET BY MOUTH THREE TIMES DAILY AS NEEDED.    EVOLOCUMAB (REPATHA SURECLICK) 140 MG/ML PNIJ    INJECT 1 ML SUB-Q EVERY 14 DAYS.    FINASTERIDE (PROSCAR) 5 MG TABLET    Take 1 tablet (5 mg total) by mouth every evening.    FLUTICASONE PROPION-SALMETEROL 115-21 MCG/DOSE (ADVAIR HFA) 115-21 MCG/ACTUATION HFAA INHALER    Inhale 2 puffs into the lungs 2 (two) times daily.    GABAPENTIN (NEURONTIN) 600 MG TABLET    Take 2 tablets (1,200 mg total) by mouth nightly.    HYDRALAZINE (APRESOLINE) 25 MG TABLET    Take 1 tablet (25 mg total) by mouth 3 (three) times daily.    ISOSORBIDE MONONITRATE (IMDUR) 60 MG 24 HR TABLET    Take 1 tablet (60 mg total) by mouth once daily.    METOPROLOL TARTRATE (LOPRESSOR) 25 MG TABLET    Take 1 tablet (25 mg total) by mouth 2 (two) times daily.    OMEPRAZOLE (PRILOSEC) 40 MG CAPSULE    Take 1 capsule (40 mg total) by mouth every morning.    TAMSULOSIN (FLOMAX) 0.4 MG CAP    TAKE 1 CAPSULE BY MOUTH EVERY EVENING.    TIOTROPIUM (SPIRIVA WITH HANDIHALER) 18 MCG INHALATION CAPSULE    INHALE THE CONTENTS OF ONE CAPSULE DAILY.    TRAZODONE (DESYREL) 100 MG TABLET    TAKE 1 TABLET BY MOUTH IN THE EVENING.    ZOLPIDEM (AMBIEN) 10 MG TAB    Take 1 tablet (10 mg total) by mouth every evening.     Review of patient's allergies indicates:   Allergen Reactions    Rosuvastatin Other (See Comments)     Joint pain   Joint and bone pain     Review of Systems   Constitutional: Negative for malaise/fatigue.   HENT:  Negative for hearing loss.    Eyes:  Negative for double vision and visual disturbance.   Cardiovascular:  Positive for chest pain.   Respiratory:  Positive for shortness of breath.    Endocrine: Negative for cold intolerance.   Hematologic/Lymphatic: Does not bruise/bleed easily.   Skin:  Negative for poor wound healing and suspicious lesions.   Musculoskeletal:   Positive for arthritis, joint pain, joint swelling, myalgias and neck pain. Negative for gout.   Gastrointestinal:  Positive for abdominal pain and heartburn. Negative for nausea and vomiting.   Genitourinary:  Positive for frequency, hesitancy, incomplete emptying and nocturia. Negative for dysuria.   Neurological:  Positive for numbness, paresthesias, sensory change and weakness.   Psychiatric/Behavioral:  Positive for depression. Negative for memory loss and substance abuse. The patient has insomnia and is nervous/anxious.    Allergic/Immunologic: Negative for persistent infections.       Objective:   Body mass index is 28.5 kg/m².  There were no vitals filed for this visit.             General    Constitutional: He is oriented to person, place, and time. He appears well-developed and well-nourished. No distress.   HENT:   Head: Normocephalic.   Eyes: EOM are normal.   Pulmonary/Chest: Effort normal.   Neurological: He is oriented to person, place, and time.   Psychiatric: He has a normal mood and affect.             Right Hand/Wrist Exam     Inspection   Scars: Wrist - absent Hand -  present  Effusion: Wrist - absent Hand -  absent    Pain   Hand - The patient exhibits pain of the index IP.  Wrist - The patient exhibits pain of the flexor/pronator group.    Tests   Phalens sign: positive  Tinel's sign (median nerve): positive  Carpal Tunnel Compression Test: positive    Atrophy   Thenar:  negative  Intrinsic:  negative    Other     Neuorologic Exam    Median Distribution: abnormal  Ulnar Distribution: normal  Radial Distribution: normal    Comments:  The patient has a well-healed right long trigger finger scar.  He has a positive Tinel and positive Phalen sign.  There is no thenar atrophy noted.  There is paracervical tenderness.  There is tenderness well localized to the right index finger radial aspect distal phalanx.  The right index finger tip has more erythema than the surrounding fingers.  And he  appreciates blueness of the distal phalanx closer to the D IP joint.  He seems to have neuroma pain radial digital nerve distribution but poorly localized      Left Hand/Wrist Exam     Inspection   Scars: Wrist - present Hand -  present  Effusion: Wrist - absent Hand -  absent    Pain   Wrist - The patient exhibits pain of the flexor/pronator group.    Tests   Phalens sign: positive  Tinel's sign (median nerve): positive  Carpal Tunnel Compression Test: positive    Atrophy  Thenar:  Negative  Intrinsic: negative    Other     Sensory Exam  Median Distribution: abnormal  Ulnar Distribution: normal  Radial Distribution: normal    Comments:  The patient has active triggering left long finger with a palpable nodule that moves with tendon excursion.  He has a positive Tinel and positive Phalen sign.  There is no thenar atrophy noted.  There is a well-healed basal joint scar and no pain with range of motion of the basal joint          Vascular Exam       Capillary Refill  Right Hand: normal capillary refill  Left Hand: normal capillary refill          Relevant imaging results reviewed and interpreted by me, discussed with the patient and / or family today radiographs right index finger were normal  Assessment:     Encounter Diagnoses   Name Primary?    Trigger ring finger of left hand     Bilateral carpal tunnel syndrome Yes    Cervical radiculopathy     Raynaud's phenomenon without gangrene     Neuroma of hand         Plan:     The patient seems to have a right index finger neuroma pain poorly localized but about the radial aspect of the distal phalanx.  He does seem to have an element of Raynaud's only involving the distal phalanx right index finger.  He does have carpal tunnel on both sides not documented by nerve conduction studies.  He wishes to have a right carpal tunnel injection and left long trigger finger injection today.  Additionally he was given bilateral carpal tunnel braces.  He was sent for nerve  conduction studies.  He was injected right carpal tunnel with 1 cc of Kenalog and 1 cc of 2% plain lidocaine.  He was injected left long trigger finger with 0.5 cc of Kenalog and 0.5 cc of 2% plain lidocaine.  He will return with his nerve conduction studies.  The Raynaud's is a separate issue.                Disclaimer: This note was prepared using a voice recognition system and is likely to have sound alike errors within the text.

## 2023-10-11 ENCOUNTER — OFFICE VISIT (OUTPATIENT)
Dept: PHYSICAL MEDICINE AND REHAB | Facility: CLINIC | Age: 68
End: 2023-10-11
Payer: MEDICARE

## 2023-10-11 ENCOUNTER — OFFICE VISIT (OUTPATIENT)
Dept: ORTHOPEDICS | Facility: CLINIC | Age: 68
End: 2023-10-11
Payer: MEDICARE

## 2023-10-11 ENCOUNTER — TELEPHONE (OUTPATIENT)
Dept: ORTHOPEDICS | Facility: CLINIC | Age: 68
End: 2023-10-11

## 2023-10-11 VITALS
BODY MASS INDEX: 28.58 KG/M2 | RESPIRATION RATE: 14 BRPM | WEIGHT: 193 LBS | HEIGHT: 69 IN | DIASTOLIC BLOOD PRESSURE: 71 MMHG | HEART RATE: 105 BPM | SYSTOLIC BLOOD PRESSURE: 102 MMHG

## 2023-10-11 VITALS — WEIGHT: 193 LBS | BODY MASS INDEX: 28.58 KG/M2 | HEIGHT: 69 IN

## 2023-10-11 DIAGNOSIS — G56.21 CUBITAL TUNNEL SYNDROME ON RIGHT: ICD-10-CM

## 2023-10-11 DIAGNOSIS — G56.03 BILATERAL CARPAL TUNNEL SYNDROME: Primary | ICD-10-CM

## 2023-10-11 DIAGNOSIS — M54.12 CERVICAL RADICULOPATHY: ICD-10-CM

## 2023-10-11 DIAGNOSIS — Z01.818 PREOPERATIVE EXAMINATION: ICD-10-CM

## 2023-10-11 PROCEDURE — 4010F ACE/ARB THERAPY RXD/TAKEN: CPT | Mod: CPTII,S$GLB,, | Performed by: ORTHOPAEDIC SURGERY

## 2023-10-11 PROCEDURE — 1101F PR PT FALLS ASSESS DOC 0-1 FALLS W/OUT INJ PAST YR: ICD-10-PCS | Mod: CPTII,S$GLB,, | Performed by: ORTHOPAEDIC SURGERY

## 2023-10-11 PROCEDURE — 99214 OFFICE O/P EST MOD 30 MIN: CPT | Mod: S$GLB,,, | Performed by: ORTHOPAEDIC SURGERY

## 2023-10-11 PROCEDURE — 3288F PR FALLS RISK ASSESSMENT DOCUMENTED: ICD-10-PCS | Mod: CPTII,S$GLB,, | Performed by: ORTHOPAEDIC SURGERY

## 2023-10-11 PROCEDURE — 4010F PR ACE/ARB THEARPY RXD/TAKEN: ICD-10-PCS | Mod: CPTII,S$GLB,, | Performed by: ORTHOPAEDIC SURGERY

## 2023-10-11 PROCEDURE — 95886 MUSC TEST DONE W/N TEST COMP: CPT | Mod: S$GLB,,, | Performed by: PHYSICAL MEDICINE & REHABILITATION

## 2023-10-11 PROCEDURE — 1125F PR PAIN SEVERITY QUANTIFIED, PAIN PRESENT: ICD-10-PCS | Mod: CPTII,S$GLB,, | Performed by: ORTHOPAEDIC SURGERY

## 2023-10-11 PROCEDURE — 99999 PR PBB SHADOW E&M-EST. PATIENT-LVL III: CPT | Mod: PBBFAC,,, | Performed by: PHYSICAL MEDICINE & REHABILITATION

## 2023-10-11 PROCEDURE — 1159F MED LIST DOCD IN RCRD: CPT | Mod: CPTII,S$GLB,, | Performed by: ORTHOPAEDIC SURGERY

## 2023-10-11 PROCEDURE — 3044F PR MOST RECENT HEMOGLOBIN A1C LEVEL <7.0%: ICD-10-PCS | Mod: CPTII,S$GLB,, | Performed by: ORTHOPAEDIC SURGERY

## 2023-10-11 PROCEDURE — 99999 PR PBB SHADOW E&M-EST. PATIENT-LVL III: ICD-10-PCS | Mod: PBBFAC,,, | Performed by: PHYSICAL MEDICINE & REHABILITATION

## 2023-10-11 PROCEDURE — 95911 PR NERVE CONDUCTION STUDY; 9-10 STUDIES: ICD-10-PCS | Mod: S$GLB,,, | Performed by: PHYSICAL MEDICINE & REHABILITATION

## 2023-10-11 PROCEDURE — 99499 NO LOS: ICD-10-PCS | Mod: S$GLB,,, | Performed by: PHYSICAL MEDICINE & REHABILITATION

## 2023-10-11 PROCEDURE — 99499 UNLISTED E&M SERVICE: CPT | Mod: S$GLB,,, | Performed by: PHYSICAL MEDICINE & REHABILITATION

## 2023-10-11 PROCEDURE — 3044F HG A1C LEVEL LT 7.0%: CPT | Mod: CPTII,S$GLB,, | Performed by: ORTHOPAEDIC SURGERY

## 2023-10-11 PROCEDURE — 95911 NRV CNDJ TEST 9-10 STUDIES: CPT | Mod: S$GLB,,, | Performed by: PHYSICAL MEDICINE & REHABILITATION

## 2023-10-11 PROCEDURE — 1159F PR MEDICATION LIST DOCUMENTED IN MEDICAL RECORD: ICD-10-PCS | Mod: CPTII,S$GLB,, | Performed by: ORTHOPAEDIC SURGERY

## 2023-10-11 PROCEDURE — 1101F PT FALLS ASSESS-DOCD LE1/YR: CPT | Mod: CPTII,S$GLB,, | Performed by: ORTHOPAEDIC SURGERY

## 2023-10-11 PROCEDURE — 1125F AMNT PAIN NOTED PAIN PRSNT: CPT | Mod: CPTII,S$GLB,, | Performed by: ORTHOPAEDIC SURGERY

## 2023-10-11 PROCEDURE — 1160F PR REVIEW ALL MEDS BY PRESCRIBER/CLIN PHARMACIST DOCUMENTED: ICD-10-PCS | Mod: CPTII,S$GLB,, | Performed by: ORTHOPAEDIC SURGERY

## 2023-10-11 PROCEDURE — 99999 PR PBB SHADOW E&M-EST. PATIENT-LVL III: ICD-10-PCS | Mod: PBBFAC,,, | Performed by: ORTHOPAEDIC SURGERY

## 2023-10-11 PROCEDURE — 3008F BODY MASS INDEX DOCD: CPT | Mod: CPTII,S$GLB,, | Performed by: ORTHOPAEDIC SURGERY

## 2023-10-11 PROCEDURE — 99214 PR OFFICE/OUTPT VISIT, EST, LEVL IV, 30-39 MIN: ICD-10-PCS | Mod: S$GLB,,, | Performed by: ORTHOPAEDIC SURGERY

## 2023-10-11 PROCEDURE — 95886 PR EMG COMPLETE, W/ NERVE CONDUCTION STUDIES, 5+ MUSCLES: ICD-10-PCS | Mod: S$GLB,,, | Performed by: PHYSICAL MEDICINE & REHABILITATION

## 2023-10-11 PROCEDURE — 99999 PR PBB SHADOW E&M-EST. PATIENT-LVL III: CPT | Mod: PBBFAC,,, | Performed by: ORTHOPAEDIC SURGERY

## 2023-10-11 PROCEDURE — 3008F PR BODY MASS INDEX (BMI) DOCUMENTED: ICD-10-PCS | Mod: CPTII,S$GLB,, | Performed by: ORTHOPAEDIC SURGERY

## 2023-10-11 PROCEDURE — 3288F FALL RISK ASSESSMENT DOCD: CPT | Mod: CPTII,S$GLB,, | Performed by: ORTHOPAEDIC SURGERY

## 2023-10-11 PROCEDURE — 1160F RVW MEDS BY RX/DR IN RCRD: CPT | Mod: CPTII,S$GLB,, | Performed by: ORTHOPAEDIC SURGERY

## 2023-10-11 NOTE — PROGRESS NOTES
[facility]   [address]  [place] [address]  Phone: [phone]        Full Name: Mil Sood YOB: 1955  Patient ID: [id number]      Visit Date: 10/11/2023 08:25  Age: 68 Years 8 Months Old  Examining Physician: Sharmila Suarez M.D.  Referring Physician:   Reason for Referral: ue pain        Chief Complaint   Patient presents with    Hand Pain         HPI: This is a 68 y.o.  male being seen in clinic today for evaluation of chronic right hand sharp pain and numbness-worse in his index finger.  He has mild numbness in the left hand, but not as significant. He has limited  and loss of fine motor.  Shaking, rubbing, and rest provide minimal relief.     History obtained from patient    Past family, medical, social, and surgical history reviewed in chart    Review of Systems:     General- denies lethargy, weight change, fever, chills  Head/neck- denies swallowing difficulties  ENT- denies hearing changes  Cardiovascular-denies chest pain  Pulmonary- denies shortness of breath  GI- denies constipation or bowel incontinence  - denies bladder incontinence  Skin- denies wounds or rashes  Musculoskeletal- + weakness, + pain  Neurologic- + numbness and tingling  Psychiatric- +depression and anxiety  Lymphatic-denies swelling  Endocrine- denies hypoglycemic symptoms/DM history  All other pertinent systems negative     Physical Examination:  General: Well developed, well nourished male, NAD  HEENT:NCAT EOMI bilaterally   Pulmonary:Normal respirations    Spinal Examination: CERVICAL  Active ROM is within normal limits.  Inspection: No deformity of spinal alignment.    Musculoskeletal Tests:  Phalen: neg  Elbow compression (ulnar): neg  Tinels at wrist: neg    Bilateral Upper and Lower Extremities:  Pulses are 2+ at radial bilaterally.  Shoulder/Elbow/Wrist/Hand ROM wnl  Hip/Knee/Ankle ROM   Bilateral Extremities show normal capillary refill.  No signs of cyanosis, rubor, edema,  skin changes, or dysvascular changes of appendages.  Nails appear intact.    Neurological Exam:  Cranial Nerves:  II-XII grossly intact    Manual Muscle Testing: (Motor 5=normal)  5/5 strength bilateral upper extremities    No focal atrophy is noted of either upper extremity.    Bilateral Reflexes:  Rios's response is absent bilaterally.    Sensation: tested to light touch  - intact in arms except dec at right 1st and 2nd digit with hypersensitivity at right 2nd digit    Gait: Narrow base and good arm swing.      Entire procedure explained to patient prior to proceeding.  Verbal consent obtained      SNC      Nerve / Sites Rec. Site Onset Lat Peak Lat Amp Segments Distance Velocity     ms ms µV  mm m/s   R Median - Digit II (Antidromic)      Wrist Dig II 3.9 4.8 12.0 Wrist - Dig  36   L Median - Digit II (Antidromic)      Wrist Dig II 3.9 4.7 11.1 Wrist - Dig  36   R Ulnar - Digit V (Antidromic)      Wrist Dig V 2.9 3.6 17.1 Wrist - Dig V 140 49   L Ulnar - Digit V (Antidromic)      Wrist Dig V 2.8 3.7 7.3 Wrist - Dig V 140 50   R Radial - Anatomical snuff box (Forearm)      Forearm Wrist 1.7 2.7 17.6 Forearm - Wrist 100 58   L Radial - Anatomical snuff box (Forearm)      Forearm Wrist 2.1 2.6 15.8 Forearm - Wrist 100 47       MNC      Nerve / Sites Muscle Latency Amplitude Duration Rel Amp Segments Distance Lat Diff Velocity     ms mV ms %  mm ms m/s   R Median - APB      Wrist APB 4.7 6.2 6.2 100 Wrist - APB 80        Elbow APB 9.6 5.7 6.5 91.8 Elbow - Wrist 230 4.8 47   L Median - APB      Wrist APB 4.9 3.3 5.8 100 Wrist - APB 80        Elbow APB 9.4 3.1 5.9 93.5 Elbow - Wrist 230 4.5 51   R Ulnar - ADM      Wrist ADM 2.7 9.2 5.4 100 Wrist -         B. Elbow ADM 6.9 9.0 5.5 97.7 B. Elbow - Wrist 240 4.2 57      A. Elbow ADM 9.5 9.2 6.1 102 A. Elbow - B. Elbow 110 2.6 42   L Ulnar - ADM      Wrist ADM 3.3 9.0 4.7 100 Wrist -         B. Elbow ADM 7.6 8.4 5.3 94 B. Elbow - Wrist 230 4.2  55      A. Elbow ADM 9.8 8.2 6.2 97 A. Elbow - B. Elbow 120 2.3 52       EMG         EMG Summary Table     Spontaneous MUAP Recruitment   Muscle IA Fib PSW Fasc Other Dur. Dur Amp Dur Polys Pattern Effort   R. Deltoid N None None None . _NFT_ _NFT_ N N N N Max   R. Biceps brachii N None None None . _NFT_ _NFT_ N N N sl red Max   R. Triceps brachii N None None None . _NFT_ _NFT_ N N N sl red Max   R. Pronator teres N None None None . _NFT_ _NFT_ N N 1+ sl red Max   R. First dorsal interosseous N None None None . _NFT_ _NFT_ N N N N Max                                            INTERPRETATION  -Bilateral median motor nerve conduction study showed prolonged latency, amplitude, and dec conduction velocity on the right  -Bilateral median sensory nerve conduction study showed prolonged peak latency and amplitude  -Bilateral ulnar motor nerve conduction study showed normal latency, amplitude, and dec conduction velocity across the right elbow  -Bilateral ulnar sensory nerve conduction study showed normal peak latency and amplitude  -Bilateral radial sensory nerve conduction study showed normal peak latency and amplitude  -Needle EMG examination performed to above mentioned muscles       IMPRESSION  ABNORMAL study  2. There is electrodiagnostic evidence of a moderate demyelinating median neuropathy (Carpal tunnel syndrome) across bilateral wrists.  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  There is also a mild chronic radiculopathy of the C6,7 nerve roots    PLAN  Discussed in detail for greater than 30 minutes about diagnosis and treatment plan    1. Follow up with referring provider: Dr. Kane Dela Cruz*  2. Handouts on CTS, cubital tunnel, and radic provided  3. This study is good for one year. If symptoms worsen or do not improve, please re-consult.    Sharmila Suarez M.D.  Physical Medicine and Rehab

## 2023-10-11 NOTE — PROGRESS NOTES
Subjective:     Patient ID: Mil Sood is a 68 y.o. male.    Chief Complaint: Pain of the Right Hand      HPI:  The patient is a 68-year-old male with a bilateral carpal tunnel syndrome and right cubital tunnel syndrome and cervical radiculopathy C6-7 documented by nerve conduction studies.  His carpal tunnel is the most symptomatic.  He is minimally symptomatic with regard to the cubital tunnel and his neck.  He wishes to have a right carpal tunnel release    Past Medical History:   Diagnosis Date    Anxiety     Cancer     spot on kidney and lung    Diabetes mellitus     Borderline diet controlled    Diverticulitis     GERD (gastroesophageal reflux disease)     Hyperlipidemia     not on any meds currently    Hypertension     Lung mass     Mass of kidney      Past Surgical History:   Procedure Laterality Date    ARTHROPLASTY OF JOINT OF FINGER Left 11/18/2022    Procedure: ARTHROPLASTY, FINGER;  Surgeon: Kane Negrete MD;  Location: [facility];  Service: Orthopedics;  Laterality: Left;  left thumb basal joint arthroplasty  Arthrex  Podiatry extras    CHOLECYSTECTOMY      COLONOSCOPY      COLONOSCOPY N/A 4/7/2017    Procedure: COLONOSCOPY;  Surgeon: Amarilys Bravo MD;  Location: [facility];  Service: Endoscopy;  Laterality: N/A;    DIAGNOSTIC LAPAROSCOPY N/A 5/4/2021    Procedure: LAPAROSCOPY, DIAGNOSTIC;  Surgeon: Shaji Jauregui MD;  Location: [facility];  Service: General;  Laterality: N/A;    ESOPHAGOGASTRODUODENOSCOPY      ESOPHAGOGASTRODUODENOSCOPY N/A 2/13/2020    Procedure: ESOPHAGOGASTRODUODENOSCOPY (EGD);  Surgeon: Shaji Aquino III, MD;  Location: [facility];  Service: Endoscopy;  Laterality: N/A;    ESOPHAGOGASTRODUODENOSCOPY N/A 5/3/2023    Procedure: EGD (ESOPHAGOGASTRODUODENOSCOPY);  Surgeon: Jenelle Jensen MD;  Location: [facility];  Service: Endoscopy;  Laterality: N/A;    HERNIA REPAIR Bilateral     groin    LEFT HEART CATHETERIZATION Left 1/31/2022    Procedure:  CATHETERIZATION, HEART, LEFT;  Surgeon: Romeo Godfrey MD;  Location: [facility] CATH LAB;  Service: Cardiology;  Laterality: Left;    NECK SURGERY      OPEN REDUCTION AND INTERNAL FIXATION (ORIF) OF INJURY OF ANKLE Left 8/10/2022    Procedure: ORIF, ANKLE;  Surgeon: Howard Mata MD;  Location: [facility] OR;  Service: Orthopedics;  Laterality: Left;    PARTIAL NEPHRECTOMY      left    RENAL BIOPSY      ROBOT-ASSISTED REPAIR OF UMBILICAL HERNIA USING DA NEL XI N/A 2021    Procedure: XI ROBOTIC REPAIR, HERNIA, UMBILICAL;  Surgeon: Shaji Jauregui MD;  Location: [facility] OR;  Service: General;  Laterality: N/A;  converted to open    TONSILLECTOMY      TRIGGER FINGER RELEASE Right     long finger     Family History   Problem Relation Age of Onset    Hypertension Mother     Hypertension Father     Melanoma Father     Cancer Sister      Social History     Socioeconomic History    Marital status:    Tobacco Use    Smoking status: Former     Current packs/day: 0.00     Average packs/day: 1 pack/day for 20.0 years (20.0 ttl pk-yrs)     Types: Cigarettes     Start date: 1975     Quit date: 1995     Years since quittin.7    Smokeless tobacco: Never   Substance and Sexual Activity    Alcohol use: Never     Alcohol/week: 0.0 standard drinks of alcohol    Drug use: No    Sexual activity: Not Currently     Partners: Female     Birth control/protection: None     Social Determinants of Health     Financial Resource Strain: Medium Risk (2023)    Overall Financial Resource Strain (CARDIA)     Difficulty of Paying Living Expenses: Somewhat hard   Food Insecurity: No Food Insecurity (2023)    Hunger Vital Sign     Worried About Running Out of Food in the Last Year: Never true     Ran Out of Food in the Last Year: Never true   Transportation Needs: No Transportation Needs (2023)    PRAPARE - Transportation     Lack of Transportation (Medical): No     Lack of Transportation  (Non-Medical): No   Physical Activity: Insufficiently Active (9/5/2023)    Exercise Vital Sign     Days of Exercise per Week: 3 days     Minutes of Exercise per Session: 40 min   Stress: Stress Concern Present (9/5/2023)    [employer] - Occupational Stress Questionnaire     Feeling of Stress : Very much   Social Connections: Moderately Isolated (9/5/2023)    Social Connection and Isolation Panel [NHANES]     Frequency of Communication with Friends and Family: More than three times a week     Frequency of Social Gatherings with Friends and Family: Once a week     Attends Tenriism Services: More than 4 times per year     Active Member of Clubs or Organizations: No     Marital Status:    Housing Stability: Low Risk  (9/5/2023)    Housing Stability Vital Sign     Unable to Pay for Housing in the Last Year: No     Number of Places Lived in the Last Year: 1     Unstable Housing in the Last Year: No     Medication List with Changes/Refills   Current Medications    AMLODIPINE (NORVASC) 5 MG TABLET    Take 1 tablet (5 mg total) by mouth once daily.    APIXABAN (ELIQUIS) 5 MG TAB    Take 1 tablet (5 mg total) by mouth 2 (two) times daily.    ASPIRIN (ECOTRIN) 81 MG EC TABLET    Take 1 tablet (81 mg total) by mouth once daily.    ATORVASTATIN (LIPITOR) 40 MG TABLET    Take 1 tablet (40 mg total) by mouth once daily.    BENAZEPRIL (LOTENSIN) 20 MG TABLET    TAKE 2 TABLETS BY MOUTH NIGHTLY.    CITALOPRAM (CELEXA) 20 MG TABLET    Take 1 tablet (20 mg total) by mouth once daily.    CLONAZEPAM (KLONOPIN) 1 MG TABLET    Take 1 tablet (1 mg total) by mouth nightly as needed for Anxiety.    CYCLOBENZAPRINE (FLEXERIL) 10 MG TABLET    TAKE 1 TABLET BY MOUTH THREE TIMES DAILY AS NEEDED.    EVOLOCUMAB (REPATHA SURECLICK) 140 MG/ML PNIJ    INJECT 1 ML SUB-Q EVERY 14 DAYS.    FINASTERIDE (PROSCAR) 5 MG TABLET    Take 1 tablet (5 mg total) by mouth every evening.    FLUTICASONE  PROPION-SALMETEROL 115-21 MCG/DOSE (ADVAIR HFA) 115-21 MCG/ACTUATION HFAA INHALER    Inhale 2 puffs into the lungs 2 (two) times daily.    GABAPENTIN (NEURONTIN) 600 MG TABLET    Take 2 tablets (1,200 mg total) by mouth nightly.    HYDRALAZINE (APRESOLINE) 25 MG TABLET    Take 1 tablet (25 mg total) by mouth 3 (three) times daily.    ISOSORBIDE MONONITRATE (IMDUR) 60 MG 24 HR TABLET    Take 1 tablet (60 mg total) by mouth once daily.    METOPROLOL TARTRATE (LOPRESSOR) 25 MG TABLET    Take 1 tablet (25 mg total) by mouth 2 (two) times daily.    OMEPRAZOLE (PRILOSEC) 40 MG CAPSULE    Take 1 capsule (40 mg total) by mouth every morning.    TAMSULOSIN (FLOMAX) 0.4 MG CAP    TAKE 1 CAPSULE BY MOUTH EVERY EVENING.    TIOTROPIUM (SPIRIVA WITH HANDIHALER) 18 MCG INHALATION CAPSULE    INHALE THE CONTENTS OF ONE CAPSULE DAILY.    TRAZODONE (DESYREL) 100 MG TABLET    TAKE 1 TABLET BY MOUTH IN THE EVENING.    ZOLPIDEM (AMBIEN) 10 MG TAB    Take 1 tablet (10 mg total) by mouth every evening.     Review of patient's allergies indicates:   Allergen Reactions    Rosuvastatin Other (See Comments)     Joint pain   Joint and bone pain     Review of Systems   Constitutional: Negative for malaise/fatigue.   HENT:  Negative for hearing loss.    Eyes:  Negative for double vision and visual disturbance.   Cardiovascular:  Positive for chest pain.   Respiratory:  Positive for shortness of breath.    Endocrine: Negative for cold intolerance.   Hematologic/Lymphatic: Does not bruise/bleed easily.   Skin:  Negative for poor wound healing and suspicious lesions.   Musculoskeletal:  Positive for arthritis, joint pain, joint swelling and neck pain. Negative for gout.   Gastrointestinal:  Positive for abdominal pain and heartburn. Negative for nausea and vomiting.   Genitourinary:  Positive for frequency, hesitancy, incomplete emptying and nocturia. Negative for dysuria.   Neurological:  Positive for focal weakness, numbness, paresthesias and  sensory change.   Psychiatric/Behavioral:  Positive for substance abuse. Negative for depression and memory loss. The patient has insomnia and is nervous/anxious.    Allergic/Immunologic: Negative for persistent infections.       Objective:   Body mass index is 28.5 kg/m².  There were no vitals filed for this visit.             General    Constitutional: He is oriented to person, place, and time. He appears well-developed and well-nourished. No distress.   HENT:   Head: Normocephalic.   Mouth/Throat: Oropharynx is clear and moist.   Eyes: EOM are normal.   Cardiovascular:  Normal rate.            Pulmonary/Chest: Effort normal.   Abdominal: Soft.   Neurological: He is alert and oriented to person, place, and time. No cranial nerve deficit.   Psychiatric: He has a normal mood and affect.             Right Hand/Wrist Exam     Inspection   Scars: Wrist - present Hand -  present    Pain   Wrist - The patient exhibits pain of the flexor/pronator group.    Tests   Phalens sign: positive  Tinel's sign (median nerve): positive  Carpal Tunnel Compression Test: positive  Cubital Tunnel Compression Test: positive    Atrophy   Thenar:  negative  Intrinsic:  negative    Other     Neuorologic Exam    Median Distribution: abnormal  Ulnar Distribution: normal  Radial Distribution: abnormal    Comments:  The patient has a positive Tinel and positive Phalen sign.  There is no thenar atrophy noted.  He has a positive Tinel sign over the ulnar nerve right elbow and no intrinsic atrophy noted.  The patient has a well-healed basal joint scar      Left Hand/Wrist Exam     Inspection   Scars: Wrist - absent Hand -  absent  Effusion: Wrist - absent Hand -  absent    Pain   Wrist - The patient exhibits pain of the flexor/pronator group.    Tests   Phalens sign: positive  Tinel's sign (median nerve): positive  Carpal Tunnel Compression Test: positive    Atrophy  Thenar:  Negative  Intrinsic: negative    Other     Sensory Exam  Median  Distribution: abnormal  Ulnar Distribution: normal  Radial Distribution: normal    Comments:  The patient has a positive Tinel and positive Phalen sign.  There is no thenar atrophy noted.      Right Elbow Exam     Tests   Tinel's sign (cubital tunnel): positive          Vascular Exam       Capillary Refill  Right Hand: normal capillary refill  Left Hand: normal capillary refill        Relevant imaging results reviewed and interpreted by me, discussed with the patient and / or family today radiographs were not obtained today.  Previous radiographs show evidence of right thumb basal joint arthroplasty  Assessment:     Encounter Diagnoses   Name Primary?    Bilateral carpal tunnel syndrome Yes    Cervical radiculopathy     Cubital tunnel syndrome on right         Plan:     The patient wishes to have a right carpal tunnel release.  He does not wish to have a cubital tunnel release.  His right index finger is the most symptomatic.  Risk complications and alternatives were discussed including the risk of infection, anesthetic risk, injury to nerves and vessels, loss of motion, and possible need for additional surgeries were discussed.  He seems to understand and agree to that surgery.  All questions were answered.  He says the small finger is relatively asymptomatic and wishes not to have anymore surgery than he needs                Disclaimer: This note was prepared using a voice recognition system and is likely to have sound alike errors within the text.

## 2023-10-11 NOTE — TELEPHONE ENCOUNTER
----- Message from Charline Darren sent at 10/11/2023  2:46 PM CDT -----  Regarding: FW: surgical clearance    ----- Message -----  From: Yasmani Chairez MD  Sent: 10/11/2023   2:46 PM CDT  To: Joaokenneth Darren  Subject: RE: surgical clearance                             Elevated periop risk of CV events for non-high risk procedure.  Ok to proceed the scheduled procedure without further cardiac study.  OK to hold ELIQUIS 2 DAYS before the procedure and resume postop once hemodynamically stable      ----- Message -----  From: Charline Banks  Sent: 10/11/2023  10:26 AM CDT  To: Yasmani Chairez MD  Subject: FW: surgical clearance                           Please advise   ----- Message -----  From: Davida Calix LPN  Sent: 10/11/2023  10:14 AM CDT  To: [facility]  Subject: surgical clearance                               Good morning    Patient is scheduled to have a right carpal tunnel release on 12/01/23 by Dr. Negrete    Please advise if patient is cleared for surgery or if they need a sooner appointment with your office     Thank you  Davida

## 2023-10-16 DIAGNOSIS — F51.05 INSOMNIA DUE TO ANXIETY AND FEAR: ICD-10-CM

## 2023-10-16 DIAGNOSIS — F40.9 INSOMNIA DUE TO ANXIETY AND FEAR: ICD-10-CM

## 2023-10-16 RX ORDER — ZOLPIDEM TARTRATE 10 MG/1
10 TABLET ORAL NIGHTLY
Qty: 30 TABLET | Refills: 5 | Status: SHIPPED | OUTPATIENT
Start: 2023-10-16 | End: 2024-04-13

## 2023-10-17 NOTE — TELEPHONE ENCOUNTER
No care due was identified.  [facility] Embedded Care Due Messages. Reference number: [id number].   10/17/2023 8:50:21 AM CDT

## 2023-10-18 ENCOUNTER — TELEPHONE (OUTPATIENT)
Dept: ORTHOPEDICS | Facility: CLINIC | Age: 68
End: 2023-10-18
Payer: MEDICARE

## 2023-10-18 RX ORDER — BENAZEPRIL HYDROCHLORIDE 20 MG/1
TABLET ORAL
Qty: 180 TABLET | Refills: 1 | Status: SHIPPED | OUTPATIENT
Start: 2023-10-18

## 2023-10-18 NOTE — TELEPHONE ENCOUNTER
Refill Decision Note   Mil Sood  is requesting a refill authorization.  Brief Assessment and Rationale for Refill:  Approve     Medication Therapy Plan:         Pharmacist review requested: Yes   Extended chart review required: Yes   Comments:     Note composed:11:35 AM 10/18/2023

## 2023-10-18 NOTE — TELEPHONE ENCOUNTER
Refill Routing Note   Medication(s) are not appropriate for processing by [facility] for the following reason(s):      Drug-disease interaction    [facility] action(s):  Defer Care Due:  None identified     Medication Therapy Plan: Drug-Disease: benazepriL and Orthostatic hypotension    Pharmacist review requested: Yes     Appointments  past 12m or future 3m with PCP    Date Provider   Last Visit   8/30/2023 Yumi Roberson MD   Next Visit   Visit date not found Yumi Roberson MD   ED visits in past 90 days: 0        Note composed:11:23 AM 10/18/2023

## 2023-10-18 NOTE — TELEPHONE ENCOUNTER
Spoke to the patient an let him know that Dr. Negrete does not prescribe any kind of pain medication before surgery an that he can try using Voltaren gel or try alternating taking tylenol an ibuprofen. The patient verbalized understanding.         ----- Message from Francesca Gonzalez sent at 10/18/2023  1:05 PM CDT -----  Contact: Self [phone]  Would like to receive medical advice.    Pharmacy name/number (copy/paste from chart):      [facility] [id number]  [id number]  [place] [address]  Phone: [phone] Fax: [phone]     Would they like a call back or a response via [facility]:  call back    Additional information:  Calling to request a medication for pain  or cream sent to pharm above for hand pain.

## 2023-10-19 DIAGNOSIS — Z01.818 PRE-OP TESTING: Primary | ICD-10-CM

## 2023-11-02 ENCOUNTER — HOSPITAL ENCOUNTER (EMERGENCY)
Facility: HOSPITAL | Age: 68
Discharge: HOME | End: 2023-11-02
Attending: EMERGENCY MEDICINE
Payer: MEDICARE

## 2023-11-02 VITALS
HEART RATE: 69 BPM | OXYGEN SATURATION: 97 % | RESPIRATION RATE: 18 BRPM | DIASTOLIC BLOOD PRESSURE: 94 MMHG | TEMPERATURE: 98 F | SYSTOLIC BLOOD PRESSURE: 140 MMHG

## 2023-11-02 DIAGNOSIS — L98.491: Primary | ICD-10-CM

## 2023-11-02 DIAGNOSIS — Z86.69 HISTORY OF CARPAL TUNNEL SYNDROME: ICD-10-CM

## 2023-11-02 PROCEDURE — 99281 EMR DPT VST MAYX REQ PHY/QHP: CPT

## 2023-11-02 NOTE — ED PROVIDER NOTES
SCRIBE #1 NOTE: I, Michael Medina, am scribing for, and in the presence of, Radha Ruiz MD. I have scribed the entire note.      History      Chief Complaint   Patient presents with    Finger Injury     Pt believes he hit right pointer finger on nail ~2 months ago, did not receive care after injury; tissue damage noted to tip of finger; no bleeding, reports severe pain       Review of patient's allergies indicates:   Allergen Reactions    Rosuvastatin Other (See Comments)     Joint pain   Joint and bone pain        HPI   HPI    11/2/2023, 6:19 AM   History obtained from the patient      History of Present Illness: Mil Sood is a 68 y.o. male patient who presents to the Emergency Department for R index finger pain, onset 2 months PTA. Pt states that he hit his R index finger against a nail 2 months ago, and has been having pain and discoloration to the area ever since. Symptoms are constant and moderate in severity. No mitigating or exacerbating factors reported. Patient denies any fever, chills, n/v/d, SOB, CP, weakness, numbness, dizziness, headache, and all other sxs at this time. Pt currently follows with Dr. Negrete (Orthopedic Surgery), and was told that he had a neuroma to the R index finger. He is scheduled to have a carpal tunnel release procedure on 12/1/23. No further complaints or concerns at this time.     Arrival mode: Personal vehicle    PCP: Yumi Roberson MD       Past Medical History:  Past Medical History:   Diagnosis Date    Anxiety     Cancer     spot on kidney and lung    Diabetes mellitus     Borderline diet controlled    Diverticulitis     GERD (gastroesophageal reflux disease)     Hyperlipidemia     not on any meds currently    Hypertension     Lung mass     Mass of kidney        Past Surgical History:  Past Surgical History:   Procedure Laterality Date    ARTHROPLASTY OF JOINT OF FINGER Left 11/18/2022    Procedure: ARTHROPLASTY, FINGER;  Surgeon: Kane Negrete MD;  Location:  [facility] OR;  Service: Orthopedics;  Laterality: Left;  left thumb basal joint arthroplasty  Arthrex  Podiatry extras    CHOLECYSTECTOMY      COLONOSCOPY      COLONOSCOPY N/A 4/7/2017    Procedure: COLONOSCOPY;  Surgeon: Amarilys Bravo MD;  Location: [facility];  Service: Endoscopy;  Laterality: N/A;    DIAGNOSTIC LAPAROSCOPY N/A 5/4/2021    Procedure: LAPAROSCOPY, DIAGNOSTIC;  Surgeon: Shaji Jauregui MD;  Location: [facility] OR;  Service: General;  Laterality: N/A;    ESOPHAGOGASTRODUODENOSCOPY      ESOPHAGOGASTRODUODENOSCOPY N/A 2/13/2020    Procedure: ESOPHAGOGASTRODUODENOSCOPY (EGD);  Surgeon: Shaji Aquino III, MD;  Location: [facility];  Service: Endoscopy;  Laterality: N/A;    ESOPHAGOGASTRODUODENOSCOPY N/A 5/3/2023    Procedure: EGD (ESOPHAGOGASTRODUODENOSCOPY);  Surgeon: Jenelle Jensen MD;  Location: [facility];  Service: Endoscopy;  Laterality: N/A;    HERNIA REPAIR Bilateral     groin    LEFT HEART CATHETERIZATION Left 1/31/2022    Procedure: CATHETERIZATION, HEART, LEFT;  Surgeon: Romeo Godfrey MD;  Location: [facility] CATH LAB;  Service: Cardiology;  Laterality: Left;    NECK SURGERY      OPEN REDUCTION AND INTERNAL FIXATION (ORIF) OF INJURY OF ANKLE Left 8/10/2022    Procedure: ORIF, ANKLE;  Surgeon: Howard Mata MD;  Location: [facility];  Service: Orthopedics;  Laterality: Left;    PARTIAL NEPHRECTOMY      left    RENAL BIOPSY      ROBOT-ASSISTED REPAIR OF UMBILICAL HERNIA USING DA NEL XI N/A 5/4/2021    Procedure: XI ROBOTIC REPAIR, HERNIA, UMBILICAL;  Surgeon: Shaji Jauregui MD;  Location: [facility];  Service: General;  Laterality: N/A;  converted to open    TONSILLECTOMY      TRIGGER FINGER RELEASE Right     long finger         Family History:  Family History   Problem Relation Age of Onset    Hypertension Mother     Hypertension Father     Melanoma Father     Cancer Sister        Social History:  Social History     Tobacco Use    Smoking status: Former     Current packs/day: 0.00     Average  packs/day: 1 pack/day for 20.0 years (20.0 ttl pk-yrs)     Types: Cigarettes     Start date: 1975     Quit date: 1995     Years since quittin.7    Smokeless tobacco: Never   Substance and Sexual Activity    Alcohol use: Never     Alcohol/week: 0.0 standard drinks of alcohol    Drug use: No    Sexual activity: Not Currently     Partners: Female     Birth control/protection: None       ROS   Review of Systems   Constitutional:  Negative for chills and fever.   HENT:  Negative for sore throat.    Respiratory:  Negative for shortness of breath.    Cardiovascular:  Negative for chest pain.   Gastrointestinal:  Negative for diarrhea, nausea and vomiting.   Genitourinary:  Negative for dysuria.   Musculoskeletal:  Positive for myalgias (R index finger). Negative for back pain.   Skin:  Positive for color change (R index finger). Negative for rash.   Neurological:  Negative for dizziness, weakness, numbness and headaches.   Hematological:  Does not bruise/bleed easily.   All other systems reviewed and are negative.    Physical Exam      Initial Vitals [23 0602]   BP Pulse Resp Temp SpO2   (!) 140/94 69 18 98 °F (36.7 °C) 97 %      MAP       --          Physical Exam  Nursing Notes and Vital Signs Reviewed.  Constitutional: Patient is in no acute distress. Well-developed and well-nourished.  Head: Atraumatic. Normocephalic.  Eyes: PERRL. EOM intact. Conjunctivae are not pale. No scleral icterus.  ENT: Mucous membranes are moist. Oropharynx is clear and symmetric.    Neck: Supple. Full ROM. No lymphadenopathy.  Cardiovascular: Regular rate. Regular rhythm. No murmurs, rubs, or gallops. Distal pulses are 2+ and symmetric.  Pulmonary/Chest: No respiratory distress. Clear to auscultation bilaterally. No wheezing or rales.  Abdominal: Soft and non-distended.  There is no tenderness.  No rebound, guarding, or rigidity.   Musculoskeletal: Moves all extremities. No obvious deformities. No edema.  Right Hand: Small  area of ulceration to the distal tip of the R index finger. No evidence of secondary infection. The area does not involve the nail. Full flexion and extension of the wrist. Radial, median, and ulnar nerves are intact. Radial and ulnar pulses are 2+. Normal capillary refill.  Distal sensation is intact. NVI distally.   Skin: Warm and dry.  Neurological:  Alert, awake, and appropriate.  Normal speech.  No acute focal neurological deficits are appreciated.  Psychiatric: Normal affect. Good eye contact. Appropriate in content.    ED Course    Procedures  ED Vital Signs:  Vitals:    11/02/23 0602   BP: (!) 140/94   Pulse: 69   Resp: 18   Temp: 98 °F (36.7 °C)   TempSrc: Oral   SpO2: 97%       Abnormal Lab Results:  Labs Reviewed - No data to display     Imaging Results:  Imaging Results    None                 The Emergency Provider reviewed the vital signs and test results, which are outlined above.    ED Discussion     6:24 AM: Discussed with pt all pertinent ED information. Discussed pt dx and plan of tx. Gave pt all f/u and return to the ED instructions. All questions and concerns were addressed at this time. Pt expresses understanding of information and instructions, and is comfortable with plan to discharge. Pt is stable for discharge.    I discussed with patient and/or family/caretaker that evaluation in the ED does not suggest any emergent or life threatening medical conditions requiring immediate intervention beyond what was provided in the ED, and I believe patient is safe for discharge.  Regardless, an unremarkable evaluation in the ED does not preclude the development or presence of a serious of life threatening condition. As such, patient was instructed to return immediately for any worsening or change in current symptoms.         ED Medication(s):  Medications - No data to display     Follow-up Information       Kane Negrete MD. Schedule an appointment as soon as possible for a visit in 2 days.     Specialties: Hand Surgery, Orthopedic Surgery  Why: Return to the Emergency Room, If symptoms worsen  Contact information:  [address] [place] [address] Dr Kamar [address]  [place] [address]  [phone]                           Discharge Medication List as of 11/2/2023  6:20 AM            Medical Decision Making    Medical Decision Making  Patient with 2 month hx of pain and skin changes to distal tip of right index finger, thinks he injured it on a nail but did not seek help immediately.  Of note patient has been followed by Dr. Negrete with ortho hand, has scheduled carpal tunnel release in same wrist.  States Dr. Negrete aware of findings and states related to carpal tunnel.  States pain woke him up and so he wanted to make sure nothing else was going on.  On exam there is very mild ulceration to distal tip of finger, there is no infection or necrosis and neurovascular status is normal.  No indication for imaging or workup.  He was advised to follow up with hand specialist.     Amount and/or Complexity of Data Reviewed  External Data Reviewed: notes.                Scribe Attestation:   Scribe #1: I performed the above scribed service and the documentation accurately describes the services I performed. I attest to the accuracy of the note.    Attending:   Physician Attestation Statement for Scribe #1: I, Radha Ruiz MD, personally performed the services described in this documentation, as scribed by Michael Medina, in my presence, and it is both accurate and complete.          Clinical Impression       ICD-10-CM ICD-9-CM   1. Finger ulcer, limited to breakdown of skin  L98.491 707.8   2. History of carpal tunnel syndrome  Z86.69 V12.49       Disposition:   Disposition: Discharged  Condition: Stable         Radha Ruiz MD  11/02/23 0714

## 2023-11-05 PROCEDURE — 99283 EMERGENCY DEPT VISIT LOW MDM: CPT

## 2023-11-06 ENCOUNTER — HOSPITAL ENCOUNTER (EMERGENCY)
Facility: HOSPITAL | Age: 68
Discharge: HOME | End: 2023-11-06
Attending: FAMILY MEDICINE
Payer: MEDICARE

## 2023-11-06 VITALS
HEART RATE: 83 BPM | SYSTOLIC BLOOD PRESSURE: 138 MMHG | DIASTOLIC BLOOD PRESSURE: 82 MMHG | TEMPERATURE: 98 F | OXYGEN SATURATION: 96 % | RESPIRATION RATE: 20 BRPM

## 2023-11-06 DIAGNOSIS — L98.499 ULCERATION LOCALIZED TO FINGERTIP: Primary | ICD-10-CM

## 2023-11-06 PROCEDURE — 25000003 PHARM REV CODE 250: Performed by: NURSE PRACTITIONER

## 2023-11-06 RX ORDER — TRAMADOL HYDROCHLORIDE 50 MG/1
50 TABLET ORAL EVERY 6 HOURS PRN
Qty: 12 TABLET | Refills: 0 | Status: SHIPPED | OUTPATIENT
Start: 2023-11-06 | End: 2023-12-15 | Stop reason: SDUPTHER

## 2023-11-06 RX ORDER — HYDROCODONE BITARTRATE AND ACETAMINOPHEN 5; 325 MG/1; MG/1
1 TABLET ORAL
Status: COMPLETED | OUTPATIENT
Start: 2023-11-06 | End: 2023-11-06

## 2023-11-06 RX ORDER — MUPIROCIN 20 MG/G
1 OINTMENT TOPICAL
Status: COMPLETED | OUTPATIENT
Start: 2023-11-06 | End: 2023-11-06

## 2023-11-06 RX ADMIN — MUPIROCIN 1 TUBE: 20 OINTMENT TOPICAL at 12:11

## 2023-11-06 RX ADMIN — HYDROCODONE BITARTRATE AND ACETAMINOPHEN 1 TABLET: 5; 325 TABLET ORAL at 12:11

## 2023-11-06 NOTE — ED PROVIDER NOTES
Encounter Date: 11/5/2023       History     Chief Complaint   Patient presents with    Hand Pain     Pt reports pain to right index finger and wants to cut it off; was recently seen here for same thing but states nothing was done     Patient is a 68-year-old male who presents with pain to the right index finger.  Patient was seen here recently with an ulceration to the finger.  States that it is continuing to cause pain and that the scab that was there has come off.  Patient states like it feels like it is worsening.  He denies any fever, nausea or vomiting.  Denies taking any medications for pain relief.  No distress noted at this time.      Review of patient's allergies indicates:   Allergen Reactions    Rosuvastatin Other (See Comments)     Joint pain   Joint and bone pain     Past Medical History:   Diagnosis Date    Anxiety     Cancer     spot on kidney and lung    Diabetes mellitus     Borderline diet controlled    Diverticulitis     GERD (gastroesophageal reflux disease)     Hyperlipidemia     not on any meds currently    Hypertension     Lung mass     Mass of kidney      Past Surgical History:   Procedure Laterality Date    ARTHROPLASTY OF JOINT OF FINGER Left 11/18/2022    Procedure: ARTHROPLASTY, FINGER;  Surgeon: Kane Negrete MD;  Location: [facility];  Service: Orthopedics;  Laterality: Left;  left thumb basal joint arthroplasty  Arthrex  Podiatry extras    CHOLECYSTECTOMY      COLONOSCOPY      COLONOSCOPY N/A 4/7/2017    Procedure: COLONOSCOPY;  Surgeon: Amarilys Bravo MD;  Location: [facility];  Service: Endoscopy;  Laterality: N/A;    DIAGNOSTIC LAPAROSCOPY N/A 5/4/2021    Procedure: LAPAROSCOPY, DIAGNOSTIC;  Surgeon: Shaji Jauregui MD;  Location: [facility];  Service: General;  Laterality: N/A;    ESOPHAGOGASTRODUODENOSCOPY      ESOPHAGOGASTRODUODENOSCOPY N/A 2/13/2020    Procedure: ESOPHAGOGASTRODUODENOSCOPY (EGD);  Surgeon: Shaji Aquino III, MD;  Location: [facility];  Service:  Endoscopy;  Laterality: N/A;    ESOPHAGOGASTRODUODENOSCOPY N/A 5/3/2023    Procedure: EGD (ESOPHAGOGASTRODUODENOSCOPY);  Surgeon: Jenelle Jensen MD;  Location: [facility] ENDO;  Service: Endoscopy;  Laterality: N/A;    HERNIA REPAIR Bilateral     groin    LEFT HEART CATHETERIZATION Left 2022    Procedure: CATHETERIZATION, HEART, LEFT;  Surgeon: Romeo Godfrey MD;  Location: [facility] CATH LAB;  Service: Cardiology;  Laterality: Left;    NECK SURGERY      OPEN REDUCTION AND INTERNAL FIXATION (ORIF) OF INJURY OF ANKLE Left 8/10/2022    Procedure: ORIF, ANKLE;  Surgeon: Howard Mata MD;  Location: [facility] OR;  Service: Orthopedics;  Laterality: Left;    PARTIAL NEPHRECTOMY      left    RENAL BIOPSY      ROBOT-ASSISTED REPAIR OF UMBILICAL HERNIA USING DA NEL XI N/A 2021    Procedure: XI ROBOTIC REPAIR, HERNIA, UMBILICAL;  Surgeon: Shaji Jauregui MD;  Location: [facility] OR;  Service: General;  Laterality: N/A;  converted to open    TONSILLECTOMY      TRIGGER FINGER RELEASE Right     long finger     Family History   Problem Relation Age of Onset    Hypertension Mother     Hypertension Father     Melanoma Father     Cancer Sister      Social History     Tobacco Use    Smoking status: Former     Current packs/day: 0.00     Average packs/day: 1 pack/day for 20.0 years (20.0 ttl pk-yrs)     Types: Cigarettes     Start date: 1975     Quit date: 1995     Years since quittin.7    Smokeless tobacco: Never   Substance Use Topics    Alcohol use: Never     Alcohol/week: 0.0 standard drinks of alcohol    Drug use: No     Review of Systems   Constitutional:  Negative for fever.   HENT:  Negative for sore throat.    Respiratory:  Negative for shortness of breath.    Cardiovascular:  Negative for chest pain.   Gastrointestinal:  Negative for nausea.   Genitourinary:  Negative for dysuria.   Musculoskeletal:  Positive for arthralgias (right index finger). Negative for back pain.   Skin:  Negative for rash.    Neurological:  Negative for weakness.   Hematological:  Does not bruise/bleed easily.       Physical Exam     Initial Vitals [11/05/23 2345]   BP Pulse Resp Temp SpO2   138/82 83 -- 97.9 °F (36.6 °C) 96 %      MAP       --         Physical Exam    Nursing note and vitals reviewed.  Constitutional: He appears well-developed and well-nourished.   HENT:   Head: Normocephalic and atraumatic.   Eyes: Conjunctivae and EOM are normal. Pupils are equal, round, and reactive to light.   Neck: Neck supple.   Normal range of motion.  Cardiovascular:  Normal rate, regular rhythm, normal heart sounds and intact distal pulses.           Pulmonary/Chest: Breath sounds normal.   Abdominal: Abdomen is soft. Bowel sounds are normal.   Musculoskeletal:         General: Normal range of motion.        Hands:       Cervical back: Normal range of motion and neck supple.     Neurological: He is alert and oriented to person, place, and time. He has normal strength and normal reflexes.   Skin: Skin is warm and dry. Capillary refill takes less than 2 seconds.   Psychiatric: He has a normal mood and affect. His behavior is normal. Judgment and thought content normal.           ED Course   Procedures  Labs Reviewed - No data to display       Imaging Results              X-Ray Finger 2 or More Views Right (In process)                      Medications   mupirocin 2 % ointment 1 Tube (has no administration in time range)   HYDROcodone-acetaminophen 5-325 mg per tablet 1 tablet (has no administration in time range)     Medical Decision Making  Patient instructed to take medication as prescribed and follow-up with orthopedist for further evaluation and management.  Patient shows no signs distress at time of discharge.    Amount and/or Complexity of Data Reviewed  Radiology: ordered.    Risk  Prescription drug management.                               Clinical Impression:   Final diagnoses:  [L98.499] Ulceration localized to fingertip (Primary)         ED Disposition Condition    Discharge Stable          ED Prescriptions       Medication Sig Dispense Start Date End Date Auth. Provider    traMADoL (ULTRAM) 50 mg tablet Take 1 tablet (50 mg total) by mouth every 6 (six) hours as needed. 12 tablet 11/6/2023 -- Jero Liao NP          Follow-up Information       Follow up With Specialties Details Why Contact Info    Kane Negrete MD Hand Surgery, Orthopedic Surgery Schedule an appointment as soon as possible for a visit   [address] [place] [address] [address]ar [address]  [facility] [address]  [phone]               Jero Liao NP  11/06/23 0045

## 2023-11-14 ENCOUNTER — HOSPITAL ENCOUNTER (OUTPATIENT)
Dept: CARDIOLOGY | Facility: HOSPITAL | Age: 68
Discharge: HOME | End: 2023-11-14
Payer: MEDICARE

## 2023-11-14 ENCOUNTER — OFFICE VISIT (OUTPATIENT)
Dept: INTERNAL MEDICINE | Facility: CLINIC | Age: 68
End: 2023-11-14
Payer: MEDICARE

## 2023-11-14 VITALS
RESPIRATION RATE: 20 BRPM | HEART RATE: 102 BPM | DIASTOLIC BLOOD PRESSURE: 65 MMHG | OXYGEN SATURATION: 96 % | TEMPERATURE: 98 F | SYSTOLIC BLOOD PRESSURE: 99 MMHG

## 2023-11-14 DIAGNOSIS — I10 ESSENTIAL HYPERTENSION: Chronic | ICD-10-CM

## 2023-11-14 DIAGNOSIS — Z01.818 PRE-OP TESTING: ICD-10-CM

## 2023-11-14 DIAGNOSIS — R94.39 ABNORMAL NUCLEAR STRESS TEST: ICD-10-CM

## 2023-11-14 DIAGNOSIS — Z95.1 HX OF CABG: ICD-10-CM

## 2023-11-14 DIAGNOSIS — G56.03 BILATERAL CARPAL TUNNEL SYNDROME: Primary | ICD-10-CM

## 2023-11-14 DIAGNOSIS — Z01.818 PREOPERATIVE EXAMINATION: ICD-10-CM

## 2023-11-14 PROCEDURE — 99214 OFFICE O/P EST MOD 30 MIN: CPT | Mod: S$GLB,,, | Performed by: INTERNAL MEDICINE

## 2023-11-14 PROCEDURE — 93010 EKG 12-LEAD: ICD-10-PCS | Mod: ,,, | Performed by: INTERNAL MEDICINE

## 2023-11-14 PROCEDURE — 1160F RVW MEDS BY RX/DR IN RCRD: CPT | Mod: CPTII,S$GLB,, | Performed by: INTERNAL MEDICINE

## 2023-11-14 PROCEDURE — 1159F PR MEDICATION LIST DOCUMENTED IN MEDICAL RECORD: ICD-10-PCS | Mod: CPTII,S$GLB,, | Performed by: INTERNAL MEDICINE

## 2023-11-14 PROCEDURE — 99999 PR PBB SHADOW E&M-EST. PATIENT-LVL IV: CPT | Mod: PBBFAC,,,

## 2023-11-14 PROCEDURE — 3074F SYST BP LT 130 MM HG: CPT | Mod: CPTII,S$GLB,, | Performed by: INTERNAL MEDICINE

## 2023-11-14 PROCEDURE — 1159F MED LIST DOCD IN RCRD: CPT | Mod: CPTII,S$GLB,, | Performed by: INTERNAL MEDICINE

## 2023-11-14 PROCEDURE — 4010F ACE/ARB THERAPY RXD/TAKEN: CPT | Mod: CPTII,S$GLB,, | Performed by: INTERNAL MEDICINE

## 2023-11-14 PROCEDURE — 93005 ELECTROCARDIOGRAM TRACING: CPT

## 2023-11-14 PROCEDURE — 99999 PR PBB SHADOW E&M-EST. PATIENT-LVL IV: ICD-10-PCS | Mod: PBBFAC,,,

## 2023-11-14 PROCEDURE — 93010 ELECTROCARDIOGRAM REPORT: CPT | Mod: ,,, | Performed by: INTERNAL MEDICINE

## 2023-11-14 PROCEDURE — 3078F DIAST BP <80 MM HG: CPT | Mod: CPTII,S$GLB,, | Performed by: INTERNAL MEDICINE

## 2023-11-14 PROCEDURE — 99214 PR OFFICE/OUTPT VISIT, EST, LEVL IV, 30-39 MIN: ICD-10-PCS | Mod: S$GLB,,, | Performed by: INTERNAL MEDICINE

## 2023-11-14 PROCEDURE — 3078F PR MOST RECENT DIASTOLIC BLOOD PRESSURE < 80 MM HG: ICD-10-PCS | Mod: CPTII,S$GLB,, | Performed by: INTERNAL MEDICINE

## 2023-11-14 PROCEDURE — 3074F PR MOST RECENT SYSTOLIC BLOOD PRESSURE < 130 MM HG: ICD-10-PCS | Mod: CPTII,S$GLB,, | Performed by: INTERNAL MEDICINE

## 2023-11-14 PROCEDURE — 1160F PR REVIEW ALL MEDS BY PRESCRIBER/CLIN PHARMACIST DOCUMENTED: ICD-10-PCS | Mod: CPTII,S$GLB,, | Performed by: INTERNAL MEDICINE

## 2023-11-14 PROCEDURE — 4010F PR ACE/ARB THEARPY RXD/TAKEN: ICD-10-PCS | Mod: CPTII,S$GLB,, | Performed by: INTERNAL MEDICINE

## 2023-11-14 PROCEDURE — 3044F HG A1C LEVEL LT 7.0%: CPT | Mod: CPTII,S$GLB,, | Performed by: INTERNAL MEDICINE

## 2023-11-14 PROCEDURE — 3044F PR MOST RECENT HEMOGLOBIN A1C LEVEL <7.0%: ICD-10-PCS | Mod: CPTII,S$GLB,, | Performed by: INTERNAL MEDICINE

## 2023-11-14 NOTE — PRE-PROCEDURE INSTRUCTIONS
Pre op instructions reviewed with patient during Clinic Visit with Provider, verbalized understanding.    To confirm, Surgery is scheduled on 12/1/23. We will call you late afternoon the business day prior to surgery with your arrival time.    *Please report to the [facility] Lobby (1st Floor) located off of [facility] (2nd Entrance/Building on the left, in front of the flag pole).  Address: [address] [place] [address] [place] [address]      INSTRUCTIONS IMPORTANT!!!  DO NOT Eat, Drink, or Smoke after 12 midnight unless instructed otherwise by your Surgeon. OK to brush teeth, no gum, candy or mints!    MORNING OF SURGERY, drink small sip of water with the following medications instructed by Pre-Admit Provider:  Tamsulosin  Amlodipine  Citalopram  Hydralazine  Imdur  Metoprolol  Omeprazole     *Additional Medication Instructions: HOLD ELIQUIS 2 DAYS PRIOR TO SURGERY!    Diabetic Patients: If you take diabetic or weight loss medication, Do NOT take morning of surgery unless instructed by Doctor. Metformin to be stopped 24 hrs prior to surgery. Ozempic/ Mounjaro/ Wegovy/ Trulicity/ Semaglutide or any weight loss injections to be stopped 7 days prior to surgery. DO NOT take long-acting insulin the evening before surgery. Blood sugars will be checked in pre-op by Nurse.    *Patients should HOLD all vitamins, herbal supplements, weight loss medication, aspirin products & NSAIDS 7 days prior to surgery, as these can thin the blood. Ok to take Tylenol.    ____  Avoid Alcoholic beverages 3 days prior to surgery, as it can thin the blood.  ____  NO Acrylic/fake nails or nail polish worn day of surgery (specifically hand/arm & foot surgeries).  ____  NO powder, lotions, deodorants, oils or cream on body.  ____  Remove all jewelry, piercings, & foreign objects prior to arrival and leave at home.  ____  Remove Dentures, Hearing Aids & Contact Lens prior to surgery.  ____  Bring photo ID and insurance information to  hospital (Leave Valuables at Home).  ____  If going home the same day, arrange for a ride home. You will not be able to drive for 24 hrs if Anesthesia was used.   ____  Females (ages 11-60): may need to give a urine sample the morning of surgery; please see Pre op Nurse prior to using the restroom.  ____  Males: Stop ED medications (Viagra, Cialis) 24 hrs prior to surgery.  ____  Wear clean, loose fitting clothing to allow for dressings/ bandages.      Bathing Instructions:    -Shower with anti-bacterial Soap (Hibiclens or Dial) the night before surgery and the morning of.   -Do not use Hibiclens on your face or genitals.   -Apply clean clothes after shower.  -Do not shave your face or body 2 days prior to surgery unless instructed otherwise by your Surgeon.  -Do not shave pubic hair 7 days prior to surgery (gyn pt's).    [facility]/Ride Policy:  Only 2 adults allowed in pre op/recovery area during your procedure. You MUST HAVE A RIDE HOME from a responsible adult that you know and trust. Medical Transport, Uber or Lyft can ONLY be used if patient has a responsible adult to accompany them during ride home.    Discharge Instructions: You will receive Post-op/Discharge instructions by your Discharge Nurse prior to going home.   *Prevention of surgical site infections:   -Keep incisions clean and dry.   -Do not soak/submerge incisions in water until completely healed.   -Do not apply lotions, powders, creams, or deodorants to site.   -Always make sure hands are cleaned with antibacterial soap/ alcohol-based  prior to touching the surgical site.        *Signs and symptoms of Infection Before or After Surgery:               !!!If you experience any fever, chills, nausea/ vomiting, foul odor/ excessive drainage from surgical site, flu-like symptoms, new wounds or cuts, PLEASE CALL THE SURGEON OFFICE at [phone] or SEND MESSAGE THROUGH [employer]!!!       *If you are running late day of surgery, please  call the Surgery Dept @ [phone].    *Billing question, please call  [phone]       Thank you,  -[facility] Pre Admit Dept.  [phone] or [phone]  M-F 7:30 am-4:00 pm (Closed Major Holidays)    Additional Tests Scheduled Today:  EKG (4th Floor) Check in at the !

## 2023-11-14 NOTE — ASSESSMENT & PLAN NOTE
Planned for RELEASE, CARPAL TUNNEL , Right with Dr. Kane Negrete on 12/1/23.     Known risk factors for perioperative complications: Coronary disease  Chronic pulmonary disease    Difficulty with intubation is not anticipated.    Cardiac Risk Estimation: Based on the Revised Cardiac Risk index, patient is a Class 3 risk with a 10.1% risk of a major cardiac event in a low risk procedure.    1.) Preoperative workup as follows: ECG, hemoglobin, hematocrit, electrolytes, creatinine, liver function studies.  2.) Change in medication regimen before surgery: discontinue ASA, NSAIDs 7 days before surgery, hold Metformin 24 hours prior to surgery.  3.) Prophylaxis for cardiac events with perioperative beta-blockers: Continue BB as prescribed.  4.) Invasive hemodynamic monitoring perioperatively: at the discretion of anesthesiologist.  5.) Deep vein thrombosis prophylaxis postoperatively: intermittent pneumatic compression boots and regimen to be chosen by surgical team.  6.) Surveillance for postoperative MI with ECG immediately postoperatively and on postoperati ve days 1 and 2 AND troponin levels 24 hours postoperatively and on day 4 or hospital discharge (whichever comes first): not indicated.  7.) Current medications which may produce withdrawal symptoms if withheld perioperatively: None  8.) Other measures: Postoperative hypertension management with IV hydralazine until able to take oral medications.     --Morning of Procedure: amlodipine, citalopram, hydralazine, Imdur, metoprolol, omeprazole, tamsulosin  --Resume all medications post-operatively  --Hold ASA, NSAIDs and all vitamins/supplements 7 days prior to procedure   --Hold Eliquis 2 days prior  --10/11/23 (telephone encounter)     Cardiac Clearance per Cardiology:  Elevated periop risk of CV events for non-high risk procedure.  Ok to proceed the scheduled procedure without further cardiac study.  OK to hold ELIQUIS 2 DAYS before the procedure and resume postop  once hemodynamically stable

## 2023-11-14 NOTE — PROGRESS NOTES
Preoperative History and Physical                                                              Hospital Medicine                                                                      Chief Complaint: Preoperative evaluation     History of Present Illness:      Mil Sood is a 68 y.o. male who presents to the office today for a preoperative consultation at the request of Dr. Kane Negrete who plans on performing Carpal Tunnel Release, Right on December 1.     Functional Status:      The patient is able to climb a flight of stairs. The patient is able to ambulate without difficulty. The patient's functional status is not affected by the surgical problem. The patient's functional status is not affected by shortness of breath, chest pain, dyspnea on exertion and fatigue.    MET score greater than 4    Past Medical History:      Past Medical History:   Diagnosis Date    Anxiety     Cancer     spot on kidney and lung    Deep vein thrombosis     Diabetes mellitus     Borderline diet controlled    Diverticulitis     GERD (gastroesophageal reflux disease)     Hyperlipidemia     not on any meds currently    Hypertension     Lung mass     Mass of kidney         Past Surgical History:      Past Surgical History:   Procedure Laterality Date    ARTHROPLASTY OF JOINT OF FINGER Left 11/18/2022    Procedure: ARTHROPLASTY, FINGER;  Surgeon: Kane Negrete MD;  Location: [facility];  Service: Orthopedics;  Laterality: Left;  left thumb basal joint arthroplasty  Arthrex  Podiatry extras    CHOLECYSTECTOMY      COLONOSCOPY      COLONOSCOPY N/A 4/7/2017    Procedure: COLONOSCOPY;  Surgeon: Amarilys Bravo MD;  Location: [facility];  Service: Endoscopy;  Laterality: N/A;    DIAGNOSTIC LAPAROSCOPY N/A 5/4/2021    Procedure: LAPAROSCOPY, DIAGNOSTIC;  Surgeon: Shaji Jauregui MD;  Location: [facility] [place] OR;  Service: General;  Laterality: N/A;    ESOPHAGOGASTRODUODENOSCOPY       ESOPHAGOGASTRODUODENOSCOPY N/A 2020    Procedure: ESOPHAGOGASTRODUODENOSCOPY (EGD);  Surgeon: Shaji Aquino III, MD;  Location: [facility] ENDO;  Service: Endoscopy;  Laterality: N/A;    ESOPHAGOGASTRODUODENOSCOPY N/A 5/3/2023    Procedure: EGD (ESOPHAGOGASTRODUODENOSCOPY);  Surgeon: Jenelle Jensen MD;  Location: [facility] ENDO;  Service: Endoscopy;  Laterality: N/A;    HERNIA REPAIR Bilateral     groin    LEFT HEART CATHETERIZATION Left 2022    Procedure: CATHETERIZATION, HEART, LEFT;  Surgeon: Romeo Godfrey MD;  Location: [facility] CATH LAB;  Service: Cardiology;  Laterality: Left;    NECK SURGERY      OPEN REDUCTION AND INTERNAL FIXATION (ORIF) OF INJURY OF ANKLE Left 8/10/2022    Procedure: ORIF, ANKLE;  Surgeon: Howard Mata MD;  Location: [facility] OR;  Service: Orthopedics;  Laterality: Left;    PARTIAL NEPHRECTOMY      left    RENAL BIOPSY      ROBOT-ASSISTED REPAIR OF UMBILICAL HERNIA USING DA NEL XI N/A 2021    Procedure: XI ROBOTIC REPAIR, HERNIA, UMBILICAL;  Surgeon: Shaji Jauregui MD;  Location: [facility] OR;  Service: General;  Laterality: N/A;  converted to open    TONSILLECTOMY      TRIGGER FINGER RELEASE Right     long finger        Social History:      Social History     Socioeconomic History    Marital status:    Tobacco Use    Smoking status: Former     Current packs/day: 0.00     Average packs/day: 1 pack/day for 20.0 years (20.0 ttl pk-yrs)     Types: Cigarettes     Start date: 1975     Quit date: 1995     Years since quittin.7    Smokeless tobacco: Never   Substance and Sexual Activity    Alcohol use: Never     Alcohol/week: 0.0 standard drinks of alcohol    Drug use: No    Sexual activity: Not Currently     Partners: Female     Birth control/protection: None     Social Determinants of Health     Financial Resource Strain: Medium Risk (2023)    Overall Financial Resource Strain (CARDIA)     Difficulty of Paying Living Expenses: Somewhat hard   Food Insecurity: No  Food Insecurity (9/5/2023)    Hunger Vital Sign     Worried About Running Out of Food in the Last Year: Never true     Ran Out of Food in the Last Year: Never true   Transportation Needs: No Transportation Needs (9/5/2023)    PRAPARE - Transportation     Lack of Transportation (Medical): No     Lack of Transportation (Non-Medical): No   Physical Activity: Insufficiently Active (9/5/2023)    Exercise Vital Sign     Days of Exercise per Week: 3 days     Minutes of Exercise per Session: 40 min   Stress: Stress Concern Present (9/5/2023)    [employer] - Occupational Stress Questionnaire     Feeling of Stress : Very much   Social Connections: Moderately Isolated (9/5/2023)    Social Connection and Isolation Panel [NHANES]     Frequency of Communication with Friends and Family: More than three times a week     Frequency of Social Gatherings with Friends and Family: Once a week     Attends [employer] Services: More than 4 times per year     Active Member of Clubs or Organizations: No     Marital Status:    Housing Stability: Low Risk  (9/5/2023)    Housing Stability Vital Sign     Unable to Pay for Housing in the Last Year: No     Number of Places Lived in the Last Year: 1     Unstable Housing in the Last Year: No        Family History:      Family History   Problem Relation Age of Onset    Hypertension Mother     Hypertension Father     Melanoma Father     Cancer Sister        Allergies:      Review of patient's allergies indicates:   Allergen Reactions    Rosuvastatin Other (See Comments)     Joint pain   Joint and bone pain       Medications:      Current Outpatient Medications   Medication Sig    amLODIPine (NORVASC) 5 MG tablet Take 1 tablet (5 mg total) by mouth once daily.    apixaban (ELIQUIS) 5 mg Tab Take 1 tablet (5 mg total) by mouth 2 (two) times daily.    aspirin (ECOTRIN) 81 MG EC tablet Take 1 tablet (81 mg total) by mouth once daily.    atorvastatin (LIPITOR) 40 MG  tablet Take 1 tablet (40 mg total) by mouth once daily.    benazepriL (LOTENSIN) 20 MG tablet TAKE 2 TABLETS BY MOUTH NIGHTLY.    citalopram (CELEXA) 20 MG tablet Take 1 tablet (20 mg total) by mouth once daily.    clonazePAM (KLONOPIN) 1 MG tablet Take 1 tablet (1 mg total) by mouth nightly as needed for Anxiety.    cyclobenzaprine (FLEXERIL) 10 MG tablet TAKE 1 TABLET BY MOUTH THREE TIMES DAILY AS NEEDED.    finasteride (PROSCAR) 5 mg tablet Take 1 tablet (5 mg total) by mouth every evening.    gabapentin (NEURONTIN) 600 MG tablet Take 2 tablets (1,200 mg total) by mouth nightly.    hydrALAZINE (APRESOLINE) 25 MG tablet Take 1 tablet (25 mg total) by mouth 3 (three) times daily.    isosorbide mononitrate (IMDUR) 60 MG 24 hr tablet Take 1 tablet (60 mg total) by mouth once daily.    metoprolol tartrate (LOPRESSOR) 25 MG tablet Take 1 tablet (25 mg total) by mouth 2 (two) times daily.    omeprazole (PRILOSEC) 40 MG capsule Take 1 capsule (40 mg total) by mouth every morning.    tamsulosin (FLOMAX) 0.4 mg Cap TAKE 1 CAPSULE BY MOUTH EVERY EVENING.    traMADoL (ULTRAM) 50 mg tablet Take 1 tablet (50 mg total) by mouth every 6 (six) hours as needed.    traZODone (DESYREL) 100 MG tablet TAKE 1 TABLET BY MOUTH IN THE EVENING.    zolpidem (AMBIEN) 10 mg Tab Take 1 tablet (10 mg total) by mouth every evening.     No current facility-administered medications for this visit.     Facility-Administered Medications Ordered in Other Visits   Medication    chlorhexidine 0.12 % solution 10 mL       Vitals:      Vitals:    11/14/23 0755   BP: 99/65   Pulse: 102   Resp: 20   Temp: 97.6 °F (36.4 °C)       Review of Systems:        Constitutional: Negative for fever, chills, weight loss, malaise/fatigue and diaphoresis.   HENT: Negative for hearing loss, ear pain, nosebleeds, congestion, sore throat, neck pain, tinnitus and ear discharge.    Eyes: Negative for blurred vision, double vision, photophobia, pain, discharge and redness.    Respiratory: Negative for cough, hemoptysis, sputum production, shortness of breath, wheezing and stridor.    Cardiovascular: Negative for chest pain, palpitations, orthopnea, claudication, leg swelling and PND.   Gastrointestinal: Negative for heartburn, nausea, vomiting, abdominal pain, diarrhea, constipation, blood in stool and melena.   Genitourinary: Negative for dysuria, urgency, frequency, hematuria and flank pain.   Musculoskeletal: Negative for myalgias, back pain, joint pain and falls.   Skin: Negative for itching and rash.   Neurological: Negative for dizziness, tingling, tremors, sensory change, speech change, focal weakness, seizures, loss of consciousness, weakness and headaches.   Endo/Heme/Allergies: Negative for environmental allergies and polydipsia. Does not bruise/bleed easily.   Psychiatric/Behavioral: Negative for depression, suicidal ideas, hallucinations, memory loss and substance abuse. The patient is not nervous/anxious and does not have insomnia.    All 14 systems reviewed and negative except as noted above.    Physical Exam:      Constitutional: Appears well-developed, well-nourished and in no acute distress.  Patient is oriented to person, place, and time.   Head: Normocephalic and atraumatic. Mucous membranes moist.  Neck: Neck supple no mass.   Cardiovascular: Normal rate and regular rhythm.  S1 S2 appreciated by ascultation.  Pulmonary/Chest: Effort normal and clear to auscultation bilaterally. No respiratory distress.   Abdomen: Soft. Non-tender and non-distended. Bowel sounds are normal.   Neurological: Patient is alert and oriented to person, place and time. Moves all extremities.  Skin: Warm and dry. No lesions.  Extremities: No clubbing, cyanosis or edema.    Laboratory data:      Reviewed and noted in plan where applicable. Please see chart for full laboratory data.      Lab Results   Component Value Date    WBC 7.55 11/14/2023    HGB 15.5 11/14/2023    HCT 47.8 11/14/2023     MCV 88 11/14/2023     11/14/2023         Predictors of intubation difficulty:       Morbid obesity? no   Anatomically abnormal facies? no   Prominent incisors? no   Receding mandible? no   Short, thick neck? no   Neck range of motion: normal   Dentition:  Full Dentures top and bottom  Based on the Modified Mallampati, patient is a mallampati score: II (hard and soft palate, upper portion of tonsils anduvula visible)    Cardiographics:      ECG: Normal sinus rhythm   Baseline Artifact   When compared with ECG of 15-SEP-2023 09:02,   RSR' pattern in V1 is no longer Present     Confirmed by JENNIFER SANDERS MD ([id number]) on 11/14/2023 9:17:42 AM     Echocardiogram:  EF: 60%    Imaging:      Chest x-ray: not indicated    Assessment and Plan:      Bilateral carpal tunnel syndrome  Planned for RELEASE, CARPAL TUNNEL , Right with Dr. Kane Negrete on 12/1/23.     Known risk factors for perioperative complications: Coronary disease  Chronic pulmonary disease    Difficulty with intubation is not anticipated.    Cardiac Risk Estimation: Based on the Revised Cardiac Risk index, patient is a Class 3 risk with a 10.1% risk of a major cardiac event in a low risk procedure.    1.) Preoperative workup as follows: ECG, hemoglobin, hematocrit, electrolytes, creatinine, liver function studies.  2.) Change in medication regimen before surgery: discontinue ASA, NSAIDs 7 days before surgery, hold Metformin 24 hours prior to surgery.  3.) Prophylaxis for cardiac events with perioperative beta-blockers: Continue BB as prescribed.  4.) Invasive hemodynamic monitoring perioperatively: at the discretion of anesthesiologist.  5.) Deep vein thrombosis prophylaxis postoperatively: intermittent pneumatic compression boots and regimen to be chosen by surgical team.  6.) Surveillance for postoperative MI with ECG immediately postoperatively and on postoperati ve days 1 and 2 AND troponin levels 24 hours postoperatively and on day 4 or hospital  discharge (whichever comes first): not indicated.  7.) Current medications which may produce withdrawal symptoms if withheld perioperatively: None  8.) Other measures: Postoperative hypertension management with IV hydralazine until able to take oral medications.     --Morning of Procedure: amlodipine, citalopram, hydralazine, Imdur, metoprolol, omeprazole, tamsulosin  --Resume all medications post-operatively  --Hold ASA, NSAIDs and all vitamins/supplements 7 days prior to procedure   --Hold Eliquis 2 days prior  --10/11/23 (telephone encounter)     Cardiac Clearance per Cardiology:  Elevated periop risk of CV events for non-high risk procedure.  Ok to proceed the scheduled procedure without further cardiac study.  OK to hold ELIQUIS 2 DAYS before the procedure and resume postop once hemodynamically stable     Hx of CABG x4 in   Followed by Cardiology, per review of chart, see Telephone Encounter Dated 10/11/23.    --See medication recommendations as above     Essential hypertension  Chronic, well controlled    --See medication recommendations as above     Abnormal nuclear stress test  Nuclear Stress test abn 1/2022, s/p CABGx4.   Nuclear Stress test completed        Electronically signed by Morenita Brooks NP on 11/14/2023 at 7:44 AM.

## 2023-11-14 NOTE — DISCHARGE INSTRUCTIONS
Pre op instructions reviewed with patient during Clinic Visit with Provider, verbalized understanding.    To confirm, Surgery is scheduled on 12/1/23. We will call you late afternoon the business day prior to surgery with your arrival time.    *Please report to the [facility] Lobby (1st Floor) located off of [facility] (2nd Entrance/Building on the left, in front of the flag pole).  Address: [address] [place] [address] [place]. [address]      INSTRUCTIONS IMPORTANT!!!  DO NOT Eat, Drink, or Smoke after 12 midnight unless instructed otherwise by your Surgeon. OK to brush teeth, no gum, candy or mints!    MORNING OF SURGERY, drink small sip of water with the following medications instructed by Pre-Admit Provider:  Tamsulosin  Amlodipine  Citalopram  Hydralazine  Imdur  Metoprolol  Omeprazole     *Additional Medication Instructions: HOLD ELIQUIS 2 DAYS PRIOR TO SURGERY!    Diabetic Patients: If you take diabetic or weight loss medication, Do NOT take morning of surgery unless instructed by Doctor. Metformin to be stopped 24 hrs prior to surgery. Ozempic/ Mounjaro/ Wegovy/ Trulicity/ Semaglutide or any weight loss injections to be stopped 7 days prior to surgery. DO NOT take long-acting insulin the evening before surgery. Blood sugars will be checked in pre-op by Nurse.    *Patients should HOLD all vitamins, herbal supplements, weight loss medication, aspirin products & NSAIDS 7 days prior to surgery, as these can thin the blood. Ok to take Tylenol.    ____  Avoid Alcoholic beverages 3 days prior to surgery, as it can thin the blood.  ____  NO Acrylic/fake nails or nail polish worn day of surgery (specifically hand/arm & foot surgeries).  ____  NO powder, lotions, deodorants, oils or cream on body.  ____  Remove all jewelry, piercings, & foreign objects prior to arrival and leave at home.  ____  Remove Dentures, Hearing Aids & Contact Lens prior to surgery.  ____  Bring photo ID and insurance information to  hospital (Leave Valuables at Home).  ____  If going home the same day, arrange for a ride home. You will not be able to drive for 24 hrs if Anesthesia was used.   ____  Females (ages 11-60): may need to give a urine sample the morning of surgery; please see Pre op Nurse prior to using the restroom.  ____  Males: Stop ED medications (Viagra, Cialis) 24 hrs prior to surgery.  ____  Wear clean, loose fitting clothing to allow for dressings/ bandages.      Bathing Instructions:    -Shower with anti-bacterial Soap (Hibiclens or Dial) the night before surgery and the morning of.   -Do not use Hibiclens on your face or genitals.   -Apply clean clothes after shower.  -Do not shave your face or body 2 days prior to surgery unless instructed otherwise by your Surgeon.  -Do not shave pubic hair 7 days prior to surgery (gyn pt's).    [facility] Visitor/Ride Policy:  Only 2 adults allowed in pre op/recovery area during your procedure. You MUST HAVE A RIDE HOME from a responsible adult that you know and trust. Medical Transport, Uber or Lyft can ONLY be used if patient has a responsible adult to accompany them during ride home.    Discharge Instructions: You will receive Post-op/Discharge instructions by your Discharge Nurse prior to going home.   *Prevention of surgical site infections:   -Keep incisions clean and dry.   -Do not soak/submerge incisions in water until completely healed.   -Do not apply lotions, powders, creams, or deodorants to site.   -Always make sure hands are cleaned with antibacterial soap/ alcohol-based  prior to touching the surgical site.        *Signs and symptoms of Infection Before or After Surgery:               !!!If you experience any fever, chills, nausea/ vomiting, foul odor/ excessive drainage from surgical site, flu-like symptoms, new wounds or cuts, PLEASE CALL THE SURGEON OFFICE at [phone] or SEND MESSAGE THROUGH [facility]!!!       *If you are running late day of surgery, please  call the Surgery Dept @ [phone].    *Billing question, please call  [phone]       Thank you,  -[facility] Pre Admit Dept.  [phone] or [phone]  M-F 7:30 am-4:00 pm (Closed Major Holidays)    Additional Tests Scheduled Today:  EKG (4th Floor) Check in at the !

## 2023-11-14 NOTE — ASSESSMENT & PLAN NOTE
Followed by Cardiology, per review of chart, see Telephone Encounter Dated 10/11/23.    --See medication recommendations as above

## 2023-11-16 RX ORDER — CHLORHEXIDINE GLUCONATE ORAL RINSE 1.2 MG/ML
10 SOLUTION DENTAL
Status: CANCELLED | OUTPATIENT
Start: 2023-11-16

## 2023-11-16 RX ORDER — CEFAZOLIN SODIUM 2 G/50ML
2 SOLUTION INTRAVENOUS
Status: CANCELLED | OUTPATIENT
Start: 2023-11-16

## 2023-11-30 ENCOUNTER — TELEPHONE (OUTPATIENT)
Dept: PREADMISSION TESTING | Facility: HOSPITAL | Age: 68
End: 2023-11-30
Payer: MEDICARE

## 2023-11-30 ENCOUNTER — ANESTHESIA EVENT (OUTPATIENT)
Dept: SURGERY | Facility: HOSPITAL | Age: 68
End: 2023-11-30
Payer: MEDICARE

## 2023-11-30 NOTE — TELEPHONE ENCOUNTER
Called and spoke with Pt about the following:     Please arrive to [facility] at 7:00 am on 12/01/23 for your scheduled procedure.  Address: [address] [place] [address] [place]. [address] (2nd Building on left, 1st Floor Lobby)  >>>NO eating or drinking after midnight unless instructed otherwise by your Surgeon<<<

## 2023-12-01 ENCOUNTER — HOSPITAL ENCOUNTER (OUTPATIENT)
Facility: HOSPITAL | Age: 68
Discharge: HOME | End: 2023-12-01
Attending: ORTHOPAEDIC SURGERY | Admitting: ORTHOPAEDIC SURGERY
Payer: MEDICARE

## 2023-12-01 ENCOUNTER — ANESTHESIA (OUTPATIENT)
Dept: SURGERY | Facility: HOSPITAL | Age: 68
End: 2023-12-01
Payer: MEDICARE

## 2023-12-01 DIAGNOSIS — G56.01 RIGHT CARPAL TUNNEL SYNDROME: ICD-10-CM

## 2023-12-01 DIAGNOSIS — G56.03 BILATERAL CARPAL TUNNEL SYNDROME: Primary | ICD-10-CM

## 2023-12-01 PROCEDURE — 37000008 HC ANESTHESIA 1ST 15 MINUTES: Performed by: ORTHOPAEDIC SURGERY

## 2023-12-01 PROCEDURE — 25000003 PHARM REV CODE 250: Performed by: NURSE ANESTHETIST, CERTIFIED REGISTERED

## 2023-12-01 PROCEDURE — 64721 CARPAL TUNNEL SURGERY: CPT | Mod: RT,,, | Performed by: ORTHOPAEDIC SURGERY

## 2023-12-01 PROCEDURE — 64721 PR REVISE MEDIAN N/CARPAL TUNNEL SURG: ICD-10-PCS | Mod: RT,,, | Performed by: ORTHOPAEDIC SURGERY

## 2023-12-01 PROCEDURE — 71000015 HC POSTOP RECOV 1ST HR: Performed by: ORTHOPAEDIC SURGERY

## 2023-12-01 PROCEDURE — 36000707: Performed by: ORTHOPAEDIC SURGERY

## 2023-12-01 PROCEDURE — 63600175 PHARM REV CODE 636 W HCPCS: Performed by: NURSE ANESTHETIST, CERTIFIED REGISTERED

## 2023-12-01 PROCEDURE — 36000706: Performed by: ORTHOPAEDIC SURGERY

## 2023-12-01 PROCEDURE — 25000003 PHARM REV CODE 250: Performed by: ORTHOPAEDIC SURGERY

## 2023-12-01 PROCEDURE — 37000009 HC ANESTHESIA EA ADD 15 MINS: Performed by: ORTHOPAEDIC SURGERY

## 2023-12-01 PROCEDURE — 71000033 HC RECOVERY, INTIAL HOUR: Performed by: ORTHOPAEDIC SURGERY

## 2023-12-01 RX ORDER — CHLORHEXIDINE GLUCONATE ORAL RINSE 1.2 MG/ML
10 SOLUTION DENTAL 2 TIMES DAILY
Status: CANCELLED | OUTPATIENT
Start: 2023-12-01 | End: 2023-12-06

## 2023-12-01 RX ORDER — OXYCODONE AND ACETAMINOPHEN 5; 325 MG/1; MG/1
1 TABLET ORAL
Status: DISCONTINUED | OUTPATIENT
Start: 2023-12-01 | End: 2023-12-01 | Stop reason: HOSPADM

## 2023-12-01 RX ORDER — FENTANYL CITRATE 50 UG/ML
INJECTION, SOLUTION INTRAMUSCULAR; INTRAVENOUS
Status: DISCONTINUED | OUTPATIENT
Start: 2023-12-01 | End: 2023-12-01

## 2023-12-01 RX ORDER — HYDROCODONE BITARTRATE AND ACETAMINOPHEN 5; 325 MG/1; MG/1
1 TABLET ORAL EVERY 4 HOURS PRN
Status: CANCELLED | OUTPATIENT
Start: 2023-12-01

## 2023-12-01 RX ORDER — MIDAZOLAM HYDROCHLORIDE 1 MG/ML
INJECTION INTRAMUSCULAR; INTRAVENOUS
Status: DISCONTINUED | OUTPATIENT
Start: 2023-12-01 | End: 2023-12-01

## 2023-12-01 RX ORDER — SODIUM CHLORIDE 0.9 % (FLUSH) 0.9 %
10 SYRINGE (ML) INJECTION
Status: DISCONTINUED | OUTPATIENT
Start: 2023-12-01 | End: 2023-12-01 | Stop reason: HOSPADM

## 2023-12-01 RX ORDER — HYDROMORPHONE HYDROCHLORIDE 2 MG/ML
0.2 INJECTION, SOLUTION INTRAMUSCULAR; INTRAVENOUS; SUBCUTANEOUS EVERY 5 MIN PRN
Status: DISCONTINUED | OUTPATIENT
Start: 2023-12-01 | End: 2023-12-01 | Stop reason: HOSPADM

## 2023-12-01 RX ORDER — LIDOCAINE HYDROCHLORIDE 20 MG/ML
INJECTION, SOLUTION EPIDURAL; INFILTRATION; INTRACAUDAL; PERINEURAL
Status: DISCONTINUED | OUTPATIENT
Start: 2023-12-01 | End: 2023-12-01 | Stop reason: HOSPADM

## 2023-12-01 RX ORDER — ONDANSETRON 2 MG/ML
4 INJECTION INTRAMUSCULAR; INTRAVENOUS DAILY PRN
Status: DISCONTINUED | OUTPATIENT
Start: 2023-12-01 | End: 2023-12-01 | Stop reason: HOSPADM

## 2023-12-01 RX ORDER — PROPOFOL 10 MG/ML
VIAL (ML) INTRAVENOUS CONTINUOUS PRN
Status: DISCONTINUED | OUTPATIENT
Start: 2023-12-01 | End: 2023-12-01

## 2023-12-01 RX ORDER — PROPOFOL 10 MG/ML
VIAL (ML) INTRAVENOUS
Status: DISCONTINUED | OUTPATIENT
Start: 2023-12-01 | End: 2023-12-01

## 2023-12-01 RX ORDER — LIDOCAINE HYDROCHLORIDE 20 MG/ML
INJECTION INTRAVENOUS
Status: DISCONTINUED | OUTPATIENT
Start: 2023-12-01 | End: 2023-12-01

## 2023-12-01 RX ORDER — ONDANSETRON 2 MG/ML
INJECTION INTRAMUSCULAR; INTRAVENOUS
Status: DISCONTINUED | OUTPATIENT
Start: 2023-12-01 | End: 2023-12-01

## 2023-12-01 RX ORDER — KETOROLAC TROMETHAMINE 30 MG/ML
15 INJECTION, SOLUTION INTRAMUSCULAR; INTRAVENOUS EVERY 8 HOURS PRN
Status: DISCONTINUED | OUTPATIENT
Start: 2023-12-01 | End: 2023-12-01 | Stop reason: HOSPADM

## 2023-12-01 RX ORDER — HYDROCODONE BITARTRATE AND ACETAMINOPHEN 5; 325 MG/1; MG/1
1 TABLET ORAL EVERY 6 HOURS PRN
Qty: 15 TABLET | Refills: 0 | Status: SHIPPED | OUTPATIENT
Start: 2023-12-01 | End: 2023-12-15 | Stop reason: CLARIF

## 2023-12-01 RX ORDER — CEFAZOLIN SODIUM 1 G/3ML
INJECTION, POWDER, FOR SOLUTION INTRAMUSCULAR; INTRAVENOUS
Status: DISCONTINUED | OUTPATIENT
Start: 2023-12-01 | End: 2023-12-01

## 2023-12-01 RX ADMIN — PROPOFOL 50 MG: 10 INJECTION, EMULSION INTRAVENOUS at 08:12

## 2023-12-01 RX ADMIN — SODIUM CHLORIDE, POTASSIUM CHLORIDE, SODIUM LACTATE AND CALCIUM CHLORIDE: 600; 310; 30; 20 INJECTION, SOLUTION INTRAVENOUS at 08:12

## 2023-12-01 RX ADMIN — MIDAZOLAM HYDROCHLORIDE 2 MG: 1 INJECTION, SOLUTION INTRAMUSCULAR; INTRAVENOUS at 08:12

## 2023-12-01 RX ADMIN — PROPOFOL 50 MCG/KG/MIN: 10 INJECTION, EMULSION INTRAVENOUS at 08:12

## 2023-12-01 RX ADMIN — CEFAZOLIN 2 G: 330 INJECTION, POWDER, FOR SOLUTION INTRAMUSCULAR; INTRAVENOUS at 08:12

## 2023-12-01 RX ADMIN — ONDANSETRON 4 MG: 2 INJECTION INTRAMUSCULAR; INTRAVENOUS at 08:12

## 2023-12-01 RX ADMIN — FENTANYL CITRATE 25 MCG: 50 INJECTION, SOLUTION INTRAMUSCULAR; INTRAVENOUS at 08:12

## 2023-12-01 RX ADMIN — LIDOCAINE HYDROCHLORIDE 100 MG: 20 INJECTION INTRAVENOUS at 08:12

## 2023-12-01 NOTE — PROGRESS NOTES
SUBJECTIVE:      Patient ID: Mil Sood is a 68 y.o. male.    HPI: Mr. Sood is here today for post-operative visit #1.  He is 4 days status post RELEASE, CARPAL TUNNEL (Right) by Dr. Negrete on 12/1/23. He reports that he is doing well.  Pain is 0/10. He is not taking any medication for pain. He states that his main concern is coming from the tip of his right index finger where he has an ulcer. It is painful to touch and bothersome when he bumps it.  He has been compliant with postop instructions and keeping the extremity dry. He denies fever, chills, and sweats since the time of the surgery.     Past Medical History:   Diagnosis Date    Anxiety     Cancer     spot on kidney and lung    Deep vein thrombosis     Diabetes mellitus     Borderline diet controlled    Diverticulitis     GERD (gastroesophageal reflux disease)     Hyperlipidemia     not on any meds currently    Hypertension     Lung mass     Mass of kidney      Past Surgical History:   Procedure Laterality Date    ARTHROPLASTY OF JOINT OF FINGER Left 11/18/2022    Procedure: ARTHROPLASTY, FINGER;  Surgeon: Kane Negrete MD;  Location: [facility] OR;  Service: Orthopedics;  Laterality: Left;  left thumb basal joint arthroplasty  Arthrex  Podiatry extras    CARPAL TUNNEL RELEASE Right 12/1/2023    Procedure: RELEASE, CARPAL TUNNEL;  Surgeon: Kane Negrete MD;  Location: [facility] OR;  Service: Orthopedics;  Laterality: Right;    CHOLECYSTECTOMY      COLONOSCOPY      COLONOSCOPY N/A 4/7/2017    Procedure: COLONOSCOPY;  Surgeon: Amarilys Bravo MD;  Location: [facility] ENDO;  Service: Endoscopy;  Laterality: N/A;    DIAGNOSTIC LAPAROSCOPY N/A 5/4/2021    Procedure: LAPAROSCOPY, DIAGNOSTIC;  Surgeon: Shaji Jauregui MD;  Location: [facility] OR;  Service: General;  Laterality: N/A;    ESOPHAGOGASTRODUODENOSCOPY      ESOPHAGOGASTRODUODENOSCOPY N/A 2/13/2020    Procedure: ESOPHAGOGASTRODUODENOSCOPY (EGD);  Surgeon: Shaji Aquino III, MD;  Location: [facility]  ENDO;  Service: Endoscopy;  Laterality: N/A;    ESOPHAGOGASTRODUODENOSCOPY N/A 5/3/2023    Procedure: EGD (ESOPHAGOGASTRODUODENOSCOPY);  Surgeon: Jenelle Jensen MD;  Location: [facility] ENDO;  Service: Endoscopy;  Laterality: N/A;    HERNIA REPAIR Bilateral     groin    LEFT HEART CATHETERIZATION Left 2022    Procedure: CATHETERIZATION, HEART, LEFT;  Surgeon: Romeo Godfrey MD;  Location: [facility] CATH LAB;  Service: Cardiology;  Laterality: Left;    NECK SURGERY      OPEN REDUCTION AND INTERNAL FIXATION (ORIF) OF INJURY OF ANKLE Left 8/10/2022    Procedure: ORIF, ANKLE;  Surgeon: Howard Mata MD;  Location: [facility] OR;  Service: Orthopedics;  Laterality: Left;    PARTIAL NEPHRECTOMY      left    RENAL BIOPSY      ROBOT-ASSISTED REPAIR OF UMBILICAL HERNIA USING DA NEL XI N/A 2021    Procedure: XI ROBOTIC REPAIR, HERNIA, UMBILICAL;  Surgeon: Shaji Jauregui MD;  Location: [facility] OR;  Service: General;  Laterality: N/A;  converted to open    TONSILLECTOMY      TRIGGER FINGER RELEASE Right     long finger     Family History   Problem Relation Age of Onset    Hypertension Mother     Hypertension Father     Melanoma Father     Cancer Sister      Social History     Socioeconomic History    Marital status:    Tobacco Use    Smoking status: Former     Current packs/day: 0.00     Average packs/day: 1 pack/day for 20.0 years (20.0 ttl pk-yrs)     Types: Cigarettes     Start date: 1975     Quit date: 1995     Years since quittin.8    Smokeless tobacco: Never   Substance and Sexual Activity    Alcohol use: Never     Alcohol/week: 0.0 standard drinks of alcohol    Drug use: No    Sexual activity: Not Currently     Partners: Female     Birth control/protection: None     Social Determinants of Health     Financial Resource Strain: Medium Risk (2023)    Overall Financial Resource Strain (CARDIA)     Difficulty of Paying Living Expenses: Somewhat hard   Food Insecurity: No Food Insecurity (2023)     Hunger Vital Sign     Worried About Running Out of Food in the Last Year: Never true     Ran Out of Food in the Last Year: Never true   Transportation Needs: No Transportation Needs (9/5/2023)    PRAPARE - Transportation     Lack of Transportation (Medical): No     Lack of Transportation (Non-Medical): No   Physical Activity: Insufficiently Active (9/5/2023)    Exercise Vital Sign     Days of Exercise per Week: 3 days     Minutes of Exercise per Session: 40 min   Stress: Stress Concern Present (9/5/2023)    [employer] - Occupational Stress Questionnaire     Feeling of Stress : Very much   Social Connections: Moderately Isolated (9/5/2023)    Social Connection and Isolation Panel [NHANES]     Frequency of Communication with Friends and Family: More than three times a week     Frequency of Social Gatherings with Friends and Family: Once a week     Attends Evangelical Services: More than 4 times per year     Active Member of Clubs or Organizations: No     Marital Status:    Housing Stability: Low Risk  (9/5/2023)    Housing Stability Vital Sign     Unable to Pay for Housing in the Last Year: No     Number of Places Lived in the Last Year: 1     Unstable Housing in the Last Year: No     Medication List with Changes/Refills   Current Medications    AMLODIPINE (NORVASC) 5 MG TABLET    Take 1 tablet (5 mg total) by mouth once daily.    APIXABAN (ELIQUIS) 5 MG TAB    Take 1 tablet (5 mg total) by mouth 2 (two) times daily.    ASPIRIN (ECOTRIN) 81 MG EC TABLET    Take 1 tablet (81 mg total) by mouth once daily.    ATORVASTATIN (LIPITOR) 40 MG TABLET    Take 1 tablet (40 mg total) by mouth once daily.    BENAZEPRIL (LOTENSIN) 20 MG TABLET    TAKE 2 TABLETS BY MOUTH NIGHTLY.    CITALOPRAM (CELEXA) 20 MG TABLET    Take 1 tablet (20 mg total) by mouth once daily.    CLONAZEPAM (KLONOPIN) 1 MG TABLET    Take 1 tablet (1 mg total) by mouth nightly as needed for Anxiety.    CYCLOBENZAPRINE  "(FLEXERIL) 10 MG TABLET    TAKE 1 TABLET BY MOUTH THREE TIMES DAILY AS NEEDED.    FINASTERIDE (PROSCAR) 5 MG TABLET    Take 1 tablet (5 mg total) by mouth every evening.    GABAPENTIN (NEURONTIN) 600 MG TABLET    Take 2 tablets (1,200 mg total) by mouth nightly.    HYDRALAZINE (APRESOLINE) 25 MG TABLET    Take 1 tablet (25 mg total) by mouth 3 (three) times daily.    HYDROCODONE-ACETAMINOPHEN (NORCO) 5-325 MG PER TABLET    Take 1 tablet by mouth every 6 (six) hours as needed for Pain.    ISOSORBIDE MONONITRATE (IMDUR) 60 MG 24 HR TABLET    Take 1 tablet (60 mg total) by mouth once daily.    METOPROLOL TARTRATE (LOPRESSOR) 25 MG TABLET    Take 1 tablet (25 mg total) by mouth 2 (two) times daily.    OMEPRAZOLE (PRILOSEC) 40 MG CAPSULE    Take 1 capsule (40 mg total) by mouth every morning.    TAMSULOSIN (FLOMAX) 0.4 MG CAP    TAKE 1 CAPSULE BY MOUTH EVERY EVENING.    TRAMADOL (ULTRAM) 50 MG TABLET    Take 1 tablet (50 mg total) by mouth every 6 (six) hours as needed.    TRAZODONE (DESYREL) 100 MG TABLET    TAKE 1 TABLET BY MOUTH IN THE EVENING.    ZOLPIDEM (AMBIEN) 10 MG TAB    Take 1 tablet (10 mg total) by mouth every evening.     Review of patient's allergies indicates:   Allergen Reactions    Rosuvastatin Other (See Comments)     Joint pain   Joint and bone pain       OBJECTIVE:     Physical exam:    Vitals:    12/05/23 0739   Weight: 86.2 kg (190 lb)   Height: 5' 9" (1.753 m)   PainSc: 0-No pain   PainLoc: Hand     Vital signs are stable, patient is afebrile.  Patient is well dressed and well groomed, no acute distress.  Alert and oriented to person, place, and time.    Right UE:  Post op dressing taken down.   Incision is clean, dry, and intact.   There is no erythema or exudate. There is no sign of any infection.   Mild swelling to hand  Mild-mod ecchymosis locally  Scab noted to index fingertip consistent with ulcer. It is tender to palpation. No drainage or signs of infection to finger.  He is NVI.   Sutures " in place.   2+ pulses noted.  Cap refill <2 seconds  Motor intact to hand    ASSESSMENT         Encounter Diagnoses   Name Primary?    S/P carpal tunnel release Yes    Ulceration localized to fingertip              4 days status post RELEASE, CARPAL TUNNEL (Right)     PLAN:           Mil was seen today for pain and post-op evaluation.    Diagnoses and all orders for this visit:    S/P carpal tunnel release    Ulceration localized to fingertip      - PO instruction reviewed and provided to patient  - The incision was cleaned with hydrogen peroxide and normal saline. A sterile Band-Aid was applied.   - Patient may clean the incision daily as above.   - he has a brace at home. Patient may use brace as needed for symptomatic relief.    - Fingertip clamshell splint provided to patient for R index ulcer pain. Advised to keep the area clean and protected.  - Patient should notify the office of any signs or symptoms of infection including fevers, erythema, purulent drainage, increasing pain.    - Follow up for suture removal     POST OPERATIVE INSTRUCTIONS - Visit #1    BANDAGES/DRESSING/BATHING:  We have removed the dressing, cleaned your incision, and put on a band-aid at your first post op visit today. You may clean the incision daily as we did today. Keep the incision clean and dry. When showering, you may cover the incision with a plastic bag/umbrella bag.   Do not use Neosporin or other topical ointments or creams on the incision. If you are concerned about any redness or drainage of the incisions, please call the office.    SWELLING  Some swelling of your arm, hand and fingers is normal. The swelling can be decreased by  elevating your arm on a few pillows when lying down. Swelling can be further controlled by cold therapy over the surgical site. Flexion/extension of the fingers (opening and closing your hands) will also help to relieve swelling and prevent stiffness.    ACTIVITY  You may use the hand and wrist  as tolerated for light activity. You are encouraged to bend the wrist, elbow and fingers as soon as the initial surgical dressing is removed. Avoid lifting, pushing, or pulling any object greater than 5-10 pounds for the first 10-14 days.     BRACE  Wear your brace or splint as directed.    MEDICATIONS  Take pain medicines exactly as directed.  If the doctor gave you a prescription medicine for pain, take it as prescribed.  If you are not taking a prescription pain medicine, take an over-the-counter medicine such as acetaminophen (Tylenol), ibuprofen (Advil, Motrin), or naproxen (Aleve) as long as you do not have an allergy or medical condition that prevents you from taking them.  Do not take two or more pain medicines at the same time unless the doctor told you to. Many pain medicines have acetaminophen, which is Tylenol. Too much acetaminophen (Tylenol) can be harmful.    FOLLOW -UP  You should be scheduled for a post-op appointment within the 10-14 days following surgery, at which time we will review your surgery, remove sutures and evaluate incisions, review your post-operative program and answer any of your questions.          [facility]O [facility]

## 2023-12-01 NOTE — OP NOTE
[facility] - Surgery ([facility])  Orthopedic Surgery  Operative Note    SUMMARY     Date of Procedure: 12/1/2023   Assistant: None    Procedure: Procedure(s) (LRB):  RELEASE, CARPAL TUNNEL (Right)       Surgeon(s) and Role:     * Kane Negrete MD - Primary    Assisting Surgeon: None    Pre-Operative Diagnosis:  Right carpal tunnel syndrome    Post-Operative Diagnosis:  Right carpal tunnel syndrome    Anesthesia:  Local with sedation    Technical Procedures Used:  right carpal tunnel release    Description of the Findings of the Procedure:  The patient was taken to the operating room where 10 cc of 2% plain lidocaine was used for local anesthetic and was administered.  After exsanguination of the extremity a proximal tourniquet was inflated to 250 mm of mercury.  Satisfactory anesthesia had been achieved the right hand was prepped and draped in the usual sterile fashion.  The patient did receive 2 g of Ancef intravenously preoperatively.  At this time a longitudinal incision was made in line with the 4th ray over the transverse carpal ligament.  The incision was extended using blunt and sharp dissection under 3.5 loupe magnification.  The transverse carpal ligament was identified and sharply incised under direct vision.  A complete release was performed and verified by the surgeon's small finger both proximally and distally.  No additional pathology was encountered.  Satisfactory release had been achieved skin was closed using horizontal mattress 4 0 nylon suture.  Xeroform gauze 4x4s and 2 ABD pads were over wrapped with 3 in gauze dressing.  The patient tolerated the procedure well and was transferred to the recovery room in satisfactory condition.      Complications: No    Estimated Blood Loss (EBL): 5cc                        Condition: Good    Disposition: PACU - hemodynamically stable.    Attestation: I was present and scrubbed for the entire procedure.

## 2023-12-01 NOTE — H&P
Subjective:     Patient ID: Mil Sood is a 68 y.o. male.    Chief Complaint: Pain of the Right Hand      HPI:  The patient is a 68-year-old male with a bilateral carpal tunnel syndrome and right cubital tunnel syndrome and cervical radiculopathy C6-7 documented by nerve conduction studies.  His carpal tunnel is the most symptomatic.  He is minimally symptomatic with regard to the cubital tunnel and his neck.  He wishes to have a right carpal tunnel release    Past Medical History:   Diagnosis Date    Anxiety     Cancer     spot on kidney and lung    Diabetes mellitus     Borderline diet controlled    Diverticulitis     GERD (gastroesophageal reflux disease)     Hyperlipidemia     not on any meds currently    Hypertension     Lung mass     Mass of kidney      Past Surgical History:   Procedure Laterality Date    ARTHROPLASTY OF JOINT OF FINGER Left 11/18/2022    Procedure: ARTHROPLASTY, FINGER;  Surgeon: Kane Negrete MD;  Location: [facility];  Service: Orthopedics;  Laterality: Left;  left thumb basal joint arthroplasty  Arthrex  Podiatry extras    CHOLECYSTECTOMY      COLONOSCOPY      COLONOSCOPY N/A 4/7/2017    Procedure: COLONOSCOPY;  Surgeon: Amarilys Bravo MD;  Location: [facility];  Service: Endoscopy;  Laterality: N/A;    DIAGNOSTIC LAPAROSCOPY N/A 5/4/2021    Procedure: LAPAROSCOPY, DIAGNOSTIC;  Surgeon: Shaji Jauregui MD;  Location: [facility];  Service: General;  Laterality: N/A;    ESOPHAGOGASTRODUODENOSCOPY      ESOPHAGOGASTRODUODENOSCOPY N/A 2/13/2020    Procedure: ESOPHAGOGASTRODUODENOSCOPY (EGD);  Surgeon: Shaji Aquino III, MD;  Location: [facility];  Service: Endoscopy;  Laterality: N/A;    ESOPHAGOGASTRODUODENOSCOPY N/A 5/3/2023    Procedure: EGD (ESOPHAGOGASTRODUODENOSCOPY);  Surgeon: Jenelle Jensen MD;  Location: [facility];  Service: Endoscopy;  Laterality: N/A;    HERNIA REPAIR Bilateral     groin    LEFT HEART CATHETERIZATION Left 1/31/2022    Procedure: CATHETERIZATION,  HEART, LEFT;  Surgeon: Romeo Godfrey MD;  Location: [facility] CATH LAB;  Service: Cardiology;  Laterality: Left;    NECK SURGERY      OPEN REDUCTION AND INTERNAL FIXATION (ORIF) OF INJURY OF ANKLE Left 8/10/2022    Procedure: ORIF, ANKLE;  Surgeon: Howard Mata MD;  Location: [facility] OR;  Service: Orthopedics;  Laterality: Left;    PARTIAL NEPHRECTOMY      left    RENAL BIOPSY      ROBOT-ASSISTED REPAIR OF UMBILICAL HERNIA USING DA NEL XI N/A 2021    Procedure: XI ROBOTIC REPAIR, HERNIA, UMBILICAL;  Surgeon: Shaji Jauregui MD;  Location: [facility] OR;  Service: General;  Laterality: N/A;  converted to open    TONSILLECTOMY      TRIGGER FINGER RELEASE Right     long finger     Family History   Problem Relation Age of Onset    Hypertension Mother     Hypertension Father     Melanoma Father     Cancer Sister      Social History     Socioeconomic History    Marital status:    Tobacco Use    Smoking status: Former     Current packs/day: 0.00     Average packs/day: 1 pack/day for 20.0 years (20.0 ttl pk-yrs)     Types: Cigarettes     Start date: 1975     Quit date: 1995     Years since quittin.7    Smokeless tobacco: Never   Substance and Sexual Activity    Alcohol use: Never     Alcohol/week: 0.0 standard drinks of alcohol    Drug use: No    Sexual activity: Not Currently     Partners: Female     Birth control/protection: None     Social Determinants of Health     Financial Resource Strain: Medium Risk (2023)    Overall Financial Resource Strain (CARDIA)     Difficulty of Paying Living Expenses: Somewhat hard   Food Insecurity: No Food Insecurity (2023)    Hunger Vital Sign     Worried About Running Out of Food in the Last Year: Never true     Ran Out of Food in the Last Year: Never true   Transportation Needs: No Transportation Needs (2023)    PRAPARE - Transportation     Lack of Transportation (Medical): No     Lack of Transportation (Non-Medical): No   Physical Activity:  Insufficiently Active (9/5/2023)    Exercise Vital Sign     Days of Exercise per Week: 3 days     Minutes of Exercise per Session: 40 min   Stress: Stress Concern Present (9/5/2023)    [employer] - Occupational Stress Questionnaire     Feeling of Stress : Very much   Social Connections: Moderately Isolated (9/5/2023)    Social Connection and Isolation Panel [NHANES]     Frequency of Communication with Friends and Family: More than three times a week     Frequency of Social Gatherings with Friends and Family: Once a week     Attends Judaism Services: More than 4 times per year     Active Member of Clubs or Organizations: No     Marital Status:    Housing Stability: Low Risk  (9/5/2023)    Housing Stability Vital Sign     Unable to Pay for Housing in the Last Year: No     Number of Places Lived in the Last Year: 1     Unstable Housing in the Last Year: No     Medication List with Changes/Refills   Current Medications    AMLODIPINE (NORVASC) 5 MG TABLET    Take 1 tablet (5 mg total) by mouth once daily.    APIXABAN (ELIQUIS) 5 MG TAB    Take 1 tablet (5 mg total) by mouth 2 (two) times daily.    ASPIRIN (ECOTRIN) 81 MG EC TABLET    Take 1 tablet (81 mg total) by mouth once daily.    ATORVASTATIN (LIPITOR) 40 MG TABLET    Take 1 tablet (40 mg total) by mouth once daily.    BENAZEPRIL (LOTENSIN) 20 MG TABLET    TAKE 2 TABLETS BY MOUTH NIGHTLY.    CITALOPRAM (CELEXA) 20 MG TABLET    Take 1 tablet (20 mg total) by mouth once daily.    CLONAZEPAM (KLONOPIN) 1 MG TABLET    Take 1 tablet (1 mg total) by mouth nightly as needed for Anxiety.    CYCLOBENZAPRINE (FLEXERIL) 10 MG TABLET    TAKE 1 TABLET BY MOUTH THREE TIMES DAILY AS NEEDED.    EVOLOCUMAB (REPATHA SURECLICK) 140 MG/ML PNIJ    INJECT 1 ML SUB-Q EVERY 14 DAYS.    FINASTERIDE (PROSCAR) 5 MG TABLET    Take 1 tablet (5 mg total) by mouth every evening.    FLUTICASONE PROPION-SALMETEROL 115-21 MCG/DOSE (ADVAIR HFA) 115-21  MCG/ACTUATION HFAA INHALER    Inhale 2 puffs into the lungs 2 (two) times daily.    GABAPENTIN (NEURONTIN) 600 MG TABLET    Take 2 tablets (1,200 mg total) by mouth nightly.    HYDRALAZINE (APRESOLINE) 25 MG TABLET    Take 1 tablet (25 mg total) by mouth 3 (three) times daily.    ISOSORBIDE MONONITRATE (IMDUR) 60 MG 24 HR TABLET    Take 1 tablet (60 mg total) by mouth once daily.    METOPROLOL TARTRATE (LOPRESSOR) 25 MG TABLET    Take 1 tablet (25 mg total) by mouth 2 (two) times daily.    OMEPRAZOLE (PRILOSEC) 40 MG CAPSULE    Take 1 capsule (40 mg total) by mouth every morning.    TAMSULOSIN (FLOMAX) 0.4 MG CAP    TAKE 1 CAPSULE BY MOUTH EVERY EVENING.    TIOTROPIUM (SPIRIVA WITH HANDIHALER) 18 MCG INHALATION CAPSULE    INHALE THE CONTENTS OF ONE CAPSULE DAILY.    TRAZODONE (DESYREL) 100 MG TABLET    TAKE 1 TABLET BY MOUTH IN THE EVENING.    ZOLPIDEM (AMBIEN) 10 MG TAB    Take 1 tablet (10 mg total) by mouth every evening.     Review of patient's allergies indicates:   Allergen Reactions    Rosuvastatin Other (See Comments)     Joint pain   Joint and bone pain     Review of Systems   Constitutional: Negative for malaise/fatigue.   HENT:  Negative for hearing loss.    Eyes:  Negative for double vision and visual disturbance.   Cardiovascular:  Positive for chest pain.   Respiratory:  Positive for shortness of breath.    Endocrine: Negative for cold intolerance.   Hematologic/Lymphatic: Does not bruise/bleed easily.   Skin:  Negative for poor wound healing and suspicious lesions.   Musculoskeletal:  Positive for arthritis, joint pain, joint swelling and neck pain. Negative for gout.   Gastrointestinal:  Positive for abdominal pain and heartburn. Negative for nausea and vomiting.   Genitourinary:  Positive for frequency, hesitancy, incomplete emptying and nocturia. Negative for dysuria.   Neurological:  Positive for focal weakness, numbness, paresthesias and sensory change.   Psychiatric/Behavioral:  Positive for  substance abuse. Negative for depression and memory loss. The patient has insomnia and is nervous/anxious.    Allergic/Immunologic: Negative for persistent infections.       Objective:   Body mass index is 28.5 kg/m².  There were no vitals filed for this visit.             General    Constitutional: He is oriented to person, place, and time. He appears well-developed and well-nourished. No distress.   HENT:   Head: Normocephalic.   Mouth/Throat: Oropharynx is clear and moist.   Eyes: EOM are normal.   Cardiovascular:  Normal rate.            Pulmonary/Chest: Effort normal.   Abdominal: Soft.   Neurological: He is alert and oriented to person, place, and time. No cranial nerve deficit.   Psychiatric: He has a normal mood and affect.             Right Hand/Wrist Exam     Inspection   Scars: Wrist - present Hand -  present    Pain   Wrist - The patient exhibits pain of the flexor/pronator group.    Tests   Phalens sign: positive  Tinel's sign (median nerve): positive  Carpal Tunnel Compression Test: positive  Cubital Tunnel Compression Test: positive    Atrophy   Thenar:  negative  Intrinsic:  negative    Other     Neuorologic Exam    Median Distribution: abnormal  Ulnar Distribution: normal  Radial Distribution: abnormal    Comments:  The patient has a positive Tinel and positive Phalen sign.  There is no thenar atrophy noted.  He has a positive Tinel sign over the ulnar nerve right elbow and no intrinsic atrophy noted.  The patient has a well-healed basal joint scar      Left Hand/Wrist Exam     Inspection   Scars: Wrist - absent Hand -  absent  Effusion: Wrist - absent Hand -  absent    Pain   Wrist - The patient exhibits pain of the flexor/pronator group.    Tests   Phalens sign: positive  Tinel's sign (median nerve): positive  Carpal Tunnel Compression Test: positive    Atrophy  Thenar:  Negative  Intrinsic: negative    Other     Sensory Exam  Median Distribution: abnormal  Ulnar Distribution: normal  Radial  Distribution: normal    Comments:  The patient has a positive Tinel and positive Phalen sign.  There is no thenar atrophy noted.      Right Elbow Exam     Tests   Tinel's sign (cubital tunnel): positive          Vascular Exam       Capillary Refill  Right Hand: normal capillary refill  Left Hand: normal capillary refill          Relevant imaging results reviewed and interpreted by me, discussed with the patient and / or family today radiographs were not obtained today.  Previous radiographs show evidence of right thumb basal joint arthroplasty  Assessment:     Encounter Diagnoses   Name Primary?    Bilateral carpal tunnel syndrome Yes    Cervical radiculopathy     Cubital tunnel syndrome on right         Plan:     The patient wishes to have a right carpal tunnel release.  He does not wish to have a cubital tunnel release.  His right index finger is the most symptomatic.  Risk complications and alternatives were discussed including the risk of infection, anesthetic risk, injury to nerves and vessels, loss of motion, and possible need for additional surgeries were discussed.  He seems to understand and agree to that surgery.  All questions were answered.  He says the small finger is relatively asymptomatic and wishes not to have anymore surgery than he needs                Disclaimer: This note was prepared using a voice recognition system and is likely to have sound alike errors within the text.

## 2023-12-01 NOTE — ANESTHESIA PREPROCEDURE EVALUATION
12/01/2023  Mil Sood is a 68 y.o., male.    Patient Active Problem List   Diagnosis    Essential hypertension    GERD (gastroesophageal reflux disease)    Prediabetes    Depression    Cervical spondylosis    Culp's esophagus with high grade dysplasia    Culp's esophagus determined by biopsy    Screen for colon cancer    Renal oncocytoma    Myalgia due to HMG CoA reductase inhibitor    Statin intolerance    Culp's esophagus    Insomnia due to anxiety and fear    Incisional hernia, without obstruction or gangrene    Centrilobular emphysema    Primary osteoarthritis of left hand    Abnormal nuclear stress test    Other chest pain    Mixed hyperlipidemia    Atherosclerosis of aorta    Hx of CABG x4 in     Closed displaced fracture of lateral malleolus of left fibula    Ankle syndesmosis disruption, left, initial encounter    BPH with obstruction/lower urinary tract symptoms    Orthostatic hypotension    Colitis    Dysarthria    Weakness    Elevated troponin    Bilateral carpal tunnel syndrome    Cubital tunnel syndrome on right     Past Surgical History:   Procedure Laterality Date    ARTHROPLASTY OF JOINT OF FINGER Left 11/18/2022    Procedure: ARTHROPLASTY, FINGER;  Surgeon: Kane Negrete MD;  Location: [facility] OR;  Service: Orthopedics;  Laterality: Left;  left thumb basal joint arthroplasty  Arthrex  Podiatry extras    CHOLECYSTECTOMY      COLONOSCOPY      COLONOSCOPY N/A 4/7/2017    Procedure: COLONOSCOPY;  Surgeon: Amarilys Bravo MD;  Location: [facility];  Service: Endoscopy;  Laterality: N/A;    DIAGNOSTIC LAPAROSCOPY N/A 5/4/2021    Procedure: LAPAROSCOPY, DIAGNOSTIC;  Surgeon: Shaji Jauregui MD;  Location: [facility] OR;  Service: General;  Laterality: N/A;    ESOPHAGOGASTRODUODENOSCOPY      ESOPHAGOGASTRODUODENOSCOPY N/A 2/13/2020    Procedure: ESOPHAGOGASTRODUODENOSCOPY  (EGD);  Surgeon: Shaji Aquino III, MD;  Location: [facility] ENDO;  Service: Endoscopy;  Laterality: N/A;    ESOPHAGOGASTRODUODENOSCOPY N/A 5/3/2023    Procedure: EGD (ESOPHAGOGASTRODUODENOSCOPY);  Surgeon: Jenelle Jensen MD;  Location: [facility] ENDO;  Service: Endoscopy;  Laterality: N/A;    HERNIA REPAIR Bilateral     groin    LEFT HEART CATHETERIZATION Left 1/31/2022    Procedure: CATHETERIZATION, HEART, LEFT;  Surgeon: Romeo Godfrey MD;  Location: [facility] CATH LAB;  Service: Cardiology;  Laterality: Left;    NECK SURGERY      OPEN REDUCTION AND INTERNAL FIXATION (ORIF) OF INJURY OF ANKLE Left 8/10/2022    Procedure: ORIF, ANKLE;  Surgeon: Howard Mata MD;  Location: [facility] OR;  Service: Orthopedics;  Laterality: Left;    PARTIAL NEPHRECTOMY      left    RENAL BIOPSY      ROBOT-ASSISTED REPAIR OF UMBILICAL HERNIA USING DA NEL XI N/A 5/4/2021    Procedure: XI ROBOTIC REPAIR, HERNIA, UMBILICAL;  Surgeon: Shaji Jauregui MD;  Location: [facility] OR;  Service: General;  Laterality: N/A;  converted to open    TONSILLECTOMY      TRIGGER FINGER RELEASE Right     long finger       Pre-op Assessment    I have reviewed the Patient Summary Reports.    I have reviewed the NPO Status.   I have reviewed the Medications.     Review of Systems  Anesthesia Hx:  No problems with previous Anesthesia                Social:  Non-Smoker, Former Smoker       Hematology/Oncology:  Hematology Normal                  Hematology Comments: Hx Deep vein thrombosis                    Cardiovascular:     Hypertension           hyperlipidemia   ECG has been reviewed.                          Pulmonary:   COPD                     Renal/:  Renal/ Normal                 Hepatic/GI:     GERD, well controlled             Neurological:  Neurology Normal                                      Endocrine:  Diabetes           Psych:   anxiety                 Physical Exam  General: Well nourished    Airway:  Mallampati: II   Mouth Opening: Normal  TM  Distance: Normal  Neck ROM: Normal ROM    Dental:  Edentulous        Anesthesia Plan  Type of Anesthesia, risks & benefits discussed:    Anesthesia Type: Gen ETT, Gen Supraglottic Airway, MAC  Intra-op Monitoring Plan: Standard ASA Monitors  Post Op Pain Control Plan: multimodal analgesia  Induction:  IV  Airway Plan: , Post-Induction  Informed Consent: Informed consent signed with the Patient and all parties understand the risks and agree with anesthesia plan.  All questions answered.   ASA Score: 3    Ready For Surgery From Anesthesia Perspective.     .    Chemistry        Component Value Date/Time     (L) 11/14/2023 0716    K 5.1 11/14/2023 0716    CL 98 11/14/2023 0716    CO2 25 11/14/2023 0716    BUN 26 (H) 11/14/2023 0716    CREATININE 1.5 (H) 11/14/2023 0716    GLU 77 11/14/2023 0716        Component Value Date/Time    CALCIUM 9.3 11/14/2023 0716    ALKPHOS 53 (L) 11/14/2023 0716    AST 19 11/14/2023 0716    ALT 27 11/14/2023 0716    BILITOT 0.4 11/14/2023 0716    ESTGFRAFRICA 89 04/27/2022 0507    ESTGFRAFRICA >60.0 02/11/2022 0858    EGFRNONAA >60.0 02/11/2022 0858        Lab Results   Component Value Date    WBC 7.55 11/14/2023    HGB 15.5 11/14/2023    HCT 47.8 11/14/2023    MCV 88 11/14/2023     11/14/2023     Normal sinus rhythm   Baseline Artifact   When compared with ECG of 15-SEP-2023 09:02,   RSR' pattern in V1 is no longer Present     Echo 5/17/23:  The left ventricle is normal in size with concentric remodeling and normal systolic function.  Normal left ventricular diastolic function.  The estimated PA systolic pressure is 40 mmHg.  Normal right ventricular size with normal right ventricular systolic function.  Normal central venous pressure (3 mmHg).  The estimated ejection fraction is 60%.  Moderate tricuspid regurgitation.  Mild-to-moderate mitral regurgitation.  Mild aortic regurgitation.     Nuc stress 9/15/23:    The gated perfusion images showed an ejection fraction of 68% at  rest. The gated perfusion images showed an ejection fraction of 78% post stress.    There is normal wall motion at rest and post stress.    LV cavity size is normal at rest and normal at stress.    The ECG portion of the study is negative for ischemia.    The patient reported no chest pain during the stress test.

## 2023-12-01 NOTE — DISCHARGE SUMMARY
[facility] - Surgery (Hospital)  Discharge Note  Short Stay    Procedure(s) (LRB):  RELEASE, CARPAL TUNNEL (Right)      OUTCOME: Patient tolerated treatment/procedure well without complication and is now ready for discharge.    DISPOSITION: Home or Self Care    FINAL DIAGNOSIS:  Right carpal tunnel syndrome    FOLLOWUP: In clinic    DISCHARGE INSTRUCTIONS:    Discharge Procedure Orders   Diet general     Call MD for:  temperature >100.4     Call MD for:  persistent nausea and vomiting     Call MD for:  severe uncontrolled pain     Call MD for:  difficulty breathing, headache or visual disturbances     Call MD for:  redness, tenderness, or signs of infection (pain, swelling, redness, odor or green/yellow discharge around incision site)     Call MD for:  hives     Call MD for:  persistent dizziness or light-headedness     Call MD for:  extreme fatigue        TIME SPENT ON DISCHARGE:  20 minutes

## 2023-12-01 NOTE — ANESTHESIA POSTPROCEDURE EVALUATION
Anesthesia Post Evaluation    Patient: Mil Sood    Procedure(s) Performed: Procedure(s) (LRB):  RELEASE, CARPAL TUNNEL (Right)    Final Anesthesia Type: MAC      Patient location during evaluation: PACU  Patient participation: Yes- Able to Participate  Level of consciousness: awake and alert  Post-procedure vital signs: reviewed and stable  Airway patency: patent      Anesthetic complications: no      Cardiovascular status: blood pressure returned to baseline  Respiratory status: unassisted and spontaneous ventilation  Hydration status: euvolemic  Follow-up not needed.              Vitals Value Taken Time   /78 12/01/23 0855   Temp 36.7 °C (98 °F) 12/01/23 0835   Pulse 60 12/01/23 0856   Resp 31 12/01/23 0856   SpO2 96 % 12/01/23 0856   Vitals shown include unvalidated device data.      Event Time   Out of Recovery 08:57:14         Pain/Doug Score: Doug Score: 10 (12/1/2023  8:45 AM)

## 2023-12-01 NOTE — TRANSFER OF CARE
"Anesthesia Transfer of Care Note    Patient: Mil Sood    Procedure(s) Performed: Procedure(s) (LRB):  RELEASE, CARPAL TUNNEL (Right)    Patient location: PACU    Anesthesia Type: MAC    Transport from OR: Transported from OR on room air with adequate spontaneous ventilation    Post pain: adequate analgesia    Post assessment: no apparent anesthetic complications and tolerated procedure well    Post vital signs: stable    Level of consciousness: sedated    Nausea/Vomiting: no nausea/vomiting    Complications: none    Transfer of care protocol was followed      Last vitals: Visit Vitals  /82 (BP Location: Right arm, Patient Position: Sitting)   Pulse 80   Temp 37.1 °C (98.8 °F) (Temporal)   Resp 18   Ht 5' 9" (1.753 m)   Wt 86.5 kg (190 lb 11.2 oz)   SpO2 97%   BMI 28.16 kg/m²     "

## 2023-12-04 VITALS
BODY MASS INDEX: 28.24 KG/M2 | RESPIRATION RATE: 15 BRPM | HEART RATE: 67 BPM | OXYGEN SATURATION: 93 % | DIASTOLIC BLOOD PRESSURE: 76 MMHG | TEMPERATURE: 98 F | SYSTOLIC BLOOD PRESSURE: 137 MMHG | WEIGHT: 190.69 LBS | HEIGHT: 69 IN

## 2023-12-05 ENCOUNTER — OFFICE VISIT (OUTPATIENT)
Dept: ORTHOPEDICS | Facility: CLINIC | Age: 68
End: 2023-12-05
Payer: MEDICARE

## 2023-12-05 VITALS — HEIGHT: 69 IN | WEIGHT: 190 LBS | BODY MASS INDEX: 28.14 KG/M2

## 2023-12-05 DIAGNOSIS — Z98.890 S/P CARPAL TUNNEL RELEASE: Primary | ICD-10-CM

## 2023-12-05 DIAGNOSIS — L98.499 ULCERATION LOCALIZED TO FINGERTIP: ICD-10-CM

## 2023-12-05 PROCEDURE — 3044F HG A1C LEVEL LT 7.0%: CPT | Mod: CPTII,S$GLB,,

## 2023-12-05 PROCEDURE — 4010F PR ACE/ARB THEARPY RXD/TAKEN: ICD-10-PCS | Mod: CPTII,S$GLB,,

## 2023-12-05 PROCEDURE — 1126F PR PAIN SEVERITY QUANTIFIED, NO PAIN PRESENT: ICD-10-PCS | Mod: CPTII,S$GLB,,

## 2023-12-05 PROCEDURE — 1126F AMNT PAIN NOTED NONE PRSNT: CPT | Mod: CPTII,S$GLB,,

## 2023-12-05 PROCEDURE — 3288F PR FALLS RISK ASSESSMENT DOCUMENTED: ICD-10-PCS | Mod: CPTII,S$GLB,,

## 2023-12-05 PROCEDURE — 4010F ACE/ARB THERAPY RXD/TAKEN: CPT | Mod: CPTII,S$GLB,,

## 2023-12-05 PROCEDURE — 1159F MED LIST DOCD IN RCRD: CPT | Mod: CPTII,S$GLB,,

## 2023-12-05 PROCEDURE — 3044F PR MOST RECENT HEMOGLOBIN A1C LEVEL <7.0%: ICD-10-PCS | Mod: CPTII,S$GLB,,

## 2023-12-05 PROCEDURE — 99999 PR PBB SHADOW E&M-EST. PATIENT-LVL III: ICD-10-PCS | Mod: PBBFAC,,,

## 2023-12-05 PROCEDURE — 99024 POSTOP FOLLOW-UP VISIT: CPT | Mod: S$GLB,,,

## 2023-12-05 PROCEDURE — 99024 PR POST-OP FOLLOW-UP VISIT: ICD-10-PCS | Mod: S$GLB,,,

## 2023-12-05 PROCEDURE — 99999 PR PBB SHADOW E&M-EST. PATIENT-LVL III: CPT | Mod: PBBFAC,,,

## 2023-12-05 PROCEDURE — 3288F FALL RISK ASSESSMENT DOCD: CPT | Mod: CPTII,S$GLB,,

## 2023-12-05 PROCEDURE — 1159F PR MEDICATION LIST DOCUMENTED IN MEDICAL RECORD: ICD-10-PCS | Mod: CPTII,S$GLB,,

## 2023-12-05 PROCEDURE — 1101F PR PT FALLS ASSESS DOC 0-1 FALLS W/OUT INJ PAST YR: ICD-10-PCS | Mod: CPTII,S$GLB,,

## 2023-12-05 PROCEDURE — 1101F PT FALLS ASSESS-DOCD LE1/YR: CPT | Mod: CPTII,S$GLB,,

## 2023-12-08 RX ORDER — METOPROLOL TARTRATE 25 MG/1
25 TABLET, FILM COATED ORAL 2 TIMES DAILY
Qty: 180 TABLET | Refills: 0 | Status: SHIPPED | OUTPATIENT
Start: 2023-12-08 | End: 2024-02-21 | Stop reason: SDUPTHER

## 2023-12-14 NOTE — PROGRESS NOTES
"SUBJECTIVE:      Patient ID: Mil Sood is a 68 y.o. male.    HPI: Mr. Sood is here today for post-operative visit #2.  He is 14 days status post RELEASE, CARPAL TUNNEL (Right) by Dr. Negrete on 12/1/23. Carpal tunnel pain is 0/10.  He is taking no medication for pain.  He has been compliant with postop instructions and keeping the extremity dry. He denies fever, chills, and sweats since the time of the surgery.     His right index fingertip is still bothersome and in 10/10 pain today. He states that this has been going on for about 3 months but has not healed or gotten better. The pain is so severe that he is ready to "chop it off". The splint given to him at the last office visit did not help.      Interval hx 12/5/23: Mr. Sood is here today for post-operative visit #1.  He is 4 days status post RELEASE, CARPAL TUNNEL (Right) by Dr. Negrete on 12/1/23. He reports that he is doing well.  Pain is 0/10. He is not taking any medication for pain. He states that his main concern is coming from the tip of his right index finger where he has an ulcer. It is painful to touch and bothersome when he bumps it.  He has been compliant with postop instructions and keeping the extremity dry. He denies fever, chills, and sweats since the time of the surgery.     Past Medical History:   Diagnosis Date    Anxiety     Cancer     spot on kidney and lung    Deep vein thrombosis     Diabetes mellitus     Borderline diet controlled    Diverticulitis     GERD (gastroesophageal reflux disease)     Hyperlipidemia     not on any meds currently    Hypertension     Lung mass     Mass of kidney      Past Surgical History:   Procedure Laterality Date    ARTHROPLASTY OF JOINT OF FINGER Left 11/18/2022    Procedure: ARTHROPLASTY, FINGER;  Surgeon: Kane Negrete MD;  Location: [facility] OR;  Service: Orthopedics;  Laterality: Left;  left thumb basal joint arthroplasty  Arthrex  Podiatry extras    CARPAL TUNNEL RELEASE Right 12/1/2023    " Procedure: RELEASE, CARPAL TUNNEL;  Surgeon: Kane Negrete MD;  Location: [facility];  Service: Orthopedics;  Laterality: Right;    CHOLECYSTECTOMY      COLONOSCOPY      COLONOSCOPY N/A 4/7/2017    Procedure: COLONOSCOPY;  Surgeon: Amarilys Bravo MD;  Location: [facility] ENDO;  Service: Endoscopy;  Laterality: N/A;    DIAGNOSTIC LAPAROSCOPY N/A 5/4/2021    Procedure: LAPAROSCOPY, DIAGNOSTIC;  Surgeon: Shaji Jauregui MD;  Location: [facility];  Service: General;  Laterality: N/A;    ESOPHAGOGASTRODUODENOSCOPY      ESOPHAGOGASTRODUODENOSCOPY N/A 2/13/2020    Procedure: ESOPHAGOGASTRODUODENOSCOPY (EGD);  Surgeon: Shaji Aquino III, MD;  Location: [facility];  Service: Endoscopy;  Laterality: N/A;    ESOPHAGOGASTRODUODENOSCOPY N/A 5/3/2023    Procedure: EGD (ESOPHAGOGASTRODUODENOSCOPY);  Surgeon: Jenelle Jensen MD;  Location: [facility];  Service: Endoscopy;  Laterality: N/A;    HERNIA REPAIR Bilateral     groin    LEFT HEART CATHETERIZATION Left 1/31/2022    Procedure: CATHETERIZATION, HEART, LEFT;  Surgeon: Romeo Godfrey MD;  Location: [facility] CATH LAB;  Service: Cardiology;  Laterality: Left;    NECK SURGERY      OPEN REDUCTION AND INTERNAL FIXATION (ORIF) OF INJURY OF ANKLE Left 8/10/2022    Procedure: ORIF, ANKLE;  Surgeon: Howard Mata MD;  Location: [facility];  Service: Orthopedics;  Laterality: Left;    PARTIAL NEPHRECTOMY      left    RENAL BIOPSY      ROBOT-ASSISTED REPAIR OF UMBILICAL HERNIA USING DA NEL XI N/A 5/4/2021    Procedure: XI ROBOTIC REPAIR, HERNIA, UMBILICAL;  Surgeon: Shaji Jauregui MD;  Location: [facility];  Service: General;  Laterality: N/A;  converted to open    TONSILLECTOMY      TRIGGER FINGER RELEASE Right     long finger     Family History   Problem Relation Age of Onset    Hypertension Mother     Hypertension Father     Melanoma Father     Cancer Sister      Social History     Socioeconomic History    Marital status:    Tobacco Use    Smoking status: Former      Current packs/day: 0.00     Average packs/day: 1 pack/day for 20.0 years (20.0 ttl pk-yrs)     Types: Cigarettes     Start date: 1975     Quit date: 1995     Years since quittin.8    Smokeless tobacco: Never   Substance and Sexual Activity    Alcohol use: Never     Alcohol/week: 0.0 standard drinks of alcohol    Drug use: No    Sexual activity: Not Currently     Partners: Female     Birth control/protection: None     Social Determinants of Health     Financial Resource Strain: Medium Risk (2023)    Overall Financial Resource Strain (CARDIA)     Difficulty of Paying Living Expenses: Somewhat hard   Food Insecurity: No Food Insecurity (2023)    Hunger Vital Sign     Worried About Running Out of Food in the Last Year: Never true     Ran Out of Food in the Last Year: Never true   Transportation Needs: No Transportation Needs (2023)    PRAPARE - Transportation     Lack of Transportation (Medical): No     Lack of Transportation (Non-Medical): No   Physical Activity: Insufficiently Active (2023)    Exercise Vital Sign     Days of Exercise per Week: 3 days     Minutes of Exercise per Session: 40 min   Stress: Stress Concern Present (2023)    [employer] - Occupational Stress Questionnaire     Feeling of Stress : Very much   Social Connections: Moderately Isolated (2023)    Social Connection and Isolation Panel [NHANES]     Frequency of Communication with Friends and Family: More than three times a week     Frequency of Social Gatherings with Friends and Family: Once a week     Attends Restorationism Services: More than 4 times per year     Active Member of Clubs or Organizations: No     Marital Status:    Housing Stability: Low Risk  (2023)    Housing Stability Vital Sign     Unable to Pay for Housing in the Last Year: No     Number of Places Lived in the Last Year: 1     Unstable Housing in the Last Year: No     Medication List with Changes/Refills    Current Medications    AMLODIPINE (NORVASC) 5 MG TABLET    Take 1 tablet (5 mg total) by mouth once daily.    APIXABAN (ELIQUIS) 5 MG TAB    Take 1 tablet (5 mg total) by mouth 2 (two) times daily.    ASPIRIN (ECOTRIN) 81 MG EC TABLET    Take 1 tablet (81 mg total) by mouth once daily.    ATORVASTATIN (LIPITOR) 40 MG TABLET    Take 1 tablet (40 mg total) by mouth once daily.    BENAZEPRIL (LOTENSIN) 20 MG TABLET    TAKE 2 TABLETS BY MOUTH NIGHTLY.    CITALOPRAM (CELEXA) 20 MG TABLET    Take 1 tablet (20 mg total) by mouth once daily.    CLONAZEPAM (KLONOPIN) 1 MG TABLET    Take 1 tablet (1 mg total) by mouth nightly as needed for Anxiety.    CLOPIDOGREL (PLAVIX) 75 MG TABLET    Take 75 mg by mouth.    FINASTERIDE (PROSCAR) 5 MG TABLET    Take 1 tablet (5 mg total) by mouth every evening.    GABAPENTIN (NEURONTIN) 600 MG TABLET    Take 2 tablets (1,200 mg total) by mouth nightly.    HYDRALAZINE (APRESOLINE) 25 MG TABLET    Take 1 tablet (25 mg total) by mouth 3 (three) times daily.    ISOSORBIDE MONONITRATE (IMDUR) 60 MG 24 HR TABLET    Take 1 tablet (60 mg total) by mouth once daily.    METOPROLOL TARTRATE (LOPRESSOR) 25 MG TABLET    TAKE 1 TABLET BY MOUTH TWICE A DAY.    OMEPRAZOLE (PRILOSEC) 40 MG CAPSULE    Take 1 capsule (40 mg total) by mouth every morning.    TAMSULOSIN (FLOMAX) 0.4 MG CAP    TAKE 1 CAPSULE BY MOUTH EVERY EVENING.    TRAZODONE (DESYREL) 100 MG TABLET    TAKE 1 TABLET BY MOUTH IN THE EVENING.    ZOLPIDEM (AMBIEN) 10 MG TAB    Take 1 tablet (10 mg total) by mouth every evening.   Changed and/or Refilled Medications    Modified Medication Previous Medication    TRAMADOL (ULTRAM) 50 MG TABLET traMADoL (ULTRAM) 50 mg tablet       Take 1 tablet (50 mg total) by mouth every 8 (eight) hours as needed for Pain.    Take 1 tablet (50 mg total) by mouth every 6 (six) hours as needed.   Discontinued Medications    CYCLOBENZAPRINE (FLEXERIL) 10 MG TABLET    TAKE 1 TABLET BY MOUTH THREE TIMES DAILY AS  "NEEDED.    HYDROCODONE-ACETAMINOPHEN (NORCO) 5-325 MG PER TABLET    Take 1 tablet by mouth every 6 (six) hours as needed for Pain.     Review of patient's allergies indicates:   Allergen Reactions    Rosuvastatin Other (See Comments)     Joint pain   Joint and bone pain       OBJECTIVE:     Physical exam:    Vitals:    12/15/23 0713   Weight: 86.2 kg (190 lb)   Height: 5' 9" (1.753 m)   PainSc: 0-No pain   PainLoc: Hand     Vital signs are stable, patient is afebrile.  Patient is well dressed and well groomed, no acute distress.  Alert and oriented to person, place, and time.    Right UE:  Incision is clean, dry, and intact.   There is no erythema or exudate. There is no sign of any infection.   Scab noted to index fingertip consistent with ulcer. It is tender to palpation from DIP joint to fingertip. No drainage, no exudate. No erythema. Cap refill prolonged to index finger, about 3 seconds.  He is NVI.   Sutures in place.   2+ pulses noted.  Motor intact to hand    ASSESSMENT         Encounter Diagnoses   Name Primary?    S/P carpal tunnel release Yes    Ulceration localized to fingertip             14 days status post RELEASE, CARPAL TUNNEL (Right)    PLAN:           Mil was seen today for pain and post-op evaluation.    Diagnoses and all orders for this visit:    S/P carpal tunnel release    Ulceration localized to fingertip  -     Sedimentation rate; Future  -     C-reactive protein; Future  -     MRI Hand Fingers Without Contrast Right; Future  -     traMADoL (ULTRAM) 50 mg tablet; Take 1 tablet (50 mg total) by mouth every 8 (eight) hours as needed for Pain.      - PO instruction reviewed and provided to patient  - The incision was cleaned with hydrogen peroxide. Sutures removed with no difficulty. Steri-Strips applied.   - Patient may use brace as needed for symptomatic relief.    - Some concern for osteomyelitis vs deep soft tissue infection vs foreign body to the index finger as it has been 3 months " since onset and the finger is still in severe pain that is not improving. Case discussed with Dr. Mccartney, agrees with plan.  - MRI w/o contrast R finger ordered to r/o infection  - Tramadol sent to pharmacy for ulcer pain. No NSAIDS due to kidney issues.  - Sed rate, CRP ordered  - Patient should notify the office of any signs or symptoms of infection including fevers, erythema, purulent drainage, increasing pain.    - Follow up with Dr. Leger after MRI    POST OPERATIVE VISIT INSTRUCTIONS - Visit #2    1. No soaking the incision for at least 7-10 days. You may get it wet in the shower and use regular soap.    2. To avoid a hard, painful scar, we recommend you use Mederma and/or Silicone Scar patches. You may also use Cocoa Butter, Vitamin E oil, Coconut oil, etc.    You may start this 5-7 days after stitches are removed and when wound is completely closed.    - Mederma scar cream: scar massage over incision 1-2 times per day. You may use topical lotion or Vitamin E oil. Massage an additional few times a day to help the scar become soft and less sensitive.    - Silicone Scar Patches: cut and place over the incision, then massage over the patch to help with scarring and sensitivity. Can be reused up to 10 days if you rinse it clean, let it dry out, then reapply.    Brands: Mepiform Silicone Scar Sheets (recommended), Scar Away, [employer] brand or generic pharmacy brand ([facility], [facility], [facility])    3. Continue range of motion exercises as instructed at todays visit.    4. You may take Tylenol 500mg and/or Ibuprofen 400mg every 4-6 hours with food for pain as tolerated as long as you do not have an allergy or medical condition that prevents you from taking them.    5. Therapy recommended: none at this time    6. Immobilization: brace as needed    7. Lifting restrictions:  Start light at about 10 lb and increase gradually as tolerated    8. Follow up: after MRI         Randi Seneca, PA-C [facility]

## 2023-12-15 ENCOUNTER — LAB VISIT (OUTPATIENT)
Dept: LAB | Facility: HOSPITAL | Age: 68
End: 2023-12-15
Payer: MEDICARE

## 2023-12-15 ENCOUNTER — OFFICE VISIT (OUTPATIENT)
Dept: ORTHOPEDICS | Facility: CLINIC | Age: 68
End: 2023-12-15
Payer: MEDICARE

## 2023-12-15 VITALS — BODY MASS INDEX: 28.14 KG/M2 | WEIGHT: 190 LBS | HEIGHT: 69 IN

## 2023-12-15 DIAGNOSIS — Z98.890 S/P CARPAL TUNNEL RELEASE: Primary | ICD-10-CM

## 2023-12-15 DIAGNOSIS — L98.499 ULCERATION LOCALIZED TO FINGERTIP: ICD-10-CM

## 2023-12-15 LAB
CRP SERPL-MCNC: 1.6 MG/L (ref 0–8.2)
ERYTHROCYTE [SEDIMENTATION RATE] IN BLOOD BY WESTERGREN METHOD: 5 MM/HR (ref 0–10)

## 2023-12-15 PROCEDURE — 4010F PR ACE/ARB THEARPY RXD/TAKEN: ICD-10-PCS | Mod: CPTII,S$GLB,,

## 2023-12-15 PROCEDURE — 3288F PR FALLS RISK ASSESSMENT DOCUMENTED: ICD-10-PCS | Mod: CPTII,S$GLB,,

## 2023-12-15 PROCEDURE — 99999 PR PBB SHADOW E&M-EST. PATIENT-LVL II: ICD-10-PCS | Mod: PBBFAC,,,

## 2023-12-15 PROCEDURE — 4010F ACE/ARB THERAPY RXD/TAKEN: CPT | Mod: CPTII,S$GLB,,

## 2023-12-15 PROCEDURE — 86140 C-REACTIVE PROTEIN: CPT

## 2023-12-15 PROCEDURE — 99024 PR POST-OP FOLLOW-UP VISIT: ICD-10-PCS | Mod: S$GLB,,,

## 2023-12-15 PROCEDURE — 1126F AMNT PAIN NOTED NONE PRSNT: CPT | Mod: CPTII,S$GLB,,

## 2023-12-15 PROCEDURE — 99999 PR PBB SHADOW E&M-EST. PATIENT-LVL II: CPT | Mod: PBBFAC,,,

## 2023-12-15 PROCEDURE — 1101F PR PT FALLS ASSESS DOC 0-1 FALLS W/OUT INJ PAST YR: ICD-10-PCS | Mod: CPTII,S$GLB,,

## 2023-12-15 PROCEDURE — 85651 RBC SED RATE NONAUTOMATED: CPT

## 2023-12-15 PROCEDURE — 99024 POSTOP FOLLOW-UP VISIT: CPT | Mod: S$GLB,,,

## 2023-12-15 PROCEDURE — 1126F PR PAIN SEVERITY QUANTIFIED, NO PAIN PRESENT: ICD-10-PCS | Mod: CPTII,S$GLB,,

## 2023-12-15 PROCEDURE — 36415 COLL VENOUS BLD VENIPUNCTURE: CPT

## 2023-12-15 PROCEDURE — 1101F PT FALLS ASSESS-DOCD LE1/YR: CPT | Mod: CPTII,S$GLB,,

## 2023-12-15 PROCEDURE — 3044F HG A1C LEVEL LT 7.0%: CPT | Mod: CPTII,S$GLB,,

## 2023-12-15 PROCEDURE — 3288F FALL RISK ASSESSMENT DOCD: CPT | Mod: CPTII,S$GLB,,

## 2023-12-15 PROCEDURE — 3044F PR MOST RECENT HEMOGLOBIN A1C LEVEL <7.0%: ICD-10-PCS | Mod: CPTII,S$GLB,,

## 2023-12-15 RX ORDER — TRAMADOL HYDROCHLORIDE 50 MG/1
50 TABLET ORAL EVERY 8 HOURS PRN
Qty: 20 TABLET | Refills: 0 | Status: SHIPPED | OUTPATIENT
Start: 2023-12-15

## 2023-12-15 RX ORDER — CLOPIDOGREL BISULFATE 75 MG/1
75 TABLET ORAL
COMMUNITY
Start: 2023-09-25

## 2023-12-20 ENCOUNTER — OFFICE VISIT (OUTPATIENT)
Dept: CARDIOLOGY | Facility: CLINIC | Age: 68
End: 2023-12-20
Payer: MEDICARE

## 2023-12-20 VITALS
SYSTOLIC BLOOD PRESSURE: 102 MMHG | HEART RATE: 51 BPM | OXYGEN SATURATION: 96 % | DIASTOLIC BLOOD PRESSURE: 66 MMHG | WEIGHT: 188.25 LBS | HEIGHT: 69 IN | BODY MASS INDEX: 27.88 KG/M2

## 2023-12-20 DIAGNOSIS — I25.708 CORONARY ARTERY DISEASE OF BYPASS GRAFT OF NATIVE HEART WITH STABLE ANGINA PECTORIS: ICD-10-CM

## 2023-12-20 DIAGNOSIS — I10 ESSENTIAL HYPERTENSION: Chronic | ICD-10-CM

## 2023-12-20 DIAGNOSIS — E78.2 MIXED HYPERLIPIDEMIA: Primary | ICD-10-CM

## 2023-12-20 DIAGNOSIS — Z95.1 HX OF CABG: ICD-10-CM

## 2023-12-20 DIAGNOSIS — I70.0 ATHEROSCLEROSIS OF AORTA: ICD-10-CM

## 2023-12-20 PROCEDURE — 3008F PR BODY MASS INDEX (BMI) DOCUMENTED: ICD-10-PCS | Mod: CPTII,S$GLB,, | Performed by: INTERNAL MEDICINE

## 2023-12-20 PROCEDURE — 3078F PR MOST RECENT DIASTOLIC BLOOD PRESSURE < 80 MM HG: ICD-10-PCS | Mod: CPTII,S$GLB,, | Performed by: INTERNAL MEDICINE

## 2023-12-20 PROCEDURE — 1159F MED LIST DOCD IN RCRD: CPT | Mod: CPTII,S$GLB,, | Performed by: INTERNAL MEDICINE

## 2023-12-20 PROCEDURE — 99999 PR PBB SHADOW E&M-EST. PATIENT-LVL IV: ICD-10-PCS | Mod: PBBFAC,,, | Performed by: INTERNAL MEDICINE

## 2023-12-20 PROCEDURE — 4010F PR ACE/ARB THEARPY RXD/TAKEN: ICD-10-PCS | Mod: CPTII,S$GLB,, | Performed by: INTERNAL MEDICINE

## 2023-12-20 PROCEDURE — 99999 PR PBB SHADOW E&M-EST. PATIENT-LVL IV: CPT | Mod: PBBFAC,,, | Performed by: INTERNAL MEDICINE

## 2023-12-20 PROCEDURE — 3074F SYST BP LT 130 MM HG: CPT | Mod: CPTII,S$GLB,, | Performed by: INTERNAL MEDICINE

## 2023-12-20 PROCEDURE — 1101F PR PT FALLS ASSESS DOC 0-1 FALLS W/OUT INJ PAST YR: ICD-10-PCS | Mod: CPTII,S$GLB,, | Performed by: INTERNAL MEDICINE

## 2023-12-20 PROCEDURE — 1126F AMNT PAIN NOTED NONE PRSNT: CPT | Mod: CPTII,S$GLB,, | Performed by: INTERNAL MEDICINE

## 2023-12-20 PROCEDURE — 1159F PR MEDICATION LIST DOCUMENTED IN MEDICAL RECORD: ICD-10-PCS | Mod: CPTII,S$GLB,, | Performed by: INTERNAL MEDICINE

## 2023-12-20 PROCEDURE — 99214 OFFICE O/P EST MOD 30 MIN: CPT | Mod: S$GLB,,, | Performed by: INTERNAL MEDICINE

## 2023-12-20 PROCEDURE — 3078F DIAST BP <80 MM HG: CPT | Mod: CPTII,S$GLB,, | Performed by: INTERNAL MEDICINE

## 2023-12-20 PROCEDURE — 4010F ACE/ARB THERAPY RXD/TAKEN: CPT | Mod: CPTII,S$GLB,, | Performed by: INTERNAL MEDICINE

## 2023-12-20 PROCEDURE — 3288F FALL RISK ASSESSMENT DOCD: CPT | Mod: CPTII,S$GLB,, | Performed by: INTERNAL MEDICINE

## 2023-12-20 PROCEDURE — 1126F PR PAIN SEVERITY QUANTIFIED, NO PAIN PRESENT: ICD-10-PCS | Mod: CPTII,S$GLB,, | Performed by: INTERNAL MEDICINE

## 2023-12-20 PROCEDURE — 3044F PR MOST RECENT HEMOGLOBIN A1C LEVEL <7.0%: ICD-10-PCS | Mod: CPTII,S$GLB,, | Performed by: INTERNAL MEDICINE

## 2023-12-20 PROCEDURE — 3044F HG A1C LEVEL LT 7.0%: CPT | Mod: CPTII,S$GLB,, | Performed by: INTERNAL MEDICINE

## 2023-12-20 PROCEDURE — 3074F PR MOST RECENT SYSTOLIC BLOOD PRESSURE < 130 MM HG: ICD-10-PCS | Mod: CPTII,S$GLB,, | Performed by: INTERNAL MEDICINE

## 2023-12-20 PROCEDURE — 1160F RVW MEDS BY RX/DR IN RCRD: CPT | Mod: CPTII,S$GLB,, | Performed by: INTERNAL MEDICINE

## 2023-12-20 PROCEDURE — 3008F BODY MASS INDEX DOCD: CPT | Mod: CPTII,S$GLB,, | Performed by: INTERNAL MEDICINE

## 2023-12-20 PROCEDURE — 1101F PT FALLS ASSESS-DOCD LE1/YR: CPT | Mod: CPTII,S$GLB,, | Performed by: INTERNAL MEDICINE

## 2023-12-20 PROCEDURE — 3288F PR FALLS RISK ASSESSMENT DOCUMENTED: ICD-10-PCS | Mod: CPTII,S$GLB,, | Performed by: INTERNAL MEDICINE

## 2023-12-20 PROCEDURE — 1160F PR REVIEW ALL MEDS BY PRESCRIBER/CLIN PHARMACIST DOCUMENTED: ICD-10-PCS | Mod: CPTII,S$GLB,, | Performed by: INTERNAL MEDICINE

## 2023-12-20 PROCEDURE — 99214 PR OFFICE/OUTPT VISIT, EST, LEVL IV, 30-39 MIN: ICD-10-PCS | Mod: S$GLB,,, | Performed by: INTERNAL MEDICINE

## 2023-12-20 RX ORDER — EVOLOCUMAB 140 MG/ML
140 INJECTION, SOLUTION SUBCUTANEOUS
Qty: 2 EACH | Refills: 5 | Status: ACTIVE | OUTPATIENT
Start: 2023-12-20

## 2023-12-20 NOTE — PROGRESS NOTES
Subjective:   Patient ID:  Mil Sood is a 68 y.o. male who presents for follow up of Chest Pain, Shortness of Breath, and Annual Exam (Elevated Cholesterol. )      69 yo male, came in for s/p CABG f/u  PMH CAD s/p CABG x4 in , h/o emboli CVA in , HTN HLD. PreDM. Anxiety, no smoking/drinking. Statin intolerance for bone pain.  C/o chest pain for 4 months, central chest, twice a week, stabbing pain, radiating to the left arm. Occurred at rest. Lasted for 10 minutes and resolved spontaneously  Walking caused SOB but no pain.   Phar. MPI ordered by PCP for chest pain and showed apical and inferoseptal ischemia.  ekg NSR and no acute stt change  Father had PPM and mother ok. Sister  of lung cancer     Pt had LHC done on 2022 bu Dr. Godfrey, revealing:  lmca 50% eccentric calcified ivus csa 5.4  LAd HEAVILY CALCIFIED MID LAD LONG LESION 50-70% IN MAGNITUDE CSA 2 MM2.  LCX MID LONG 40% HEAVILY CALCIFIED.   OM1 90% PROX.  D1 CALCIFIED NON OBS DISEASE.   RCA CALCIFIED DIFFUSE DISEASE PDA OSTIUM 90% PLB 90% .  EF NORMAL.      visit states that still has exertional chest pain and Left arm pain. Has SOB,   Prefers to go to Dr. Mcmanus for CABG at [facility]. His wife worked before at Dr. Mcmanus's office     visit  22 S/p 4v CABG (LIMA to LAD, SVG to diagonal, SVG to OM and SVG to rPDA) by Dr. Mcmanus. Continue aspirin, Plavix, and beta-blocker.  2022 admitted for Syncope ad [place]. SBP dropped to 55 mmHG. Cr 1.3 and IVF Rx  EKG NSR  Echo 1. Normal left ventricular systolic function. LVEF 55 - 65%. Mild (Grade I) diastolic   dysfunction (impaired relaxation). Mild asymmetric septal hypertrophy. No LV outflow   tract obstruction.   2. Normal right ventricular size and systolic function.   3. Mild mitral valve regurgitation.   4. Mild aortic valve regurgitation.   5. Mild tricuspid valve regurgitation.   After d/c no recurrent syncope, no chest pain dizziness SOB leg swelling  Living with his  son.      visit  Copay of Repatha was high for pt.        Two weeks ago, [place] of [place] out [place]. Waking up and went to the kitchen. Felt dizziness and then passed out. Waking up and BP 60/40 mmHg.  Otherwise, no chest pain dyspnea and leg swelling    06/23 visit  05-23 admitted for AMS. MRI brain with multifocal infarcts in right frontal lobe, bilateral parietal lobes and bilateral cerebellum possibly embolic type infarcts. Vascular surgery was consulted, no intervention recommended.  Troponin  2.7>3.21>3.27  EKG with nonspecific ST T-wave abnormality, sinus rhythm.    Echo with EF 60%, mild to mod MR, mild AI, [place] 26 nl size    Now left arm and hand weakness and right arm numbness. Right foot 3 toes numbness. Walking is ok.  DJD some dizziness    Interval history  Angina once a month and resolved after NTG SL x1. Occasional UMANA>  Ok to walk 100 yard for the mail. No active bleeding  Taking Lipitor 40mg daily and had bone pain when taking 80 mg  High copay for repatha  09/23 [place] MPI no ischemia       03/07/24 dc repatha due to not reach out      Past Medical History:   Diagnosis Date    Anxiety     Cancer     spot on kidney and lung    Deep vein thrombosis     Diabetes mellitus     Borderline diet controlled    Diverticulitis     GERD (gastroesophageal reflux disease)     Hyperlipidemia     not on any meds currently    Hypertension     Lung mass     Mass of kidney        Past Surgical History:   Procedure Laterality Date    ARTHROPLASTY OF JOINT OF FINGER Left 11/18/2022    Procedure: ARTHROPLASTY, FINGER;  Surgeon: Kane Negrete MD;  Location: [facility] OR;  Service: Orthopedics;  Laterality: Left;  left thumb basal joint arthroplasty  Arthrex  Podiatry extras    CARPAL TUNNEL RELEASE Right 12/1/2023    Procedure: RELEASE, CARPAL TUNNEL;  Surgeon: Kane Negrete MD;  Location: [facility] OR;  Service: Orthopedics;  Laterality: Right;    CHOLECYSTECTOMY      COLONOSCOPY       COLONOSCOPY N/A 2017    Procedure: COLONOSCOPY;  Surgeon: Amarilys Bravo MD;  Location: [facility] ENDO;  Service: Endoscopy;  Laterality: N/A;    DIAGNOSTIC LAPAROSCOPY N/A 2021    Procedure: LAPAROSCOPY, DIAGNOSTIC;  Surgeon: Shaji Jauregui MD;  Location: [facility] OR;  Service: General;  Laterality: N/A;    ESOPHAGOGASTRODUODENOSCOPY      ESOPHAGOGASTRODUODENOSCOPY N/A 2020    Procedure: ESOPHAGOGASTRODUODENOSCOPY (EGD);  Surgeon: Shaji Aquino III, MD;  Location: [facility] ENDO;  Service: Endoscopy;  Laterality: N/A;    ESOPHAGOGASTRODUODENOSCOPY N/A 5/3/2023    Procedure: EGD (ESOPHAGOGASTRODUODENOSCOPY);  Surgeon: Jenelle Jensen MD;  Location: [facility] ENDO;  Service: Endoscopy;  Laterality: N/A;    HERNIA REPAIR Bilateral     groin    LEFT HEART CATHETERIZATION Left 2022    Procedure: CATHETERIZATION, HEART, LEFT;  Surgeon: Romeo Godfrey MD;  Location: [facility] CATH LAB;  Service: Cardiology;  Laterality: Left;    NECK SURGERY      OPEN REDUCTION AND INTERNAL FIXATION (ORIF) OF INJURY OF ANKLE Left 8/10/2022    Procedure: ORIF, ANKLE;  Surgeon: Howard Mata MD;  Location: [facility] OR;  Service: Orthopedics;  Laterality: Left;    PARTIAL NEPHRECTOMY      left    RENAL BIOPSY      ROBOT-ASSISTED REPAIR OF UMBILICAL HERNIA USING DA NEL XI N/A 2021    Procedure: XI ROBOTIC REPAIR, HERNIA, UMBILICAL;  Surgeon: Shaji Jauregui MD;  Location: [facility];  Service: General;  Laterality: N/A;  converted to open    TONSILLECTOMY      TRIGGER FINGER RELEASE Right     long finger       Social History     Tobacco Use    Smoking status: Former     Current packs/day: 0.00     Average packs/day: 1 pack/day for 20.0 years (20.0 ttl pk-yrs)     Types: Cigarettes     Start date: 1975     Quit date: 1995     Years since quittin.8    Smokeless tobacco: Never   Substance Use Topics    Alcohol use: Never     Alcohol/week: 0.0 standard drinks of alcohol    Drug use: No       Family  History   Problem Relation Age of Onset    Hypertension Mother     Hypertension Father     Melanoma Father     Cancer Sister          ROS    Objective:   Physical Exam  HENT:      Head: Normocephalic.   Eyes:      Pupils: Pupils are equal, round, and reactive to light.   Neck:      Thyroid: No thyromegaly.      Vascular: Normal carotid pulses. No carotid bruit or JVD.   Cardiovascular:      Rate and Rhythm: Normal rate and regular rhythm. No extrasystoles are present.     Chest Wall: PMI is not displaced.      Pulses: Normal pulses.           Carotid pulses are 2+ on the right side and 2+ on the left side.     Heart sounds: Normal heart sounds. No murmur heard.     No gallop. No S3 sounds.      Comments: S/p sternotomy    Pulmonary:      Effort: No respiratory distress.      Breath sounds: Normal breath sounds. No stridor.   Abdominal:      General: Bowel sounds are normal.      Palpations: Abdomen is soft.      Tenderness: There is no abdominal tenderness. There is no rebound.   Musculoskeletal:         General: Normal range of motion.   Skin:     Findings: No rash.   Neurological:      Mental Status: He is alert and oriented to person, place, and time.   Psychiatric:         Behavior: Behavior normal.       Lab Results   Component Value Date    CHOL 170 05/17/2023    CHOL 194 08/18/2022    CHOL 212 (H) 02/11/2022     Lab Results   Component Value Date    HDL 46 05/17/2023    HDL 38 (L) 08/18/2022    HDL 49 02/11/2022     Lab Results   Component Value Date    LDLCALC 105.8 05/17/2023    LDLCALC 118.4 08/18/2022    LDLCALC 127.4 02/11/2022     Lab Results   Component Value Date    TRIG 91 05/17/2023    TRIG 188 (H) 08/18/2022    TRIG 178 (H) 02/11/2022     Lab Results   Component Value Date    CHOLHDL 27.1 05/17/2023    CHOLHDL 19.6 (L) 08/18/2022    CHOLHDL 23.1 02/11/2022       Chemistry        Component Value Date/Time     (L) 11/14/2023 0716    K 5.1 11/14/2023 0716    CL 98 11/14/2023 0716    CO2 25  11/14/2023 0716    BUN 26 (H) 11/14/2023 0716    CREATININE 1.5 (H) 11/14/2023 0716    GLU 77 11/14/2023 0716        Component Value Date/Time    CALCIUM 9.3 11/14/2023 0716    ALKPHOS 53 (L) 11/14/2023 0716    AST 19 11/14/2023 0716    ALT 27 11/14/2023 0716    BILITOT 0.4 11/14/2023 0716    ESTGFRAFRICA 89 04/27/2022 0507    ESTGFRAFRICA >60.0 02/11/2022 0858    EGFRNONAA >60.0 02/11/2022 0858          Lab Results   Component Value Date    HGBA1C 5.4 05/17/2023     Lab Results   Component Value Date    TSH 1.335 05/17/2023     Lab Results   Component Value Date    INR 1.0 05/17/2023    INR 1.0 05/16/2023    INR 1.4 03/11/2022     Lab Results   Component Value Date    WBC 7.55 11/14/2023    HGB 15.5 11/14/2023    HCT 47.8 11/14/2023    MCV 88 11/14/2023     11/14/2023     BMP  Sodium   Date Value Ref Range Status   11/14/2023 133 (L) 136 - 145 mmol/L Final     Potassium   Date Value Ref Range Status   11/14/2023 5.1 3.5 - 5.1 mmol/L Final     Chloride   Date Value Ref Range Status   11/14/2023 98 95 - 110 mmol/L Final     CO2   Date Value Ref Range Status   11/14/2023 25 23 - 29 mmol/L Final     BUN   Date Value Ref Range Status   11/14/2023 26 (H) 8 - 23 mg/dL Final     Creatinine   Date Value Ref Range Status   11/14/2023 1.5 (H) 0.5 - 1.4 mg/dL Final     Calcium   Date Value Ref Range Status   11/14/2023 9.3 8.7 - 10.5 mg/dL Final     Anion Gap   Date Value Ref Range Status   11/14/2023 10 8 - 16 mmol/L Final     eGFR if    Date Value Ref Range Status   02/11/2022 >60.0 >60 mL/min/1.73 m^2 Final     eGFR    Date Value Ref Range Status   04/27/2022 89 mL/min/1.73mSq Final     Comment:     In accordance with NKF-ASN Task Force recommendation, calculation based on the Chronic Kidney Disease Epidemiology Collaboration (CKD-EPI) equation without adjustment for race. eGFR adjusted for gender and age and calculated in ml/min/1.73mSquared. eGFR cannot be calculated if patient is  under 18 years of age.     Reference Range:   >= 60 ml/min/1.73mSquared.     eGFR if non    Date Value Ref Range Status   02/11/2022 >60.0 >60 mL/min/1.73 m^2 Final     Comment:     Calculation used to obtain the estimated glomerular filtration  rate (eGFR) is the CKD-EPI equation.        BNP  @LABRCNTIP(BNP,BNPTRIAGEBLO)@  @LABRCNTIP(troponini)@  CrCl cannot be calculated (Patient's most recent lab result is older than the maximum 7 days allowed.).  No results found in the last 24 hours.  No results found in the last 24 hours.  No results found in the last 24 hours.    Assessment:      1. Mixed hyperlipidemia    2. Essential hypertension    3. Atherosclerosis of aorta    4. Hx of CABG x4 in     5. Coronary artery disease of bypass graft of native heart with stable angina pectoris        Plan:   D/c asa. Continue eliquis and plavix  Resend repatha    Continue amlodipine statin benazepril metoprolol and imdur   RTC in 6 M

## 2023-12-22 ENCOUNTER — HOSPITAL ENCOUNTER (EMERGENCY)
Facility: HOSPITAL | Age: 68
Discharge: HOME | End: 2023-12-22
Attending: FAMILY MEDICINE
Payer: MEDICARE

## 2023-12-22 VITALS
RESPIRATION RATE: 17 BRPM | HEIGHT: 69 IN | TEMPERATURE: 98 F | BODY MASS INDEX: 29.88 KG/M2 | OXYGEN SATURATION: 97 % | SYSTOLIC BLOOD PRESSURE: 106 MMHG | DIASTOLIC BLOOD PRESSURE: 64 MMHG | WEIGHT: 201.75 LBS | HEART RATE: 57 BPM

## 2023-12-22 DIAGNOSIS — I95.9 HYPOTENSION: Primary | ICD-10-CM

## 2023-12-22 DIAGNOSIS — R55 SYNCOPE AND COLLAPSE: ICD-10-CM

## 2023-12-22 LAB
ALBUMIN SERPL BCP-MCNC: 3.5 G/DL (ref 3.5–5.2)
ALP SERPL-CCNC: 48 U/L (ref 55–135)
ALT SERPL W/O P-5'-P-CCNC: 20 U/L (ref 10–44)
ANION GAP SERPL CALC-SCNC: 9 MMOL/L (ref 8–16)
AST SERPL-CCNC: 16 U/L (ref 10–40)
BASOPHILS # BLD AUTO: 0.08 K/UL (ref 0–0.2)
BASOPHILS NFR BLD: 1 % (ref 0–1.9)
BILIRUB SERPL-MCNC: 0.4 MG/DL (ref 0.1–1)
BNP SERPL-MCNC: 42 PG/ML (ref 0–99)
BUN SERPL-MCNC: 22 MG/DL (ref 8–23)
CALCIUM SERPL-MCNC: 8.5 MG/DL (ref 8.7–10.5)
CHLORIDE SERPL-SCNC: 104 MMOL/L (ref 95–110)
CO2 SERPL-SCNC: 22 MMOL/L (ref 23–29)
CREAT SERPL-MCNC: 1.3 MG/DL (ref 0.5–1.4)
DIFFERENTIAL METHOD BLD: ABNORMAL
EOSINOPHIL # BLD AUTO: 0.2 K/UL (ref 0–0.5)
EOSINOPHIL NFR BLD: 2 % (ref 0–8)
ERYTHROCYTE [DISTWIDTH] IN BLOOD BY AUTOMATED COUNT: 13.2 % (ref 11.5–14.5)
EST. GFR  (NO RACE VARIABLE): 60 ML/MIN/1.73 M^2
ETHANOL SERPL-MCNC: <10 MG/DL
GLUCOSE SERPL-MCNC: 91 MG/DL (ref 70–110)
HCT VFR BLD AUTO: 40.4 % (ref 40–54)
HGB BLD-MCNC: 13.2 G/DL (ref 14–18)
IMM GRANULOCYTES # BLD AUTO: 0.04 K/UL (ref 0–0.04)
IMM GRANULOCYTES NFR BLD AUTO: 0.5 % (ref 0–0.5)
INFLUENZA A, MOLECULAR: NEGATIVE
INFLUENZA B, MOLECULAR: NEGATIVE
LACTATE SERPL-SCNC: 1.2 MMOL/L (ref 0.5–2.2)
LYMPHOCYTES # BLD AUTO: 1.3 K/UL (ref 1–4.8)
LYMPHOCYTES NFR BLD: 16 % (ref 18–48)
MCH RBC QN AUTO: 28.3 PG (ref 27–31)
MCHC RBC AUTO-ENTMCNC: 32.7 G/DL (ref 32–36)
MCV RBC AUTO: 87 FL (ref 82–98)
MONOCYTES # BLD AUTO: 0.9 K/UL (ref 0.3–1)
MONOCYTES NFR BLD: 11.2 % (ref 4–15)
NEUTROPHILS # BLD AUTO: 5.8 K/UL (ref 1.8–7.7)
NEUTROPHILS NFR BLD: 69.3 % (ref 38–73)
NRBC BLD-RTO: 0 /100 WBC
PLATELET # BLD AUTO: 265 K/UL (ref 150–450)
PMV BLD AUTO: 8.5 FL (ref 9.2–12.9)
POTASSIUM SERPL-SCNC: 4.2 MMOL/L (ref 3.5–5.1)
PROCALCITONIN SERPL IA-MCNC: 0.02 NG/ML
PROT SERPL-MCNC: 6.2 G/DL (ref 6–8.4)
RBC # BLD AUTO: 4.67 M/UL (ref 4.6–6.2)
SARS-COV-2 RDRP RESP QL NAA+PROBE: NEGATIVE
SODIUM SERPL-SCNC: 135 MMOL/L (ref 136–145)
SPECIMEN SOURCE: NORMAL
TROPONIN I SERPL DL<=0.01 NG/ML-MCNC: <0.006 NG/ML (ref 0–0.03)
WBC # BLD AUTO: 8.3 K/UL (ref 3.9–12.7)

## 2023-12-22 PROCEDURE — 83605 ASSAY OF LACTIC ACID: CPT | Performed by: FAMILY MEDICINE

## 2023-12-22 PROCEDURE — 87502 INFLUENZA DNA AMP PROBE: CPT | Performed by: FAMILY MEDICINE

## 2023-12-22 PROCEDURE — 80053 COMPREHEN METABOLIC PANEL: CPT | Performed by: FAMILY MEDICINE

## 2023-12-22 PROCEDURE — 82077 ASSAY SPEC XCP UR&BREATH IA: CPT | Performed by: FAMILY MEDICINE

## 2023-12-22 PROCEDURE — 84145 PROCALCITONIN (PCT): CPT | Performed by: FAMILY MEDICINE

## 2023-12-22 PROCEDURE — 83880 ASSAY OF NATRIURETIC PEPTIDE: CPT | Performed by: FAMILY MEDICINE

## 2023-12-22 PROCEDURE — 96361 HYDRATE IV INFUSION ADD-ON: CPT

## 2023-12-22 PROCEDURE — 96374 THER/PROPH/DIAG INJ IV PUSH: CPT

## 2023-12-22 PROCEDURE — 96360 HYDRATION IV INFUSION INIT: CPT | Mod: 59

## 2023-12-22 PROCEDURE — 84484 ASSAY OF TROPONIN QUANT: CPT | Performed by: FAMILY MEDICINE

## 2023-12-22 PROCEDURE — 93010 ELECTROCARDIOGRAM REPORT: CPT | Mod: ,,, | Performed by: INTERNAL MEDICINE

## 2023-12-22 PROCEDURE — 99285 EMERGENCY DEPT VISIT HI MDM: CPT | Mod: 25

## 2023-12-22 PROCEDURE — U0002 COVID-19 LAB TEST NON-CDC: HCPCS | Performed by: FAMILY MEDICINE

## 2023-12-22 PROCEDURE — 25000003 PHARM REV CODE 250: Performed by: FAMILY MEDICINE

## 2023-12-22 PROCEDURE — 85025 COMPLETE CBC W/AUTO DIFF WBC: CPT | Performed by: FAMILY MEDICINE

## 2023-12-22 PROCEDURE — 63600175 PHARM REV CODE 636 W HCPCS: Performed by: FAMILY MEDICINE

## 2023-12-22 PROCEDURE — 87040 BLOOD CULTURE FOR BACTERIA: CPT | Mod: 59 | Performed by: FAMILY MEDICINE

## 2023-12-22 PROCEDURE — 93005 ELECTROCARDIOGRAM TRACING: CPT

## 2023-12-22 RX ORDER — KETOROLAC TROMETHAMINE 30 MG/ML
15 INJECTION, SOLUTION INTRAMUSCULAR; INTRAVENOUS
Status: COMPLETED | OUTPATIENT
Start: 2023-12-22 | End: 2023-12-22

## 2023-12-22 RX ADMIN — SODIUM CHLORIDE 2121 ML: 0.9 INJECTION, SOLUTION INTRAVENOUS at 08:12

## 2023-12-22 RX ADMIN — KETOROLAC TROMETHAMINE 15 MG: 30 INJECTION, SOLUTION INTRAMUSCULAR; INTRAVENOUS at 10:12

## 2023-12-23 NOTE — ED PROVIDER NOTES
SCRIBE #1 NOTE: I, Kristina Rios, am scribing for, and in the presence of, Ondina Meza MD. I have scribed the entire note.       History     Chief Complaint   Patient presents with    Loss of Consciousness     Pt suffered loss of consciousness lasting 5-10 min per [facility], Given 1mg Narcan with no response. Pt eventually came to, c/o nausea - given 4mg Zofran. Initial BP 88/43. Unknown if mixed up meds. [facility] reports that medications strewn all over table and on floor.      Review of patient's allergies indicates:   Allergen Reactions    Rosuvastatin Other (See Comments)     Joint pain   Joint and bone pain         History of Present Illness     HPI    12/22/2023, 9:19 PM  History obtained from the patient and [facility]      History of Present Illness: Mil Sood is a 68 y.o. male patient with a PMHx of HTN, DM, cancer, and DVT who presents to the Emergency Department for evaluation of syncopal episode today lasting 5-10 minutes. Per [facility], pt was given 1mg narcan with no response and 4mg Zofran en route.  Per [facility], medicines were strewn all over table and floor when they arrived to the scene.     Symptoms are intermittent and moderate in severity. No mitigating or exacerbating factors reported. Associated sxs include SOB, left sided CP and nausea. . No further complaints or concerns at this time.       Arrival mode: [facility]    PCP: Yumi Roberson MD        Past Medical History:  Past Medical History:   Diagnosis Date    Anxiety     Cancer     spot on kidney and lung    Deep vein thrombosis     Diabetes mellitus     Borderline diet controlled    Diverticulitis     GERD (gastroesophageal reflux disease)     Hyperlipidemia     not on any meds currently    Hypertension     Lung mass     Mass of kidney        Past Surgical History:  Past Surgical History:   Procedure Laterality Date    ARTHROPLASTY OF JOINT OF FINGER Left 11/18/2022    Procedure: ARTHROPLASTY, FINGER;  Surgeon: Kane Negrete MD;  Location: [facility] OR;   Service: Orthopedics;  Laterality: Left;  left thumb basal joint arthroplasty  Arthrex  Podiatry extras    CARPAL TUNNEL RELEASE Right 12/1/2023    Procedure: RELEASE, CARPAL TUNNEL;  Surgeon: Kane Negrete MD;  Location: [facility];  Service: Orthopedics;  Laterality: Right;    CHOLECYSTECTOMY      COLONOSCOPY      COLONOSCOPY N/A 4/7/2017    Procedure: COLONOSCOPY;  Surgeon: Amarilys Bravo MD;  Location: [facility];  Service: Endoscopy;  Laterality: N/A;    DIAGNOSTIC LAPAROSCOPY N/A 5/4/2021    Procedure: LAPAROSCOPY, DIAGNOSTIC;  Surgeon: Shaji Jauregui MD;  Location: [facility];  Service: General;  Laterality: N/A;    ESOPHAGOGASTRODUODENOSCOPY      ESOPHAGOGASTRODUODENOSCOPY N/A 2/13/2020    Procedure: ESOPHAGOGASTRODUODENOSCOPY (EGD);  Surgeon: Shaji Aquino III, MD;  Location: [facility];  Service: Endoscopy;  Laterality: N/A;    ESOPHAGOGASTRODUODENOSCOPY N/A 5/3/2023    Procedure: EGD (ESOPHAGOGASTRODUODENOSCOPY);  Surgeon: Jenelle Jensen MD;  Location: [facility];  Service: Endoscopy;  Laterality: N/A;    HERNIA REPAIR Bilateral     groin    LEFT HEART CATHETERIZATION Left 1/31/2022    Procedure: CATHETERIZATION, HEART, LEFT;  Surgeon: Romeo Godfrey MD;  Location: [facility] CATH LAB;  Service: Cardiology;  Laterality: Left;    NECK SURGERY      OPEN REDUCTION AND INTERNAL FIXATION (ORIF) OF INJURY OF ANKLE Left 8/10/2022    Procedure: ORIF, ANKLE;  Surgeon: Howard Mata MD;  Location: [facility];  Service: Orthopedics;  Laterality: Left;    PARTIAL NEPHRECTOMY      left    RENAL BIOPSY      ROBOT-ASSISTED REPAIR OF UMBILICAL HERNIA USING DA NEL XI N/A 5/4/2021    Procedure: XI ROBOTIC REPAIR, HERNIA, UMBILICAL;  Surgeon: Shaji Jauregui MD;  Location: [facility];  Service: General;  Laterality: N/A;  converted to open    TONSILLECTOMY      TRIGGER FINGER RELEASE Right     long finger         Family History:  Family History   Problem Relation Age of Onset    Hypertension Mother      Hypertension Father     Melanoma Father     Cancer Sister        Social History:  Social History     Tobacco Use    Smoking status: Former     Current packs/day: 0.00     Average packs/day: 1 pack/day for 20.0 years (20.0 ttl pk-yrs)     Types: Cigarettes     Start date: 1975     Quit date: 1995     Years since quittin.9    Smokeless tobacco: Never   Substance and Sexual Activity    Alcohol use: Never     Alcohol/week: 0.0 standard drinks of alcohol    Drug use: No    Sexual activity: Not Currently     Partners: Female     Birth control/protection: None        Review of Systems     Review of Systems   Respiratory:  Positive for shortness of breath.    Cardiovascular:  Positive for chest pain.   Gastrointestinal:  Positive for nausea.   Neurological:  Positive for syncope.      Physical Exam     Initial Vitals [23]   BP Pulse Resp Temp SpO2   109/73 63 14 98.3 °F (36.8 °C) 97 %      MAP       --          Physical Exam  Nursing Notes and Vital Signs Reviewed.  Constitutional: Patient is in no acute distress. Well-developed and well-nourished.  Head: Atraumatic. Normocephalic.  Eyes: PERRL. EOM intact. Conjunctivae are not pale. No scleral icterus.  ENT: Mucous membranes are moist. Oropharynx is clear and symmetric.    Neck: Supple. Full ROM. No lymphadenopathy.  Cardiovascular: Regular rate. Regular rhythm. No murmurs, rubs, or gallops. Distal pulses are 2+ and symmetric.  Pulmonary/Chest: No respiratory distress. Clear to auscultation bilaterally. No wheezing or rales.  Abdominal: Soft and non-distended.  There is no tenderness.  No rebound, guarding, or rigidity.  Genitourinary: No CVA tenderness  Musculoskeletal: Moves all extremities. No obvious deformities. No edema.  Skin: Warm and dry.  Neurological:  Alert, awake, and appropriate.  Normal speech.  No acute focal neurological deficits are appreciated.  Psychiatric: Normal affect. Good eye contact. Appropriate in content.     ED Course    Procedures  ED Vital Signs:  Vitals:    12/22/23 2033 12/22/23 2034 12/22/23 2035 12/22/23 2037   BP: (!) 64/44 90/60 90/60 (!) 89/63   Pulse: 64 65 63 64   Resp: 20 19 18 (!) 21   Temp:       TempSrc:       SpO2: 97% 96% 95% 97%   Weight:       Height:        12/22/23 2041 12/22/23 2045 12/22/23 2102 12/22/23 2107   BP:  (!) 86/58 95/62 91/61   Pulse:  61 66 63   Resp:  (!) 22 (!) 22 20   Temp:       TempSrc:       SpO2:  99% 96% 100%   Weight: 91.5 kg (201 lb 11.5 oz)      Height:        12/22/23 2112 12/22/23 2117 12/22/23 2145 12/22/23 2200   BP: (!) 87/57 102/62 106/70 106/68   Pulse: 64 63 63 61   Resp: (!) 21 19 20 19   Temp:       TempSrc:       SpO2: 100% 99% 98% 96%   Weight:       Height:        12/22/23 2215 12/22/23 2259 12/22/23 2330   BP: 120/74 120/74 106/64   Pulse: 66 66 (!) 57   Resp: 18 18 17   Temp:  98.2 °F (36.8 °C)    TempSrc:  Oral    SpO2: 97% 97% 97%   Weight:      Height:          Abnormal Lab Results:  Labs Reviewed   CBC W/ AUTO DIFFERENTIAL - Abnormal; Notable for the following components:       Result Value    Hemoglobin 13.2 (*)     MPV 8.5 (*)     Lymph % 16.0 (*)     All other components within normal limits   COMPREHENSIVE METABOLIC PANEL - Abnormal; Notable for the following components:    Sodium 135 (*)     CO2 22 (*)     Calcium 8.5 (*)     Alkaline Phosphatase 48 (*)     All other components within normal limits   CULTURE, BLOOD    Narrative:     Aerobic and anaerobic   CULTURE, BLOOD    Narrative:     Aerobic and anaerobic   INFLUENZA A & B BY MOLECULAR   LACTIC ACID, PLASMA   B-TYPE NATRIURETIC PEPTIDE   TROPONIN I   PROCALCITONIN   ALCOHOL,MEDICAL (ETHANOL)   SARS-COV-2 RNA AMPLIFICATION, QUAL        All Lab Results:  Results for orders placed or performed during the hospital encounter of 12/22/23   Blood culture x two cultures. Draw prior to antibiotics.    Specimen: Peripheral, Antecubital, Right; Blood   Result Value Ref Range    Blood Culture, Routine No Growth to  date    Blood culture x two cultures. Draw prior to antibiotics.    Specimen: Peripheral, Hand, Left; Blood   Result Value Ref Range    Blood Culture, Routine No Growth to date    Influenza A & B by Molecular    Specimen: Nasopharyngeal Swab   Result Value Ref Range    Influenza A, Molecular Negative Negative    Influenza B, Molecular Negative Negative    Flu A & B Source Nasal swab    CBC auto differential   Result Value Ref Range    WBC 8.30 3.90 - 12.70 K/uL    RBC 4.67 4.60 - 6.20 M/uL    Hemoglobin 13.2 (L) 14.0 - 18.0 g/dL    Hematocrit 40.4 40.0 - 54.0 %    MCV 87 82 - 98 fL    MCH 28.3 27.0 - 31.0 pg    MCHC 32.7 32.0 - 36.0 g/dL    RDW 13.2 11.5 - 14.5 %    Platelets 265 150 - 450 K/uL    MPV 8.5 (L) 9.2 - 12.9 fL    Immature Granulocytes 0.5 0.0 - 0.5 %    Gran # (ANC) 5.8 1.8 - 7.7 K/uL    Immature Grans (Abs) 0.04 0.00 - 0.04 K/uL    Lymph # 1.3 1.0 - 4.8 K/uL    Mono # 0.9 0.3 - 1.0 K/uL    Eos # 0.2 0.0 - 0.5 K/uL    Baso # 0.08 0.00 - 0.20 K/uL    nRBC 0 0 /100 WBC    Gran % 69.3 38.0 - 73.0 %    Lymph % 16.0 (L) 18.0 - 48.0 %    Mono % 11.2 4.0 - 15.0 %    Eosinophil % 2.0 0.0 - 8.0 %    Basophil % 1.0 0.0 - 1.9 %    Differential Method Automated    Comprehensive metabolic panel   Result Value Ref Range    Sodium 135 (L) 136 - 145 mmol/L    Potassium 4.2 3.5 - 5.1 mmol/L    Chloride 104 95 - 110 mmol/L    CO2 22 (L) 23 - 29 mmol/L    Glucose 91 70 - 110 mg/dL    BUN 22 8 - 23 mg/dL    Creatinine 1.3 0.5 - 1.4 mg/dL    Calcium 8.5 (L) 8.7 - 10.5 mg/dL    Total Protein 6.2 6.0 - 8.4 g/dL    Albumin 3.5 3.5 - 5.2 g/dL    Total Bilirubin 0.4 0.1 - 1.0 mg/dL    Alkaline Phosphatase 48 (L) 55 - 135 U/L    AST 16 10 - 40 U/L    ALT 20 10 - 44 U/L    eGFR 60 >60 mL/min/1.73 m^2    Anion Gap 9 8 - 16 mmol/L   Lactic acid, plasma #1   Result Value Ref Range    Lactate (Lactic Acid) 1.2 0.5 - 2.2 mmol/L   Brain natriuretic peptide   Result Value Ref Range    BNP 42 0 - 99 pg/mL   Troponin I   Result Value Ref  Range    Troponin I <0.006 0.000 - 0.026 ng/mL   Procalcitonin   Result Value Ref Range    Procalcitonin 0.02 <0.25 ng/mL   Ethanol   Result Value Ref Range    Alcohol, Serum <10 <10 mg/dL   COVID-19 Rapid Screening   Result Value Ref Range    SARS-CoV-2 RNA, Amplification, Qual Negative Negative         Imaging Results:  Imaging Results              CT Head Without Contrast (Final result)  Result time 12/22/23 22:49:25      Final result by Melvin Guerrero MD (12/22/23 22:49:25)                   Impression:      1. No acute process seen.  Recommend follow-up if symptoms persist .  Atrophy and chronic white matter changes.  No hemorrhage mass effect or midline shift.      All CT scans at [this location] are performed using dose modulation techniques as appropriate to a performed exam including the following: automated exposure control; adjustment of the mA and/or kV according to patient size (this includes techniques or standardized protocols for targeted exams where dose is matched to indication / reason for exam; i.e. extremities or head); use of iterative reconstruction technique.      Finalized on: 12/22/2023 10:49 PM By:  Melvin Guerrero MD DESTINY PhD  [facility]# [id number]      2023-12-22 22:51:28.968    [facility]               Narrative:    EXAM: CT HEAD WITHOUT CONTRAST    CLINICAL HISTORY: Mental status changes.    TECHNIQUE: Contiguous axial images were obtained from the skull base through the vertex without intravenous contrast.    COMPARISON: None available.    FINDINGS: Atrophy and chronic white matter changes more prominent in the right frontal lobe.  No hemorrhage mass effect or midline shift.  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  No acute osseous injury.  No sinusitis.                                         X-Ray Chest AP Portable (Final result)  Result time 12/23/23 07:00:14      Final result by Hitesh Han MD (12/23/23 07:00:14)                   Impression:      Hazy opacity/infiltrate in the left midlung  zone.      Electronically signed by: Hitesh Han  Date:    12/23/2023  Time:    07:00               Narrative:    EXAMINATION:  XR CHEST AP PORTABLE    CLINICAL HISTORY:  Sepsis;    TECHNIQUE:  Single frontal view of the chest was performed.    COMPARISON:  Chest radiograph 05/16/2023    FINDINGS:  Cardiac leads project over the chest.  There are postsurgical changes of a sternotomy and CABG.  Cardiomediastinal silhouette is within normal limits and unchanged in size.  Hazy opacity in left mid lung field may reflect infiltrate.  No evidence of a large pleural effusion.  No pneumothorax.  Visualized osseous structures appear intact.  Postsurgical changes of a partially visualized ACDF.                                       The EKG was ordered, reviewed, and independently interpreted by the ED provider.  Interpretation time: 20:53  Rate: 61 BPM  Rhythm: normal sinus rhythm  Interpretation: No acute ST or T wave abnormalities. No STEMI.           The Emergency Provider reviewed the vital signs and test results, which are outlined above.     ED Discussion     11:35 PM: Reassessed pt at this time.   Discussed with pt all pertinent ED information and results. Discussed pt dx and plan of tx. Gave pt all f/u and return to the ED instructions. All questions and concerns were addressed at this time. Pt expresses understanding of information and instructions, and is comfortable with plan to discharge. Pt is stable for discharge.    I discussed with patient and/or family/caretaker that evaluation in the ED does not suggest any emergent or life threatening medical conditions requiring immediate intervention beyond what was provided in the ED, and I believe patient is safe for discharge.  Regardless, an unremarkable evaluation in the ED does not preclude the development or presence of a serious of life threatening condition. As such, patient was instructed to return immediately for any worsening or change in current  symptoms.         Medical Decision Making  Amount and/or Complexity of Data Reviewed  Independent Historian: EMS     Details: See HPI  Labs: ordered. Decision-making details documented in ED Course.  Radiology: ordered. Decision-making details documented in ED Course.  ECG/medicine tests: ordered. Decision-making details documented in ED Course.    Risk  Prescription drug management.                ED Medication(s):  Medications   sodium chloride 0.9% bolus 2,121 mL 2,121 mL (0 mLs Intravenous Stopped 12/22/23 2244)   ketorolac injection 15 mg (15 mg Intravenous Given 12/22/23 2210)       Discharge Medication List as of 12/22/2023 11:32 PM           Follow-up Information       Schedule an appointment as soon as possible for a visit  with Yumi Roberson MD.    Specialty: Family Medicine  Why: As needed  Contact information:  [address] [address] [address] [phone]                                 Scribe Attestation:   Scribe #1: I performed the above scribed service and the documentation accurately describes the services I performed. I attest to the accuracy of the note.     Attending:   Physician Attestation Statement for Scribe #1: I, Ondina Meza MD, personally performed the services described in this documentation, as scribed by Kristina Rios, in my presence, and it is both accurate and complete.           Clinical Impression       ICD-10-CM ICD-9-CM   1. Hypotension  I95.9 458.9   2. Syncope and collapse  R55 780.2       Disposition:   Disposition: Discharged  Condition: Stable         Ondina Meza MD  12/23/23 [id number]

## 2023-12-28 LAB
BACTERIA BLD CULT: NORMAL
BACTERIA BLD CULT: NORMAL

## 2024-01-09 ENCOUNTER — HOSPITAL ENCOUNTER (OUTPATIENT)
Dept: RADIOLOGY | Facility: HOSPITAL | Age: 69
Discharge: HOME | End: 2024-01-09
Payer: MEDICARE

## 2024-01-09 DIAGNOSIS — L98.499 ULCERATION LOCALIZED TO FINGERTIP: ICD-10-CM

## 2024-01-09 PROCEDURE — A9585 GADOBUTROL INJECTION: HCPCS

## 2024-01-09 PROCEDURE — 25500020 PHARM REV CODE 255

## 2024-01-09 PROCEDURE — 73220 MRI UPPR EXTREMITY W/O&W/DYE: CPT | Mod: 26,RT,, | Performed by: RADIOLOGY

## 2024-01-09 PROCEDURE — 73220 MRI UPPR EXTREMITY W/O&W/DYE: CPT | Mod: TC,RT

## 2024-01-09 RX ORDER — GADOBUTROL 604.72 MG/ML
9 INJECTION INTRAVENOUS
Status: COMPLETED | OUTPATIENT
Start: 2024-01-09 | End: 2024-01-09

## 2024-01-09 RX ADMIN — GADOBUTROL 9 ML: 604.72 INJECTION INTRAVENOUS at 02:01

## 2024-01-16 RX ORDER — CITALOPRAM 20 MG/1
20 TABLET, FILM COATED ORAL DAILY
Qty: 30 TABLET | Refills: 11 | Status: SHIPPED | OUTPATIENT
Start: 2024-01-16 | End: 2025-01-15

## 2024-01-16 NOTE — TELEPHONE ENCOUNTER
No care due was identified.  [facility] Embedded Care Due Messages. Reference number: [id number].   1/16/2024 11:53:00 AM CST

## 2024-01-16 NOTE — TELEPHONE ENCOUNTER
----- Message from Rosaline Wilkes sent at 1/16/2024 10:16 AM CST -----  He is needing a refill of his citalopram.

## 2024-01-17 ENCOUNTER — OFFICE VISIT (OUTPATIENT)
Dept: ORTHOPEDICS | Facility: CLINIC | Age: 69
End: 2024-01-17
Payer: MEDICARE

## 2024-01-17 VITALS — WEIGHT: 201.75 LBS | BODY MASS INDEX: 29.88 KG/M2 | HEIGHT: 69 IN

## 2024-01-17 DIAGNOSIS — L98.499 ULCER OF FINGER, UNSPECIFIED ULCER STAGE: Primary | ICD-10-CM

## 2024-01-17 DIAGNOSIS — M79.644 PAIN IN FINGER OF RIGHT HAND: ICD-10-CM

## 2024-01-17 PROCEDURE — 3008F BODY MASS INDEX DOCD: CPT | Mod: CPTII,S$GLB,, | Performed by: STUDENT IN AN ORGANIZED HEALTH CARE EDUCATION/TRAINING PROGRAM

## 2024-01-17 PROCEDURE — 3288F FALL RISK ASSESSMENT DOCD: CPT | Mod: CPTII,S$GLB,, | Performed by: STUDENT IN AN ORGANIZED HEALTH CARE EDUCATION/TRAINING PROGRAM

## 2024-01-17 PROCEDURE — 1125F AMNT PAIN NOTED PAIN PRSNT: CPT | Mod: CPTII,S$GLB,, | Performed by: STUDENT IN AN ORGANIZED HEALTH CARE EDUCATION/TRAINING PROGRAM

## 2024-01-17 PROCEDURE — 99214 OFFICE O/P EST MOD 30 MIN: CPT | Mod: 24,S$GLB,, | Performed by: STUDENT IN AN ORGANIZED HEALTH CARE EDUCATION/TRAINING PROGRAM

## 2024-01-17 PROCEDURE — 1159F MED LIST DOCD IN RCRD: CPT | Mod: CPTII,S$GLB,, | Performed by: STUDENT IN AN ORGANIZED HEALTH CARE EDUCATION/TRAINING PROGRAM

## 2024-01-17 PROCEDURE — 1160F RVW MEDS BY RX/DR IN RCRD: CPT | Mod: CPTII,S$GLB,, | Performed by: STUDENT IN AN ORGANIZED HEALTH CARE EDUCATION/TRAINING PROGRAM

## 2024-01-17 PROCEDURE — 99999 PR PBB SHADOW E&M-EST. PATIENT-LVL IV: CPT | Mod: PBBFAC,,, | Performed by: STUDENT IN AN ORGANIZED HEALTH CARE EDUCATION/TRAINING PROGRAM

## 2024-01-17 PROCEDURE — 1101F PT FALLS ASSESS-DOCD LE1/YR: CPT | Mod: CPTII,S$GLB,, | Performed by: STUDENT IN AN ORGANIZED HEALTH CARE EDUCATION/TRAINING PROGRAM

## 2024-01-17 NOTE — PROGRESS NOTES
Hand Surgery Clinic Note    Chief Complaint  Chief Complaint   Patient presents with    Right Hand - Pain, Injury, Numbness, Swelling       History of Present Illness  68-year-old right-hand dominant male who has a retired  presents for evaluation of severe right index finger pain with an associated lesion at the fingertip.  These symptoms have been taking place for approximately 4 months.  Patient was unsure if there was an associated injury which started this.  He does remember noticing a small wound on his right index finger after interacting with a PA system while at [employer] 4 months ago but he does not know if he specifically injured the finger during that incident.  He additionally has numbness at the tip of this finger.  He does not have numbness in the other fingers.  He recently underwent right carpal tunnel release surgery by Dr. Negrete on 12/01/2023.  The symptoms in his index finger started prior to this surgery.  He has no known history of Raynaud's.  He is a previous smoker but he has not smoked in 30 years.  He experiences 10/10 pain when the finger touches anything.  Infection labs were obtained on 12/15/2023.  CRP was within normal limits at 1.6 and ESR was within normal limits at 5.  An MRI with and without contrast was obtained on 01/09/2024 further evaluation.  Patient is very frustrated with the pain in his finger.  He repeatedly says that he just wants finger to be cut off. Most recent Hg A1C was 5.4 (within normal range) on 5/17/23.    Review of Systems  Review of systems negative for chest pain, shortness of breath, fevers, chills, nausea/vomiting.    Past Medical History  Past Medical History:   Diagnosis Date    Anxiety     Cancer     spot on kidney and lung    Deep vein thrombosis     Diabetes mellitus     Borderline diet controlled    Diverticulitis     GERD (gastroesophageal reflux disease)     Hyperlipidemia     not on any meds currently    Hypertension     Lung mass      Mass of kidney        Past Surgical History  Past Surgical History:   Procedure Laterality Date    ARTHROPLASTY OF JOINT OF FINGER Left 11/18/2022    Procedure: ARTHROPLASTY, FINGER;  Surgeon: Kane Negrete MD;  Location: [facility];  Service: Orthopedics;  Laterality: Left;  left thumb basal joint arthroplasty  Arthrex  Podiatry extras    CARPAL TUNNEL RELEASE Right 12/1/2023    Procedure: RELEASE, CARPAL TUNNEL;  Surgeon: Kane Negrete MD;  Location: [facility];  Service: Orthopedics;  Laterality: Right;    CHOLECYSTECTOMY      COLONOSCOPY      COLONOSCOPY N/A 4/7/2017    Procedure: COLONOSCOPY;  Surgeon: Amarilys Bravo MD;  Location: [facility];  Service: Endoscopy;  Laterality: N/A;    DIAGNOSTIC LAPAROSCOPY N/A 5/4/2021    Procedure: LAPAROSCOPY, DIAGNOSTIC;  Surgeon: Shaji Jauregui MD;  Location: [facility];  Service: General;  Laterality: N/A;    ESOPHAGOGASTRODUODENOSCOPY      ESOPHAGOGASTRODUODENOSCOPY N/A 2/13/2020    Procedure: ESOPHAGOGASTRODUODENOSCOPY (EGD);  Surgeon: Shaji Aquino III, MD;  Location: [facility];  Service: Endoscopy;  Laterality: N/A;    ESOPHAGOGASTRODUODENOSCOPY N/A 5/3/2023    Procedure: EGD (ESOPHAGOGASTRODUODENOSCOPY);  Surgeon: Jenelle Jensen MD;  Location: [facility];  Service: Endoscopy;  Laterality: N/A;    HERNIA REPAIR Bilateral     groin    LEFT HEART CATHETERIZATION Left 1/31/2022    Procedure: CATHETERIZATION, HEART, LEFT;  Surgeon: Romeo Godfrey MD;  Location: [facility] CATH LAB;  Service: Cardiology;  Laterality: Left;    NECK SURGERY      OPEN REDUCTION AND INTERNAL FIXATION (ORIF) OF INJURY OF ANKLE Left 8/10/2022    Procedure: ORIF, ANKLE;  Surgeon: Howard Mata MD;  Location: [facility] OR;  Service: Orthopedics;  Laterality: Left;    PARTIAL NEPHRECTOMY      left    RENAL BIOPSY      ROBOT-ASSISTED REPAIR OF UMBILICAL HERNIA USING DA NEL XI N/A 5/4/2021    Procedure: XI ROBOTIC REPAIR, HERNIA, UMBILICAL;  Surgeon: Shaji Jauregui MD;   Location: [facility] OR;  Service: General;  Laterality: N/A;  converted to open    TONSILLECTOMY      TRIGGER FINGER RELEASE Right     long finger       Allergies  Review of patient's allergies indicates:   Allergen Reactions    Rosuvastatin Other (See Comments)     Joint pain   Joint and bone pain       Family History  Family History   Problem Relation Age of Onset    Hypertension Mother     Hypertension Father     Melanoma Father     Cancer Sister        Social History  Social History     Socioeconomic History    Marital status:    Tobacco Use    Smoking status: Former     Current packs/day: 0.00     Average packs/day: 1 pack/day for 20.0 years (20.0 ttl pk-yrs)     Types: Cigarettes     Start date: 1975     Quit date: 1995     Years since quittin.9    Smokeless tobacco: Never   Substance and Sexual Activity    Alcohol use: Never     Alcohol/week: 0.0 standard drinks of alcohol    Drug use: No    Sexual activity: Not Currently     Partners: Female     Birth control/protection: None     Social Determinants of Health     Financial Resource Strain: Medium Risk (2023)    Overall Financial Resource Strain (CARDIA)     Difficulty of Paying Living Expenses: Somewhat hard   Food Insecurity: No Food Insecurity (2023)    Hunger Vital Sign     Worried About Running Out of Food in the Last Year: Never true     Ran Out of Food in the Last Year: Never true   Transportation Needs: No Transportation Needs (2023)    PRAPARE - Transportation     Lack of Transportation (Medical): No     Lack of Transportation (Non-Medical): No   Physical Activity: Insufficiently Active (2023)    Exercise Vital Sign     Days of Exercise per Week: 3 days     Minutes of Exercise per Session: 40 min   Stress: Stress Concern Present (2023)    [employer] - Occupational Stress Questionnaire     Feeling of Stress : Very much   Social Connections: Moderately Isolated (2023)    Social  Connection and Isolation Panel [NHANES]     Frequency of Communication with Friends and Family: More than three times a week     Frequency of Social Gatherings with Friends and Family: Once a week     Attends [employer] Services: More than 4 times per year     Active Member of Clubs or Organizations: No     Marital Status:    Housing Stability: Low Risk  (9/5/2023)    Housing Stability Vital Sign     Unable to Pay for Housing in the Last Year: No     Number of Places Lived in the Last Year: 1     Unstable Housing in the Last Year: No       Vital Signs  There were no vitals filed for this visit.    Physical Exam  Constitutional: Appears well-developed and well-nourished. No distress.   HENT:   Head: Normocephalic.   Eyes: EOM are normal.   Pulmonary/Chest: Effort normal.   Neurological: Oriented to person, place, and time.   Psychiatric: Normal mood and affect.     Right Upper Extremity:  Open carpal tunnel incision is well healed.  No erythema or drainage.  No swelling.  Negative Tinel's in the median nerve distribution at the wrist.  Negative Phalen's.  Well healed thumb basal joint scar from previous surgery.  Two-point discrimination is 5 mm in the thumb, middle finger, ring finger, and small finger.  On evaluation of the index finger, patient has severe tenderness distal to the DIPJ joint with palpation. As can be seen in the photo below, there is a small approximately 5 x 5 mm lesion/wound at the tip of the finger.  Two-point discrimination is greater than 10 mm distal to the DIPJ joint on both the radial and ulnar aspects of the index finger.  Proximal to the DIPJ joint, patient has no tenderness and his two-point discrimination is 5 mm on the radial and ulnar sides.  The index finger is warm with brisk capillary refill.  Palpable radial pulse.  Patient has full active motion at the index MCP, PIP, and DIPJ joints though he has pain with D IP motion.      Imaging  Right index finger x-rays three views  were obtained on 11/06/2023 and independently reviewed by myself.  Mild arthritic changes are noted at the DIPJ joint with early osteophyte formation.  It appears that it may be a couple of small radiolucent areas in the central aspect of the distal phalanx though it was hard to tell if this could simply just be the angle the lateral x-ray was taken as it was not as notable on the other views.  Other an abnormalities noted.    MRI of the right hand with with and without contrast was obtained on 01/09/2024.  Patient has findings of marrow edema throughout the distal phalanx with enhancement on postcontrast sequences    Assessment and Plan  68-year-old right-hand dominant male presents with right index finger pain for 4 months.  Patient has severe pain distal to the DIPJ joint to the extent that he is requesting that his finger be cut off.  I do think that it was possible that this could be due to osteomyelitis.  There is an associated wound and there is marrow edema on the MRI.  I discussed treatment options including bone biopsy to evaluate for osteomyelitis.  Patient is adamant that he would rather just have an amputation.  Risks and benefits of operative versus nonoperative treatment were discussed with the patient and he elected to proceed with surgery.  Consent was obtained for surgery.  Will plan for D IP disarticulation.  I will send the amputated part or culture to evaluate for osteomyelitis.  Patient booked for Friday 1/26/24.    Kasia Leger MD  Orthopaedic Hand Surgery

## 2024-01-18 ENCOUNTER — TELEPHONE (OUTPATIENT)
Dept: SPORTS MEDICINE | Facility: CLINIC | Age: 69
End: 2024-01-18
Payer: MEDICARE

## 2024-01-18 NOTE — TELEPHONE ENCOUNTER
----- Message from Krysten Fallon sent at 1/18/2024 10:37 AM CST -----  Contact: Patient  Mil LEPE May would like a call back at [phone], in regards to to his appt on yesterday. Pt states he has decided that he wants to have his finger cut off.

## 2024-01-19 NOTE — TELEPHONE ENCOUNTER
"Pt came by clinic this morning just to talk, states he hit his finger 5 times since his clinic visit on Wed and it "killed him" states he is sure he wants to have it cut off. Advised that Dr. Leger is in surgery now, but she will definitely call to discuss with him once she is done in surgery. Pt verbalized understanding.  "

## 2024-01-20 RX ORDER — LIDOCAINE HYDROCHLORIDE 10 MG/ML
1 INJECTION, SOLUTION EPIDURAL; INFILTRATION; INTRACAUDAL; PERINEURAL ONCE
Status: CANCELLED | OUTPATIENT
Start: 2024-01-20 | End: 2024-01-20

## 2024-01-20 RX ORDER — SODIUM CHLORIDE 9 MG/ML
INJECTION, SOLUTION INTRAVENOUS CONTINUOUS
Status: CANCELLED | OUTPATIENT
Start: 2024-01-20

## 2024-01-20 RX ORDER — CEFAZOLIN SODIUM 2 G/50ML
2 SOLUTION INTRAVENOUS
Status: CANCELLED | OUTPATIENT
Start: 2024-01-20

## 2024-01-22 ENCOUNTER — TELEPHONE (OUTPATIENT)
Dept: PREADMISSION TESTING | Facility: HOSPITAL | Age: 69
End: 2024-01-22
Payer: MEDICARE

## 2024-01-22 NOTE — TELEPHONE ENCOUNTER
Pre op instructions reviewed with pt over telephone, verbalized understanding.    To confirm, Surgery is scheduled on 1/26/24. We will call you late afternoon the business day prior to surgery with your arrival time.    *Please report to the [facility] Lobby (1st Floor) located off of [facility] (2nd Entrance/Building on the left, in front of the flag pole).  Address: [address] [place] [address] [place]. [address]      INSTRUCTIONS IMPORTANT!!!  DO NOT Eat, Drink, or Smoke after 12 midnight unless instructed otherwise by your Surgeon. OK to brush teeth, no gum, candy or mints!    >>>MEDICATION INSTRUCTIONS<<<: Morning of Surgery, take small sip of water with ONLY these medications:  Tamsulosin  Amlodipine  Citalopram  Hydralazine  Imdur  Metoprolol  Omeprazole       *Blood Thinners: Stop taking Eliquis & Plavix  prior to surgery per Dr Chairez Instructions! Call your Surgeon office to inquire about any questions regarding your blood thinner medication.    *Diabetic Patients: !!!If you take diabetic or weight loss medication, Do NOT take morning of surgery unless instructed by Doctor!!!  Metformin to be stopped 24 hrs prior to surgery.   Ozempic/ Mounjaro/ Wegovy/ Trulicity/ Semaglutide injections or weight loss medication to be stopped 7 days prior to surgery.  Long Acting Insulin Instructions: hold the night before surgery.    *Patients should HOLD all vitamins, herbal supplements, weight loss medication, aspirin products & NSAIDS 7 days prior to surgery, as these can thin the blood. Ok to take Tylenol.    ____  Avoid Alcoholic beverages 3 days prior to surgery, as it can thin the blood.  ____  NO Acrylic/fake nails or nail polish worn day of surgery (specifically hand/arm & foot surgeries).  ____  NO powder, lotions, deodorants, oils or cream on body.  ____  Remove all jewelry & piercings & foreign objects before arrival & leave at home.  ____  Remove Dentures, Hearing Aids & Contact Lens prior to  surgery.  ____  Bring photo ID and insurance information to hospital (Leave Valuables at Home).  ____  If going home the same day, arrange for a ride home. You will not be able to drive for 24 hrs if Anesthesia was used.   ____  Females (ages 11-60): may need to give a urine sample the morning of surgery; please see Pre op Nurse prior to using the restroom.  ____  Males: Stop ED medications (Viagra, Cialis) 24 hrs prior to surgery.  ____  Wear clean, loose fitting clothing to allow for dressings/ bandages.      Bathing Instructions:    -Shower with anti-bacterial Soap (Hibiclens or Dial) the night before surgery and the morning of.   -Do not use Hibiclens on your face or genitals.   -Apply clean clothes after shower.  -Do not shave your face or body 2 days prior to surgery unless instructed otherwise by your Surgeon.  -Do not shave pubic hair 7 days prior to surgery (gyn pt's).    [facility] Visitor/Ride Policy:  Only 2 adults allowed in pre op/recovery area during your procedure. You MUST HAVE A RIDE HOME from a responsible adult that you know and trust. Medical Transport, Uber or Lyft can ONLY be used if patient has a responsible adult to accompany them during ride home.       *Signs and symptoms of Infection Before or After Surgery:               !!!If you experience any fever, chills, nausea/ vomiting, foul odor/ excessive drainage from surgical site, flu-like symptoms, new wounds or cuts, PLEASE CALL THE SURGEON OFFICE at [phone] or SEND MESSAGE THROUGH [employer] PORTAL!!!       *If you are running late the morning of surgery, please call the Hospital Surgery Dept @ [phone].     *Billing questions:  [phone]       Thank you,  -[facility] Pre Admit Dept.  [phone] or [phone]  M-F 7:30 am-4:00 pm (Closed Major Holidays)

## 2024-01-23 ENCOUNTER — E-CONSULT (OUTPATIENT)
Dept: CARDIOLOGY | Facility: CLINIC | Age: 69
End: 2024-01-23
Payer: MEDICARE

## 2024-01-23 DIAGNOSIS — Z01.810 PREOP CARDIOVASCULAR EXAM: Primary | ICD-10-CM

## 2024-01-23 PROCEDURE — 99451 NTRPROF PH1/NTRNET/EHR 5/>: CPT | Mod: S$GLB,,, | Performed by: INTERNAL MEDICINE

## 2024-01-23 NOTE — CONSULTS
[facility]  Response for E-Consult     Patient Name: Mil Sood  MRN: [id number]  Primary Care Provider: Yumi Roberson MD   Requesting Provider: Morenita Brooks NP  E-Consult to Cardiology  Consult performed by: Yasmani Chairez MD  Consult ordered by: Morenita Brooks NP          69 yo male. E consult for preop clearance of finger amputation  The chart reviewed.  PMH CAD s/p CABG x4 in , h/o emboli CVA in 05-23, HTN HLD. PreDM. Anxiety, no smoking/drinking. Statin intolerance   12/2023 EKG NSR  GFR > 60  Plan  Elevated periop risk of CV events for non-high risk procedure.  Ok to proceed the scheduled procedure without further cardiac study.  OK to hold eliquis 2 days and Plavix 5 days before the procedure and resume postop once hemodynamically stable      Total time of Consultation: 10 minute    I did not speak to the requesting provider verbally about this.     *This eConsult is based on the clinical data available to me and is furnished without benefit of a physical examination. The eConsult will need to be interpreted in light of any clinical issues or changes in patient status not available to me at the time of filing this eConsults. Significant changes in patient condition or level of acuity should result in immediate formal consultation and reevaluation. Please alert me if you have further questions.    Thank you for this eConsult referral.     Yasmani Chairez MD  [facility]

## 2024-01-25 ENCOUNTER — TELEPHONE (OUTPATIENT)
Dept: PREADMISSION TESTING | Facility: HOSPITAL | Age: 69
End: 2024-01-25
Payer: MEDICARE

## 2024-01-25 DIAGNOSIS — Z98.890 POSTOPERATIVE STATE: Primary | ICD-10-CM

## 2024-01-25 NOTE — TELEPHONE ENCOUNTER
Called and spoke with patient about the following:     Please arrive to [facility] at 5:30am on 1/26/2024 for your scheduled procedure.  Address: [address] [place] [address] [place]. [address] (2nd Building on left, 1st Floor Lobby)  >>>NO eating or drinking after midnight unless instructed otherwise by your Surgeon<<<    Patient confirmed last dose of Eliquis and Plavix: 1/21/24    Thank you,  -[facility] Pre Admit Testing Dept.  Mon-Fri 7:30 am - 4 pm [phone]

## 2024-02-07 DIAGNOSIS — M79.644 PAIN IN FINGER OF RIGHT HAND: ICD-10-CM

## 2024-02-07 DIAGNOSIS — L98.499: ICD-10-CM

## 2024-02-07 DIAGNOSIS — L98.499 ULCER OF FINGER, UNSPECIFIED ULCER STAGE: Primary | ICD-10-CM

## 2024-02-07 RX ORDER — CEFAZOLIN SODIUM 2 G/50ML
2 SOLUTION INTRAVENOUS
Status: CANCELLED | OUTPATIENT
Start: 2024-02-07

## 2024-02-07 RX ORDER — SODIUM CHLORIDE 9 MG/ML
INJECTION, SOLUTION INTRAVENOUS CONTINUOUS
Status: CANCELLED | OUTPATIENT
Start: 2024-02-07

## 2024-02-07 RX ORDER — LIDOCAINE HYDROCHLORIDE 10 MG/ML
1 INJECTION, SOLUTION EPIDURAL; INFILTRATION; INTRACAUDAL; PERINEURAL ONCE
Status: CANCELLED | OUTPATIENT
Start: 2024-02-07 | End: 2024-02-07

## 2024-02-07 NOTE — PROGRESS NOTES
Case rescheduled. It was canceled on the day of surgery last time. After clarification, it appears that there was some confusion regarding patient's insurance. This is now resolved. Patient is experiencing the same severe pain in his finger and requesting amputation. No change in symptoms or physical exam.    Kasia Leger MD  Orthopaedic Hand Surgery

## 2024-02-13 ENCOUNTER — TELEPHONE (OUTPATIENT)
Dept: ORTHOPEDICS | Facility: CLINIC | Age: 69
End: 2024-02-13
Payer: MEDICARE

## 2024-02-13 ENCOUNTER — TELEPHONE (OUTPATIENT)
Dept: ORTHOPEDICS | Facility: CLINIC | Age: 69
End: 2024-02-13

## 2024-02-13 NOTE — TELEPHONE ENCOUNTER
----- Message from Maria Teresa Garcia sent at 2/13/2024 12:37 PM CST -----  Contact: self  [phone]  Patient called back after missing a call form your office. Please call back  [phone]. Thanks tpw

## 2024-02-13 NOTE — TELEPHONE ENCOUNTER
Tried reaching pt again to give number to [facility] & to answer any financial questions regarding upcoming surgery. Pt given number & states he will discuss with them

## 2024-02-13 NOTE — TELEPHONE ENCOUNTER
Returned call to pt, states he spk with [facility] & was told the [employer] plan he has will not cover his surgery & is out of network. Pt states that he also spk with Ins and as told that [facility] are covered places of service, so he would like Dr. Leger to know that he's sorry but he is going to have to go to a facility that is covered. Advised pt that I will let Dr. Leger know.

## 2024-02-25 RX ORDER — METOPROLOL TARTRATE 25 MG/1
25 TABLET, FILM COATED ORAL 2 TIMES DAILY
Qty: 180 TABLET | Refills: 3 | Status: SHIPPED | OUTPATIENT
Start: 2024-02-25

## 2024-04-23 ENCOUNTER — TELEPHONE (OUTPATIENT)
Dept: FAMILY MEDICINE | Facility: CLINIC | Age: 69
End: 2024-04-23
Payer: MEDICARE

## 2024-04-23 NOTE — TELEPHONE ENCOUNTER
We are at 100% the rest if the week, he is asking to be added onto the scheduled for right arm pain and numbness

## 2024-04-23 NOTE — TELEPHONE ENCOUNTER
----- Message from Filomena Solorzano sent at 4/23/2024  7:29 AM CDT -----  Contact: [phone]  Pt is calling regarding appt / R arm numbness(isnt letting me schedule on my end) .  Please call back at [phone] . Thanksdj

## 2024-04-26 ENCOUNTER — TELEPHONE (OUTPATIENT)
Dept: FAMILY MEDICINE | Facility: CLINIC | Age: 69
End: 2024-04-26
Payer: MEDICARE

## 2024-04-26 NOTE — TELEPHONE ENCOUNTER
----- Message from Marzena Bernal sent at 4/26/2024  9:11 AM CDT -----  Who Called: Pt    What is the request in detail: Requesting call back to discuss recent missed call. Please advise    Can the clinic reply by [facility]? No    Best Call Back Number: [phone]      Additional Information:

## 2024-05-02 DIAGNOSIS — F51.05 INSOMNIA DUE TO ANXIETY AND FEAR: ICD-10-CM

## 2024-05-02 DIAGNOSIS — F40.9 INSOMNIA DUE TO ANXIETY AND FEAR: ICD-10-CM

## 2024-05-02 RX ORDER — ZOLPIDEM TARTRATE 10 MG/1
10 TABLET ORAL NIGHTLY
Qty: 30 TABLET | Refills: 0 | OUTPATIENT
Start: 2024-05-02

## 2024-05-02 NOTE — TELEPHONE ENCOUNTER
No care due was identified.  [facility] Embedded Care Due Messages. Reference number: [id number].   5/02/2024 12:46:52 PM CDT

## 2024-05-08 RX ORDER — BENAZEPRIL HYDROCHLORIDE 20 MG/1
TABLET ORAL
Qty: 180 TABLET | Refills: 0 | Status: SHIPPED | OUTPATIENT
Start: 2024-05-08

## 2024-05-08 NOTE — TELEPHONE ENCOUNTER
No care due was identified.  [facility] Embedded Care Due Messages. Reference number: [id number].   5/08/2024 4:01:38 PM CDT

## 2024-06-03 RX ORDER — ISOSORBIDE MONONITRATE 60 MG/1
60 TABLET, EXTENDED RELEASE ORAL
Qty: 90 TABLET | Refills: 0 | Status: SHIPPED | OUTPATIENT
Start: 2024-06-03

## 2024-06-03 NOTE — TELEPHONE ENCOUNTER
No care due was identified.  [facility] Embedded Care Due Messages. Reference number: [id number].   6/03/2024 12:45:00 PM CDT

## 2024-06-10 DIAGNOSIS — M47.812 CERVICAL SPONDYLOSIS: ICD-10-CM

## 2024-06-10 NOTE — TELEPHONE ENCOUNTER
No care due was identified.  [facility] Embedded Care Due Messages. Reference number: [id number].   6/10/2024 5:03:45 PM CDT

## 2024-06-11 RX ORDER — GABAPENTIN 600 MG/1
1200 TABLET ORAL NIGHTLY
Qty: 180 TABLET | Refills: 0 | OUTPATIENT
Start: 2024-06-11

## 2024-06-11 RX ORDER — TRAZODONE HYDROCHLORIDE 100 MG/1
100 TABLET ORAL NIGHTLY
Qty: 90 TABLET | Refills: 0 | OUTPATIENT
Start: 2024-06-11

## 2024-06-11 RX ORDER — CLOPIDOGREL BISULFATE 75 MG/1
75 TABLET ORAL
Qty: 90 TABLET | Refills: 0 | OUTPATIENT
Start: 2024-06-11

## 2024-06-12 DIAGNOSIS — M47.812 CERVICAL SPONDYLOSIS: ICD-10-CM

## 2024-06-12 NOTE — TELEPHONE ENCOUNTER
No care due was identified.  [facility] Embedded Care Due Messages. Reference number: [id number].   6/12/2024 4:02:26 PM CDT

## 2024-06-13 RX ORDER — CLOPIDOGREL BISULFATE 75 MG/1
75 TABLET ORAL
Qty: 90 TABLET | Refills: 0 | Status: SHIPPED | OUTPATIENT
Start: 2024-06-13

## 2024-06-13 RX ORDER — TRAZODONE HYDROCHLORIDE 100 MG/1
100 TABLET ORAL NIGHTLY
Qty: 90 TABLET | Refills: 0 | Status: SHIPPED | OUTPATIENT
Start: 2024-06-13

## 2024-06-13 RX ORDER — GABAPENTIN 600 MG/1
1200 TABLET ORAL NIGHTLY
Qty: 180 TABLET | Refills: 0 | Status: SHIPPED | OUTPATIENT
Start: 2024-06-13

## 2024-06-27 RX ORDER — ATORVASTATIN CALCIUM 40 MG/1
40 TABLET, FILM COATED ORAL
Qty: 90 TABLET | Refills: 0 | OUTPATIENT
Start: 2024-06-27

## 2024-06-27 NOTE — TELEPHONE ENCOUNTER
No care due was identified.  [facility] Embedded Care Due Messages. Reference number: [id number].   6/27/2024 10:07:20 AM CDT

## 2024-07-11 RX ORDER — ATORVASTATIN CALCIUM 40 MG/1
40 TABLET, FILM COATED ORAL
Qty: 90 TABLET | Refills: 0 | OUTPATIENT
Start: 2024-07-11

## 2024-07-11 NOTE — TELEPHONE ENCOUNTER
No care due was identified.  [facility] Embedded Care Due Messages. Reference number: [id number].   7/11/2024 8:56:05 AM CDT

## 2024-07-29 DIAGNOSIS — K21.9 GASTROESOPHAGEAL REFLUX DISEASE WITHOUT ESOPHAGITIS: ICD-10-CM

## 2024-07-29 RX ORDER — ATORVASTATIN CALCIUM 40 MG/1
40 TABLET, FILM COATED ORAL
Qty: 90 TABLET | Refills: 0 | OUTPATIENT
Start: 2024-07-29

## 2024-07-29 NOTE — TELEPHONE ENCOUNTER
No care due was identified.  [facility] Embedded Care Due Messages. Reference number: [id number].   7/29/2024 1:40:02 PM CDT

## 2024-07-30 RX ORDER — OMEPRAZOLE 40 MG/1
40 CAPSULE, DELAYED RELEASE ORAL EVERY MORNING
Qty: 90 CAPSULE | Refills: 0 | Status: SHIPPED | OUTPATIENT
Start: 2024-07-30

## 2024-08-13 RX ORDER — BENAZEPRIL HYDROCHLORIDE 20 MG/1
TABLET ORAL
Qty: 180 TABLET | Refills: 0 | OUTPATIENT
Start: 2024-08-13

## 2024-08-13 NOTE — TELEPHONE ENCOUNTER
No care due was identified.  [facility] Embedded Care Due Messages. Reference number: [id number].   8/13/2024 12:20:41 PM CDT

## 2024-08-14 RX ORDER — ATORVASTATIN CALCIUM 40 MG/1
40 TABLET, FILM COATED ORAL
Qty: 90 TABLET | Refills: 3 | OUTPATIENT
Start: 2024-08-14

## 2024-08-14 NOTE — TELEPHONE ENCOUNTER
No care due was identified.  [facility] Embedded Care Due Messages. Reference number: [id number].   8/14/2024 9:27:48 AM CDT

## 2024-08-22 RX ORDER — BENAZEPRIL HYDROCHLORIDE 20 MG/1
TABLET ORAL
Qty: 180 TABLET | Refills: 0 | OUTPATIENT
Start: 2024-08-22

## 2024-08-22 NOTE — TELEPHONE ENCOUNTER
No care due was identified.  [facility] Embedded Care Due Messages. Reference number: [id number].   8/22/2024 2:44:36 PM CDT

## 2024-08-22 NOTE — DISCHARGE SUMMARY
[facility]  Discharge Note       Description of Procedure: L5-S1 Lumbar Epidural Steroid Injection under Fluoroscopic Guidance    Procedure Date: 1/31/2019    Admit Date: 1/31/2019  Discharge Date: 1/31/2019     Attending Physician: Mandy Zelaya   Discharge Provider: Mandy Zelaya    Preoperative Diagnosis: Lumbar Spondylosis, Lumbar Radiculopathy     Postoperative Diagnosis: as above, same as preoperative diagnosis    Discharged Condition: Stable    Hospital Course: Patient was admitted for an outpatient procedure. The procedure was tolerated well with no complications.    Final Diagnoses: Same as principal problem.    Disposition: Home, self-care.    Follow up/Patient Instructions:  Follow-up in clinic in 2-3 weeks.    Medications: No medications were prescribed today. The patient was advised to resume normal medication regimen without change.  Specific information was provided regarding restarting any anticoagulant/s.    Discharge Procedure Orders (must include Diet, Follow-up, Activity):  Light activity for the remainder of the day, resume normal activity tomorrow. Resume normal diet. Follow-up in clinic in 2-3 weeks.   warm and dry

## 2024-08-27 DIAGNOSIS — M47.812 CERVICAL SPONDYLOSIS: ICD-10-CM

## 2024-08-27 RX ORDER — TAMSULOSIN HYDROCHLORIDE 0.4 MG/1
CAPSULE ORAL
Qty: 90 CAPSULE | Refills: 3 | OUTPATIENT
Start: 2024-08-27

## 2024-08-27 RX ORDER — ISOSORBIDE MONONITRATE 60 MG/1
60 TABLET, EXTENDED RELEASE ORAL
Qty: 90 TABLET | Refills: 0 | OUTPATIENT
Start: 2024-08-27

## 2024-08-27 RX ORDER — GABAPENTIN 600 MG/1
TABLET ORAL
Qty: 180 TABLET | Refills: 0 | OUTPATIENT
Start: 2024-08-27

## 2024-08-27 RX ORDER — CLOPIDOGREL BISULFATE 75 MG/1
75 TABLET ORAL
Qty: 90 TABLET | Refills: 0 | OUTPATIENT
Start: 2024-08-27

## 2024-08-27 RX ORDER — FINASTERIDE 5 MG/1
5 TABLET, FILM COATED ORAL NIGHTLY
Qty: 90 TABLET | Refills: 3 | OUTPATIENT
Start: 2024-08-27

## 2024-08-27 NOTE — TELEPHONE ENCOUNTER
Care Due:                  Date            Visit Type   Department     Provider  --------------------------------------------------------------------------------                                EP -                              PRIMARY      [facility]  Last Visit: 08-      CARE ([facility])   MEDICINE       Yumi Roberson  Next Visit: None Scheduled  None         None Found                                                            Last  Test          Frequency    Reason                     Performed    Due Date  --------------------------------------------------------------------------------    Office Visit  15 months..  amLODIPine, benazepriL,    08- 11-                             citalopram, clopidogreL,                             hydrALAZINE, isosorbide,                             omeprazole, tamsulosin,                             traZODone................    [employer] Embedded Care Due Messages. Reference number: [id number].   8/27/2024 12:49:21 PM CDT

## 2024-08-29 ENCOUNTER — TELEPHONE (OUTPATIENT)
Dept: FAMILY MEDICINE | Facility: CLINIC | Age: 69
End: 2024-08-29
Payer: MEDICARE

## 2024-08-29 DIAGNOSIS — M47.812 CERVICAL SPONDYLOSIS: ICD-10-CM

## 2024-08-29 RX ORDER — BENAZEPRIL HYDROCHLORIDE 20 MG/1
TABLET ORAL
Qty: 180 TABLET | Refills: 0 | OUTPATIENT
Start: 2024-08-29

## 2024-08-29 RX ORDER — CLOPIDOGREL BISULFATE 75 MG/1
75 TABLET ORAL
Qty: 90 TABLET | Refills: 0 | OUTPATIENT
Start: 2024-08-29

## 2024-08-29 RX ORDER — GABAPENTIN 600 MG/1
TABLET ORAL
Qty: 180 TABLET | Refills: 0 | OUTPATIENT
Start: 2024-08-29

## 2024-08-29 RX ORDER — FINASTERIDE 5 MG/1
5 TABLET, FILM COATED ORAL NIGHTLY
Qty: 90 TABLET | Refills: 3 | OUTPATIENT
Start: 2024-08-29

## 2024-08-29 RX ORDER — TAMSULOSIN HYDROCHLORIDE 0.4 MG/1
CAPSULE ORAL
Qty: 90 CAPSULE | Refills: 3 | OUTPATIENT
Start: 2024-08-29

## 2024-08-29 RX ORDER — ISOSORBIDE MONONITRATE 60 MG/1
60 TABLET, EXTENDED RELEASE ORAL
Qty: 90 TABLET | Refills: 0 | OUTPATIENT
Start: 2024-08-29

## 2024-08-29 RX ORDER — ATORVASTATIN CALCIUM 40 MG/1
40 TABLET, FILM COATED ORAL
Qty: 90 TABLET | Refills: 3 | OUTPATIENT
Start: 2024-08-29

## 2024-08-29 NOTE — TELEPHONE ENCOUNTER
----- Message from Shira Mims sent at 8/29/2024 11:02 AM CDT -----  Type:  Needs Medical Advice    Who Called: Karl   Symptoms (please be specific):    How long has patient had these symptoms:    Pharmacy name and phone #:    Would the patient rather a call back or a response via [facility]?   Best Call Back Number: [phone]  Additional Information:       Patient needs refills but his insurance is no longer in network. Karl will like to know if patient can receive refill 1 last time to stay within the [employer] compliance which would effect the star rating

## 2024-08-29 NOTE — TELEPHONE ENCOUNTER
No care due was identified.  [facility] Embedded Care Due Messages. Reference number: [id number].   8/29/2024 10:36:04 AM CDT

## 2024-09-04 DIAGNOSIS — F40.9 INSOMNIA DUE TO ANXIETY AND FEAR: ICD-10-CM

## 2024-09-04 DIAGNOSIS — F51.05 INSOMNIA DUE TO ANXIETY AND FEAR: ICD-10-CM

## 2024-09-04 DIAGNOSIS — E78.2 MIXED HYPERLIPIDEMIA: ICD-10-CM

## 2024-09-04 DIAGNOSIS — M47.812 CERVICAL SPONDYLOSIS: ICD-10-CM

## 2024-09-04 DIAGNOSIS — Z86.73 S/P STROKE DUE TO CEREBROVASCULAR DISEASE: Primary | ICD-10-CM

## 2024-09-04 DIAGNOSIS — I10 ESSENTIAL HYPERTENSION: ICD-10-CM

## 2024-09-04 DIAGNOSIS — F41.8 DEPRESSION WITH ANXIETY: ICD-10-CM

## 2024-09-04 RX ORDER — BENAZEPRIL HYDROCHLORIDE 20 MG/1
40 TABLET ORAL NIGHTLY
Qty: 60 TABLET | Refills: 0 | Status: SHIPPED | OUTPATIENT
Start: 2024-09-04 | End: 2024-10-04

## 2024-09-04 RX ORDER — METOPROLOL TARTRATE 25 MG/1
25 TABLET, FILM COATED ORAL 2 TIMES DAILY
Qty: 60 TABLET | Refills: 0 | Status: SHIPPED | OUTPATIENT
Start: 2024-09-04 | End: 2024-10-04

## 2024-09-04 RX ORDER — CITALOPRAM 20 MG/1
20 TABLET, FILM COATED ORAL DAILY
Qty: 30 TABLET | Refills: 0 | Status: SHIPPED | OUTPATIENT
Start: 2024-09-04 | End: 2025-09-04

## 2024-09-04 RX ORDER — AMLODIPINE BESYLATE 5 MG/1
5 TABLET ORAL DAILY
Qty: 90 TABLET | Refills: 3 | Status: SHIPPED | OUTPATIENT
Start: 2024-09-04 | End: 2025-09-04

## 2024-09-04 RX ORDER — GABAPENTIN 600 MG/1
1200 TABLET ORAL NIGHTLY
Qty: 180 TABLET | Refills: 0 | Status: SHIPPED | OUTPATIENT
Start: 2024-09-04

## 2024-09-04 RX ORDER — ZOLPIDEM TARTRATE 10 MG/1
10 TABLET ORAL NIGHTLY
Qty: 30 TABLET | Refills: 0 | Status: SHIPPED | OUTPATIENT
Start: 2024-09-04 | End: 2024-10-04

## 2024-09-04 RX ORDER — HYDRALAZINE HYDROCHLORIDE 25 MG/1
25 TABLET, FILM COATED ORAL 3 TIMES DAILY
Qty: 90 TABLET | Refills: 0 | Status: SHIPPED | OUTPATIENT
Start: 2024-09-04 | End: 2024-10-04

## 2024-09-04 RX ORDER — EVOLOCUMAB 140 MG/ML
140 INJECTION, SOLUTION SUBCUTANEOUS
Qty: 2 ML | Refills: 0 | Status: SHIPPED | OUTPATIENT
Start: 2024-09-04 | End: 2024-10-04

## 2024-09-04 RX ORDER — CLOPIDOGREL BISULFATE 75 MG/1
75 TABLET ORAL ONCE
Qty: 30 TABLET | Refills: 0 | Status: SHIPPED | OUTPATIENT
Start: 2024-09-04 | End: 2024-09-04

## 2024-09-04 RX ORDER — CLONAZEPAM 1 MG/1
1 TABLET ORAL NIGHTLY PRN
Qty: 30 TABLET | Refills: 0 | OUTPATIENT
Start: 2024-09-04

## 2024-09-04 RX ORDER — TAMSULOSIN HYDROCHLORIDE 0.4 MG/1
1 CAPSULE ORAL NIGHTLY
Qty: 30 CAPSULE | Refills: 0 | Status: SHIPPED | OUTPATIENT
Start: 2024-09-04 | End: 2024-10-04

## 2024-09-04 NOTE — TELEPHONE ENCOUNTER
----- Message from Rosaline Wilkes sent at 9/4/2024  7:58 AM CDT -----  She has been out of his med for 3 month now. He got put on an insurance that we don't take and will be changing it next month. He wants to know if he can get refills till he get his new ins next month and he will make an appt to see the doctor.

## 2024-09-04 NOTE — TELEPHONE ENCOUNTER
His insurance got changed to something [facility] doesn't take. He is not able to make an appointment right now but he is changing his insurance back to what it was and will make an appt with you next month, he is completely out if his meds can he get a months worth to get him through to the appointment ?

## 2024-09-04 NOTE — TELEPHONE ENCOUNTER
No care due was identified.  [facility] Embedded Care Due Messages. Reference number: [id number].   9/04/2024 4:12:19 PM CDT

## 2024-09-05 RX ORDER — ATORVASTATIN CALCIUM 40 MG/1
40 TABLET, FILM COATED ORAL
Qty: 90 TABLET | Refills: 3 | OUTPATIENT
Start: 2024-09-05

## 2024-09-05 NOTE — TELEPHONE ENCOUNTER
No care due was identified.  Health [facility] Embedded Care Due Messages. Reference number: [id number].   9/05/2024 10:26:34 AM CDT

## 2024-09-13 RX ORDER — ATORVASTATIN CALCIUM 40 MG/1
40 TABLET, FILM COATED ORAL
Qty: 90 TABLET | Refills: 3 | OUTPATIENT
Start: 2024-09-13

## 2024-09-13 NOTE — TELEPHONE ENCOUNTER
No care due was identified.  Health [facility] Embedded Care Due Messages. Reference number: [id number].   9/13/2024 5:21:41 PM CDT

## 2024-09-16 ENCOUNTER — TELEPHONE (OUTPATIENT)
Dept: FAMILY MEDICINE | Facility: CLINIC | Age: 69
End: 2024-09-16
Payer: MEDICARE

## 2024-09-16 NOTE — TELEPHONE ENCOUNTER
----- Message from Dulce Maria Solorzano sent at 9/16/2024 12:19 PM CDT -----  Contact: Karl/[facility]  Karl is calling in regards to getting a refill on  Lipitor.please call back at [phone]      .  [facility] [address] [address]  [address] [facility] [address]  Phone: [phone] Fax: [phone]    Thanks  DION

## 2024-09-18 DIAGNOSIS — R73.03 PREDIABETES: ICD-10-CM

## 2024-09-23 RX ORDER — ATORVASTATIN CALCIUM 40 MG/1
40 TABLET, FILM COATED ORAL NIGHTLY
Qty: 90 TABLET | Refills: 0 | Status: SHIPPED | OUTPATIENT
Start: 2024-09-23

## 2024-09-23 RX ORDER — ATORVASTATIN CALCIUM 40 MG/1
40 TABLET, FILM COATED ORAL
Qty: 90 TABLET | Refills: 3 | OUTPATIENT
Start: 2024-09-23

## 2024-09-23 RX ORDER — ATORVASTATIN CALCIUM 40 MG/1
40 TABLET, FILM COATED ORAL NIGHTLY
COMMUNITY
Start: 2024-04-01 | End: 2024-09-23 | Stop reason: SDUPTHER

## 2024-09-23 NOTE — TELEPHONE ENCOUNTER
"Spoke with emma at the [facility] and patient is about to fall out of compliance with his medicare if we don't refill his lipitor which will "ding them" at the end of the year.  Patient is due to come in for annual and labs but his insurance changed and [facility] doesn't accept it.  He is going to change it back next month , you refilled all of his meds except the lipitor can we fill it so that he stays complaint with the pharmacy   "

## 2024-09-23 NOTE — TELEPHONE ENCOUNTER
Care Due:                  Date            Visit Type   Department     Provider  --------------------------------------------------------------------------------                                EP -                              PRIMARY      [facility]  Last Visit: 08-      CARE ([facility])   MEDICINE       Yumi Roberson  Next Visit: None Scheduled  None         None Found                                                            Last  Test          Frequency    Reason                     Performed    Due Date  --------------------------------------------------------------------------------    CBC.........  12 months..  clopidogreL, hydrALAZINE.  12- 12-    CMP.........  12 months..  benazepriL...............  12- 12-    Health [facility] Embedded Care Due Messages. Reference number: [id number].   9/23/2024 3:45:29 PM CDT

## 2024-09-23 NOTE — TELEPHONE ENCOUNTER
----- Message from Belem Monroy sent at 9/23/2024 11:14 AM CDT -----  Contact: emma/[facility]   Emma with [facility] is requesting a call back in regards to getting a refill because the patient is about to fall out of compliance for DIR, states he has sent over several messages. Please give him a call back [phone]      [facility] - [address] [address]  Phone: [phone] Fax: [phone]

## 2024-09-25 DIAGNOSIS — Z86.73 S/P STROKE DUE TO CEREBROVASCULAR DISEASE: ICD-10-CM

## 2024-09-25 DIAGNOSIS — I10 ESSENTIAL HYPERTENSION: ICD-10-CM

## 2024-09-25 RX ORDER — BENAZEPRIL HYDROCHLORIDE 20 MG/1
40 TABLET ORAL NIGHTLY
Qty: 60 TABLET | Refills: 0 | Status: SHIPPED | OUTPATIENT
Start: 2024-09-25

## 2024-09-25 RX ORDER — TAMSULOSIN HYDROCHLORIDE 0.4 MG/1
1 CAPSULE ORAL NIGHTLY
Qty: 30 CAPSULE | Refills: 0 | Status: SHIPPED | OUTPATIENT
Start: 2024-09-25

## 2024-09-25 RX ORDER — CLOPIDOGREL BISULFATE 75 MG/1
75 TABLET ORAL
Qty: 30 TABLET | Refills: 0 | Status: SHIPPED | OUTPATIENT
Start: 2024-09-25

## 2024-09-25 RX ORDER — HYDRALAZINE HYDROCHLORIDE 25 MG/1
25 TABLET, FILM COATED ORAL 3 TIMES DAILY
Qty: 90 TABLET | Refills: 0 | Status: SHIPPED | OUTPATIENT
Start: 2024-09-25

## 2024-09-25 NOTE — TELEPHONE ENCOUNTER
Care Due:                  Date            Visit Type   Department     Provider  --------------------------------------------------------------------------------                                EP -                              PRIMARY      [facility]  Last Visit: 08-      CARE ([facility])   MEDICINE       Yumi Roberson  Next Visit: None Scheduled  None         None Found                                                            Last  Test          Frequency    Reason                     Performed    Due Date  --------------------------------------------------------------------------------    Lipid Panel.  12 months..  atorvastatin.............  05- 05-    [facility] Embedded Care Due Messages. Reference number: [id number].   9/25/2024 12:31:18 PM CDT

## 2024-09-27 NOTE — TELEPHONE ENCOUNTER
Problem: Skin Injury Risk Increased  Goal: Skin Health and Integrity  Outcome: Progressing  Intervention: Optimize Skin Protection  Flowsheets (Taken 9/27/2024 1831)  Pressure Reduction Techniques:   frequent weight shift encouraged   heels elevated off bed  Pressure Reduction Devices:   heel offloading device utilized   positioning supports utilized  Skin Protection: skin sealant/moisture barrier applied  Activity Management:   Ambulated to bathroom - L4   Ambulated in room - L4  Head of Bed (HOB) Positioning: HOB elevated  Intervention: Promote and Optimize Oral Intake  Flowsheets (Taken 9/27/2024 1831)  Oral Nutrition Promotion:   physical activity promoted   rest periods promoted      MARY on  for Landon to return call.

## 2024-10-03 DIAGNOSIS — F51.05 INSOMNIA DUE TO ANXIETY AND FEAR: ICD-10-CM

## 2024-10-03 DIAGNOSIS — F40.9 INSOMNIA DUE TO ANXIETY AND FEAR: ICD-10-CM

## 2024-10-03 RX ORDER — ZOLPIDEM TARTRATE 10 MG/1
10 TABLET ORAL NIGHTLY
Qty: 30 TABLET | Refills: 0 | OUTPATIENT
Start: 2024-10-03 | End: 2024-11-02

## 2024-10-03 NOTE — TELEPHONE ENCOUNTER
No care due was identified.  Health [facility] Embedded Care Due Messages. Reference number: [id number].   10/03/2024 11:12:10 AM CDT

## 2024-10-28 DIAGNOSIS — Z86.73 S/P STROKE DUE TO CEREBROVASCULAR DISEASE: ICD-10-CM

## 2024-10-28 DIAGNOSIS — I10 ESSENTIAL HYPERTENSION: ICD-10-CM

## 2024-10-28 RX ORDER — HYDRALAZINE HYDROCHLORIDE 25 MG/1
25 TABLET, FILM COATED ORAL 3 TIMES DAILY
Qty: 90 TABLET | Refills: 0 | OUTPATIENT
Start: 2024-10-28

## 2024-10-28 RX ORDER — CLOPIDOGREL BISULFATE 75 MG/1
75 TABLET ORAL
Qty: 30 TABLET | Refills: 0 | OUTPATIENT
Start: 2024-10-28

## 2024-10-28 RX ORDER — TAMSULOSIN HYDROCHLORIDE 0.4 MG/1
1 CAPSULE ORAL NIGHTLY
Qty: 30 CAPSULE | Refills: 0 | OUTPATIENT
Start: 2024-10-28

## 2024-10-28 RX ORDER — BENAZEPRIL HYDROCHLORIDE 20 MG/1
TABLET ORAL
Qty: 60 TABLET | Refills: 0 | OUTPATIENT
Start: 2024-10-28

## 2024-11-12 DIAGNOSIS — K21.9 GASTROESOPHAGEAL REFLUX DISEASE WITHOUT ESOPHAGITIS: ICD-10-CM

## 2024-11-12 RX ORDER — TRAZODONE HYDROCHLORIDE 100 MG/1
100 TABLET ORAL NIGHTLY
Qty: 90 TABLET | Refills: 0 | OUTPATIENT
Start: 2024-11-12

## 2024-11-12 RX ORDER — OMEPRAZOLE 40 MG/1
40 CAPSULE, DELAYED RELEASE ORAL EVERY MORNING
Qty: 90 CAPSULE | Refills: 0 | OUTPATIENT
Start: 2024-11-12

## 2024-11-23 DIAGNOSIS — K21.9 GASTROESOPHAGEAL REFLUX DISEASE WITHOUT ESOPHAGITIS: ICD-10-CM

## 2024-11-25 DIAGNOSIS — M47.812 CERVICAL SPONDYLOSIS: ICD-10-CM

## 2024-11-25 RX ORDER — TRAZODONE HYDROCHLORIDE 100 MG/1
100 TABLET ORAL NIGHTLY
Qty: 90 TABLET | Refills: 11 | OUTPATIENT
Start: 2024-11-25

## 2024-11-25 RX ORDER — OMEPRAZOLE 40 MG/1
40 CAPSULE, DELAYED RELEASE ORAL EVERY MORNING
Qty: 90 CAPSULE | Refills: 11 | OUTPATIENT
Start: 2024-11-25

## 2024-11-25 NOTE — TELEPHONE ENCOUNTER
Care Due:                  Date            Visit Type   Department     Provider  --------------------------------------------------------------------------------                                EP -                              [facility]  Last Visit: 08-      CARE ([facility])   MEDICINE       Yumi Roberson                               -                              [facility]  Next Visit: 01-      CARE ([facility])   [facility]                                                            Last  Test          Frequency    Reason                     Performed    Due Date  --------------------------------------------------------------------------------    CBC.........  12 months..  clopidogreL, hydrALAZINE.  12- 12-    CMP.........  12 months..  atorvastatin, benazepriL.  12- 12-    Lipid Panel.  12 months..  atorvastatin.............  05- 05-    Health [facility] Embedded Care Due Messages. Reference number: [id number].   11/25/2024 2:16:45 PM CST

## 2024-11-26 RX ORDER — GABAPENTIN 600 MG/1
1200 TABLET ORAL NIGHTLY
Qty: 180 TABLET | Refills: 0 | OUTPATIENT
Start: 2024-11-26

## 2024-11-26 NOTE — TELEPHONE ENCOUNTER
Refill Routing Note   Medication(s) are not appropriate for processing by [facility] for the following reason(s):        Outside of protocol    [facility] action(s):  Route             Appointments  past 12m or future 3m with PCP    Date Provider   Last Visit   8/30/2023 Yumi Roberson MD   Next Visit   1/2/2025 Yumi Roberson MD   ED visits in past 90 days: 0        Note composed:12:47 AM 11/26/2024

## 2024-12-09 RX ORDER — ATORVASTATIN CALCIUM 40 MG/1
40 TABLET, FILM COATED ORAL
Qty: 90 TABLET | Refills: 0 | OUTPATIENT
Start: 2024-12-09

## 2024-12-09 NOTE — TELEPHONE ENCOUNTER
No care due was identified.  [facility] Embedded Care Due Messages. Reference number: [id number].   12/09/2024 4:34:21 PM CST

## 2024-12-19 DIAGNOSIS — I10 ESSENTIAL HYPERTENSION: ICD-10-CM

## 2024-12-19 RX ORDER — BENAZEPRIL HYDROCHLORIDE 20 MG/1
TABLET ORAL
Qty: 60 TABLET | Refills: 11 | OUTPATIENT
Start: 2024-12-19

## 2024-12-19 RX ORDER — ATORVASTATIN CALCIUM 40 MG/1
40 TABLET, FILM COATED ORAL
Qty: 90 TABLET | Refills: 3 | OUTPATIENT
Start: 2024-12-19

## 2024-12-19 NOTE — TELEPHONE ENCOUNTER
No care due was identified.  [facility] Embedded Care Due Messages. Reference number: [id number].   12/19/2024 10:58:45 AM CST

## 2024-12-19 NOTE — TELEPHONE ENCOUNTER
No care due was identified.  Health [facility] Embedded Care Due Messages. Reference number: [id number].   12/19/2024 10:57:44 AM CST

## 2025-01-02 ENCOUNTER — OFFICE VISIT (OUTPATIENT)
Dept: FAMILY MEDICINE | Facility: CLINIC | Age: 70
End: 2025-01-02
Payer: MEDICARE

## 2025-01-02 ENCOUNTER — LAB VISIT (OUTPATIENT)
Dept: LAB | Facility: HOSPITAL | Age: 70
End: 2025-01-02
Attending: FAMILY MEDICINE
Payer: MEDICARE

## 2025-01-02 VITALS
SYSTOLIC BLOOD PRESSURE: 160 MMHG | DIASTOLIC BLOOD PRESSURE: 92 MMHG | HEART RATE: 75 BPM | BODY MASS INDEX: 27.22 KG/M2 | WEIGHT: 184.31 LBS | OXYGEN SATURATION: 97 %

## 2025-01-02 DIAGNOSIS — Z12.5 PROSTATE CANCER SCREENING ENCOUNTER, OPTIONS AND RISKS DISCUSSED: ICD-10-CM

## 2025-01-02 DIAGNOSIS — Z12.11 COLON CANCER SCREENING: ICD-10-CM

## 2025-01-02 DIAGNOSIS — Z00.00 WELL ADULT EXAM: Primary | ICD-10-CM

## 2025-01-02 DIAGNOSIS — K21.9 GASTROESOPHAGEAL REFLUX DISEASE WITHOUT ESOPHAGITIS: ICD-10-CM

## 2025-01-02 DIAGNOSIS — Z95.1 HX OF CABG: ICD-10-CM

## 2025-01-02 DIAGNOSIS — G89.29 CHRONIC PAIN OF LEFT KNEE: ICD-10-CM

## 2025-01-02 DIAGNOSIS — F51.05 INSOMNIA DUE TO ANXIETY AND FEAR: ICD-10-CM

## 2025-01-02 DIAGNOSIS — F41.8 DEPRESSION WITH ANXIETY: ICD-10-CM

## 2025-01-02 DIAGNOSIS — I10 ESSENTIAL HYPERTENSION: ICD-10-CM

## 2025-01-02 DIAGNOSIS — E78.2 MIXED HYPERLIPIDEMIA: ICD-10-CM

## 2025-01-02 DIAGNOSIS — M25.562 CHRONIC PAIN OF LEFT KNEE: ICD-10-CM

## 2025-01-02 DIAGNOSIS — F40.9 INSOMNIA DUE TO ANXIETY AND FEAR: ICD-10-CM

## 2025-01-02 DIAGNOSIS — Z00.00 WELL ADULT EXAM: ICD-10-CM

## 2025-01-02 DIAGNOSIS — M47.812 CERVICAL SPONDYLOSIS: ICD-10-CM

## 2025-01-02 DIAGNOSIS — Z86.73 S/P STROKE DUE TO CEREBROVASCULAR DISEASE: ICD-10-CM

## 2025-01-02 LAB
ALBUMIN SERPL BCP-MCNC: 4.4 G/DL (ref 3.5–5.2)
ALP SERPL-CCNC: 57 U/L (ref 40–150)
ALT SERPL W/O P-5'-P-CCNC: 18 U/L (ref 10–44)
ANION GAP SERPL CALC-SCNC: 8 MMOL/L (ref 8–16)
AST SERPL-CCNC: 22 U/L (ref 10–40)
BASOPHILS # BLD AUTO: 0.08 K/UL (ref 0–0.2)
BASOPHILS NFR BLD: 1.3 % (ref 0–1.9)
BILIRUB SERPL-MCNC: 0.6 MG/DL (ref 0.1–1)
BUN SERPL-MCNC: 14 MG/DL (ref 8–23)
CALCIUM SERPL-MCNC: 9.9 MG/DL (ref 8.7–10.5)
CHLORIDE SERPL-SCNC: 102 MMOL/L (ref 95–110)
CHOLEST SERPL-MCNC: 201 MG/DL (ref 120–199)
CHOLEST/HDLC SERPL: 3.8 {RATIO} (ref 2–5)
CO2 SERPL-SCNC: 27 MMOL/L (ref 23–29)
COMPLEXED PSA SERPL-MCNC: 1.6 NG/ML (ref 0–4)
CREAT SERPL-MCNC: 1.2 MG/DL (ref 0.5–1.4)
DIFFERENTIAL METHOD BLD: ABNORMAL
EOSINOPHIL # BLD AUTO: 0.4 K/UL (ref 0–0.5)
EOSINOPHIL NFR BLD: 7.1 % (ref 0–8)
ERYTHROCYTE [DISTWIDTH] IN BLOOD BY AUTOMATED COUNT: 12.9 % (ref 11.5–14.5)
EST. GFR  (NO RACE VARIABLE): >60 ML/MIN/1.73 M^2
ESTIMATED AVG GLUCOSE: 117 MG/DL (ref 68–131)
GLUCOSE SERPL-MCNC: 93 MG/DL (ref 70–110)
HBA1C MFR BLD: 5.7 % (ref 4–5.6)
HCT VFR BLD AUTO: 52.1 % (ref 40–54)
HDLC SERPL-MCNC: 53 MG/DL (ref 40–75)
HDLC SERPL: 26.4 % (ref 20–50)
HGB BLD-MCNC: 16.6 G/DL (ref 14–18)
IMM GRANULOCYTES # BLD AUTO: 0.01 K/UL (ref 0–0.04)
IMM GRANULOCYTES NFR BLD AUTO: 0.2 % (ref 0–0.5)
LDLC SERPL CALC-MCNC: 113.4 MG/DL (ref 63–159)
LYMPHOCYTES # BLD AUTO: 1.3 K/UL (ref 1–4.8)
LYMPHOCYTES NFR BLD: 20.1 % (ref 18–48)
MCH RBC QN AUTO: 28.5 PG (ref 27–31)
MCHC RBC AUTO-ENTMCNC: 31.9 G/DL (ref 32–36)
MCV RBC AUTO: 89 FL (ref 82–98)
MONOCYTES # BLD AUTO: 0.6 K/UL (ref 0.3–1)
MONOCYTES NFR BLD: 10 % (ref 4–15)
NEUTROPHILS # BLD AUTO: 3.8 K/UL (ref 1.8–7.7)
NEUTROPHILS NFR BLD: 61.3 % (ref 38–73)
NONHDLC SERPL-MCNC: 148 MG/DL
NRBC BLD-RTO: 0 /100 WBC
PLATELET # BLD AUTO: 277 K/UL (ref 150–450)
PMV BLD AUTO: 9.6 FL (ref 9.2–12.9)
POTASSIUM SERPL-SCNC: 5.5 MMOL/L (ref 3.5–5.1)
PROT SERPL-MCNC: 8.1 G/DL (ref 6–8.4)
RBC # BLD AUTO: 5.83 M/UL (ref 4.6–6.2)
SODIUM SERPL-SCNC: 137 MMOL/L (ref 136–145)
TRIGL SERPL-MCNC: 173 MG/DL (ref 30–150)
WBC # BLD AUTO: 6.23 K/UL (ref 3.9–12.7)

## 2025-01-02 PROCEDURE — 80053 COMPREHEN METABOLIC PANEL: CPT | Mod: HCNC | Performed by: FAMILY MEDICINE

## 2025-01-02 PROCEDURE — 3080F DIAST BP >= 90 MM HG: CPT | Mod: CPTII,S$GLB,, | Performed by: FAMILY MEDICINE

## 2025-01-02 PROCEDURE — 3077F SYST BP >= 140 MM HG: CPT | Mod: CPTII,S$GLB,, | Performed by: FAMILY MEDICINE

## 2025-01-02 PROCEDURE — 1101F PT FALLS ASSESS-DOCD LE1/YR: CPT | Mod: CPTII,S$GLB,, | Performed by: FAMILY MEDICINE

## 2025-01-02 PROCEDURE — 3008F BODY MASS INDEX DOCD: CPT | Mod: CPTII,S$GLB,, | Performed by: FAMILY MEDICINE

## 2025-01-02 PROCEDURE — 4010F ACE/ARB THERAPY RXD/TAKEN: CPT | Mod: CPTII,S$GLB,, | Performed by: FAMILY MEDICINE

## 2025-01-02 PROCEDURE — 36415 COLL VENOUS BLD VENIPUNCTURE: CPT | Mod: HCNC,PN | Performed by: FAMILY MEDICINE

## 2025-01-02 PROCEDURE — 85025 COMPLETE CBC W/AUTO DIFF WBC: CPT | Mod: HCNC | Performed by: FAMILY MEDICINE

## 2025-01-02 PROCEDURE — 99397 PER PM REEVAL EST PAT 65+ YR: CPT | Mod: S$GLB,,, | Performed by: FAMILY MEDICINE

## 2025-01-02 PROCEDURE — 80061 LIPID PANEL: CPT | Mod: HCNC | Performed by: FAMILY MEDICINE

## 2025-01-02 PROCEDURE — 99214 OFFICE O/P EST MOD 30 MIN: CPT | Mod: 25,S$GLB,, | Performed by: FAMILY MEDICINE

## 2025-01-02 PROCEDURE — 3044F HG A1C LEVEL LT 7.0%: CPT | Mod: CPTII,S$GLB,, | Performed by: FAMILY MEDICINE

## 2025-01-02 PROCEDURE — 1125F AMNT PAIN NOTED PAIN PRSNT: CPT | Mod: CPTII,S$GLB,, | Performed by: FAMILY MEDICINE

## 2025-01-02 PROCEDURE — 99999 PR PBB SHADOW E&M-EST. PATIENT-LVL IV: CPT | Mod: PBBFAC,,, | Performed by: FAMILY MEDICINE

## 2025-01-02 PROCEDURE — 83036 HEMOGLOBIN GLYCOSYLATED A1C: CPT | Mod: DBM,HCNC | Performed by: FAMILY MEDICINE

## 2025-01-02 PROCEDURE — 84153 ASSAY OF PSA TOTAL: CPT | Mod: HCNC | Performed by: FAMILY MEDICINE

## 2025-01-02 PROCEDURE — 3288F FALL RISK ASSESSMENT DOCD: CPT | Mod: CPTII,S$GLB,, | Performed by: FAMILY MEDICINE

## 2025-01-02 RX ORDER — TRAZODONE HYDROCHLORIDE 100 MG/1
100 TABLET ORAL NIGHTLY
Qty: 90 TABLET | Refills: 0 | Status: SHIPPED | OUTPATIENT
Start: 2025-01-02

## 2025-01-02 RX ORDER — GABAPENTIN 600 MG/1
1200 TABLET ORAL NIGHTLY
Qty: 180 TABLET | Refills: 0 | Status: SHIPPED | OUTPATIENT
Start: 2025-01-02

## 2025-01-02 RX ORDER — CLOPIDOGREL BISULFATE 75 MG/1
75 TABLET ORAL ONCE
Qty: 30 TABLET | Refills: 3 | Status: SHIPPED | OUTPATIENT
Start: 2025-01-02 | End: 2025-01-24

## 2025-01-02 RX ORDER — HYDRALAZINE HYDROCHLORIDE 25 MG/1
25 TABLET, FILM COATED ORAL 3 TIMES DAILY
Qty: 90 TABLET | Refills: 0 | Status: SHIPPED | OUTPATIENT
Start: 2025-01-02 | End: 2025-01-21

## 2025-01-02 RX ORDER — BENAZEPRIL HYDROCHLORIDE 20 MG/1
40 TABLET ORAL NIGHTLY
Qty: 60 TABLET | Refills: 0 | Status: SHIPPED | OUTPATIENT
Start: 2025-01-02 | End: 2025-01-22

## 2025-01-02 RX ORDER — CITALOPRAM 20 MG/1
20 TABLET, FILM COATED ORAL DAILY
Qty: 30 TABLET | Refills: 0 | Status: SHIPPED | OUTPATIENT
Start: 2025-01-02 | End: 2026-01-02

## 2025-01-02 RX ORDER — OMEPRAZOLE 40 MG/1
40 CAPSULE, DELAYED RELEASE ORAL EVERY MORNING
Qty: 90 CAPSULE | Refills: 0 | Status: SHIPPED | OUTPATIENT
Start: 2025-01-02 | End: 2025-01-24

## 2025-01-02 RX ORDER — AMLODIPINE BESYLATE 5 MG/1
5 TABLET ORAL DAILY
Qty: 90 TABLET | Refills: 3 | Status: SHIPPED | OUTPATIENT
Start: 2025-01-02 | End: 2025-01-10

## 2025-01-02 RX ORDER — METOPROLOL TARTRATE 25 MG/1
25 TABLET, FILM COATED ORAL 2 TIMES DAILY
Qty: 60 TABLET | Refills: 0 | Status: SHIPPED | OUTPATIENT
Start: 2025-01-02 | End: 2025-02-01

## 2025-01-02 RX ORDER — CLONAZEPAM 1 MG/1
1 TABLET ORAL NIGHTLY PRN
Qty: 30 TABLET | Refills: 1 | Status: CANCELLED | OUTPATIENT
Start: 2025-01-02

## 2025-01-02 RX ORDER — ZOLPIDEM TARTRATE 10 MG/1
10 TABLET ORAL NIGHTLY
Qty: 30 TABLET | Refills: 5 | Status: SHIPPED | OUTPATIENT
Start: 2025-01-02 | End: 2025-02-01

## 2025-01-02 RX ORDER — ATORVASTATIN CALCIUM 40 MG/1
40 TABLET, FILM COATED ORAL NIGHTLY
Qty: 90 TABLET | Refills: 0 | Status: SHIPPED | OUTPATIENT
Start: 2025-01-02

## 2025-01-02 RX ORDER — TRAMADOL HYDROCHLORIDE 50 MG/1
50 TABLET ORAL EVERY 8 HOURS PRN
Qty: 20 TABLET | Refills: 0 | Status: CANCELLED | OUTPATIENT
Start: 2025-01-02

## 2025-01-02 RX ORDER — TAMSULOSIN HYDROCHLORIDE 0.4 MG/1
1 CAPSULE ORAL NIGHTLY
Qty: 30 CAPSULE | Refills: 0 | Status: SHIPPED | OUTPATIENT
Start: 2025-01-02 | End: 2025-01-21

## 2025-01-02 RX ORDER — ISOSORBIDE MONONITRATE 60 MG/1
60 TABLET, EXTENDED RELEASE ORAL DAILY
Qty: 90 TABLET | Refills: 0 | Status: SHIPPED | OUTPATIENT
Start: 2025-01-02

## 2025-01-02 NOTE — PROGRESS NOTES
Chief Complaint:    Chief Complaint   Patient presents with    Annual Exam       History of Present Illness:    Patient presents today    History of Present Illness    Patient presents today for follow-up regarding blood pressure management and other health concerns. He reports home blood pressure readings of 160/90. He has not been taking his blood pressure medications for the past 3-5 months. He reports enlarged prostate with frequent urination, needing to urinate again within 10 minutes of voiding. He notes large volume with each void. He is currently out of FloMax. He has Culp's esophagus diagnosed in 2016 and continues omeprazole. Endoscopy in 2023 showed no gross lesions. He reports chronic knee pain with popping sensation upon certain movements. He reports inability to sleep at night, spending nighttime hours working on his car. He previously took Ambien for sleep. History of bypass surgery in 2022.      ROS:  General: denies fever, denies chills, denies fatigue, denies weight gain, denies weight loss, denies loss of appetite  Eyes: denies vision changes, denies blurry vision, denies eye pain, denies eye discharge  ENT: denies ear pain, denies hearing loss, denies tinnitus, denies nasal congestion, denies sore throat  Cardiovascular: denies chest pain, denies palpitations, denies lower extremity edema  Respiratory: denies cough, denies shortness of breath, denies wheezing, denies sputum production  Endocrine: denies polyuria, denies polydipsia, denies heat intolerance, denies cold intolerance  Gastrointestinal: denies abdominal pain, denies heartburn, denies nausea, denies vomiting, denies diarrhea, denies constipation, denies blood in stool  Genitourinary: denies dysuria, denies urgency, admits frequency, denies hematuria, denies nocturia, denies incontinence  Heme & Lymphatic: denies easy or excessive bleeding, denies easy bruising, denies swollen lymph nodes  Musculoskeletal: denies muscle pain,  denies back pain, admits joint pain, denies joint swelling  Skin: denies rash, denies lesion, denies itching, denies skin texture changes, denies skin color changes  Neurological: denies headache, denies dizziness, denies numbness, denies tingling, denies seizure activity, denies speech difficulty, denies memory loss, denies confusion  Psychiatric: denies anxiety, denies depression, admits sleep difficulty             Past Medical History:   Diagnosis Date    Anxiety     Cancer     spot on kidney and lung    Deep vein thrombosis     Diabetes mellitus     Borderline diet controlled    Diverticulitis     GERD (gastroesophageal reflux disease)     Hyperlipidemia     not on any meds currently    Hypertension     Lung mass     Mass of kidney        Social History:  Social History     Socioeconomic History    Marital status:    Tobacco Use    Smoking status: Former     Current packs/day: 0.00     Average packs/day: 1 pack/day for 20.0 years (20.0 ttl pk-yrs)     Types: Cigarettes     Start date: 1975     Quit date: 1995     Years since quittin.9     Passive exposure: Past    Smokeless tobacco: Never   Substance and Sexual Activity    Alcohol use: Never     Alcohol/week: 0.0 standard drinks of alcohol    Drug use: No    Sexual activity: Not Currently     Partners: Female     Birth control/protection: None     Social Drivers of Health     Financial Resource Strain: Medium Risk (2023)    Overall Financial Resource Strain (CARDIA)     Difficulty of Paying Living Expenses: Somewhat hard   Food Insecurity: No Food Insecurity (2023)    Hunger Vital Sign     Worried About Running Out of Food in the Last Year: Never true     Ran Out of Food in the Last Year: Never true   Transportation Needs: No Transportation Needs (2023)    PRAPARE - Transportation     Lack of Transportation (Medical): No     Lack of Transportation (Non-Medical): No   Physical Activity: Insufficiently Active (2023)    Exercise  Vital Sign     Days of Exercise per Week: 3 days     Minutes of Exercise per Session: 40 min   Stress: Stress Concern Present (9/5/2023)    [employer] - Occupational Stress Questionnaire     Feeling of Stress : Very much   Housing Stability: Low Risk  (9/5/2023)    Housing Stability Vital Sign     Unable to Pay for Housing in the Last Year: No     Number of Places Lived in the Last Year: 1     Unstable Housing in the Last Year: No       Family History:   family history includes Cancer in his sister; Hypertension in his father and mother; Melanoma in his father.    Health Maintenance   Topic Date Due    Shingles Vaccine (1 of 2) Never done    RSV Vaccine (Age 60+ and Pregnant patients) (1 - Risk 60-74 years 1-dose series) Never done    Hemoglobin A1c (Prediabetes)  05/17/2024    Lipid Panel  05/17/2024    Influenza Vaccine (1) 09/01/2024    COVID-19 Vaccine (3 - 2024-25 season) 09/01/2024    Colorectal Cancer Screening  02/24/2025    TETANUS VACCINE  04/26/2028    Hepatitis C Screening  Completed    Pneumococcal Vaccines (Age 50+)  Completed    Abdominal Aortic Aneurysm Screening  Completed       Exam:Physical     Vital Signs  Pulse: 75  SpO2: 97 %  BP: (!) 160/92  BP Location: Left arm  Patient Position: Sitting  Pain Score: 10-Worst pain ever  Pain Loc: Knee  Height and Weight  Weight: 83.6 kg (184 lb 4.9 oz)]    Body mass index is 27.22 kg/m².    Physical Exam  Constitutional:       Appearance: He is well-developed.   Eyes:      Conjunctiva/sclera: Conjunctivae normal.      Pupils: Pupils are equal, round, and reactive to light.   Cardiovascular:      Rate and Rhythm: Normal rate and regular rhythm.      Heart sounds: Normal heart sounds. No murmur heard.  Pulmonary:      Effort: Pulmonary effort is normal. No respiratory distress.      Breath sounds: Normal breath sounds. No wheezing or rales.   Chest:      Chest wall: No tenderness.   Abdominal:      General: There is no distension.       Palpations: Abdomen is soft. There is no mass.      Tenderness: There is no abdominal tenderness. There is no guarding.   Musculoskeletal:         General: No tenderness.      Cervical back: Normal range of motion and neck supple.   Lymphadenopathy:      Cervical: No cervical adenopathy.   Skin:     General: Skin is warm and dry.   Neurological:      Mental Status: He is alert and oriented to person, place, and time.      Deep Tendon Reflexes: Reflexes are normal and symmetric.   Psychiatric:         Behavior: Behavior normal.         Thought Content: Thought content normal.         Judgment: Judgment normal.           Assessment:      ICD-10-CM ICD-9-CM   1. Well adult exam  Z00.00 V70.0   2. Essential hypertension  I10 401.9   3. Mixed hyperlipidemia  E78.2 272.2   4. Depression with anxiety  F41.8 300.4   5. S/P stroke due to cerebrovascular disease  Z86.73 V12.54   6. Cervical spondylosis  M47.812 721.0   7. Gastroesophageal reflux disease without esophagitis  K21.9 530.81   8. Ulceration localized to fingertip  L98.499 707.8   9. Insomnia due to anxiety and fear  F51.05 300.20    F40.9 327.02   10. Prostate cancer screening encounter, options and risks discussed  Z12.5 V76.44   11. Chronic pain of left knee  M25.562 719.46    G89.29 338.29   12. Colon cancer screening  Z12.11 V76.51   13. Hx of CABG x4 in   Z95.1 V45.81         Plan:    Assessment & Plan    MEDICAL DECISION MAKING:  - Assessed elevated blood pressure at 160/90; emphasized importance of resuming antihypertensive medication due to history of heart disease and bypass surgery  - Evaluated skin tags on patient's back; determined they were benign and did not require intervention  - Reviewed recent endoscopy results from 2023, noting improvement in Culp's esophagus compared to 2016 findings  - Assessed urinary symptoms; determined they may be related to enlarged prostate and restarting Flomax could improve symptoms  - Considered  patient's complaints of knee and ankle pain; decided orthopedic consultation was necessary    PATIENT EDUCATION:  - Explained target blood pressure should be below 140/90  - Discussed importance of blood pressure control in preventing recurrence of heart blockages  - Informed patient that skin tags on back are benign and do not require treatment  - Educated on the importance of having someone accompany patient for colonoscopy due to sedation    ACTION ITEMS/LIFESTYLE:  - Patient to monitor blood pressure 2 times daily and record readings  - Patient to use coconut oil or hydrocortisone cream for pruritus at surgical site    MEDICATIONS:  - Restarted antihypertensive medication  - Restarted Flomax for enlarged prostate  - Continued Ambien for sleep  - Discontinued Klonopin    ORDERS:  - CBC, CMP ordered to check liver and kidney function  - Colonoscopy ordered    REFERRALS:  - Referred to orthopedic consultation for knee and ankle pain  - Referred to cardiology for follow-up to check for potential blockages    FOLLOW UP:  - Follow up in 1 week to review blood pressure readings and ensure numbers are improving  - Patient to bring completed blood pressure log to next appointment               Follow up in about 3 weeks (around 1/23/2025).      Yumi Roberson MD

## 2025-01-06 ENCOUNTER — OFFICE VISIT (OUTPATIENT)
Dept: OTOLARYNGOLOGY | Facility: CLINIC | Age: 70
End: 2025-01-06
Payer: MEDICARE

## 2025-01-06 ENCOUNTER — OFFICE VISIT (OUTPATIENT)
Dept: FAMILY MEDICINE | Facility: CLINIC | Age: 70
End: 2025-01-06
Payer: MEDICARE

## 2025-01-06 VITALS
OXYGEN SATURATION: 96 % | TEMPERATURE: 98 F | BODY MASS INDEX: 27.41 KG/M2 | HEIGHT: 69 IN | SYSTOLIC BLOOD PRESSURE: 142 MMHG | HEART RATE: 78 BPM | DIASTOLIC BLOOD PRESSURE: 89 MMHG | WEIGHT: 185.06 LBS

## 2025-01-06 VITALS — BODY MASS INDEX: 27.33 KG/M2 | HEIGHT: 69 IN

## 2025-01-06 DIAGNOSIS — T16.1XXA FOREIGN BODY OF RIGHT EAR, INITIAL ENCOUNTER: Primary | ICD-10-CM

## 2025-01-06 DIAGNOSIS — T16.1XXA FOREIGN BODY OF RIGHT EAR, INITIAL ENCOUNTER: ICD-10-CM

## 2025-01-06 DIAGNOSIS — H92.01 RIGHT EAR PAIN: ICD-10-CM

## 2025-01-06 PROCEDURE — 99999 PR PBB SHADOW E&M-EST. PATIENT-LVL IV: CPT | Mod: PBBFAC,HCNC,, | Performed by: STUDENT IN AN ORGANIZED HEALTH CARE EDUCATION/TRAINING PROGRAM

## 2025-01-06 PROCEDURE — 1159F MED LIST DOCD IN RCRD: CPT | Mod: HCNC,CPTII,S$GLB, | Performed by: STUDENT IN AN ORGANIZED HEALTH CARE EDUCATION/TRAINING PROGRAM

## 2025-01-06 PROCEDURE — 4010F ACE/ARB THERAPY RXD/TAKEN: CPT | Mod: HCNC,CPTII,S$GLB, | Performed by: STUDENT IN AN ORGANIZED HEALTH CARE EDUCATION/TRAINING PROGRAM

## 2025-01-06 PROCEDURE — 1125F AMNT PAIN NOTED PAIN PRSNT: CPT | Mod: HCNC,CPTII,S$GLB, | Performed by: PHYSICIAN ASSISTANT

## 2025-01-06 PROCEDURE — 3044F HG A1C LEVEL LT 7.0%: CPT | Mod: HCNC,CPTII,S$GLB, | Performed by: STUDENT IN AN ORGANIZED HEALTH CARE EDUCATION/TRAINING PROGRAM

## 2025-01-06 PROCEDURE — 1159F MED LIST DOCD IN RCRD: CPT | Mod: HCNC,CPTII,S$GLB, | Performed by: PHYSICIAN ASSISTANT

## 2025-01-06 PROCEDURE — 69200 CLEAR OUTER EAR CANAL: CPT | Mod: HCNC,RT,S$GLB, | Performed by: PHYSICIAN ASSISTANT

## 2025-01-06 PROCEDURE — 99999 PR PBB SHADOW E&M-EST. PATIENT-LVL III: CPT | Mod: PBBFAC,HCNC,, | Performed by: PHYSICIAN ASSISTANT

## 2025-01-06 PROCEDURE — 3044F HG A1C LEVEL LT 7.0%: CPT | Mod: HCNC,CPTII,S$GLB, | Performed by: PHYSICIAN ASSISTANT

## 2025-01-06 PROCEDURE — 4010F ACE/ARB THERAPY RXD/TAKEN: CPT | Mod: HCNC,CPTII,S$GLB, | Performed by: PHYSICIAN ASSISTANT

## 2025-01-06 PROCEDURE — 99213 OFFICE O/P EST LOW 20 MIN: CPT | Mod: 25,HCNC,S$GLB, | Performed by: PHYSICIAN ASSISTANT

## 2025-01-06 PROCEDURE — 3008F BODY MASS INDEX DOCD: CPT | Mod: HCNC,CPTII,S$GLB, | Performed by: PHYSICIAN ASSISTANT

## 2025-01-06 PROCEDURE — 3008F BODY MASS INDEX DOCD: CPT | Mod: HCNC,CPTII,S$GLB, | Performed by: STUDENT IN AN ORGANIZED HEALTH CARE EDUCATION/TRAINING PROGRAM

## 2025-01-06 PROCEDURE — 99213 OFFICE O/P EST LOW 20 MIN: CPT | Mod: HCNC,S$GLB,, | Performed by: STUDENT IN AN ORGANIZED HEALTH CARE EDUCATION/TRAINING PROGRAM

## 2025-01-06 PROCEDURE — 1101F PT FALLS ASSESS-DOCD LE1/YR: CPT | Mod: HCNC,CPTII,S$GLB, | Performed by: STUDENT IN AN ORGANIZED HEALTH CARE EDUCATION/TRAINING PROGRAM

## 2025-01-06 PROCEDURE — 3079F DIAST BP 80-89 MM HG: CPT | Mod: HCNC,CPTII,S$GLB, | Performed by: STUDENT IN AN ORGANIZED HEALTH CARE EDUCATION/TRAINING PROGRAM

## 2025-01-06 PROCEDURE — 3077F SYST BP >= 140 MM HG: CPT | Mod: HCNC,CPTII,S$GLB, | Performed by: STUDENT IN AN ORGANIZED HEALTH CARE EDUCATION/TRAINING PROGRAM

## 2025-01-06 PROCEDURE — 3288F FALL RISK ASSESSMENT DOCD: CPT | Mod: HCNC,CPTII,S$GLB, | Performed by: STUDENT IN AN ORGANIZED HEALTH CARE EDUCATION/TRAINING PROGRAM

## 2025-01-06 PROCEDURE — 3288F FALL RISK ASSESSMENT DOCD: CPT | Mod: HCNC,CPTII,S$GLB, | Performed by: PHYSICIAN ASSISTANT

## 2025-01-06 PROCEDURE — 1125F AMNT PAIN NOTED PAIN PRSNT: CPT | Mod: HCNC,CPTII,S$GLB, | Performed by: STUDENT IN AN ORGANIZED HEALTH CARE EDUCATION/TRAINING PROGRAM

## 2025-01-06 PROCEDURE — 1101F PT FALLS ASSESS-DOCD LE1/YR: CPT | Mod: HCNC,CPTII,S$GLB, | Performed by: PHYSICIAN ASSISTANT

## 2025-01-06 NOTE — PROGRESS NOTES
Patient ID: Mil Sood is a 69 y.o. male.    Chief Complaint: Foreign Body in Ear    History of Present Illness    CHIEF COMPLAINT:  Mr. Sood presents today with a hearing aid stuck in the right ear.    HISTORY OF PRESENT ILLNESS:  He reports that the rubber tip of his hearing aid has become stuck in his ear on three occasions - twice in one ear and once in the other. Initially, he thought the tip had fallen on the floor.      ROS:  General: -fever, -chills, -fatigue, -weight gain, -weight loss  Eyes: -vision changes, -redness, -discharge  ENT: -ear pain, -nasal congestion, -sore throat, -hearing loss  Cardiovascular: -chest pain, -palpitations, -lower extremity edema  Respiratory: -cough, -shortness of breath  Gastrointestinal: -abdominal pain, -nausea, -vomiting, -diarrhea, -constipation, -blood in stool  Genitourinary: -dysuria, -hematuria, -frequency  Musculoskeletal: -joint pain, -muscle pain  Skin: -rash, -lesion  Neurological: -headache, -dizziness, -numbness, -tingling  Psychiatric: -anxiety, -depression, -sleep difficulty         Pmh, Psh, Family Hx, Social Hx updated in Epic Tabs today.   No problems updated.      Review of Systems        Physical Exam  HENT:      Right Ear: A foreign body is present.   Neurological:      Mental Status: He is alert.        Assessment:     1. Foreign body of right ear, initial encounter    2. Right ear pain        LABS:   Lab Results   Component Value Date    HGBA1C 5.7 (H) 01/02/2025    HGBA1C 5.4 05/17/2023    HGBA1C 5.5 08/18/2022      Lab Results   Component Value Date    CHOL 201 (H) 01/02/2025    CHOL 170 05/17/2023    CHOL 194 08/18/2022     Lab Results   Component Value Date    LDLCALC 113.4 01/02/2025    LDLCALC 105.8 05/17/2023    LDLCALC 118.4 08/18/2022     Lab Results   Component Value Date    WBC 6.23 01/02/2025    HGB 16.6 01/02/2025    HCT 52.1 01/02/2025     01/02/2025    CHOL 201 (H) 01/02/2025    TRIG 173 (H) 01/02/2025    HDL 53 01/02/2025     ALT 18 01/02/2025    AST 22 01/02/2025     01/02/2025    K 5.5 (H) 01/02/2025     01/02/2025    CREATININE 1.2 01/02/2025    BUN 14 01/02/2025    CO2 27 01/02/2025    TSH 1.335 05/17/2023    PSA 1.6 01/02/2025    INR 1.0 05/17/2023    HGBA1C 5.7 (H) 01/02/2025       Plan:   Mil was seen today for foreign body in ear.    Diagnoses and all orders for this visit:    Foreign body of right ear, initial encounter  -     Ambulatory referral/consult to ENT; Future    Right ear pain        Assessment & Plan    IMPRESSION:  - Attempted to remove hearing aid tip stuck in patient's ear  - Unable to safely extract hearing aid tip due to its deep position in the ear canal  - Determined ENT referral necessary for safe removal of the hearing aid tip    FOREIGN BODY IN LEFT EAR CANAL:  - Visually confirmed the presence of a foreign body deep inside the left ear canal.  - Assessed that the foreign body is too deep to be safely removed in the current setting.  - Referred the patient to an ENT specialist for removal of the stuck hearing aid tip.  - Advised the patient that the hearing aid has been stuck in the left ear since New Year's.    RECURRENT HEARING AID COMPLICATIONS:  - Noted that this is the third occurrence of a hearing aid getting stuck, with two previous incidents in the other ear.    OTALGIA:  - Noted that the patient reports soreness in the left ear associated with the stuck hearing aid.  - Recommend follow-up with the ENT specialist to address both the foreign body removal and associated otalgia.    ENT REFERRAL:  - Referred the patient to an ENT specialist for removal of the stuck hearing aid tip.  - Recommend follow-up with the ENT specialist to address both the foreign body removal and associated otalgia.           Face to Face time with patient: 10:40 AM CST -      Each patient to whom he or she provides medical services by telemedicine is:  (1) informed of the relationship between the physician and  patient and the respective role of any other health care provider with respect to management of the patient; and (2) notified that he or she may decline to receive medical services by telemedicine and may withdraw from such care at any time.    I spent a total of  29     minutes face to face and non-face to face on the date of this visit.This includes time preparing to see the patient (eg, review of tests, notes), obtaining and/or reviewing additional history from an independent historian and/or outside medical records, documenting clinical information in the electronic health record, independently interpreting results and/or communicating results to the patient/family/caregiver, or care coordinator.  Visit today included increased complexity associated with the care of the episodic problem addressed and managing the longitudinal care of the patient due to the serious and/or complex managed problem(s).    There are no Patient Instructions on file for this visit.    No follow-ups on file.  The ASCVD Risk score (Ashlee ORTEGA, et al., 2019) failed to calculate for the following reasons:    Risk score cannot be calculated because patient has a medical history suggesting prior/existing ASCVD    This note was generated with the assistance of ambient listening technology. Verbal consent was obtained by the patient and accompanying visitor(s) for the recording of patient appointment to facilitate this note. I attest to having reviewed and edited the generated note for accuracy, though some syntax or spelling errors may persist. Please contact the author of this note for any clarification.

## 2025-01-06 NOTE — PROGRESS NOTES
"Subjective:   Patient ID: Mil Sood is a 69 y.o. male.    Chief Complaint: Follow-up (Hearing aid plug stuck in R ear.)    Patient is a 69 year old male presenting for a piece of his hearing aid stuck in his right ear. He realized on new years night when he removed his hearing aid that the dome was missing. He assumed it had fallen out. When he tried to put the hearing aid back in he was unable to put it in and was getting feedback from the hearing aid. Denies drainage. States he has a dull ache in the right ear.     Follow-up      Review of patient's allergies indicates:   Allergen Reactions    Rosuvastatin Other (See Comments)     Joint pain   Joint and bone pain           Review of Systems   HENT:  Positive for ear pain and hearing loss. Negative for ear discharge.          Objective:   Ht 5' 9" (1.753 m)   BMI 27.33 kg/m²     Physical Exam  Constitutional:       General: He is not in acute distress.     Appearance: Normal appearance. He is not ill-appearing.   HENT:      Head: Normocephalic and atraumatic.      Right Ear: Tympanic membrane and external ear normal. A foreign body is present.      Left Ear: Tympanic membrane and external ear normal.      Ears:      Comments: Black dome of hearing aid in right ear canal      Nose: Nose normal.   Eyes:      Extraocular Movements: Extraocular movements intact.      Conjunctiva/sclera: Conjunctivae normal.   Pulmonary:      Effort: Pulmonary effort is normal. No respiratory distress.   Neurological:      General: No focal deficit present.      Mental Status: He is alert and oriented to person, place, and time.   Psychiatric:         Mood and Affect: Mood normal.         Behavior: Behavior normal.         Thought Content: Thought content normal.         Judgment: Judgment normal.        PROCEDURE NOTE:  Removal of foreign body from ear canal  Preprocedure diagnosis:  Foreign body ear canal  Postprocedure diangosis:  Same    Procedure in detail:  After verbal " consent was obtained, the binocular operating microscope was used to visualize the foreign body and ear canal.  The object was carefully grasped using an alligator forcep as well as a curette as necessary.  It was removed from the ear canal without difficulty.  The canal and tympanic membrane were then inspected, and were found to be intact with no abrasions or lacerations. The patient tolerated the procedure well, and there were no significant complications.     Assessment:     1. Foreign body of right ear, initial encounter        Plan:     Foreign body of right ear, initial encounter  -     Ambulatory referral/consult to ENT      Dome of hearing aid removed without complication today. Patient tolerated the procedure well. RTC PRN.

## 2025-01-07 ENCOUNTER — PATIENT OUTREACH (OUTPATIENT)
Dept: ADMINISTRATIVE | Facility: HOSPITAL | Age: 70
End: 2025-01-07
Payer: MEDICARE

## 2025-01-07 NOTE — PROGRESS NOTES
VBHM Score: 1     Uncontrolled BP    Influenza Vaccine  Shingles/Zoster Vaccine  RSV Vaccine          Health Maintenance Topic(s) Outreach Outcomes & Actions Taken:    Blood Pressure - Outreach Outcomes & Actions Taken  : patient has upcoming primary care appointment on 1/10/25 for follow up BP

## 2025-01-09 ENCOUNTER — HOSPITAL ENCOUNTER (OUTPATIENT)
Dept: PREADMISSION TESTING | Facility: HOSPITAL | Age: 70
Discharge: HOME | End: 2025-01-09
Attending: INTERNAL MEDICINE
Payer: MEDICARE

## 2025-01-09 DIAGNOSIS — Z12.11 COLON CANCER SCREENING: ICD-10-CM

## 2025-01-10 ENCOUNTER — OFFICE VISIT (OUTPATIENT)
Dept: FAMILY MEDICINE | Facility: CLINIC | Age: 70
End: 2025-01-10
Payer: MEDICARE

## 2025-01-10 ENCOUNTER — TELEPHONE (OUTPATIENT)
Dept: FAMILY MEDICINE | Facility: CLINIC | Age: 70
End: 2025-01-10

## 2025-01-10 VITALS
BODY MASS INDEX: 27.1 KG/M2 | OXYGEN SATURATION: 96 % | SYSTOLIC BLOOD PRESSURE: 130 MMHG | WEIGHT: 183.56 LBS | DIASTOLIC BLOOD PRESSURE: 79 MMHG | HEART RATE: 78 BPM

## 2025-01-10 DIAGNOSIS — J01.90 ACUTE BACTERIAL SINUSITIS: ICD-10-CM

## 2025-01-10 DIAGNOSIS — I10 ESSENTIAL HYPERTENSION: Primary | Chronic | ICD-10-CM

## 2025-01-10 DIAGNOSIS — R73.03 PREDIABETES: ICD-10-CM

## 2025-01-10 DIAGNOSIS — E87.5 HYPERKALEMIA: Primary | ICD-10-CM

## 2025-01-10 DIAGNOSIS — I70.0 ATHEROSCLEROSIS OF AORTA: ICD-10-CM

## 2025-01-10 DIAGNOSIS — B96.89 ACUTE BACTERIAL SINUSITIS: ICD-10-CM

## 2025-01-10 PROCEDURE — 99999 PR PBB SHADOW E&M-EST. PATIENT-LVL III: CPT | Mod: PBBFAC,HCNC,, | Performed by: FAMILY MEDICINE

## 2025-01-10 RX ORDER — AMOXICILLIN AND CLAVULANATE POTASSIUM 875; 125 MG/1; MG/1
1 TABLET, FILM COATED ORAL EVERY 12 HOURS
Qty: 14 TABLET | Refills: 0 | Status: SHIPPED | OUTPATIENT
Start: 2025-01-10 | End: 2025-01-17

## 2025-01-10 RX ORDER — AMLODIPINE BESYLATE 10 MG/1
10 TABLET ORAL DAILY
Qty: 90 TABLET | Refills: 3 | Status: SHIPPED | OUTPATIENT
Start: 2025-01-10 | End: 2026-01-10

## 2025-01-10 NOTE — TELEPHONE ENCOUNTER
----- Message from Dalila sent at 1/10/2025  3:40 PM CST -----  .Type:  Patient Returning Call    Who Called:patient   Who Left Message for Patient:Venessa James LPN  Does the patient know what this is regarding?:  Would the patient rather a call back or a response via [facility]? call  Best Call Back Number:.[phone]    Additional Information:

## 2025-01-10 NOTE — PROGRESS NOTES
Chief Complaint:    Chief Complaint   Patient presents with    Follow-up       History of Present Illness:    History of Present Illness    Patient presents today with sinus problems and headaches. He reports sinus problems for the past couple weeks with nasal congestion and greenish discharge. He experiences localized headaches. He reports elevated blood pressure readings of 171/98. He takes Benazepril 20mg, Amlodipine 5mg, and Hydralazine split between morning and evening doses. He reports improvement in urinary symptoms with Flomax, noting decreased nighttime urination frequency. He reports worsening pre-diabetes. His daily fluid intake includes 32 ounces of sweet tea.      ROS:  General: denies fever, denies chills, denies fatigue, denies weight gain, denies weight loss, denies loss of appetite  Eyes: denies vision changes, denies blurry vision, denies eye pain, denies eye discharge  ENT: denies ear pain, denies hearing loss, denies tinnitus, admits nasal congestion, denies sore throat  Cardiovascular: denies chest pain, denies palpitations, denies lower extremity edema  Respiratory: denies cough, denies shortness of breath, denies wheezing, denies sputum production  Endocrine: denies polyuria, denies polydipsia, denies heat intolerance, denies cold intolerance  Gastrointestinal: denies abdominal pain, denies heartburn, denies nausea, denies vomiting, denies diarrhea, denies constipation, denies blood in stool  Genitourinary: denies dysuria, denies urgency, admits frequency, denies hematuria, admits nocturia, denies incontinence  Heme & Lymphatic: denies easy or excessive bleeding, denies easy bruising, denies swollen lymph nodes  Musculoskeletal: denies muscle pain, denies back pain, denies joint pain, denies joint swelling  Skin: denies rash, denies lesion, denies itching, denies skin texture changes, denies skin color changes  Neurological: admits headache, denies dizziness, denies numbness, denies tingling,  denies seizure activity, denies speech difficulty, denies memory loss, denies confusion  Psychiatric: denies anxiety, denies depression, denies sleep difficulty  Head: admits head pain           Past Medical History:   Diagnosis Date    Anxiety     Cancer     spot on kidney and lung    Deep vein thrombosis     Diabetes mellitus     Borderline diet controlled    Diverticulitis     GERD (gastroesophageal reflux disease)     Hyperlipidemia     not on any meds currently    Hypertension     Lung mass     Mass of kidney        Social History:  Social History     Socioeconomic History    Marital status:    Tobacco Use    Smoking status: Former     Current packs/day: 0.00     Average packs/day: 1 pack/day for 20.0 years (20.0 ttl pk-yrs)     Types: Cigarettes     Start date: 1975     Quit date: 1995     Years since quittin.9     Passive exposure: Past    Smokeless tobacco: Never   Substance and Sexual Activity    Alcohol use: Never     Alcohol/week: 0.0 standard drinks of alcohol    Drug use: No    Sexual activity: Not Currently     Partners: Female     Birth control/protection: None     Social Drivers of Health     Financial Resource Strain: Medium Risk (2023)    Overall Financial Resource Strain (CARDIA)     Difficulty of Paying Living Expenses: Somewhat hard   Food Insecurity: No Food Insecurity (2023)    Hunger Vital Sign     Worried About Running Out of Food in the Last Year: Never true     Ran Out of Food in the Last Year: Never true   Transportation Needs: No Transportation Needs (2023)    PRAPARE - Transportation     Lack of Transportation (Medical): No     Lack of Transportation (Non-Medical): No   Physical Activity: Insufficiently Active (2023)    Exercise Vital Sign     Days of Exercise per Week: 3 days     Minutes of Exercise per Session: 40 min   Stress: Stress Concern Present (2023)    [employer] - Occupational Stress Questionnaire      Feeling of Stress : Very much   Housing Stability: Low Risk  (9/5/2023)    Housing Stability Vital Sign     Unable to Pay for Housing in the Last Year: No     Number of Places Lived in the Last Year: 1     Unstable Housing in the Last Year: No       Family History:   family history includes Cancer in his sister; Hypertension in his father and mother; Melanoma in his father.    Health Maintenance   Topic Date Due    Shingles Vaccine (1 of 2) Never done    RSV Vaccine (Age 60+ and Pregnant patients) (1 - Risk 60-74 years 1-dose series) Never done    Influenza Vaccine (1) 09/01/2024    COVID-19 Vaccine (3 - 2024-25 season) 09/01/2024    Colorectal Cancer Screening  02/24/2025    Hemoglobin A1c (Prediabetes)  01/02/2026    Lipid Panel  01/02/2026    TETANUS VACCINE  04/26/2028    Hepatitis C Screening  Completed    Pneumococcal Vaccines (Age 50+)  Completed    Abdominal Aortic Aneurysm Screening  Completed       Exam:Physical     Vital Signs  Pulse: 78  SpO2: 96 %  BP: 130/79  BP Location: Left arm  Patient Position: Sitting  Pain Score: 0-No pain  Height and Weight  Weight: 83.3 kg (183 lb 8.5 oz)]    Body mass index is 27.1 kg/m².    Physical Exam    Vitals: BP Hypertensive. Blood pressure: 171/98.  General: Well-developed. Well-nourished. No acute distress.  Eyes: EOMI. Sclerae anicteric.  HENT: Normocephalic. Atraumatic. Nares patent. Moist oral mucosa.  Maxillary sinus tenderness  Cardiovascular: Regular rate. Regular rhythm. No murmurs. No rubs. No gallops. Normal S1, S2.  Respiratory: Normal respiratory effort. Clear to auscultation bilaterally. No rales. No rhonchi. No wheezing.  Musculoskeletal: No  obvious deformity.  Extremities: No lower extremity edema.  Neurological: Alert & oriented x3. No slurred speech. Normal gait.  Psychiatric: Normal mood. Normal affect. Good insight. Good judgment.  Skin: Warm. Dry. No rash.           Assessment:        ICD-10-CM ICD-9-CM   1. Essential hypertension  I10 401.9   2.  Acute bacterial sinusitis  J01.90 461.9    B96.89    3. Prediabetes  R73.03 790.29         Plan:    Assessment & Plan    MEDICAL DECISION MAKING:  - Suspect sinus infection based on patient's symptoms and greenish nasal discharge  - Bottom number of blood pressure slightly elevated; adjusting medications to address  - Pre-diabetes returning based on recent labs  - Reviewed patient's current medication regimen and adherence    PATIENT EDUCATION:  - Explained link between excessive sugar intake and diabetes risk  - Discussed potential health risks associated with artificial sweeteners  - Educated on importance of reading nutritional labels for sugar content    ACTION ITEMS/LIFESTYLE:  - Patient to eliminate sweet tea consumption (currently 32 oz/day)  - Recommend reducing overall sugar and starch intake  - Patient can consider switching to sugar-free or artificially sweetened beverages if unable to drink plain water/tea    MEDICATIONS:  - Started Augmentin twice daily for 7 days for sinus infection  - Increased Amlodipine from 5mg to 10mg daily  - Continued Benazepril 20mg (taken in morning)  - Continued Hydralazine 25mg 3 times daily  - Continued Metoprolol  - Continued Flomax for urinary symptoms    ORDERS:  - Blood pressure check ordered  - Blood sugar test ordered    FOLLOW UP:  - Follow up in a few weeks with home blood pressure machine and completed blood pressure log         Mil was seen today for follow-up.    Diagnoses and all orders for this visit:    Essential hypertension  -     amLODIPine (NORVASC) 10 MG tablet; Take 1 tablet (10 mg total) by mouth once daily.    Acute bacterial sinusitis    Prediabetes    Other orders  -     amoxicillin-clavulanate 875-125mg (AUGMENTIN) 875-125 mg per tablet; Take 1 tablet by mouth every 12 (twelve) hours. for 7 days        Follow up in about 2 weeks (around 1/24/2025).      Yumi Roberson MD

## 2025-01-10 NOTE — TELEPHONE ENCOUNTER
----- Message from Yumi Roberson MD sent at 1/5/2025  6:05 PM CST -----  Potassium is high, repeat bmp in 3-4 days.   Prediabetes still present, cut out starches and sugars and exercise.

## 2025-01-13 ENCOUNTER — HOSPITAL ENCOUNTER (OUTPATIENT)
Dept: CARDIOLOGY | Facility: HOSPITAL | Age: 70
Discharge: HOME | End: 2025-01-13
Attending: INTERNAL MEDICINE
Payer: MEDICARE

## 2025-01-13 ENCOUNTER — OFFICE VISIT (OUTPATIENT)
Dept: CARDIOLOGY | Facility: CLINIC | Age: 70
End: 2025-01-13
Payer: MEDICARE

## 2025-01-13 VITALS
WEIGHT: 184.94 LBS | SYSTOLIC BLOOD PRESSURE: 126 MMHG | HEART RATE: 71 BPM | OXYGEN SATURATION: 95 % | BODY MASS INDEX: 27.39 KG/M2 | DIASTOLIC BLOOD PRESSURE: 85 MMHG | HEIGHT: 69 IN

## 2025-01-13 DIAGNOSIS — T46.6X5A MYALGIA DUE TO HMG COA REDUCTASE INHIBITOR: ICD-10-CM

## 2025-01-13 DIAGNOSIS — E78.2 MIXED HYPERLIPIDEMIA: ICD-10-CM

## 2025-01-13 DIAGNOSIS — I25.708 CORONARY ARTERY DISEASE OF BYPASS GRAFT OF NATIVE HEART WITH STABLE ANGINA PECTORIS: Primary | ICD-10-CM

## 2025-01-13 DIAGNOSIS — I10 ESSENTIAL HYPERTENSION: Chronic | ICD-10-CM

## 2025-01-13 DIAGNOSIS — Z95.1 HX OF CABG: ICD-10-CM

## 2025-01-13 DIAGNOSIS — M79.10 MYALGIA DUE TO HMG COA REDUCTASE INHIBITOR: ICD-10-CM

## 2025-01-13 DIAGNOSIS — J43.2 CENTRILOBULAR EMPHYSEMA: ICD-10-CM

## 2025-01-13 DIAGNOSIS — I11.0 HYPERTENSIVE HEART DISEASE WITH HEART FAILURE: ICD-10-CM

## 2025-01-13 DIAGNOSIS — Z00.00 ENCOUNTER FOR MEDICARE ANNUAL WELLNESS EXAM: ICD-10-CM

## 2025-01-13 DIAGNOSIS — I70.0 ATHEROSCLEROSIS OF AORTA: ICD-10-CM

## 2025-01-13 LAB
OHS QRS DURATION: 80 MS
OHS QTC CALCULATION: 398 MS

## 2025-01-13 PROCEDURE — 3074F SYST BP LT 130 MM HG: CPT | Mod: CPTII,S$GLB,, | Performed by: INTERNAL MEDICINE

## 2025-01-13 PROCEDURE — 93010 ELECTROCARDIOGRAM REPORT: CPT | Mod: ,,, | Performed by: INTERNAL MEDICINE

## 2025-01-13 PROCEDURE — 93005 ELECTROCARDIOGRAM TRACING: CPT

## 2025-01-13 PROCEDURE — 1159F MED LIST DOCD IN RCRD: CPT | Mod: CPTII,S$GLB,, | Performed by: INTERNAL MEDICINE

## 2025-01-13 PROCEDURE — 1160F RVW MEDS BY RX/DR IN RCRD: CPT | Mod: CPTII,S$GLB,, | Performed by: INTERNAL MEDICINE

## 2025-01-13 PROCEDURE — 1101F PT FALLS ASSESS-DOCD LE1/YR: CPT | Mod: CPTII,S$GLB,, | Performed by: INTERNAL MEDICINE

## 2025-01-13 PROCEDURE — 3079F DIAST BP 80-89 MM HG: CPT | Mod: CPTII,S$GLB,, | Performed by: INTERNAL MEDICINE

## 2025-01-13 PROCEDURE — 4010F ACE/ARB THERAPY RXD/TAKEN: CPT | Mod: CPTII,S$GLB,, | Performed by: INTERNAL MEDICINE

## 2025-01-13 PROCEDURE — 3008F BODY MASS INDEX DOCD: CPT | Mod: CPTII,S$GLB,, | Performed by: INTERNAL MEDICINE

## 2025-01-13 PROCEDURE — 1126F AMNT PAIN NOTED NONE PRSNT: CPT | Mod: CPTII,S$GLB,, | Performed by: INTERNAL MEDICINE

## 2025-01-13 PROCEDURE — 3044F HG A1C LEVEL LT 7.0%: CPT | Mod: CPTII,S$GLB,, | Performed by: INTERNAL MEDICINE

## 2025-01-13 PROCEDURE — 99999 PR PBB SHADOW E&M-EST. PATIENT-LVL IV: CPT | Mod: PBBFAC,,, | Performed by: INTERNAL MEDICINE

## 2025-01-13 PROCEDURE — 3288F FALL RISK ASSESSMENT DOCD: CPT | Mod: CPTII,S$GLB,, | Performed by: INTERNAL MEDICINE

## 2025-01-13 PROCEDURE — 99214 OFFICE O/P EST MOD 30 MIN: CPT | Mod: S$GLB,,, | Performed by: INTERNAL MEDICINE

## 2025-01-13 RX ORDER — EVOLOCUMAB 140 MG/ML
140 INJECTION, SOLUTION SUBCUTANEOUS
Qty: 2 EACH | Refills: 5 | Status: ACTIVE | OUTPATIENT
Start: 2025-01-13

## 2025-01-13 NOTE — PROGRESS NOTES
Subjective:   Patient ID:  Mil Sood is a 69 y.o. male who presents for follow up of Shortness of Breath (Sob with exertion)      68 yo male, came in for 1 yr f/u  PMH CAD s/p CABG x4 in , h/o emboli CVA in , HTN HLD. PreDM. Anxiety, no smoking/drinking. Statin intolerance for bone pain.  C/o chest pain for 4 months, central chest, twice a week, stabbing pain, radiating to the left arm. Occurred at rest. Lasted for 10 minutes and resolved spontaneously  Walking caused SOB but no pain.   Phar. MPI ordered by PCP for chest pain and showed apical and inferoseptal ischemia.  ekg NSR and no acute stt change  Father had PPM and mother ok. Sister  of lung cancer     Pt had LHC done on 2022 bu Dr. Godfrey, revealing:  lmca 50% eccentric calcified ivus csa 5.4  LAd HEAVILY CALCIFIED MID LAD LONG LESION 50-70% IN MAGNITUDE CSA 2 MM2.  LCX MID LONG 40% HEAVILY CALCIFIED.   OM1 90% PROX.  D1 CALCIFIED NON OBS DISEASE.   RCA CALCIFIED DIFFUSE DISEASE PDA OSTIUM 90% PLB 90% .  EF NORMAL.      visit states that still has exertional chest pain and Left arm pain. Has SOB,   Prefers to go to Dr. Mcmanus for CABG at [facility]. His wife worked before at Dr. Mcmanus's office     visit  22 S/p 4v CABG (LIMA to LAD, SVG to diagonal, SVG to OM and SVG to rPDA) by Dr. Mcmanus. Continue aspirin, Plavix, and beta-blocker.  2022 admitted for Syncope ad OH. SBP dropped to 55 mmHG. Cr 1.3 and IVF Rx  EKG NSR  Echo 1. Normal left ventricular systolic function. LVEF 55 - 65%. Mild (Grade I) diastolic   dysfunction (impaired relaxation). Mild asymmetric septal hypertrophy. No LV outflow   tract obstruction.   2. Normal right ventricular size and systolic function.   3. Mild mitral valve regurgitation.   4. Mild aortic valve regurgitation.   5. Mild tricuspid valve regurgitation.   After d/c no recurrent syncope, no chest pain dizziness SOB leg swelling  Living with his son.      visit  Copay of [place]  was high for pt.        Two weeks ago, [place]. W[place] up and went to the kitchen. Felt dizziness and then passed out. Waking up and BP 60/40 mmHg.  Otherwise, no chest pain dyspnea and leg swelling    06/23 visit  05-23 admitted for AMS. MRI brain with multifocal infarcts in right frontal lobe, bilateral parietal lobes and bilateral cerebellum possibly embolic type infarcts. Vascular surgery was consulted, no intervention recommended.  Troponin  2.7>3.21>3.27  EKG with nonspecific ST T-wave abnormality, sinus rhythm.    Echo with EF 60%, mild to mod MR, mild AI, [place] 26 nl size    Now left arm and hand weakness and right arm numbness. Right foot 3 toes numbness. Walking is ok.  DJD some dizziness    12/23 visit  Angina once a month and resolved after NTG SL x1. Occasional UMANA>  Ok to walk 100 yard for the mail. No active bleeding  Taking Lipitor 40mg daily and had bone pain when taking 80 mg  High copay for repatha  09/23 [place] MPI no ischemia       03/07/24 dc repatha due to not reach out    Interval history  1 yr f/u. Walks a lot.  Occasional CP and took NTG SL once a month.   SOB OE. Quit smoking 30 yrs ago  Denied palpitation, fainting, PND, orthopnea, syncope and claudication. No active bleeding    EKG reviewed by myself today NSR and poor R progression on precordial leads. No change compared to prior one          Past Medical History:   Diagnosis Date    Anxiety     Cancer     spot on kidney and lung    Deep vein thrombosis     Diabetes mellitus     Borderline diet controlled    Diverticulitis     GERD (gastroesophageal reflux disease)     Hyperlipidemia     not on any meds currently    Hypertension     Lung mass     Mass of kidney        Past Surgical History:   Procedure Laterality Date    ARTHROPLASTY OF JOINT OF FINGER Left 11/18/2022    Procedure: ARTHROPLASTY, FINGER;  Surgeon: Kane Negrete MD;  Location[facility] OR;  Service: Orthopedics;  Laterality:  Left;  left thumb basal joint arthroplasty  Arthrex  Podiatry extras    CARPAL TUNNEL RELEASE Right 12/1/2023    Procedure: RELEASE, CARPAL TUNNEL;  Surgeon: Kane Negrete MD;  Location: [facility];  Service: Orthopedics;  Laterality: Right;    CHOLECYSTECTOMY      COLONOSCOPY      COLONOSCOPY N/A 4/7/2017    Procedure: COLONOSCOPY;  Surgeon: Amarilys Bravo MD;  Location: [facility];  Service: Endoscopy;  Laterality: N/A;    DIAGNOSTIC LAPAROSCOPY N/A 5/4/2021    Procedure: LAPAROSCOPY, DIAGNOSTIC;  Surgeon: Shaji Jauregui MD;  Location: [facility];  Service: General;  Laterality: N/A;    ESOPHAGOGASTRODUODENOSCOPY      ESOPHAGOGASTRODUODENOSCOPY N/A 2/13/2020    Procedure: ESOPHAGOGASTRODUODENOSCOPY (EGD);  Surgeon: Shaji Aquino III, MD;  Location: [facility];  Service: Endoscopy;  Laterality: N/A;    ESOPHAGOGASTRODUODENOSCOPY N/A 5/3/2023    Procedure: EGD (ESOPHAGOGASTRODUODENOSCOPY);  Surgeon: Jenelle Jensen MD;  Location: [facility];  Service: Endoscopy;  Laterality: N/A;    HERNIA REPAIR Bilateral     groin    LEFT HEART CATHETERIZATION Left 1/31/2022    Procedure: CATHETERIZATION, HEART, LEFT;  Surgeon: Romeo Godfrey MD;  Location: [facility] CATH LAB;  Service: Cardiology;  Laterality: Left;    NECK SURGERY      OPEN REDUCTION AND INTERNAL FIXATION (ORIF) OF INJURY OF ANKLE Left 8/10/2022    Procedure: ORIF, ANKLE;  Surgeon: Howard Mata MD;  Location: [facility];  Service: Orthopedics;  Laterality: Left;    PARTIAL NEPHRECTOMY      left    RENAL BIOPSY      ROBOT-ASSISTED REPAIR OF UMBILICAL HERNIA USING DA NEL XI N/A 5/4/2021    Procedure: XI ROBOTIC REPAIR, HERNIA, UMBILICAL;  Surgeon: Shaji Jauregui MD;  Location: [facility];  Service: General;  Laterality: N/A;  converted to open    TONSILLECTOMY      TRIGGER FINGER RELEASE Right     long finger       Social History     Tobacco Use    Smoking status: Former     Current packs/day: 0.00     Average packs/day: 1 pack/day  for 20.0 years (20.0 ttl pk-yrs)     Types: Cigarettes     Start date: 1975     Quit date: 1995     Years since quittin.9     Passive exposure: Past    Smokeless tobacco: Never   Substance Use Topics    Alcohol use: Never     Alcohol/week: 0.0 standard drinks of alcohol    Drug use: No       Family History   Problem Relation Name Age of Onset    Hypertension Mother      Hypertension Father      Melanoma Father      Cancer Sister           ROS    Objective:   Physical Exam  HENT:      Head: Normocephalic.   Eyes:      Pupils: Pupils are equal, round, and reactive to light.   Neck:      Thyroid: No thyromegaly.      Vascular: Normal carotid pulses. No carotid bruit or JVD.   Cardiovascular:      Rate and Rhythm: Normal rate and regular rhythm. No extrasystoles are present.     Chest Wall: PMI is not displaced.      Pulses: Normal pulses.           Carotid pulses are 2+ on the right side and 2+ on the left side.     Heart sounds: Normal heart sounds. No murmur heard.     No gallop. No S3 sounds.      Comments: S/p sternotomy    Pulmonary:      Effort: No respiratory distress.      Breath sounds: Normal breath sounds. No stridor.   Abdominal:      General: Bowel sounds are normal.      Palpations: Abdomen is soft.      Tenderness: There is no abdominal tenderness. There is no rebound.   Musculoskeletal:         General: Normal range of motion.   Skin:     Findings: No rash.   Neurological:      Mental Status: He is alert and oriented to person, place, and time.   Psychiatric:         Behavior: Behavior normal.       Lab Results   Component Value Date    CHOL 201 (H) 2025    CHOL 170 2023    CHOL 194 2022     Lab Results   Component Value Date    HDL 53 2025    HDL 46 2023    HDL 38 (L) 2022     Lab Results   Component Value Date    LDLCALC 113.4 2025    LDLCALC 105.8 2023    LDLCALC 118.4 2022     Lab Results   Component Value Date    TRIG 173 (H)  01/02/2025    TRIG 91 05/17/2023    TRIG 188 (H) 08/18/2022     Lab Results   Component Value Date    CHOLHDL 26.4 01/02/2025    CHOLHDL 27.1 05/17/2023    CHOLHDL 19.6 (L) 08/18/2022       Chemistry        Component Value Date/Time     01/02/2025 0858    K 5.5 (H) 01/02/2025 0858     01/02/2025 0858    CO2 27 01/02/2025 0858    BUN 14 01/02/2025 0858    CREATININE 1.2 01/02/2025 0858    GLU 93 01/02/2025 0858        Component Value Date/Time    CALCIUM 9.9 01/02/2025 0858    ALKPHOS 57 01/02/2025 0858    AST 22 01/02/2025 0858    ALT 18 01/02/2025 0858    BILITOT 0.6 01/02/2025 0858    ESTGFRAFRICA 89 04/27/2022 0507    ESTGFRAFRICA >60.0 02/11/2022 0858    EGFRNONAA >60.0 02/11/2022 0858          Lab Results   Component Value Date    HGBA1C 5.7 (H) 01/02/2025     Lab Results   Component Value Date    TSH 1.335 05/17/2023     Lab Results   Component Value Date    INR 1.0 05/17/2023    INR 1.0 05/16/2023    INR 1.4 03/11/2022     Lab Results   Component Value Date    WBC 6.23 01/02/2025    HGB 16.6 01/02/2025    HCT 52.1 01/02/2025    MCV 89 01/02/2025     01/02/2025     BMP  Sodium   Date Value Ref Range Status   01/02/2025 137 136 - 145 mmol/L Final     Potassium   Date Value Ref Range Status   01/02/2025 5.5 (H) 3.5 - 5.1 mmol/L Final     Comment:     *No Visible Hemolysis     Chloride   Date Value Ref Range Status   01/02/2025 102 95 - 110 mmol/L Final     CO2   Date Value Ref Range Status   01/02/2025 27 23 - 29 mmol/L Final     BUN   Date Value Ref Range Status   01/02/2025 14 8 - 23 mg/dL Final     Creatinine   Date Value Ref Range Status   01/02/2025 1.2 0.5 - 1.4 mg/dL Final     Calcium   Date Value Ref Range Status   01/02/2025 9.9 8.7 - 10.5 mg/dL Final     Anion Gap   Date Value Ref Range Status   01/02/2025 8 8 - 16 mmol/L Final     eGFR if    Date Value Ref Range Status   02/11/2022 >60.0 >60 mL/min/1.73 m^2 Final     eGFR    Date Value Ref Range  Status   04/27/2022 89 mL/min/1.73mSq Final     Comment:     In accordance with NKF-ASN Task Force recommendation, calculation based on the Chronic Kidney Disease Epidemiology Collaboration (CKD-EPI) equation without adjustment for race. eGFR adjusted for gender and age and calculated in ml/min/1.73mSquared. eGFR cannot be calculated if patient is under 18 years of age.     Reference Range:   >= 60 ml/min/1.73mSquared.     eGFR if non    Date Value Ref Range Status   02/11/2022 >60.0 >60 mL/min/1.73 m^2 Final     Comment:     Calculation used to obtain the estimated glomerular filtration  rate (eGFR) is the CKD-EPI equation.        BNP  @LABRCNTIP(BNP,BNPTRIAGEBLO)@  @LABRCNTIP(troponini)@  CrCl cannot be calculated (Patient's most recent lab result is older than the maximum 7 days allowed.).  No results found in the last 24 hours.  No results found in the last 24 hours.  No results found in the last 24 hours.    Assessment:      1. Coronary artery disease of bypass graft of native heart with stable angina pectoris    2. Hx of CABG x4 in     3. Hypertensive heart disease with heart failure    4. Atherosclerosis of aorta    5. Essential hypertension    6. Mixed hyperlipidemia    7. Centrilobular emphysema    8. Myalgia due to HMG CoA reductase inhibitor        Plan:       Re send Repatha   Continue asa Plavix lipitor 40 mg benazepril hydralazine imdur     Counseled DASH  Check Lipid profile with PCP in 6 months  Recommend heart-healthy diet, weight control and regular exercise.  Shruti. Risk modification.   I have reviewed all pertinent labs and cardiac studies independently. Plans and recommendations have been formulated under my direct supervision. All questions answered and patient voiced understanding.   If symptoms persist go to the ED  RTC in 12 months

## 2025-01-15 DIAGNOSIS — E87.5 HYPERKALEMIA: Primary | ICD-10-CM

## 2025-01-15 RX ORDER — HYDROCHLOROTHIAZIDE 12.5 MG/1
12.5 CAPSULE ORAL DAILY
Qty: 30 CAPSULE | Refills: 11 | Status: SHIPPED | OUTPATIENT
Start: 2025-01-15 | End: 2026-01-15

## 2025-01-20 DIAGNOSIS — I10 ESSENTIAL HYPERTENSION: ICD-10-CM

## 2025-01-20 NOTE — TELEPHONE ENCOUNTER
No care due was identified.  [facility] Embedded Care Due Messages. Reference number: [id number].   1/20/2025 3:58:06 PM CST

## 2025-01-21 RX ORDER — TAMSULOSIN HYDROCHLORIDE 0.4 MG/1
1 CAPSULE ORAL NIGHTLY
Qty: 90 CAPSULE | Refills: 3 | Status: SHIPPED | OUTPATIENT
Start: 2025-01-21

## 2025-01-21 RX ORDER — HYDRALAZINE HYDROCHLORIDE 25 MG/1
25 TABLET, FILM COATED ORAL 3 TIMES DAILY
Qty: 270 TABLET | Refills: 3 | Status: SHIPPED | OUTPATIENT
Start: 2025-01-21

## 2025-01-22 RX ORDER — BENAZEPRIL HYDROCHLORIDE 20 MG/1
TABLET ORAL
Qty: 180 TABLET | Refills: 3 | Status: SHIPPED | OUTPATIENT
Start: 2025-01-22

## 2025-01-22 NOTE — TELEPHONE ENCOUNTER
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

## 2025-01-22 NOTE — TELEPHONE ENCOUNTER
Refill Routing Note   Medication(s) are not appropriate for processing by [facility] for the following reason(s):        Required labs abnormal    [facility] action(s):  Defer  Approve           Pharmacist review requested: Yes   Extended chart review required: Yes     Appointments  past 12m or future 3m with PCP    Date Provider   Last Visit   1/10/2025 Yumi Roberson MD   Next Visit   1/24/2025 Yumi Roberson MD   ED visits in past 90 days: 0        Note composed:11:20 PM 01/21/2025

## 2025-01-24 ENCOUNTER — OFFICE VISIT (OUTPATIENT)
Dept: FAMILY MEDICINE | Facility: CLINIC | Age: 70
End: 2025-01-24
Payer: MEDICARE

## 2025-01-24 VITALS
DIASTOLIC BLOOD PRESSURE: 88 MMHG | HEART RATE: 53 BPM | SYSTOLIC BLOOD PRESSURE: 122 MMHG | BODY MASS INDEX: 26.77 KG/M2 | HEIGHT: 69 IN | WEIGHT: 180.75 LBS | OXYGEN SATURATION: 96 %

## 2025-01-24 DIAGNOSIS — I10 ESSENTIAL HYPERTENSION: Primary | Chronic | ICD-10-CM

## 2025-01-24 DIAGNOSIS — F41.9 ANXIETY: ICD-10-CM

## 2025-01-24 DIAGNOSIS — K21.9 GASTROESOPHAGEAL REFLUX DISEASE, UNSPECIFIED WHETHER ESOPHAGITIS PRESENT: ICD-10-CM

## 2025-01-24 PROCEDURE — 3074F SYST BP LT 130 MM HG: CPT | Mod: CPTII,S$GLB,, | Performed by: FAMILY MEDICINE

## 2025-01-24 PROCEDURE — 3288F FALL RISK ASSESSMENT DOCD: CPT | Mod: CPTII,S$GLB,, | Performed by: FAMILY MEDICINE

## 2025-01-24 PROCEDURE — 99214 OFFICE O/P EST MOD 30 MIN: CPT | Mod: S$GLB,,, | Performed by: FAMILY MEDICINE

## 2025-01-24 PROCEDURE — 1101F PT FALLS ASSESS-DOCD LE1/YR: CPT | Mod: CPTII,S$GLB,, | Performed by: FAMILY MEDICINE

## 2025-01-24 PROCEDURE — 4010F ACE/ARB THERAPY RXD/TAKEN: CPT | Mod: CPTII,S$GLB,, | Performed by: FAMILY MEDICINE

## 2025-01-24 PROCEDURE — G2211 COMPLEX E/M VISIT ADD ON: HCPCS | Mod: S$GLB,,, | Performed by: FAMILY MEDICINE

## 2025-01-24 PROCEDURE — 1126F AMNT PAIN NOTED NONE PRSNT: CPT | Mod: CPTII,S$GLB,, | Performed by: FAMILY MEDICINE

## 2025-01-24 PROCEDURE — 99999 PR PBB SHADOW E&M-EST. PATIENT-LVL III: CPT | Mod: PBBFAC,,, | Performed by: FAMILY MEDICINE

## 2025-01-24 PROCEDURE — 3079F DIAST BP 80-89 MM HG: CPT | Mod: CPTII,S$GLB,, | Performed by: FAMILY MEDICINE

## 2025-01-24 PROCEDURE — 3044F HG A1C LEVEL LT 7.0%: CPT | Mod: CPTII,S$GLB,, | Performed by: FAMILY MEDICINE

## 2025-01-24 PROCEDURE — 3008F BODY MASS INDEX DOCD: CPT | Mod: CPTII,S$GLB,, | Performed by: FAMILY MEDICINE

## 2025-01-24 RX ORDER — SUCRALFATE 1 G/1
1 TABLET ORAL 4 TIMES DAILY
Qty: 120 TABLET | Refills: 3 | Status: SHIPPED | OUTPATIENT
Start: 2025-01-24

## 2025-01-24 RX ORDER — PANTOPRAZOLE SODIUM 40 MG/1
40 TABLET, DELAYED RELEASE ORAL DAILY
Qty: 90 TABLET | Refills: 3 | Status: SHIPPED | OUTPATIENT
Start: 2025-01-24 | End: 2026-01-24

## 2025-01-24 NOTE — PROGRESS NOTES
Chief Complaint:    Chief Complaint   Patient presents with    Follow-up     Blood pressure       History of Present Illness:    History of Present Illness    Patient presents today for follow up. He reports generally well-controlled blood pressure except during periods of inactivity at home, with one elevated reading of 188 during a sedentary period. He went without blood pressure medication for a year. His girlfriend assists with daily blood pressure monitoring using a home device. He reports dysphagia with food frequently getting stuck in his throat. He can no longer tolerate spicy foods. His diet consists of greasy foods with evening meals around 6pm, and he snacks on cakes and potato chips. He continues Prilosec 40mg twice daily for management. EGD in 2023 showed improvement in Culp's esophagus. He experiences anxiety during periods of inactivity at home. He takes Citalopram as needed rather than daily as prescribed, with last dose taken on Wednesday due to anxiety symptoms.      ROS:  General: denies fever, denies chills, denies fatigue, denies weight gain, denies weight loss, denies loss of appetite  Eyes: denies vision changes, denies blurry vision, denies eye pain, denies eye discharge  ENT: denies ear pain, denies hearing loss, denies tinnitus, denies nasal congestion, denies sore throat, admits difficulty swallowing  Cardiovascular: denies chest pain, denies palpitations, denies lower extremity edema  Respiratory: denies cough, denies shortness of breath, denies wheezing, denies sputum production  Endocrine: denies polyuria, denies polydipsia, denies heat intolerance, denies cold intolerance  Gastrointestinal: denies abdominal pain, denies heartburn, denies nausea, denies vomiting, denies diarrhea, denies constipation, denies blood in stool  Genitourinary: denies dysuria, denies urgency, denies frequency, denies hematuria, denies nocturia, denies incontinence  Heme & Lymphatic: denies easy or excessive  bleeding, denies easy bruising, denies swollen lymph nodes  Musculoskeletal: denies muscle pain, denies back pain, denies joint pain, denies joint swelling  Skin: denies rash, denies lesion, denies itching, denies skin texture changes, denies skin color changes  Neurological: denies headache, denies dizziness, denies numbness, denies tingling, denies seizure activity, denies speech difficulty, denies memory loss, denies confusion  Psychiatric: admits anxiety, denies depression, denies sleep difficulty           Past Medical History:   Diagnosis Date    Anxiety     Cancer     spot on kidney and lung    Deep vein thrombosis     Diabetes mellitus     Borderline diet controlled    Diverticulitis     GERD (gastroesophageal reflux disease)     Hyperlipidemia     not on any meds currently    Hypertension     Lung mass     Mass of kidney        Social History:  Social History     Socioeconomic History    Marital status:    Tobacco Use    Smoking status: Former     Current packs/day: 0.00     Average packs/day: 1 pack/day for 20.0 years (20.0 ttl pk-yrs)     Types: Cigarettes     Start date: 1975     Quit date: 1995     Years since quittin.9     Passive exposure: Past    Smokeless tobacco: Never   Substance and Sexual Activity    Alcohol use: Never     Alcohol/week: 0.0 standard drinks of alcohol    Drug use: No    Sexual activity: Not Currently     Partners: Female     Birth control/protection: None     Social Drivers of Health     Financial Resource Strain: Medium Risk (2023)    Overall Financial Resource Strain (CARDIA)     Difficulty of Paying Living Expenses: Somewhat hard   Food Insecurity: No Food Insecurity (2023)    Hunger Vital Sign     Worried About Running Out of Food in the Last Year: Never true     Ran Out of Food in the Last Year: Never true   Transportation Needs: No Transportation Needs (2023)    PRAPARE - Transportation     Lack of Transportation (Medical): No     Lack of  "Transportation (Non-Medical): No   Physical Activity: Insufficiently Active (9/5/2023)    Exercise Vital Sign     Days of Exercise per Week: 3 days     Minutes of Exercise per Session: 40 min   Stress: Stress Concern Present (9/5/2023)    [employer] [place] [employer] - Occupational Stress Questionnaire     Feeling of Stress : Very much   Housing Stability: Low Risk  (9/5/2023)    Housing Stability Vital Sign     Unable to Pay for Housing in the Last Year: No     Number of Places Lived in the Last Year: 1     Unstable Housing in the Last Year: No       Family History:   family history includes Cancer in his sister; Hypertension in his father and mother; Melanoma in his father.    Health Maintenance   Topic Date Due    Shingles Vaccine (1 of 2) Never done    RSV Vaccine (Age 60+ and Pregnant patients) (1 - Risk 60-74 years 1-dose series) Never done    Influenza Vaccine (1) 09/01/2024    COVID-19 Vaccine (3 - 2024-25 season) 09/01/2024    Colorectal Cancer Screening  02/24/2025    Hemoglobin A1c (Prediabetes)  01/02/2026    Lipid Panel  01/02/2026    TETANUS VACCINE  04/26/2028    Hepatitis C Screening  Completed    Pneumococcal Vaccines (Age 50+)  Completed    Abdominal Aortic Aneurysm Screening  Completed       Exam:Physical     Vital Signs  Pulse: (!) 53  SpO2: 96 %  BP: 122/88  BP Location: Left arm  Patient Position: Sitting  Pain Score: 0-No pain  Height and Weight  Height: 5' 9" (175.3 cm)  Weight: 82 kg (180 lb 12.4 oz)  BSA (Calculated - sq m): 2 sq meters  BMI (Calculated): 26.7  Weight in (lb) to have BMI = 25: 168.9]    Body mass index is 26.7 kg/m².    Physical Exam    General: Well-developed. Well-nourished. No acute distress.  Eyes: EOMI. Sclerae anicteric.  HENT: Normocephalic. Atraumatic. Nares patent. Moist oral mucosa.  Cardiovascular: Regular rate. Regular rhythm. No murmurs. No rubs. No gallops. Normal S1, S2.  Respiratory: Normal respiratory effort. Clear to auscultation bilaterally. " No rales. No rhonchi. No wheezing.  Musculoskeletal: No  obvious deformity.  Extremities: No lower extremity edema.  Neurological: Alert & oriented x3. No slurred speech. Normal gait.  Psychiatric: Normal mood. Normal affect. Good insight. Good judgment.  Skin: Warm. Dry. No rash.           Assessment:        ICD-10-CM ICD-9-CM   1. Essential hypertension  I10 401.9   2. Anxiety  F41.9 300.00   3. Gastroesophageal reflux disease, unspecified whether esophagitis present  K21.9 530.81         Plan:    Assessment & Plan    MEDICAL DECISION MAKING:  - Assessed blood pressure control, noting improvement with medication adjustment  - Evaluated heartburn symptoms and current management with Prilosec  - Will change antacid medication from Prilosec to Protonix for better symptom control  - Added Carafate as an additional acid-neutralizing medication  - Reviewed anxiety management and current use of Citalopram (Celexa)  - Noted recent EGD results showing no precancerous changes, negating need for repeat procedure for at least 5 years    PATIENT EDUCATION:  - Educated patient on foods that can trigger heartburn, including caffeine, spicy foods, and greasy foods  - Explained mechanism of lower esophageal sphincter and how certain foods can relax it, leading to acid reflux  - Discussed importance of avoiding late-night eating and snacking to prevent heartburn  - Advised on the relationship between dietary habits and glucose control, referencing previous discussions about prediabetes    ACTION ITEMS/LIFESTYLE:  - Patient to avoid eating late at night and snacking before bed  - Recommend reducing intake of greasy, spicy, and acidic foods  - Patient to continue regular blood pressure monitoring at home, at least 3 times per week    MEDICATIONS:  - Started Protonix  - Started Carafate, up to 3-4 times daily as needed for acid neutralization  - Continued Citalopram (Celexa), instructed to take daily instead of as needed  - Continued  Klonopin as needed for anxiety  - Discontinued Prilosec 40 mg daily    FOLLOW UP:  - Follow up sooner than previous gap to maintain better control of conditions  - Continue regular blood pressure checks and record results         Mil was seen today for follow-up.    Diagnoses and all orders for this visit:    Essential hypertension    Anxiety    Gastroesophageal reflux disease, unspecified whether esophagitis present    Other orders  -     pantoprazole (PROTONIX) 40 MG tablet; Take 1 tablet (40 mg total) by mouth once daily.  -     sucralfate (CARAFATE) 1 gram tablet; Take 1 tablet (1 g total) by mouth 4 (four) times daily.        Follow up in about 6 months (around 7/24/2025).      Yumi Roberson MD

## 2025-02-04 DIAGNOSIS — M25.562 PAIN IN BOTH KNEES, UNSPECIFIED CHRONICITY: Primary | ICD-10-CM

## 2025-02-04 DIAGNOSIS — M25.561 PAIN IN BOTH KNEES, UNSPECIFIED CHRONICITY: Primary | ICD-10-CM

## 2025-02-05 ENCOUNTER — OFFICE VISIT (OUTPATIENT)
Dept: ORTHOPEDICS | Facility: CLINIC | Age: 70
End: 2025-02-05
Payer: MEDICARE

## 2025-02-05 ENCOUNTER — HOSPITAL ENCOUNTER (OUTPATIENT)
Dept: RADIOLOGY | Facility: HOSPITAL | Age: 70
Discharge: HOME | End: 2025-02-05
Attending: ORTHOPAEDIC SURGERY
Payer: MEDICARE

## 2025-02-05 VITALS — HEART RATE: 69 BPM | SYSTOLIC BLOOD PRESSURE: 155 MMHG | DIASTOLIC BLOOD PRESSURE: 85 MMHG

## 2025-02-05 DIAGNOSIS — M25.561 PAIN IN BOTH KNEES, UNSPECIFIED CHRONICITY: ICD-10-CM

## 2025-02-05 DIAGNOSIS — G89.29 CHRONIC PAIN OF LEFT KNEE: ICD-10-CM

## 2025-02-05 DIAGNOSIS — M25.562 PAIN IN BOTH KNEES, UNSPECIFIED CHRONICITY: ICD-10-CM

## 2025-02-05 DIAGNOSIS — M25.562 CHRONIC PAIN OF LEFT KNEE: ICD-10-CM

## 2025-02-05 PROCEDURE — 99999 PR PBB SHADOW E&M-EST. PATIENT-LVL III: CPT | Mod: PBBFAC,HCNC,, | Performed by: ORTHOPAEDIC SURGERY

## 2025-02-05 PROCEDURE — 3079F DIAST BP 80-89 MM HG: CPT | Mod: HCNC,CPTII,S$GLB, | Performed by: ORTHOPAEDIC SURGERY

## 2025-02-05 PROCEDURE — 1125F AMNT PAIN NOTED PAIN PRSNT: CPT | Mod: HCNC,CPTII,S$GLB, | Performed by: ORTHOPAEDIC SURGERY

## 2025-02-05 PROCEDURE — 3077F SYST BP >= 140 MM HG: CPT | Mod: HCNC,CPTII,S$GLB, | Performed by: ORTHOPAEDIC SURGERY

## 2025-02-05 PROCEDURE — 4010F ACE/ARB THERAPY RXD/TAKEN: CPT | Mod: HCNC,CPTII,S$GLB, | Performed by: ORTHOPAEDIC SURGERY

## 2025-02-05 PROCEDURE — 1159F MED LIST DOCD IN RCRD: CPT | Mod: HCNC,CPTII,S$GLB, | Performed by: ORTHOPAEDIC SURGERY

## 2025-02-05 PROCEDURE — 20610 DRAIN/INJ JOINT/BURSA W/O US: CPT | Mod: HCNC,LT,S$GLB, | Performed by: ORTHOPAEDIC SURGERY

## 2025-02-05 PROCEDURE — 1101F PT FALLS ASSESS-DOCD LE1/YR: CPT | Mod: HCNC,CPTII,S$GLB, | Performed by: ORTHOPAEDIC SURGERY

## 2025-02-05 PROCEDURE — 73564 X-RAY EXAM KNEE 4 OR MORE: CPT | Mod: 26,50,HCNC, | Performed by: RADIOLOGY

## 2025-02-05 PROCEDURE — 1160F RVW MEDS BY RX/DR IN RCRD: CPT | Mod: HCNC,CPTII,S$GLB, | Performed by: ORTHOPAEDIC SURGERY

## 2025-02-05 PROCEDURE — 73564 X-RAY EXAM KNEE 4 OR MORE: CPT | Mod: TC,50,HCNC

## 2025-02-05 PROCEDURE — 99214 OFFICE O/P EST MOD 30 MIN: CPT | Mod: HCNC,25,S$GLB, | Performed by: ORTHOPAEDIC SURGERY

## 2025-02-05 PROCEDURE — 3044F HG A1C LEVEL LT 7.0%: CPT | Mod: HCNC,CPTII,S$GLB, | Performed by: ORTHOPAEDIC SURGERY

## 2025-02-05 PROCEDURE — 3288F FALL RISK ASSESSMENT DOCD: CPT | Mod: HCNC,CPTII,S$GLB, | Performed by: ORTHOPAEDIC SURGERY

## 2025-02-05 RX ORDER — LIDOCAINE HYDROCHLORIDE 10 MG/ML
5 INJECTION, SOLUTION INFILTRATION; PERINEURAL
Status: DISCONTINUED | OUTPATIENT
Start: 2025-02-05 | End: 2025-02-05 | Stop reason: HOSPADM

## 2025-02-05 RX ORDER — TRIAMCINOLONE ACETONIDE 40 MG/ML
40 INJECTION, SUSPENSION INTRA-ARTICULAR; INTRAMUSCULAR
Status: DISCONTINUED | OUTPATIENT
Start: 2025-02-05 | End: 2025-02-05 | Stop reason: HOSPADM

## 2025-02-05 RX ADMIN — TRIAMCINOLONE ACETONIDE 40 MG: 40 INJECTION, SUSPENSION INTRA-ARTICULAR; INTRAMUSCULAR at 09:02

## 2025-02-05 RX ADMIN — LIDOCAINE HYDROCHLORIDE 5 ML: 10 INJECTION, SOLUTION INFILTRATION; PERINEURAL at 09:02

## 2025-02-05 NOTE — PROCEDURES
Large Joint Aspiration/Injection: L knee    Date/Time: 2/5/2025 9:00 AM    Performed by: Jo Ann Garcia Jr., MD  Authorized by: Jo Ann Garcia Jr., MD    Consent Done?:  Yes (Verbal)  Indications:  Pain  Timeout: prior to procedure the correct patient, procedure, and site was verified    Local anesthesia used?: No      Details:  Needle Size:  21 G  Approach:  Anterolateral  Location:  Knee  Site:  L knee  Medications:  5 mL LIDOcaine HCL 10 mg/ml (1%) 10 mg/mL (1 %); 40 mg triamcinolone acetonide 40 mg/mL  Patient tolerance:  Patient tolerated the procedure well with no immediate complications

## 2025-02-05 NOTE — PROGRESS NOTES
Patient ID: Mil Sood  YOB: 1955  MRN: [id number]    Chief Complaint: Pain of the Left Knee      Referred By: Yumi Roberson MD     History of Present Illness: Mil Sood is a  70 y.o. male   Data Unavailable with a chief complaint of Pain of the Left Knee    70-year-old gentleman presenting today with ongoing medial left knee pain that has been steadily increasing over the last year.  He denies any particular injury or trauma.  He has problems squatting down and renovating his new car.    HPI    Past Medical History:   Past Medical History:   Diagnosis Date    Anxiety     Cancer     spot on kidney and lung    Deep vein thrombosis     Diabetes mellitus     Borderline diet controlled    Diverticulitis     GERD (gastroesophageal reflux disease)     Hyperlipidemia     not on any meds currently    Hypertension     Lung mass     Mass of kidney      Past Surgical History:   Procedure Laterality Date    ARTHROPLASTY OF JOINT OF FINGER Left 11/18/2022    Procedure: ARTHROPLASTY, FINGER;  Surgeon: Kane Negrete MD;  Location: [facility];  Service: Orthopedics;  Laterality: Left;  left thumb basal joint arthroplasty  Arthrex  Podiatry extras    CARPAL TUNNEL RELEASE Right 12/1/2023    Procedure: RELEASE, CARPAL TUNNEL;  Surgeon: Kane Negrete MD;  Location: [facility] OR;  Service: Orthopedics;  Laterality: Right;    CHOLECYSTECTOMY      COLONOSCOPY      COLONOSCOPY N/A 4/7/2017    Procedure: COLONOSCOPY;  Surgeon: Amarilys Bravo MD;  Location: [facility];  Service: Endoscopy;  Laterality: N/A;    DIAGNOSTIC LAPAROSCOPY N/A 5/4/2021    Procedure: LAPAROSCOPY, DIAGNOSTIC;  Surgeon: Shaji Jauregui MD;  Location: [facility] OR;  Service: General;  Laterality: N/A;    ESOPHAGOGASTRODUODENOSCOPY      ESOPHAGOGASTRODUODENOSCOPY N/A 2/13/2020    Procedure: ESOPHAGOGASTRODUODENOSCOPY (EGD);  Surgeon: Shaji Aquino III, MD;  Location: [facility];  Service: Endoscopy;  Laterality: N/A;     ESOPHAGOGASTRODUODENOSCOPY N/A 5/3/2023    Procedure: EGD (ESOPHAGOGASTRODUODENOSCOPY);  Surgeon: Jenelle Jensen MD;  Location: [facility] ENDO;  Service: Endoscopy;  Laterality: N/A;    HERNIA REPAIR Bilateral     groin    LEFT HEART CATHETERIZATION Left 2022    Procedure: CATHETERIZATION, HEART, LEFT;  Surgeon: oRmeo Godfrey MD;  Location: [facility] CATH LAB;  Service: Cardiology;  Laterality: Left;    NECK SURGERY      OPEN REDUCTION AND INTERNAL FIXATION (ORIF) OF INJURY OF ANKLE Left 8/10/2022    Procedure: ORIF, ANKLE;  Surgeon: Howard Mata MD;  Location: [facility] OR;  Service: Orthopedics;  Laterality: Left;    PARTIAL NEPHRECTOMY      left    RENAL BIOPSY      ROBOT-ASSISTED REPAIR OF UMBILICAL HERNIA USING DA NEL XI N/A 2021    Procedure: XI ROBOTIC REPAIR, HERNIA, UMBILICAL;  Surgeon: Shaji Jauregui MD;  Location: [facility] OR;  Service: General;  Laterality: N/A;  converted to open    TONSILLECTOMY      TRIGGER FINGER RELEASE Right     long finger     Family History   Problem Relation Name Age of Onset    Hypertension Mother      Hypertension Father      Melanoma Father      Cancer Sister       Social History     Socioeconomic History    Marital status:    Tobacco Use    Smoking status: Former     Current packs/day: 0.00     Average packs/day: 1 pack/day for 20.0 years (20.0 ttl pk-yrs)     Types: Cigarettes     Start date: 1975     Quit date: 1995     Years since quittin.0     Passive exposure: Past    Smokeless tobacco: Never   Substance and Sexual Activity    Alcohol use: Never     Alcohol/week: 0.0 standard drinks of alcohol    Drug use: No    Sexual activity: Not Currently     Partners: Female     Birth control/protection: None     Social Drivers of Health     Financial Resource Strain: Medium Risk (2023)    Overall Financial Resource Strain (CARDIA)     Difficulty of Paying Living Expenses: Somewhat hard   Food Insecurity: No Food Insecurity (2023)    Hunger Vital  Sign     Worried About Running Out of Food in the Last Year: Never true     Ran Out of Food in the Last Year: Never true   Transportation Needs: No Transportation Needs (9/5/2023)    PRAPARE - Transportation     Lack of Transportation (Medical): No     Lack of Transportation (Non-Medical): No   Physical Activity: Insufficiently Active (9/5/2023)    Exercise Vital Sign     Days of Exercise per Week: 3 days     Minutes of Exercise per Session: 40 min   Stress: Stress Concern Present (9/5/2023)    [employer] [place] [employer] - Occupational Stress Questionnaire     Feeling of Stress : Very much   Housing Stability: Low Risk  (9/5/2023)    Housing Stability Vital Sign     Unable to Pay for Housing in the Last Year: No     Number of Places Lived in the Last Year: 1     Unstable Housing in the Last Year: No     Medication List with Changes/Refills   Current Medications    AMLODIPINE (NORVASC) 10 MG TABLET    Take 1 tablet (10 mg total) by mouth once daily.    ATORVASTATIN (LIPITOR) 40 MG TABLET    Take 1 tablet (40 mg total) by mouth every evening.    BENAZEPRIL (LOTENSIN) 20 MG TABLET    TAKE TWO (2) TABLETS (40 MG TOTAL) BY MOUTH EVERY EVENING.    CITALOPRAM (CELEXA) 20 MG TABLET    Take 1 tablet (20 mg total) by mouth once daily.    CLONAZEPAM (KLONOPIN) 1 MG TABLET    Take 1 tablet (1 mg total) by mouth nightly as needed for Anxiety.    CLOPIDOGREL (PLAVIX) 75 MG TABLET    Take 1 tablet (75 mg total) by mouth once. for 1 dose    EVOLOCUMAB (REPATHA SURECLICK) 140 MG/ML PNIJ    Inject 1 mL (140 mg total) into the skin every 14 (fourteen) days.    GABAPENTIN (NEURONTIN) 600 MG TABLET    Take 2 tablets (1,200 mg total) by mouth every evening.    HYDRALAZINE (APRESOLINE) 25 MG TABLET    TAKE 1 TABLET BY MOUTH THREE TIMES DAILY.    HYDROCHLOROTHIAZIDE (MICROZIDE) 12.5 MG CAPSULE    Take 1 capsule (12.5 mg total) by mouth once daily.    ISOSORBIDE MONONITRATE (IMDUR) 60 MG 24 HR TABLET    Take 1 tablet (60 mg  total) by mouth once daily.    METOPROLOL TARTRATE (LOPRESSOR) 25 MG TABLET    Take 1 tablet (25 mg total) by mouth 2 (two) times daily.    PANTOPRAZOLE (PROTONIX) 40 MG TABLET    Take 1 tablet (40 mg total) by mouth once daily.    SUCRALFATE (CARAFATE) 1 GRAM TABLET    Take 1 tablet (1 g total) by mouth 4 (four) times daily.    TAMSULOSIN (FLOMAX) 0.4 MG CAP    TAKE 1 CAPSULE (0.4 MG TOTAL) BY MOUTH EVERY EVENING.    TRAMADOL (ULTRAM) 50 MG TABLET    Take 1 tablet (50 mg total) by mouth every 8 (eight) hours as needed for Pain.    TRAZODONE (DESYREL) 100 MG TABLET    Take 1 tablet (100 mg total) by mouth every evening.    ZOLPIDEM (AMBIEN) 10 MG TAB    Take 1 tablet (10 mg total) by mouth every evening.     Review of patient's allergies indicates:   Allergen Reactions    Rosuvastatin Other (See Comments)     Joint pain   Joint and bone pain     ROS    Physical Exam:   There is no height or weight on file to calculate BMI.  Vitals:    02/05/25 0849   BP: (!) 155/85   Pulse: 69      GENERAL: Well appearing, appropriate for stated age, no acute distress.  CARDIOVASCULAR: Pulses regular by peripheral palpation.  PULMONARY: Respirations are even and non-labored.  NEURO: Awake, alert, and oriented x 3.  PSYCH: Mood & affect are appropriate.  HEENT: Head is normocephalic and atraumatic.  Ortho/SPM Exam  Skin is intact no signs of erythema or infection  He has no significant effusion  He has pain along the medial joint line which is sharp  Raffaele's maneuver reproduces the medial knee pain  Logroll of the hip causes back pain but no knee pain, he does have slight decreased range of motion  Knee range of motion was normal  Ligamentous exam was normal  Neurovascular exam of the extremities normal    Imaging:    X-ray Knee Ortho Bilateral with Flexion  Narrative: EXAM: XR KNEE ORTHO BILAT WITH FLEXION    CLINICAL HISTORY: Bilateral knee pain.    No prior examination available for comparison.    Bilateral knee x-ray, 5  views    FINDINGS:    RIGHT knee: Negative for acute fracture or dislocation.  Medial lateral compartment joint spaces are well-preserved.  Small butterfly device medial compartment.  Mild patellofemoral joint space narrowing.  Vascular calcifications.  Otherwise soft tissues normal.  Questionable well healed proximal fibula fracture.    LEFT knee: Negative for acute fracture or dislocation.  Medial compartment well preserved with exuberant chondral calcinosis.  Lateral compartment well preserved with chondrocalcinosis.  Mild patellofemoral joint space narrowing.  Normal soft tissues.  Impression: Bilateral degenerative changes with chondrocalcinosis as detailed above.    Finalized on: 2/5/2025 9:02 AM By:  Eric Giang MD  [facility]# [id number]      2025-02-05 09:04:12.[address] [place] [address]      Relevant imaging results reviewed and interpreted by me, discussed with the patient and / or family today.  X-rays show some mild to moderate degenerative changes    Other Tests:     None    There are no Patient Instructions on file for this visit.  Provider Note/Medical Decision Making:     Patient presents like degenerative medial meniscus tear.  We talked about treatment options for this.  He would like to avoid surgery.  We will see how he does with corticosteroid injection.  He will follow-up should his pain recur.  I answered all of his questions today at length.    I discussed worrisome and red flag signs and symptoms with the patient. The patient expressed understanding and agreed to alert me immediately or to go to the emergency room if they experience any of these.   Treatment plan was developed with input from the patient/family, and they expressed understanding and agreement with the plan. All questions were answered today.         Jo Ann Garcia MD  Orthopaedic Surgery       Disclaimer: This note was prepared using a voice recognition system and is likely to have sound alike errors within the text.

## 2025-02-20 DIAGNOSIS — F41.8 DEPRESSION WITH ANXIETY: ICD-10-CM

## 2025-02-20 RX ORDER — CITALOPRAM 20 MG/1
20 TABLET, FILM COATED ORAL
Qty: 90 TABLET | Refills: 3 | Status: SHIPPED | OUTPATIENT
Start: 2025-02-20

## 2025-02-20 NOTE — TELEPHONE ENCOUNTER
Refill Decision Note   Mil Sood  is requesting a refill authorization.  Brief Assessment and Rationale for Refill:  Approve     Medication Therapy Plan:  last 2 orders were only for 30 days as there was a temporary insurance concern. Pt had recent OV and has been on this med for a while.      Pharmacist review requested: Yes   Comments:     Note composed:2:30 PM 02/20/2025

## 2025-02-20 NOTE — TELEPHONE ENCOUNTER
Refill Routing Note   Medication(s) are not appropriate for processing by [facility] for the following reason(s):        Drug-disease interactionDrug-Disease: citalopram and Hypertensive heart disease with heart failure     [facility] action(s):  Defer        Medication Therapy Plan: last 2 orders were only for 30 days as there was a temporary insurance concern. Pt had recent OV and has been on this med for a while.    Pharmacist review requested: Yes     Appointments  past 12m or future 3m with PCP    Date Provider   Last Visit   1/24/2025 Yumi Roberson MD   Next Visit   7/24/2025 Yumi Roberson MD   ED visits in past 90 days: 0        Note composed:2:18 PM 02/20/2025

## 2025-02-20 NOTE — TELEPHONE ENCOUNTER
No care due was identified.  [facility] Embedded Care Due Messages. Reference number: [id number].   2/20/2025 12:12:06 PM CST

## 2025-03-04 ENCOUNTER — TELEPHONE (OUTPATIENT)
Dept: CARDIOLOGY | Facility: CLINIC | Age: 70
End: 2025-03-04
Payer: MEDICARE

## 2025-03-04 NOTE — TELEPHONE ENCOUNTER
Faxed recent labs    ----- Message from Anuradha sent at 3/4/2025  3:37 PM CST -----  Contact: tyron Thomas [facility]  Type: Staff Message Who called: tyron Thomas retinaCall back number: [phone] for the call: need recent lab results before tomorrowAdditional information: fax - [phone]

## 2025-03-20 DIAGNOSIS — I10 ESSENTIAL HYPERTENSION: ICD-10-CM

## 2025-03-20 DIAGNOSIS — M47.812 CERVICAL SPONDYLOSIS: ICD-10-CM

## 2025-03-20 RX ORDER — TRAZODONE HYDROCHLORIDE 100 MG/1
100 TABLET ORAL NIGHTLY
Qty: 90 TABLET | Refills: 3 | Status: SHIPPED | OUTPATIENT
Start: 2025-03-20

## 2025-03-20 RX ORDER — GABAPENTIN 600 MG/1
1200 TABLET ORAL NIGHTLY
Qty: 180 TABLET | Refills: 0 | Status: SHIPPED | OUTPATIENT
Start: 2025-03-20

## 2025-03-20 RX ORDER — ISOSORBIDE MONONITRATE 60 MG/1
60 TABLET, EXTENDED RELEASE ORAL DAILY
Qty: 90 TABLET | Refills: 3 | Status: SHIPPED | OUTPATIENT
Start: 2025-03-20

## 2025-03-20 NOTE — TELEPHONE ENCOUNTER
No care due was identified.  [facility] Embedded Care Due Messages. Reference number: [id number].   3/20/2025 3:15:03 PM CDT

## 2025-03-20 NOTE — TELEPHONE ENCOUNTER
Refill Routing Note   Medication(s) are not appropriate for processing by [facility] for the following reason(s):        Outside of protocol    [facility] action(s):  Route  Approve             Appointments  past 12m or future 3m with PCP    Date Provider   Last Visit   1/24/2025 Yumi Roberson MD   Next Visit   7/24/2025 Yumi Roberson MD   ED visits in past 90 days: 0        Note composed:6:34 PM 03/20/2025

## 2025-04-03 ENCOUNTER — LAB VISIT (OUTPATIENT)
Dept: LAB | Facility: HOSPITAL | Age: 70
End: 2025-04-03
Attending: FAMILY MEDICINE
Payer: MEDICARE

## 2025-04-03 ENCOUNTER — OFFICE VISIT (OUTPATIENT)
Dept: FAMILY MEDICINE | Facility: CLINIC | Age: 70
End: 2025-04-03
Payer: MEDICARE

## 2025-04-03 VITALS
DIASTOLIC BLOOD PRESSURE: 90 MMHG | OXYGEN SATURATION: 96 % | HEIGHT: 69 IN | HEART RATE: 64 BPM | BODY MASS INDEX: 28.08 KG/M2 | WEIGHT: 189.63 LBS | SYSTOLIC BLOOD PRESSURE: 156 MMHG

## 2025-04-03 DIAGNOSIS — Z12.11 COLON CANCER SCREENING: ICD-10-CM

## 2025-04-03 DIAGNOSIS — R73.03 PREDIABETES: Primary | ICD-10-CM

## 2025-04-03 DIAGNOSIS — E87.5 HYPERKALEMIA: ICD-10-CM

## 2025-04-03 DIAGNOSIS — I10 PRIMARY HYPERTENSION: ICD-10-CM

## 2025-04-03 DIAGNOSIS — R73.03 PREDIABETES: ICD-10-CM

## 2025-04-03 DIAGNOSIS — I10 ESSENTIAL HYPERTENSION: ICD-10-CM

## 2025-04-03 LAB
ABSOLUTE EOSINOPHIL (OHS): 0.52 K/UL
ABSOLUTE MONOCYTE (OHS): 0.68 K/UL (ref 0.3–1)
ABSOLUTE NEUTROPHIL COUNT (OHS): 3.91 K/UL (ref 1.8–7.7)
ALBUMIN SERPL BCP-MCNC: 4 G/DL (ref 3.5–5.2)
ALP SERPL-CCNC: 56 UNIT/L (ref 40–150)
ALT SERPL W/O P-5'-P-CCNC: 15 UNIT/L (ref 10–44)
ANION GAP (OHS): 7 MMOL/L (ref 8–16)
AST SERPL-CCNC: 15 UNIT/L (ref 11–45)
BASOPHILS # BLD AUTO: 0.08 K/UL
BASOPHILS NFR BLD AUTO: 1.2 %
BILIRUB SERPL-MCNC: 0.4 MG/DL (ref 0.1–1)
BUN SERPL-MCNC: 21 MG/DL (ref 8–23)
CALCIUM SERPL-MCNC: 9.5 MG/DL (ref 8.7–10.5)
CHLORIDE SERPL-SCNC: 105 MMOL/L (ref 95–110)
CO2 SERPL-SCNC: 27 MMOL/L (ref 23–29)
CREAT SERPL-MCNC: 1.4 MG/DL (ref 0.5–1.4)
EAG (OHS): 114 MG/DL (ref 68–131)
ERYTHROCYTE [DISTWIDTH] IN BLOOD BY AUTOMATED COUNT: 13.6 % (ref 11.5–14.5)
GFR SERPLBLD CREATININE-BSD FMLA CKD-EPI: 54 ML/MIN/1.73/M2
GLUCOSE SERPL-MCNC: 93 MG/DL (ref 70–110)
HBA1C MFR BLD: 5.6 % (ref 4–5.6)
HCT VFR BLD AUTO: 47.4 % (ref 40–54)
HGB BLD-MCNC: 15.2 GM/DL (ref 14–18)
IMM GRANULOCYTES # BLD AUTO: 0.02 K/UL (ref 0–0.04)
IMM GRANULOCYTES NFR BLD AUTO: 0.3 % (ref 0–0.5)
LYMPHOCYTES # BLD AUTO: 1.4 K/UL (ref 1–4.8)
MCH RBC QN AUTO: 28.4 PG (ref 27–31)
MCHC RBC AUTO-ENTMCNC: 32.1 G/DL (ref 32–36)
MCV RBC AUTO: 88 FL (ref 82–98)
NUCLEATED RBC (/100WBC) (OHS): 0 /100 WBC
PLATELET # BLD AUTO: 272 K/UL (ref 150–450)
PMV BLD AUTO: 9.4 FL (ref 9.2–12.9)
POTASSIUM SERPL-SCNC: 5.1 MMOL/L (ref 3.5–5.1)
PROT SERPL-MCNC: 7.2 GM/DL (ref 6–8.4)
RBC # BLD AUTO: 5.36 M/UL (ref 4.6–6.2)
RELATIVE EOSINOPHIL (OHS): 7.9 %
RELATIVE LYMPHOCYTE (OHS): 21.2 % (ref 18–48)
RELATIVE MONOCYTE (OHS): 10.3 % (ref 4–15)
RELATIVE NEUTROPHIL (OHS): 59.1 % (ref 38–73)
SODIUM SERPL-SCNC: 139 MMOL/L (ref 136–145)
WBC # BLD AUTO: 6.61 K/UL (ref 3.9–12.7)

## 2025-04-03 PROCEDURE — 83036 HEMOGLOBIN GLYCOSYLATED A1C: CPT | Mod: HCNC

## 2025-04-03 PROCEDURE — 99999 PR PBB SHADOW E&M-EST. PATIENT-LVL IV: CPT | Mod: PBBFAC,HCNC,, | Performed by: FAMILY MEDICINE

## 2025-04-03 PROCEDURE — 36415 COLL VENOUS BLD VENIPUNCTURE: CPT | Mod: HCNC,PN

## 2025-04-03 PROCEDURE — 80053 COMPREHEN METABOLIC PANEL: CPT | Mod: HCNC

## 2025-04-03 PROCEDURE — 85025 COMPLETE CBC W/AUTO DIFF WBC: CPT | Mod: HCNC

## 2025-04-03 RX ORDER — METOPROLOL TARTRATE 25 MG/1
25 TABLET, FILM COATED ORAL 2 TIMES DAILY
Qty: 60 TABLET | Refills: 0 | Status: SHIPPED | OUTPATIENT
Start: 2025-04-03 | End: 2025-05-03

## 2025-04-03 RX ORDER — BENAZEPRIL HYDROCHLORIDE 40 MG/1
40 TABLET ORAL DAILY
Qty: 30 TABLET | Refills: 2 | Status: SHIPPED | OUTPATIENT
Start: 2025-04-03

## 2025-04-03 RX ORDER — HYDROCHLOROTHIAZIDE 12.5 MG/1
12.5 CAPSULE ORAL DAILY
Qty: 30 CAPSULE | Refills: 11 | Status: SHIPPED | OUTPATIENT
Start: 2025-04-03 | End: 2026-04-03

## 2025-04-03 RX ORDER — HYDRALAZINE HYDROCHLORIDE 25 MG/1
25 TABLET, FILM COATED ORAL 3 TIMES DAILY
Qty: 270 TABLET | Refills: 3 | Status: SHIPPED | OUTPATIENT
Start: 2025-04-03

## 2025-04-03 RX ORDER — ISOSORBIDE MONONITRATE 60 MG/1
60 TABLET, EXTENDED RELEASE ORAL DAILY
Qty: 90 TABLET | Refills: 3 | Status: SHIPPED | OUTPATIENT
Start: 2025-04-03

## 2025-04-03 NOTE — PROGRESS NOTES
Chief Complaint:    Chief Complaint   Patient presents with    Follow-up       History of Present Illness:    History of Present Illness    Patient presents today for follow up of neck pain. He reports neck pain with history of prior neck surgery several years ago. Pain is exacerbated at night and with prolonged periods of lying down. Home blood pressure readings, taken 2-3 times weekly by his wife, are typically around 125-130/80-85. He is prescribed Amlodipine 10mg, Benazepril 20mg, Hydralazine 25mg 3 times daily, Isosorbide Imdur 60mg daily, and Metoprolol 25mg twice daily for blood pressure management. He expresses uncertainty about medication compliance and is unable to identify all current medications, though acknowledges taking a 10mg medication. He reports increased walking activity, stating he is walking more than ever before. His diet includes nighttime consumption of cheese puffs and occasional [employer] chocolate. Last colonoscopy was performed in 2017 without complications.      ROS:  General: denies fever, denies chills, denies fatigue, denies weight gain, denies weight loss, denies loss of appetite  Eyes: denies vision changes, denies blurry vision, denies eye pain, denies eye discharge, admits twitch/tic, admits abnormal eye movement  ENT: denies ear pain, denies hearing loss, denies tinnitus, denies nasal congestion, denies sore throat  Cardiovascular: denies chest pain, denies palpitations, denies lower extremity edema  Respiratory: denies cough, denies shortness of breath, denies wheezing, denies sputum production  Endocrine: denies polyuria, denies polydipsia, denies heat intolerance, denies cold intolerance  Gastrointestinal: denies abdominal pain, denies heartburn, denies nausea, denies vomiting, denies diarrhea, denies constipation, denies blood in stool  Genitourinary: denies dysuria, denies urgency, denies frequency, denies hematuria, denies nocturia, denies incontinence  Heme & Lymphatic:  denies easy or excessive bleeding, denies easy bruising, denies swollen lymph nodes  Musculoskeletal: denies muscle pain, denies back pain, denies joint pain, denies joint swelling  Skin: denies rash, denies lesion, denies itching, denies skin texture changes, denies skin color changes  Neurological: denies headache, denies dizziness, denies numbness, denies tingling, denies seizure activity, denies speech difficulty, denies memory loss, denies confusion  Psychiatric: denies anxiety, denies depression, denies sleep difficulty  Neck: admits neck pain, admits pain with movement           Past Medical History:   Diagnosis Date    Anxiety     Cancer     spot on kidney and lung    Deep vein thrombosis     Diabetes mellitus     Borderline diet controlled    Diverticulitis     GERD (gastroesophageal reflux disease)     Hyperlipidemia     not on any meds currently    Hypertension     Lung mass     Mass of kidney        Social History:  Social History     Socioeconomic History    Marital status:    Tobacco Use    Smoking status: Former     Current packs/day: 0.00     Average packs/day: 1 pack/day for 20.0 years (20.0 ttl pk-yrs)     Types: Cigarettes     Start date: 1975     Quit date: 1995     Years since quittin.1     Passive exposure: Past    Smokeless tobacco: Never   Substance and Sexual Activity    Alcohol use: Never     Alcohol/week: 0.0 standard drinks of alcohol    Drug use: No    Sexual activity: Not Currently     Partners: Female     Birth control/protection: None     Social Drivers of Health     Financial Resource Strain: Medium Risk (2023)    Overall Financial Resource Strain (CARDIA)     Difficulty of Paying Living Expenses: Somewhat hard   Food Insecurity: No Food Insecurity (2023)    Hunger Vital Sign     Worried About Running Out of Food in the Last Year: Never true     Ran Out of Food in the Last Year: Never true   Transportation Needs: No Transportation Needs (2023)     "PRAPARE - Transportation     Lack of Transportation (Medical): No     Lack of Transportation (Non-Medical): No   Physical Activity: Insufficiently Active (9/5/2023)    Exercise Vital Sign     Days of Exercise per Week: 3 days     Minutes of Exercise per Session: 40 min   Stress: Stress Concern Present (9/5/2023)    [employer] - Occupational Stress Questionnaire     Feeling of Stress : Very much   Housing Stability: Low Risk  (9/5/2023)    Housing Stability Vital Sign     Unable to Pay for Housing in the Last Year: No     Number of Places Lived in the Last Year: 1     Unstable Housing in the Last Year: No       Family History:   family history includes Cancer in his sister; Hypertension in his father and mother; Melanoma in his father.    Health Maintenance   Topic Date Due    Shingles Vaccine (1 of 2) Never done    RSV Vaccine (Age 60+ and Pregnant patients) (1 - Risk 60-74 years 1-dose series) Never done    Influenza Vaccine (1) 09/01/2024    COVID-19 Vaccine (3 - 2024-25 season) 09/01/2024    Colorectal Cancer Screening  02/24/2025    Hemoglobin A1c (Prediabetes)  01/02/2026    Lipid Panel  01/02/2026    TETANUS VACCINE  04/26/2028    Hepatitis C Screening  Completed    Pneumococcal Vaccines (Age 50+)  Completed    Abdominal Aortic Aneurysm Screening  Completed       Exam:Physical     Vital Signs  Pulse: 64  SpO2: 96 %  BP: (!) 156/90  BP Location: Left arm  Patient Position: Sitting  Pain Score:   5  Pain Loc: Neck  Height and Weight  Height: 5' 9" (175.3 cm)  Weight: 86 kg (189 lb 9.5 oz)  BSA (Calculated - sq m): 2.05 sq meters  BMI (Calculated): 28  Weight in (lb) to have BMI = 25: 168.9]    Body mass index is 28 kg/m².    Physical Exam    General: Well-developed. Well-nourished. No acute distress.  Eyes: EOMI. Sclerae anicteric.  HENT: Normocephalic. Atraumatic. Nares patent. Moist oral mucosa.  Cardiovascular: Regular rate. Regular rhythm. No murmurs. No rubs. No gallops. Normal " S1, S2.  Respiratory: Normal respiratory effort. Clear to auscultation bilaterally. No rales. No rhonchi. No wheezing.  Musculoskeletal: No  obvious deformity.  Extremities: No lower extremity edema.  Neurological: Alert & oriented x3. No slurred speech. Normal gait.  Psychiatric: Normal mood. Normal affect. Good insight. Good judgment.  Skin: Warm. Dry. No rash.           Assessment:        ICD-10-CM ICD-9-CM   1. Prediabetes  R73.03 790.29   2. Primary hypertension  I10 401.9   3. Essential hypertension  I10 401.9   4. Hyperkalemia  E87.5 276.7   5. Colon cancer screening  Z12.11 V76.51         Plan:    Assessment & Plan    MEDICAL DECISION MAKING:  - BP slightly elevated.  - Evaluated risk for diabetes based on rising glucose levels.  - Considered potential need for medication adjustment if potassium levels remain elevated.  - Reviewed clearance for colonoscopy from Dr. Mcgill.    PATIENT EDUCATION:  - Educated on importance of controlling BP for long-term health.  - Explained relationship between diet, particularly junk food, and risk of developing diabetes.  - Discussed importance of regular exercise, specifically walking, for overall health.    ACTION ITEMS/LIFESTYLE:  - Patient to cut out junk food, sweets, candies, and sugary drinks to reduce risk of diabetes.  - Patient to implement a daily walking routine for at least 20 minutes.    MEDICATIONS:  - Started HCTZ 12.5 mg to help lower potassium levels.  - Continued all BP medications as prescribed.    ORDERS:  - Ordered labs to check potassium levels and other relevant markers.  - Ordered colonoscopy.         Mil was seen today for follow-up.    Diagnoses and all orders for this visit:    Prediabetes  -     Hemoglobin A1C; Future    Primary hypertension    Essential hypertension  -     benazepriL (LOTENSIN) 40 MG tablet; Take 1 tablet (40 mg total) by mouth once daily.  -     hydrALAZINE (APRESOLINE) 25 MG tablet; Take 1 tablet (25 mg total) by mouth 3  (three) times daily.  -     isosorbide mononitrate (IMDUR) 60 MG 24 hr tablet; Take 1 tablet (60 mg total) by mouth once daily.  -     metoprolol tartrate (LOPRESSOR) 25 MG tablet; Take 1 tablet (25 mg total) by mouth 2 (two) times daily.  -     CBC Auto Differential; Future  -     Comprehensive Metabolic Panel; Future    Hyperkalemia  -     hydroCHLOROthiazide (MICROZIDE) 12.5 mg capsule; Take 1 capsule (12.5 mg total) by mouth once daily.  -     Comprehensive Metabolic Panel; Future    Colon cancer screening  -     Ambulatory referral/consult to Endo Procedure ; Future        Follow up in about 1 week (around 4/10/2025).      Yumi Roberson MD

## 2025-04-06 ENCOUNTER — RESULTS FOLLOW-UP (OUTPATIENT)
Dept: FAMILY MEDICINE | Facility: CLINIC | Age: 70
End: 2025-04-06

## 2025-04-07 DIAGNOSIS — Z86.73 S/P STROKE DUE TO CEREBROVASCULAR DISEASE: ICD-10-CM

## 2025-04-07 NOTE — TELEPHONE ENCOUNTER
No care due was identified.  Health [facility] Embedded Care Due Messages. Reference number: [id number].   4/07/2025 4:50:06 PM CDT   This is a recent snapshot of the patient's [facility] medical record.  For current drug dose and complete information and questions, call [phone] or In Basket pool, [facility] home infusion ([id number])  CSN Number:  [id number]

## 2025-04-08 ENCOUNTER — PATIENT OUTREACH (OUTPATIENT)
Dept: ADMINISTRATIVE | Facility: HOSPITAL | Age: 70
End: 2025-04-08
Payer: MEDICARE

## 2025-04-08 RX ORDER — CLOPIDOGREL BISULFATE 75 MG/1
75 TABLET ORAL DAILY
Qty: 90 TABLET | Refills: 2 | Status: SHIPPED | OUTPATIENT
Start: 2025-04-08

## 2025-04-08 NOTE — PROGRESS NOTES
VBHM Score: 1     Uncontrolled BP    Influenza Vaccine  Shingles/Zoster Vaccine  RSV Vaccine          Health Maintenance Topic(s) Outreach Outcomes & Actions Taken:    Blood Pressure - Outreach Outcomes & Actions Taken  : patient has upcoming appointment with PCP on 4/10/25 for follow up

## 2025-04-08 NOTE — TELEPHONE ENCOUNTER
Refill Routing Note   Medication(s) are not appropriate for processing by [facility] for the following reason(s):        Clarification of medication (Rx) details    [facility] action(s):  Defer      Medication Therapy Plan: previous order states: Take 1 tablet (75 mg total) by mouth once. for 1 dose; sig adjusted and pended for provider review      Appointments  past 12m or future 3m with PCP    Date Provider   Last Visit   4/3/2025 Yumi Roberson MD   Next Visit   4/10/2025 Yumi Roberson MD   ED visits in past 90 days: 0        Note composed:10:04 AM 04/08/2025

## 2025-04-10 ENCOUNTER — LAB VISIT (OUTPATIENT)
Dept: LAB | Facility: HOSPITAL | Age: 70
End: 2025-04-10
Attending: FAMILY MEDICINE
Payer: MEDICARE

## 2025-04-10 ENCOUNTER — OFFICE VISIT (OUTPATIENT)
Dept: FAMILY MEDICINE | Facility: CLINIC | Age: 70
End: 2025-04-10
Payer: MEDICARE

## 2025-04-10 ENCOUNTER — RESULTS FOLLOW-UP (OUTPATIENT)
Dept: FAMILY MEDICINE | Facility: CLINIC | Age: 70
End: 2025-04-10

## 2025-04-10 VITALS
HEIGHT: 70 IN | WEIGHT: 193.56 LBS | BODY MASS INDEX: 27.71 KG/M2 | DIASTOLIC BLOOD PRESSURE: 82 MMHG | SYSTOLIC BLOOD PRESSURE: 134 MMHG | OXYGEN SATURATION: 98 % | HEART RATE: 88 BPM

## 2025-04-10 DIAGNOSIS — I10 ESSENTIAL HYPERTENSION: ICD-10-CM

## 2025-04-10 DIAGNOSIS — N28.9 RENAL INSUFFICIENCY: Primary | ICD-10-CM

## 2025-04-10 LAB
ANION GAP (OHS): 8 MMOL/L (ref 8–16)
BUN SERPL-MCNC: 19 MG/DL (ref 8–23)
CALCIUM SERPL-MCNC: 9 MG/DL (ref 8.7–10.5)
CHLORIDE SERPL-SCNC: 106 MMOL/L (ref 95–110)
CO2 SERPL-SCNC: 25 MMOL/L (ref 23–29)
CREAT SERPL-MCNC: 1.2 MG/DL (ref 0.5–1.4)
GFR SERPLBLD CREATININE-BSD FMLA CKD-EPI: >60 ML/MIN/1.73/M2
GLUCOSE SERPL-MCNC: 116 MG/DL (ref 70–110)
POTASSIUM SERPL-SCNC: 4.3 MMOL/L (ref 3.5–5.1)
SODIUM SERPL-SCNC: 139 MMOL/L (ref 136–145)

## 2025-04-10 PROCEDURE — 4010F ACE/ARB THERAPY RXD/TAKEN: CPT | Mod: HCNC,CPTII,S$GLB, | Performed by: FAMILY MEDICINE

## 2025-04-10 PROCEDURE — 3075F SYST BP GE 130 - 139MM HG: CPT | Mod: HCNC,CPTII,S$GLB, | Performed by: FAMILY MEDICINE

## 2025-04-10 PROCEDURE — 99214 OFFICE O/P EST MOD 30 MIN: CPT | Mod: HCNC,S$GLB,, | Performed by: FAMILY MEDICINE

## 2025-04-10 PROCEDURE — 3008F BODY MASS INDEX DOCD: CPT | Mod: HCNC,CPTII,S$GLB, | Performed by: FAMILY MEDICINE

## 2025-04-10 PROCEDURE — 3079F DIAST BP 80-89 MM HG: CPT | Mod: HCNC,CPTII,S$GLB, | Performed by: FAMILY MEDICINE

## 2025-04-10 PROCEDURE — 1159F MED LIST DOCD IN RCRD: CPT | Mod: HCNC,CPTII,S$GLB, | Performed by: FAMILY MEDICINE

## 2025-04-10 PROCEDURE — 99999 PR PBB SHADOW E&M-EST. PATIENT-LVL IV: CPT | Mod: PBBFAC,HCNC,, | Performed by: FAMILY MEDICINE

## 2025-04-10 PROCEDURE — 36415 COLL VENOUS BLD VENIPUNCTURE: CPT | Mod: HCNC,PN

## 2025-04-10 PROCEDURE — 80048 BASIC METABOLIC PNL TOTAL CA: CPT | Mod: HCNC

## 2025-04-10 PROCEDURE — 3288F FALL RISK ASSESSMENT DOCD: CPT | Mod: HCNC,CPTII,S$GLB, | Performed by: FAMILY MEDICINE

## 2025-04-10 PROCEDURE — 1126F AMNT PAIN NOTED NONE PRSNT: CPT | Mod: HCNC,CPTII,S$GLB, | Performed by: FAMILY MEDICINE

## 2025-04-10 PROCEDURE — 3044F HG A1C LEVEL LT 7.0%: CPT | Mod: HCNC,CPTII,S$GLB, | Performed by: FAMILY MEDICINE

## 2025-04-10 PROCEDURE — 1101F PT FALLS ASSESS-DOCD LE1/YR: CPT | Mod: HCNC,CPTII,S$GLB, | Performed by: FAMILY MEDICINE

## 2025-04-10 RX ORDER — HYDRALAZINE HYDROCHLORIDE 25 MG/1
25 TABLET, FILM COATED ORAL 3 TIMES DAILY
Qty: 270 TABLET | Refills: 3 | Status: SHIPPED | OUTPATIENT
Start: 2025-04-10

## 2025-04-10 NOTE — PROGRESS NOTES
Chief Complaint:    Chief Complaint   Patient presents with    Follow-up       History of Present Illness:    History of Present Illness    Patient presents today for follow up He is not taking hydralazine 3 times daily as prescribed due to difficulty locating medications after moving to a new residence.  He has started hydrochlorothiazide 12.5 mg.  He takes 2-3 Tylenol tablets at night for neck pain management. He reports improved vision in the affected eye and can now read numbers, though he needs to hold them closer for clarity.      ROS:  General: denies fever, denies chills, denies fatigue, denies weight gain, denies weight loss, denies loss of appetite  Eyes: denies vision changes, admits blurry vision, denies eye pain, denies eye discharge  ENT: denies ear pain, denies hearing loss, denies tinnitus, denies nasal congestion, denies sore throat  Cardiovascular: denies chest pain, denies palpitations, denies lower extremity edema  Respiratory: denies cough, denies shortness of breath, denies wheezing, denies sputum production  Endocrine: denies polyuria, denies polydipsia, denies heat intolerance, denies cold intolerance  Gastrointestinal: denies abdominal pain, denies heartburn, denies nausea, denies vomiting, denies diarrhea, denies constipation, denies blood in stool  Genitourinary: denies dysuria, denies urgency, denies frequency, denies hematuria, denies nocturia, denies incontinence  Heme & Lymphatic: denies easy or excessive bleeding, denies easy bruising, denies swollen lymph nodes  Musculoskeletal: denies muscle pain, denies back pain, denies joint pain, denies joint swelling, admits nightime pain, admits neck pain  Skin: denies rash, denies lesion, denies itching, denies skin texture changes, denies skin color changes  Neurological: denies headache, denies dizziness, denies numbness, denies tingling, denies seizure activity, denies speech difficulty, denies memory loss, denies confusion  Psychiatric:  denies anxiety, denies depression, denies sleep difficulty           Past Medical History:   Diagnosis Date    Anxiety     Cancer     spot on kidney and lung    Deep vein thrombosis     Diabetes mellitus     Borderline diet controlled    Diverticulitis     GERD (gastroesophageal reflux disease)     Hyperlipidemia     not on any meds currently    Hypertension     Lung mass     Mass of kidney        Social History:  Social History     Socioeconomic History    Marital status:    Tobacco Use    Smoking status: Former     Current packs/day: 0.00     Average packs/day: 1 pack/day for 20.0 years (20.0 ttl pk-yrs)     Types: Cigarettes     Start date: 1975     Quit date: 1995     Years since quittin.2     Passive exposure: Past    Smokeless tobacco: Never   Substance and Sexual Activity    Alcohol use: Never     Alcohol/week: 0.0 standard drinks of alcohol    Drug use: No    Sexual activity: Not Currently     Partners: Female     Birth control/protection: None     Social Drivers of Health     Financial Resource Strain: Medium Risk (2023)    Overall Financial Resource Strain (CARDIA)     Difficulty of Paying Living Expenses: Somewhat hard   Food Insecurity: No Food Insecurity (2023)    Hunger Vital Sign     Worried About Running Out of Food in the Last Year: Never true     Ran Out of Food in the Last Year: Never true   Transportation Needs: No Transportation Needs (2023)    PRAPARE - Transportation     Lack of Transportation (Medical): No     Lack of Transportation (Non-Medical): No   Physical Activity: Insufficiently Active (2023)    Exercise Vital Sign     Days of Exercise per Week: 3 days     Minutes of Exercise per Session: 40 min   Stress: Stress Concern Present (2023)    [employer] - Occupational Stress Questionnaire     Feeling of Stress : Very much   Housing Stability: Low Risk  (2023)    Housing Stability Vital Sign     Unable to Pay for  "Housing in the Last Year: No     Number of Places Lived in the Last Year: 1     Unstable Housing in the Last Year: No       Family History:   family history includes Cancer in his sister; Hypertension in his father and mother; Melanoma in his father.    Health Maintenance   Topic Date Due    Shingles Vaccine (1 of 2) Never done    RSV Vaccine (Age 60+ and Pregnant patients) (1 - Risk 60-74 years 1-dose series) Never done    Influenza Vaccine (1) 09/01/2024    COVID-19 Vaccine (3 - 2024-25 season) 09/01/2024    Colorectal Cancer Screening  02/24/2025    Lipid Panel  01/02/2026    Hemoglobin A1c (Prediabetes)  04/03/2026    TETANUS VACCINE  04/26/2028    Hepatitis C Screening  Completed    Pneumococcal Vaccines (Age 50+)  Completed    Abdominal Aortic Aneurysm Screening  Completed       Exam:Physical     Vital Signs  Pulse: 88  SpO2: 98 %  BP: 134/82  Pain Score: 0-No pain  Height and Weight  Height: 5' 10" (177.8 cm)  Weight: 87.8 kg (193 lb 9 oz)  BSA (Calculated - sq m): 2.08 sq meters  BMI (Calculated): 27.8  Weight in (lb) to have BMI = 25: 173.9]    Body mass index is 27.77 kg/m².    Physical Exam    General: Well-developed. Well-nourished. No acute distress.  Eyes: EOMI. Sclerae anicteric.  HENT: Normocephalic. Atraumatic. Nares patent. Moist oral mucosa.  Cardiovascular: Regular rate. Regular rhythm. No murmurs. No rubs. No gallops. Normal S1, S2.  Respiratory: Normal respiratory effort. Clear to auscultation bilaterally. No rales. No rhonchi. No wheezing.  Musculoskeletal: No  obvious deformity.  Extremities: No lower extremity edema.  Neurological: Alert & oriented x3. No slurred speech. Normal gait.  Psychiatric: Normal mood. Normal affect. Good insight. Good judgment.  Skin: Warm. Dry. No rash.           Assessment:        ICD-10-CM ICD-9-CM   1. Renal insufficiency  N28.9 593.9   2. Essential hypertension  I10 401.9         Plan:    Assessment & Plan    MEDICAL DECISION MAKING:  - Reviewed BP medications " and adherence.  - Assessed renal function, noting slight abnormality previously.  - Evaluated liver function, found to be normal, addressing confusion between liver and kidney issues.  - Considered impact of HCTZ on potassium and renal function.  - Observed improvement in some lab values, though some remain high or low.  - stay away from NSAIDs    PATIENT EDUCATION:  - Explained difference between Tylenol (acetaminophen) and NSAIDs (ibuprofen, Advil, Aleve) in relation to renal function.    ACTION ITEMS/LIFESTYLE:  - Patient to continue drinking enough water.    MEDICATIONS:  - Started hydralazine, to be taken 3 times daily, noting confusion about prescription.  - Advised against use of NSAIDs due to kidney concerns.  - Continued HCTZ and other BP medications.    ORDERS:  - Ordered blood test to check potassium and renal function.    FOLLOW UP:  - Follow up at  for blood test.         Mil was seen today for follow-up.    Diagnoses and all orders for this visit:    Renal insufficiency    Essential hypertension  -     hydrALAZINE (APRESOLINE) 25 MG tablet; Take 1 tablet (25 mg total) by mouth 3 (three) times daily.  -     Basic Metabolic Panel; Future        Follow up in about 6 months (around 10/10/2025), or if symptoms worsen or fail to improve.      Yumi Roberson MD

## 2025-05-06 ENCOUNTER — E-CONSULT (OUTPATIENT)
Dept: CARDIOLOGY | Facility: HOSPITAL | Age: 70
End: 2025-05-06
Payer: MEDICARE

## 2025-05-06 ENCOUNTER — HOSPITAL ENCOUNTER (OUTPATIENT)
Dept: PREADMISSION TESTING | Facility: HOSPITAL | Age: 70
Discharge: HOME | End: 2025-05-06
Attending: COLON & RECTAL SURGERY
Payer: MEDICARE

## 2025-05-06 DIAGNOSIS — Z01.810 PREOP CARDIOVASCULAR EXAM: Primary | ICD-10-CM

## 2025-05-06 DIAGNOSIS — Z12.11 COLON CANCER SCREENING: Primary | ICD-10-CM

## 2025-05-06 RX ORDER — SOD SULF/POT CHLORIDE/MAG SULF 1.479 G
12 TABLET ORAL DAILY
Qty: 24 TABLET | Refills: 0 | Status: SHIPPED | OUTPATIENT
Start: 2025-05-06

## 2025-05-06 NOTE — CONSULTS
[facility] CARDIOLOGY  Response for E-Consult     Patient Name: Mil Sood  MRN: [id number]  Primary Care Provider: Yumi Roberson MD   Requesting Provider: Fabi Chapman NP  E-Consult to General Cardiology  Consult performed by: Yasmani Chairez MD  Consult ordered by: Fabi Chapman NP          E consult for preop clearance of colonoscopy  The chart reviewed.  PMH CAD s/p cabg HTN HLD  03/25 EKG reviewed by myself today reveals NSR nonspecific STT change    Plan  Elevated periop risk of CV events for non-high risk procedure.  Ok to proceed the scheduled procedure without further cardiac study.  OK to hold Plavix 5 days before the procedure and resume postop once hemodynamically stable        Total time of Consultation: 10 minute    I did not speak to the requesting provider verbally about this.     *This eConsult is based on the clinical data available to me and is furnished without benefit of a physical examination. The eConsult will need to be interpreted in light of any clinical issues or changes in patient status not available to me at the time of filing this eConsults. Significant changes in patient condition or level of acuity should result in immediate formal consultation and reevaluation. Please alert me if you have further questions.    Thank you for this eConsult referral.     Yasmani Chairez MD  [facility] CARDIOLOGY

## 2025-05-09 DIAGNOSIS — I10 ESSENTIAL HYPERTENSION: ICD-10-CM

## 2025-05-09 RX ORDER — SUCRALFATE 1 G/1
TABLET ORAL
Qty: 120 TABLET | Refills: 3 | Status: SHIPPED | OUTPATIENT
Start: 2025-05-09

## 2025-05-09 RX ORDER — METOPROLOL TARTRATE 25 MG/1
25 TABLET, FILM COATED ORAL 2 TIMES DAILY
Qty: 180 TABLET | Refills: 3 | Status: SHIPPED | OUTPATIENT
Start: 2025-05-09

## 2025-05-09 NOTE — TELEPHONE ENCOUNTER
Refill Routing Note   Medication(s) are not appropriate for processing by [facility] for the following reason(s):        Outside of protocol    [facility] action(s):  Approve  Route             Appointments  past 12m or future 3m with PCP    Date Provider   Last Visit   4/10/2025 Yumi Roberson MD   Next Visit   7/24/2025 Yumi Roberson MD   ED visits in past 90 days: 0        Note composed:1:14 PM 05/09/2025

## 2025-05-26 DIAGNOSIS — F40.9 INSOMNIA DUE TO ANXIETY AND FEAR: ICD-10-CM

## 2025-05-26 DIAGNOSIS — F51.05 INSOMNIA DUE TO ANXIETY AND FEAR: ICD-10-CM

## 2025-05-26 RX ORDER — ZOLPIDEM TARTRATE 10 MG/1
10 TABLET ORAL NIGHTLY
Qty: 30 TABLET | Refills: 5 | Status: SHIPPED | OUTPATIENT
Start: 2025-05-26 | End: 2025-06-25

## 2025-05-26 NOTE — TELEPHONE ENCOUNTER
No care due was identified.  Health [facility] Embedded Care Due Messages. Reference number: [id number].   5/26/2025 10:39:42 AM CDT

## 2025-05-30 ENCOUNTER — ANESTHESIA (OUTPATIENT)
Dept: ENDOSCOPY | Facility: HOSPITAL | Age: 70
End: 2025-05-30
Payer: MEDICARE

## 2025-05-30 ENCOUNTER — ANESTHESIA EVENT (OUTPATIENT)
Dept: ENDOSCOPY | Facility: HOSPITAL | Age: 70
End: 2025-05-30
Payer: MEDICARE

## 2025-05-30 ENCOUNTER — HOSPITAL ENCOUNTER (OUTPATIENT)
Dept: ENDOSCOPY | Facility: HOSPITAL | Age: 70
Discharge: HOME | End: 2025-05-30
Attending: FAMILY MEDICINE | Admitting: COLON & RECTAL SURGERY
Payer: MEDICARE

## 2025-05-30 VITALS
WEIGHT: 185 LBS | DIASTOLIC BLOOD PRESSURE: 72 MMHG | OXYGEN SATURATION: 98 % | RESPIRATION RATE: 16 BRPM | HEART RATE: 68 BPM | TEMPERATURE: 98 F | HEIGHT: 70 IN | SYSTOLIC BLOOD PRESSURE: 137 MMHG | BODY MASS INDEX: 26.48 KG/M2

## 2025-05-30 DIAGNOSIS — Z12.11 COLON CANCER SCREENING: Primary | ICD-10-CM

## 2025-05-30 DIAGNOSIS — Z12.11 COLON CANCER SCREENING: ICD-10-CM

## 2025-05-30 PROCEDURE — 37000009 HC ANESTHESIA EA ADD 15 MINS: Mod: HCNC

## 2025-05-30 PROCEDURE — G0121 COLON CA SCRN NOT HI RSK IND: HCPCS | Mod: HCNC | Performed by: COLON & RECTAL SURGERY

## 2025-05-30 PROCEDURE — 37000008 HC ANESTHESIA 1ST 15 MINUTES: Mod: HCNC

## 2025-05-30 PROCEDURE — 63600175 PHARM REV CODE 636 W HCPCS: Mod: HCNC | Performed by: NURSE ANESTHETIST, CERTIFIED REGISTERED

## 2025-05-30 RX ORDER — PROPOFOL 10 MG/ML
VIAL (ML) INTRAVENOUS
Status: DISCONTINUED | OUTPATIENT
Start: 2025-05-30 | End: 2025-05-30

## 2025-05-30 RX ADMIN — PROPOFOL 30 MG: 10 INJECTION, EMULSION INTRAVENOUS at 09:05

## 2025-05-30 RX ADMIN — PROPOFOL 70 MG: 10 INJECTION, EMULSION INTRAVENOUS at 09:05

## 2025-05-30 NOTE — ANESTHESIA PREPROCEDURE EVALUATION
05/30/2025  Mil Sood is a 70 y.o., male.      Pre-op Assessment    I have reviewed the Patient Summary Reports.    I have reviewed the NPO Status.      Review of Systems  Anesthesia Hx:  No problems with previous Anesthesia                Social:  Former Smoker       Hematology/Oncology:                   Hematology Comments: Hx DVT                     Cardiovascular:     Hypertension   CAD   CABG/stent (Hx of CABG x4 in )        hyperlipidemia      Elevated periop risk of CV events for non-high risk procedure.  Ok to proceed the scheduled procedure without further cardiac study.  OK to hold Plavix 5 days before the procedure and resume postop once hemodynamically stable   Patient on beta blockers                          Pulmonary:   COPD                     Renal/:  Chronic Renal Disease  BPH Partial nephrectomy             Hepatic/GI:  Bowel Prep.   GERD                Musculoskeletal:  Arthritis               Endocrine:  Diabetes           Psych:  Psychiatric History anxiety depression                Physical Exam  General: Well nourished, Cooperative, Alert and Oriented    Airway:  Mallampati: II   Mouth Opening: Normal  TM Distance: Normal  Tongue: Normal  Neck ROM: Normal ROM    Dental:  Edentulous        Anesthesia Plan  Type of Anesthesia, risks & benefits discussed:    Anesthesia Type: MAC, Gen Natural Airway  Intra-op Monitoring Plan: Standard ASA Monitors  Post Op Pain Control Plan: IV/PO Opioids PRN  Induction:  IV  Informed Consent: Informed consent signed with the Patient and all parties understand the risks and agree with anesthesia plan.  All questions answered.   ASA Score: 3  Day of Surgery Review of History & Physical: H&P Update referred to the surgeon/provider.I have interviewed and examined the patient. I have reviewed the patient's H&P dated: There are no significant  changes.     Ready For Surgery From Anesthesia Perspective.     .    Summary  The left ventricle is normal in size with concentric remodeling and normal systolic function.  Normal left ventricular diastolic function.  The estimated PA systolic pressure is 40 mmHg.  Normal right ventricular size with normal right ventricular systolic function.  Normal central venous pressure (3 mmHg).  The estimated ejection fraction is 60%.  Moderate tricuspid regurgitation.  Mild-to-moderate mitral regurgitation.  Mild aortic regurgitation.    Interpretation Summary      The gated perfusion images showed an ejection fraction of 68% at rest. The gated perfusion images showed an ejection fraction of 78% post stress.    There is normal wall motion at rest and post stress.    LV cavity size is normal at rest and normal at stress.    The ECG portion of the study is negative for ischemia.    The patient reported no chest pain during the stress test.    Vent. Rate :  72 BPM     Atrial Rate :  72 BPM      P-R Int : 164 ms          QRS Dur :  80 ms       QT Int : 364 ms       P-R-T Axes :  17  32  32 degrees     QTcB Int : 398 ms     Normal sinus rhythm   Possible reversal:   Anteroseptal infarct ,age undetermined   Abnormal ECG   When compared with ECG of 22-Dec-2023 20:53,   Anteroseptal infarct is now Present  may be secondary to lead placement   QT has shortened   Confirmed by Indra Moreno ([id number]) on 1/13/2025 9:42:09 AM

## 2025-05-30 NOTE — TRANSFER OF CARE
"Anesthesia Transfer of Care Note    Patient: Mil Sood    Procedure(s) Performed: * No procedures listed *    Patient location: GI    Anesthesia Type: MAC    Transport from OR: Transported from OR on room air with adequate spontaneous ventilation    Post pain: adequate analgesia    Post assessment: no apparent anesthetic complications    Post vital signs: stable    Level of consciousness: responds to stimulation    Nausea/Vomiting: no nausea/vomiting    Complications: none    Transfer of care protocol was followed      Last vitals: Visit Vitals  BP (!) 195/101 (BP Location: Left arm, Patient Position: Lying)   Pulse (!) 57   Temp 36.8 °C (98.2 °F)   Resp 20   Ht 5' 10" (1.778 m)   Wt 83.9 kg (185 lb)   SpO2 99%   BMI 26.54 kg/m²     "

## 2025-05-30 NOTE — ANESTHESIA POSTPROCEDURE EVALUATION
Anesthesia Post Evaluation    Patient: Mil Sood    Procedure(s) Performed: * No procedures listed *    Final Anesthesia Type: MAC      Patient location during evaluation: GI PACU  Patient participation: Yes- Able to Participate  Level of consciousness: awake and alert  Post-procedure vital signs: reviewed and stable  Pain management: adequate  Airway patency: patent    PONV status at discharge: No PONV  Anesthetic complications: no      Cardiovascular status: hemodynamically stable  Respiratory status: unassisted, room air and spontaneous ventilation  Hydration status: euvolemic  Follow-up not needed.              Vitals Value Taken Time   /72 05/30/25 10:01   Temp 36.4 °C (97.5 °F) 05/30/25 09:45   Pulse 68 05/30/25 10:01   Resp 16 05/30/25 10:01   SpO2 98 % 05/30/25 10:01         Event Time   Out of Recovery 10:03:09         Pain/Doug Score: Doug Score: 10 (5/30/2025  9:50 AM)

## 2025-05-30 NOTE — H&P
[facility] ([facility])  Colon and Rectal Surgery  History & Physical    Patient Name: Mil Sood  MRN: [id number]  Admission Date: 5/30/2025  Attending Physician: Sánchez Bishop MD  Primary Care Provider: Yumi Roberson MD    Patient information was obtained from patient and medical records.    Subjective:     Chief Complaint/Reason for Admission: Here for Colonoscopy    History of Present Illness:  Patient is a 70 y.o. male presents for colonoscopy. Last cscope in 2017 with multiple adenomas removed. No current hematochezia or melena. No fam hx of CRC.    Current Outpatient Medications on File Prior to Encounter   Medication Sig    amLODIPine (NORVASC) 10 MG tablet Take 1 tablet (10 mg total) by mouth once daily.    benazepriL (LOTENSIN) 40 MG tablet Take 1 tablet (40 mg total) by mouth once daily.    citalopram (CELEXA) 20 MG tablet TAKE 1 TABLET BY MOUTH ONCE DAILY.    clonazePAM (KLONOPIN) 1 MG tablet Take 1 tablet (1 mg total) by mouth nightly as needed for Anxiety.    hydrALAZINE (APRESOLINE) 25 MG tablet Take 1 tablet (25 mg total) by mouth 3 (three) times daily.    hydroCHLOROthiazide (MICROZIDE) 12.5 mg capsule Take 1 capsule (12.5 mg total) by mouth once daily.    isosorbide mononitrate (IMDUR) 60 MG 24 hr tablet Take 1 tablet (60 mg total) by mouth once daily.    metoprolol tartrate (LOPRESSOR) 25 MG tablet TAKE 1 TABLET BY MOUTH TWICE A DAY.    pantoprazole (PROTONIX) 40 MG tablet Take 1 tablet (40 mg total) by mouth once daily.    sod sulf-pot chloride-mag sulf (SUTAB) 1.479-0.188- 0.225 gram tablet Take 12 tablets by mouth once daily. Take according to package instructions with indicated amount of water.    sucralfate (CARAFATE) 1 gram tablet TAKE 1 TABLET (1 GM TOTAL) BY MOUTH FOUR (4) TIMES DAILY.    tamsulosin (FLOMAX) 0.4 mg Cap TAKE 1 CAPSULE (0.4 MG TOTAL) BY MOUTH EVERY EVENING.    traMADoL (ULTRAM) 50 mg tablet Take 1 tablet (50 mg total) by mouth every 8 (eight) hours as needed for  Pain.    traZODone (DESYREL) 100 MG tablet TAKE 1 TABLET (100 MG TOTAL) BY MOUTH EVERY EVENING.    atorvastatin (LIPITOR) 40 MG tablet Take 1 tablet (40 mg total) by mouth every evening.    clopidogreL (PLAVIX) 75 mg tablet Take 1 tablet (75 mg total) by mouth once daily.    gabapentin (NEURONTIN) 600 MG tablet TAKE 2 TABLETS BY MOUTH EVERY EVENING.    zolpidem (AMBIEN) 10 mg Tab TAKE 1 TABLET (10 MG TOTAL) BY MOUTH EVERY EVENING.     No current facility-administered medications on file prior to encounter.       Review of patient's allergies indicates:   Allergen Reactions    Rosuvastatin Other (See Comments)     Joint pain   Joint and bone pain       Past Medical History:   Diagnosis Date    Anxiety     Cancer     spot on kidney and lung    Deep vein thrombosis     Diabetes mellitus     Borderline diet controlled    Diverticulitis     GERD (gastroesophageal reflux disease)     Hyperlipidemia     not on any meds currently    Hypertension     Lung mass     Mass of kidney      Past Surgical History:   Procedure Laterality Date    ARTHROPLASTY OF JOINT OF FINGER Left 11/18/2022    Procedure: ARTHROPLASTY, FINGER;  Surgeon: Kane Negrete MD;  Location: [facility];  Service: Orthopedics;  Laterality: Left;  left thumb basal joint arthroplasty  Arthrex  Podiatry extras    CARPAL TUNNEL RELEASE Right 12/1/2023    Procedure: RELEASE, CARPAL TUNNEL;  Surgeon: Kane Negrete MD;  Location: [facility] OR;  Service: Orthopedics;  Laterality: Right;    CHOLECYSTECTOMY      COLONOSCOPY      COLONOSCOPY N/A 4/7/2017    Procedure: COLONOSCOPY;  Surgeon: Amarilys Bravo MD;  Location: [facility];  Service: Endoscopy;  Laterality: N/A;    DIAGNOSTIC LAPAROSCOPY N/A 5/4/2021    Procedure: LAPAROSCOPY, DIAGNOSTIC;  Surgeon: Shaji Jauregui MD;  Location: [facility] OR;  Service: General;  Laterality: N/A;    ESOPHAGOGASTRODUODENOSCOPY      ESOPHAGOGASTRODUODENOSCOPY N/A 2/13/2020    Procedure: ESOPHAGOGASTRODUODENOSCOPY (EGD);   Surgeon: Shaji Aquino III, MD;  Location: [facility] ENDO;  Service: Endoscopy;  Laterality: N/A;    ESOPHAGOGASTRODUODENOSCOPY N/A 5/3/2023    Procedure: EGD (ESOPHAGOGASTRODUODENOSCOPY);  Surgeon: Jenelle Jensen MD;  Location: [facility] ENDO;  Service: Endoscopy;  Laterality: N/A;    HERNIA REPAIR Bilateral     groin    LEFT HEART CATHETERIZATION Left 2022    Procedure: CATHETERIZATION, HEART, LEFT;  Surgeon: Romeo Godfrey MD;  Location: [facility] CATH LAB;  Service: Cardiology;  Laterality: Left;    NECK SURGERY      OPEN REDUCTION AND INTERNAL FIXATION (ORIF) OF INJURY OF ANKLE Left 8/10/2022    Procedure: ORIF, ANKLE;  Surgeon: Howard Mata MD;  Location: [facility] OR;  Service: Orthopedics;  Laterality: Left;    PARTIAL NEPHRECTOMY      left    RENAL BIOPSY      ROBOT-ASSISTED REPAIR OF UMBILICAL HERNIA USING DA NEL XI N/A 2021    Procedure: XI ROBOTIC REPAIR, HERNIA, UMBILICAL;  Surgeon: Shaji Jauregui MD;  Location: [facility] OR;  Service: General;  Laterality: N/A;  converted to open    TONSILLECTOMY      TRIGGER FINGER RELEASE Right     long finger     Family History       Problem Relation (Age of Onset)    Cancer Sister    Hypertension Mother, Father    Melanoma Father          Tobacco Use    Smoking status: Former     Current packs/day: 0.00     Average packs/day: 1 pack/day for 20.0 years (20.0 ttl pk-yrs)     Types: Cigarettes     Start date: 1975     Quit date: 1995     Years since quittin.3     Passive exposure: Past    Smokeless tobacco: Never   Substance and Sexual Activity    Alcohol use: Never     Alcohol/week: 0.0 standard drinks of alcohol    Drug use: No    Sexual activity: Not Currently     Partners: Female     Birth control/protection: None     Review of Systems   Constitutional:  Negative for activity change, appetite change, chills, fatigue, fever and unexpected weight change.   HENT:  Negative for congestion, ear pain, sore throat and trouble swallowing.    Eyes:  Negative  for pain, redness and itching.   Respiratory:  Negative for cough, shortness of breath and wheezing.    Cardiovascular:  Negative for chest pain, palpitations and leg swelling.   Gastrointestinal:  Negative for abdominal distention, abdominal pain, anal bleeding, blood in stool, constipation, diarrhea, nausea, rectal pain and vomiting.   Endocrine: Negative for cold intolerance, heat intolerance and polyuria.   Genitourinary:  Negative for dysuria, flank pain, frequency and hematuria.   Musculoskeletal:  Negative for gait problem, joint swelling and neck pain.   Skin:  Negative for color change, rash and wound.   Allergic/Immunologic: Negative for environmental allergies and immunocompromised state.   Neurological:  Negative for dizziness, speech difficulty, weakness and numbness.   Psychiatric/Behavioral:  Negative for agitation, confusion and hallucinations.      Objective:     Vital Signs (Most Recent):  Temp: 98.2 °F (36.8 °C) (05/30/25 0839)  Pulse: (!) 57 (05/30/25 0839)  Resp: 20 (05/30/25 0839)  BP: (!) 195/101 (05/30/25 0839)  SpO2: 99 % (05/30/25 0839) Vital Signs (24h Range):  Temp:  [98.2 °F (36.8 °C)] 98.2 °F (36.8 °C)  Pulse:  [57] 57  Resp:  [20] 20  SpO2:  [99 %] 99 %  BP: (195)/(101) 195/101     Weight: 83.9 kg (185 lb)  Body mass index is 26.54 kg/m².    Physical Exam  Constitutional:       Appearance: He is well-developed.   HENT:      Head: Normocephalic and atraumatic.   Eyes:      Conjunctiva/sclera: Conjunctivae normal.   Neck:      Thyroid: No thyromegaly.   Cardiovascular:      Rate and Rhythm: Bradycardia present.   Pulmonary:      Effort: Pulmonary effort is normal. No respiratory distress.   Abdominal:      General: There is no distension.      Palpations: Abdomen is soft. There is no mass.      Tenderness: There is no abdominal tenderness.   Musculoskeletal:         General: No tenderness. Normal range of motion.      Cervical back: Normal range of motion.   Skin:     General: Skin is  warm and dry.      Capillary Refill: Capillary refill takes less than 2 seconds.      Findings: No rash.   Neurological:      Mental Status: He is alert and oriented to person, place, and time.       Assessment/Plan:     Patient is a 70 y.o. male who presents for colonoscopy     - Ok to proceed to endoscopy suite for colonoscopy  - Consent obtained. All risks, benefits and alternatives fully explained to patient, including but not limited to bleeding, infection, perforation, and missed polyps. All questions appropriately answered to patient's satisfaction. Consent signed and placed on chart.    There are no hospital problems to display for this patient.    VTE Risk Mitigation (From admission, onward)      None            Sánchez Bishop MD  Colon and Rectal Surgery  [facility] ([facility])

## 2025-05-30 NOTE — BRIEF OP NOTE
[facility] ([facility])  Brief Operative Note     SUMMARY     Surgery Date: 5/30/2025     Surgeon: Sánchez Bishop MD    Pre-op Diagnosis:  Screen for Colon Cancer    Post-op Diagnosis:  Screen for Colon Cancer    Procedure: Colonoscopy    Anesthesia: MAC    Description of the findings of the procedure: poor prep    Estimated Blood Loss: minimal         Specimens:   Specimen (24h ago, onward)      None

## 2025-06-02 VITALS
HEART RATE: 68 BPM | DIASTOLIC BLOOD PRESSURE: 72 MMHG | SYSTOLIC BLOOD PRESSURE: 137 MMHG | BODY MASS INDEX: 26.48 KG/M2 | OXYGEN SATURATION: 98 % | RESPIRATION RATE: 16 BRPM | HEIGHT: 70 IN | WEIGHT: 185 LBS | TEMPERATURE: 98 F

## 2025-06-10 DIAGNOSIS — M47.812 CERVICAL SPONDYLOSIS: ICD-10-CM

## 2025-06-10 RX ORDER — GABAPENTIN 600 MG/1
TABLET ORAL
Qty: 180 TABLET | Refills: 0 | Status: SHIPPED | OUTPATIENT
Start: 2025-06-10

## 2025-06-10 NOTE — TELEPHONE ENCOUNTER
No care due was identified.  [facility] Embedded Care Due Messages. Reference number: [id number].   6/10/2025 12:12:01 PM CDT

## 2025-06-23 DIAGNOSIS — I10 ESSENTIAL HYPERTENSION: ICD-10-CM

## 2025-06-23 RX ORDER — BENAZEPRIL HYDROCHLORIDE 40 MG/1
40 TABLET ORAL
Qty: 90 TABLET | Refills: 0 | Status: SHIPPED | OUTPATIENT
Start: 2025-06-23

## 2025-06-23 NOTE — TELEPHONE ENCOUNTER
No care due was identified.  [facility] Embedded Care Due Messages. Reference number: [id number].   6/23/2025 3:00:42 PM CDT

## 2025-06-24 NOTE — TELEPHONE ENCOUNTER
Refill Routing Note   Medication(s) are not appropriate for processing by [facility] for the following reason(s):        New or recently adjusted medication    [facility] action(s):  Defer             Appointments  past 12m or future 3m with PCP    Date Provider   Last Visit   4/10/2025 Yumi Roberson MD   Next Visit   7/24/2025 Yumi Roberson MD   ED visits in past 90 days: 0        Note composed:7:41 PM 06/23/2025

## 2025-07-24 ENCOUNTER — OFFICE VISIT (OUTPATIENT)
Dept: FAMILY MEDICINE | Facility: CLINIC | Age: 70
End: 2025-07-24
Payer: MEDICARE

## 2025-07-24 VITALS
HEART RATE: 70 BPM | HEIGHT: 70 IN | DIASTOLIC BLOOD PRESSURE: 80 MMHG | BODY MASS INDEX: 28.04 KG/M2 | OXYGEN SATURATION: 99 % | WEIGHT: 195.88 LBS | SYSTOLIC BLOOD PRESSURE: 126 MMHG

## 2025-07-24 DIAGNOSIS — F51.05 INSOMNIA DUE TO ANXIETY AND FEAR: ICD-10-CM

## 2025-07-24 DIAGNOSIS — I10 ESSENTIAL HYPERTENSION: Chronic | ICD-10-CM

## 2025-07-24 DIAGNOSIS — Z12.11 COLON CANCER SCREENING: ICD-10-CM

## 2025-07-24 DIAGNOSIS — E78.2 MIXED HYPERLIPIDEMIA: ICD-10-CM

## 2025-07-24 DIAGNOSIS — F40.9 INSOMNIA DUE TO ANXIETY AND FEAR: ICD-10-CM

## 2025-07-24 DIAGNOSIS — M65.332 TRIGGER MIDDLE FINGER OF LEFT HAND: ICD-10-CM

## 2025-07-24 DIAGNOSIS — K22.711 BARRETT'S ESOPHAGUS WITH HIGH GRADE DYSPLASIA: ICD-10-CM

## 2025-07-24 DIAGNOSIS — Z00.00 WELL ADULT EXAM: Primary | ICD-10-CM

## 2025-07-24 DIAGNOSIS — K21.9 GASTROESOPHAGEAL REFLUX DISEASE, UNSPECIFIED WHETHER ESOPHAGITIS PRESENT: ICD-10-CM

## 2025-07-24 PROCEDURE — 99999 PR PBB SHADOW E&M-EST. PATIENT-LVL V: CPT | Mod: PBBFAC,HCNC,, | Performed by: FAMILY MEDICINE

## 2025-07-24 RX ORDER — PANTOPRAZOLE SODIUM 40 MG/1
40 TABLET, DELAYED RELEASE ORAL 2 TIMES DAILY
Qty: 180 TABLET | Refills: 3 | Status: SHIPPED | OUTPATIENT
Start: 2025-07-24 | End: 2026-07-24

## 2025-07-24 NOTE — PROGRESS NOTES
Chief Complaint:    Chief Complaint   Patient presents with    Follow-up       History of Present Illness:    History of Present Illness    Patient presents today for follow up of trigger finger. He reports recurrent trigger finger involving the left hand middle finger with history of previous trigger finger surgeries on other fingers. 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 He has Culp's esophagus identified on EGD in 2023 without precancerous changes, with follow-up EGD anticipated in 2028. He reports ongoing acid reflux symptoms requiring Protonix twice daily instead of daily. He recently had an incomplete colonoscopy due to inadequate bowel preparation that will need to be repeated. He reports intermittent knee pain. Earlier this year, he received a knee injection from an orthopedic specialist which provided 3-4 months of relief. X-ray showed mild arthritis. He reports intermittent emotional sensitivity with episodes of intense emotional response triggered by specific events or media content. He denies persistent sadness or prolonged depressive states. He takes metoprolol, isosorbide, hydralazine, and benazepril for blood pressure management. For sleep, he uses Ambien and takes Klonopin as needed at night, not using the full prescribed amount. He rarely uses Trazodone despite having multiple bottles. He takes Gabapentin at night with variable effectiveness.      ROS:  General: denies fever, denies chills, denies fatigue, denies weight gain, denies weight loss, denies loss of appetite  Eyes: denies vision changes, denies blurry vision, denies eye pain, denies eye discharge  ENT: denies ear pain, denies hearing loss, denies tinnitus, denies nasal congestion, denies sore throat  Cardiovascular: denies chest pain, denies palpitations, denies lower extremity edema  Respiratory: denies cough, denies shortness of breath, denies wheezing, denies sputum production  Endocrine:  denies polyuria, denies polydipsia, denies heat intolerance, denies cold intolerance  Gastrointestinal: denies abdominal pain, denies heartburn, denies nausea, denies vomiting, denies diarrhea, denies constipation, denies blood in stool, admits indigestion  Genitourinary: denies dysuria, denies urgency, admits frequency, denies hematuria, denies nocturia, denies incontinence  Heme & Lymphatic: denies easy or excessive bleeding, denies easy bruising, denies swollen lymph nodes  Musculoskeletal: denies muscle pain, denies back pain, admits joint pain, denies joint swelling, admits pain with movement, admits limb pain  Skin: denies rash, denies lesion, denies itching, denies skin texture changes, denies skin color changes  Neurological: denies headache, denies dizziness, denies numbness, denies tingling, denies seizure activity, denies speech difficulty, denies memory loss, denies confusion  Psychiatric: denies anxiety, denies depression, admits sleep difficulty, admits emotional lability           Past Medical History:   Diagnosis Date    Anxiety     Cancer     spot on kidney and lung    Deep vein thrombosis     Diabetes mellitus     Borderline diet controlled    Diverticulitis     GERD (gastroesophageal reflux disease)     Hyperlipidemia     not on any meds currently    Hypertension     Lung mass     Mass of kidney        Social History:  Social History     Socioeconomic History    Marital status:    Tobacco Use    Smoking status: Former     Current packs/day: 0.00     Average packs/day: 1 pack/day for 20.0 years (20.0 ttl pk-yrs)     Types: Cigarettes     Start date: 1975     Quit date: 1995     Years since quittin.4     Passive exposure: Past    Smokeless tobacco: Never   Substance and Sexual Activity    Alcohol use: Never     Alcohol/week: 0.0 standard drinks of alcohol    Drug use: No    Sexual activity: Not Currently     Partners: Female     Birth control/protection: None     Social Drivers  of Health     Financial Resource Strain: Medium Risk (9/5/2023)    Overall Financial Resource Strain (CARDIA)     Difficulty of Paying Living Expenses: Somewhat hard   Food Insecurity: No Food Insecurity (9/5/2023)    Hunger Vital Sign     Worried About Running Out of Food in the Last Year: Never true     Ran Out of Food in the Last Year: Never true   Transportation Needs: No Transportation Needs (9/5/2023)    PRAPARE - Transportation     Lack of Transportation (Medical): No     Lack of Transportation (Non-Medical): No   Physical Activity: Insufficiently Active (9/5/2023)    Exercise Vital Sign     Days of Exercise per Week: 3 days     Minutes of Exercise per Session: 40 min   Stress: Stress Concern Present (9/5/2023)    [employer] - Occupational Stress Questionnaire     Feeling of Stress : Very much   Housing Stability: Low Risk  (9/5/2023)    Housing Stability Vital Sign     Unable to Pay for Housing in the Last Year: No     Number of Places Lived in the Last Year: 1     Unstable Housing in the Last Year: No       Family History:   family history includes Cancer in his sister; Hypertension in his father and mother; Melanoma in his father.    Health Maintenance   Topic Date Due    RSV Vaccine (Age 60+ and Pregnant patients) (1 - Risk 60-74 years 1-dose series) 07/24/2025 (Originally 2/3/2015)    Shingles Vaccine (1 of 2) 07/24/2026 (Originally 2/3/2005)    COVID-19 Vaccine (3 - 2024-25 season) 07/24/2026 (Originally 9/1/2024)    Influenza Vaccine (1) 09/01/2025    Lipid Panel  01/02/2026    Hemoglobin A1c (Prediabetes)  04/03/2026    Aspirin/Antiplatelet Therapy  07/24/2026    TETANUS VACCINE  04/26/2028    Colorectal Cancer Screening  05/30/2035    Hepatitis C Screening  Completed    Pneumococcal Vaccines (Age 50+)  Completed    Abdominal Aortic Aneurysm Screening  Completed       Exam:Physical     Vital Signs  Pulse: 70  SpO2: 99 %  BP: 126/80  Pain Score: 0-No pain  Height and  "Weight  Height: 5' 10" (177.8 cm)  Weight: 88.9 kg (195 lb 14.1 oz)  BSA (Calculated - sq m): 2.09 sq meters  BMI (Calculated): 28.1  Weight in (lb) to have BMI = 25: 173.9]    Body mass index is 28.11 kg/m².    Physical Exam    General: Well-developed. Well-nourished. No acute distress.  Eyes: EOMI. Sclerae anicteric.  HENT: Normocephalic. Atraumatic. Nares patent. Moist oral mucosa.  Cardiovascular: Regular rate. Regular rhythm. No murmurs. No rubs. No gallops. Normal S1, S2.  Respiratory: Normal respiratory effort. Clear to auscultation bilaterally. No rales. No rhonchi. No wheezing.  Musculoskeletal: No  obvious deformity.  Extremities: No lower extremity edema.  Neurological: Alert & oriented x3. No slurred speech. Normal gait.  Psychiatric: Normal mood. Normal affect. Good insight. Good judgment.  Skin: Warm. Dry. No rash.  MSK: Hand/Wrist - Left: Trigger finger in left middle finger.           Assessment:        ICD-10-CM ICD-9-CM   1. Well adult exam  Z00.00 V70.0   2. Colon cancer screening  Z12.11 V76.51   3. Gastroesophageal reflux disease, unspecified whether esophagitis present  K21.9 530.81   4. Trigger middle finger of left hand  M65.332 727.03   5. Essential hypertension  I10 401.9   6. Insomnia due to anxiety and fear  F51.05 300.20    F40.9 327.02   7. Culp's esophagus with high grade dysplasia  K22.711 530.85   8. Mixed hyperlipidemia  E78.2 272.2         Plan:    Assessment & Plan    MEDICAL DECISION MAKING:  - Assessed trigger finger in left hand middle finger.  - Reviewed history of Culp's esophagus, noting recent EGD in 2023 showed no precancerous condition.  - Depression symptoms within normal emotional range, no intervention required at this time.  - Reviewed recent blood test results, no additional testing needed at this visit.  - Assessed knee pain, XR shows mild arthritis but no significant concerns.  - Reviewed cardiac status, no immediate follow-up needed based on last visit 6 " months ago.    ACTION ITEMS/LIFESTYLE:  - Patient to watch weight and sugar intake due to previous prediabetic state.  - Patient to stay active.    MEDICATIONS:  - Increased Protonix to twice daily for GERD symptom management.    ORDERS:  - Ordered repeat colonoscopy due to inadequate bowel prep in previous attempt.    REFERRALS:  - Referred to hand surgeon for evaluation and treatment of trigger finger in left hand middle finger.         Mil was seen today for follow-up.    Diagnoses and all orders for this visit:    Well adult exam  -     Hemoglobin A1C; Future  -     Comprehensive Metabolic Panel; Future    Colon cancer screening  -     Ambulatory referral/consult to Endo Procedure ; Future    Gastroesophageal reflux disease, unspecified whether esophagitis present    Trigger middle finger of left hand  -     Ambulatory referral/consult to Orthopedics; Future    Essential hypertension  -     Lipid Panel; Future  -     Hemoglobin A1C; Future  -     Comprehensive Metabolic Panel; Future  -     CBC Auto Differential; Future    Insomnia due to anxiety and fear    Culp's esophagus with high grade dysplasia    Mixed hyperlipidemia  -     Lipid Panel; Future  -     Comprehensive Metabolic Panel; Future    Other orders  -     pantoprazole (PROTONIX) 40 MG tablet; Take 1 tablet (40 mg total) by mouth 2 (two) times daily.        Follow up in about 6 months (around 1/24/2026), or if symptoms worsen or fail to improve.      Yumi Roberson MD

## 2025-07-25 ENCOUNTER — HOSPITAL ENCOUNTER (OUTPATIENT)
Dept: PREADMISSION TESTING | Facility: HOSPITAL | Age: 70
Discharge: HOME | End: 2025-07-25
Attending: COLON & RECTAL SURGERY
Payer: MEDICARE

## 2025-07-25 DIAGNOSIS — Z12.11 COLON CANCER SCREENING: Primary | ICD-10-CM

## 2025-07-25 RX ORDER — SODIUM, POTASSIUM,MAG SULFATES 17.5-3.13G
1 SOLUTION, RECONSTITUTED, ORAL ORAL DAILY
Qty: 1 KIT | Refills: 0 | Status: SHIPPED | OUTPATIENT
Start: 2025-07-25 | End: 2025-07-27

## 2025-08-01 ENCOUNTER — HOSPITAL ENCOUNTER (OUTPATIENT)
Dept: ENDOSCOPY | Facility: HOSPITAL | Age: 70
Discharge: HOME | End: 2025-08-01
Attending: FAMILY MEDICINE
Payer: MEDICARE

## 2025-08-01 VITALS
WEIGHT: 185 LBS | OXYGEN SATURATION: 98 % | BODY MASS INDEX: 26.48 KG/M2 | RESPIRATION RATE: 18 BRPM | TEMPERATURE: 98 F | SYSTOLIC BLOOD PRESSURE: 167 MMHG | HEIGHT: 70 IN | HEART RATE: 70 BPM | DIASTOLIC BLOOD PRESSURE: 91 MMHG

## 2025-08-01 DIAGNOSIS — Z12.11 COLON CANCER SCREENING: ICD-10-CM

## 2025-08-01 NOTE — H&P
Notified by pre-op that patient with dirty stools. Procedure therefore cancelled for today and will be rescheduled to a later date with an extended prep.

## 2025-08-13 ENCOUNTER — OFFICE VISIT (OUTPATIENT)
Dept: ORTHOPEDICS | Facility: CLINIC | Age: 70
End: 2025-08-13
Payer: MEDICARE

## 2025-08-13 VITALS — WEIGHT: 184.94 LBS | BODY MASS INDEX: 26.48 KG/M2 | HEIGHT: 70 IN

## 2025-08-13 DIAGNOSIS — M65.332 TRIGGER MIDDLE FINGER OF LEFT HAND: Primary | ICD-10-CM

## 2025-08-13 DIAGNOSIS — M65.30 TRIGGER FINGER, ACQUIRED: Primary | ICD-10-CM

## 2025-08-13 DIAGNOSIS — Z01.818 PREOPERATIVE EXAMINATION: ICD-10-CM

## 2025-08-13 DIAGNOSIS — M65.332 TRIGGER MIDDLE FINGER OF LEFT HAND: ICD-10-CM

## 2025-08-13 PROCEDURE — 4010F ACE/ARB THERAPY RXD/TAKEN: CPT | Mod: CPTII,HCNC,S$GLB, | Performed by: ORTHOPAEDIC SURGERY

## 2025-08-13 PROCEDURE — 1125F AMNT PAIN NOTED PAIN PRSNT: CPT | Mod: CPTII,HCNC,S$GLB, | Performed by: ORTHOPAEDIC SURGERY

## 2025-08-13 PROCEDURE — 99214 OFFICE O/P EST MOD 30 MIN: CPT | Mod: HCNC,S$GLB,, | Performed by: ORTHOPAEDIC SURGERY

## 2025-08-13 PROCEDURE — 99999 PR PBB SHADOW E&M-EST. PATIENT-LVL III: CPT | Mod: PBBFAC,HCNC,, | Performed by: ORTHOPAEDIC SURGERY

## 2025-08-13 PROCEDURE — 3044F HG A1C LEVEL LT 7.0%: CPT | Mod: CPTII,HCNC,S$GLB, | Performed by: ORTHOPAEDIC SURGERY

## 2025-08-13 PROCEDURE — 3008F BODY MASS INDEX DOCD: CPT | Mod: CPTII,HCNC,S$GLB, | Performed by: ORTHOPAEDIC SURGERY

## 2025-08-13 PROCEDURE — 3288F FALL RISK ASSESSMENT DOCD: CPT | Mod: CPTII,HCNC,S$GLB, | Performed by: ORTHOPAEDIC SURGERY

## 2025-08-13 PROCEDURE — 1160F RVW MEDS BY RX/DR IN RCRD: CPT | Mod: CPTII,HCNC,S$GLB, | Performed by: ORTHOPAEDIC SURGERY

## 2025-08-13 PROCEDURE — 1159F MED LIST DOCD IN RCRD: CPT | Mod: CPTII,HCNC,S$GLB, | Performed by: ORTHOPAEDIC SURGERY

## 2025-08-13 PROCEDURE — 1101F PT FALLS ASSESS-DOCD LE1/YR: CPT | Mod: CPTII,HCNC,S$GLB, | Performed by: ORTHOPAEDIC SURGERY

## 2025-08-15 ENCOUNTER — OFFICE VISIT (OUTPATIENT)
Dept: INTERNAL MEDICINE | Facility: CLINIC | Age: 70
End: 2025-08-15
Payer: MEDICARE

## 2025-08-15 ENCOUNTER — HOSPITAL ENCOUNTER (OUTPATIENT)
Dept: CARDIOLOGY | Facility: HOSPITAL | Age: 70
Discharge: HOME | End: 2025-08-15
Payer: MEDICARE

## 2025-08-15 VITALS
HEART RATE: 75 BPM | TEMPERATURE: 99 F | OXYGEN SATURATION: 98 % | SYSTOLIC BLOOD PRESSURE: 152 MMHG | RESPIRATION RATE: 20 BRPM | DIASTOLIC BLOOD PRESSURE: 91 MMHG

## 2025-08-15 DIAGNOSIS — Z01.818 PREOPERATIVE EXAMINATION: ICD-10-CM

## 2025-08-15 DIAGNOSIS — F40.9 INSOMNIA DUE TO ANXIETY AND FEAR: ICD-10-CM

## 2025-08-15 DIAGNOSIS — R73.03 PREDIABETES: ICD-10-CM

## 2025-08-15 DIAGNOSIS — I25.708 CORONARY ARTERY DISEASE OF BYPASS GRAFT OF NATIVE HEART WITH STABLE ANGINA PECTORIS: ICD-10-CM

## 2025-08-15 DIAGNOSIS — E78.2 MIXED HYPERLIPIDEMIA: ICD-10-CM

## 2025-08-15 DIAGNOSIS — M47.812 CERVICAL SPONDYLOSIS: ICD-10-CM

## 2025-08-15 DIAGNOSIS — N13.8 BPH WITH OBSTRUCTION/LOWER URINARY TRACT SYMPTOMS: ICD-10-CM

## 2025-08-15 DIAGNOSIS — N40.1 BPH WITH OBSTRUCTION/LOWER URINARY TRACT SYMPTOMS: ICD-10-CM

## 2025-08-15 DIAGNOSIS — K21.9 GASTROESOPHAGEAL REFLUX DISEASE WITHOUT ESOPHAGITIS: ICD-10-CM

## 2025-08-15 DIAGNOSIS — M65.332 TRIGGER MIDDLE FINGER OF LEFT HAND: Primary | ICD-10-CM

## 2025-08-15 DIAGNOSIS — F32.1 CURRENT MODERATE EPISODE OF MAJOR DEPRESSIVE DISORDER, UNSPECIFIED WHETHER RECURRENT: ICD-10-CM

## 2025-08-15 DIAGNOSIS — I10 ESSENTIAL HYPERTENSION: Chronic | ICD-10-CM

## 2025-08-15 DIAGNOSIS — D30.00 RENAL ONCOCYTOMA, UNSPECIFIED LATERALITY: ICD-10-CM

## 2025-08-15 DIAGNOSIS — F51.05 INSOMNIA DUE TO ANXIETY AND FEAR: ICD-10-CM

## 2025-08-15 DIAGNOSIS — I11.0 HYPERTENSIVE HEART DISEASE WITH HEART FAILURE: ICD-10-CM

## 2025-08-15 LAB
OHS QRS DURATION: 86 MS
OHS QTC CALCULATION: 383 MS

## 2025-08-15 PROCEDURE — 93005 ELECTROCARDIOGRAM TRACING: CPT | Mod: HCNC

## 2025-08-15 PROCEDURE — 99999 PR PBB SHADOW E&M-EST. PATIENT-LVL IV: CPT | Mod: PBBFAC,HCNC,,

## 2025-08-15 PROCEDURE — 93010 ELECTROCARDIOGRAM REPORT: CPT | Mod: HCNC,,, | Performed by: INTERNAL MEDICINE

## 2025-08-20 ENCOUNTER — TELEPHONE (OUTPATIENT)
Dept: PREADMISSION TESTING | Facility: HOSPITAL | Age: 70
End: 2025-08-20
Payer: MEDICARE

## 2025-08-20 ENCOUNTER — ANESTHESIA EVENT (OUTPATIENT)
Dept: CARDIOLOGY | Facility: HOSPITAL | Age: 70
End: 2025-08-20
Payer: MEDICARE

## 2025-08-20 ENCOUNTER — HOSPITAL ENCOUNTER (INPATIENT)
Facility: HOSPITAL | Age: 70
LOS: 3 days | Discharge: HOME | DRG: 321 | End: 2025-08-23
Attending: EMERGENCY MEDICINE | Admitting: INTERNAL MEDICINE
Payer: MEDICARE

## 2025-08-20 ENCOUNTER — ANESTHESIA (OUTPATIENT)
Dept: CARDIOLOGY | Facility: HOSPITAL | Age: 70
End: 2025-08-20
Payer: MEDICARE

## 2025-08-20 DIAGNOSIS — J96.21 ACUTE ON CHRONIC RESPIRATORY FAILURE WITH HYPOXIA: ICD-10-CM

## 2025-08-20 DIAGNOSIS — I21.3 ST ELEVATION MYOCARDIAL INFARCTION (STEMI), UNSPECIFIED ARTERY: ICD-10-CM

## 2025-08-20 DIAGNOSIS — Z86.79 HISTORY OF CAD (CORONARY ARTERY DISEASE): ICD-10-CM

## 2025-08-20 DIAGNOSIS — Z95.1 HX OF CABG: ICD-10-CM

## 2025-08-20 DIAGNOSIS — R07.9 CHEST PAIN: ICD-10-CM

## 2025-08-20 DIAGNOSIS — F41.8 DEPRESSION WITH ANXIETY: ICD-10-CM

## 2025-08-20 DIAGNOSIS — I21.3 STEMI (ST ELEVATION MYOCARDIAL INFARCTION): ICD-10-CM

## 2025-08-20 DIAGNOSIS — I25.10 CORONARY ARTERY DISEASE, UNSPECIFIED VESSEL OR LESION TYPE, UNSPECIFIED WHETHER ANGINA PRESENT, UNSPECIFIED WHETHER NATIVE OR TRANSPLANTED HEART: ICD-10-CM

## 2025-08-20 DIAGNOSIS — J43.2 CENTRILOBULAR EMPHYSEMA: ICD-10-CM

## 2025-08-20 DIAGNOSIS — I21.11 ST ELEVATION MYOCARDIAL INFARCTION INVOLVING RIGHT CORONARY ARTERY: Primary | ICD-10-CM

## 2025-08-20 DIAGNOSIS — I16.1 HYPERTENSIVE EMERGENCY: ICD-10-CM

## 2025-08-20 DIAGNOSIS — J44.9 CHRONIC OBSTRUCTIVE PULMONARY DISEASE, UNSPECIFIED COPD TYPE: ICD-10-CM

## 2025-08-20 LAB
ABSOLUTE EOSINOPHIL (OHS): 0.03 K/UL
ABSOLUTE EOSINOPHIL (OHS): 0.57 K/UL
ABSOLUTE MONOCYTE (OHS): 0.42 K/UL (ref 0.3–1)
ABSOLUTE MONOCYTE (OHS): 1.07 K/UL (ref 0.3–1)
ABSOLUTE NEUTROPHIL COUNT (OHS): 5.5 K/UL (ref 1.8–7.7)
ABSOLUTE NEUTROPHIL COUNT (OHS): 9.16 K/UL (ref 1.8–7.7)
ALBUMIN SERPL BCP-MCNC: 4.6 G/DL (ref 3.5–5.2)
ALP SERPL-CCNC: 54 UNIT/L (ref 40–150)
ALT SERPL W/O P-5'-P-CCNC: 20 UNIT/L (ref 10–44)
ANION GAP (OHS): 10 MMOL/L (ref 8–16)
ANION GAP (OHS): 12 MMOL/L (ref 8–16)
AORTIC ROOT ANNULUS: 3.4 CM
AORTIC SIZE INDEX: 1.6 CM/M2
APTT PPP: 22.2 SECONDS (ref 21–32)
ASCENDING AORTA: 3.3 CM
AST SERPL-CCNC: 24 UNIT/L (ref 11–45)
AV INDEX (PROSTH): 0.6
AV MEAN GRADIENT: 5 MMHG
AV PEAK GRADIENT: 8 MMHG
AV REGURGITATION PRESSURE HALF TIME: 879 MS
AV VALVE AREA BY VELOCITY RATIO: 2 CM²
AV VALVE AREA: 1.9 CM²
AV VELOCITY RATIO: 0.64
BASOPHILS # BLD AUTO: 0.06 K/UL
BASOPHILS # BLD AUTO: 0.1 K/UL
BASOPHILS NFR BLD AUTO: 0.6 %
BASOPHILS NFR BLD AUTO: 1.1 %
BILIRUB SERPL-MCNC: 0.5 MG/DL (ref 0.1–1)
BSA FOR ECHO PROCEDURE: 2.06 M2
BUN SERPL-MCNC: 19 MG/DL (ref 8–23)
BUN SERPL-MCNC: 21 MG/DL (ref 8–23)
CALCIUM SERPL-MCNC: 10.1 MG/DL (ref 8.7–10.5)
CALCIUM SERPL-MCNC: 9.2 MG/DL (ref 8.7–10.5)
CHLORIDE SERPL-SCNC: 105 MMOL/L (ref 95–110)
CHLORIDE SERPL-SCNC: 105 MMOL/L (ref 95–110)
CO2 SERPL-SCNC: 25 MMOL/L (ref 23–29)
CO2 SERPL-SCNC: 25 MMOL/L (ref 23–29)
CREAT SERPL-MCNC: 1.4 MG/DL (ref 0.5–1.4)
CREAT SERPL-MCNC: 1.4 MG/DL (ref 0.5–1.4)
CV ECHO LV RWT: 0.68 CM
DOP CALC AO PEAK VEL: 1.4 M/S
DOP CALC AO VTI: 25.3 CM
DOP CALC LVOT AREA: 3.1 CM2
DOP CALC LVOT DIAMETER: 2 CM
DOP CALC LVOT PEAK VEL: 0.9 M/S
DOP CALC RVOT PEAK VEL: 0.59 M/S
DOP CALC RVOT VTI: 8.6 CM
DOP CALCLVOT PEAK VEL VTI: 15.1 CM
E WAVE DECELERATION TIME: 259 MSEC
E/A RATIO: 0.56
E/E' RATIO: 7 M/S
ECHO LV POSTERIOR WALL: 1.3 CM (ref 0.6–1.1)
EJECTION FRACTION: 55 %
ERYTHROCYTE [DISTWIDTH] IN BLOOD BY AUTOMATED COUNT: 12.8 % (ref 11.5–14.5)
ERYTHROCYTE [DISTWIDTH] IN BLOOD BY AUTOMATED COUNT: 13.2 % (ref 11.5–14.5)
FRACTIONAL SHORTENING: 34.2 % (ref 28–44)
GFR SERPLBLD CREATININE-BSD FMLA CKD-EPI: 54 ML/MIN/1.73/M2
GFR SERPLBLD CREATININE-BSD FMLA CKD-EPI: 54 ML/MIN/1.73/M2
GLUCOSE SERPL-MCNC: 112 MG/DL (ref 70–110)
GLUCOSE SERPL-MCNC: 117 MG/DL (ref 70–110)
HCT VFR BLD AUTO: 44.1 % (ref 40–54)
HCT VFR BLD AUTO: 46.9 % (ref 40–54)
HGB BLD-MCNC: 14.3 GM/DL (ref 14–18)
HGB BLD-MCNC: 15.9 GM/DL (ref 14–18)
IMM GRANULOCYTES # BLD AUTO: 0.04 K/UL (ref 0–0.04)
IMM GRANULOCYTES # BLD AUTO: 0.06 K/UL (ref 0–0.04)
IMM GRANULOCYTES NFR BLD AUTO: 0.4 % (ref 0–0.5)
IMM GRANULOCYTES NFR BLD AUTO: 0.6 % (ref 0–0.5)
INDIRECT COOMBS: NORMAL
INR PPP: 1 (ref 0.8–1.2)
INTERVENTRICULAR SEPTUM: 1.4 CM (ref 0.6–1.1)
IVRT: 97 MSEC
LA MAJOR: 5.4 CM
LA MINOR: 5.3 CM
LA WIDTH: 2.6 CM
LEFT ATRIUM AREA SYSTOLIC (APICAL 2 CHAMBER): 18.89 CM2
LEFT ATRIUM AREA SYSTOLIC (APICAL 4 CHAMBER): 14.45 CM2
LEFT ATRIUM SIZE: 4.1 CM
LEFT ATRIUM VOLUME INDEX MOD: 21 ML/M2
LEFT ATRIUM VOLUME INDEX: 24 ML/M2
LEFT ATRIUM VOLUME MOD: 42 ML
LEFT ATRIUM VOLUME: 48 CM3
LEFT INTERNAL DIMENSION IN SYSTOLE: 2.5 CM (ref 2.1–4)
LEFT VENTRICLE DIASTOLIC VOLUME INDEX: 29.9 ML/M2
LEFT VENTRICLE DIASTOLIC VOLUME: 61 ML
LEFT VENTRICLE END SYSTOLIC VOLUME APICAL 2 CHAMBER: 54.06 ML
LEFT VENTRICLE END SYSTOLIC VOLUME APICAL 4 CHAMBER: 31.48 ML
LEFT VENTRICLE MASS INDEX: 89.9 G/M2
LEFT VENTRICLE SYSTOLIC VOLUME INDEX: 11.3 ML/M2
LEFT VENTRICLE SYSTOLIC VOLUME: 23 ML
LEFT VENTRICULAR INTERNAL DIMENSION IN DIASTOLE: 3.8 CM (ref 3.5–6)
LEFT VENTRICULAR MASS: 183.4 G
LV LATERAL E/E' RATIO: 6.9 M/S
LV SEPTAL E/E' RATIO: 8 M/S
LVED V (TEICH): 60.72 ML
LVES V (TEICH): 23.25 ML
LVOT MG: 1.86 MMHG
LVOT MV: 0.62 CM/S
LYMPHOCYTES # BLD AUTO: 0.86 K/UL (ref 1–4.8)
LYMPHOCYTES # BLD AUTO: 1.85 K/UL (ref 1–4.8)
Lab: 1.9 CM/M
MCH RBC QN AUTO: 28.7 PG (ref 27–31)
MCH RBC QN AUTO: 29 PG (ref 27–31)
MCHC RBC AUTO-ENTMCNC: 32.4 G/DL (ref 32–36)
MCHC RBC AUTO-ENTMCNC: 33.9 G/DL (ref 32–36)
MCV RBC AUTO: 86 FL (ref 82–98)
MCV RBC AUTO: 89 FL (ref 82–98)
MV PEAK A VEL: 0.85 M/S
MV PEAK E VEL: 0.48 M/S
MV STENOSIS PRESSURE HALF TIME: 75.1 MS
MV VALVE AREA P 1/2 METHOD: 2.93 CM2
NT-PROBNP SERPL-MCNC: 218 PG/ML
NUCLEATED RBC (/100WBC) (OHS): 0 /100 WBC
NUCLEATED RBC (/100WBC) (OHS): 0 /100 WBC
OHS CV CPX PATIENT HEIGHT IN: 70
OHS QRS DURATION: 98 MS
OHS QTC CALCULATION: 415 MS
PISA AR MAX VEL: 4.47 M/S
PISA MRMAX VEL: 5.41 M/S
PISA TR MAX VEL: 3.3 M/S
PLATELET # BLD AUTO: 275 K/UL (ref 150–450)
PLATELET # BLD AUTO: 302 K/UL (ref 150–450)
PMV BLD AUTO: 8.7 FL (ref 9.2–12.9)
PMV BLD AUTO: 9.1 FL (ref 9.2–12.9)
POCT GLUCOSE: 137 MG/DL (ref 70–110)
POTASSIUM SERPL-SCNC: 4.6 MMOL/L (ref 3.5–5.1)
POTASSIUM SERPL-SCNC: 4.9 MMOL/L (ref 3.5–5.1)
PROCALCITONIN SERPL-MCNC: 0.06 NG/ML
PROT SERPL-MCNC: 7.9 GM/DL (ref 6–8.4)
PROTHROMBIN TIME: 11.1 SECONDS (ref 9–12.5)
PV MEAN GRADIENT: 1 MMHG
RA MAJOR: 4.95 CM
RA PRESSURE ESTIMATED: 3 MMHG
RA WIDTH: 3.3 CM
RBC # BLD AUTO: 4.98 M/UL (ref 4.6–6.2)
RBC # BLD AUTO: 5.48 M/UL (ref 4.6–6.2)
RELATIVE EOSINOPHIL (OHS): 0.3 %
RELATIVE EOSINOPHIL (OHS): 6.2 %
RELATIVE LYMPHOCYTE (OHS): 20.3 % (ref 18–48)
RELATIVE LYMPHOCYTE (OHS): 8.1 % (ref 18–48)
RELATIVE MONOCYTE (OHS): 11.7 % (ref 4–15)
RELATIVE MONOCYTE (OHS): 4 % (ref 4–15)
RELATIVE NEUTROPHIL (OHS): 60.3 % (ref 38–73)
RELATIVE NEUTROPHIL (OHS): 86.4 % (ref 38–73)
RH BLD: NORMAL
RIGHT VENTRICLE DIASTOLIC MID DIMENSION: 4.2 CM
RV MID DIAMA: 4.21 CM
RV TB RVSP: 6 MMHG
SODIUM SERPL-SCNC: 140 MMOL/L (ref 136–145)
SODIUM SERPL-SCNC: 142 MMOL/L (ref 136–145)
SPECIMEN OUTDATE: NORMAL
STJ: 3.1 CM
TDI LATERAL: 0.07 M/S
TDI SEPTAL: 0.06 M/S
TDI: 0.07 M/S
TR MAX PG: 44 MMHG
TRICUSPID ANNULAR PLANE SYSTOLIC EXCURSION: 1.8 CM
TROPONIN I SERPL HS-MCNC: 20 NG/L
TROPONIN I SERPL HS-MCNC: ABNORMAL NG/L
TV REST PULMONARY ARTERY PRESSURE: 47 MMHG
WBC # BLD AUTO: 10.59 K/UL (ref 3.9–12.7)
WBC # BLD AUTO: 9.13 K/UL (ref 3.9–12.7)
Z-SCORE OF LEFT VENTRICULAR DIMENSION IN END DIASTOLE: -4.7
Z-SCORE OF LEFT VENTRICULAR DIMENSION IN END SYSTOLE: -3.16

## 2025-08-20 PROCEDURE — C1894 INTRO/SHEATH, NON-LASER: HCPCS | Mod: HCNC | Performed by: INTERNAL MEDICINE

## 2025-08-20 PROCEDURE — B2111ZZ FLUOROSCOPY OF MULTIPLE CORONARY ARTERIES USING LOW OSMOLAR CONTRAST: ICD-10-PCS | Performed by: INTERNAL MEDICINE

## 2025-08-20 PROCEDURE — 85730 THROMBOPLASTIN TIME PARTIAL: CPT | Mod: HCNC | Performed by: EMERGENCY MEDICINE

## 2025-08-20 PROCEDURE — 85025 COMPLETE CBC W/AUTO DIFF WBC: CPT | Mod: HCNC | Performed by: EMERGENCY MEDICINE

## 2025-08-20 PROCEDURE — 86900 BLOOD TYPING SEROLOGIC ABO: CPT | Mod: HCNC | Performed by: NURSE PRACTITIONER

## 2025-08-20 PROCEDURE — 63600175 PHARM REV CODE 636 W HCPCS: Mod: HCNC | Performed by: INTERNAL MEDICINE

## 2025-08-20 PROCEDURE — 93005 ELECTROCARDIOGRAM TRACING: CPT | Mod: HCNC

## 2025-08-20 PROCEDURE — B2131ZZ FLUOROSCOPY OF MULTIPLE CORONARY ARTERY BYPASS GRAFTS USING LOW OSMOLAR CONTRAST: ICD-10-PCS | Performed by: INTERNAL MEDICINE

## 2025-08-20 PROCEDURE — 25000003 PHARM REV CODE 250: Mod: HCNC | Performed by: INTERNAL MEDICINE

## 2025-08-20 PROCEDURE — 63600175 PHARM REV CODE 636 W HCPCS: Mod: JZ,TB,HCNC | Performed by: INTERNAL MEDICINE

## 2025-08-20 PROCEDURE — 99152 MOD SED SAME PHYS/QHP 5/>YRS: CPT | Mod: HCNC,,, | Performed by: INTERNAL MEDICINE

## 2025-08-20 PROCEDURE — 02C13ZZ EXTIRPATION OF MATTER FROM CORONARY ARTERY, TWO ARTERIES, PERCUTANEOUS APPROACH: ICD-10-PCS | Performed by: INTERNAL MEDICINE

## 2025-08-20 PROCEDURE — C9607 PERC D-E COR REVASC CHRO SIN: HCPCS | Mod: LC,HCNC | Performed by: INTERNAL MEDICINE

## 2025-08-20 PROCEDURE — C1769 GUIDE WIRE: HCPCS | Mod: HCNC | Performed by: INTERNAL MEDICINE

## 2025-08-20 PROCEDURE — C1874 STENT, COATED/COV W/DEL SYS: HCPCS | Mod: HCNC | Performed by: INTERNAL MEDICINE

## 2025-08-20 PROCEDURE — 84484 ASSAY OF TROPONIN QUANT: CPT | Mod: HCNC | Performed by: EMERGENCY MEDICINE

## 2025-08-20 PROCEDURE — 93010 ELECTROCARDIOGRAM REPORT: CPT | Mod: HCNC,XU,, | Performed by: INTERNAL MEDICINE

## 2025-08-20 PROCEDURE — 99285 EMERGENCY DEPT VISIT HI MDM: CPT | Mod: 25,HCNC

## 2025-08-20 PROCEDURE — 20000000 HC ICU ROOM: Mod: HCNC

## 2025-08-20 PROCEDURE — 84145 PROCALCITONIN (PCT): CPT | Mod: HCNC | Performed by: INTERNAL MEDICINE

## 2025-08-20 PROCEDURE — 85610 PROTHROMBIN TIME: CPT | Mod: HCNC | Performed by: EMERGENCY MEDICINE

## 2025-08-20 PROCEDURE — 37000008 HC ANESTHESIA 1ST 15 MINUTES: Mod: HCNC | Performed by: INTERNAL MEDICINE

## 2025-08-20 PROCEDURE — 85025 COMPLETE CBC W/AUTO DIFF WBC: CPT | Mod: HCNC | Performed by: NURSE PRACTITIONER

## 2025-08-20 PROCEDURE — 92973 PRQ TRLUML C MCHN ASP THRMBC: CPT | Mod: HCNC | Performed by: INTERNAL MEDICINE

## 2025-08-20 PROCEDURE — 63600175 PHARM REV CODE 636 W HCPCS: Mod: HCNC | Performed by: STUDENT IN AN ORGANIZED HEALTH CARE EDUCATION/TRAINING PROGRAM

## 2025-08-20 PROCEDURE — C1725 CATH, TRANSLUMIN NON-LASER: HCPCS | Mod: HCNC | Performed by: INTERNAL MEDICINE

## 2025-08-20 PROCEDURE — C1760 CLOSURE DEV, VASC: HCPCS | Mod: HCNC | Performed by: INTERNAL MEDICINE

## 2025-08-20 PROCEDURE — 36415 COLL VENOUS BLD VENIPUNCTURE: CPT | Mod: HCNC | Performed by: NURSE PRACTITIONER

## 2025-08-20 PROCEDURE — 99223 1ST HOSP IP/OBS HIGH 75: CPT | Mod: AI,HCNC,, | Performed by: INTERNAL MEDICINE

## 2025-08-20 PROCEDURE — 92973 PRQ TRLUML C MCHN ASP THRMBC: CPT | Mod: LC,HCNC,, | Performed by: INTERNAL MEDICINE

## 2025-08-20 PROCEDURE — C1757 CATH, THROMBECTOMY/EMBOLECT: HCPCS | Mod: HCNC | Performed by: INTERNAL MEDICINE

## 2025-08-20 PROCEDURE — 25500020 PHARM REV CODE 255: Mod: HCNC | Performed by: INTERNAL MEDICINE

## 2025-08-20 PROCEDURE — 93459 L HRT ART/GRFT ANGIO: CPT | Mod: HCNC | Performed by: INTERNAL MEDICINE

## 2025-08-20 PROCEDURE — B2181ZZ FLUOROSCOPY OF LEFT INTERNAL MAMMARY BYPASS GRAFT USING LOW OSMOLAR CONTRAST: ICD-10-PCS | Performed by: INTERNAL MEDICINE

## 2025-08-20 PROCEDURE — 27201423 OPTIME MED/SURG SUP & DEVICES STERILE SUPPLY: Mod: HCNC | Performed by: INTERNAL MEDICINE

## 2025-08-20 PROCEDURE — 83880 ASSAY OF NATRIURETIC PEPTIDE: CPT | Mod: HCNC | Performed by: EMERGENCY MEDICINE

## 2025-08-20 PROCEDURE — 25000003 PHARM REV CODE 250: Mod: HCNC | Performed by: STUDENT IN AN ORGANIZED HEALTH CARE EDUCATION/TRAINING PROGRAM

## 2025-08-20 PROCEDURE — 4A023N7 MEASUREMENT OF CARDIAC SAMPLING AND PRESSURE, LEFT HEART, PERCUTANEOUS APPROACH: ICD-10-PCS | Performed by: INTERNAL MEDICINE

## 2025-08-20 PROCEDURE — 86038 ANTINUCLEAR ANTIBODIES: CPT | Mod: HCNC | Performed by: INTERNAL MEDICINE

## 2025-08-20 PROCEDURE — 84484 ASSAY OF TROPONIN QUANT: CPT | Mod: HCNC | Performed by: INTERNAL MEDICINE

## 2025-08-20 PROCEDURE — 92928 PRQ TCAT PLMT NTRAC ST 1 LES: CPT | Mod: LM,HCNC,XU, | Performed by: INTERNAL MEDICINE

## 2025-08-20 PROCEDURE — C1887 CATHETER, GUIDING: HCPCS | Mod: HCNC | Performed by: INTERNAL MEDICINE

## 2025-08-20 PROCEDURE — B3121ZZ FLUOROSCOPY OF LEFT SUBCLAVIAN ARTERY USING LOW OSMOLAR CONTRAST: ICD-10-PCS | Performed by: INTERNAL MEDICINE

## 2025-08-20 PROCEDURE — 37000009 HC ANESTHESIA EA ADD 15 MINS: Mod: HCNC | Performed by: INTERNAL MEDICINE

## 2025-08-20 PROCEDURE — 92943 PRQ TRLUML REVSC CH OCC ANT: CPT | Mod: LC,HCNC,, | Performed by: INTERNAL MEDICINE

## 2025-08-20 PROCEDURE — C9600 PERC DRUG-EL COR STENT SING: HCPCS | Mod: LM,HCNC | Performed by: INTERNAL MEDICINE

## 2025-08-20 PROCEDURE — 80053 COMPREHEN METABOLIC PANEL: CPT | Mod: HCNC | Performed by: EMERGENCY MEDICINE

## 2025-08-20 PROCEDURE — 63600175 PHARM REV CODE 636 W HCPCS: Mod: HCNC | Performed by: NURSE PRACTITIONER

## 2025-08-20 PROCEDURE — 36415 COLL VENOUS BLD VENIPUNCTURE: CPT | Mod: HCNC | Performed by: INTERNAL MEDICINE

## 2025-08-20 PROCEDURE — 027337Z DILATION OF CORONARY ARTERY, FOUR OR MORE ARTERIES WITH FOUR OR MORE DRUG-ELUTING INTRALUMINAL DEVICES, PERCUTANEOUS APPROACH: ICD-10-PCS | Performed by: INTERNAL MEDICINE

## 2025-08-20 PROCEDURE — 99152 MOD SED SAME PHYS/QHP 5/>YRS: CPT | Mod: HCNC | Performed by: INTERNAL MEDICINE

## 2025-08-20 PROCEDURE — 93459 L HRT ART/GRFT ANGIO: CPT | Mod: 26,XU,HCNC, | Performed by: INTERNAL MEDICINE

## 2025-08-20 PROCEDURE — 86036 ANCA SCREEN EACH ANTIBODY: CPT | Mod: HCNC | Performed by: INTERNAL MEDICINE

## 2025-08-20 DEVICE — IMPLANTABLE DEVICE: Type: IMPLANTABLE DEVICE | Site: CORONARY | Status: FUNCTIONAL

## 2025-08-20 DEVICE — KIT ANGIO SEAL 6FR VIP: Type: IMPLANTABLE DEVICE | Site: CORONARY | Status: FUNCTIONAL

## 2025-08-20 RX ORDER — ACETAMINOPHEN 325 MG/1
650 TABLET ORAL EVERY 4 HOURS PRN
Status: DISCONTINUED | OUTPATIENT
Start: 2025-08-20 | End: 2025-08-23 | Stop reason: HOSPADM

## 2025-08-20 RX ORDER — FUROSEMIDE 10 MG/ML
40 INJECTION INTRAMUSCULAR; INTRAVENOUS ONCE
Status: COMPLETED | OUTPATIENT
Start: 2025-08-20 | End: 2025-08-20

## 2025-08-20 RX ORDER — GLUCAGON 1 MG
1 KIT INJECTION
Status: DISCONTINUED | OUTPATIENT
Start: 2025-08-20 | End: 2025-08-21

## 2025-08-20 RX ORDER — SODIUM NITROPRUSSIDE 25 MG/ML
INJECTION INTRAVENOUS
Status: DISCONTINUED | OUTPATIENT
Start: 2025-08-20 | End: 2025-08-20

## 2025-08-20 RX ORDER — CLONAZEPAM 1 MG/1
1 TABLET ORAL NIGHTLY PRN
Status: DISCONTINUED | OUTPATIENT
Start: 2025-08-20 | End: 2025-08-23 | Stop reason: HOSPADM

## 2025-08-20 RX ORDER — TRAZODONE HYDROCHLORIDE 100 MG/1
100 TABLET ORAL NIGHTLY
Status: DISCONTINUED | OUTPATIENT
Start: 2025-08-20 | End: 2025-08-23 | Stop reason: HOSPADM

## 2025-08-20 RX ORDER — TAMSULOSIN HYDROCHLORIDE 0.4 MG/1
1 CAPSULE ORAL NIGHTLY
Status: DISCONTINUED | OUTPATIENT
Start: 2025-08-20 | End: 2025-08-23 | Stop reason: HOSPADM

## 2025-08-20 RX ORDER — LISINOPRIL 20 MG/1
40 TABLET ORAL DAILY
Status: DISCONTINUED | OUTPATIENT
Start: 2025-08-21 | End: 2025-08-21

## 2025-08-20 RX ORDER — ISOSORBIDE MONONITRATE 60 MG/1
60 TABLET, EXTENDED RELEASE ORAL DAILY
Status: DISCONTINUED | OUTPATIENT
Start: 2025-08-21 | End: 2025-08-23 | Stop reason: HOSPADM

## 2025-08-20 RX ORDER — SODIUM CHLORIDE 9 MG/ML
INJECTION, SOLUTION INTRAVENOUS CONTINUOUS
Status: DISCONTINUED | OUTPATIENT
Start: 2025-08-20 | End: 2025-08-21

## 2025-08-20 RX ORDER — METOPROLOL TARTRATE 25 MG/1
25 TABLET, FILM COATED ORAL 2 TIMES DAILY
Status: DISCONTINUED | OUTPATIENT
Start: 2025-08-20 | End: 2025-08-23 | Stop reason: HOSPADM

## 2025-08-20 RX ORDER — TIROFIBAN HYDROCHLORIDE 50 UG/ML
0.15 INJECTION INTRAVENOUS CONTINUOUS
Status: DISCONTINUED | OUTPATIENT
Start: 2025-08-20 | End: 2025-08-20

## 2025-08-20 RX ORDER — MUPIROCIN 20 MG/G
OINTMENT TOPICAL 2 TIMES DAILY
Status: DISCONTINUED | OUTPATIENT
Start: 2025-08-20 | End: 2025-08-23 | Stop reason: HOSPADM

## 2025-08-20 RX ORDER — TRAMADOL HYDROCHLORIDE 50 MG/1
50 TABLET, FILM COATED ORAL EVERY 8 HOURS PRN
Status: DISCONTINUED | OUTPATIENT
Start: 2025-08-20 | End: 2025-08-23 | Stop reason: HOSPADM

## 2025-08-20 RX ORDER — NITROGLYCERIN 5 MG/ML
INJECTION, SOLUTION INTRAVENOUS
Status: DISCONTINUED | OUTPATIENT
Start: 2025-08-20 | End: 2025-08-20

## 2025-08-20 RX ORDER — ATORVASTATIN CALCIUM 40 MG/1
40 TABLET, FILM COATED ORAL NIGHTLY
Status: DISCONTINUED | OUTPATIENT
Start: 2025-08-20 | End: 2025-08-20

## 2025-08-20 RX ORDER — HEPARIN SODIUM 1000 [USP'U]/ML
INJECTION, SOLUTION INTRAVENOUS; SUBCUTANEOUS
Status: DISCONTINUED | OUTPATIENT
Start: 2025-08-20 | End: 2025-08-20

## 2025-08-20 RX ORDER — KETAMINE HCL IN 0.9 % NACL 50 MG/5 ML
SYRINGE (ML) INTRAVENOUS
Status: DISCONTINUED | OUTPATIENT
Start: 2025-08-20 | End: 2025-08-20

## 2025-08-20 RX ORDER — TICAGRELOR 90 MG/1
TABLET, FILM COATED ORAL
Status: DISCONTINUED | OUTPATIENT
Start: 2025-08-20 | End: 2025-08-20

## 2025-08-20 RX ORDER — PHENYLEPHRINE HYDROCHLORIDE 10 MG/ML
INJECTION INTRAVENOUS
Status: DISCONTINUED | OUTPATIENT
Start: 2025-08-20 | End: 2025-08-20

## 2025-08-20 RX ORDER — TICAGRELOR 90 MG/1
90 TABLET, FILM COATED ORAL 2 TIMES DAILY
Status: DISCONTINUED | OUTPATIENT
Start: 2025-08-20 | End: 2025-08-23 | Stop reason: HOSPADM

## 2025-08-20 RX ORDER — ASPIRIN 81 MG/1
81 TABLET ORAL DAILY
Status: DISCONTINUED | OUTPATIENT
Start: 2025-08-21 | End: 2025-08-23 | Stop reason: HOSPADM

## 2025-08-20 RX ORDER — ONDANSETRON 8 MG/1
8 TABLET, ORALLY DISINTEGRATING ORAL EVERY 8 HOURS PRN
Status: DISCONTINUED | OUTPATIENT
Start: 2025-08-20 | End: 2025-08-23 | Stop reason: HOSPADM

## 2025-08-20 RX ORDER — TIROFIBAN HYDROCHLORIDE 50 UG/ML
INJECTION INTRAVENOUS
Status: DISCONTINUED | OUTPATIENT
Start: 2025-08-20 | End: 2025-08-20

## 2025-08-20 RX ORDER — PANTOPRAZOLE SODIUM 40 MG/1
40 TABLET, DELAYED RELEASE ORAL 2 TIMES DAILY
Status: DISCONTINUED | OUTPATIENT
Start: 2025-08-20 | End: 2025-08-23 | Stop reason: HOSPADM

## 2025-08-20 RX ORDER — INSULIN ASPART 100 [IU]/ML
0-10 INJECTION, SOLUTION INTRAVENOUS; SUBCUTANEOUS EVERY 6 HOURS PRN
Status: DISCONTINUED | OUTPATIENT
Start: 2025-08-20 | End: 2025-08-21

## 2025-08-20 RX ORDER — NITROGLYCERIN 20 MG/100ML
0-400 INJECTION INTRAVENOUS CONTINUOUS
Status: DISCONTINUED | OUTPATIENT
Start: 2025-08-20 | End: 2025-08-20

## 2025-08-20 RX ORDER — CEFAZOLIN 2 G/1
2 INJECTION, POWDER, FOR SOLUTION INTRAMUSCULAR; INTRAVENOUS
Status: COMPLETED | OUTPATIENT
Start: 2025-08-20 | End: 2025-08-21

## 2025-08-20 RX ADMIN — SODIUM CHLORIDE: 9 INJECTION, SOLUTION INTRAVENOUS at 08:08

## 2025-08-20 RX ADMIN — PHENYLEPHRINE HYDROCHLORIDE 200 MCG: 10 INJECTION INTRAVENOUS at 02:08

## 2025-08-20 RX ADMIN — PHENYLEPHRINE HYDROCHLORIDE 100 MCG: 10 INJECTION INTRAVENOUS at 03:08

## 2025-08-20 RX ADMIN — TAMSULOSIN HYDROCHLORIDE 0.4 MG: 0.4 CAPSULE ORAL at 08:08

## 2025-08-20 RX ADMIN — METHYLPREDNISOLONE SODIUM SUCCINATE 40 MG: 40 INJECTION, POWDER, FOR SOLUTION INTRAMUSCULAR; INTRAVENOUS at 08:08

## 2025-08-20 RX ADMIN — Medication 20 MG: at 03:08

## 2025-08-20 RX ADMIN — FUROSEMIDE 40 MG: 10 INJECTION, SOLUTION INTRAVENOUS at 05:08

## 2025-08-20 RX ADMIN — SODIUM CHLORIDE, SODIUM LACTATE, POTASSIUM CHLORIDE, AND CALCIUM CHLORIDE: .6; .31; .03; .02 INJECTION, SOLUTION INTRAVENOUS at 02:08

## 2025-08-20 RX ADMIN — TRAZODONE HYDROCHLORIDE 100 MG: 100 TABLET ORAL at 08:08

## 2025-08-20 RX ADMIN — PANTOPRAZOLE SODIUM 40 MG: 40 TABLET, DELAYED RELEASE ORAL at 08:08

## 2025-08-20 RX ADMIN — MUPIROCIN: 20 OINTMENT TOPICAL at 08:08

## 2025-08-20 RX ADMIN — METOPROLOL TARTRATE 25 MG: 25 TABLET, FILM COATED ORAL at 08:08

## 2025-08-20 RX ADMIN — SODIUM CHLORIDE: 9 INJECTION, SOLUTION INTRAVENOUS at 04:08

## 2025-08-20 RX ADMIN — TIROFIBAN 0.08 MCG/KG/MIN: 5 INJECTION, SOLUTION INTRAVENOUS at 04:08

## 2025-08-20 RX ADMIN — CEFAZOLIN 2 G: 2 INJECTION, POWDER, FOR SOLUTION INTRAMUSCULAR; INTRAVENOUS at 05:08

## 2025-08-20 RX ADMIN — TICAGRELOR 90 MG: 90 TABLET ORAL at 08:08

## 2025-08-21 PROBLEM — R04.2 HEMOPTYSIS: Status: ACTIVE | Noted: 2025-08-21

## 2025-08-21 PROBLEM — J84.9 INTERSTITIAL LUNG DISEASE: Status: ACTIVE | Noted: 2025-08-21

## 2025-08-21 LAB
ABSOLUTE EOSINOPHIL (OHS): 0 K/UL
ABSOLUTE MONOCYTE (OHS): 0.31 K/UL (ref 0.3–1)
ABSOLUTE NEUTROPHIL COUNT (OHS): 9.32 K/UL (ref 1.8–7.7)
ANION GAP (OHS): 11 MMOL/L (ref 8–16)
BASOPHILS # BLD AUTO: 0.01 K/UL
BASOPHILS NFR BLD AUTO: 0.1 %
BUN SERPL-MCNC: 29 MG/DL (ref 8–23)
CALCIUM SERPL-MCNC: 9 MG/DL (ref 8.7–10.5)
CHLORIDE SERPL-SCNC: 105 MMOL/L (ref 95–110)
CHOLEST SERPL-MCNC: 199 MG/DL (ref 120–199)
CHOLEST/HDLC SERPL: 4.1 {RATIO} (ref 2–5)
CO2 SERPL-SCNC: 24 MMOL/L (ref 23–29)
CREAT SERPL-MCNC: 1.5 MG/DL (ref 0.5–1.4)
CRP SERPL-MCNC: 9.7 MG/L
ERYTHROCYTE [DISTWIDTH] IN BLOOD BY AUTOMATED COUNT: 13.2 % (ref 11.5–14.5)
ERYTHROCYTE [SEDIMENTATION RATE] IN BLOOD: <2 MM/HR
GFR SERPLBLD CREATININE-BSD FMLA CKD-EPI: 50 ML/MIN/1.73/M2
GLUCOSE SERPL-MCNC: 144 MG/DL (ref 70–110)
HCT VFR BLD AUTO: 40.2 % (ref 40–54)
HDLC SERPL-MCNC: 48 MG/DL (ref 40–75)
HDLC SERPL: 24.1 % (ref 20–50)
HGB BLD-MCNC: 13.4 GM/DL (ref 14–18)
IMM GRANULOCYTES # BLD AUTO: 0.03 K/UL (ref 0–0.04)
IMM GRANULOCYTES NFR BLD AUTO: 0.3 % (ref 0–0.5)
LDLC SERPL CALC-MCNC: 124.6 MG/DL (ref 63–159)
LYMPHOCYTES # BLD AUTO: 0.72 K/UL (ref 1–4.8)
MCH RBC QN AUTO: 29.5 PG (ref 27–31)
MCHC RBC AUTO-ENTMCNC: 33.3 G/DL (ref 32–36)
MCV RBC AUTO: 89 FL (ref 82–98)
NONHDLC SERPL-MCNC: 151 MG/DL
NUCLEATED RBC (/100WBC) (OHS): 0 /100 WBC
PLATELET # BLD AUTO: 246 K/UL (ref 150–450)
PMV BLD AUTO: 9 FL (ref 9.2–12.9)
POCT GLUCOSE: 139 MG/DL (ref 70–110)
POCT GLUCOSE: 212 MG/DL (ref 70–110)
POTASSIUM SERPL-SCNC: 4.6 MMOL/L (ref 3.5–5.1)
RBC # BLD AUTO: 4.54 M/UL (ref 4.6–6.2)
RELATIVE EOSINOPHIL (OHS): 0 %
RELATIVE LYMPHOCYTE (OHS): 6.9 % (ref 18–48)
RELATIVE MONOCYTE (OHS): 3 % (ref 4–15)
RELATIVE NEUTROPHIL (OHS): 89.7 % (ref 38–73)
RHEUMATOID FACT SERPL-ACNC: <13 IU/ML
SODIUM SERPL-SCNC: 140 MMOL/L (ref 136–145)
TRIGL SERPL-MCNC: 132 MG/DL (ref 30–150)
TROPONIN I SERPL HS-MCNC: ABNORMAL NG/L
TROPONIN I SERPL HS-MCNC: ABNORMAL NG/L
WBC # BLD AUTO: 10.39 K/UL (ref 3.9–12.7)

## 2025-08-21 PROCEDURE — 99232 SBSQ HOSP IP/OBS MODERATE 35: CPT | Mod: HCNC,FS,, | Performed by: INTERNAL MEDICINE

## 2025-08-21 PROCEDURE — 80048 BASIC METABOLIC PNL TOTAL CA: CPT | Mod: HCNC | Performed by: INTERNAL MEDICINE

## 2025-08-21 PROCEDURE — 36415 COLL VENOUS BLD VENIPUNCTURE: CPT | Mod: HCNC | Performed by: INTERNAL MEDICINE

## 2025-08-21 PROCEDURE — 94761 N-INVAS EAR/PLS OXIMETRY MLT: CPT | Mod: HCNC

## 2025-08-21 PROCEDURE — 86140 C-REACTIVE PROTEIN: CPT | Mod: HCNC | Performed by: INTERNAL MEDICINE

## 2025-08-21 PROCEDURE — 21400001 HC TELEMETRY ROOM: Mod: HCNC

## 2025-08-21 PROCEDURE — 63600175 PHARM REV CODE 636 W HCPCS: Mod: HCNC | Performed by: INTERNAL MEDICINE

## 2025-08-21 PROCEDURE — 63600175 PHARM REV CODE 636 W HCPCS: Mod: JZ,TB,HCNC | Performed by: INTERNAL MEDICINE

## 2025-08-21 PROCEDURE — 25000003 PHARM REV CODE 250: Mod: HCNC | Performed by: INTERNAL MEDICINE

## 2025-08-21 PROCEDURE — 25000003 PHARM REV CODE 250: Mod: HCNC | Performed by: PHYSICIAN ASSISTANT

## 2025-08-21 PROCEDURE — 97166 OT EVAL MOD COMPLEX 45 MIN: CPT | Mod: HCNC

## 2025-08-21 PROCEDURE — 93010 ELECTROCARDIOGRAM REPORT: CPT | Mod: HCNC,XU,, | Performed by: INTERNAL MEDICINE

## 2025-08-21 PROCEDURE — 97116 GAIT TRAINING THERAPY: CPT | Mod: HCNC

## 2025-08-21 PROCEDURE — 86431 RHEUMATOID FACTOR QUANT: CPT | Mod: HCNC | Performed by: INTERNAL MEDICINE

## 2025-08-21 PROCEDURE — 63600175 PHARM REV CODE 636 W HCPCS: Mod: HCNC | Performed by: PHYSICIAN ASSISTANT

## 2025-08-21 PROCEDURE — 97530 THERAPEUTIC ACTIVITIES: CPT | Mod: HCNC

## 2025-08-21 PROCEDURE — 85025 COMPLETE CBC W/AUTO DIFF WBC: CPT | Mod: HCNC | Performed by: INTERNAL MEDICINE

## 2025-08-21 PROCEDURE — 80061 LIPID PANEL: CPT | Mod: HCNC | Performed by: INTERNAL MEDICINE

## 2025-08-21 PROCEDURE — 84484 ASSAY OF TROPONIN QUANT: CPT | Mod: HCNC | Performed by: INTERNAL MEDICINE

## 2025-08-21 PROCEDURE — 83516 IMMUNOASSAY NONANTIBODY: CPT | Mod: HCNC | Performed by: INTERNAL MEDICINE

## 2025-08-21 PROCEDURE — 27000221 HC OXYGEN, UP TO 24 HOURS: Mod: HCNC

## 2025-08-21 PROCEDURE — 93005 ELECTROCARDIOGRAM TRACING: CPT | Mod: HCNC

## 2025-08-21 PROCEDURE — 97162 PT EVAL MOD COMPLEX 30 MIN: CPT | Mod: HCNC

## 2025-08-21 PROCEDURE — 84484 ASSAY OF TROPONIN QUANT: CPT | Mod: HCNC | Performed by: PHYSICIAN ASSISTANT

## 2025-08-21 PROCEDURE — 87077 CULTURE AEROBIC IDENTIFY: CPT | Mod: HCNC | Performed by: INTERNAL MEDICINE

## 2025-08-21 PROCEDURE — 94799 UNLISTED PULMONARY SVC/PX: CPT | Mod: HCNC

## 2025-08-21 PROCEDURE — 85652 RBC SED RATE AUTOMATED: CPT | Mod: HCNC | Performed by: INTERNAL MEDICINE

## 2025-08-21 RX ORDER — INSULIN ASPART 100 [IU]/ML
0-10 INJECTION, SOLUTION INTRAVENOUS; SUBCUTANEOUS
Status: DISCONTINUED | OUTPATIENT
Start: 2025-08-21 | End: 2025-08-23 | Stop reason: HOSPADM

## 2025-08-21 RX ORDER — IBUPROFEN 200 MG
24 TABLET ORAL
Status: DISCONTINUED | OUTPATIENT
Start: 2025-08-21 | End: 2025-08-23 | Stop reason: HOSPADM

## 2025-08-21 RX ORDER — SODIUM CHLORIDE 9 MG/ML
INJECTION, SOLUTION INTRAVENOUS CONTINUOUS
Status: DISCONTINUED | OUTPATIENT
Start: 2025-08-21 | End: 2025-08-22

## 2025-08-21 RX ORDER — IBUPROFEN 200 MG
16 TABLET ORAL
Status: DISCONTINUED | OUTPATIENT
Start: 2025-08-21 | End: 2025-08-23 | Stop reason: HOSPADM

## 2025-08-21 RX ORDER — GLUCAGON 1 MG
1 KIT INJECTION
Status: DISCONTINUED | OUTPATIENT
Start: 2025-08-21 | End: 2025-08-23 | Stop reason: HOSPADM

## 2025-08-21 RX ADMIN — ACETAMINOPHEN 650 MG: 325 TABLET ORAL at 04:08

## 2025-08-21 RX ADMIN — TICAGRELOR 90 MG: 90 TABLET ORAL at 08:08

## 2025-08-21 RX ADMIN — SODIUM CHLORIDE: 0.9 INJECTION, SOLUTION INTRAVENOUS at 08:08

## 2025-08-21 RX ADMIN — PANTOPRAZOLE SODIUM 40 MG: 40 TABLET, DELAYED RELEASE ORAL at 09:08

## 2025-08-21 RX ADMIN — TAMSULOSIN HYDROCHLORIDE 0.4 MG: 0.4 CAPSULE ORAL at 11:08

## 2025-08-21 RX ADMIN — TICAGRELOR 90 MG: 90 TABLET ORAL at 11:08

## 2025-08-21 RX ADMIN — TRAMADOL HYDROCHLORIDE 50 MG: 50 TABLET, COATED ORAL at 03:08

## 2025-08-21 RX ADMIN — MUPIROCIN: 20 OINTMENT TOPICAL at 08:08

## 2025-08-21 RX ADMIN — METHYLPREDNISOLONE SODIUM SUCCINATE 40 MG: 40 INJECTION, POWDER, FOR SOLUTION INTRAMUSCULAR; INTRAVENOUS at 11:08

## 2025-08-21 RX ADMIN — MUPIROCIN: 20 OINTMENT TOPICAL at 11:08

## 2025-08-21 RX ADMIN — ASPIRIN 81 MG: 81 TABLET, COATED ORAL at 08:08

## 2025-08-21 RX ADMIN — INSULIN ASPART 2 UNITS: 100 INJECTION, SOLUTION INTRAVENOUS; SUBCUTANEOUS at 07:08

## 2025-08-21 RX ADMIN — METOPROLOL TARTRATE 25 MG: 25 TABLET, FILM COATED ORAL at 08:08

## 2025-08-21 RX ADMIN — PANTOPRAZOLE SODIUM 40 MG: 40 TABLET, DELAYED RELEASE ORAL at 08:08

## 2025-08-21 RX ADMIN — CEFAZOLIN 2 G: 2 INJECTION, POWDER, FOR SOLUTION INTRAMUSCULAR; INTRAVENOUS at 03:08

## 2025-08-21 RX ADMIN — CEFAZOLIN 2 G: 2 INJECTION, POWDER, FOR SOLUTION INTRAMUSCULAR; INTRAVENOUS at 08:08

## 2025-08-21 RX ADMIN — METHYLPREDNISOLONE SODIUM SUCCINATE 40 MG: 40 INJECTION, POWDER, FOR SOLUTION INTRAMUSCULAR; INTRAVENOUS at 06:08

## 2025-08-21 RX ADMIN — METOPROLOL TARTRATE 25 MG: 25 TABLET, FILM COATED ORAL at 11:08

## 2025-08-21 RX ADMIN — LISINOPRIL 40 MG: 20 TABLET ORAL at 08:08

## 2025-08-21 RX ADMIN — TRAZODONE HYDROCHLORIDE 100 MG: 100 TABLET ORAL at 11:08

## 2025-08-21 RX ADMIN — ISOSORBIDE MONONITRATE 60 MG: 60 TABLET, EXTENDED RELEASE ORAL at 08:08

## 2025-08-22 PROBLEM — J43.9: Status: ACTIVE | Noted: 2025-08-22

## 2025-08-22 LAB
ABSOLUTE EOSINOPHIL (OHS): 0 K/UL
ABSOLUTE MONOCYTE (OHS): 1.3 K/UL (ref 0.3–1)
ABSOLUTE NEUTROPHIL COUNT (OHS): 12.01 K/UL (ref 1.8–7.7)
ALBUMIN SERPL BCP-MCNC: 3.8 G/DL (ref 3.5–5.2)
ALP SERPL-CCNC: 37 UNIT/L (ref 40–150)
ALT SERPL W/O P-5'-P-CCNC: 26 UNIT/L (ref 10–44)
ANA (OHS): NORMAL
ANION GAP (OHS): 6 MMOL/L (ref 8–16)
AST SERPL-CCNC: 113 UNIT/L (ref 11–45)
BASOPHILS # BLD AUTO: 0.02 K/UL
BASOPHILS NFR BLD AUTO: 0.1 %
BILIRUB SERPL-MCNC: 0.5 MG/DL (ref 0.1–1)
BUN SERPL-MCNC: 38 MG/DL (ref 8–23)
CALCIUM SERPL-MCNC: 8.8 MG/DL (ref 8.7–10.5)
CHLORIDE SERPL-SCNC: 105 MMOL/L (ref 95–110)
CO2 SERPL-SCNC: 28 MMOL/L (ref 23–29)
CREAT SERPL-MCNC: 1.2 MG/DL (ref 0.5–1.4)
ERYTHROCYTE [DISTWIDTH] IN BLOOD BY AUTOMATED COUNT: 13.5 % (ref 11.5–14.5)
GFR SERPLBLD CREATININE-BSD FMLA CKD-EPI: >60 ML/MIN/1.73/M2
GLUCOSE SERPL-MCNC: 104 MG/DL (ref 70–110)
HCT VFR BLD AUTO: 32.3 % (ref 40–54)
HGB BLD-MCNC: 10.8 GM/DL (ref 14–18)
IMM GRANULOCYTES # BLD AUTO: 0.06 K/UL (ref 0–0.04)
IMM GRANULOCYTES NFR BLD AUTO: 0.4 % (ref 0–0.5)
LYMPHOCYTES # BLD AUTO: 0.78 K/UL (ref 1–4.8)
MAGNESIUM SERPL-MCNC: 1.9 MG/DL (ref 1.6–2.6)
MCH RBC QN AUTO: 29.6 PG (ref 27–31)
MCHC RBC AUTO-ENTMCNC: 33.4 G/DL (ref 32–36)
MCV RBC AUTO: 89 FL (ref 82–98)
NUCLEATED RBC (/100WBC) (OHS): 0 /100 WBC
OHS QRS DURATION: 86 MS
OHS QTC CALCULATION: 432 MS
PLATELET # BLD AUTO: 216 K/UL (ref 150–450)
PMV BLD AUTO: 9.2 FL (ref 9.2–12.9)
POC ACTIVATED CLOTTING TIME K: 176 SEC (ref 74–137)
POC ACTIVATED CLOTTING TIME K: 250 SEC (ref 74–137)
POC ACTIVATED CLOTTING TIME K: 256 SEC (ref 74–137)
POCT GLUCOSE: 111 MG/DL (ref 70–110)
POCT GLUCOSE: 117 MG/DL (ref 70–110)
POCT GLUCOSE: 123 MG/DL (ref 70–110)
POCT GLUCOSE: 170 MG/DL (ref 70–110)
POTASSIUM SERPL-SCNC: 5 MMOL/L (ref 3.5–5.1)
PROT SERPL-MCNC: 6.2 GM/DL (ref 6–8.4)
RBC # BLD AUTO: 3.65 M/UL (ref 4.6–6.2)
RELATIVE EOSINOPHIL (OHS): 0 %
RELATIVE LYMPHOCYTE (OHS): 5.5 % (ref 18–48)
RELATIVE MONOCYTE (OHS): 9.2 % (ref 4–15)
RELATIVE NEUTROPHIL (OHS): 84.8 % (ref 38–73)
SAMPLE: ABNORMAL
SODIUM SERPL-SCNC: 139 MMOL/L (ref 136–145)
TROPONIN I SERPL HS-MCNC: ABNORMAL NG/L
WBC # BLD AUTO: 14.17 K/UL (ref 3.9–12.7)

## 2025-08-22 PROCEDURE — 99233 SBSQ HOSP IP/OBS HIGH 50: CPT | Mod: HCNC,,, | Performed by: INTERNAL MEDICINE

## 2025-08-22 PROCEDURE — 84484 ASSAY OF TROPONIN QUANT: CPT | Mod: HCNC | Performed by: PHYSICIAN ASSISTANT

## 2025-08-22 PROCEDURE — 83735 ASSAY OF MAGNESIUM: CPT | Mod: HCNC | Performed by: PHYSICIAN ASSISTANT

## 2025-08-22 PROCEDURE — 36415 COLL VENOUS BLD VENIPUNCTURE: CPT | Mod: HCNC | Performed by: INTERNAL MEDICINE

## 2025-08-22 PROCEDURE — 21400001 HC TELEMETRY ROOM: Mod: HCNC

## 2025-08-22 PROCEDURE — 80053 COMPREHEN METABOLIC PANEL: CPT | Mod: HCNC | Performed by: INTERNAL MEDICINE

## 2025-08-22 PROCEDURE — 25000003 PHARM REV CODE 250: Mod: HCNC | Performed by: PHYSICIAN ASSISTANT

## 2025-08-22 PROCEDURE — 85025 COMPLETE CBC W/AUTO DIFF WBC: CPT | Mod: HCNC | Performed by: INTERNAL MEDICINE

## 2025-08-22 PROCEDURE — 63600175 PHARM REV CODE 636 W HCPCS: Mod: JZ,TB,HCNC | Performed by: PHYSICIAN ASSISTANT

## 2025-08-22 RX ADMIN — PANTOPRAZOLE SODIUM 40 MG: 40 TABLET, DELAYED RELEASE ORAL at 09:08

## 2025-08-22 RX ADMIN — INSULIN ASPART 2 UNITS: 100 INJECTION, SOLUTION INTRAVENOUS; SUBCUTANEOUS at 04:08

## 2025-08-22 RX ADMIN — ISOSORBIDE MONONITRATE 60 MG: 60 TABLET, EXTENDED RELEASE ORAL at 09:08

## 2025-08-22 RX ADMIN — MUPIROCIN 1 G: 20 OINTMENT TOPICAL at 09:08

## 2025-08-22 RX ADMIN — METHYLPREDNISOLONE SODIUM SUCCINATE 40 MG: 40 INJECTION, POWDER, FOR SOLUTION INTRAMUSCULAR; INTRAVENOUS at 10:08

## 2025-08-22 RX ADMIN — TICAGRELOR 90 MG: 90 TABLET ORAL at 09:08

## 2025-08-22 RX ADMIN — ASPIRIN 81 MG: 81 TABLET, COATED ORAL at 09:08

## 2025-08-22 RX ADMIN — TAMSULOSIN HYDROCHLORIDE 0.4 MG: 0.4 CAPSULE ORAL at 09:08

## 2025-08-22 RX ADMIN — TRAZODONE HYDROCHLORIDE 100 MG: 100 TABLET ORAL at 09:08

## 2025-08-22 RX ADMIN — METHYLPREDNISOLONE SODIUM SUCCINATE 40 MG: 40 INJECTION, POWDER, FOR SOLUTION INTRAMUSCULAR; INTRAVENOUS at 09:08

## 2025-08-22 RX ADMIN — SODIUM CHLORIDE: 0.9 INJECTION, SOLUTION INTRAVENOUS at 04:08

## 2025-08-22 RX ADMIN — ACETAMINOPHEN 650 MG: 325 TABLET ORAL at 11:08

## 2025-08-22 RX ADMIN — METOPROLOL TARTRATE 25 MG: 25 TABLET, FILM COATED ORAL at 09:08

## 2025-08-22 RX ADMIN — MUPIROCIN: 20 OINTMENT TOPICAL at 09:08

## 2025-08-23 VITALS
OXYGEN SATURATION: 90 % | HEART RATE: 85 BPM | DIASTOLIC BLOOD PRESSURE: 61 MMHG | WEIGHT: 190 LBS | RESPIRATION RATE: 18 BRPM | TEMPERATURE: 98 F | SYSTOLIC BLOOD PRESSURE: 113 MMHG | BODY MASS INDEX: 27.2 KG/M2 | HEIGHT: 70 IN

## 2025-08-23 PROBLEM — J96.21 ACUTE ON CHRONIC RESPIRATORY FAILURE WITH HYPOXIA: Status: ACTIVE | Noted: 2025-08-23

## 2025-08-23 PROBLEM — J44.0: Status: ACTIVE | Noted: 2025-08-23

## 2025-08-23 LAB
ABSOLUTE EOSINOPHIL (OHS): 0 K/UL
ABSOLUTE MONOCYTE (OHS): 1.4 K/UL (ref 0.3–1)
ABSOLUTE NEUTROPHIL COUNT (OHS): 13.63 K/UL (ref 1.8–7.7)
BASOPHILS # BLD AUTO: 0.02 K/UL
BASOPHILS NFR BLD AUTO: 0.1 %
ERYTHROCYTE [DISTWIDTH] IN BLOOD BY AUTOMATED COUNT: 13.3 % (ref 11.5–14.5)
HCT VFR BLD AUTO: 33.4 % (ref 40–54)
HGB BLD-MCNC: 11.1 GM/DL (ref 14–18)
IMM GRANULOCYTES # BLD AUTO: 0.12 K/UL (ref 0–0.04)
IMM GRANULOCYTES NFR BLD AUTO: 0.8 % (ref 0–0.5)
LYMPHOCYTES # BLD AUTO: 0.56 K/UL (ref 1–4.8)
MCH RBC QN AUTO: 29.1 PG (ref 27–31)
MCHC RBC AUTO-ENTMCNC: 33.2 G/DL (ref 32–36)
MCV RBC AUTO: 88 FL (ref 82–98)
NUCLEATED RBC (/100WBC) (OHS): 0 /100 WBC
OHS QRS DURATION: 90 MS
OHS QTC CALCULATION: 427 MS
PLATELET # BLD AUTO: 255 K/UL (ref 150–450)
PMV BLD AUTO: 9 FL (ref 9.2–12.9)
POCT GLUCOSE: 129 MG/DL (ref 70–110)
RBC # BLD AUTO: 3.81 M/UL (ref 4.6–6.2)
RELATIVE EOSINOPHIL (OHS): 0 %
RELATIVE LYMPHOCYTE (OHS): 3.6 % (ref 18–48)
RELATIVE MONOCYTE (OHS): 8.9 % (ref 4–15)
RELATIVE NEUTROPHIL (OHS): 86.6 % (ref 38–73)
WBC # BLD AUTO: 15.73 K/UL (ref 3.9–12.7)

## 2025-08-23 PROCEDURE — 99900035 HC TECH TIME PER 15 MIN (STAT): Mod: HCNC

## 2025-08-23 PROCEDURE — 36415 COLL VENOUS BLD VENIPUNCTURE: CPT | Mod: HCNC | Performed by: INTERNAL MEDICINE

## 2025-08-23 PROCEDURE — 25000003 PHARM REV CODE 250: Mod: HCNC | Performed by: PHYSICIAN ASSISTANT

## 2025-08-23 PROCEDURE — 99233 SBSQ HOSP IP/OBS HIGH 50: CPT | Mod: HCNC,,, | Performed by: INTERNAL MEDICINE

## 2025-08-23 PROCEDURE — 85025 COMPLETE CBC W/AUTO DIFF WBC: CPT | Mod: HCNC | Performed by: INTERNAL MEDICINE

## 2025-08-23 PROCEDURE — 25000003 PHARM REV CODE 250: Mod: HCNC | Performed by: INTERNAL MEDICINE

## 2025-08-23 PROCEDURE — 1111F DSCHRG MED/CURRENT MED MERGE: CPT | Mod: CPTII,HCNC,, | Performed by: INTERNAL MEDICINE

## 2025-08-23 PROCEDURE — 99239 HOSP IP/OBS DSCHRG MGMT >30: CPT | Mod: HCNC,,, | Performed by: INTERNAL MEDICINE

## 2025-08-23 PROCEDURE — 94799 UNLISTED PULMONARY SVC/PX: CPT | Mod: HCNC

## 2025-08-23 PROCEDURE — 63600175 PHARM REV CODE 636 W HCPCS: Mod: JZ,TB,HCNC | Performed by: PHYSICIAN ASSISTANT

## 2025-08-23 PROCEDURE — 94618 PULMONARY STRESS TESTING: CPT | Mod: HCNC

## 2025-08-23 RX ORDER — PREDNISONE 20 MG/1
TABLET ORAL
Qty: 20 TABLET | Refills: 1 | Status: SHIPPED | OUTPATIENT
Start: 2025-08-23

## 2025-08-23 RX ORDER — LEVOFLOXACIN 500 MG/1
500 TABLET, FILM COATED ORAL DAILY
Qty: 10 TABLET | Refills: 0 | Status: SHIPPED | OUTPATIENT
Start: 2025-08-23 | End: 2025-09-02

## 2025-08-23 RX ORDER — ASPIRIN 81 MG/1
81 TABLET ORAL DAILY
Qty: 30 TABLET | Refills: 6 | Status: SHIPPED | OUTPATIENT
Start: 2025-08-24 | End: 2026-08-24

## 2025-08-23 RX ORDER — LEVOFLOXACIN 750 MG/1
750 TABLET, FILM COATED ORAL DAILY
Status: DISCONTINUED | OUTPATIENT
Start: 2025-08-23 | End: 2025-08-23 | Stop reason: HOSPADM

## 2025-08-23 RX ORDER — TICAGRELOR 90 MG/1
90 TABLET, FILM COATED ORAL 2 TIMES DAILY
Qty: 60 TABLET | Refills: 11 | Status: SHIPPED | OUTPATIENT
Start: 2025-08-23 | End: 2026-08-23

## 2025-08-23 RX ADMIN — ISOSORBIDE MONONITRATE 60 MG: 60 TABLET, EXTENDED RELEASE ORAL at 08:08

## 2025-08-23 RX ADMIN — METOPROLOL TARTRATE 25 MG: 25 TABLET, FILM COATED ORAL at 08:08

## 2025-08-23 RX ADMIN — METHYLPREDNISOLONE SODIUM SUCCINATE 40 MG: 40 INJECTION, POWDER, FOR SOLUTION INTRAMUSCULAR; INTRAVENOUS at 08:08

## 2025-08-23 RX ADMIN — TICAGRELOR 90 MG: 90 TABLET ORAL at 08:08

## 2025-08-23 RX ADMIN — PANTOPRAZOLE SODIUM 40 MG: 40 TABLET, DELAYED RELEASE ORAL at 08:08

## 2025-08-23 RX ADMIN — MUPIROCIN: 20 OINTMENT TOPICAL at 08:08

## 2025-08-23 RX ADMIN — LEVOFLOXACIN 750 MG: 750 TABLET, FILM COATED ORAL at 10:08

## 2025-08-23 RX ADMIN — TRAMADOL HYDROCHLORIDE 50 MG: 50 TABLET, COATED ORAL at 01:08

## 2025-08-23 RX ADMIN — ASPIRIN 81 MG: 81 TABLET, COATED ORAL at 08:08

## 2025-08-25 ENCOUNTER — TELEPHONE (OUTPATIENT)
Dept: CARDIOLOGY | Facility: CLINIC | Age: 70
End: 2025-08-25
Payer: MEDICARE

## 2025-08-25 ENCOUNTER — TELEPHONE (OUTPATIENT)
Dept: PULMONOLOGY | Facility: CLINIC | Age: 70
End: 2025-08-25
Payer: MEDICARE

## 2025-08-25 LAB
BACTERIA SPT CULT: ABNORMAL
BACTERIA SPT CULT: ABNORMAL
GRAM STN SPEC: ABNORMAL
W C-ANCA: NORMAL TITER
W P-ANCA: NORMAL TITER

## 2025-08-26 ENCOUNTER — PATIENT OUTREACH (OUTPATIENT)
Dept: ADMINISTRATIVE | Facility: CLINIC | Age: 70
End: 2025-08-26
Payer: MEDICARE

## 2025-08-26 ENCOUNTER — RESULTS FOLLOW-UP (OUTPATIENT)
Dept: SLEEP MEDICINE | Facility: CLINIC | Age: 70
End: 2025-08-26
Payer: MEDICARE

## 2025-08-26 LAB — W MYELOPEROXIDASE (MPO) AB: <0.2 U/ML

## 2025-08-28 ENCOUNTER — OFFICE VISIT (OUTPATIENT)
Dept: FAMILY MEDICINE | Facility: CLINIC | Age: 70
End: 2025-08-28
Payer: MEDICARE

## 2025-08-28 VITALS
DIASTOLIC BLOOD PRESSURE: 70 MMHG | SYSTOLIC BLOOD PRESSURE: 114 MMHG | BODY MASS INDEX: 28.26 KG/M2 | OXYGEN SATURATION: 98 % | HEIGHT: 69 IN | WEIGHT: 190.81 LBS | HEART RATE: 82 BPM

## 2025-08-28 DIAGNOSIS — E78.2 MIXED HYPERLIPIDEMIA: ICD-10-CM

## 2025-08-28 DIAGNOSIS — L98.499 ULCERATION LOCALIZED TO FINGERTIP: ICD-10-CM

## 2025-08-28 DIAGNOSIS — J84.111 CHRONIC INTERSTITIAL PNEUMONIA: ICD-10-CM

## 2025-08-28 DIAGNOSIS — Z09 HOSPITAL DISCHARGE FOLLOW-UP: Primary | ICD-10-CM

## 2025-08-28 DIAGNOSIS — Z95.5 S/P DRUG ELUTING CORONARY STENT PLACEMENT: ICD-10-CM

## 2025-08-28 DIAGNOSIS — J84.10 PULMONARY FIBROSIS: ICD-10-CM

## 2025-08-28 PROCEDURE — 99999 PR PBB SHADOW E&M-EST. PATIENT-LVL IV: CPT | Mod: PBBFAC,HCNC,, | Performed by: FAMILY MEDICINE

## 2025-08-28 RX ORDER — ALBUTEROL SULFATE 90 UG/1
2 INHALANT RESPIRATORY (INHALATION) EVERY 4 HOURS PRN
Qty: 18 G | Refills: 2 | Status: SHIPPED | OUTPATIENT
Start: 2025-08-28 | End: 2026-08-28

## 2025-08-28 RX ORDER — ATORVASTATIN CALCIUM 40 MG/1
40 TABLET, FILM COATED ORAL NIGHTLY
Qty: 90 TABLET | Refills: 0 | Status: SHIPPED | OUTPATIENT
Start: 2025-08-28

## 2025-08-28 RX ORDER — TRAMADOL HYDROCHLORIDE 50 MG/1
50 TABLET, FILM COATED ORAL EVERY 8 HOURS PRN
Qty: 30 TABLET | Refills: 1 | Status: SHIPPED | OUTPATIENT
Start: 2025-08-28

## 2025-08-29 ENCOUNTER — OFFICE VISIT (OUTPATIENT)
Dept: CARDIOLOGY | Facility: CLINIC | Age: 70
End: 2025-08-29
Payer: MEDICARE

## 2025-08-29 VITALS
DIASTOLIC BLOOD PRESSURE: 70 MMHG | HEART RATE: 77 BPM | WEIGHT: 191.25 LBS | SYSTOLIC BLOOD PRESSURE: 114 MMHG | HEIGHT: 69 IN | BODY MASS INDEX: 28.33 KG/M2

## 2025-08-29 DIAGNOSIS — I25.708 CORONARY ARTERY DISEASE OF BYPASS GRAFT OF NATIVE HEART WITH STABLE ANGINA PECTORIS: Primary | ICD-10-CM

## 2025-08-29 DIAGNOSIS — J43.2 CENTRILOBULAR EMPHYSEMA: ICD-10-CM

## 2025-08-29 DIAGNOSIS — Z95.1 HX OF CABG: ICD-10-CM

## 2025-08-29 DIAGNOSIS — E78.2 MIXED HYPERLIPIDEMIA: ICD-10-CM

## 2025-08-29 DIAGNOSIS — I10 ESSENTIAL HYPERTENSION: Chronic | ICD-10-CM

## 2025-08-29 PROCEDURE — 99999 PR PBB SHADOW E&M-EST. PATIENT-LVL III: CPT | Mod: PBBFAC,HCNC,,

## 2025-09-04 ENCOUNTER — OFFICE VISIT (OUTPATIENT)
Dept: PULMONOLOGY | Facility: CLINIC | Age: 70
End: 2025-09-04
Payer: MEDICARE

## 2025-09-04 VITALS
WEIGHT: 177.38 LBS | SYSTOLIC BLOOD PRESSURE: 116 MMHG | DIASTOLIC BLOOD PRESSURE: 70 MMHG | HEIGHT: 70 IN | HEART RATE: 67 BPM | BODY MASS INDEX: 25.39 KG/M2 | RESPIRATION RATE: 16 BRPM | OXYGEN SATURATION: 95 %

## 2025-09-04 DIAGNOSIS — J43.2 CENTRILOBULAR EMPHYSEMA: ICD-10-CM

## 2025-09-04 DIAGNOSIS — J96.01 ACUTE RESPIRATORY FAILURE WITH HYPOXIA: Primary | ICD-10-CM

## 2025-09-04 DIAGNOSIS — I50.32 DIASTOLIC DYSFUNCTION WITH CHRONIC HEART FAILURE: ICD-10-CM

## 2025-09-04 PROCEDURE — 99999 PR PBB SHADOW E&M-EST. PATIENT-LVL V: CPT | Mod: PBBFAC,HCNC,, | Performed by: STUDENT IN AN ORGANIZED HEALTH CARE EDUCATION/TRAINING PROGRAM

## 2025-09-04 RX ORDER — ALBUTEROL SULFATE 90 UG/1
2 INHALANT RESPIRATORY (INHALATION) EVERY 4 HOURS PRN
Qty: 18 G | Refills: 2 | Status: SHIPPED | OUTPATIENT
Start: 2025-09-04 | End: 2026-09-04

## (undated) DEVICE — SEE MEDLINE ITEM 157173

## (undated) DEVICE — SEE MEDLINE ITEM 157117

## (undated) DEVICE — NDL SAFETY 25G X 1.5 ECLIPSE

## (undated) DEVICE — UNDERGLOVES BIOGEL PI SIZE 7.5

## (undated) DEVICE — POSITIONER HEAD DONUT 9IN FOAM

## (undated) DEVICE — BIT DRILL AO 3.5X122MM

## (undated) DEVICE — GLOVE BIOGEL PI ORTHO PRO SZ7

## (undated) DEVICE — SOL 9P NACL IRR PIC IL

## (undated) DEVICE — ALCOHOL 70% ISOP RUBBING 4OZ

## (undated) DEVICE — BANDAGE ESMARK ELASTIC ST 4X9

## (undated) DEVICE — KIT SYR REUSABLE

## (undated) DEVICE — GUIDE DRILL AO T10 3.5X30MM

## (undated) DEVICE — STOCKINETTE TUBULAR 2PL 6 X 4

## (undated) DEVICE — SUT CTD VICRYL 0 UND BR SUT

## (undated) DEVICE — SEE MEDLINE ITEM 152522

## (undated) DEVICE — BANDAGE ELASTIC 3X5 VELCRO ST

## (undated) DEVICE — SUT VLOC BLU GS-22 SZ0 18IN

## (undated) DEVICE — GLOVE BIOGEL SZ 8 1/2

## (undated) DEVICE — GAUZE SPONGE PEANUT STRL

## (undated) DEVICE — CATH JR4 5FR

## (undated) DEVICE — CATH IMPULSE PIGTAIL 6F 110CM

## (undated) DEVICE — SYR 3CC LUER LOC

## (undated) DEVICE — TOWEL OR DISP STRL BLUE 4/PK

## (undated) DEVICE — SUT V-LOC 2-0 180

## (undated) DEVICE — SUPPORT ULNA NERVE PROTECTOR

## (undated) DEVICE — CATH NC EMERGE MR 2.75X12MM

## (undated) DEVICE — COVER TIP CURVED SCISSORS XI

## (undated) DEVICE — PACK HEART CATH BR

## (undated) DEVICE — GOWN POLY REINF BRTH SLV XL

## (undated) DEVICE — TOURNIQUET SB QC SP 24X4IN

## (undated) DEVICE — SPONGE GAUZE 16PLY 4X4

## (undated) DEVICE — SUT VICRYL 2-0 27 CT-1

## (undated) DEVICE — SEE MEDLINE ITEM 146308

## (undated) DEVICE — KIT INDIGO CATH RX ASP 140CM

## (undated) DEVICE — BANDAGE ELASTIC 2X5 VELCRO ST

## (undated) DEVICE — GUIDEWIRE EMERALD .035IN 260CM

## (undated) DEVICE — CATH DIAG IMPULSE 6FR FL4

## (undated) DEVICE — PACK BASIC SETUP SC BR

## (undated) DEVICE — CATH PIG145 INFINITI 5X110CM

## (undated) DEVICE — CATH IMPULSE 6FR MULTI-PAK

## (undated) DEVICE — GUIDE RUNWAY 6FR VODA 3.5

## (undated) DEVICE — GLOVE SURGICAL LATEX SZ 8

## (undated) DEVICE — KIT INTERNALBRACE HAND/WRIST
Type: IMPLANTABLE DEVICE | Site: THUMB | Status: NON-FUNCTIONAL
Removed: 2022-11-18

## (undated) DEVICE — SYR LUER LOCK STERILE 10ML

## (undated) DEVICE — GAUZE SPONGE 4X4 12PLY

## (undated) DEVICE — DRESSING AQUACEL AG 3.5X10IN

## (undated) DEVICE — COVER OVERHEAD SURG LT BLUE

## (undated) DEVICE — ELECTRODE REM PLYHSV RETURN 9

## (undated) DEVICE — PAD ABD 8X10 STERILE

## (undated) DEVICE — SUT VICRYL PLUS 3-0 PS2 18

## (undated) DEVICE — SUT COATED VICRYL 3-0 1X18

## (undated) DEVICE — PAD UNDERPAD 30X30

## (undated) DEVICE — TAPE CLOTH SOFT MEDIPORE 4IN

## (undated) DEVICE — PAD CAST SPECIALIST STRL 6

## (undated) DEVICE — ANGIOTOUCH KIT

## (undated) DEVICE — PAD CAST SPECIALIST STRL 3

## (undated) DEVICE — DRAPE MOBILE C-ARM

## (undated) DEVICE — SEE MEDLINE ITEM 157131

## (undated) DEVICE — HEADREST ROUND DISP FOAM 9IN

## (undated) DEVICE — SOL NS 1000CC

## (undated) DEVICE — CANISTER INDIGO ENGINE

## (undated) DEVICE — SCRUB DYNA-HEX LIQ 4% CHG 4OZ

## (undated) DEVICE — SEE MEDLINE ITEM 157187

## (undated) DEVICE — TUBING HEATED INSUFFLATOR

## (undated) DEVICE — DECANTER VIAL ASEPTIC TRANSFER

## (undated) DEVICE — BLADE SURG #15 CARBON STEEL

## (undated) DEVICE — Device

## (undated) DEVICE — DRAPE U SPLIT SHEET 54X76IN

## (undated) DEVICE — BAND TR COMP DEVICE REG 24CM

## (undated) DEVICE — SEE MEDLINE ITEM 157027

## (undated) DEVICE — BANDAGE MATRIX HK LOOP 4IN 5YD

## (undated) DEVICE — MANIFOLD 4 PORT

## (undated) DEVICE — GLOVE SURG BIOGEL LATEX SZ 7.5

## (undated) DEVICE — DRAPE THREE-QTR REINF 53X77IN

## (undated) DEVICE — SUT 4-0 ETHILON 18 PS-2

## (undated) DEVICE — TOURNIQUET SB QC DP 18X4IN

## (undated) DEVICE — SUT VICRYL+ 27 UR-6 VIOL

## (undated) DEVICE — BIT DRILL AO 2.6X135MM SCALED

## (undated) DEVICE — OMNIPAQUE 300MG 150ML VIAL

## (undated) DEVICE — DRAPE HAND STERILE

## (undated) DEVICE — BANDAGE MATRIX HK LOOP 6IN 5YD

## (undated) DEVICE — NDL PERCUTANEOUS 21G 7CM VASC

## (undated) DEVICE — PAD CAST SPECIALIST STRL 4

## (undated) DEVICE — INFLATOR ENCORE 26 BLLN INFL

## (undated) DEVICE — SOL PVP-I SCRUB 7.5% 4OZ

## (undated) DEVICE — DRAPE T EXTRM SURG 121X128X90

## (undated) DEVICE — BNDG COFLEX FOAM LF2 ST 4X5YD

## (undated) DEVICE — PAD CAST 2 IN X 4YDS STERILE

## (undated) DEVICE — BANDAGE ESMARK ELASTIC ST 6X9

## (undated) DEVICE — TOURNIQUET SB QC DP 34X4IN

## (undated) DEVICE — DRESSING XEROFORM FOIL PK 1X8

## (undated) DEVICE — PAD ABDOMINAL STERILE 8X10IN

## (undated) DEVICE — SYR 10CC LUER LOCK

## (undated) DEVICE — CLOSURE SKIN STERI STRIP 1/2X4

## (undated) DEVICE — APPLICATOR CHLORAPREP ORN 26ML

## (undated) DEVICE — GUIDE LAUNCHER 6FR EBU 3.5

## (undated) DEVICE — KIT MANIFOLD LOW PRESS TUBING

## (undated) DEVICE — GOWN NONREINF SET-IN SLV 2XL

## (undated) DEVICE — DRESSING XEROFORM NONADH 1X8IN

## (undated) DEVICE — SUT CTD VICRYL 2-0 UND SUTU

## (undated) DEVICE — GLOVE SURGICAL LATEX SZ 6.5

## (undated) DEVICE — IMMOBILIZER SHOULDER MEDIUM

## (undated) DEVICE — SUT CTD VICRYL 4-0 P-3 18IN

## (undated) DEVICE — DRAPE LAP TIBURON 77X122IN

## (undated) DEVICE — GUIDEWIRE WHOLEY HI TORQ 175CM

## (undated) DEVICE — SPONGE SURGIFOAM GELATIN SZ50

## (undated) DEVICE — SEE MEDLINE ITEM 152622

## (undated) DEVICE — PADDING CAST 4IN

## (undated) DEVICE — CAUTERY TIP 2 3/4

## (undated) DEVICE — GUIDEWIRE PILOT 50 300CM

## (undated) DEVICE — WIRE CHOICE PT X SUPP 014X300

## (undated) DEVICE — SUT STRATAFIX 2-0 30CM

## (undated) DEVICE — ALCOHOL 70% ANTISEPTIC ISO 4OZ

## (undated) DEVICE — CATH PRONTO LP ROUND 6FR 1.5MM

## (undated) DEVICE — KIT GLIDESHEATH SLEND 6FR 10CM

## (undated) DEVICE — SUT VICRYL 3-0 27 SH

## (undated) DEVICE — PACK ANGIOPLASTY ACCESS PLUS

## (undated) DEVICE — DRAPE C-ARMOR EQUIPMENT COVER

## (undated) DEVICE — GUIDE DRILL AO 2.6X30MM

## (undated) DEVICE — EVACUATOR KIT SMOKE PLUME AWAY

## (undated) DEVICE — DRAPE ARM DAVINCI XI

## (undated) DEVICE — STAPLER SKIN PROXIMATE WIDE

## (undated) DEVICE — SUT STRATAFIX 1 SPIRAL .5

## (undated) DEVICE — COVER LIGHT HANDLE 80/CA

## (undated) DEVICE — GUIDEWIRE FIELDER XT.014X300CM

## (undated) DEVICE — NDL PNEUMO INSUFFLATI 120MM

## (undated) DEVICE — SUT MONOCRYL 4.0 PS2 CP496G

## (undated) DEVICE — NDL ECLIPSE SAF REG 25GX1.5IN

## (undated) DEVICE — SPLINT FIBERGLASS PAD 3X15

## (undated) DEVICE — SUT VICRYL 4-0 ANTIBACT

## (undated) DEVICE — DRAPE ABDOMINAL TIBURON 14X11

## (undated) DEVICE — SPONGE COTTON TRAY 4X4IN

## (undated) DEVICE — GUIDEWIRE STD .035X180CM ANG

## (undated) DEVICE — DRAPE COLUMN DAVINCI XI

## (undated) DEVICE — SUT ETHILON 4-0 PS2 18 BLK

## (undated) DEVICE — KIT WING PAD POSITIONING

## (undated) DEVICE — CATH DIAG IMPULSE 6FR FR4

## (undated) DEVICE — KIT SITE-RITE NDL GUIDE 21G

## (undated) DEVICE — UNDERGLOVES BIOGEL PI SIZE 8

## (undated) DEVICE — DECANTER 6 VIAL

## (undated) DEVICE — COVER CAMERA OPERATING ROOM

## (undated) DEVICE — WIRE X-SUP CHOICE PT .014X182

## (undated) DEVICE — SEE MEDLINE ITEM 157216

## (undated) DEVICE — CLIPPER BLADE MOD 4406 (CAREF)

## (undated) DEVICE — SUT VICRYL 2-0 SH VCP317H

## (undated) DEVICE — UNDERGLOVES BIOGEL PI SIZE 8.5

## (undated) DEVICE — SOL NACL IRR 1000ML BTL

## (undated) DEVICE — PACK CATH LAB CUSTOM BR

## (undated) DEVICE — KIT ANTIFOG

## (undated) DEVICE — SEAL UNIVERSAL 5MM-8MM XI

## (undated) DEVICE — CATH JL4 5FR

## (undated) DEVICE — ADHESIVE DERMABOND ADVANCED

## (undated) DEVICE — OBTURATOR BLADELESS 8MM XI CLR

## (undated) DEVICE — SOL STRL WATER INJ 1000ML BG

## (undated) DEVICE — SUT NUR BR0 CLSR 8-18IN MO6